# Patient Record
Sex: FEMALE | Race: WHITE | NOT HISPANIC OR LATINO | Employment: OTHER | URBAN - NONMETROPOLITAN AREA
[De-identification: names, ages, dates, MRNs, and addresses within clinical notes are randomized per-mention and may not be internally consistent; named-entity substitution may affect disease eponyms.]

---

## 2022-09-08 ENCOUNTER — OFFICE VISIT (OUTPATIENT)
Dept: PSYCHIATRY | Facility: CLINIC | Age: 57
End: 2022-09-08

## 2022-09-08 VITALS
HEIGHT: 63 IN | BODY MASS INDEX: 28.53 KG/M2 | HEART RATE: 95 BPM | WEIGHT: 161 LBS | DIASTOLIC BLOOD PRESSURE: 70 MMHG | SYSTOLIC BLOOD PRESSURE: 102 MMHG

## 2022-09-08 DIAGNOSIS — F43.10 POST TRAUMATIC STRESS DISORDER (PTSD): ICD-10-CM

## 2022-09-08 DIAGNOSIS — F33.1 MODERATE EPISODE OF RECURRENT MAJOR DEPRESSIVE DISORDER: Primary | ICD-10-CM

## 2022-09-08 PROCEDURE — 90792 PSYCH DIAG EVAL W/MED SRVCS: CPT | Performed by: NURSE PRACTITIONER

## 2022-09-08 RX ORDER — CEPHALEXIN 250 MG/1
250 CAPSULE ORAL DAILY
COMMUNITY
Start: 2022-07-26 | End: 2023-02-02

## 2022-09-08 RX ORDER — FLUOXETINE 10 MG/1
10 CAPSULE ORAL DAILY
COMMUNITY
Start: 2022-07-14 | End: 2022-09-08 | Stop reason: SDUPTHER

## 2022-09-08 RX ORDER — OMEPRAZOLE 20 MG/1
20 CAPSULE, DELAYED RELEASE ORAL DAILY
COMMUNITY
Start: 2022-07-26 | End: 2023-02-15 | Stop reason: SDUPTHER

## 2022-09-08 RX ORDER — IBANDRONATE SODIUM 150 MG/1
TABLET, FILM COATED ORAL
COMMUNITY
Start: 2022-08-18

## 2022-09-08 RX ORDER — ESTRADIOL 0.1 MG/G
CREAM VAGINAL
COMMUNITY
Start: 2022-07-07 | End: 2023-01-02 | Stop reason: SDUPTHER

## 2022-09-08 RX ORDER — PHENTERMINE HYDROCHLORIDE 37.5 MG/1
37.5 TABLET ORAL DAILY
COMMUNITY
Start: 2022-08-18 | End: 2022-11-14

## 2022-09-08 RX ORDER — FLUOXETINE 10 MG/1
10 CAPSULE ORAL DAILY
Qty: 30 CAPSULE | Refills: 2 | Status: SHIPPED | OUTPATIENT
Start: 2022-09-08 | End: 2022-11-03 | Stop reason: SDUPTHER
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
## 2022-10-24 ENCOUNTER — OFFICE VISIT (OUTPATIENT)
Dept: PSYCHIATRY | Facility: CLINIC | Age: 57
End: 2022-10-24

## 2022-10-24 DIAGNOSIS — F33.1 MODERATE EPISODE OF RECURRENT MAJOR DEPRESSIVE DISORDER: Primary | ICD-10-CM

## 2022-10-24 DIAGNOSIS — F43.10 POST TRAUMATIC STRESS DISORDER (PTSD): ICD-10-CM

## 2022-10-24 PROCEDURE — 90837 PSYTX W PT 60 MINUTES: CPT | Performed by: SOCIAL WORKER
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
## 2022-10-31 ENCOUNTER — OFFICE VISIT (OUTPATIENT)
Dept: PSYCHIATRY | Facility: CLINIC | Age: 57
End: 2022-10-31

## 2022-10-31 DIAGNOSIS — F43.10 POST TRAUMATIC STRESS DISORDER (PTSD): ICD-10-CM

## 2022-10-31 DIAGNOSIS — F33.1 MODERATE EPISODE OF RECURRENT MAJOR DEPRESSIVE DISORDER: Primary | ICD-10-CM

## 2022-10-31 PROCEDURE — 90837 PSYTX W PT 60 MINUTES: CPT | Performed by: SOCIAL WORKER
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
## 2022-11-03 ENCOUNTER — OFFICE VISIT (OUTPATIENT)
Dept: PSYCHIATRY | Facility: CLINIC | Age: 57
End: 2022-11-03

## 2022-11-03 VITALS
WEIGHT: 158 LBS | BODY MASS INDEX: 28 KG/M2 | DIASTOLIC BLOOD PRESSURE: 64 MMHG | HEART RATE: 88 BPM | SYSTOLIC BLOOD PRESSURE: 116 MMHG | HEIGHT: 63 IN

## 2022-11-03 DIAGNOSIS — F41.1 GENERALIZED ANXIETY DISORDER: ICD-10-CM

## 2022-11-03 DIAGNOSIS — F43.10 POST TRAUMATIC STRESS DISORDER (PTSD): ICD-10-CM

## 2022-11-03 DIAGNOSIS — F33.1 MODERATE EPISODE OF RECURRENT MAJOR DEPRESSIVE DISORDER: Primary | ICD-10-CM

## 2022-11-03 PROCEDURE — 99213 OFFICE O/P EST LOW 20 MIN: CPT | Performed by: NURSE PRACTITIONER

## 2022-11-03 RX ORDER — FLUOXETINE 10 MG/1
10 CAPSULE ORAL DAILY
Qty: 90 CAPSULE | Refills: 0 | Status: SHIPPED | OUTPATIENT
Start: 2022-11-03 | End: 2023-02-02 | Stop reason: SDUPTHER

## 2022-11-03 RX ORDER — PHENTERMINE AND TOPIRAMATE 11.25; 69 MG/1; MG/1
CAPSULE, EXTENDED RELEASE ORAL
COMMUNITY
Start: 2022-11-03 | End: 2022-11-03

## 2022-11-03 NOTE — PROGRESS NOTES
Subjective   Zo Palma is a 57 y.o. female who presents today for follow up    Chief Complaint:  Depression     History of Present Illness:   History of Present Illness  Zo Palma presents today for medication management follow-up.  Reports that she is doing well overall with current medication regimen.  Feels that symptoms associated with both anxiety and depression are adequately controlled with medication at current dose.  Reports that symptoms associated with depression have improved, rates depression 3-4/10 with 10 being the worst.  Denies any symptoms associated with anxiety.  Reports that SI has continued to be present on a daily basis and has been for multiple years now, but says she is able to control these ideations.  She continues to travel with her support animal as agreed upon by past psychiatrist to help with controlling SI.  She feels that this agreement has continued to help with SI.  Says that she was able to get a roommate, this has provided some financial relief. Denies any current plans plans or having access to means for completion of suicide.  Denies any AV hallucinations PHQ-9 total score: 1, GURMEET- 7 total score: 2.icide attempt about 2 years ago.     Previous Psych Meds: Reports having tried multiple SSRIs and SNRIs along with Abilify, risperidone, Seroquel, Vraylar, Latuda, Lamictal, Trileptal and Depakote (caused liver failure).     The following portions of the patient's history were reviewed and updated as appropriate: allergies, current medications, past family history, past medical history, past social history, past surgical history and problem list.      Past Medical History:  Past Medical History:   Diagnosis Date   • Anxiety    • Depression    • Insomnia    • Migraines    • Peripheral neuropathy        Social History:  Social History     Socioeconomic History   • Marital status:    Tobacco Use   • Smoking status: Never   Vaping Use   • Vaping Use: Never used   Substance  "and Sexual Activity   • Alcohol use: Yes     Alcohol/week: 1.0 standard drink     Types: 1 Shots of liquor per week   • Drug use: Never   • Sexual activity: Defer       Family History:  History reviewed. No pertinent family history.    Past Surgical History:  Past Surgical History:   Procedure Laterality Date   • BREAST SURGERY     • GASTRIC SLEEVE LAPAROSCOPIC N/A    • HYSTERECTOMY     • WISDOM TOOTH EXTRACTION N/A        Problem List:  There is no problem list on file for this patient.      Allergy:   Allergies   Allergen Reactions   • Chlorzoxazone Unknown - High Severity and Swelling     Lorzone, muscle relaxant.   • Iodine Anaphylaxis     Topical makes her swell  Anaphylaxis with IV contrast (when she was ~ 9yrs old)   • Phenazopyridine Hcl Swelling and Anaphylaxis   • Pyridium [Phenazopyridine] Anaphylaxis   • Quinine Unknown - High Severity     Has malaria symptoms   • Valproic Acid Other (See Comments)     Liver failure  Liver failure  Liver failure; lupus like symptoms and liver failure   • Methocarbamol Other (See Comments)     Made her feel \"suicidal\" and gave her less control over her actions.   • Iodinated Diagnostic Agents Unknown - Low Severity   • Acetaminophen-Codeine Itching   • Diphenhydramine Anxiety     Pt has severe panic attack symptoms when given benadryl IV. Needs to take a benzodiazapine med concurrently when getting benadryl.         Current Medications:   Current Outpatient Medications   Medication Sig Dispense Refill   • cephalexin (KEFLEX) 250 MG capsule Take 250 mg by mouth Daily.     • estradiol (ESTRACE) 0.1 MG/GM vaginal cream      • FLUoxetine (PROzac) 10 MG capsule Take 1 capsule by mouth Daily for 90 days. 90 capsule 0   • ibandronate (BONIVA) 150 MG tablet TAKE 1 TABLET BY MOUTH 1 TIME MONTHLY     • omeprazole (priLOSEC) 20 MG capsule Take 20 mg by mouth Daily.     • phentermine (ADIPEX-P) 37.5 MG tablet Take 37.5 mg by mouth Daily.       No current facility-administered " "medications for this visit.       Review of Symptoms:    Review of Systems   Constitutional: Negative for activity change, appetite change, fatigue, unexpected weight gain and unexpected weight loss.   Eyes: Negative for visual disturbance.   Respiratory: Negative for shortness of breath.    Cardiovascular: Negative for chest pain.   Psychiatric/Behavioral: Positive for depressed mood and stress. Negative for sleep disturbance and suicidal ideas. The patient is nervous/anxious.      Physical Exam:   Physical Exam  Vitals reviewed.   Constitutional:       General: She is not in acute distress.     Appearance: Normal appearance.   Neurological:      Mental Status: She is alert.      Gait: Gait normal.     Vitals:   Blood pressure 116/64, pulse 88, height 160 cm (63\"), weight 71.7 kg (158 lb).    Mental Status Exam:   Hygiene:   good  Cooperation:  Cooperative  Eye Contact:  Good  Psychomotor Behavior:  Appropriate  Affect:  Full range  Mood: normal  Hopelessness: Denies  Speech:  Normal  Thought Process:  Goal directed and Linear  Thought Content:  Mood congruent  Suicidal:  None  Homicidal:  None  Hallucinations:  None  Delusion:  None  Memory:  Intact  Orientation:  Person, Place, Time and Situation  Reliability:  good  Insight:  Good  Judgement:  Good  Impulse Control:  Good    Lab Results:   No results found for any previous visit.       EKG Results:  No orders to display       Assessment & Plan   Problems Addressed this Visit    None  Visit Diagnoses     Moderate episode of recurrent major depressive disorder (HCC)    -  Primary    Relevant Medications    FLUoxetine (PROzac) 10 MG capsule    Post traumatic stress disorder (PTSD)        Relevant Medications    FLUoxetine (PROzac) 10 MG capsule    Generalized anxiety disorder        Relevant Medications    FLUoxetine (PROzac) 10 MG capsule      Diagnoses       Codes Comments    Moderate episode of recurrent major depressive disorder (HCC)    -  Primary ICD-10-CM: " F33.1  ICD-9-CM: 296.32     Post traumatic stress disorder (PTSD)     ICD-10-CM: F43.10  ICD-9-CM: 309.81     Generalized anxiety disorder     ICD-10-CM: F41.1  ICD-9-CM: 300.02           Visit Diagnoses:    ICD-10-CM ICD-9-CM   1. Moderate episode of recurrent major depressive disorder (HCC)  F33.1 296.32   2. Post traumatic stress disorder (PTSD)  F43.10 309.81   3. Generalized anxiety disorder  F41.1 300.02     -Reviewed previous available documentation and most recent available labs. YOSHI reviewed and is appropriate.    Discussed different coping mechanisms to better control depression.  Encouraged her to continue with counseling as scheduled.    -The benefits of a healthy diet and exercise were discussed with patient, especially the positive effects they have on mental health. Patient encouraged to consider lifestyle modification regarding diet and exercise patterns to maximize results of mental health treatment.     Discussed medication regimen and plan of care.  Ms. Palma verbalizes that she is doing well on current medication regimen and feels symptoms are well controlled.  Denies any adverse effects of medication.  Will continue Prozac as previously described.   -Continue Prozac 10 mg daily for depression and anxiety.    GOALS:  Short Term Goals: Patient will be compliant with medication, and patient will have no significant medication related side effects.  Patient will be engaged in psychotherapy as indicated.  Patient will report subjective improvement of symptoms.  Long term goals: To stabilize mood and treat/improve subjective symptoms, the patient will stay out of the hospital, the patient will be at an optimal level of functioning, and the patient will take all medications as prescribed.  The patient/guardian verbalized understanding and agreement with goals that were mutually set.    TREATMENT PLAN: Continue supportive psychotherapy efforts and medications as indicated for patient's diagnosis.   Pharmacological and Non-Pharmacological treatment options discussed during today's visit. Patient/Guardian acknowledged and verbally consented with current treatment plan and was educated on the importance of compliance with treatment and follow-up appointments.      MEDICATION ISSUES:  Discussed medication options and treatment plan of prescribed medication as well as the risks, benefits, any black box warnings, and side effects including potential falls, possible impaired driving, and metabolic adversities among others. Patient is agreeable to call the office with any worsening of symptoms or onset of side effects, or if any concerns or questions arise.  The contact information for the office is made available to the patient. Patient is agreeable to call 911 or go to the nearest ER should they begin having any SI/HI, or if any urgent concerns arise. No medication side effects or related complaints today.     MEDS ORDERED DURING VISIT:  New Medications Ordered This Visit   Medications   • FLUoxetine (PROzac) 10 MG capsule     Sig: Take 1 capsule by mouth Daily for 90 days.     Dispense:  90 capsule     Refill:  0       FOLLOW UP:  Return in about 3 months (around 2/3/2023) for Recheck.             This document has been electronically signed by TRACI Khan  November 8, 2022 16:48 EST    Please note that portions of this note were completed with a voice recognition program. Efforts were made to edit dictation, but occasionally words are mistranscribed.

## 2022-11-14 ENCOUNTER — OFFICE VISIT (OUTPATIENT)
Dept: FAMILY MEDICINE CLINIC | Facility: CLINIC | Age: 57
End: 2022-11-14

## 2022-11-14 VITALS
HEIGHT: 63 IN | SYSTOLIC BLOOD PRESSURE: 112 MMHG | HEART RATE: 67 BPM | OXYGEN SATURATION: 99 % | DIASTOLIC BLOOD PRESSURE: 68 MMHG | BODY MASS INDEX: 28.17 KG/M2 | RESPIRATION RATE: 16 BRPM | WEIGHT: 159 LBS | TEMPERATURE: 98 F

## 2022-11-14 DIAGNOSIS — E55.9 VITAMIN D DEFICIENCY: ICD-10-CM

## 2022-11-14 DIAGNOSIS — E11.9 TYPE 2 DIABETES MELLITUS WITHOUT COMPLICATION, WITHOUT LONG-TERM CURRENT USE OF INSULIN: Chronic | ICD-10-CM

## 2022-11-14 DIAGNOSIS — N95.9 POSTMENOPAUSAL SYMPTOMS: ICD-10-CM

## 2022-11-14 DIAGNOSIS — E88.81 METABOLIC SYNDROME: Primary | ICD-10-CM

## 2022-11-14 DIAGNOSIS — M81.0 POSTMENOPAUSAL OSTEOPOROSIS: ICD-10-CM

## 2022-11-14 DIAGNOSIS — K21.9 GASTROESOPHAGEAL REFLUX DISEASE WITHOUT ESOPHAGITIS: ICD-10-CM

## 2022-11-14 DIAGNOSIS — E78.2 MIXED HYPERLIPIDEMIA: ICD-10-CM

## 2022-11-14 DIAGNOSIS — E27.40 ADRENAL INSUFFICIENCY: Chronic | ICD-10-CM

## 2022-11-14 PROBLEM — F33.1 MODERATE EPISODE OF RECURRENT MAJOR DEPRESSIVE DISORDER: Status: ACTIVE | Noted: 2019-06-25

## 2022-11-14 PROBLEM — J34.89 SINUS DRAINAGE: Status: ACTIVE | Noted: 2021-02-19

## 2022-11-14 PROBLEM — M85.862 OSTEOPENIA OF LEFT LOWER LEG: Status: ACTIVE | Noted: 2018-02-06

## 2022-11-14 PROBLEM — M19.09: Status: ACTIVE | Noted: 2018-08-27

## 2022-11-14 PROBLEM — G47.10 HYPERSOMNIA: Status: ACTIVE | Noted: 2018-07-03

## 2022-11-14 PROBLEM — R25.2 CRAMP OF LIMB: Status: ACTIVE | Noted: 2022-11-14

## 2022-11-14 PROCEDURE — 99204 OFFICE O/P NEW MOD 45 MIN: CPT | Performed by: FAMILY MEDICINE

## 2022-11-14 RX ORDER — PHENTERMINE AND TOPIRAMATE 15; 92 MG/1; MG/1
CAPSULE, EXTENDED RELEASE ORAL
COMMUNITY

## 2022-11-14 NOTE — PROGRESS NOTES
New Patient History and Physical      Referring Physician: No ref. provider found    Chief Complaint:    Chief Complaint   Patient presents with   • Establish Care       History of Present Illness:   Patient is a 57-year-old female who is here today to establish with a new primary care physician.    She has numerous chronic comorbid conditions, including metabolic syndrome, adrenal insufficiency, diabetes mellitus, dyslipidemia, GERD, vitamin D deficiency and postmenopausal osteoporosis.    She denies any cyclical/persistent hypoglycemic episodes.  She denies fever, chills or night sweats.  Denies any chest pain, syncope, palpitations or vertigo.    Deals with frequent electrolyte disturbances as result of her adrenal insufficiency syndrome.  She would like to be referred to endocrinology.    She denies any aspiration/dysphagia.  Denies any BRB/BTS.  Continues an active lifestyle, formal exercise program as best able.  She denies any recent falls or injuries.  Maintains good urine output without hematuria reported.  She reports balanced dietary intake.    Subjective     Review of Systems     1. Constitutional: Negative for fever. Negative for chills, diaphoresis, fatigue and unexpected weight change.   2. HENT: No dysphagia; no changes to vision/hearing/smell/taste; no epistaxis  3. Eyes: Negative for redness and visual disturbance.   4. Respiratory: negative for shortness of breath. Negative for chest pain . Negative for cough and chest tightness.   5. Cardiovascular: Negative for chest pain and palpitations.   6. Gastrointestinal: Negative for abdominal distention, abdominal pain and blood in stool.   7. Endocrine: Negative for cold intolerance and heat intolerance.   8. Genitourinary: Negative for difficulty urinating, dysuria and frequency.   9. Musculoskeletal: Negative for arthralgias, back pain and myalgias.   10. Skin: Negative for color change, rash and wound.   11. Neurological: Negative for  syncope, weakness and headaches.   12. Hematological: Negative for adenopathy. Does not bruise/bleed easily.   13. Psychiatric/Behavioral: Negative for confusion. The patient is not nervous/anxious.     The following portions of the patient's history were reviewed and updated as appropriate: allergies, current medications, past family history, past medical history, past social history, past surgical history and problem list.    Past Medical History:   Past Medical History:   Diagnosis Date   • Anxiety    • Depression    • Insomnia    • Migraines    • Obesity    • Peripheral neuropathy    • Seizures (HCC) 2001    Conversion Disorder       Past Surgical History:  Past Surgical History:   Procedure Laterality Date   • BARIATRIC SURGERY  2008    Gastric sleeve   • BREAST SURGERY     • CHOLECYSTECTOMY  2010   • COLONOSCOPY     • FRACTURE SURGERY      Left wrist   • GASTRIC SLEEVE LAPAROSCOPIC N/A    • HYSTERECTOMY     • SINUS SURGERY  2021   • WISDOM TOOTH EXTRACTION N/A        Family History: family history includes Cancer in her brother, father, and mother; Learning disabilities in her brother and father; Other in her brother, father, and paternal grandfather.    Social History:  reports that she has never smoked. She has never been exposed to tobacco smoke. She does not have any smokeless tobacco history on file. She reports current alcohol use of about 1.0 standard drink per week. She reports that she does not use drugs.    Medications:    Current Outpatient Medications:   •  cephalexin (KEFLEX) 250 MG capsule, Take 250 mg by mouth Daily., Disp: , Rfl:   •  estradiol (ESTRACE) 0.1 MG/GM vaginal cream, , Disp: , Rfl:   •  FLUoxetine (PROzac) 10 MG capsule, Take 1 capsule by mouth Daily for 90 days., Disp: 90 capsule, Rfl: 0  •  ibandronate (BONIVA) 150 MG tablet, TAKE 1 TABLET BY MOUTH 1 TIME MONTHLY, Disp: , Rfl:   •  omeprazole (priLOSEC) 20 MG capsule, Take 20 mg by mouth Daily., Disp: , Rfl:   •   "Phentermine-Topiramate (Qsymia) 15-92 MG capsule sustained-release 24 hr, Take  by mouth., Disp: , Rfl:     Allergies:  Allergies   Allergen Reactions   • Chlorzoxazone Unknown - High Severity and Swelling     Lorzone, muscle relaxant.   • Iodine Anaphylaxis     Topical makes her swell  Anaphylaxis with IV contrast (when she was ~ 9yrs old)   • Phenazopyridine Hcl Swelling and Anaphylaxis   • Pyridium [Phenazopyridine] Anaphylaxis   • Quinine Unknown - High Severity     Has malaria symptoms   • Valproic Acid Other (See Comments)     Liver failure  Liver failure  Liver failure; lupus like symptoms and liver failure   • Methocarbamol Other (See Comments)     Made her feel \"suicidal\" and gave her less control over her actions.   • Iodinated Diagnostic Agents Unknown - Low Severity   • Acetaminophen-Codeine Itching   • Diphenhydramine Anxiety     Pt has severe panic attack symptoms when given benadryl IV. Needs to take a benzodiazapine med concurrently when getting benadryl.        Objective     Physical Exam:  Vital Signs: /68   Pulse 67   Temp 98 °F (36.7 °C)   Resp 16   Ht 160 cm (63\")   Wt 72.1 kg (159 lb)   SpO2 99%   BMI 28.17 kg/m²    BMI is >= 25 and <30. (Overweight) The following options were offered after discussion;: exercise counseling/recommendations and nutrition counseling/recommendations    General Appearance: alert, oriented x 3, no acute distress.  Pleasant and interactive during questioning/examination.  Well-nourished and developed female.  Skin: warm and dry.  Without jaundice.  She has a generalized dark complexion.  HEENT: Atraumatic.  pupils round and reactive to light and accommodation, oral mucosa pink and moist.  Nares patent without epistaxis.  External auditory canals are patent tympanic membranes intact.  Neck: supple, no JVD, trachea midline.  No thyromegaly; post-surgical scarring noted to anterior neck.  Lungs: CTA, unlabored breathing effort.  Heart: RRR, normal S1 and S2, " no S3, no rub.  Abdomen: soft, non-tender, no palpable bladder, present bowel sounds to auscultation ×4.  No guarding or rigidity.  Postsurgical scarring noted.  Extremities: no clubbing, cyanosis or edema.  Good range of motion actively and passively.  Symmetric muscle strength and development  Neuro: normal speech and mental status.  Cranial nerves II through XII intact.  No anosmia. DTR 2+; proprioception intact.  No focal motor/sensory deficits.    Advance Care Planning   ACP discussion was held with the patient during this visit. Patient does not have an advance directive, information provided.       Assessment / Plan     Assessment/Plan:   Diagnoses and all orders for this visit:    1. Metabolic syndrome (Primary)  -     Ambulatory Referral to Endocrinology  -     Cortisol  -     Hemoglobin A1c    2. Vitamin D deficiency    3. Gastroesophageal reflux disease without esophagitis    4. Postmenopausal symptoms    5. Postmenopausal osteoporosis    6. Adrenal insufficiency (HCC)  -     Ambulatory Referral to Endocrinology  -     Cortisol    7. Type 2 diabetes mellitus without complication, without long-term current use of insulin (HCC)  -     Ambulatory Referral to Endocrinology  -     Hemoglobin A1c    8. Mixed hyperlipidemia  -     Lipid Panel      Referral has been placed to establish with endocrinology.  Her hemoglobin A1c demonstrates excellent blood glucose control, although is slightly above a traditional normal range.  I have stressed the importance of healthy dietary choices, as well as the need to avoid any prolonged fasting periods.  Maintain appropriate hydration status.    Surveillance labs today including hemoglobin A1c, lipid panel and cortisol level.    Vital signs demonstrate hemodynamic stability, blood pressure is at goal.    Continue bisphosphonate therapy and treatment of her postmenopausal osteoporosis.  Continue weightbearing activities, including formal exercise program.    Continue current  mood stabilizer treatment regimen.  Discussed need for stress/anxiety reducing techniques such as prayer/meditation/breathing and counting exercises and avoidance of stress producing environments/situations; will follow clinically.    Continue PPI therapy, as well as dietary/lifestyle modifications to aid in symptom management of her chronic GERD.      Discussion Summary:    I spent 45 minutes caring for Zo on this date of service. This time includes time spent by me in the following activities:preparing for the visit, reviewing tests, performing a medically appropriate examination and/or evaluation , counseling and educating the patient/family/caregiver, ordering medications, tests, or procedures, referring and communicating with other health care professionals , documenting information in the medical record and care coordination    Discussed plan of care in detail with pt today; pt verb understanding and agrees.  Follow up:  No follow-ups on file.     There are no Patient Instructions on file for this visit.    Evan Rivas DO  11/16/22  16:08 EST

## 2022-11-15 LAB
CHOLEST SERPL-MCNC: 218 MG/DL (ref 0–200)
CORTIS SERPL-MCNC: 7.5 UG/DL
HBA1C MFR BLD: 5.7 % (ref 4.8–5.6)
HDLC SERPL-MCNC: 69 MG/DL (ref 40–60)
LDLC SERPL CALC-MCNC: 124 MG/DL (ref 0–100)
TRIGL SERPL-MCNC: 144 MG/DL (ref 0–150)
VLDLC SERPL CALC-MCNC: 25 MG/DL (ref 5–40)

## 2022-11-29 ENCOUNTER — OFFICE VISIT (OUTPATIENT)
Dept: PSYCHIATRY | Facility: CLINIC | Age: 57
End: 2022-11-29

## 2022-11-29 DIAGNOSIS — F33.1 MODERATE EPISODE OF RECURRENT MAJOR DEPRESSIVE DISORDER: Primary | ICD-10-CM

## 2022-11-29 DIAGNOSIS — F43.10 POST TRAUMATIC STRESS DISORDER (PTSD): ICD-10-CM

## 2022-11-29 DIAGNOSIS — F41.1 GENERALIZED ANXIETY DISORDER: ICD-10-CM

## 2022-11-29 PROCEDURE — 90834 PSYTX W PT 45 MINUTES: CPT | Performed by: SOCIAL WORKER

## 2022-11-29 NOTE — PROGRESS NOTES
"Date: 11/29/2022   Time In: 1435  Time Out: 1516    PROGRESS NOTE  Data:  Zo Palma is a 57 y.o. female who presents today for individual therapy session at [facility].     ICD-10-CM ICD-9-CM   1. Moderate episode of recurrent major depressive disorder (HCC)  F33.1 296.32   2. Post traumatic stress disorder (PTSD)  F43.10 309.81   3. Generalized anxiety disorder  F41.1 300.02     Patient reports experiencing seasonal affective disorder. She discussed being triggered by overcast days that lead to negative thoughts and suicidal ideations. She reports having suicidal ideations two weeks ago and said she was making plans to complete suicide. She discussed having thoughts of \"No end in sight, things aren't going to get better\". She tells me she was able to call a friend to help with this. She reports believing that suicide is an option. She discussed a family trauma and contemplation about visiting a family member. She discussed her coping skills for these overcast days will be Using a SAD light, diffuser oils, and her online blog. She reports she will be taking a trip for a few weeks and can practice these coping skills while she is gone. Patient denies suicidal ideations today.       Clinical Maneuvering/Intervention:    (Scales based on 0 - 10 with 10 being the worst)  Depression:   na Anxiety:  na       Assisted patient in processing above session content; acknowledged and normalized patient’s thoughts, feelings, and concerns.  Assisted patient in processing her thoughts and feelings regarding the family trauma. Reviewed negative thoughts and challenged irrational thoughts about weather. Reviewed boundaries and discussed how they are important.     Allowed patient to freely discuss issues without interruption or judgment. Provided safe, confidential environment to facilitate the development of positive therapeutic relationship and encourage open, honest communication. Assisted patient in " identifying risk factors which would indicate the need for higher level of care including thoughts to harm self or others and/or self-harming behavior and encouraged patient to contact this office, call 911, or present to the nearest emergency room should any of these events occur. Discussed crisis intervention services and means to access. Patient adamantly and convincingly denies current suicidal or homicidal ideation or perceptual disturbance.    Assessment   Patient appears to maintain relative stability as compared to their baseline.  However, patient continues to struggle with mood and anxiety which continues to cause impairment in important areas of functioning.  As a result, they can be reasonably expected to continue to benefit from treatment and would likely be at increased risk for decompensation otherwise.    Mental Status Exam:   Hygiene:   good  Cooperation:  Cooperative  Eye Contact:  Good  Psychomotor Behavior:  Appropriate  Affect:  Appropriate  Mood: depressed and anxious  Speech:  Normal  Thought Process:  Goal directed  Thought Content:  Mood congruent  Suicidal:  None  Homicidal:  None  Hallucinations:  None  Delusion:  None  Memory:  Intact  Orientation:  Person, Place, Time and Situation  Reliability:  good  Insight:  Fair  Judgement:  Fair  Impulse Control:  Good  Physical/Medical Issues:  Yes seizures     Patient's Support Network Includes:  daughter and friends    Functional Status: Moderate impairment     Progress toward goal: Not at goal    Prognosis: Good with Ongoing Treatment          Plan     Patient will continue in individual outpatient therapy with focus on improved functioning and coping skills, maintaining stability, and avoiding decompensation and the need for higher level of care.    Patient will adhere to medication regimen as prescribed and report any side effects. Patient will contact this office, call 911 or present to the nearest emergency room should suicidal or homicidal  ideations occur. Provide Cognitive Behavioral Therapy and Solution Focused Therapy to improve functioning, maintain stability, and avoid decompensation and the need for higher level of care.     Return in about Return in about 4 weeks (around 12/27/2022). or earlier if symptoms worsen or fail to improve.            This document has been electronically signed by Mely Tate LCSW  November 29, 2022 14:36 EST      Part of this note may be an electronic transcription/translation of spoken language to printed text using the Dragon Dictation System.

## 2023-01-03 ENCOUNTER — TELEPHONE (OUTPATIENT)
Dept: FAMILY MEDICINE CLINIC | Facility: CLINIC | Age: 58
End: 2023-01-03
Payer: MEDICARE

## 2023-01-03 RX ORDER — ESTRADIOL 0.1 MG/G
2 CREAM VAGINAL DAILY
Qty: 60 G | Refills: 2 | Status: SHIPPED | OUTPATIENT
Start: 2023-01-03

## 2023-01-03 NOTE — TELEPHONE ENCOUNTER
MATTHEW SEAMAN/ALIVIA CALLED TO CONFIRM THE DIRECTIONS FOR THE ESTRADIOL CREAM. SHE SAYS THE NORMAL DOSAGE IS USUALLY 1 GRAM, 2-3 X PER WEEK. I SPOKE WITH DR JEFFRIES AND HE SAID IT'S OK TO BE FILLED PER THE \"NORMAL\" DOSAGE.

## 2023-01-25 ENCOUNTER — OFFICE VISIT (OUTPATIENT)
Dept: PSYCHIATRY | Facility: CLINIC | Age: 58
End: 2023-01-25
Payer: MEDICARE

## 2023-01-25 DIAGNOSIS — F41.1 GENERALIZED ANXIETY DISORDER: ICD-10-CM

## 2023-01-25 DIAGNOSIS — F33.1 MODERATE EPISODE OF RECURRENT MAJOR DEPRESSIVE DISORDER: Primary | ICD-10-CM

## 2023-01-25 DIAGNOSIS — F43.10 PTSD (POST-TRAUMATIC STRESS DISORDER): ICD-10-CM

## 2023-01-25 PROCEDURE — 90834 PSYTX W PT 45 MINUTES: CPT | Performed by: SOCIAL WORKER

## 2023-01-25 NOTE — PROGRESS NOTES
"Date: 01/25/2023   Time In: 1042  Time Out: 1123    PROGRESS NOTE  Data:  Zo Palma is a 57 y.o. female who presents today for individual therapy session at [facility].     ICD-10-CM ICD-9-CM   1. Moderate episode of recurrent major depressive disorder (HCC)  F33.1 296.32   2. PTSD (post-traumatic stress disorder)  F43.10 309.81   3. Generalized anxiety disorder  F41.1 300.02     Patient reports feeling \"down in the dumps\". She reports feeling lazy at times if she is not doing anything productive. She reports this could be from childhood.  She reports problems with sleep because of friends and work obligations. She tells me she is sleeping 2-6 hours per night and identified needing 13 hours per night to feel rested. She reports difficulty with showering and bathing and reports she will be able to do this tonight. She discussed a job opportunity that will help reduce stressors.       Clinical Maneuvering/Intervention:    Assisted patient in processing above session content; acknowledged and normalized patient’s thoughts, feelings, and concerns. Discussed triggers associated with patient's depression.  Also discussed coping skills for patient to implement such as spending time outdoors when possible, completing activities of daily living and using social supports.    Allowed patient to freely discuss issues without interruption or judgment. Provided safe, confidential environment to facilitate the development of positive therapeutic relationship and encourage open, honest communication. Assisted patient in identifying risk factors which would indicate the need for higher level of care including thoughts to harm self or others and/or self-harming behavior and encouraged patient to contact this office, call 911, or present to the nearest emergency room should any of these events occur. Discussed crisis intervention services and means to access. Patient adamantly and convincingly denies current " suicidal or homicidal ideation or perceptual disturbance.    Assessment   Patient appears to maintain relative stability as compared to their baseline.  However, patient continues to struggle with depression which continues to cause impairment in important areas of functioning.  As a result, they can be reasonably expected to continue to benefit from treatment and would likely be at increased risk for decompensation otherwise.    Mental Status Exam:   Hygiene:   fair  Cooperation:  Cooperative  Eye Contact:  Good  Psychomotor Behavior:  Appropriate  Affect:  Appropriate  Mood: depressed  Speech:  Normal  Thought Process:  Goal directed  Thought Content:  Mood congruent  Suicidal:  None  Homicidal:  None  Hallucinations:  None  Delusion:  None  Memory:  Intact  Orientation:  Person, Place, Time and Situation  Reliability:  good  Insight:  Fair  Judgement:  Fair  Impulse Control:  Good  Physical/Medical Issues:  Yes seizures     Patient's Support Network Includes:  daughter and friends    Functional Status: Moderate impairment     Progress toward goal: Not at goal    Prognosis: Good with Ongoing Treatment          Plan     Patient will continue in individual outpatient therapy with focus on improved functioning and coping skills, maintaining stability, and avoiding decompensation and the need for higher level of care.    Patient will adhere to medication regimen as prescribed and report any side effects. Patient will contact this office, call 911 or present to the nearest emergency room should suicidal or homicidal ideations occur. Provide Cognitive Behavioral Therapy and Solution Focused Therapy to improve functioning, maintain stability, and avoid decompensation and the need for higher level of care.     Return in about Return in about 2 weeks (around 2/8/2023). or earlier if symptoms worsen or fail to improve.            This document has been electronically signed by Mely Tate LCSW  January 25, 2023 10:41  EST      Part of this note may be an electronic transcription/translation of spoken language to printed text using the Dragon Dictation System.

## 2023-02-02 ENCOUNTER — OFFICE VISIT (OUTPATIENT)
Dept: PSYCHIATRY | Facility: CLINIC | Age: 58
End: 2023-02-02
Payer: MEDICARE

## 2023-02-02 VITALS
SYSTOLIC BLOOD PRESSURE: 128 MMHG | HEART RATE: 76 BPM | WEIGHT: 167 LBS | BODY MASS INDEX: 29.59 KG/M2 | DIASTOLIC BLOOD PRESSURE: 84 MMHG | HEIGHT: 63 IN

## 2023-02-02 DIAGNOSIS — F33.1 MODERATE EPISODE OF RECURRENT MAJOR DEPRESSIVE DISORDER: ICD-10-CM

## 2023-02-02 DIAGNOSIS — F43.10 PTSD (POST-TRAUMATIC STRESS DISORDER): ICD-10-CM

## 2023-02-02 DIAGNOSIS — F41.1 GENERALIZED ANXIETY DISORDER: Primary | ICD-10-CM

## 2023-02-02 PROCEDURE — 99214 OFFICE O/P EST MOD 30 MIN: CPT | Performed by: NURSE PRACTITIONER

## 2023-02-02 RX ORDER — FLUOXETINE 10 MG/1
20 CAPSULE ORAL DAILY
Qty: 180 CAPSULE | Refills: 0 | Status: SHIPPED | OUTPATIENT
Start: 2023-02-02

## 2023-02-02 NOTE — PROGRESS NOTES
Subjective   Zo Palma is a 58 y.o. female who presents today for follow up    Chief Complaint:  Depression and anxiety    History of Present Illness:   History of Present Illness  Zo Palma presents today for medication management follow-up.  She is accompanied to the visit today by her NOE dog. Reports that she has been doing well overall.  She does say that a friend from [place] recently moved in with her and she plans to be his caregiver.  Says that he has history of DID, with a total of 34 personalities.  She says that one of his personalities is anorexic, so he has lost a significant amount of weight and is down to 134 pounds.  Says that she feels confident that caring for him will not result in deterioration of her mental health or wellbeing.  Reports that her mood has been well stabilized.  Continues to take Prozac, but does admit to taking an extra capsule on occasion when feeling overwhelmed, anxious or depressed.  Admits that she only does this for short time periods as she would run out of medication too soon.  Says that [place] opens next week and feels that her mood will improve tremendously as she always notices a significant improvement in mood when she is around other people with like interests.  She does admit that her mood is often low/down at the end of [place] when people leave.  Says that she continues to travel with her dog as discussed in past with her previous psychiatrist and gave her word that she would always keep a support animal present.  Verbalizes that having a service animal with her helps with the frequent SI, denies any current plans plans or having access to means for completion of suicide.  Denies any history of auditory or visual hallucinations.  Reports that she is sleeping well.  Reports appetite is good, but admits to overeating and compulsive eating at times.  Currently on Qsymia as prescribed by PCP.  PHQ-9 total score: 5, GURMEET-7 total score: 3.    Previous Psych  Meds: Reports having tried multiple SSRIs and SNRIs along with Abilify, risperidone, Seroquel, Vraylar, Latuda, Lamictal, Trileptal and Depakote (caused liver failure).     The following portions of the patient's history were reviewed and updated as appropriate: allergies, current medications, past family history, past medical history, past social history, past surgical history and problem list.      Past Medical History:  Past Medical History:   Diagnosis Date   • Anxiety    • Depression    • Insomnia    • Migraines    • Obesity    • Peripheral neuropathy    • Seizures (HCC) 2001    Conversion Disorder       Social History:  Social History     Socioeconomic History   • Marital status:    Tobacco Use   • Smoking status: Never     Passive exposure: Never   • Smokeless tobacco: Never   Vaping Use   • Vaping Use: Never used   Substance and Sexual Activity   • Alcohol use: Yes     Alcohol/week: 1.0 standard drink     Types: 1 Drinks containing 0.5 oz of alcohol per week     Comment: Occasionally   • Drug use: Never   • Sexual activity: Defer     Partners: Male     Birth control/protection: Post-menopausal, Hysterectomy       Family History:  Family History   Problem Relation Age of Onset   • Cancer Mother         Brain, suspected uterine   • Cancer Father         Prostate   • Learning disabilities Father         Dyslexia   • Other Father         Early onset alzheimer   • Other Paternal Grandfather         Early onset alzheimer   • Cancer Brother         Prostate, bone   • Learning disabilities Brother         Dyslexia   • Other Brother         Alzheimer       Past Surgical History:  Past Surgical History:   Procedure Laterality Date   • BARIATRIC SURGERY  2008    Gastric sleeve   • BREAST SURGERY     • CHOLECYSTECTOMY  2010   • COLONOSCOPY     • FRACTURE SURGERY      Left wrist   • GASTRIC SLEEVE LAPAROSCOPIC N/A    • HYSTERECTOMY     • SINUS SURGERY  2021   • WISDOM TOOTH EXTRACTION N/A        Problem  "List:  Patient Active Problem List   Diagnosis   • Anxiety   • Bursitis of hip   • Cervical spondylosis with radiculopathy   • Chronic migraine without aura without status migrainosus, not intractable   • Conversion disorder with attacks or seizures, persistent, with psychological stressor   • Cramp of limb   • Prediabetes   • Diverticulosis of colon   • Elevated liver enzymes   • Eustachian tube dysfunction   • Foot drop   • Gastroesophageal reflux disease without esophagitis   • Hereditary and idiopathic peripheral neuropathy   • Hypersomnia   • Internal hemorrhoids   • Iron deficiency   • Lumbar radiculopathy, chronic   • Malaise and fatigue   • Metabolic syndrome   • Medicare annual wellness visit, subsequent   • Encounter for neuropsychological testing   • Mixed hyperlipidemia   • Moderate episode of recurrent major depressive disorder (HCC)   • Obstructive sleep apnea   • Osteoarthritis of right temporomandibular joint   • Osteopenia of left lower leg   • Panic disorder   • PTSD (post-traumatic stress disorder)   • RLS (restless legs syndrome)   • Primary insomnia   • Sinus drainage   • Tinnitus   • Urge incontinence   • Urinary urgency   • Vitamin D deficiency   • Postmenopausal symptoms   • Postmenopausal osteoporosis       Allergy:   Allergies   Allergen Reactions   • Chlorzoxazone Unknown - High Severity and Swelling     Lorzone, muscle relaxant.   • Iodine Anaphylaxis     Topical makes her swell  Anaphylaxis with IV contrast (when she was ~ 9yrs old)   • Phenazopyridine Hcl Swelling and Anaphylaxis   • Pyridium [Phenazopyridine] Anaphylaxis   • Quinine Unknown - High Severity     Has malaria symptoms   • Valproic Acid Other (See Comments)     Liver failure  Liver failure  Liver failure; lupus like symptoms and liver failure   • Methocarbamol Other (See Comments)     Made her feel \"suicidal\" and gave her less control over her actions.   • Iodinated Contrast Media Unknown - Low Severity   • " Acetaminophen-Codeine Itching   • Diphenhydramine Anxiety     Pt has severe panic attack symptoms when given benadryl IV. Needs to take a benzodiazapine med concurrently when getting benadryl.         Current Medications:   Current Outpatient Medications   Medication Sig Dispense Refill   • estradiol (ESTRACE) 0.1 MG/GM vaginal cream Insert 2 g into the vagina Daily. (Patient taking differently: Insert 2 g into the vagina 3 (Three) Times a Week.) 60 g 2   • FLUoxetine (PROzac) 10 MG capsule Take 2 capsules by mouth Daily. 180 capsule 0   • ibandronate (BONIVA) 150 MG tablet TAKE 1 TABLET BY MOUTH 1 TIME MONTHLY     • omeprazole (priLOSEC) 20 MG capsule Take 20 mg by mouth Daily.     • Phentermine-Topiramate (Qsymia) 15-92 MG capsule sustained-release 24 hr Take  by mouth.     • Liraglutide (Victoza) 18 MG/3ML solution pen-injector injection Inject 0.6 mg under the skin into the appropriate area as directed Daily. 3 mL 3   • NON FORMULARY Calcium 600mg-D3 120mcg Daily  D-Mannose 1000mg BID  Rhodiola Rosea 1000mg Daily  Taurine 1500mg 3 caps daily  Mag 500mg daily  Terra Cone Daily  Adrenal Support 2 caps daily  Chasteberry 300mg daily  D3 125 mcg daily  Dotorra Terrazyme 2 caps daily  Dotorra onguard daily  Pantethina 900mg daily  Dotorra Yarrow/pom 2 caps daily  Dotorra Sereneity daily  Doterra Alpha CRS 2 caps daily  Doterra Microplex 2 caps daily  Doterra EO Anthony 2 cap  Doterra MetaPWR 1 cap BID   Potassium 99mg daily       No current facility-administered medications for this visit.       Review of Symptoms:    Review of Systems   Constitutional: Negative for activity change, appetite change, fatigue, unexpected weight gain and unexpected weight loss.   Eyes: Negative for visual disturbance.   Respiratory: Negative for shortness of breath.    Cardiovascular: Negative for chest pain.   Psychiatric/Behavioral: Positive for depressed mood and stress. Negative for sleep disturbance and suicidal ideas. The patient is  "nervous/anxious.      Physical Exam:   Physical Exam  Vitals reviewed.   Constitutional:       General: She is not in acute distress.     Appearance: Normal appearance.   Neurological:      Mental Status: She is alert.      Gait: Gait normal.     Vitals:   Blood pressure 128/84, pulse 76, height 160 cm (63\"), weight 75.8 kg (167 lb).    Mental Status Exam:   Hygiene:   good  Cooperation:  Cooperative  Eye Contact:  Good  Psychomotor Behavior:  Appropriate  Affect:  Full range  Mood: normal  Hopelessness: Denies  Speech:  Normal  Thought Process:  Goal directed and Linear  Thought Content:  Mood congruent  Suicidal:  None  Homicidal:  None  Hallucinations:  None  Delusion:  None  Memory:  Intact  Orientation:  Person, Place, Time and Situation  Reliability:  good  Insight:  Good  Judgement:  Good  Impulse Control:  Good    Lab Results:   Office Visit on 11/14/2022   Component Date Value Ref Range Status   • Cortisol 11/14/2022 7.5  ug/dL Final    Comment:                         Cortisol AM         6.2 - 19.4                          Cortisol PM         2.3 - 11.9     • Hemoglobin A1C 11/14/2022 5.70 (H)  4.80 - 5.60 % Final    Comment: Hemoglobin A1C Ranges:  Increased Risk for Diabetes  5.7% to 6.4%  Diabetes                     >= 6.5%  Diabetic Goal                < 7.0%     • Total Cholesterol 11/14/2022 218 (H)  0 - 200 mg/dL Final    Comment: Cholesterol Reference Ranges  (U.S. Department of Health and Human Services ATP III  Classifications)  Desirable          <200 mg/dL  Borderline High    200-239 mg/dL  High Risk          >240 mg/dL  Triglyceride Reference Ranges  (U.S. Department of Health and Human Services ATP III  Classifications)  Normal           <150 mg/dL  Borderline High  150-199 mg/dL  High             200-499 mg/dL  Very High        >500 mg/dL  HDL Reference Ranges  (U.S. Department of Health and Human Services ATP III  Classifications)  Low     <40 mg/dl (major risk factor for CHD)  High    >60 " mg/dl ('negative' risk factor for CHD)  LDL Reference Ranges  (U.S. Department of Health and Human Services ATP III  Classifications)  Optimal          <100 mg/dL  Near Optimal     100-129 mg/dL  Borderline High  130-159 mg/dL  High             160-189 mg/dL  Very High        >189 mg/dL     • Triglycerides 11/14/2022 144  0 - 150 mg/dL Final   • HDL Cholesterol 11/14/2022 69 (H)  40 - 60 mg/dL Final   • VLDL Cholesterol Armaan 11/14/2022 25  5 - 40 mg/dL Final   • LDL Chol Calc (NIH) 11/14/2022 124 (H)  0 - 100 mg/dL Final       EKG Results:  No orders to display       Assessment & Plan   Problems Addressed this Visit        Mental Health    Moderate episode of recurrent major depressive disorder (HCC)    Relevant Medications    FLUoxetine (PROzac) 10 MG capsule    PTSD (post-traumatic stress disorder)    Relevant Medications    FLUoxetine (PROzac) 10 MG capsule   Other Visit Diagnoses     Generalized anxiety disorder    -  Primary    Relevant Medications    FLUoxetine (PROzac) 10 MG capsule      Diagnoses       Codes Comments    Generalized anxiety disorder    -  Primary ICD-10-CM: F41.1  ICD-9-CM: 300.02     Moderate episode of recurrent major depressive disorder (HCC)     ICD-10-CM: F33.1  ICD-9-CM: 296.32     PTSD (post-traumatic stress disorder)     ICD-10-CM: F43.10  ICD-9-CM: 309.81           Visit Diagnoses:    ICD-10-CM ICD-9-CM   1. Generalized anxiety disorder  F41.1 300.02   2. Moderate episode of recurrent major depressive disorder (HCC)  F33.1 296.32   3. PTSD (post-traumatic stress disorder)  F43.10 309.81     -Reviewed previous available documentation and most recent available labs. YOSHI reviewed and is appropriate.    Discussed different coping mechanisms to better control depression.  Encouraged her to continue with counseling as scheduled.    -The benefits of a healthy diet and exercise were discussed with patient, especially the positive effects they have on mental health. Patient encouraged to  consider lifestyle modification regarding diet and exercise patterns to maximize results of mental health treatment.     Zo reports that she is doing well with mood fluctuations, anxiety and depression overall.  She does admit to taking an extra capsule of Prozac on occasion for a total dose of 20 mg/day.  Says that she only does this for short periods of time as to not run out of medication.  Feels that this has been helpful in controlling increased episodes of depression or anxiety when they occur.  She has been taking 1 capsule (10 mg) consistently daily, but fears she will run out of medication if she continues to take an extra one during those periods of time.  Discussed increasing medication dose, she is agreeable to increase from 10 mg to 20 mg daily to help with better management of mood, anxiety and depression.  Denies any adverse effects of medication.    -Increase Prozac 10 mg to 20 mg daily for depression and anxiety.  Prefers to keep 10 mg capsules.    GOALS:  Short Term Goals: Patient will be compliant with medication, and patient will have no significant medication related side effects.  Patient will be engaged in psychotherapy as indicated.  Patient will report subjective improvement of symptoms.  Long term goals: To stabilize mood and treat/improve subjective symptoms, the patient will stay out of the hospital, the patient will be at an optimal level of functioning, and the patient will take all medications as prescribed.  The patient/guardian verbalized understanding and agreement with goals that were mutually set.    TREATMENT PLAN: Continue supportive psychotherapy efforts and medications as indicated for patient's diagnosis.  Pharmacological and Non-Pharmacological treatment options discussed during today's visit. Patient/Guardian acknowledged and verbally consented with current treatment plan and was educated on the importance of compliance with treatment and follow-up appointments.       MEDICATION ISSUES:  Discussed medication options and treatment plan of prescribed medication as well as the risks, benefits, any black box warnings, and side effects including potential falls, possible impaired driving, and metabolic adversities among others. Patient is agreeable to call the office with any worsening of symptoms or onset of side effects, or if any concerns or questions arise.  The contact information for the office is made available to the patient. Patient is agreeable to call 911 or go to the nearest ER should they begin having any SI/HI, or if any urgent concerns arise. No medication side effects or related complaints today.     MEDS ORDERED DURING VISIT:  New Medications Ordered This Visit   Medications   • FLUoxetine (PROzac) 10 MG capsule     Sig: Take 2 capsules by mouth Daily.     Dispense:  180 capsule     Refill:  0       FOLLOW UP:  Return in about 3 months (around 5/2/2023).             This document has been electronically signed by TRACI Khan  February 9, 2023 13:45 EST    Please note that portions of this note were completed with a voice recognition program. Efforts were made to edit dictation, but occasionally words are mistranscribed.

## 2023-02-06 ENCOUNTER — OFFICE VISIT (OUTPATIENT)
Dept: ENDOCRINOLOGY | Facility: CLINIC | Age: 58
End: 2023-02-06
Payer: MEDICARE

## 2023-02-06 VITALS
SYSTOLIC BLOOD PRESSURE: 122 MMHG | OXYGEN SATURATION: 97 % | WEIGHT: 165 LBS | HEART RATE: 84 BPM | DIASTOLIC BLOOD PRESSURE: 70 MMHG | HEIGHT: 63 IN | BODY MASS INDEX: 29.23 KG/M2

## 2023-02-06 DIAGNOSIS — R73.03 PREDIABETES: ICD-10-CM

## 2023-02-06 DIAGNOSIS — R68.89 ABNORMAL ENDOCRINE LABORATORY TEST FINDING: Primary | ICD-10-CM

## 2023-02-06 PROCEDURE — 99204 OFFICE O/P NEW MOD 45 MIN: CPT | Performed by: INTERNAL MEDICINE

## 2023-02-06 PROCEDURE — 96372 THER/PROPH/DIAG INJ SC/IM: CPT | Performed by: INTERNAL MEDICINE

## 2023-02-06 RX ORDER — LIRAGLUTIDE 6 MG/ML
0.6 INJECTION SUBCUTANEOUS DAILY
Qty: 3 ML | Refills: 3 | Status: SHIPPED | OUTPATIENT
Start: 2023-02-06 | End: 2023-02-27 | Stop reason: SDUPTHER

## 2023-02-06 RX ORDER — COSYNTROPIN 0.25 MG/ML
0.25 INJECTION, POWDER, FOR SOLUTION INTRAMUSCULAR; INTRAVENOUS ONCE
Status: COMPLETED | OUTPATIENT
Start: 2023-02-09 | End: 2023-02-09

## 2023-02-06 NOTE — PROGRESS NOTES
Chief Complaint   Patient presents with   • Abnormal Lab   • Prediabetes     Consult for Dr. Evan Rivas       HPI:   Zo Palma is a 58 y.o.female sent in consultation by Evan Rivas DO for further evaluation of a h/o abnormal adrenal hormone labs, difficulty losing weight, and prediabetes. Her history is as follows:    She is accompanied by her support animal today.    1) abnormal endocrine labs:  - In the early 2000's, pt reported being evaluated for multiple hormone deficiencies by a provider [place]. She was evaluated with serum, urine, and multiple salivary tests collected throughout the day. Does not appear to have had a Cosyntropin stimulation test. She was told she had deficiencies in estrogen and adrenal hormones. Pt was prescribed estrogen. Was not prescribed glucocorticoids.     Currently on these supplements that do not contain Biotin:  - Calcium +D3 (600mg / 120 mcg): 1 tab daily  - D-Mannose: 1 tab BID  - Rhodiola Rosea: 1000 mg daily  - Taurine 1500 mg: 3 caps daily  - Magnesium: 500 mg daily  - doTerra Bone Nutrient: 2 caps daily  - Adrenal Support: 2 caps daily - this supplement does not contain glucocorticoids  - Chaste Berry: 300 mg daily  - Vit D3: 5000 IU daily  - doTerra TerraZyme: 2 caps daily  - doTerra Onguard: 1 cap daily  - Pantethina: 900 mg daily  - doTerra Yarrow: 2 caps daily  - doTerra Sevenedy: 1 cap daily  - doTerra Alpha CRS: 2 caps daily  - doTerra Microplex: 2 caps daily  - doTerra E Omega: 2 caps daily  - doTerra MetaPower: 1 cap BID  - potassium 99 mg: daily     2) Weight gain:  - h/o sleeve gastrectomy in 2008. Pre-surgery wt was 245 lbs. Post-op was 135 lbs for many years  - By 2018, she had gained back to 175 lbs. She was prescribed Qsymia in 10/2018 and was able to lose 30 lbs (down to 145 lbs)  - Pt had to stop the Qsymia in 01/2022 as she did not have a provider to continue the Rx.  - She then regained wt to 165 lbs. In 2022, Dr. Suresh Edge prescribed  Qsymia 15-92 mg dose which she is taking currently. However, she has not had weight loss on this dose of Qsymia.    3) Prediabetes:  - diagnosed in 2008, A1C% 5.7. Highest A1C% was 6.1% in 11/2019  - reports a h/o reactive hypoglycemia   - Did not tolerate Metformin      Other history:  - pt's  passed away in 2020  - had a h/o acute liver injury in the past while on Depakote. LFT's in the 300's. H/o fatty liver    Review of Systems   Constitutional: Positive for unexpected weight change. Negative for fatigue.        Weight gain   HENT: Positive for tinnitus.    Eyes: Negative.    Respiratory: Negative.    Cardiovascular: Negative.    Gastrointestinal: Negative.         Acid reflux   Endocrine: Positive for cold intolerance and polydipsia.   Genitourinary: Negative.    Musculoskeletal: Positive for neck stiffness.   Skin: Negative.    Allergic/Immunologic: Positive for environmental allergies.   Neurological: Positive for tremors.   Hematological: Bruises/bleeds easily.   Psychiatric/Behavioral: Negative.        Past Medical History:   Diagnosis Date   • Anxiety    • Depression    • Insomnia    • Migraines    • Obesity    • Peripheral neuropathy    • Seizures (HCC) 2001    Conversion Disorder     family history includes Cancer in her brother, father, and mother; Learning disabilities in her brother and father; Other in her brother, father, and paternal grandfather.  Past Surgical History:   Procedure Laterality Date   • BARIATRIC SURGERY  2008    Gastric sleeve   • BREAST SURGERY     • CHOLECYSTECTOMY  2010   • COLONOSCOPY     • FRACTURE SURGERY      Left wrist   • GASTRIC SLEEVE LAPAROSCOPIC N/A    • HYSTERECTOMY     • SINUS SURGERY  2021   • WISDOM TOOTH EXTRACTION N/A      Social History     Tobacco Use   • Smoking status: Never     Passive exposure: Never   • Smokeless tobacco: Never   Vaping Use   • Vaping Use: Never used   Substance Use Topics   • Alcohol use: Yes     Alcohol/week: 1.0 standard drink      "Types: 1 Drinks containing 0.5 oz of alcohol per week     Comment: Occasionally   • Drug use: Never     Outpatient Medications Prior to Visit   Medication Sig Dispense Refill   • estradiol (ESTRACE) 0.1 MG/GM vaginal cream Insert 2 g into the vagina Daily. (Patient taking differently: Insert 2 g into the vagina 3 (Three) Times a Week.) 60 g 2   • FLUoxetine (PROzac) 10 MG capsule Take 2 capsules by mouth Daily. 180 capsule 0   • ibandronate (BONIVA) 150 MG tablet TAKE 1 TABLET BY MOUTH 1 TIME MONTHLY     • NON FORMULARY Calcium 600mg-D3 120mcg Daily  D-Mannose 1000mg BID  Rhodiola Rosea 1000mg Daily  Taurine 1500mg 3 caps daily  Mag 500mg daily  Terra Cone Daily  Adrenal Support 2 caps daily  Chasteberry 300mg daily  D3 125 mcg daily  Dotorra Terrazyme 2 caps daily  Dotorra onguard daily  Pantethina 900mg daily  Dotorra Yarrow/pom 2 caps daily  Dotorra Sereneity daily  Doterra Alpha CRS 2 caps daily  Doterra Microplex 2 caps daily  Doterra EO Anthony 2 cap  Doterra MetaPWR 1 cap BID   Potassium 99mg daily     • Phentermine-Topiramate (Qsymia) 15-92 MG capsule sustained-release 24 hr Take  by mouth.     • omeprazole (priLOSEC) 20 MG capsule Take 20 mg by mouth Daily.       No facility-administered medications prior to visit.     Allergies   Allergen Reactions   • Chlorzoxazone Unknown - High Severity and Swelling     Lorzone, muscle relaxant.   • Iodine Anaphylaxis     Topical makes her swell  Anaphylaxis with IV contrast (when she was ~ 9yrs old)   • Phenazopyridine Hcl Swelling and Anaphylaxis   • Pyridium [Phenazopyridine] Anaphylaxis   • Quinine Unknown - High Severity     Has malaria symptoms   • Valproic Acid Other (See Comments)     Liver failure  Liver failure  Liver failure; lupus like symptoms and liver failure   • Methocarbamol Other (See Comments)     Made her feel \"suicidal\" and gave her less control over her actions.   • Iodinated Contrast Media Unknown - Low Severity   • Acetaminophen-Codeine Itching   • " "Diphenhydramine Anxiety     Pt has severe panic attack symptoms when given benadryl IV. Needs to take a benzodiazapine med concurrently when getting benadryl.        /70   Pulse 84   Ht 160 cm (63\")   Wt 74.8 kg (165 lb)   SpO2 97%   BMI 29.23 kg/m²   Physical Exam  Vitals reviewed.   Constitutional:       General: She is not in acute distress.     Appearance: She is well-developed. She is not diaphoretic.   HENT:      Head: Normocephalic.   Eyes:      Conjunctiva/sclera: Conjunctivae normal.      Pupils: Pupils are equal, round, and reactive to light.   Neck:      Thyroid: No thyromegaly.      Trachea: No tracheal deviation.      Comments: No palpable thyroid nodules    Cardiovascular:      Rate and Rhythm: Normal rate and regular rhythm.      Heart sounds: Normal heart sounds. No murmur heard.  Pulmonary:      Effort: Pulmonary effort is normal. No respiratory distress.      Breath sounds: Normal breath sounds.   Abdominal:      General: Bowel sounds are normal.      Palpations: Abdomen is soft. There is no mass.      Tenderness: There is no abdominal tenderness.   Lymphadenopathy:      Cervical: No cervical adenopathy.   Skin:     General: Skin is warm and dry.      Findings: No erythema.      Comments: No areas of hyperpigmentation   Neurological:      Mental Status: She is alert and oriented to person, place, and time.      Cranial Nerves: No cranial nerve deficit.   Psychiatric:         Behavior: Behavior normal.         LABS/IMAGING: outside records reviewed and summarized in [facility]  Lab Results   Component Value Date    TSH 2.350 02/09/2023       ACTH stimulation test completed 2/9/2023 at 8:27 AM:  8AM ACTH: 8.7 - normal  Time 0 AM Cortisol: 13.95  Time 30 min Cortisol: 29.30  Time 60 min Cortisol: 34.80    ASSESSMENT/PLAN:  1) abnormal endocrine lab:  - Patient reported a history of being told she had deficiencies in adrenal hormones.  Discussed with patient the pathophysiology of primary versus " secondary adrenal insufficiency.  Discussed the signs and symptoms of these disorders.  - Reviewed with patient that clinically she did not have the signs or symptoms.   - Had patient complete an ACTH stimulation test on 2/9/2023 at 8:27 AM.  Labs showed no evidence of adrenal insufficiency  - Patient without evidence of thyroid hormone deficiency    2) prediabetes:  - Patient did not tolerate metformin in the past  - I will have patient try Victoza 0.6 mg daily.  If tolerated, advised patient that she can increase to 1.2 mg daily in 2 to 3 weeks.  If the 1.2 mg dose is tolerated, she can then increase to the 1.8 mg dose in 4 weeks.     RTC 3 months    Signed: Damari Fulton MD    Counseling was given to patient for the following topics:  diagnostic results, impressions and risks and benefits of treatment options, see details in assessment/plan. Total face to face time of the encounter was 55 minutes and 45 minutes was spent counseling.

## 2023-02-09 ENCOUNTER — LAB (OUTPATIENT)
Dept: ENDOCRINOLOGY | Facility: CLINIC | Age: 58
End: 2023-02-09
Payer: MEDICARE

## 2023-02-09 DIAGNOSIS — R68.89 ABNORMAL ENDOCRINE LABORATORY TEST FINDING: ICD-10-CM

## 2023-02-09 LAB
CORTIS AM PEAK SERPL-MCNC: 13.95 MCG/DL
CORTIS SERPL-MCNC: 29.3 MCG/DL
CORTIS SERPL-MCNC: 34.8 MCG/DL
T4 FREE SERPL-MCNC: 1.33 NG/DL (ref 0.93–1.7)
TSH SERPL DL<=0.05 MIU/L-ACNC: 2.35 UIU/ML (ref 0.27–4.2)

## 2023-02-09 PROCEDURE — 84443 ASSAY THYROID STIM HORMONE: CPT | Performed by: INTERNAL MEDICINE

## 2023-02-09 PROCEDURE — 82533 TOTAL CORTISOL: CPT | Performed by: INTERNAL MEDICINE

## 2023-02-09 PROCEDURE — 84439 ASSAY OF FREE THYROXINE: CPT | Performed by: INTERNAL MEDICINE

## 2023-02-09 PROCEDURE — 36415 COLL VENOUS BLD VENIPUNCTURE: CPT | Performed by: INTERNAL MEDICINE

## 2023-02-09 PROCEDURE — 82024 ASSAY OF ACTH: CPT | Performed by: INTERNAL MEDICINE

## 2023-02-09 RX ADMIN — COSYNTROPIN 0.25 MG: 0.25 INJECTION, POWDER, FOR SOLUTION INTRAMUSCULAR; INTRAVENOUS at 08:51

## 2023-02-10 LAB — ACTH PLAS-MCNC: 8.7 PG/ML (ref 7.2–63.3)

## 2023-02-15 RX ORDER — OMEPRAZOLE 20 MG/1
20 CAPSULE, DELAYED RELEASE ORAL DAILY
Qty: 90 CAPSULE | Refills: 0 | Status: SHIPPED | OUTPATIENT
Start: 2023-02-15

## 2023-02-27 DIAGNOSIS — R73.03 PREDIABETES: ICD-10-CM

## 2023-02-28 RX ORDER — PEN NEEDLE, DIABETIC 30 GX3/16"
1 NEEDLE, DISPOSABLE MISCELLANEOUS DAILY
Qty: 100 EACH | Refills: 3 | Status: SHIPPED | OUTPATIENT
Start: 2023-02-28

## 2023-02-28 RX ORDER — LIRAGLUTIDE 6 MG/ML
1.8 INJECTION SUBCUTANEOUS DAILY
Qty: 9 ML | Refills: 11 | Status: SHIPPED | OUTPATIENT
Start: 2023-02-28

## 2023-03-06 ENCOUNTER — OFFICE VISIT (OUTPATIENT)
Dept: PSYCHIATRY | Facility: CLINIC | Age: 58
End: 2023-03-06
Payer: MEDICARE

## 2023-03-06 DIAGNOSIS — F43.10 POST TRAUMATIC STRESS DISORDER (PTSD): ICD-10-CM

## 2023-03-06 DIAGNOSIS — F33.1 MODERATE EPISODE OF RECURRENT MAJOR DEPRESSIVE DISORDER: Primary | ICD-10-CM

## 2023-03-06 DIAGNOSIS — F41.1 GENERALIZED ANXIETY DISORDER: ICD-10-CM

## 2023-03-06 PROCEDURE — 90837 PSYTX W PT 60 MINUTES: CPT | Performed by: SOCIAL WORKER

## 2023-03-06 NOTE — PROGRESS NOTES
Date: 03/06/2023   Time In: 900  Time Out: 953    PROGRESS NOTE  Data:  Zo Palma is a 58 y.o. female who presents today for individual therapy session at [facility]. Chief Complaint: depression, anxiety and trauma    Patient reports struggling with depressive disorder for about 2 weeks. She reports sleeping 20 hours per day during this episode. She reports she has been feeling better the last few days. She reports Feeling she processed her 's death and reports processing childhood memories as well. She reports they have been positive or neutral feelings associated with these memories. She reports being busy packing and is anticipating moving in a month or so. She reports finances are still a stressor for her. She reports wanting to participate in ketamine therapy to help improve mood.  She discussed how being busy helps with managing her symptoms. She reports reaching out to friends and is going to be visiting a friend during her trip to [place].      Clinical Maneuvering/Intervention:    Assisted patient in processing above session content; acknowledged and normalized patient’s thoughts, feelings, and concerns.  We reviewed helpful coping skills to manage symptoms such as talking with supportive friends, using light and participating in activities.    Allowed patient to freely discuss issues without interruption or judgment. Provided safe, confidential environment to facilitate the development of positive therapeutic relationship and encourage open, honest communication. Assisted patient in identifying risk factors which would indicate the need for higher level of care including thoughts to harm self or others and/or self-harming behavior and encouraged patient to contact this office, call 911, or present to the nearest emergency room should any of these events occur. Discussed crisis intervention services and means to access. Patient adamantly and convincingly denies current suicidal  or homicidal ideation or perceptual disturbance.    Assessment   Patient appears to maintain relative stability as compared to their baseline.  However, patient continues to struggle with depression and anxiety which continues to cause impairment in important areas of functioning.  As a result, they can be reasonably expected to continue to benefit from treatment and would likely be at increased risk for decompensation otherwise.    Mental Status Exam:   Hygiene:   good  Cooperation:  Cooperative  Eye Contact:  Good  Psychomotor Behavior:  Appropriate  Affect:  Appropriate  Mood: depressed  Speech:  Normal  Thought Process:  Goal directed  Thought Content:  Mood congruent  Suicidal:  None  Homicidal:  None  Hallucinations:  None  Delusion:  None  Memory:  Intact  Orientation:  Person, Place, Time and Situation  Reliability:  good  Insight:  Good  Judgement:  Good  Impulse Control:  Good  Physical/Medical Issues:  No      Patient's Support Network Includes:  daughter and friends    Functional Status: Moderate impairment     Progress toward goal: Not at goal    Prognosis: Good with Ongoing Treatment          Plan     VISIT DIAGNOSIS:     ICD-10-CM ICD-9-CM   1. Moderate episode of recurrent major depressive disorder (HCC)  F33.1 296.32   2. Post traumatic stress disorder (PTSD)  F43.10 309.81   3. Generalized anxiety disorder  F41.1 300.02        Patient will continue in individual outpatient therapy with focus on improved functioning and coping skills, maintaining stability, and avoiding decompensation and the need for higher level of care.    Patient will adhere to medication regimen as prescribed and report any side effects. Patient will contact this office, call 911 or present to the nearest emergency room should suicidal or homicidal ideations occur. Provide Cognitive Behavioral Therapy and Solution Focused Therapy to improve functioning, maintain stability, and avoid decompensation and the need for higher level  of care.     Return in about Return in about 2 weeks (around 3/20/2023). or earlier if symptoms worsen or fail to improve.            This document has been electronically signed by Mely Tate LCSW  March 6, 2023 09:01 EST      Part of this note may be an electronic transcription/translation of spoken language to printed text using the Dragon Dictation System.

## 2023-03-29 ENCOUNTER — TELEPHONE (OUTPATIENT)
Dept: ENDOCRINOLOGY | Facility: CLINIC | Age: 58
End: 2023-03-29
Payer: MEDICARE

## 2023-04-04 ENCOUNTER — OFFICE VISIT (OUTPATIENT)
Dept: PSYCHIATRY | Facility: CLINIC | Age: 58
End: 2023-04-04
Payer: MEDICARE

## 2023-04-04 DIAGNOSIS — F33.1 MODERATE EPISODE OF RECURRENT MAJOR DEPRESSIVE DISORDER: Primary | ICD-10-CM

## 2023-04-04 DIAGNOSIS — F41.1 GENERALIZED ANXIETY DISORDER: ICD-10-CM

## 2023-04-04 DIAGNOSIS — F43.10 POST TRAUMATIC STRESS DISORDER (PTSD): ICD-10-CM

## 2023-04-04 PROCEDURE — 90832 PSYTX W PT 30 MINUTES: CPT | Performed by: SOCIAL WORKER

## 2023-04-04 NOTE — PROGRESS NOTES
Date: 2023   Time In: 1415  Time Out: 1450    PROGRESS NOTE  Data:  Zo Palma is a 58 y.o. female who presents today for individual therapy session at [facility]. Chief Complaint: depression, anxiety and trauma    Patient reports feeling good today. She reports having one depressed day when the weather was bad. She reports being busy with moving and working. She discussed new business opportunity. She reports finances continue to be a stressor but reports less stress because of new job and living arrangement. She reports having a few days of processing her childhood trauma and reports feeling better about that. She reports being able to recall some childhood memories. She discussed experiences with communicating with her   and says they have been positive. Patient reports she is managing at this time.       Clinical Maneuvering/Intervention:    Assisted patient in processing above session content; acknowledged and normalized patient’s thoughts, feelings, and concerns.  Rationalized patient thought process regarding depression.  Assisted patient in processing her thoughts and feelings regarding the changes with a new role and living situation, the processing of childhood memories and experiences with communicating with  .    Allowed patient to freely discuss issues without interruption or judgment. Provided safe, confidential environment to facilitate the development of positive therapeutic relationship and encourage open, honest communication. Assisted patient in identifying risk factors which would indicate the need for higher level of care including thoughts to harm self or others and/or self-harming behavior and encouraged patient to contact this office, call 911, or present to the nearest emergency room should any of these events occur. Discussed crisis intervention services and means to access. Patient adamantly and convincingly denies current suicidal  or homicidal ideation or perceptual disturbance.    Assessment   Patient appears to maintain relative stability as compared to their baseline.  However, patient continues to struggle with depression and anxiety which continues to cause impairment in important areas of functioning.  As a result, they can be reasonably expected to continue to benefit from treatment and would likely be at increased risk for decompensation otherwise.    Mental Status Exam:   Hygiene:   good  Cooperation:  Cooperative  Eye Contact:  Good  Psychomotor Behavior:  Appropriate  Affect:  Appropriate  Mood: normal  Speech:  Normal  Thought Process:  Goal directed  Thought Content:  Normal  Suicidal:  None  Homicidal:  None  Hallucinations:  None  Delusion:  None  Memory:  Intact  Orientation:  Person, Place, Time and Situation  Reliability:  good  Insight:  Good  Judgement:  Good  Impulse Control:  Good  Physical/Medical Issues:  Yes reports strained wrist     Patient's Support Network Includes:  daughter and friends    Functional Status: Mild impairment     Progress toward goal: Not at goal    Prognosis: Good with Ongoing Treatment          Plan     VISIT DIAGNOSIS:     ICD-10-CM ICD-9-CM   1. Moderate episode of recurrent major depressive disorder  F33.1 296.32   2. Post traumatic stress disorder (PTSD)  F43.10 309.81   3. Generalized anxiety disorder  F41.1 300.02        Patient will continue in individual outpatient therapy with focus on improved functioning and coping skills, maintaining stability, and avoiding decompensation and the need for higher level of care.    Patient will adhere to medication regimen as prescribed and report any side effects. Patient will contact this office, call 911 or present to the nearest emergency room should suicidal or homicidal ideations occur. Provide Cognitive Behavioral Therapy and Solution Focused Therapy to improve functioning, maintain stability, and avoid decompensation and the need for higher  level of care.     Return in about Return in about 2 weeks (around 4/18/2023). or earlier if symptoms worsen or fail to improve.            This document has been electronically signed by Mely Tate LCSW  April 4, 2023 14:15 EDT      Part of this note may be an electronic transcription/translation of spoken language to printed text using the Dragon Dictation System.

## 2023-04-17 ENCOUNTER — OFFICE VISIT (OUTPATIENT)
Dept: PSYCHIATRY | Facility: CLINIC | Age: 58
End: 2023-04-17
Payer: MEDICARE

## 2023-04-17 DIAGNOSIS — F33.1 MODERATE EPISODE OF RECURRENT MAJOR DEPRESSIVE DISORDER: Primary | ICD-10-CM

## 2023-04-17 DIAGNOSIS — F43.10 POST TRAUMATIC STRESS DISORDER (PTSD): ICD-10-CM

## 2023-04-17 DIAGNOSIS — F41.1 GENERALIZED ANXIETY DISORDER: ICD-10-CM

## 2023-04-17 PROCEDURE — 90834 PSYTX W PT 45 MINUTES: CPT | Performed by: SOCIAL WORKER

## 2023-04-17 NOTE — PROGRESS NOTES
Date: 04/17/2023   Time In: 805  Time Out: 844    PROGRESS NOTE  Data:  Zo Palma is a 58 y.o. female who presents today for individual therapy session at [facility]. Chief Complaint: depression, PTSD and anxiety    Patient reports feeling drained today. She discussed some stress and frustration with moving but reports she is managing this. She reports anticipating less stress when things get finished. She reports mood has been stable and reports taking baths as her coping skill.       Clinical Maneuvering/Intervention:    Assisted patient in processing above session content; acknowledged and normalized patient’s thoughts, feelings, and concerns.  Rationalized patient thought process regarding depression.  Discussed triggers associated with patient's stress.  Also discussed coping skills for patient to implement such as practicing acceptance.    Allowed patient to freely discuss issues without interruption or judgment. Provided safe, confidential environment to facilitate the development of positive therapeutic relationship and encourage open, honest communication. Assisted patient in identifying risk factors which would indicate the need for higher level of care including thoughts to harm self or others and/or self-harming behavior and encouraged patient to contact this office, call 911, or present to the nearest emergency room should any of these events occur. Discussed crisis intervention services and means to access. Patient adamantly and convincingly denies current suicidal or homicidal ideation or perceptual disturbance.    Assessment   Patient appears to maintain relative stability as compared to their baseline.  However, patient continues to struggle with depression which continues to cause impairment in important areas of functioning.  As a result, they can be reasonably expected to continue to benefit from treatment and would likely be at increased risk for decompensation  otherwise.    Mental Status Exam:   Hygiene:   good  Cooperation:  Cooperative  Eye Contact:  Good  Psychomotor Behavior:  Appropriate  Affect:  Appropriate  Mood: normal  Speech:  Normal  Thought Process:  Goal directed  Thought Content:  Normal  Suicidal:  None  Homicidal:  None  Hallucinations:  None  Delusion:  None  Memory:  Intact  Orientation:  Person, Place, Time and Situation  Reliability:  good  Insight:  Good  Judgement:  Good  Impulse Control:  Good  Physical/Medical Issues:  No      Patient's Support Network Includes:  daughter and friends    Functional Status: Mild impairment     Progress toward goal: Not at goal    Prognosis: Good with Ongoing Treatment          Plan     VISIT DIAGNOSIS:     ICD-10-CM ICD-9-CM   1. Moderate episode of recurrent major depressive disorder  F33.1 296.32   2. Post traumatic stress disorder (PTSD)  F43.10 309.81   3. Generalized anxiety disorder  F41.1 300.02        Patient will continue in individual outpatient therapy with focus on improved functioning and coping skills, maintaining stability, and avoiding decompensation and the need for higher level of care.    Patient will adhere to medication regimen as prescribed and report any side effects. Patient will contact this office, call 911 or present to the nearest emergency room should suicidal or homicidal ideations occur. Provide Cognitive Behavioral Therapy and Solution Focused Therapy to improve functioning, maintain stability, and avoid decompensation and the need for higher level of care.     Return in about Return in about 2 weeks (around 5/1/2023). or earlier if symptoms worsen or fail to improve.            This document has been electronically signed by Mely Tate LCSW  April 17, 2023 08:06 EDT      Part of this note may be an electronic transcription/translation of spoken language to printed text using the Dragon Dictation System.

## 2023-04-27 ENCOUNTER — OFFICE VISIT (OUTPATIENT)
Dept: FAMILY MEDICINE CLINIC | Facility: CLINIC | Age: 58
End: 2023-04-27
Payer: MEDICARE

## 2023-04-27 VITALS
DIASTOLIC BLOOD PRESSURE: 78 MMHG | BODY MASS INDEX: 28 KG/M2 | RESPIRATION RATE: 14 BRPM | HEART RATE: 89 BPM | OXYGEN SATURATION: 100 % | SYSTOLIC BLOOD PRESSURE: 122 MMHG | TEMPERATURE: 97 F | WEIGHT: 158 LBS | HEIGHT: 63 IN

## 2023-04-27 DIAGNOSIS — N32.89 BLADDER SPASM: Primary | ICD-10-CM

## 2023-04-27 DIAGNOSIS — R30.0 DYSURIA: ICD-10-CM

## 2023-04-27 DIAGNOSIS — N30.90 CYSTITIS: ICD-10-CM

## 2023-04-27 LAB
BILIRUB BLD-MCNC: NEGATIVE MG/DL
CLARITY, POC: CLEAR
COLOR UR: YELLOW
EXPIRATION DATE: NORMAL
GLUCOSE UR STRIP-MCNC: NEGATIVE MG/DL
KETONES UR QL: NEGATIVE
LEUKOCYTE EST, POC: NEGATIVE
Lab: NORMAL
NITRITE UR-MCNC: NEGATIVE MG/ML
PH UR: 6 [PH] (ref 5–8)
PROT UR STRIP-MCNC: NEGATIVE MG/DL
RBC # UR STRIP: NEGATIVE /UL
SP GR UR: 1.03 (ref 1–1.03)
UROBILINOGEN UR QL: NORMAL

## 2023-04-27 PROCEDURE — 81003 URINALYSIS AUTO W/O SCOPE: CPT | Performed by: FAMILY MEDICINE

## 2023-04-27 PROCEDURE — 99213 OFFICE O/P EST LOW 20 MIN: CPT | Performed by: FAMILY MEDICINE

## 2023-04-27 RX ORDER — CEFDINIR 300 MG/1
300 CAPSULE ORAL 2 TIMES DAILY
Qty: 10 CAPSULE | Refills: 0 | Status: SHIPPED | OUTPATIENT
Start: 2023-04-27 | End: 2023-05-02

## 2023-04-27 NOTE — PROGRESS NOTES
"                      Established Patient        Chief Complaint:   Chief Complaint   Patient presents with   • Urinary Tract Infection     symptoms        Zo Palma is a 58 y.o. female    History of Present Illness:   Here today in evaluation of urinary symptoms, symptoms described as crampy in nature, longstanding history of bladder spasms with urinary tract infections.  She describes some burning with urination, no gross hematuria.  Denies fever, chills or night sweats.  Does admit to some malaise/fatigue, however tolerance of p.o. intake is good, including both liquids and food.  She denies BRB/BTS.  Denies chest pain, syncope, palpitations or vertigo.    Subjective     The following portions of the patient's history were reviewed and updated as appropriate: allergies, current medications, past family history, past medical history, past social history, past surgical history and problem list.    Allergies   Allergen Reactions   • Chlorzoxazone Unknown - High Severity and Swelling     Lorzone, muscle relaxant.   • Iodine Anaphylaxis     Topical makes her swell  Anaphylaxis with IV contrast (when she was ~ 9yrs old)   • Phenazopyridine Hcl Swelling and Anaphylaxis   • Pyridium [Phenazopyridine] Anaphylaxis   • Quinine Unknown - High Severity     Has malaria symptoms   • Valproic Acid Other (See Comments)     Liver failure  Liver failure  Liver failure; lupus like symptoms and liver failure   • Methocarbamol Other (See Comments)     Made her feel \"suicidal\" and gave her less control over her actions.   • Iodinated Contrast Media Unknown - Low Severity   • Acetaminophen-Codeine Itching   • Diphenhydramine Anxiety     Pt has severe panic attack symptoms when given benadryl IV. Needs to take a benzodiazapine med concurrently when getting benadryl.        Review of Systems  1. Constitutional: Negative for fever. Negative for chills, diaphoresis, fatigue and unexpected weight change.   2. HENT: No dysphagia; no " "changes to vision/hearing/smell/taste; no epistaxis  3. Eyes: Negative for redness and visual disturbance.   4. Respiratory: negative for shortness of breath. Negative for chest pain . Negative for cough and chest tightness.   5. Cardiovascular: Negative for chest pain and palpitations.   6. Gastrointestinal: Negative for abdominal distention, abdominal pain and blood in stool.   7. Endocrine: Negative for cold intolerance and heat intolerance.   8. Genitourinary: As per above.  9. Musculoskeletal: Chronic arthralgias, back pain and myalgias.   10. Skin: Negative for color change, rash and wound.   11. Neurological: Negative for syncope, weakness and headaches.   12. Hematological: Negative for adenopathy. Does not bruise/bleed easily.   13. Psychiatric/Behavioral: Negative for confusion. The patient is not nervous/anxious.    Objective     Physical Exam   Vital Signs: /78   Pulse 89   Temp 97 °F (36.1 °C)   Resp 14   Ht 160 cm (63\")   Wt 71.7 kg (158 lb)   SpO2 100%   BMI 27.99 kg/m²   BMI is >= 25 and <30. (Overweight) The following options were offered after discussion;: exercise counseling/recommendations and nutrition counseling/recommendations    General Appearance: alert, oriented x 3, no acute distress.  Pleasant and interactive during questioning/examination.  Has service dog.   Skin: warm and dry.   HEENT: Atraumatic.  pupils round and reactive to light and accommodation, oral mucosa pink and moist.  Nares patent without epistaxis.  External auditory canals are patent tympanic membranes intact.  Neck: supple, no JVD, trachea midline.  No thyromegaly  Lungs: CTA, unlabored breathing effort.  Heart: RRR, normal S1 and S2, no S3, no rub.  Abdomen: soft, non-tender, no palpable bladder, present bowel sounds to auscultation ×4.  No guarding or rigidity.  No CVA tenderness.  Postsurgical scarring noted.  Extremities: no clubbing, cyanosis or edema.  Good range of motion actively and passively.  " Symmetric muscle strength and development  Neuro: normal speech and mental status.  Cranial nerves II through XII intact.  No anosmia. DTR 2+; proprioception intact.  No focal motor/sensory deficits.    Advance Care Planning   ACP discussion was held with the patient during this visit. Patient has an advance directive in EMR which is still valid.        Assessment and Plan      Assessment/Plan:   Diagnoses and all orders for this visit:    1. Bladder spasm (Primary)  -     POCT urinalysis dipstick, automated  -     cefdinir (OMNICEF) 300 MG capsule; Take 1 capsule by mouth 2 (Two) Times a Day for 5 days.  Dispense: 10 capsule; Refill: 0  -     Urine Culture - , Urine, Clean Catch    2. Dysuria  -     cefdinir (OMNICEF) 300 MG capsule; Take 1 capsule by mouth 2 (Two) Times a Day for 5 days.  Dispense: 10 capsule; Refill: 0  -     Urine Culture - , Urine, Clean Catch    3. Cystitis  -     cefdinir (OMNICEF) 300 MG capsule; Take 1 capsule by mouth 2 (Two) Times a Day for 5 days.  Dispense: 10 capsule; Refill: 0  -     Urine Culture - , Urine, Clean Catch      Patient has been erroneously marked as diabetic. Based on the available clinical information, she does not have diabetes and should therefore be excluded from diabetic health maintenance and quality measures for the remainder of the reporting period.    Planned treatment of complicated simple cystitis with a course of cefdinir, utilize a 5-day duration; patient verbalized understanding of the potential need for change in antibiotic choice, as the results of her urine culture will substantiate this.    Vital signs demonstrate hemodynamic stability.  I have stressed the importance of adequate hydration with her in detail today.    Discussion Summary:    Discussed plan of care in detail with pt today; pt verb understanding and agrees.    I spent 25 minutes caring for Zo on this date of service. This time includes time spent by me in the following  activities:preparing for the visit, performing a medically appropriate examination and/or evaluation , counseling and educating the patient/family/caregiver, ordering medications, tests, or procedures, documenting information in the medical record, independently interpreting results and communicating that information with the patient/family/caregiver and care coordination    I have reviewed and updated all copied forward information, as appropriate.  I attest to the accuracy and relevance of any unchanged information.    Follow up:  Return in about 25 days (around 5/22/2023) for Medicare Wellness, Initial.     There are no Patient Instructions on file for this visit.    Evan Rivas DO  04/27/23  09:18 EDT

## 2023-05-01 ENCOUNTER — OFFICE VISIT (OUTPATIENT)
Dept: PSYCHIATRY | Facility: CLINIC | Age: 58
End: 2023-05-01
Payer: MEDICARE

## 2023-05-01 DIAGNOSIS — F43.10 POST TRAUMATIC STRESS DISORDER (PTSD): ICD-10-CM

## 2023-05-01 DIAGNOSIS — F41.1 GENERALIZED ANXIETY DISORDER: ICD-10-CM

## 2023-05-01 DIAGNOSIS — F33.1 MODERATE EPISODE OF RECURRENT MAJOR DEPRESSIVE DISORDER: Primary | ICD-10-CM

## 2023-05-01 PROCEDURE — 90837 PSYTX W PT 60 MINUTES: CPT | Performed by: SOCIAL WORKER

## 2023-05-01 NOTE — PROGRESS NOTES
Date: 05/01/2023   Time In: 845  Time Out: 944    PROGRESS NOTE  Data:  Zo Palma is a 58 y.o. female who presents today for individual therapy session at [facility]. Chief Complaint: depression, anxiety and trauma    Patient reports she has been frustrated with packing and disagreements with roommate. She reports taking the day off yesterday and watched TV. She says this was helpful. She reports she has been in pain with her arthritis. She discussed moments of being triggered by trauma when her brother contacts her. She reports she does not want contact with them but reports feeling obligated to answer the phone. She says this sometimes makes her feel she is spiraling. She reports managing by playing games on her phone or going outside. She reports mood has been stable.       Clinical Maneuvering/Intervention:    Assisted patient in processing above session content; acknowledged and normalized patient’s thoughts, feelings, and concerns.  Rationalized patient thought process regarding stress of being caretaker and being triggered by brother.   Also discussed coping skills for patient to implement such as writing about the resentments.     Allowed patient to freely discuss issues without interruption or judgment. Provided safe, confidential environment to facilitate the development of positive therapeutic relationship and encourage open, honest communication. Assisted patient in identifying risk factors which would indicate the need for higher level of care including thoughts to harm self or others and/or self-harming behavior and encouraged patient to contact this office, call 911, or present to the nearest emergency room should any of these events occur. Discussed crisis intervention services and means to access. Patient adamantly and convincingly denies current suicidal or homicidal ideation or perceptual disturbance.    Assessment   Patient appears to maintain relative stability as  compared to their baseline.  However, patient continues to struggle with trauma and depression which continues to cause impairment in important areas of functioning.  As a result, they can be reasonably expected to continue to benefit from treatment and would likely be at increased risk for decompensation otherwise.    Mental Status Exam:   Hygiene:   good  Cooperation:  Cooperative  Eye Contact:  Good  Psychomotor Behavior:  Appropriate  Affect:  Appropriate  Mood: normal  Speech:  Normal  Thought Process:  Goal directed  Thought Content:  Mood congruent  Suicidal:  None  Homicidal:  None  Hallucinations:  None  Delusion:  None  Memory:  Intact  Orientation:  Person, Place, Time and Situation  Reliability:  good  Insight:  Good  Judgement:  Good  Impulse Control:  Good  Physical/Medical Issues:  No      Patient's Support Network Includes:  daughter and friends    Functional Status: Mild impairment     Progress toward goal: Not at goal    Prognosis: Good with Ongoing Treatment          Plan     VISIT DIAGNOSIS:     ICD-10-CM ICD-9-CM   1. Moderate episode of recurrent major depressive disorder  F33.1 296.32   2. Post traumatic stress disorder (PTSD)  F43.10 309.81   3. Generalized anxiety disorder  F41.1 300.02        Patient will continue in individual outpatient therapy with focus on improved functioning and coping skills, maintaining stability, and avoiding decompensation and the need for higher level of care.    Patient will adhere to medication regimen as prescribed and report any side effects. Patient will contact this office, call 911 or present to the nearest emergency room should suicidal or homicidal ideations occur. Provide Cognitive Behavioral Therapy and Solution Focused Therapy to improve functioning, maintain stability, and avoid decompensation and the need for higher level of care.     Return in about Return in about 2 weeks (around 5/15/2023). or earlier if symptoms worsen or fail to improve.             This document has been electronically signed by Mely Taet LCSW  May 1, 2023 08:45 EDT      Part of this note may be an electronic transcription/translation of spoken language to printed text using the Dragon Dictation System.

## 2023-05-02 LAB
BACTERIA UR CULT: ABNORMAL
BACTERIA UR CULT: ABNORMAL
OTHER ANTIBIOTIC SUSC ISLT: ABNORMAL

## 2023-05-03 ENCOUNTER — OFFICE VISIT (OUTPATIENT)
Dept: ENDOCRINOLOGY | Facility: CLINIC | Age: 58
End: 2023-05-03
Payer: MEDICARE

## 2023-05-03 VITALS
HEIGHT: 63 IN | DIASTOLIC BLOOD PRESSURE: 68 MMHG | SYSTOLIC BLOOD PRESSURE: 108 MMHG | BODY MASS INDEX: 27.64 KG/M2 | OXYGEN SATURATION: 97 % | WEIGHT: 156 LBS | HEART RATE: 62 BPM

## 2023-05-03 DIAGNOSIS — R73.03 PREDIABETES: Primary | ICD-10-CM

## 2023-05-03 LAB
EXPIRATION DATE: NORMAL
HBA1C MFR BLD: 5.5 %
Lab: NORMAL

## 2023-05-03 PROCEDURE — 3044F HG A1C LEVEL LT 7.0%: CPT | Performed by: INTERNAL MEDICINE

## 2023-05-03 PROCEDURE — 83036 HEMOGLOBIN GLYCOSYLATED A1C: CPT | Performed by: INTERNAL MEDICINE

## 2023-05-03 PROCEDURE — 99213 OFFICE O/P EST LOW 20 MIN: CPT | Performed by: INTERNAL MEDICINE

## 2023-05-03 NOTE — PROGRESS NOTES
Chief Complaint   Patient presents with   • Abnormal endocrine laboratory test finding     Follow up        HPI:   Zo Palma is a 58 y.o.female who returns to endocrine clinic for follow-up evaluation of her prediabetes and weight gain.  Last visit 02/06/2023. Her history is as follows:    Interim Events:   - Pt now able to obtain Victoza through [employer] PAP  - Is tolerating 1.8 mg qAM  - Overall feeling well.   - Has lost approximately 8 lbs since last visit    1) Weight gain:  - h/o sleeve gastrectomy in 2008. Pre-surgery wt was 245 lbs. Post-op was 135 lbs for many years  - By 2018, she had gained back to 175 lbs. She was prescribed Qsymia in 10/2018 and was able to lose 30 lbs (down to 145 lbs)  - Pt had to stop the Qsymia in 01/2022 as she did not have a provider to continue the Rx.  - She then regained wt to 165 lbs. In 2022, Dr. Suresh Edge prescribed Qsymia 15-92 mg dose which she was taking in 02/2023. However, she has not had weight loss on this dose of Qsymia.  - (02/2023) Started Victoza 1.8 mg daily, obtained through PAP. Has lost 8 lbs since last visit    2) Prediabetes:  - diagnosed in 2008, A1C% 5.7. Highest A1C% was 6.1% in 11/2019  - reports a h/o reactive hypoglycemia   - Did not tolerate Metformin  - (02/2023) Started Victoza 1.8 mg daily, obtained through PAP  Patient tolerating this dose    3) h/o  abnormal endocrine labs:  - In the early 2000's, pt reported being evaluated for multiple hormone deficiencies [place]. She was evaluated with serum, urine, and multiple salivary tests collected throughout the day. Does not appear to have had a Cosyntropin stimulation test. She was told she had deficiencies in estrogen and adrenal hormones. Pt was prescribed estrogen. Was not prescribed glucocorticoids.     Currently on these supplements that do not contain Biotin:  - Calcium +D3 (600mg / 120 mcg): 1 tab daily  - D-Mannose: 1 tab BID  - Rhodiola Rosea: 1000 mg daily  -  "Taurine 1500 mg: 3 caps daily  - Magnesium: 500 mg daily  - doTerra Bone Nutrient: 2 caps daily  - Adrenal Support: 2 caps daily - this supplement does not contain glucocorticoids  - Chrissy Berry: 300 mg daily  - Vit D3: 5000 IU daily  - doTerra TerraZyme: 2 caps daily  - doTerra Onguard: 1 cap daily  - Pantethina: 900 mg daily  - doTerra Yarrow: 2 caps daily  - doTerra Sevenedy: 1 cap daily  - doTerra Alpha CRS: 2 caps daily  - doTerra Microplex: 2 caps daily  - doTerra E Omega: 2 caps daily  - doTerra MetaPower: 1 cap BID  - potassium 99 mg: daily     - Had patient complete an ACTH stimulation test on 2/9/2023 at 8:27 AM.  Labs showed no evidence of adrenal insufficiency  - Patient without evidence of thyroid hormone deficiency    ACTH stimulation test completed 2/9/2023 at 8:27 AM:  8AM ACTH: 8.7 - normal  Time 0 AM Cortisol: 13.95  Time 30 min Cortisol: 29.30  Time 60 min Cortisol: 34.80    Other history:  - pt's  passed away in 2020  - had a h/o acute liver injury in the past while on Depakote. LFT's in the 300's. H/o fatty liver    Review of Systems   Constitutional: Negative for fatigue.        Weight loss   HENT: Positive for tinnitus.    Eyes: Negative.    Respiratory: Negative.    Cardiovascular: Negative.    Gastrointestinal: Negative.         Acid reflux   Endocrine: Positive for cold intolerance and polydipsia.   Genitourinary: Negative.    Musculoskeletal: Positive for neck stiffness.   Skin: Negative.    Allergic/Immunologic: Positive for environmental allergies.   Neurological: Positive for tremors.   Hematological: Bruises/bleeds easily.   Psychiatric/Behavioral: Negative.        The following portions of the patient's history were reviewed and updated as appropriate: allergies, current medications, past family history, past medical history, past social history, past surgical history and problem list.    /68   Pulse 62   Ht 160 cm (63\")   Wt 70.8 kg (156 lb)   SpO2 97%   BMI 27.63 " kg/m²   Physical Exam  Vitals reviewed.   Constitutional:       General: She is not in acute distress.     Appearance: She is well-developed. She is not diaphoretic.   HENT:      Head: Normocephalic.   Eyes:      Conjunctiva/sclera: Conjunctivae normal.      Pupils: Pupils are equal, round, and reactive to light.   Neck:      Thyroid: No thyromegaly.      Trachea: No tracheal deviation.      Comments: No palpable thyroid nodules    Cardiovascular:      Rate and Rhythm: Normal rate and regular rhythm.      Heart sounds: Normal heart sounds. No murmur heard.  Pulmonary:      Effort: Pulmonary effort is normal. No respiratory distress.      Breath sounds: Normal breath sounds.   Abdominal:      General: Bowel sounds are normal.      Palpations: Abdomen is soft. There is no mass.      Tenderness: There is no abdominal tenderness.   Lymphadenopathy:      Cervical: No cervical adenopathy.   Skin:     General: Skin is warm and dry.      Findings: No erythema.      Comments: No areas of hyperpigmentation   Neurological:      Mental Status: She is alert and oriented to person, place, and time.      Cranial Nerves: No cranial nerve deficit.   Psychiatric:         Behavior: Behavior normal.         LABS/IMAGING: outside records reviewed and summarized in HPI  Office Visit on 05/03/2023   Component Date Value Ref Range Status   • Hemoglobin A1C 05/03/2023 5.5  % Final   • Lot Number 05/03/2023 [id number]   Final   • Expiration Date 05/03/2023 12/12/24   Final       Lab Results   Component Value Date    TSH 2.350 02/09/2023       ASSESSMENT/PLAN:    1) prediabetes: controlled, A1C% 5.5 today  - Patient did not tolerate metformin in the past  - Continue Victoza 1.8 mg qAM  - Obtaining Victoza through [employer] PAP    RTC 6 months    Signed: Damari Fulton MD

## 2023-05-04 ENCOUNTER — OFFICE VISIT (OUTPATIENT)
Dept: PSYCHIATRY | Facility: CLINIC | Age: 58
End: 2023-05-04
Payer: MEDICARE

## 2023-05-04 VITALS
WEIGHT: 159 LBS | HEIGHT: 63 IN | BODY MASS INDEX: 28.17 KG/M2 | HEART RATE: 76 BPM | SYSTOLIC BLOOD PRESSURE: 118 MMHG | DIASTOLIC BLOOD PRESSURE: 74 MMHG

## 2023-05-04 DIAGNOSIS — F41.1 GENERALIZED ANXIETY DISORDER: Primary | ICD-10-CM

## 2023-05-04 DIAGNOSIS — F43.10 PTSD (POST-TRAUMATIC STRESS DISORDER): ICD-10-CM

## 2023-05-04 DIAGNOSIS — F33.1 MODERATE EPISODE OF RECURRENT MAJOR DEPRESSIVE DISORDER: ICD-10-CM

## 2023-05-04 DIAGNOSIS — F33.0 MILD EPISODE OF RECURRENT MAJOR DEPRESSIVE DISORDER: ICD-10-CM

## 2023-05-04 NOTE — PATIENT INSTRUCTIONS
"    Seasonal Affective Disorder  Seasonal affective disorder (SAD) is a type of depression. It is when you feel depressed at specific times of the year. SAD is most common during late fall and winter when the days are shorter and most people spend less time outdoors. This is why SAD is also known as the \"winter blues.\" SAD occurs less commonly in the spring or summer, but if it does, it may be associated with overexposure to light or high pollen counts.  SAD can be mild to severe, and it can interfere with work, school, relationships, and normal daily activities.  What are the causes?  The cause of this condition is not known. It may be related to changes in brain chemistry that are caused by having more or less exposure to daylight.  What increases the risk?  You are more likely to develop this condition if:  • You are female.  • You live far north or far south of the equator. These areas get less sunlight and have longer winter seasons.  • You have a personal history of depression or bipolar disorder.  • You have a family history of mental health conditions.  What are the signs or symptoms?  Symptoms of this condition include:  • Mood changes, such as:  ? Feeling sad or teary.  ? Having crying spells.  ? Irritability.  ? Feelings of guilt or worthlessness.  ? Thinking about self-harm or attempting suicide.  • Physical changes, such as:  ? Restlessness or loss of energy.  ? Difficulty concentrating, remembering, or making decisions.  ? A significant change in appetite or weight.  • Behavioral changes, such as:  ? Trouble sleeping, or sleeping more than usual.  ? Loss of interest in activities that you usually enjoy.  ? Overeating or craving sweet foods.  ? Avoiding social situations (social withdrawal), or feeling like \"hibernating.\"  Symptoms associated with the less common summer pattern of SAD include:  • Loss of appetite.  • Weight loss.  • Trouble sleeping.  • Episodes of violent behavior (in severe " cases).  How is this diagnosed?  This condition is usually diagnosed through an assessment with your health care provider. You will be asked about your moods, thoughts, and behaviors, and if you have noticed a seasonal pattern. You will also be asked about your medical history, any major life changes, and any medicines and substances that you use.  You may have a physical exam and blood tests to rule out other possible causes of your symptoms. You may be referred to a mental health specialist for more evaluation.  How is this treated?  Treatment for this condition may include:  • Light therapy. This therapy involves sitting in front of a light source for 15-30 minutes every day. The light source may be:  ? A light box designed specifically to treat SAD.  ? A jose simulator or sunrise clock. This is a timer-activated light source that copies the sunrise by slowly becoming brighter. This can help to activate your body's internal clock.  • Antidepressant medicine.  • Cognitive behavioral therapy (CBT). CBT is a form of talk therapy that helps to identify and change negative thoughts that are associated with SAD.  • Changes to your dietary, exercise, or sleeping habits. A healthy lifestyle may help to prevent or relieve symptoms.  Follow these instructions at home:  Medicines  • Take over-the-counter and prescription medicines only as told by your health care provider.  • If you are taking antidepressant medicines, ask your health care provider what side effects you should be aware of.  • Talk with your health care provider before you start taking any new prescription or over-the-counter medicines, herbs, or supplements.  Lifestyle  • Eat a healthy diet that includes fruits and vegetables, whole grains, and lean proteins.  • Get plenty of sleep. To improve your sleep, make sure you:  ? Keep your bedroom dark and cool.  ? Go to sleep and wake up at about the same time every day.  ? Do not keep screens, such as a TV or  smartphone, in your bedroom. Limit your screen time starting a few hours before bedtime.  • Exercise regularly.  • Limit alcohol and caffeine as told by your health care provider.  General instructions  • Attend CBT therapy sessions as directed.  • For fall and winter SAD only:  ? Make your home and work environment as yelena or bright as possible. Open window blinds and move furniture closer to windows.  ? Spend as much time outside as possible.  ? Use light therapy for 15-30 minutes every day, or as often as directed. This should start in the early fall and continue until spring. If SAD happens during the summer, light treatment would not be recommended.  • Keep all follow-up visits. This is important.  Where to find more information  • National [place] of Mental Health: www.nimh.nih.gov  • National [place] on Mental Illness: www.darryl.org  Contact a health care provider if:  • Your symptoms do not get better or they get worse.  • You have trouble taking care of yourself.  • You are using drugs or alcohol to cope with your symptoms.  • You have side effects from medicines.  Get help right away if:  • You have thoughts about hurting yourself or others.  If you ever feel like you may hurt yourself or others, or have thoughts about taking your own life, get help right away. Go to your nearest emergency department or:  • Call your local emergency services (416 in the [place]  • Call a suicide crisis helpline, such as the National Suicide Prevention Lifeline at 1-514.353.6662 or 264 in the [place] This is open 24 hours a day in the [place]  • Text the Crisis Text Line at 172930 (in the [place]  Summary  • Seasonal affective disorder (SAD) is a type of depression that is associated with specific times of the year (usually fall and winter).  • This condition may be treated with light therapy, talk therapy, and antidepressant medicines.  • To help treat your condition, take good care of yourself and keep all follow-up visits  with your health care provider.  • Get help right away if you have thoughts about hurting yourself or others.  This information is not intended to replace advice given to you by your health care provider. Make sure you discuss any questions you have with your health care provider.  Document Revised: 07/13/2022 Document Reviewed: 04/07/2022  Elsevier Patient Education © 2022 [employer]

## 2023-05-04 NOTE — PROGRESS NOTES
Subjective   Zo Palma is a 58 y.o. female who presents today for follow up    Chief Complaint:  Depression and anxiety    History of Present Illness:   History of Present Illness  Zo Palma presents today for medication management follow-up, along with her NOE.  Continues to take Prozac daily and verbalizes adequate control of anxiety, depression and mood fluctuations.  Says that she does have days of feeling more depressed, but has noticed that this occurs when there is an overcast out as she has history of seasonal depression.  Tries to stay busy, engaging in activities as a form of distraction.  Continues to help care for a friend with a history of DID that recently moved to [place] from [place]. Plan is still to move into his basement once contractors have completed the renovations.  Voices that she is happy with current medication regimen and denies any adverse effects.  Continues to travel with NOE, honoring verbal agreement with past psychiatrist to always travel with her dog due to frequent SI.  Says that having a service animal present helps decrease severity/frequency of SI.  Denies any current SI with plan or intent.  Continues to struggle with compulsive eating, was recently placed on Victoza that has been helpful in decreasing appetite.  Reports that she sleeps well.  Denies any HI, AVH.  PHQ-9 total score: 3, GURMEET-7 total score: 4.    Previous Psych Meds: Reports having tried multiple SSRIs and SNRIs along with Abilify, risperidone, Seroquel, Vraylar, Latuda, Lamictal, Trileptal and Depakote (caused liver failure).     The following portions of the patient's history were reviewed and updated as appropriate: allergies, current medications, past family history, past medical history, past social history, past surgical history and problem list.      Past Medical History:  Past Medical History:   Diagnosis Date   • Anxiety    • Depression    • Insomnia    • Migraines    • Obesity    • Peripheral  neuropathy    • Seizures 2001    Conversion Disorder       Social History:  Social History     Socioeconomic History   • Marital status:    Tobacco Use   • Smoking status: Never     Passive exposure: Past   • Smokeless tobacco: Never   Vaping Use   • Vaping Use: Never used   Substance and Sexual Activity   • Alcohol use: Yes     Alcohol/week: 1.0 standard drink     Types: 1 Drinks containing 0.5 oz of alcohol per week     Comment: Occasionally   • Drug use: Never   • Sexual activity: Defer     Partners: Male     Birth control/protection: Post-menopausal, Hysterectomy       Family History:  Family History   Problem Relation Age of Onset   • Cancer Mother         Brain, suspected uterine   • Cancer Father         Prostate   • Learning disabilities Father         Dyslexia   • Other Father         Early onset alzheimer   • Other Paternal Grandfather         Early onset alzheimer   • Cancer Brother         Prostate, bone   • Learning disabilities Brother         Dyslexia   • Other Brother         Alzheimer       Past Surgical History:  Past Surgical History:   Procedure Laterality Date   • BARIATRIC SURGERY  2008    Gastric sleeve   • BREAST SURGERY     • CHOLECYSTECTOMY  2010   • COLONOSCOPY     • FRACTURE SURGERY      Left wrist   • GASTRIC SLEEVE LAPAROSCOPIC N/A    • HYSTERECTOMY     • SINUS SURGERY  2021   • WISDOM TOOTH EXTRACTION N/A        Problem List:  Patient Active Problem List   Diagnosis   • Anxiety   • Bursitis of hip   • Cervical spondylosis with radiculopathy   • Chronic migraine without aura without status migrainosus, not intractable   • Conversion disorder with attacks or seizures, persistent, with psychological stressor   • Cramp of limb   • Prediabetes   • Diverticulosis of colon   • Elevated liver enzymes   • Eustachian tube dysfunction   • Foot drop   • Gastroesophageal reflux disease without esophagitis   • Hereditary and idiopathic peripheral neuropathy   • Hypersomnia   • Internal  "hemorrhoids   • Iron deficiency   • Lumbar radiculopathy, chronic   • Malaise and fatigue   • Metabolic syndrome   • Medicare annual wellness visit, subsequent   • Encounter for neuropsychological testing   • Mixed hyperlipidemia   • Moderate episode of recurrent major depressive disorder   • Obstructive sleep apnea   • Osteoarthritis of right temporomandibular joint   • Osteopenia of left lower leg   • Panic disorder   • PTSD (post-traumatic stress disorder)   • RLS (restless legs syndrome)   • Primary insomnia   • Sinus drainage   • Tinnitus   • Urge incontinence   • Urinary urgency   • Vitamin D deficiency   • Postmenopausal symptoms   • Postmenopausal osteoporosis       Allergy:   Allergies   Allergen Reactions   • Chlorzoxazone Unknown - High Severity and Swelling     Lorzone, muscle relaxant.   • Iodine Anaphylaxis     Topical makes her swell  Anaphylaxis with IV contrast (when she was ~ 9yrs old)   • Phenazopyridine Hcl Swelling and Anaphylaxis   • Pyridium [Phenazopyridine] Anaphylaxis   • Quinine Unknown - High Severity     Has malaria symptoms   • Valproic Acid Other (See Comments)     Liver failure  Liver failure  Liver failure; lupus like symptoms and liver failure   • Methocarbamol Other (See Comments)     Made her feel \"suicidal\" and gave her less control over her actions.   • Iodinated Contrast Media Unknown - Low Severity   • Acetaminophen-Codeine Itching   • Diphenhydramine Anxiety     Pt has severe panic attack symptoms when given benadryl IV. Needs to take a benzodiazapine med concurrently when getting benadryl.         Current Medications:   Current Outpatient Medications   Medication Sig Dispense Refill   • estradiol (ESTRACE) 0.1 MG/GM vaginal cream Insert 2 g into the vagina Daily. (Patient taking differently: Insert 2 g into the vagina 3 (Three) Times a Week.) 60 g 2   • FLUoxetine (PROzac) 10 MG capsule Take 2 capsules by mouth Daily. 180 capsule 0   • ibandronate (BONIVA) 150 MG tablet TAKE " "1 TABLET BY MOUTH 1 TIME MONTHLY     • Insulin Pen Needle (Pen Needles) 30G X 5 MM misc 1 each Daily. 100 each 3   • Liraglutide (Victoza) 18 MG/3ML solution pen-injector injection Inject 1.8 mg under the skin into the appropriate area as directed Daily. 9 mL 11   • NON FORMULARY Calcium 600mg-D3 120mcg Daily  D-Mannose 1000mg BID  Rhodiola Rosea 1000mg Daily  Taurine 1500mg 3 caps daily  Mag 500mg daily  Terra Cone Daily  Adrenal Support 2 caps daily  Chasteberry 300mg daily  D3 125 mcg daily  Dotorra Terrazyme 2 caps daily  Dotorra onguard daily  Pantethina 900mg daily  Dotorra Yarrow/pom 2 caps daily  Dotorra Sereneity daily  Doterra Alpha CRS 2 caps daily  Doterra Microplex 2 caps daily  Doterra EO Anthony 2 cap  Doterra MetaPWR 1 cap BID   Potassium 99mg daily     • omeprazole (priLOSEC) 20 MG capsule Take 1 capsule by mouth Daily. 90 capsule 0     No current facility-administered medications for this visit.       Review of Symptoms:    Review of Systems   Constitutional: Negative for activity change, appetite change, fatigue, unexpected weight gain and unexpected weight loss.   Eyes: Negative for visual disturbance.   Respiratory: Negative for shortness of breath.    Cardiovascular: Negative for chest pain.   Psychiatric/Behavioral: Positive for depressed mood and stress. Negative for sleep disturbance and suicidal ideas. The patient is nervous/anxious.      Physical Exam:   Physical Exam  Vitals reviewed.   Constitutional:       General: She is not in acute distress.     Appearance: Normal appearance.   Neurological:      Mental Status: She is alert.      Gait: Gait normal.     Vitals:   Blood pressure 118/74, pulse 76, height 160 cm (63\"), weight 72.1 kg (159 lb).    Mental Status Exam:   Hygiene:   good  Cooperation:  Cooperative  Eye Contact:  Good  Psychomotor Behavior:  Appropriate  Affect:  Full range and Appropriate  Mood: normal  Hopelessness: Denies  Speech:  Normal and Talkative  Thought Process:  Goal " directed and Linear  Thought Content:  Mood congruent  Suicidal:  None  Homicidal:  None  Hallucinations:  None  Delusion:  None  Memory:  Intact  Orientation:  Person, Place, Time and Situation  Reliability:  good  Insight:  Good  Judgement:  Good  Impulse Control:  Good    Lab Results:   Office Visit on 05/03/2023   Component Date Value Ref Range Status   • Hemoglobin A1C 05/03/2023 5.5  % Final   • Lot Number 05/03/2023 [id number]   Final   • Expiration Date 05/03/2023 12/12/24   Final   Office Visit on 04/27/2023   Component Date Value Ref Range Status   • Color 04/27/2023 Yellow  Yellow, Straw, Dark Yellow, Mely Final   • Clarity, UA 04/27/2023 Clear  Clear Final   • Specific Gravity  04/27/2023 1.030  1.005 - 1.030 Final   • pH, Urine 04/27/2023 6.0  5.0 - 8.0 Final   • Leukocytes 04/27/2023 Negative  Negative Final   • Nitrite, UA 04/27/2023 Negative  Negative Final   • Protein, POC 04/27/2023 Negative  Negative mg/dL Final   • Glucose, UA 04/27/2023 Negative  Negative mg/dL Final   • Ketones, UA 04/27/2023 Negative  Negative Final   • Urobilinogen, UA 04/27/2023 Normal  Normal, 0.2 E.U./dL Final   • Bilirubin 04/27/2023 Negative  Negative Final   • Blood, UA 04/27/2023 Negative  Negative Final   • Lot Number 04/27/2023 [id number]   Final   • Expiration Date 04/27/2023 02/08/2024   Final   • Urine Culture 04/27/2023 Final report (A)   Final   • Result 1 04/27/2023 Escherichia coli (A)   Final    Comment: Cefazolin <=4 ug/mL  Cefazolin with an CROW <=16 predicts susceptibility to the oral agents  cefaclor, cefdinir, cefpodoxime, cefprozil, cefuroxime, cephalexin,  and loracarbef when used for therapy of uncomplicated urinary tract  infections due to E. coli, Klebsiella pneumoniae, and Proteus  mirabilis.  Greater than 100,000 colony forming units per mL     • Susceptibility Testing 04/27/2023 Comment   Final    Comment:       ** S = Susceptible; I = Intermediate; R = Resistant **                     P =  Positive; N = Negative              MICS are expressed in micrograms per mL     Antibiotic                 RSLT#1    RSLT#2    RSLT#3    RSLT#4  Amoxicillin/Clavulanic Acid    S  Ampicillin                     R  Cefepime                       S  Ceftriaxone                    S  Cefuroxime                     S  Ciprofloxacin                  S  Ertapenem                      S  Gentamicin                     S  Imipenem                       S  Levofloxacin                   S  Meropenem                      S  Nitrofurantoin                 S  Piperacillin/Tazobactam        S  Tetracycline                   S  Tobramycin                     S  Trimethoprim/Sulfa             S     Lab on 02/09/2023   Component Date Value Ref Range Status   • ACTH 02/09/2023 8.7  7.2 - 63.3 pg/mL Final    ACTH reference interval for samples collected between 7 and 10 AM.   • TSH 02/09/2023 2.350  0.270 - 4.200 uIU/mL Final   • Free T4 02/09/2023 1.33  0.93 - 1.70 ng/dL Final   • Cortisol - AM 02/09/2023 13.95  mcg/dL Final   • Cortisol 02/09/2023 29.30    mcg/dL Final   • Cortisol 02/09/2023 34.80    mcg/dL Final   Office Visit on 11/14/2022   Component Date Value Ref Range Status   • Cortisol 11/14/2022 7.5  ug/dL Final    Comment:                         Cortisol AM         6.2 - 19.4                          Cortisol PM         2.3 - 11.9     • Hemoglobin A1C 11/14/2022 5.70 (H)  4.80 - 5.60 % Final    Comment: Hemoglobin A1C Ranges:  Increased Risk for Diabetes  5.7% to 6.4%  Diabetes                     >= 6.5%  Diabetic Goal                < 7.0%     • Total Cholesterol 11/14/2022 218 (H)  0 - 200 mg/dL Final    Comment: Cholesterol Reference Ranges  (U.S. Department of Health and Human Services ATP III  Classifications)  Desirable          <200 mg/dL  Borderline High    200-239 mg/dL  High Risk          >240 mg/dL  Triglyceride Reference Ranges  (U.S. Department of Health and Human Services ATP III  Classifications)  Normal            <150 mg/dL  Borderline High  150-199 mg/dL  High             200-499 mg/dL  Very High        >500 mg/dL  HDL Reference Ranges  (U.S. Department of Health and Human Services ATP III  Classifications)  Low     <40 mg/dl (major risk factor for CHD)  High    >60 mg/dl ('negative' risk factor for CHD)  LDL Reference Ranges  (U.S. Department of Health and Human Services ATP III  Classifications)  Optimal          <100 mg/dL  Near Optimal     100-129 mg/dL  Borderline High  130-159 mg/dL  High             160-189 mg/dL  Very High        >189 mg/dL     • Triglycerides 11/14/2022 144  0 - 150 mg/dL Final   • HDL Cholesterol 11/14/2022 69 (H)  40 - 60 mg/dL Final   • VLDL Cholesterol Armaan 11/14/2022 25  5 - 40 mg/dL Final   • LDL Chol Calc (NIH) 11/14/2022 124 (H)  0 - 100 mg/dL Final       EKG Results:  No orders to display       Assessment & Plan   Problems Addressed this Visit        Mental Health    Moderate episode of recurrent major depressive disorder    PTSD (post-traumatic stress disorder)   Other Visit Diagnoses     Generalized anxiety disorder    -  Primary    Mild episode of recurrent major depressive disorder          Diagnoses       Codes Comments    Generalized anxiety disorder    -  Primary ICD-10-CM: F41.1  ICD-9-CM: 300.02     Mild episode of recurrent major depressive disorder     ICD-10-CM: F33.0  ICD-9-CM: 296.31     PTSD (post-traumatic stress disorder)     ICD-10-CM: F43.10  ICD-9-CM: 309.81     Moderate episode of recurrent major depressive disorder     ICD-10-CM: F33.1  ICD-9-CM: 296.32           Visit Diagnoses:    ICD-10-CM ICD-9-CM   1. Generalized anxiety disorder  F41.1 300.02   2. Mild episode of recurrent major depressive disorder  F33.0 296.31   3. PTSD (post-traumatic stress disorder)  F43.10 309.81   4. Moderate episode of recurrent major depressive disorder  F33.1 296.32     Zo presents today for routine follow-up and medication management.  Has history of frequent mood  fluctuations, anxiety as well as depression.  Currently on Prozac and feels that medication has been beneficial.  Continues to take Prozac 10 mg daily on a consistent basis, takes 20 mg on occasion. Medication has been taking this way for several years and reports that depression, anxiety and mood fluctuations have been well controlled.  Discussed plan of care, voices that she would prefer to continue with 10 mg daily, taking an extra 10 mg capsule as needed when depression or anxiety is elevated. Denies any adverse effects of medication.  Continues to say that SI is constantly present, denies having plan or intent.  Continues to travel with NOE, honoring verbal contract with psychiatrist several years ago that she would never travel alone. Denies any AVH.    Continue Prozac 20 mg daily for depression and anxiety. Prefers to keep 10 mg capsules.  No refills needed at this time    -Reviewed previous available documentation and most recent available labs. YOSHI reviewed and is appropriate.    GOALS:  Short Term Goals: Patient will be compliant with medication, and patient will have no significant medication related side effects.  Patient will be engaged in psychotherapy as indicated.  Patient will report subjective improvement of symptoms.  Long term goals: To stabilize mood and treat/improve subjective symptoms, the patient will stay out of the hospital, the patient will be at an optimal level of functioning, and the patient will take all medications as prescribed.  The patient/guardian verbalized understanding and agreement with goals that were mutually set.    TREATMENT PLAN: Continue supportive psychotherapy efforts and medications as indicated for patient's diagnosis.  Pharmacological and Non-Pharmacological treatment options discussed during today's visit. Patient/Guardian acknowledged and verbally consented with current treatment plan and was educated on the importance of compliance with treatment and  follow-up appointments.      MEDICATION ISSUES:  Discussed medication options and treatment plan of prescribed medication as well as the risks, benefits, any black box warnings, and side effects including potential falls, possible impaired driving, and metabolic adversities among others. Patient is agreeable to call the office with any worsening of symptoms or onset of side effects, or if any concerns or questions arise.  The contact information for the office is made available to the patient. Patient is agreeable to call 911 or go to the nearest ER should they begin having any SI/HI, or if any urgent concerns arise. No medication side effects or related complaints today.     MEDS ORDERED DURING VISIT:  No orders of the defined types were placed in this encounter.      FOLLOW UP:  Return in about 3 months (around 8/4/2023) for Recheck.             This document has been electronically signed by TRACI Khan  May 4, 2023 12:28 EDT    Please note that portions of this note were completed with a voice recognition program. Efforts were made to edit dictation, but occasionally words are mistranscribed

## 2023-06-05 ENCOUNTER — TELEMEDICINE (OUTPATIENT)
Dept: PSYCHIATRY | Facility: CLINIC | Age: 58
End: 2023-06-05
Payer: MEDICARE

## 2023-06-05 DIAGNOSIS — F43.10 POST TRAUMATIC STRESS DISORDER (PTSD): ICD-10-CM

## 2023-06-05 DIAGNOSIS — F33.1 MAJOR DEPRESSIVE DISORDER, RECURRENT EPISODE, MODERATE: Primary | ICD-10-CM

## 2023-06-05 DIAGNOSIS — F41.1 GAD (GENERALIZED ANXIETY DISORDER): ICD-10-CM

## 2023-06-05 PROCEDURE — 90832 PSYTX W PT 30 MINUTES: CPT | Performed by: SOCIAL WORKER

## 2023-06-05 NOTE — PROGRESS NOTES
"Date: June 5, 2023  Time In: 1000  Time Out: 1028      PROGRESS NOTE  Data:  Zo Palma is a 58 y.o. female who presents today for individual therapy session through the [facility].  The Patient is seen remotely at their home, using Zoom. Patient is being seen via telehealth and stated they are in a secure environment for this session. The patient's condition being diagnosed/treated is appropriate for telemedicine. The provider identified herself as well as her credentials. The patient and/or patients guardian consent to be seen remotely, and when consent is given they understand that the consent allows for patient identifiable information to be sent to a third party as needed. They may refuse to be seen remotely at any time. The electronic data is encrypted and password protected, and the patient has been advised of the potential risks to privacy not withstanding such measures.     Chief Complaint: depression, anxiety and trauma    Patient reports feeling \"ok\" today. She discussed moments of frustration with current stressors. She reports taking a day off every 3-4 days just to herself. She reports having no contact with her brothers would be helpful for dealing with trauma. She reports some passive suicidal ideations at times. She reports her dog is a protective factor and that as long as she has him or a  person in the car she would not drive off a yohan.  She says this has been the safety plan since seeing her previous psychiatrist. She reports being busy continues to be helpful for her.       Clinical Maneuvering/Intervention:    Assisted patient in processing above session content; acknowledged and normalized patient’s thoughts, feelings, and concerns.  Rationalized patient thought process regarding feelings of frustration with changes in home environment and being a caregiver. We discussed work boundaries to help with stress management and she plans on going over this with roommate. " Patient will continue practicing coping skills and safety plan.  Allowed patient to freely discuss issues without interruption or judgment. Provided safe, confidential environment to facilitate the development of positive therapeutic relationship and encourage open, honest communication. Assisted patient in identifying risk factors which would indicate the need for higher level of care including thoughts to harm self or others and/or self-harming behavior and encouraged patient to contact this office, call 911, or present to the nearest emergency room should any of these events occur. Discussed crisis intervention services and means to access. Patient adamantly and convincingly denies current suicidal or homicidal ideation or perceptual disturbance.    Assessment   Patient appears to maintain relative stability as compared to their baseline.  However, patient continues to struggle with depression and anxiety which continues to cause impairment in important areas of functioning.  A result, they can be reasonably expected to continue to benefit from treatment and would likely be at increased risk for decompensation otherwise.    Mental Status Exam:   Hygiene:    video visit  Cooperation:  Cooperative  Eye Contact:  Good  Psychomotor Behavior:  Appropriate  Affect:  Appropriate  Mood: normal  Speech:  Normal  Thought Process:  Goal directed and Linear  Thought Content:  Mood congruent  Suicidal:  None  Homicidal:  None  Hallucinations:  None  Delusion:  None  Memory:  Intact  Orientation:  Person, Place, Time, and Situation  Reliability:  good  Insight:  Good  Judgement:  Fair  Impulse Control:  Good  Physical/Medical Issues:  No          Patient's Support Network Includes:   daughter and friends    Functional Status: Mild impairment     Progress toward goal: Not at goal    Prognosis: Good with Ongoing Treatment              Plan     Patient will continue in individual outpatient therapy with focus on improved  functioning and coping skills, maintaining stability, and avoiding decompensation and the need for higher level of care.    Patient will adhere to medication regimen as prescribed and report any side effects. Patient will contact this office, call 911 or present to the nearest emergency room should suicidal or homicidal ideations occur. Provide Cognitive Behavioral Therapy and Solution Focused Therapy to improve functioning, maintain stability, and avoid decompensation and the need for higher level of care.     Return in about 2 weeks, or earlier if symptoms worsen or fail to improve.           VISIT DIAGNOSIS:     ICD-10-CM ICD-9-CM   1. Major depressive disorder, recurrent episode, moderate  F33.1 296.32   2. Post traumatic stress disorder (PTSD)  F43.10 309.81   3. GURMEET (generalized anxiety disorder)  F41.1 300.02            This document has been electronically signed by Meyl Tate LCSW  June 5, 2023 09:59 EDT    Part of this note may be an electronic transcription/translation of spoken language to printed text using the Dragon Dictation System.

## 2023-06-19 ENCOUNTER — OFFICE VISIT (OUTPATIENT)
Dept: PSYCHIATRY | Facility: CLINIC | Age: 58
End: 2023-06-19
Payer: MEDICARE

## 2023-06-19 DIAGNOSIS — F43.10 POST TRAUMATIC STRESS DISORDER (PTSD): ICD-10-CM

## 2023-06-19 DIAGNOSIS — F33.1 MAJOR DEPRESSIVE DISORDER, RECURRENT EPISODE, MODERATE: Primary | ICD-10-CM

## 2023-06-19 DIAGNOSIS — F41.1 GENERALIZED ANXIETY DISORDER: ICD-10-CM

## 2023-06-19 NOTE — PROGRESS NOTES
"Date: 06/19/2023   Time In: 1002  Time Out: 1055    PROGRESS NOTE  Data:  Zo Palma is a 58 y.o. female who presents today for individual therapy session at [facility]. Chief Complaint: depression, anxiety and PTSD    Patient discussed experiencing \"disasters\" regarding the home projects they are working on. She discussed insight into her mood recently and was able to identify why she felt grumpy. She discussed problems with sleep and reports sleeping 2-4 hours per night. She discussed fear of doors being closed and discussed incidents that lead to this fear. She would like to work through this anxiety.    Clinical Maneuvering/Intervention:      Assisted patient in processing above session content; acknowledged and normalized patient’s thoughts, feelings, and concerns.  Rationalized patient thought process regarding mood changes and stressors that have contributed to problems with sleep.  We discussed re-framing negative thoughts associated with doors to reduce anxiety.  Allowed patient to freely discuss issues without interruption or judgment. Provided safe, confidential environment to facilitate the development of positive therapeutic relationship and encourage open, honest communication. Assisted patient in identifying risk factors which would indicate the need for higher level of care including thoughts to harm self or others and/or self-harming behavior and encouraged patient to contact this office, call 911, or present to the nearest emergency room should any of these events occur. Discussed crisis intervention services and means to access. Patient adamantly and convincingly denies current suicidal or homicidal ideation or perceptual disturbance.    Assessment   Patient appears to maintain relative stability as compared to their baseline.  However, patient continues to struggle with anxiety and depression which continues to cause impairment in important areas of functioning.  As a " result, they can be reasonably expected to continue to benefit from treatment and would likely be at increased risk for decompensation otherwise.    Mental Status Exam:   Hygiene:   good  Cooperation:  Cooperative  Eye Contact:  Good  Psychomotor Behavior:  Appropriate  Affect:  Appropriate  Mood: anxious  Speech:  Normal  Thought Process:  Goal directed and Linear  Thought Content:  Mood congruent  Suicidal:  None  Homicidal:  None  Hallucinations:  None  Delusion:  None  Memory:  Intact  Orientation:  Person, Place, Time and Situation  Reliability:  good  Insight:  Good  Judgement:  Good  Impulse Control:  Good  Physical/Medical Issues:  No      Patient's Support Network Includes:  children and friends    Functional Status: Mild impairment     Progress toward goal: Not at goal    Prognosis: Good with Ongoing Treatment          Plan     VISIT DIAGNOSIS:     ICD-10-CM ICD-9-CM   1. Major depressive disorder, recurrent episode, moderate  F33.1 296.32   2. Post traumatic stress disorder (PTSD)  F43.10 309.81   3. Generalized anxiety disorder  F41.1 300.02        Patient will continue in individual outpatient therapy with focus on improved functioning and coping skills, maintaining stability, and avoiding decompensation and the need for higher level of care.    Patient will adhere to medication regimen as prescribed and report any side effects. Patient will contact this office, call 911 or present to the nearest emergency room should suicidal or homicidal ideations occur. Provide Cognitive Behavioral Therapy and Solution Focused Therapy to improve functioning, maintain stability, and avoid decompensation and the need for higher level of care.     Return in about Return in about 2 weeks (around 7/3/2023). or earlier if symptoms worsen or fail to improve.            This document has been electronically signed by Mely Tate LCSW  June 19, 2023 10:02 EDT      Part of this note may be an electronic  transcription/translation of spoken language to printed text using the Dragon Dictation System.

## 2023-07-31 ENCOUNTER — TELEMEDICINE (OUTPATIENT)
Dept: PSYCHIATRY | Facility: CLINIC | Age: 58
End: 2023-07-31
Payer: MEDICARE

## 2023-07-31 DIAGNOSIS — F43.10 PTSD (POST-TRAUMATIC STRESS DISORDER): ICD-10-CM

## 2023-07-31 DIAGNOSIS — F41.1 GENERALIZED ANXIETY DISORDER: ICD-10-CM

## 2023-07-31 DIAGNOSIS — F33.1 MODERATE EPISODE OF RECURRENT MAJOR DEPRESSIVE DISORDER: Primary | ICD-10-CM

## 2023-07-31 PROCEDURE — 90834 PSYTX W PT 45 MINUTES: CPT | Performed by: SOCIAL WORKER

## 2023-08-01 ENCOUNTER — HOSPITAL ENCOUNTER (OUTPATIENT)
Dept: ULTRASOUND IMAGING | Facility: HOSPITAL | Age: 58
Discharge: HOME | End: 2023-08-01
Payer: MEDICARE

## 2023-08-01 ENCOUNTER — HOSPITAL ENCOUNTER (OUTPATIENT)
Dept: GENERAL RADIOLOGY | Facility: HOSPITAL | Age: 58
Discharge: HOME | End: 2023-08-01
Payer: MEDICARE

## 2023-08-01 ENCOUNTER — OFFICE VISIT (OUTPATIENT)
Dept: FAMILY MEDICINE CLINIC | Facility: CLINIC | Age: 58
End: 2023-08-01
Payer: MEDICARE

## 2023-08-01 VITALS
DIASTOLIC BLOOD PRESSURE: 78 MMHG | HEART RATE: 79 BPM | TEMPERATURE: 97.6 F | OXYGEN SATURATION: 100 % | SYSTOLIC BLOOD PRESSURE: 122 MMHG | BODY MASS INDEX: 25.41 KG/M2 | WEIGHT: 143.4 LBS | HEIGHT: 63 IN

## 2023-08-01 DIAGNOSIS — R60.0 EDEMA OF RIGHT LOWER EXTREMITY: ICD-10-CM

## 2023-08-01 DIAGNOSIS — M25.512 ARTHRALGIA OF LEFT SHOULDER REGION: ICD-10-CM

## 2023-08-01 DIAGNOSIS — M79.672 LEFT FOOT PAIN: ICD-10-CM

## 2023-08-01 DIAGNOSIS — M79.604 RIGHT LEG PAIN: ICD-10-CM

## 2023-08-01 DIAGNOSIS — M75.52 SUBACROMIAL BURSITIS OF LEFT SHOULDER JOINT: ICD-10-CM

## 2023-08-01 DIAGNOSIS — M75.82 ROTATOR CUFF TENDONITIS, LEFT: ICD-10-CM

## 2023-08-01 DIAGNOSIS — M25.512 ARTHRALGIA OF SHOULDER REGION, LEFT: Primary | ICD-10-CM

## 2023-08-01 PROCEDURE — 73630 X-RAY EXAM OF FOOT: CPT

## 2023-08-01 PROCEDURE — 99214 OFFICE O/P EST MOD 30 MIN: CPT | Performed by: FAMILY MEDICINE

## 2023-08-01 PROCEDURE — 93971 EXTREMITY STUDY: CPT

## 2023-08-01 PROCEDURE — 3044F HG A1C LEVEL LT 7.0%: CPT | Performed by: FAMILY MEDICINE

## 2023-08-01 RX ORDER — SULINDAC 200 MG/1
TABLET ORAL
Qty: 35 TABLET | Refills: 0 | Status: SHIPPED | OUTPATIENT
Start: 2023-08-01

## 2023-08-01 NOTE — PROGRESS NOTES
"                      Established Patient        Chief Complaint:   Chief Complaint   Patient presents with    Right leg and knee pain with numbness     Left shoulder pain as well        Zo Palma is a 58 y.o. female    History of Present Illness:   Answers submitted by the patient for this visit:  Other (Submitted on 7/31/2023)  Please describe your symptoms.: 1. Numbness in lower right leg -sock neuralgia , 2. Left rotator cuff injury  Have you had these symptoms before?: No  How long have you been having these symptoms?: Greater than 2 weeks  Please list any medications you are currently taking for this condition.: Tylenol for shoulder pain  Please describe any probable cause for these symptoms. : 1. 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 Torn rotator cuff  Primary Reason for Visit (Submitted on 7/31/2023)  What is the primary reason for your visit?: Other      Subjective     The following portions of the patient's history were reviewed and updated as appropriate: allergies, current medications, past family history, past medical history, past social history, past surgical history and problem list.    Allergies   Allergen Reactions    Chlorzoxazone Unknown - High Severity and Swelling     Lorzone, muscle relaxant.    Iodine Anaphylaxis     Topical makes her swell  Anaphylaxis with IV contrast (when she was ~ 9yrs old)    Phenazopyridine Hcl Swelling and Anaphylaxis    Pyridium [Phenazopyridine] Anaphylaxis    Quinine Unknown - High Severity     Has malaria symptoms    Valproic Acid Other (See Comments)     Liver failure  Liver failure  Liver failure; lupus like symptoms and liver failure    Methocarbamol Other (See Comments)     Made her feel \"suicidal\" and gave her less control over her actions.    Iodinated Contrast Media Unknown - Low Severity    Acetaminophen-Codeine Itching    Diphenhydramine Anxiety     Pt has severe panic attack symptoms when given benadryl IV. Needs to take a benzodiazapine med concurrently " "when getting benadryl.        Review of Systems  Constitutional: Negative for fever. Negative for chills, diaphoresis, fatigue and unexpected weight change.   HENT: No dysphagia; no changes to vision/hearing/smell/taste; no epistaxis  Eyes: Negative for redness and visual disturbance.   Respiratory: negative for shortness of breath. Negative for chest pain . Negative for cough and chest tightness.   Cardiovascular: Negative for chest pain and palpitations.   Gastrointestinal: Negative for abdominal distention, abdominal pain and blood in stool.   Endocrine: Negative for cold intolerance and heat intolerance.   Genitourinary: As per above.  Musculoskeletal: Chronic arthralgias, back pain and myalgias.  Otherwise, as per above.  Skin: Negative for color change, rash and wound.   Neurological: Negative for syncope, weakness and headaches.   Hematological: Negative for adenopathy. Does not bruise/bleed easily.   Psychiatric/Behavioral: Negative for confusion. The patient is not nervous/anxious.    Objective     Physical Exam   Vital Signs: /78   Pulse 79   Temp 97.6 øF (36.4 øC)   Ht 160 cm (63\")   Wt 65 kg (143 lb 6.4 oz)   SpO2 100%   BMI 25.40 kg/mý   BMI is >= 25 and <30. (Overweight) The following options were offered after discussion;: exercise counseling/recommendations and nutrition counseling/recommendations    General Appearance: alert, oriented x 3, no acute distress.  Pleasant and interactive during questioning/examination.  Has service dog.   Skin: warm and dry.   HEENT: Atraumatic.  pupils round and reactive to light and accommodation, oral mucosa pink and moist.  Nares patent without epistaxis.  External auditory canals are patent tympanic membranes intact.  Neck: supple, no JVD, trachea midline.  No thyromegaly  Lungs: CTA, unlabored breathing effort.  Heart: RRR, normal S1 and S2, no S3, no rub.  Abdomen: soft, non-tender, no palpable bladder, present bowel sounds to auscultation x4.  No " guarding or rigidity.  No CVA tenderness.  Postsurgical scarring noted.  Extremities: no clubbing, cyanosis.  Symmetric muscle strength and development.  Edema noted to the right lower leg.  There is impaired range of motion A/P to the left shoulder, pain on palpation to the left subacromial space.  Pain relieved with traction of the upper extremity.  Apley scratch test limited due to discomfort.  Cross body abduction worsens discomfort.  She is able to ambulate independently, toe raise additionally.  Tess/Simmons sign negative to bilateral lower extremities.  Neuro: normal speech and mental status.  Cranial nerves II through XII intact.  No anosmia. DTR 2+; proprioception intact.  No focal motor/sensory deficits.    Advance Care Planning   ACP discussion was held with the patient during this visit. Patient has an advance directive in EMR which is still valid.        Assessment and Plan      Assessment/Plan:   Diagnoses and all orders for this visit:    1. Arthralgia of shoulder region, left (Primary)  -     sulindac (CLINORIL) 200 MG tablet; 1 tab po bid with meals x 14 days; then dec to 1 tab po daily x 7 days; then STOP  Dispense: 35 tablet; Refill: 0    2. Right leg pain  -     US venous doppler lower extremity right (duplex)    3. Arthralgia of left shoulder region  -     sulindac (CLINORIL) 200 MG tablet; 1 tab po bid with meals x 14 days; then dec to 1 tab po daily x 7 days; then STOP  Dispense: 35 tablet; Refill: 0    4. Subacromial bursitis of left shoulder joint  -     sulindac (CLINORIL) 200 MG tablet; 1 tab po bid with meals x 14 days; then dec to 1 tab po daily x 7 days; then STOP  Dispense: 35 tablet; Refill: 0    5. Rotator cuff tendonitis, left    6. Left foot pain  -     XR Foot 3+ View Left    7. Edema of right lower extremity    I have recommended utilization of sulindac 200 mg, 1 tablet twice daily with meals for the first 14 days, then decrease to 1 p.o. daily with meal x7 days, then stop.   This should provide relief of her left subacromial bursitis.  I have also advised her to utilize ice compress therapy to the affected shoulder on an as-needed basis.    Venous ultrasound right lower extremity to rule out insidious DVT.    X-rays ordered of left foot additionally.  Symptoms favor degenerative osteoarthritis.    Vital signs demonstrate hemodynamic stability.      Discussion Summary:    Discussed plan of care in detail with pt today; pt verb understanding and agrees.    I spent 30 minutes caring for Zo on this date of service. This time includes time spent by me in the following activities:preparing for the visit, performing a medically appropriate examination and/or evaluation , counseling and educating the patient/family/caregiver, ordering medications, tests, or procedures, documenting information in the medical record, and care coordination    I have reviewed and updated all copied forward information, as appropriate.  I attest to the accuracy and relevance of any unchanged information.    Follow up:  No follow-ups on file.     There are no Patient Instructions on file for this visit.    Evan Rivas DO  08/01/23  10:44 EDT

## 2023-08-17 ENCOUNTER — OFFICE VISIT (OUTPATIENT)
Dept: PSYCHIATRY | Facility: CLINIC | Age: 58
End: 2023-08-17
Payer: MEDICARE

## 2023-08-17 VITALS
SYSTOLIC BLOOD PRESSURE: 128 MMHG | WEIGHT: 147 LBS | DIASTOLIC BLOOD PRESSURE: 82 MMHG | BODY MASS INDEX: 26.05 KG/M2 | HEART RATE: 70 BPM | HEIGHT: 63 IN

## 2023-08-17 DIAGNOSIS — F33.1 MODERATE EPISODE OF RECURRENT MAJOR DEPRESSIVE DISORDER: Primary | ICD-10-CM

## 2023-08-17 DIAGNOSIS — F41.1 GAD (GENERALIZED ANXIETY DISORDER): ICD-10-CM

## 2023-08-17 RX ORDER — FLUOXETINE 10 MG/1
20 CAPSULE ORAL DAILY
Qty: 180 CAPSULE | Refills: 0 | Status: SHIPPED | OUTPATIENT
Start: 2023-08-17

## 2023-08-17 NOTE — PROGRESS NOTES
Subjective   Zo Palma is a 58 y.o. female who presents today for follow up    Chief Complaint:  Depression and anxiety    History of Present Illness:   History of Present Illness  Zo Palma presents today for medication management follow-up along with her NOE (Jesús). Taking Prozac daily and reports that symptoms of depression remain at baseline. Reports no significant changes in mood. Continues to travel with NOE to honor the agreement with past psychiatrist to always travel with her dog due to frequent SI.  Reports that she does continue to have frequent SI with ideations that worsen when driving alone. Reports that she traveled without her NOE 2 to 3 weeks ago and reports increase in severity of SI.  Verbalizes that she will no longer travel alone. Hoping to sell her house soon to help with financial burdens.  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  Says that she has been trying to engage in more self-care and feels this has been helpful.  Continues to use methods of distraction to help with depression and anxiety. Still taking Victoza to help with compulsive eating and reports that medication works well.  Reports that sleep is adequate.  Denies any HI, AVH.  PHQ-9 total score: 5, GURMEET-7 total score: 5.    Previous Psych Meds: Reports having tried multiple SSRIs and SNRIs along with Abilify, risperidone, Seroquel, Vraylar, Latuda, Lamictal, Trileptal and Depakote (caused liver failure).     The following portions of the patient's history were reviewed and updated as appropriate: allergies, current medications, past family history, past medical history, past social history, past surgical history and problem list.      Past Medical History:  Past Medical History:   Diagnosis Date    Anxiety     Depression     Insomnia     Migraines     Obesity     Peripheral neuropathy     Seizures 2001    Conversion Disorder       Social History:  Social History      Socioeconomic History    Marital status:    Tobacco Use    Smoking status: Never     Passive exposure: Past    Smokeless tobacco: Never   Vaping Use    Vaping Use: Never used   Substance and Sexual Activity    Alcohol use: Yes     Alcohol/week: 1.0 standard drink     Types: 1 Drinks containing 0.5 oz of alcohol per week     Comment: Occasionally    Drug use: Never    Sexual activity: Defer     Partners: Male     Birth control/protection: Post-menopausal, Hysterectomy       Family History:  Family History   Problem Relation Age of Onset    Cancer Mother         Brain, suspected uterine    Cancer Father         Prostate    Learning disabilities Father         Dyslexia    Other Father         Early onset alzheimer    Other Paternal Grandfather         Early onset alzheimer    Cancer Brother         Prostate, bone    Learning disabilities Brother         Dyslexia    Other Brother         Alzheimer       Past Surgical History:  Past Surgical History:   Procedure Laterality Date    BARIATRIC SURGERY  2008    Gastric sleeve    BREAST SURGERY      CHOLECYSTECTOMY  2010    COLONOSCOPY      FRACTURE SURGERY      Left wrist    GASTRIC SLEEVE LAPAROSCOPIC N/A     HYSTERECTOMY      SINUS SURGERY  2021    WISDOM TOOTH EXTRACTION N/A        Problem List:  Patient Active Problem List   Diagnosis    Anxiety    Bursitis of hip    Cervical spondylosis with radiculopathy    Chronic migraine without aura without status migrainosus, not intractable    Conversion disorder with attacks or seizures, persistent, with psychological stressor    Cramp of limb    Prediabetes    Diverticulosis of colon    Elevated liver enzymes    Eustachian tube dysfunction    Foot drop    Gastroesophageal reflux disease without esophagitis    Hereditary and idiopathic peripheral neuropathy    Hypersomnia    Internal hemorrhoids    Iron deficiency    Lumbar radiculopathy, chronic    Malaise and fatigue    Metabolic syndrome    Medicare annual wellness  "visit, subsequent    Encounter for neuropsychological testing    Mixed hyperlipidemia    Moderate episode of recurrent major depressive disorder    Obstructive sleep apnea    Osteoarthritis of right temporomandibular joint    Osteopenia of left lower leg    Panic disorder    PTSD (post-traumatic stress disorder)    RLS (restless legs syndrome)    Primary insomnia    Sinus drainage    Tinnitus    Urge incontinence    Urinary urgency    Vitamin D deficiency    Postmenopausal symptoms    Postmenopausal osteoporosis    Right leg pain    Edema of right lower extremity       Allergy:   Allergies   Allergen Reactions    Chlorzoxazone Unknown - High Severity and Swelling     Lorzone, muscle relaxant.    Iodine Anaphylaxis     Topical makes her swell  Anaphylaxis with IV contrast (when she was ~ 9yrs old)    Phenazopyridine Hcl Swelling and Anaphylaxis    Pyridium [Phenazopyridine] Anaphylaxis    Quinine Unknown - High Severity     Has malaria symptoms    Valproic Acid Other (See Comments)     Liver failure  Liver failure  Liver failure; lupus like symptoms and liver failure    Methocarbamol Other (See Comments)     Made her feel \"suicidal\" and gave her less control over her actions.    Iodinated Contrast Media Unknown - Low Severity    Acetaminophen-Codeine Itching    Diphenhydramine Anxiety     Pt has severe panic attack symptoms when given benadryl IV. Needs to take a benzodiazapine med concurrently when getting benadryl.         Current Medications:   Current Outpatient Medications   Medication Sig Dispense Refill    estradiol (ESTRACE) 0.1 MG/GM vaginal cream Insert 2 g into the vagina Daily. (Patient taking differently: Insert 2 g into the vagina 3 (Three) Times a Week.) 60 g 2    FLUoxetine (PROzac) 10 MG capsule Take 2 capsules by mouth Daily. 180 capsule 0    ibandronate (BONIVA) 150 MG tablet TAKE 1 TABLET BY MOUTH 1 TIME MONTHLY      Insulin Pen Needle (Pen Needles) 30G X 5 MM misc 1 each Daily. 100 each 3    " "Liraglutide (Victoza) 18 MG/3ML solution pen-injector injection Inject 1.8 mg under the skin into the appropriate area as directed Daily. 9 mL 11    NON FORMULARY Calcium 600mg-D3 120mcg Daily  D-Mannose 1000mg BID  Rhodiola Rosea 1000mg Daily  Taurine 1500mg 3 caps daily  Mag 500mg daily  Terra Cone Daily  Adrenal Support 2 caps daily  Chasteberry 300mg daily  D3 125 mcg daily  Dotorra Terrazyme 2 caps daily  Dotorra onguard daily  Pantethina 900mg daily  Dotorra Yarrow/pom 2 caps daily  Dotorra Sereneity daily  Doterra Alpha CRS 2 caps daily  Doterra Microplex 2 caps daily  Doterra EO Anthony 2 cap  Doterra MetaPWR 1 cap BID   Potassium 99mg daily      omeprazole (priLOSEC) 20 MG capsule Take 1 capsule by mouth Daily. 90 capsule 0    sulindac (CLINORIL) 200 MG tablet 1 tab po bid with meals x 14 days; then dec to 1 tab po daily x 7 days; then STOP 35 tablet 0     No current facility-administered medications for this visit.     Review of Systems   Constitutional:  Negative for activity change, appetite change, unexpected weight gain and unexpected weight loss.   Respiratory:  Negative for shortness of breath.    Cardiovascular:  Negative for chest pain.   Psychiatric/Behavioral:  Positive for depressed mood and stress. Negative for sleep disturbance. The patient is nervous/anxious.      Physical Exam  Vitals reviewed.   Constitutional:       General: She is not in acute distress.     Appearance: Normal appearance.   Neurological:      Mental Status: She is alert.      Gait: Gait normal.     Vitals:   Blood pressure 128/82, pulse 70, height 160 cm (63\"), weight 66.7 kg (147 lb).    Mental Status Exam:   Hygiene:   good  Cooperation:  Cooperative  Eye Contact:  Good  Psychomotor Behavior:  Appropriate  Affect:  Full range and Appropriate  Mood: normal  Hopelessness: Denies  Speech:  Normal and Talkative  Thought Process:  Goal directed and Linear  Thought Content:  Mood congruent  Suicidal:  None  Homicidal:  " None  Hallucinations:  None  Delusion:  None  Memory:  Intact  Orientation:  Person, Place, Time, and Situation  Reliability:  good  Insight:  Good  Judgement:  Good  Impulse Control:  Good    Lab Results:   Office Visit on 05/03/2023   Component Date Value Ref Range Status    Hemoglobin A1C 05/03/2023 5.5  % Final    Lot Number 05/03/2023 [id number]   Final    Expiration Date 05/03/2023 12/12/24   Final   Office Visit on 04/27/2023   Component Date Value Ref Range Status    Color 04/27/2023 Yellow  Yellow, Straw, Dark Yellow, Mely Final    Clarity, UA 04/27/2023 Clear  Clear Final    Specific Gravity  04/27/2023 1.030  1.005 - 1.030 Final    pH, Urine 04/27/2023 6.0  5.0 - 8.0 Final    Leukocytes 04/27/2023 Negative  Negative Final    Nitrite, UA 04/27/2023 Negative  Negative Final    Protein, POC 04/27/2023 Negative  Negative mg/dL Final    Glucose, UA 04/27/2023 Negative  Negative mg/dL Final    Ketones, UA 04/27/2023 Negative  Negative Final    Urobilinogen, UA 04/27/2023 Normal  Normal, 0.2 E.U./dL Final    Bilirubin 04/27/2023 Negative  Negative Final    Blood, UA 04/27/2023 Negative  Negative Final    Lot Number 04/27/2023 [id number]   Final    Expiration Date 04/27/2023 02/08/2024   Final    Urine Culture 04/27/2023 Final report (A)   Final    Result 1 04/27/2023 Escherichia coli (A)   Final    Comment: Cefazolin <=4 ug/mL  Cefazolin with an CROW <=16 predicts susceptibility to the oral agents  cefaclor, cefdinir, cefpodoxime, cefprozil, cefuroxime, cephalexin,  and loracarbef when used for therapy of uncomplicated urinary tract  infections due to E. coli, Klebsiella pneumoniae, and Proteus  mirabilis.  Greater than 100,000 colony forming units per mL      Susceptibility Testing 04/27/2023 Comment   Final    Comment:       ** S = Susceptible; I = Intermediate; R = Resistant **                     P = Positive; N = Negative              MICS are expressed in micrograms per mL     Antibiotic                  RSLT#1    RSLT#2    RSLT#3    RSLT#4  Amoxicillin/Clavulanic Acid    S  Ampicillin                     R  Cefepime                       S  Ceftriaxone                    S  Cefuroxime                     S  Ciprofloxacin                  S  Ertapenem                      S  Gentamicin                     S  Imipenem                       S  Levofloxacin                   S  Meropenem                      S  Nitrofurantoin                 S  Piperacillin/Tazobactam        S  Tetracycline                   S  Tobramycin                     S  Trimethoprim/Sulfa             S         EKG Results:  No orders to display       Assessment & Plan   Problems Addressed this Visit          Mental Health    Moderate episode of recurrent major depressive disorder - Primary    Relevant Medications    FLUoxetine (PROzac) 10 MG capsule     Other Visit Diagnoses       GURMEET (generalized anxiety disorder)        Relevant Medications    FLUoxetine (PROzac) 10 MG capsule          Diagnoses         Codes Comments    Moderate episode of recurrent major depressive disorder    -  Primary ICD-10-CM: F33.1  ICD-9-CM: 296.32     GURMEET (generalized anxiety disorder)     ICD-10-CM: F41.1  ICD-9-CM: 300.02             Visit Diagnoses:    ICD-10-CM ICD-9-CM   1. Moderate episode of recurrent major depressive disorder  F33.1 296.32   2. GURMEET (generalized anxiety disorder)  F41.1 300.02     Zo presents today for medication management follow-up.  Reports that she continues to do well with medication and says that her mood remains at baseline. Verbalizes that she takes Prozac daily, but will take 20 mg on occasion to help with better control of symptoms.  Says that she has done medication this way for multiple years and symptoms have continued to be adequately controlled.  She voices interest in possibly starting Spravato to help with SI as she has been dealing with SI frequently for several years. Says that SI is constantly present, denies having  any plans or intent.  Provided information on Spravato and discussed requirements along with possible adverse effects. Verbalizes that she will continue to consider Spravato.  Discussed plan and is agreeable to continue with Prozac for now as previously prescribed.  Denies any adverse effects of medication.    -Refill Prozac 20 mg daily for depression and anxiety. Prefers to keep 10 mg capsules.     -Reviewed previous available documentation and most recent available labs. YOSHI reviewed and is appropriate.    GOALS:  Short Term Goals: Patient will be compliant with medication, and patient will have no significant medication related side effects.  Patient will be engaged in psychotherapy as indicated.  Patient will report subjective improvement of symptoms.  Long term goals: To stabilize mood and treat/improve subjective symptoms, the patient will stay out of the hospital, the patient will be at an optimal level of functioning, and the patient will take all medications as prescribed.  The patient/guardian verbalized understanding and agreement with goals that were mutually set.    TREATMENT PLAN: Continue supportive psychotherapy efforts and medications as indicated for patient's diagnosis.  Pharmacological and Non-Pharmacological treatment options discussed during today's visit. Patient/Guardian acknowledged and verbally consented with current treatment plan and was educated on the importance of compliance with treatment and follow-up appointments.      MEDICATION ISSUES:  Discussed medication options and treatment plan of prescribed medication as well as the risks, benefits, any black box warnings, and side effects including potential falls, possible impaired driving, and metabolic adversities among others. Patient is agreeable to call the office with any worsening of symptoms or onset of side effects, or if any concerns or questions arise.  The contact information for the office is made available to the patient.  Patient is agreeable to call 911 or go to the nearest ER should they begin having any SI/HI, or if any urgent concerns arise. No medication side effects or related complaints today.     MEDS ORDERED DURING VISIT:  New Medications Ordered This Visit   Medications    FLUoxetine (PROzac) 10 MG capsule     Sig: Take 2 capsules by mouth Daily.     Dispense:  180 capsule     Refill:  0       FOLLOW UP:  Return in about 3 months (around 11/17/2023), or if symptoms worsen or fail to improve, for Recheck.             This document has been electronically signed by TRACI Khan  August 17, 2023 13:07 EDT    Please note that portions of this note were completed with a voice recognition program. Efforts were made to edit dictation, but occasionally words are mistranscribed

## 2023-08-28 ENCOUNTER — TELEPHONE (OUTPATIENT)
Dept: FAMILY MEDICINE CLINIC | Facility: CLINIC | Age: 58
End: 2023-08-28
Payer: MEDICARE

## 2023-08-28 ENCOUNTER — OFFICE VISIT (OUTPATIENT)
Dept: PSYCHIATRY | Facility: CLINIC | Age: 58
End: 2023-08-28
Payer: MEDICARE

## 2023-08-28 DIAGNOSIS — F41.1 GENERALIZED ANXIETY DISORDER: ICD-10-CM

## 2023-08-28 DIAGNOSIS — F33.1 MAJOR DEPRESSIVE DISORDER, RECURRENT EPISODE, MODERATE: Primary | ICD-10-CM

## 2023-08-28 DIAGNOSIS — F43.10 POST TRAUMATIC STRESS DISORDER (PTSD): ICD-10-CM

## 2023-08-28 NOTE — TELEPHONE ENCOUNTER
Caller: Zo Palma    Relationship: Self    Best call back number: [phone]    Who are you requesting to speak with (clinical staff, provider,  specific staff member): DR. JEFFRIES     What was the call regarding: FINISHING ANTI-BIOTICS. PATIENT SENT A MESSAGE LAST WEEK AND HAS GOTTEN NO RESPONSE

## 2023-08-28 NOTE — PROGRESS NOTES
Date: 08/28/2023   Time In: 1005  Time Out: 1054    PROGRESS NOTE  Data:  Zo Palma is a 58 y.o. female who presents today for individual therapy session at [facility]. Chief Complaint: depression, anxiety and PTSD    Patient reports experiencing torn rotator cuff. She reports this is painful and is limiting her ability to do things. She reports spending more time sleeping. She reports financial stressors. She reports mood has been more down since injury and denies SI.      Clinical Maneuvering/Intervention:    Assisted patient in processing above session content; acknowledged and normalized patient's thoughts, feelings, and concerns.  Assisted patient in processing her thoughts and feelings regarding injury and the continued financial stress. Patient has established coping skills.  Allowed patient to freely discuss issues without interruption or judgment. Provided safe, confidential environment to facilitate the development of positive therapeutic relationship and encourage open, honest communication. Assisted patient in identifying risk factors which would indicate the need for higher level of care including thoughts to harm self or others and/or self-harming behavior and encouraged patient to contact this office, call 911, or present to the nearest emergency room should any of these events occur. Discussed crisis intervention services and means to access. Patient adamantly and convincingly denies current suicidal or homicidal ideation or perceptual disturbance.    Assessment   Patient appears to maintain relative stability as compared to their baseline.  However, patient continues to struggle with depression and anxiety which continues to cause impairment in important areas of functioning.  As a result, they can be reasonably expected to continue to benefit from treatment and would likely be at increased risk for decompensation otherwise.    Mental Status Exam:   Hygiene:    good  Cooperation:  Cooperative  Eye Contact:  Good  Psychomotor Behavior:  Appropriate  Affect:  Appropriate  Mood: normal  Speech:  Normal  Thought Process:  Goal directed and Linear  Thought Content:  Mood congruent  Suicidal:  None  Homicidal:  None  Hallucinations:  None  Delusion:  None  Memory:  Intact  Orientation:  Person, Place, Time, and Situation  Reliability:  good  Insight:  Good  Judgement:  Good  Impulse Control:  Good  Physical/Medical Issues:  No      Patient's Support Network Includes:  children and friend    Functional Status: Mild impairment     Progress toward goal: Not at goal    Prognosis: Good with Ongoing Treatment          Plan     VISIT DIAGNOSIS:     ICD-10-CM ICD-9-CM   1. Major depressive disorder, recurrent episode, moderate  F33.1 296.32   2. Post traumatic stress disorder (PTSD)  F43.10 309.81   3. Generalized anxiety disorder  F41.1 300.02        Patient will continue in individual outpatient therapy with focus on improved functioning and coping skills, maintaining stability, and avoiding decompensation and the need for higher level of care.    Patient will adhere to medication regimen as prescribed and report any side effects. Patient will contact this office, call 911 or present to the nearest emergency room should suicidal or homicidal ideations occur. Provide Cognitive Behavioral Therapy and Solution Focused Therapy to improve functioning, maintain stability, and avoid decompensation and the need for higher level of care.     Return in about Return in about 2 weeks (around 9/11/2023). or earlier if symptoms worsen or fail to improve.            This document has been electronically signed by Mely Tate LCSW  August 28, 2023 10:04 EDT      Part of this note may be an electronic transcription/translation of spoken language to printed text using the Dragon Dictation System.

## 2023-08-30 NOTE — TELEPHONE ENCOUNTER
Please advise.      I have attached the patient's previous [employer] message.    Zo Palma   to [facility] (supporting Evan Rivas DO)        2:25 PM  Dr. Rivas,     I finishes the course of anti-inflammatory that prescribed for my shoulder. It helped tremendously when on two tablets a day for the two weeks. The pain level increased when I went to one a day and now that the course is complete I am back to the levels I was at before drug treatment.     Where do we go from here?     Zo Palma

## 2023-09-05 ENCOUNTER — TELEPHONE (OUTPATIENT)
Dept: FAMILY MEDICINE CLINIC | Facility: CLINIC | Age: 58
End: 2023-09-05
Payer: MEDICARE

## 2023-09-06 NOTE — TELEPHONE ENCOUNTER
Would you be ok with ordering labs prior to the patient's appt?    Patient has  an appt on 9/19/23

## 2023-09-07 RX ORDER — IBANDRONATE SODIUM 150 MG/1
150 TABLET, FILM COATED ORAL
Qty: 3 TABLET | Refills: 3 | Status: SHIPPED | OUTPATIENT
Start: 2023-09-07

## 2023-09-18 ENCOUNTER — OFFICE VISIT (OUTPATIENT)
Dept: PSYCHIATRY | Facility: CLINIC | Age: 58
End: 2023-09-18
Payer: MEDICARE

## 2023-09-18 DIAGNOSIS — F33.1 MODERATE EPISODE OF RECURRENT MAJOR DEPRESSIVE DISORDER: Primary | ICD-10-CM

## 2023-09-18 DIAGNOSIS — F41.1 GENERALIZED ANXIETY DISORDER: ICD-10-CM

## 2023-09-18 DIAGNOSIS — F43.10 PTSD (POST-TRAUMATIC STRESS DISORDER): ICD-10-CM

## 2023-09-18 NOTE — PROGRESS NOTES
Date: 09/18/2023   Time In: 1000  Time Out: 1053    PROGRESS NOTE  Data:  Zo Palma is a 58 y.o. female who presents today for individual therapy session at [facility]. Chief Complaint: depression, anxiety, PTSD    Patient reports continued stressors regarding work, being a caregiver and dealing with her torn rotator cuff. She reports sleep problems and not getting enough rest. She discussed the possibility of getting ketamine treatment to alleviate depression symptoms such as low motivation and depressed mood.       Clinical Maneuvering/Intervention:    Assisted patient in processing above session content; acknowledged and normalized patient’s thoughts, feelings, and concerns.  Rationalized patient thought process regarding increase in stressors. Utilized person centered approach. Reviewed healthy ways of coping with stress. Allowed patient to freely discuss issues without interruption or judgment. Provided safe, confidential environment to facilitate the development of positive therapeutic relationship and encourage open, honest communication. Assisted patient in identifying risk factors which would indicate the need for higher level of care including thoughts to harm self or others and/or self-harming behavior and encouraged patient to contact this office, call 911, or present to the nearest emergency room should any of these events occur. Discussed crisis intervention services and means to access. Patient adamantly and convincingly denies current suicidal or homicidal ideation or perceptual disturbance.    Assessment   Patient appears to maintain relative stability as compared to their baseline.  However, patient continues to struggle with depression which continues to cause impairment in important areas of functioning.  As a result, they can be reasonably expected to continue to benefit from treatment and would likely be at increased risk for decompensation otherwise.    Mental Status  Exam:   Hygiene:   good  Cooperation:  Cooperative  Eye Contact:  Good  Psychomotor Behavior:  Appropriate  Affect:  Appropriate  Mood: normal  Speech:  Normal  Thought Process:  Goal directed and Linear  Thought Content:  Mood congruent  Suicidal:  None  Homicidal:  None  Hallucinations:  None  Delusion:  None  Memory:  Intact  Orientation:  Person, Place, Time, and Situation  Reliability:  good  Insight:  Good  Judgement:  Good  Impulse Control:  Good  Physical/Medical Issues:  No      Patient's Support Network Includes:  children and friend    Functional Status: Mild impairment     Progress toward goal: Not at goal    Prognosis: Good with Ongoing Treatment          Plan     VISIT DIAGNOSIS:     ICD-10-CM ICD-9-CM   1. Moderate episode of recurrent major depressive disorder  F33.1 296.32   2. PTSD (post-traumatic stress disorder)  F43.10 309.81   3. Generalized anxiety disorder  F41.1 300.02        Patient will continue in individual outpatient therapy with focus on improved functioning and coping skills, maintaining stability, and avoiding decompensation and the need for higher level of care.    Patient will adhere to medication regimen as prescribed and report any side effects. Patient will contact this office, call 911 or present to the nearest emergency room should suicidal or homicidal ideations occur. Provide Cognitive Behavioral Therapy and Solution Focused Therapy to improve functioning, maintain stability, and avoid decompensation and the need for higher level of care.     Return in about Return in about 2 weeks (around 10/2/2023). or earlier if symptoms worsen or fail to improve.            This document has been electronically signed by Mely Tate LCSW  September 18, 2023 09:56 EDT      Part of this note may be an electronic transcription/translation of spoken language to printed text using the Dragon Dictation System.

## 2023-09-19 ENCOUNTER — OFFICE VISIT (OUTPATIENT)
Dept: FAMILY MEDICINE CLINIC | Facility: CLINIC | Age: 58
End: 2023-09-19
Payer: MEDICARE

## 2023-09-19 VITALS
RESPIRATION RATE: 18 BRPM | OXYGEN SATURATION: 98 % | SYSTOLIC BLOOD PRESSURE: 118 MMHG | BODY MASS INDEX: 25.52 KG/M2 | HEIGHT: 63 IN | DIASTOLIC BLOOD PRESSURE: 88 MMHG | TEMPERATURE: 97 F | HEART RATE: 64 BPM | WEIGHT: 144 LBS

## 2023-09-19 DIAGNOSIS — Z00.00 MEDICARE ANNUAL WELLNESS VISIT, SUBSEQUENT: Primary | ICD-10-CM

## 2023-09-19 DIAGNOSIS — R74.8 ELEVATED LIVER ENZYMES: ICD-10-CM

## 2023-09-19 DIAGNOSIS — E55.9 VITAMIN D DEFICIENCY: ICD-10-CM

## 2023-09-19 DIAGNOSIS — E61.1 IRON DEFICIENCY: ICD-10-CM

## 2023-09-19 DIAGNOSIS — E78.2 MIXED HYPERLIPIDEMIA: ICD-10-CM

## 2023-09-19 PROCEDURE — 3044F HG A1C LEVEL LT 7.0%: CPT | Performed by: FAMILY MEDICINE

## 2023-09-19 PROCEDURE — G0439 PPPS, SUBSEQ VISIT: HCPCS | Performed by: FAMILY MEDICINE

## 2023-09-19 NOTE — PROGRESS NOTES
The ABCs of the Annual Wellness Visit  Subsequent Medicare Wellness Visit    Subjective    Zo Palma is a 58 y.o. female who presents for a Subsequent Medicare Wellness Visit.    The following portions of the patient's history were reviewed and   updated as appropriate: allergies, current medications, past family history, past medical history, past social history, past surgical history, and problem list.    Compared to one year ago, the patient feels her physical   health is the same.    Compared to one year ago, the patient feels her mental   health is the same.    Recent Hospitalizations:  She was not admitted to the hospital during the last year.       Current Medical Providers:  Patient Care Team:  Evan Rivas DO as PCP - General (Family Medicine)  Damari Fulton MD as Consulting Physician (Endocrinology)    Outpatient Medications Prior to Visit   Medication Sig Dispense Refill    estradiol (ESTRACE) 0.1 MG/GM vaginal cream Insert 2 g into the vagina Daily. (Patient taking differently: Insert 2 g into the vagina 3 (Three) Times a Week.) 60 g 2    FLUoxetine (PROzac) 10 MG capsule Take 2 capsules by mouth Daily. 180 capsule 0    ibandronate (BONIVA) 150 MG tablet Take 1 tablet by mouth Every 30 (Thirty) Days. 3 tablet 3    Insulin Pen Needle (Pen Needles) 30G X 5 MM misc 1 each Daily. 100 each 3    Liraglutide (Victoza) 18 MG/3ML solution pen-injector injection Inject 1.8 mg under the skin into the appropriate area as directed Daily. 9 mL 11    NON FORMULARY Calcium 600mg-D3 120mcg Daily  D-Mannose 1000mg BID  Rhodiola Rosea 1000mg Daily  Taurine 1500mg 3 caps daily  Mag 500mg daily  Terra Cone Daily  Adrenal Support 2 caps daily  Chasteberry 300mg daily  D3 125 mcg daily  Dotorra Terrazyme 2 caps daily  Dotorra onguard daily  Pantethina 900mg daily  Dotorra Yarrow/pom 2 caps daily  Dotorra Sereneity daily  Doterra Alpha CRS 2 caps daily  Doterra Microplex 2 caps daily  Doterra EO Anthony 2 cap  Doterra  MetaPWR 1 cap BID   Potassium 99mg daily      omeprazole (priLOSEC) 20 MG capsule Take 1 capsule by mouth Daily. 90 capsule 0    sulindac (CLINORIL) 200 MG tablet 1 tab po bid with meals 60 tablet 3     No facility-administered medications prior to visit.       No opioid medication identified on active medication list. I have reviewed chart for other potential  high risk medication/s and harmful drug interactions in the elderly.        Aspirin is not on active medication list.  Aspirin use is not indicated based on review of current medical condition/s. Risk of harm outweighs potential benefits.  .    Patient Active Problem List   Diagnosis    Anxiety    Bursitis of hip    Cervical spondylosis with radiculopathy    Chronic migraine without aura without status migrainosus, not intractable    Conversion disorder with attacks or seizures, persistent, with psychological stressor    Cramp of limb    Prediabetes    Diverticulosis of colon    Elevated liver enzymes    Eustachian tube dysfunction    Foot drop    Gastroesophageal reflux disease without esophagitis    Hereditary and idiopathic peripheral neuropathy    Hypersomnia    Internal hemorrhoids    Iron deficiency    Lumbar radiculopathy, chronic    Malaise and fatigue    Metabolic syndrome    Medicare annual wellness visit, subsequent    Encounter for neuropsychological testing    Mixed hyperlipidemia    Moderate episode of recurrent major depressive disorder    Obstructive sleep apnea    Osteoarthritis of right temporomandibular joint    Osteopenia of left lower leg    Panic disorder    PTSD (post-traumatic stress disorder)    RLS (restless legs syndrome)    Primary insomnia    Sinus drainage    Tinnitus    Urge incontinence    Urinary urgency    Vitamin D deficiency    Postmenopausal symptoms    Postmenopausal osteoporosis    Right leg pain    Edema of right lower extremity     Advance Care Planning   Advance Care Planning     Advance Directive is on file.  ACP  "discussion was held with the patient during this visit. Patient has an advance directive in EMR which is still valid.     Review of Systems  Constitutional: Negative for fever. Negative for chills, diaphoresis, fatigue and unexpected weight change.   HENT: No dysphagia; no changes to vision/hearing/smell/taste; no epistaxis  Eyes: Negative for redness and visual disturbance.   Respiratory: negative for shortness of breath. Negative for chest pain . Negative for cough and chest tightness.   Cardiovascular: Negative for chest pain and palpitations.   Gastrointestinal: Negative for abdominal distention, abdominal pain and blood in stool.   Endocrine: Negative for cold intolerance and heat intolerance.   Genitourinary: As per above.  Musculoskeletal: Chronic arthralgias, back pain and myalgias.  Otherwise, as per above.  Skin: Negative for color change, rash and wound.   Neurological: Negative for syncope, weakness and headaches.   Hematological: Negative for adenopathy. Does not bruise/bleed easily.   Psychiatric/Behavioral: Negative for confusion. The patient is not nervous/anxious.       Objective    Vitals:    09/19/23 1630   BP: 118/88   Pulse: 64   Resp: 18   Temp: 97 øF (36.1 øC)   SpO2: 98%   Weight: 65.3 kg (144 lb)   Height: 160 cm (63\")     Estimated body mass index is 25.51 kg/mý as calculated from the following:    Height as of this encounter: 160 cm (63\").    Weight as of this encounter: 65.3 kg (144 lb).     General Appearance: alert, oriented x 3, no acute distress.  Pleasant and interactive during questioning/examination.  Has service dog.   Skin: warm and dry.   HEENT: Atraumatic.  pupils round and reactive to light and accommodation, oral mucosa pink and moist.  Nares patent without epistaxis.  External auditory canals are patent tympanic membranes intact.  Neck: supple, no JVD, trachea midline.  No thyromegaly  Lungs: CTA, unlabored breathing effort.  Heart: RRR, normal S1 and S2, no S3, no " rub.  Abdomen: soft, non-tender, no palpable bladder, present bowel sounds to auscultation x4.  No guarding or rigidity.  No CVA tenderness.  Postsurgical scarring noted.  Extremities: no clubbing, cyanosis.  Symmetric muscle strength and development.  Edema noted to the right lower leg.  There is impaired range of motion A/P to the left shoulder, pain on palpation to the left subacromial space.  Pain relieved with traction of the upper extremity.  Apley scratch test limited due to discomfort.  Cross body abduction worsens discomfort.  She is able to ambulate independently, toe raise additionally.  Tess/Simmons sign negative to bilateral lower extremities.  Neuro: normal speech and mental status.  Cranial nerves II through XII intact.  No anosmia. DTR 2+; proprioception intact.  No focal motor/sensory deficits.      Does the patient have evidence of cognitive impairment?   No            HEALTH RISK ASSESSMENT    Smoking Status:  Social History     Tobacco Use   Smoking Status Never    Passive exposure: Past   Smokeless Tobacco Never     Alcohol Consumption:  Social History     Substance and Sexual Activity   Alcohol Use Yes    Alcohol/week: 1.0 standard drink of alcohol    Types: 1 Drinks containing 0.5 oz of alcohol per week    Comment: Occasionally     Fall Risk Screen:    [facility] Fall Risk Assessment was completed, and patient is at LOW risk for falls.Assessment completed on:2023    Depression Screenin/19/2023     4:28 PM   PHQ-2/PHQ-9 Depression Screening   Little Interest or Pleasure in Doing Things 0-->not at all   Feeling Down, Depressed or Hopeless 1-->several days   PHQ-9: Brief Depression Severity Measure Score 1       Health Habits and Functional and Cognitive Screenin/19/2023     4:29 PM   Functional & Cognitive Status   Do you have difficulty preparing food and eating? No   Do you have difficulty bathing yourself, getting dressed or grooming yourself? Yes   Do you have difficulty  using the toilet? No   Do you have difficulty moving around from place to place? No   Do you have trouble with steps or getting out of a bed or a chair? No   Current Diet Well Balanced Diet   Dental Exam Not up to date   Eye Exam Not up to date   Exercise (times per week) 7 times per week   Current Exercises Include House Cleaning;Walking;Yard Work   Do you need help using the phone?  No   Are you deaf or do you have serious difficulty hearing?  Yes   Do you need help to go to places out of walking distance? No   Do you need help shopping? No   Do you need help preparing meals?  No   Do you need help with housework?  No   Do you need help with laundry? No   Do you need help taking your medications? No   Do you need help managing money? Yes   Do you ever drive or ride in a car without wearing a seat belt? No       Age-appropriate Screening Schedule:  Refer to the list below for future screening recommendations based on patient's age, sex and/or medical conditions. Orders for these recommended tests are listed in the plan section. The patient has been provided with a written plan.    Health Maintenance   Topic Date Due    COVID-19 Vaccine (1) Never done    HEPATITIS C SCREENING  Never done    INFLUENZA VACCINE  08/01/2023    BMI FOLLOWUP  08/01/2024    ANNUAL WELLNESS VISIT  09/19/2024    LIPID PANEL  09/20/2024    MAMMOGRAM  11/17/2024    DXA SCAN  11/17/2024    COLORECTAL CANCER SCREENING  06/16/2025    TDAP/TD VACCINES (4 - Td or Tdap) 09/21/2031    Hepatitis B  Completed    ZOSTER VACCINE  Completed    Pneumococcal Vaccine 0-64  Aged Out                  CMS Preventative Services Quick Reference  Risk Factors Identified During Encounter:    None Identified    The above risks/problems have been discussed with the patient.  Pertinent information has been shared with the patient in the After Visit Summary.    Diagnoses and all orders for this visit:    1. Medicare annual wellness visit, subsequent (Primary)    2.  Mixed hyperlipidemia  -     Comprehensive Metabolic Panel  -     Lipid Panel    3. Iron deficiency  -     CBC & Differential  -     Iron Profile  -     Ferritin    4. Vitamin D deficiency  -     Vitamin D 25 hydroxy    5. Elevated liver enzymes  -     Comprehensive Metabolic Panel        Follow Up:   Next Medicare Wellness visit to be scheduled in 1 year.      An After Visit Summary and PPPS were made available to the patient.    I have discussed age/gender specific preventative healthcare issues in detail with patient today.  I have answered all of the questions.  Continue balanced dietary intake, natural sunlight exposure.    Surveillance labs today.    Continue iron supplementation.    Vital signs demonstrate hemodynamic stability.

## 2023-09-19 NOTE — PATIENT INSTRUCTIONS
Medicare Wellness  Personal Prevention Plan of Service     Date of Office Visit:    Encounter Provider:  Evan Rivas DO  Place of Service:  [facility]  Patient Name: Zo Palma  :  1965    As part of the Medicare Wellness portion of your visit today, we are providing you with this personalized preventive plan of services (PPPS). This plan is based upon recommendations of the [place] Preventive Services Task Force (USPSTF) and the Advisory Committee on Immunization Practices (ACIP).    This lists the preventive care services that should be considered, and provides dates of when you are due. Items listed as completed are up-to-date and do not require any further intervention.    Health Maintenance   Topic Date Due    COVID-19 Vaccine (1) Never done    HEPATITIS C SCREENING  Never done    INFLUENZA VACCINE  10/01/2023    LIPID PANEL  2023    ANNUAL WELLNESS VISIT  2024    BMI FOLLOWUP  2024    MAMMOGRAM  2024    DXA SCAN  2024    COLORECTAL CANCER SCREENING  2025    TDAP/TD VACCINES (4 - Td or Tdap) 2031    Hepatitis B  Completed    ZOSTER VACCINE  Completed    Pneumococcal Vaccine 0-64  Aged Out       Orders Placed This Encounter   Procedures    Comprehensive Metabolic Panel     Order Specific Question:   Release to patient     Answer:   Routine Release [[id number]]    Lipid Panel     Order Specific Question:   Release to patient     Answer:   Routine Release [[id number]]    Vitamin D 25 hydroxy     Order Specific Question:   Release to patient     Answer:   Routine Release [[id number]]    Iron Profile     Order Specific Question:   Release to patient     Answer:   Routine Release [[id number]]    Ferritin     Order Specific Question:   Release to patient     Answer:   Routine Release [[id number]]    CBC & Differential     Order Specific Question:   Release to patient     Answer:   Routine Release  [[id number]]       No follow-ups on file.

## 2023-10-02 ENCOUNTER — OFFICE VISIT (OUTPATIENT)
Dept: PSYCHIATRY | Facility: CLINIC | Age: 58
End: 2023-10-02
Payer: MEDICARE

## 2023-10-02 DIAGNOSIS — F41.1 GENERALIZED ANXIETY DISORDER: ICD-10-CM

## 2023-10-02 DIAGNOSIS — F33.1 MAJOR DEPRESSIVE DISORDER, RECURRENT EPISODE, MODERATE: Primary | ICD-10-CM

## 2023-10-02 DIAGNOSIS — F43.10 POST TRAUMATIC STRESS DISORDER (PTSD): ICD-10-CM

## 2023-10-02 PROCEDURE — 90834 PSYTX W PT 45 MINUTES: CPT | Performed by: SOCIAL WORKER

## 2023-10-02 NOTE — PROGRESS NOTES
"Date: 10/02/2023   Time In: 1000  Time Out: 1048    PROGRESS NOTE  Data:  Zo Palma is a 58 y.o. female who presents today for individual therapy session at [facility]. Chief Complaint: depression, anxiety and PTSD    Patient reports mood has been \"pretty good\" since last session. She reports enjoying her vacation with her family. She reports ongoing stressors with needing tasks completed at home and selling her house. She reports pain regarding torn rotator cuff and this is interfering with her completing work that needs to be done. She reports increase in worry regarding her friend. She reports adjusting with care giving role. Patient denies SI      Clinical Maneuvering/Intervention:    Assisted patient in processing above session content; acknowledged and normalized patient’s thoughts, feelings, and concerns.  Assisted patient in processing her thoughts and feelings regarding mood, ongoing stressors and physical pain. Patient has coping skills of \"keeping busy\" to help with mood.     Allowed patient to freely discuss issues without interruption or judgment. Provided safe, confidential environment to facilitate the development of positive therapeutic relationship and encourage open, honest communication. Assisted patient in identifying risk factors which would indicate the need for higher level of care including thoughts to harm self or others and/or self-harming behavior and encouraged patient to contact this office, call 911, or present to the nearest emergency room should any of these events occur. Discussed crisis intervention services and means to access. Patient adamantly and convincingly denies current suicidal or homicidal ideation or perceptual disturbance.    Assessment   Patient appears to maintain relative stability as compared to their baseline.  However, patient continues to struggle with anxiety and depression which continues to cause impairment in important areas of " functioning.  As a result, they can be reasonably expected to continue to benefit from treatment and would likely be at increased risk for decompensation otherwise.    Mental Status Exam:   Hygiene:   good  Cooperation:  Cooperative  Eye Contact:  Good  Psychomotor Behavior:  Appropriate  Affect:  Appropriate  Mood: normal  Speech:  Normal  Thought Process:  Goal directed and Linear  Thought Content:  Mood congruent  Suicidal:  None  Homicidal:  None  Hallucinations:  None  Delusion:  None  Memory:  Intact  Orientation:  Person, Place, Time, and Situation  Reliability:  good  Insight:  Good  Judgement:  Good  Impulse Control:  Good  Physical/Medical Issues:  No      Patient's Support Network Includes:  children and friend    Functional Status: Mild impairment     Progress toward goal: Not at goal    Prognosis: Good with Ongoing Treatment          Plan     VISIT DIAGNOSIS:     ICD-10-CM ICD-9-CM   1. Major depressive disorder, recurrent episode, moderate  F33.1 296.32   2. Post traumatic stress disorder (PTSD)  F43.10 309.81   3. Generalized anxiety disorder  F41.1 300.02        Patient will continue in individual outpatient therapy with focus on improved functioning and coping skills, maintaining stability, and avoiding decompensation and the need for higher level of care.    Patient will adhere to medication regimen as prescribed and report any side effects. Patient will contact this office, call 911 or present to the nearest emergency room should suicidal or homicidal ideations occur. Provide Cognitive Behavioral Therapy and Solution Focused Therapy to improve functioning, maintain stability, and avoid decompensation and the need for higher level of care.     Return in about Return in about 2 weeks (around 10/16/2023). or earlier if symptoms worsen or fail to improve.            This document has been electronically signed by Mely Tate LCSW  October 2, 2023 09:59 EDT      Part of this note may be an electronic  transcription/translation of spoken language to printed text using the Dragon Dictation System.

## 2023-10-16 ENCOUNTER — OFFICE VISIT (OUTPATIENT)
Dept: PSYCHIATRY | Facility: CLINIC | Age: 58
End: 2023-10-16
Payer: MEDICARE

## 2023-10-16 DIAGNOSIS — F41.1 GENERALIZED ANXIETY DISORDER: ICD-10-CM

## 2023-10-16 DIAGNOSIS — F43.10 POST TRAUMATIC STRESS DISORDER (PTSD): ICD-10-CM

## 2023-10-16 DIAGNOSIS — F33.1 MAJOR DEPRESSIVE DISORDER, RECURRENT EPISODE, MODERATE: ICD-10-CM

## 2023-10-16 PROCEDURE — 90837 PSYTX W PT 60 MINUTES: CPT | Performed by: SOCIAL WORKER

## 2023-10-16 NOTE — PROGRESS NOTES
"Date: 10/16/2023   Time In: 1100  Time Out: 1154    PROGRESS NOTE  Data:  Zo Palma is a 58 y.o. female who presents today for individual therapy session at [facility]. Chief Complaint: depression, anxiety and PTSD    Patient reports feeling \"ok\" today. Patient reports continued pain from shoulder injury., She reports this prevents her from getting work done at the house. She reports anxiety while in public. She reports mood has been \"blah\". She reports caregiver stress.       Clinical Maneuvering/Intervention:    Assisted patient in processing above session content; acknowledged and normalized patient’s thoughts, feelings, and concerns.  Applied cognitive behavioral techniques to assist patient in identifying maladaptive thoughts and behaviors that contribute to her mood. Discussed the coping skill of acceptance regarding roommate's behavior that she identify's as frustrating.  Allowed patient to freely discuss issues without interruption or judgment. Provided safe, confidential environment to facilitate the development of positive therapeutic relationship and encourage open, honest communication. Assisted patient in identifying risk factors which would indicate the need for higher level of care including thoughts to harm self or others and/or self-harming behavior and encouraged patient to contact this office, call 911, or present to the nearest emergency room should any of these events occur. Discussed crisis intervention services and means to access. Patient adamantly and convincingly denies current suicidal or homicidal ideation or perceptual disturbance.    Assessment   Patient appears to maintain relative stability as compared to their baseline.  However, patient continues to struggle with depression and anxiety which continues to cause impairment in important areas of functioning.  As a result, they can be reasonably expected to continue to benefit from treatment and would likely be " at increased risk for decompensation otherwise.    Mental Status Exam:   Hygiene:   good  Cooperation:  Cooperative  Eye Contact:  Good  Psychomotor Behavior:  Appropriate  Affect:  Appropriate  Mood: irritable  Speech:  Normal  Thought Process:  Goal directed and Linear  Thought Content:  Mood congruent  Suicidal:  None  Homicidal:  None  Hallucinations:  None  Delusion:  None  Memory:  Intact  Orientation:  Person, Place, Time, and Situation  Reliability:  good  Insight:  Good  Judgement:  Good  Impulse Control:  Good  Physical/Medical Issues:  No      Patient's Support Network Includes:  children and friend    Functional Status: Mild impairment     Progress toward goal: Not at goal    Prognosis: Good with Ongoing Treatment          Plan     VISIT DIAGNOSIS:     ICD-10-CM ICD-9-CM   1. Major depressive disorder, recurrent episode, moderate  F33.1 296.32   2. Generalized anxiety disorder  F41.1 300.02   3. Post traumatic stress disorder (PTSD)  F43.10 309.81        Patient will continue in individual outpatient therapy with focus on improved functioning and coping skills, maintaining stability, and avoiding decompensation and the need for higher level of care.    Patient will adhere to medication regimen as prescribed and report any side effects. Patient will contact this office, call 911 or present to the nearest emergency room should suicidal or homicidal ideations occur. Provide Cognitive Behavioral Therapy and Solution Focused Therapy to improve functioning, maintain stability, and avoid decompensation and the need for higher level of care.     Return in about Return in about 2 weeks (around 10/30/2023). or earlier if symptoms worsen or fail to improve.            This document has been electronically signed by Mely Tate LCSW  October 16, 2023 10:56 EDT      Part of this note may be an electronic transcription/translation of spoken language to printed text using the Dragon Dictation System.

## 2023-10-18 DIAGNOSIS — M75.02 ADHESIVE CAPSULITIS OF LEFT SHOULDER: ICD-10-CM

## 2023-10-18 DIAGNOSIS — M12.812 LEFT ROTATOR CUFF TEAR ARTHROPATHY: Primary | ICD-10-CM

## 2023-10-18 DIAGNOSIS — M75.102 LEFT ROTATOR CUFF TEAR ARTHROPATHY: Primary | ICD-10-CM

## 2023-10-30 ENCOUNTER — TELEMEDICINE (OUTPATIENT)
Dept: PSYCHIATRY | Facility: CLINIC | Age: 58
End: 2023-10-30
Payer: MEDICARE

## 2023-10-30 DIAGNOSIS — F41.1 GENERALIZED ANXIETY DISORDER: ICD-10-CM

## 2023-10-30 DIAGNOSIS — F43.10 POST TRAUMATIC STRESS DISORDER (PTSD): ICD-10-CM

## 2023-10-30 DIAGNOSIS — F33.1 MAJOR DEPRESSIVE DISORDER, RECURRENT EPISODE, MODERATE: ICD-10-CM

## 2023-10-30 PROCEDURE — 90832 PSYTX W PT 30 MINUTES: CPT | Performed by: SOCIAL WORKER

## 2023-10-30 NOTE — PROGRESS NOTES
"Date: October 30, 2023  Time In: 1104  Time Out: 1130      PROGRESS NOTE  Data:  Zo Palma is a 58 y.o. female who presents today for individual therapy session through the [facility].  The Patient is seen remotely at their home, using Zoom. Patient is being seen via telehealth and stated they are in a secure environment for this session. The patient's condition being diagnosed/treated is appropriate for telemedicine. The provider identified herself as well as her credentials. The patient and/or patients guardian consent to be seen remotely, and when consent is given they understand that the consent allows for patient identifiable information to be sent to a third party as needed. They may refuse to be seen remotely at any time. The electronic data is encrypted and password protected, and the patient has been advised of the potential risks to privacy not withstanding such measures.     Chief Complaint: depression, anxiety and trauma    Patient reports having a migraine today. She reports depressed mood and being in her \"sleep cycle\" where she sleeps more than normal. Patient reports feelings of frustration regarding contractors and home repair. Patient requested to end early because of migraine. Patient denies SI    Clinical Maneuvering/Intervention:    Assisted patient in processing above session content; acknowledged and normalized patient’s thoughts, feelings, and concerns.  Rationalized patient thought process regarding frustration and mood.  Applied cognitive behavioral techniques to assist patient in identifying maladaptive thoughts and assumptions that contribute to her frustration and mood. Discussed re-framing negative thoughts.    Allowed patient to freely discuss issues without interruption or judgment. Provided safe, confidential environment to facilitate the development of positive therapeutic relationship and encourage open, honest communication. Assisted patient in identifying " risk factors which would indicate the need for higher level of care including thoughts to harm self or others and/or self-harming behavior and encouraged patient to contact this office, call 911, or present to the nearest emergency room should any of these events occur. Discussed crisis intervention services and means to access. Patient adamantly and convincingly denies current suicidal or homicidal ideation or perceptual disturbance.    Assessment   Patient appears to maintain relative stability as compared to their baseline.  However, patient continues to struggle with depression which continues to cause impairment in important areas of functioning.  A result, they can be reasonably expected to continue to benefit from treatment and would likely be at increased risk for decompensation otherwise.    Mental Status Exam:   Hygiene:    mychart video  Cooperation:  Cooperative  Eye Contact:  Good  Psychomotor Behavior:  Appropriate  Affect:  Appropriate  Mood: depressed  Speech:  Normal  Thought Process:  Goal directed and Linear  Thought Content:  Mood congruent  Suicidal:  None  Homicidal:  None  Hallucinations:  None  Delusion:  None  Memory:  Intact  Orientation:  Person, Place, Time, and Situation  Reliability:  good  Insight:  Good  Judgement:  Good  Impulse Control:  Good  Physical/Medical Issues:  Yes reports migraine          Patient's Support Network Includes:  children and friend    Functional Status: Mild impairment     Progress toward goal: Not at goal    Prognosis: Good with Ongoing Treatment              Plan     Patient will continue in individual outpatient therapy with focus on improved functioning and coping skills, maintaining stability, and avoiding decompensation and the need for higher level of care.    Patient will adhere to medication regimen as prescribed and report any side effects. Patient will contact this office, call 911 or present to the nearest emergency room should suicidal or  homicidal ideations occur. Provide Cognitive Behavioral Therapy and Solution Focused Therapy to improve functioning, maintain stability, and avoid decompensation and the need for higher level of care.     Return in about 2 weeks, or earlier if symptoms worsen or fail to improve.           VISIT DIAGNOSIS:     ICD-10-CM ICD-9-CM   1. Major depressive disorder, recurrent episode, moderate  F33.1 296.32   2. Generalized anxiety disorder  F41.1 300.02   3. Post traumatic stress disorder (PTSD)  F43.10 309.81            This document has been electronically signed by Mely Tate LCSW  October 30, 2023 11:04 EDT    Part of this note may be an electronic transcription/translation of spoken language to printed text using the Dragon Dictation System.

## 2023-11-02 ENCOUNTER — HOSPITAL ENCOUNTER (INPATIENT)
Facility: HOSPITAL | Age: 58
LOS: 4 days | Discharge: HOME | DRG: 885 | End: 2023-11-06
Attending: PSYCHIATRY & NEUROLOGY | Admitting: PSYCHIATRY & NEUROLOGY
Payer: MEDICARE

## 2023-11-02 ENCOUNTER — TELEPHONE (OUTPATIENT)
Dept: PSYCHIATRY | Facility: CLINIC | Age: 58
End: 2023-11-02
Payer: MEDICARE

## 2023-11-02 ENCOUNTER — HOSPITAL ENCOUNTER (EMERGENCY)
Facility: HOSPITAL | Age: 58
Discharge: PSYCHIATRIC OR SUBSTANCE USE FACILITY | DRG: 885 | End: 2023-11-02
Attending: STUDENT IN AN ORGANIZED HEALTH CARE EDUCATION/TRAINING PROGRAM
Payer: MEDICARE

## 2023-11-02 VITALS
HEART RATE: 60 BPM | RESPIRATION RATE: 17 BRPM | WEIGHT: 138 LBS | TEMPERATURE: 98.4 F | DIASTOLIC BLOOD PRESSURE: 85 MMHG | OXYGEN SATURATION: 99 % | SYSTOLIC BLOOD PRESSURE: 129 MMHG | BODY MASS INDEX: 24.45 KG/M2 | HEIGHT: 63 IN

## 2023-11-02 DIAGNOSIS — R45.851 SUICIDAL IDEATION: Primary | ICD-10-CM

## 2023-11-02 DIAGNOSIS — N30.00 ACUTE CYSTITIS WITHOUT HEMATURIA: ICD-10-CM

## 2023-11-02 LAB
ALBUMIN SERPL-MCNC: 4.4 G/DL (ref 3.5–5.2)
ALBUMIN/GLOB SERPL: 1.6 G/DL
ALP SERPL-CCNC: 74 U/L (ref 39–117)
ALT SERPL W P-5'-P-CCNC: 22 U/L (ref 1–33)
AMPHET+METHAMPHET UR QL: NEGATIVE
AMPHETAMINES UR QL: NEGATIVE
ANION GAP SERPL CALCULATED.3IONS-SCNC: 9.5 MMOL/L (ref 5–15)
AST SERPL-CCNC: 20 U/L (ref 1–32)
BACTERIA UR QL AUTO: ABNORMAL /HPF
BARBITURATES UR QL SCN: NEGATIVE
BASOPHILS # BLD AUTO: 0.06 10*3/MM3 (ref 0–0.2)
BASOPHILS NFR BLD AUTO: 1 % (ref 0–1.5)
BENZODIAZ UR QL SCN: NEGATIVE
BILIRUB SERPL-MCNC: 0.2 MG/DL (ref 0–1.2)
BILIRUB UR QL STRIP: NEGATIVE
BUN SERPL-MCNC: 9 MG/DL (ref 6–20)
BUN/CREAT SERPL: 10.8 (ref 7–25)
BUPRENORPHINE SERPL-MCNC: NEGATIVE NG/ML
CALCIUM SPEC-SCNC: 9.8 MG/DL (ref 8.6–10.5)
CANNABINOIDS SERPL QL: NEGATIVE
CHLORIDE SERPL-SCNC: 108 MMOL/L (ref 98–107)
CLARITY UR: CLEAR
CO2 SERPL-SCNC: 24.5 MMOL/L (ref 22–29)
COCAINE UR QL: NEGATIVE
COLOR UR: YELLOW
CREAT SERPL-MCNC: 0.83 MG/DL (ref 0.57–1)
DEPRECATED RDW RBC AUTO: 42.7 FL (ref 37–54)
EGFRCR SERPLBLD CKD-EPI 2021: 81.8 ML/MIN/1.73
EOSINOPHIL # BLD AUTO: 0.1 10*3/MM3 (ref 0–0.4)
EOSINOPHIL NFR BLD AUTO: 1.7 % (ref 0.3–6.2)
ERYTHROCYTE [DISTWIDTH] IN BLOOD BY AUTOMATED COUNT: 12.3 % (ref 12.3–15.4)
ETHANOL BLD-MCNC: <10 MG/DL (ref 0–10)
ETHANOL UR QL: <0.01 %
FENTANYL UR-MCNC: NEGATIVE NG/ML
GLOBULIN UR ELPH-MCNC: 2.8 GM/DL
GLUCOSE SERPL-MCNC: 107 MG/DL (ref 65–99)
GLUCOSE UR STRIP-MCNC: ABNORMAL MG/DL
HCT VFR BLD AUTO: 42.5 % (ref 34–46.6)
HGB BLD-MCNC: 13.2 G/DL (ref 12–15.9)
HGB UR QL STRIP.AUTO: NEGATIVE
HOLD SPECIMEN: NORMAL
HYALINE CASTS UR QL AUTO: ABNORMAL /LPF
IMM GRANULOCYTES # BLD AUTO: 0 10*3/MM3 (ref 0–0.05)
IMM GRANULOCYTES NFR BLD AUTO: 0 % (ref 0–0.5)
KETONES UR QL STRIP: NEGATIVE
LEUKOCYTE ESTERASE UR QL STRIP.AUTO: ABNORMAL
LYMPHOCYTES # BLD AUTO: 2.03 10*3/MM3 (ref 0.7–3.1)
LYMPHOCYTES NFR BLD AUTO: 34.8 % (ref 19.6–45.3)
MAGNESIUM SERPL-MCNC: 2.1 MG/DL (ref 1.6–2.6)
MCH RBC QN AUTO: 29.1 PG (ref 26.6–33)
MCHC RBC AUTO-ENTMCNC: 31.1 G/DL (ref 31.5–35.7)
MCV RBC AUTO: 93.6 FL (ref 79–97)
METHADONE UR QL SCN: NEGATIVE
MONOCYTES # BLD AUTO: 0.52 10*3/MM3 (ref 0.1–0.9)
MONOCYTES NFR BLD AUTO: 8.9 % (ref 5–12)
NEUTROPHILS NFR BLD AUTO: 3.12 10*3/MM3 (ref 1.7–7)
NEUTROPHILS NFR BLD AUTO: 53.6 % (ref 42.7–76)
NITRITE UR QL STRIP: NEGATIVE
NRBC BLD AUTO-RTO: 0 /100 WBC (ref 0–0.2)
OPIATES UR QL: NEGATIVE
OXYCODONE UR QL SCN: NEGATIVE
PCP UR QL SCN: NEGATIVE
PH UR STRIP.AUTO: 5.5 [PH] (ref 5–8)
PLATELET # BLD AUTO: 243 10*3/MM3 (ref 140–450)
PMV BLD AUTO: 9.5 FL (ref 6–12)
POTASSIUM SERPL-SCNC: 3.6 MMOL/L (ref 3.5–5.2)
PROT SERPL-MCNC: 7.2 G/DL (ref 6–8.5)
PROT UR QL STRIP: NEGATIVE
RBC # BLD AUTO: 4.54 10*6/MM3 (ref 3.77–5.28)
RBC # UR STRIP: ABNORMAL /HPF
REF LAB TEST METHOD: ABNORMAL
SODIUM SERPL-SCNC: 142 MMOL/L (ref 136–145)
SP GR UR STRIP: 1.01 (ref 1–1.03)
SQUAMOUS #/AREA URNS HPF: ABNORMAL /HPF
TRICYCLICS UR QL SCN: NEGATIVE
UROBILINOGEN UR QL STRIP: ABNORMAL
WBC # UR STRIP: ABNORMAL /HPF
WBC NRBC COR # BLD: 5.83 10*3/MM3 (ref 3.4–10.8)
WHOLE BLOOD HOLD COAG: NORMAL
WHOLE BLOOD HOLD SPECIMEN: NORMAL

## 2023-11-02 PROCEDURE — 80307 DRUG TEST PRSMV CHEM ANLYZR: CPT | Performed by: PHYSICIAN ASSISTANT

## 2023-11-02 PROCEDURE — 93005 ELECTROCARDIOGRAM TRACING: CPT | Performed by: PSYCHIATRY & NEUROLOGY

## 2023-11-02 PROCEDURE — 36415 COLL VENOUS BLD VENIPUNCTURE: CPT

## 2023-11-02 PROCEDURE — 85025 COMPLETE CBC W/AUTO DIFF WBC: CPT | Performed by: PHYSICIAN ASSISTANT

## 2023-11-02 PROCEDURE — 87088 URINE BACTERIA CULTURE: CPT | Performed by: PHYSICIAN ASSISTANT

## 2023-11-02 PROCEDURE — 87186 SC STD MICRODIL/AGAR DIL: CPT | Performed by: PHYSICIAN ASSISTANT

## 2023-11-02 PROCEDURE — 87086 URINE CULTURE/COLONY COUNT: CPT | Performed by: PHYSICIAN ASSISTANT

## 2023-11-02 PROCEDURE — 80053 COMPREHEN METABOLIC PANEL: CPT | Performed by: PHYSICIAN ASSISTANT

## 2023-11-02 PROCEDURE — 81001 URINALYSIS AUTO W/SCOPE: CPT | Performed by: PHYSICIAN ASSISTANT

## 2023-11-02 PROCEDURE — 83735 ASSAY OF MAGNESIUM: CPT | Performed by: PHYSICIAN ASSISTANT

## 2023-11-02 PROCEDURE — 82077 ASSAY SPEC XCP UR&BREATH IA: CPT | Performed by: PHYSICIAN ASSISTANT

## 2023-11-02 PROCEDURE — 99285 EMERGENCY DEPT VISIT HI MDM: CPT

## 2023-11-02 RX ORDER — BENZONATATE 100 MG/1
100 CAPSULE ORAL 3 TIMES DAILY PRN
Status: DISCONTINUED | OUTPATIENT
Start: 2023-11-02 | End: 2023-11-06 | Stop reason: HOSPADM

## 2023-11-02 RX ORDER — OMEGA-3S/DHA/EPA/FISH OIL/D3 300MG-1000
1000 CAPSULE ORAL WEEKLY
Status: DISCONTINUED | OUTPATIENT
Start: 2023-11-03 | End: 2023-11-06 | Stop reason: HOSPADM

## 2023-11-02 RX ORDER — CEFDINIR 300 MG/1
300 CAPSULE ORAL EVERY 12 HOURS SCHEDULED
Status: DISCONTINUED | OUTPATIENT
Start: 2023-11-03 | End: 2023-11-06 | Stop reason: HOSPADM

## 2023-11-02 RX ORDER — ESTRADIOL 0.1 MG/G
2 CREAM VAGINAL 3 TIMES WEEKLY
Status: DISCONTINUED | OUTPATIENT
Start: 2023-11-03 | End: 2023-11-03

## 2023-11-02 RX ORDER — BISACODYL 5 MG/1
5 TABLET, DELAYED RELEASE ORAL DAILY PRN
Status: DISCONTINUED | OUTPATIENT
Start: 2023-11-02 | End: 2023-11-06 | Stop reason: HOSPADM

## 2023-11-02 RX ORDER — ESTRADIOL 0.1 MG/G
2 CREAM VAGINAL 3 TIMES WEEKLY
COMMUNITY

## 2023-11-02 RX ORDER — LANOLIN ALCOHOL/MO/W.PET/CERES
3 CREAM (GRAM) TOPICAL NIGHTLY PRN
Status: DISCONTINUED | OUTPATIENT
Start: 2023-11-04 | End: 2023-11-06 | Stop reason: HOSPADM

## 2023-11-02 RX ORDER — TRAZODONE HYDROCHLORIDE 50 MG/1
12.5 TABLET ORAL NIGHTLY PRN
Status: ACTIVE | OUTPATIENT
Start: 2023-11-02 | End: 2023-11-04

## 2023-11-02 RX ORDER — ALUMINA, MAGNESIA, AND SIMETHICONE 2400; 2400; 240 MG/30ML; MG/30ML; MG/30ML
15 SUSPENSION ORAL EVERY 6 HOURS PRN
Status: DISCONTINUED | OUTPATIENT
Start: 2023-11-02 | End: 2023-11-06 | Stop reason: HOSPADM

## 2023-11-02 RX ORDER — IBUPROFEN 400 MG/1
400 TABLET ORAL EVERY 6 HOURS PRN
Status: DISCONTINUED | OUTPATIENT
Start: 2023-11-02 | End: 2023-11-06 | Stop reason: HOSPADM

## 2023-11-02 RX ORDER — ONDANSETRON 4 MG/1
4 TABLET, FILM COATED ORAL EVERY 6 HOURS PRN
Status: DISCONTINUED | OUTPATIENT
Start: 2023-11-02 | End: 2023-11-06 | Stop reason: HOSPADM

## 2023-11-02 RX ORDER — FLUOXETINE HYDROCHLORIDE 20 MG/1
20 CAPSULE ORAL DAILY
Status: DISCONTINUED | OUTPATIENT
Start: 2023-11-03 | End: 2023-11-03

## 2023-11-02 RX ORDER — MULTIPLE VITAMINS W/ MINERALS TAB 9MG-400MCG
1 TAB ORAL DAILY
COMMUNITY

## 2023-11-02 RX ORDER — LIRAGLUTIDE 6 MG/ML
1.8 INJECTION SUBCUTANEOUS DAILY
COMMUNITY

## 2023-11-02 RX ORDER — MULTIPLE VITAMINS W/ MINERALS TAB 9MG-400MCG
1 TAB ORAL DAILY
Status: DISCONTINUED | OUTPATIENT
Start: 2023-11-03 | End: 2023-11-06 | Stop reason: HOSPADM

## 2023-11-02 RX ORDER — ECHINACEA PURPUREA EXTRACT 125 MG
2 TABLET ORAL AS NEEDED
Status: DISCONTINUED | OUTPATIENT
Start: 2023-11-02 | End: 2023-11-06 | Stop reason: HOSPADM

## 2023-11-02 RX ORDER — LOPERAMIDE HYDROCHLORIDE 2 MG/1
2 CAPSULE ORAL
Status: DISCONTINUED | OUTPATIENT
Start: 2023-11-02 | End: 2023-11-06 | Stop reason: HOSPADM

## 2023-11-02 RX ORDER — MELATONIN
1000 WEEKLY
COMMUNITY

## 2023-11-02 NOTE — ED PROVIDER NOTES
Subjective   History of Present Illness  58-year-old female who presents to the ED today for mental health evaluation.  She states she has been having worsening depression and began to have suicidal ideations last night.  She states she made a plan to go into the forest with a gun.  She denies any homicidal ideations.  She denies any drug or alcohol use but states she has had cravings to drink alcohol.  She states her appetite has been normal but her sleep has been poor.  She does report for the last 6 weeks she has had frequent urination with urgency.  She states now she is having some right flank pain.  She denies any dysuria but her urine has had a foul odor.  She also has a history of right hip bursitis and left rotator cuff injury.    History provided by:  Patient  Mental Health Problem  Presenting symptoms: depression and suicidal thoughts    Degree of incapacity (severity):  Severe  Onset quality:  Gradual  Duration:  1 day  Timing:  Constant  Progression:  Worsening  Chronicity:  New  Context: not alcohol use and not drug abuse    Relieved by:  Nothing  Worsened by:  Nothing  Associated symptoms: feelings of worthlessness and insomnia    Associated symptoms: no appetite change    Risk factors: hx of mental illness        Review of Systems   Constitutional: Negative.  Negative for appetite change.   HENT: Negative.     Eyes: Negative.    Respiratory: Negative.     Cardiovascular: Negative.    Gastrointestinal: Negative.    Genitourinary: Negative.    Musculoskeletal: Negative.    Skin: Negative.    Neurological: Negative.    Psychiatric/Behavioral:  Positive for dysphoric mood, sleep disturbance and suicidal ideas. The patient has insomnia.    All other systems reviewed and are negative.      Past Medical History:   Diagnosis Date    Anxiety     Depression     Insomnia     Migraines     Obesity     Peripheral neuropathy     Seizures 2001    Conversion Disorder       Allergies   Allergen Reactions     "Chlorzoxazone Unknown - High Severity and Swelling     Lorzone, muscle relaxant.    Iodine Anaphylaxis     Topical makes her swell  Anaphylaxis with IV contrast (when she was ~ 9yrs old)    Phenazopyridine Hcl Swelling and Anaphylaxis    Pyridium [Phenazopyridine] Anaphylaxis    Quinine Unknown - High Severity     Has malaria symptoms    Valproic Acid Other (See Comments)     Liver failure  Liver failure  Liver failure; lupus like symptoms and liver failure    Methocarbamol Other (See Comments)     Made her feel \"suicidal\" and gave her less control over her actions.    Iodinated Contrast Media Unknown - Low Severity    Acetaminophen-Codeine Itching    Diphenhydramine Anxiety     Pt has severe panic attack symptoms when given benadryl IV. Needs to take a benzodiazapine med concurrently when getting benadryl.        Past Surgical History:   Procedure Laterality Date    BARIATRIC SURGERY  2008    Gastric sleeve    BREAST SURGERY      CHOLECYSTECTOMY  2010    COLONOSCOPY      FRACTURE SURGERY      Left wrist    GASTRIC SLEEVE LAPAROSCOPIC N/A     HYSTERECTOMY      SINUS SURGERY  2021    WISDOM TOOTH EXTRACTION N/A        Family History   Problem Relation Age of Onset    Cancer Mother         Brain, suspected uterine    Cancer Father         Prostate    Learning disabilities Father         Dyslexia    Other Father         Early onset alzheimer    Other Paternal Grandfather         Early onset alzheimer    Cancer Brother         Prostate, bone    Learning disabilities Brother         Dyslexia    Other Brother         Alzheimer       Social History     Socioeconomic History    Marital status:    Tobacco Use    Smoking status: Never     Passive exposure: Past    Smokeless tobacco: Never   Vaping Use    Vaping Use: Never used   Substance and Sexual Activity    Alcohol use: Yes     Alcohol/week: 1.0 standard drink of alcohol     Types: 1 Drinks containing 0.5 oz of alcohol per week     Comment: Occasionally    Drug " use: Never    Sexual activity: Defer     Partners: Male     Birth control/protection: Post-menopausal, Hysterectomy           Objective   Physical Exam  Vitals and nursing note reviewed.   Constitutional:       General: She is not in acute distress.     Appearance: Normal appearance.   HENT:      Head: Normocephalic and atraumatic.      Right Ear: External ear normal.      Left Ear: External ear normal.      Nose: Nose normal.   Eyes:      Conjunctiva/sclera: Conjunctivae normal.      Pupils: Pupils are equal, round, and reactive to light.   Cardiovascular:      Rate and Rhythm: Normal rate and regular rhythm.      Pulses: Normal pulses.      Heart sounds: Normal heart sounds.   Pulmonary:      Effort: Pulmonary effort is normal.      Breath sounds: Normal breath sounds.   Abdominal:      General: Bowel sounds are normal.      Palpations: Abdomen is soft.   Musculoskeletal:         General: Normal range of motion.      Cervical back: Normal range of motion and neck supple.   Skin:     General: Skin is warm and dry.      Capillary Refill: Capillary refill takes less than 2 seconds.   Neurological:      General: No focal deficit present.      Mental Status: She is alert and oriented to person, place, and time.   Psychiatric:         Mood and Affect: Mood and affect normal.         Speech: Speech normal.         Behavior: Behavior normal. Behavior is cooperative.         Thought Content: Thought content includes suicidal ideation. Thought content does not include homicidal ideation. Thought content includes suicidal plan.         Procedures       Results for orders placed or performed during the hospital encounter of 11/02/23   Comprehensive Metabolic Panel    Specimen: Blood   Result Value Ref Range    Glucose 107 (H) 65 - 99 mg/dL    BUN 9 6 - 20 mg/dL    Creatinine 0.83 0.57 - 1.00 mg/dL    Sodium 142 136 - 145 mmol/L    Potassium 3.6 3.5 - 5.2 mmol/L    Chloride 108 (H) 98 - 107 mmol/L    CO2 24.5 22.0 - 29.0  mmol/L    Calcium 9.8 8.6 - 10.5 mg/dL    Total Protein 7.2 6.0 - 8.5 g/dL    Albumin 4.4 3.5 - 5.2 g/dL    ALT (SGPT) 22 1 - 33 U/L    AST (SGOT) 20 1 - 32 U/L    Alkaline Phosphatase 74 39 - 117 U/L    Total Bilirubin 0.2 0.0 - 1.2 mg/dL    Globulin 2.8 gm/dL    A/G Ratio 1.6 g/dL    BUN/Creatinine Ratio 10.8 7.0 - 25.0    Anion Gap 9.5 5.0 - 15.0 mmol/L    eGFR 81.8 >60.0 mL/min/1.73   Urinalysis With Microscopic If Indicated (No Culture) - Urine, Clean Catch    Specimen: Urine, Clean Catch   Result Value Ref Range    Color, UA Yellow Yellow, Straw    Appearance, UA Clear Clear    pH, UA 5.5 5.0 - 8.0    Specific Gravity, UA 1.012 1.005 - 1.030    Glucose,  mg/dL (Trace) (A) Negative    Ketones, UA Negative Negative    Bilirubin, UA Negative Negative    Blood, UA Negative Negative    Protein, UA Negative Negative    Leuk Esterase, UA Small (1+) (A) Negative    Nitrite, UA Negative Negative    Urobilinogen, UA 0.2 E.U./dL 0.2 - 1.0 E.U./dL   Ethanol    Specimen: Blood   Result Value Ref Range    Ethanol <10 0 - 10 mg/dL    Ethanol % <0.010 %   Urine Drug Screen - Urine, Clean Catch    Specimen: Urine, Clean Catch   Result Value Ref Range    THC, Screen, Urine Negative Negative    Phencyclidine (PCP), Urine Negative Negative    Cocaine Screen, Urine Negative Negative    Methamphetamine, Ur Negative Negative    Opiate Screen Negative Negative    Amphetamine Screen, Urine Negative Negative    Benzodiazepine Screen, Urine Negative Negative    Tricyclic Antidepressants Screen Negative Negative    Methadone Screen, Urine Negative Negative    Barbiturates Screen, Urine Negative Negative    Oxycodone Screen, Urine Negative Negative    Buprenorphine, Screen, Urine Negative Negative   Magnesium    Specimen: Blood   Result Value Ref Range    Magnesium 2.1 1.6 - 2.6 mg/dL   CBC Auto Differential    Specimen: Blood   Result Value Ref Range    WBC 5.83 3.40 - 10.80 10*3/mm3    RBC 4.54 3.77 - 5.28 10*6/mm3    Hemoglobin  13.2 12.0 - 15.9 g/dL    Hematocrit 42.5 34.0 - 46.6 %    MCV 93.6 79.0 - 97.0 fL    MCH 29.1 26.6 - 33.0 pg    MCHC 31.1 (L) 31.5 - 35.7 g/dL    RDW 12.3 12.3 - 15.4 %    RDW-SD 42.7 37.0 - 54.0 fl    MPV 9.5 6.0 - 12.0 fL    Platelets 243 140 - 450 10*3/mm3    Neutrophil % 53.6 42.7 - 76.0 %    Lymphocyte % 34.8 19.6 - 45.3 %    Monocyte % 8.9 5.0 - 12.0 %    Eosinophil % 1.7 0.3 - 6.2 %    Basophil % 1.0 0.0 - 1.5 %    Immature Grans % 0.0 0.0 - 0.5 %    Neutrophils, Absolute 3.12 1.70 - 7.00 10*3/mm3    Lymphocytes, Absolute 2.03 0.70 - 3.10 10*3/mm3    Monocytes, Absolute 0.52 0.10 - 0.90 10*3/mm3    Eosinophils, Absolute 0.10 0.00 - 0.40 10*3/mm3    Basophils, Absolute 0.06 0.00 - 0.20 10*3/mm3    Immature Grans, Absolute 0.00 0.00 - 0.05 10*3/mm3    nRBC 0.0 0.0 - 0.2 /100 WBC   Fentanyl, Urine - Urine, Clean Catch    Specimen: Urine, Clean Catch   Result Value Ref Range    Fentanyl, Urine Negative Negative   Urinalysis, Microscopic Only - Urine, Clean Catch    Specimen: Urine, Clean Catch   Result Value Ref Range    RBC, UA 0-2 None Seen, 0-2 /HPF    WBC, UA 11-20 (A) None Seen, 0-2 /HPF    Bacteria, UA 4+ (A) None Seen /HPF    Squamous Epithelial Cells, UA 0-2 None Seen, 0-2 /HPF    Hyaline Casts, UA None Seen None Seen /LPF    Methodology Automated Microscopy    [place] Urine Culture Tube - Urine, Clean Catch    Specimen: Urine, Clean Catch   Result Value Ref Range    Extra Tube Hold for add-ons.    Green Top (Gel)   Result Value Ref Range    Extra Tube Hold for add-ons.    Lavender Top   Result Value Ref Range    Extra Tube hold for add-on    Gold Top - SST   Result Value Ref Range    Extra Tube Hold for add-ons.    Light Blue Top   Result Value Ref Range    Extra Tube Hold for add-ons.           ED Course  ED Course as of 11/02/23 2052   Thu Nov 02, 2023 1837 Medically clear for psych - recommend starting cefdinir for UTI [AH]   1837 Endorsed to Mayra Singer []   2051 Spoke with psych intake nurse  who says patient will be [facility] to [facility]note 3[facility] under the care of Dr. Osuna.  [MM]      ED Course User Index  [AH] Huong Yañez PA  [MM] Mayra Singer PA                                           Medical Decision Making    58-year-old female who presents to the ED today for mental health evaluation.  She states she has been having worsening depression and began to have suicidal ideations last night.  She states she made a plan to go into the forest with a gun.  She denies any homicidal ideations.  She denies any drug or alcohol use but states she has had cravings to drink alcohol.  She states her appetite has been normal but her sleep has been poor.  She does report for the last 6 weeks she has had frequent urination with urgency.  She states now she is having some right flank pain.  She denies any dysuria but her urine has had a foul odor.  She also has a history of right hip bursitis and left rotator cuff injury.    History provided by:  Patient  Mental Health Problem  Presenting symptoms: depression and suicidal thoughts    Degree of incapacity (severity):  Severe  Onset quality:  Gradual  Duration:  1 day  Timing:  Constant  Progression:  Worsening  Chronicity:  New  Context: not alcohol use and not drug abuse    Relieved by:  Nothing  Worsened by:  Nothing  Associated symptoms: feelings of worthlessness and insomnia    Associated symptoms: no appetite change    Risk factors: hx of mental illness        Problems Addressed:  Acute cystitis without hematuria: complicated acute illness or injury  Suicidal ideation: complicated acute illness or injury    Amount and/or Complexity of Data Reviewed  Labs: ordered. Decision-making details documented in ED Course.        Final diagnoses:   Suicidal ideation   Acute cystitis without hematuria   Electronically signed by NACHO Adams, 11/02/23, 6:37 PM EDT.     ED Disposition  ED Disposition       ED Disposition   DC/Transfer to Behavioral Health     Condition   Stable    Comment   --               No follow-up provider specified.       Medication List      No changes were made to your prescriptions during this visit.            Mayra Singer PA  11/02/23 2052

## 2023-11-02 NOTE — TELEPHONE ENCOUNTER
Pt called and stated that she was not doing well on the Prozac and wanted to see about getting an earlier appointment if possible. Currently has an appointment 11/16/2023. Please advise.

## 2023-11-02 NOTE — TELEPHONE ENCOUNTER
States that right now it is not working for her at all. Pt stated that she is currently experiencing SI. Talked with pt about going to ED to be evaluated. Pt feels she may need inpatient. Informed pt that [facility] currently did not have any sooner appointments available. Pt stated she would go to ED for evaluation after finishing paying her bills. Asked pt if she had anyone with her, pt stated she did not. Pt stated that she felt safe enough to drive herself to ED. Instructed pt to call us back if she needed any further assistance.

## 2023-11-02 NOTE — NURSING NOTE
"Pt presents to intake for SI with specific plan. Pt says she planned to shoot herself in the woods behind her house today or tomorrow. Pt has access to a gun at home. Hx of previous attempts \"several years ago.\" No self-injury. Pt says sleeping and eating are both poor. Denies SI/HI/AVH. Rates anxiety and depression both 8/10. Denies any substance abuse.   "

## 2023-11-02 NOTE — NURSING NOTE
Patient presents to intake at 1745. Patient searched per policy with two staff members present. Items logged and placed in intake locker. Safety checks in place.

## 2023-11-03 LAB
CHOLEST SERPL-MCNC: 177 MG/DL (ref 0–200)
GLUCOSE BLDC GLUCOMTR-MCNC: 125 MG/DL (ref 70–130)
HAV IGM SERPL QL IA: NORMAL
HBA1C MFR BLD: 5.4 % (ref 4.8–5.6)
HBV CORE IGM SERPL QL IA: NORMAL
HBV SURFACE AG SERPL QL IA: NORMAL
HCV AB SER DONR QL: NORMAL
HDLC SERPL-MCNC: 52 MG/DL (ref 40–60)
LDLC SERPL CALC-MCNC: 106 MG/DL (ref 0–100)
LDLC/HDLC SERPL: 2.01 {RATIO}
QT INTERVAL: 406 MS
QTC INTERVAL: 398 MS
TRIGL SERPL-MCNC: 103 MG/DL (ref 0–150)
VLDLC SERPL-MCNC: 19 MG/DL (ref 5–40)

## 2023-11-03 PROCEDURE — 83036 HEMOGLOBIN GLYCOSYLATED A1C: CPT | Performed by: PSYCHIATRY & NEUROLOGY

## 2023-11-03 PROCEDURE — 80074 ACUTE HEPATITIS PANEL: CPT | Performed by: PSYCHIATRY & NEUROLOGY

## 2023-11-03 PROCEDURE — 99223 1ST HOSP IP/OBS HIGH 75: CPT | Performed by: PSYCHIATRY & NEUROLOGY

## 2023-11-03 PROCEDURE — 90686 IIV4 VACC NO PRSV 0.5 ML IM: CPT | Performed by: PSYCHIATRY & NEUROLOGY

## 2023-11-03 PROCEDURE — 25010000002 INFLUENZA VAC SPLIT QUAD 0.5 ML SUSPENSION PREFILLED SYRINGE: Performed by: PSYCHIATRY & NEUROLOGY

## 2023-11-03 PROCEDURE — 82948 REAGENT STRIP/BLOOD GLUCOSE: CPT

## 2023-11-03 PROCEDURE — 80061 LIPID PANEL: CPT | Performed by: PSYCHIATRY & NEUROLOGY

## 2023-11-03 PROCEDURE — 93010 ELECTROCARDIOGRAM REPORT: CPT | Performed by: INTERNAL MEDICINE

## 2023-11-03 PROCEDURE — G0008 ADMIN INFLUENZA VIRUS VAC: HCPCS | Performed by: PSYCHIATRY & NEUROLOGY

## 2023-11-03 RX ORDER — HYDROXYZINE HYDROCHLORIDE 25 MG/1
25 TABLET, FILM COATED ORAL 3 TIMES DAILY PRN
Status: DISCONTINUED | OUTPATIENT
Start: 2023-11-03 | End: 2023-11-06 | Stop reason: HOSPADM

## 2023-11-03 RX ORDER — FLUOXETINE HYDROCHLORIDE 20 MG/1
40 CAPSULE ORAL DAILY
Status: DISCONTINUED | OUTPATIENT
Start: 2023-11-04 | End: 2023-11-06 | Stop reason: HOSPADM

## 2023-11-03 RX ORDER — ESTRADIOL 0.1 MG/G
2 CREAM VAGINAL 3 TIMES WEEKLY
Status: DISCONTINUED | OUTPATIENT
Start: 2023-11-03 | End: 2023-11-06 | Stop reason: HOSPADM

## 2023-11-03 RX ORDER — HYDROXYZINE HYDROCHLORIDE 25 MG/1
25 TABLET, FILM COATED ORAL 3 TIMES DAILY PRN
Status: DISCONTINUED | OUTPATIENT
Start: 2023-11-03 | End: 2023-11-03

## 2023-11-03 RX ADMIN — CEFDINIR 300 MG: 300 CAPSULE ORAL at 00:57

## 2023-11-03 RX ADMIN — Medication 1 TABLET: at 08:20

## 2023-11-03 RX ADMIN — INFLUENZA A VIRUS A/VICTORIA/4897/2022 IVR-238 (H1N1) ANTIGEN (FORMALDEHYDE INACTIVATED), INFLUENZA A VIRUS A/DARWIN/9/2021 SAN-010 (H3N2) ANTIGEN (FORMALDEHYDE INACTIVATED), INFLUENZA B VIRUS B/PHUKET/3073/2013 ANTIGEN (FORMALDEHYDE INACTIVATED), AND INFLUENZA B VIRUS B/MICHIGAN/01/2021 ANTIGEN (FORMALDEHYDE INACTIVATED) 0.5 ML: 15; 15; 15; 15 INJECTION, SUSPENSION INTRAMUSCULAR at 09:06

## 2023-11-03 RX ADMIN — CEFDINIR 300 MG: 300 CAPSULE ORAL at 08:20

## 2023-11-03 RX ADMIN — CEFDINIR 300 MG: 300 CAPSULE ORAL at 20:30

## 2023-11-03 RX ADMIN — Medication 1000 UNITS: at 08:20

## 2023-11-03 RX ADMIN — ESTRADIOL 2 G: 0.1 CREAM VAGINAL at 20:30

## 2023-11-03 RX ADMIN — FLUOXETINE HYDROCHLORIDE 20 MG: 20 CAPSULE ORAL at 08:20

## 2023-11-03 RX ADMIN — IBUPROFEN 400 MG: 400 TABLET, FILM COATED ORAL at 08:25

## 2023-11-03 NOTE — NURSING NOTE
Spoke to Dr. SAQIB Osuan via phone. Intake information provided. Instructed to admit the patient. Admit orders received. SP3 routine orders, Cefdinir 300mg BID x 7 days recommended by  SORIN Nunez PA [id number]. Patient and ED provider made aware of plan of care. Safety precautions maintained.

## 2023-11-03 NOTE — PLAN OF CARE
Goal Outcome Evaluation:  Plan of Care Reviewed With: patient  Patient Agreement with Plan of Care: agrees     Progress: no change  Outcome Evaluation: Patient presents to [facility] from ED. Patient reports her depression has increased over the past month and half and she started becoming suicidal. Patient says she planned to take a gun out in the woods and shoot herself.  Patient reports history of suicide attempt about 10 years ago where she took foxglove. Patient rates anxiety and depression 8/10. Denies HI or AVH. Patient says sleeping and eating are both poor. Denies any substance abuse. She reports she has been dealing with torn left rotator cuff that has caused her right hip bursitis. She reports she has an MRI scheduled in Dec. for torn rotator cuff.

## 2023-11-03 NOTE — PLAN OF CARE
Goal Outcome Evaluation:  Plan of Care Reviewed With: patient  Patient Agreement with Plan of Care: agrees     Progress: no change  Outcome Evaluation: Pt new admit this shift for SI. Pt has been calm and cooperative and appears to have slept well

## 2023-11-03 NOTE — PLAN OF CARE
Goal Outcome Evaluation:  Plan of Care Reviewed With: patient  Patient Agreement with Plan of Care: agrees     Progress: improving       Patient rates anxiety 7-8 and depression 7, reports having suicidal thoughts verbalizes can inform staff of thoughts, denies thoughts of harming others, and denies hallucinations.

## 2023-11-03 NOTE — NURSING NOTE
[facility] Lead RN contacted at this time for chart review and request of bed assignment. Bed assigned to 23B.

## 2023-11-03 NOTE — H&P
INITIAL PSYCHIATRIC HISTORY & PHYSICAL    Patient Identification:  Name:  Zo Palma  Age:  58 y.o.  Sex:  female  :  1965  MRN:  [id number]   Visit Number:  [id number]  Primary Care Physician:  Evan Rivas DO    SUBJECTIVE    CC/Focus of Exam: Depression and SI    HPI: Zo Palma is a 58 y.o. female who was admitted on 2023 with complaints of depression and suicidal ideations with a plan. The patient reports she has been dealing with depression for a long time but it started getting worse over the last few weeks. Severity is high, timing is constant, no modifying factors identified.   Associated symptoms include poor sleep and appetite, anhedonia, feeling hopeless, no energy and suicidal thoughts. Patient reports she has tried to kill herself in the past about 10 years ago.  She reports no manic or hypomanic episode. Denies any AVH.     PAST PSYCHIATRIC HX: The patient has been in treatment for MDD, GURMEET and PTSD. Currently on fluoxetine and states it was helping but it is not as effective now.     SUBSTANCE USE HX: None reported. Drinks alcohol once a week.    SOCIAL HX:   Social History     Socioeconomic History    Marital status:    Tobacco Use    Smoking status: Never     Passive exposure: Past    Smokeless tobacco: Never   Vaping Use    Vaping Use: Never used   Substance and Sexual Activity    Alcohol use: Yes     Alcohol/week: 1.0 standard drink of alcohol     Types: 1 Drinks containing 0.5 oz of alcohol per week     Comment: Occasionally    Drug use: Never    Sexual activity: Defer     Partners: Male     Birth control/protection: Post-menopausal, Hysterectomy         Past Medical History:   Diagnosis Date    Anxiety     Depression     Insomnia     Migraines     Obesity     Peripheral neuropathy     Seizures     Conversion Disorder          Past Surgical History:   Procedure Laterality Date    BARIATRIC SURGERY  2008    Gastric sleeve    BREAST SURGERY       CHOLECYSTECTOMY  2010    COLONOSCOPY      FRACTURE SURGERY      Left wrist    GASTRIC SLEEVE LAPAROSCOPIC N/A     HYSTERECTOMY      SINUS SURGERY  2021    WISDOM TOOTH EXTRACTION N/A        Family History   Problem Relation Age of Onset    Cancer Mother         Brain, suspected uterine    Cancer Father         Prostate    Learning disabilities Father         Dyslexia    Other Father         Early onset alzheimer    Other Paternal Grandfather         Early onset alzheimer    Cancer Brother         Prostate, bone    Learning disabilities Brother         Dyslexia    Other Brother         Alzheimer         Medications Prior to Admission   Medication Sig Dispense Refill Last Dose    FLUoxetine (PROzac) 10 MG capsule Take 2 capsules by mouth Daily. 180 capsule 0 11/2/2023    Liraglutide (Victoza) 18 MG/3ML solution pen-injector injection Inject 1.8 mg under the skin into the appropriate area as directed Daily.   11/2/2023    multivitamin with minerals tablet tablet Take 1 tablet by mouth Daily.   11/2/2023    Omega 3-6-9 Fatty Acids (OMEGA 3-6-9 COMPLEX PO) Take 1 capsule by mouth Daily.   11/2/2023    Cholecalciferol 25 MCG (1000 UT) tablet Take 1 tablet by mouth 1 (One) Time Per Week.       estradiol (ESTRACE) 0.1 MG/GM vaginal cream Insert 2 g into the vagina 3 (Three) Times a Week.       ibandronate (BONIVA) 150 MG tablet Take 1 tablet by mouth Every 30 (Thirty) Days. 3 tablet 3          ALLERGIES:  Chlorzoxazone, Iodine, Phenazopyridine hcl, Pyridium [phenazopyridine], Quinine, Valproic acid, Methocarbamol, Iodinated contrast media, Acetaminophen-codeine, and Diphenhydramine    Temp:  [97 °F (36.1 °C)-98.5 °F (36.9 °C)] 97.9 °F (36.6 °C)  Heart Rate:  [55-71] 69  Resp:  [16-17] 16  BP: ()/(52-85) 136/75    REVIEW OF SYSTEMS:  Review of Systems   Constitutional:  Positive for fatigue.   HENT: Negative.     Eyes: Negative.    Respiratory: Negative.     Cardiovascular: Negative.    Gastrointestinal: Negative.     Endocrine: Negative.    Genitourinary:  Positive for dysuria.   Musculoskeletal:  Positive for arthralgias, joint swelling and myalgias.   Skin: Negative.    Allergic/Immunologic: Negative.    Neurological: Negative.    Hematological: Negative.    Psychiatric/Behavioral:  Positive for dysphoric mood and suicidal ideas. The patient is nervous/anxious.         OBJECTIVE    PHYSICAL EXAM:  Physical Exam  Constitutional:  Appears well-developed and well-nourished.   HENT:   Head: Normocephalic and atraumatic.   Right Ear: External ear normal.   Left Ear: External ear normal.   Mouth/Throat: Oropharynx is clear and moist.   Eyes: Pupils are equal, round, and reactive to light. Conjunctivae and EOM are normal.   Neck: Normal range of motion. Neck supple.   Cardiovascular: Normal rate, regular rhythm and normal heart sounds.    Respiratory: Effort normal and breath sounds normal. No respiratory distress. No wheezes.   GI: Soft. Bowel sounds are normal.No distension. There is no tenderness.   Musculoskeletal: Normal range of motion. No edema or deformity.   Neurological:No cranial nerve deficit. Coordination normal.   Skin: Skin is warm and dry. No rash noted. No erythema.     MENTAL STATUS EXAM:   Hygiene:   fair  Cooperation:  Cooperative  Eye Contact:  Fair  Psychomotor Behavior:  Appropriate  Affect:  Appropriate  Hopelessness: 4  Speech:  Normal  Thought Progress: Goal directed  Thought Content:  Normal  Suicidal:  Suicidal Ideation  Homicidal:  None  Hallucinations:  None  Delusion:  None  Memory:  Intact  Orientation:  Person, Place, Time, and Situation  Reliability:  fair  Insight:  Fair  Judgement:  Fair  Impulse Control:  Fair    Imaging Results (Last 24 Hours)       ** No results found for the last 24 hours. **             ECG/EMG Results (most recent)       Procedure Component Value Units Date/Time    ECG 12 Lead Other; Baseline Cardiac Status [[id number]] Collected: 11/03/23 0050     Updated: 11/03/23 0052      QT Interval 406 ms      QTC Interval 398 ms     Narrative:      Test Reason : Other~  Blood Pressure :   */*   mmHG  Vent. Rate :  58 BPM     Atrial Rate :  58 BPM     P-R Int : 198 ms          QRS Dur :  78 ms      QT Int : 406 ms       P-R-T Axes :  58   7  45 degrees     QTc Int : 398 ms    Sinus bradycardia  Otherwise normal ECG  No previous ECGs available    Referred By:            Confirmed By:              Lab Results   Component Value Date    GLUCOSE 107 (H) 11/02/2023    BUN 9 11/02/2023    CREATININE 0.83 11/02/2023    BCR 10.8 11/02/2023    CO2 24.5 11/02/2023    CALCIUM 9.8 11/02/2023    ALBUMIN 4.4 11/02/2023    AST 20 11/02/2023    ALT 22 11/02/2023       Lab Results   Component Value Date    WBC 5.83 11/02/2023    HGB 13.2 11/02/2023    HCT 42.5 11/02/2023    MCV 93.6 11/02/2023     11/02/2023       Last Urine Toxicity          Latest Ref Rng & Units 11/2/2023   LAST URINE TOXICITY RESULTS   Amphetamine, Urine Qual Negative Negative    Barbiturates Screen, Urine Negative Negative    Benzodiazepine Screen, Urine Negative Negative    Buprenorphine, Screen, Urine Negative Negative    Cocaine Screen, Urine Negative Negative    Fentanyl, Urine Negative Negative    Methadone Screen , Urine Negative Negative    Methamphetamine, Ur Negative Negative        Brief Urine Lab Results  (Last result in the past 365 days)        Color   Clarity   Blood   Leuk Est   Nitrite   Protein   CREAT   Urine HCG        11/02/23 1759 Yellow   Clear   Negative   Small (1+)   Negative   Negative                   DATA  Labs reviewed. CMP, CBC are unremarkable. Hep screen negative. UA shows glucose, leukocyte esterase, WBC, bacteria. UDS negative.   EKG reviewed. QTc 398 ms.  YOSHI reviewed.   Record reviewed. The patient has been in outpatient treatment at [facility] in [place] for MDD, GURMEET and PTSD.    Strengths: Motivated for treatment    Weaknesses:Poor coping skills    Code status:  Full  Discussed code  status with patient.    ASSESSMENT & PLAN:    Hospital bed: Yes patient has chronic pain.      Suicidal ideations  -SP 3      Severe episode of recurrent major depressive disorder  -Increase fluoxetine 40 mg daily      PTSD (post-traumatic stress disorder)  -Fluoxetine as above      GURMEET  -Hydroxyzine and fluoxetine      DM II  -Victoza      UTI  -Cefdinir            The patient has been admitted for safety and stabilization.  Patient will be monitored for suicidality daily and maintained on Special Precautions Level 3 (q15 min checks) .  The patient will have individual and group therapy with a master's level therapist. A master treatment plan will be developed and agreed upon by the patient and his/her treatment team.  The patient's estimated length of stay in the hospital is 5-7 days.

## 2023-11-04 LAB
BACTERIA SPEC AEROBE CULT: ABNORMAL
GLUCOSE BLDC GLUCOMTR-MCNC: 84 MG/DL (ref 70–130)
GLUCOSE BLDC GLUCOMTR-MCNC: 94 MG/DL (ref 70–130)

## 2023-11-04 PROCEDURE — 82948 REAGENT STRIP/BLOOD GLUCOSE: CPT

## 2023-11-04 PROCEDURE — 99232 SBSQ HOSP IP/OBS MODERATE 35: CPT | Performed by: PSYCHIATRY & NEUROLOGY

## 2023-11-04 RX ADMIN — FLUOXETINE HYDROCHLORIDE 40 MG: 20 CAPSULE ORAL at 08:20

## 2023-11-04 RX ADMIN — CEFDINIR 300 MG: 300 CAPSULE ORAL at 20:18

## 2023-11-04 RX ADMIN — Medication 1 TABLET: at 08:20

## 2023-11-04 RX ADMIN — CEFDINIR 300 MG: 300 CAPSULE ORAL at 08:20

## 2023-11-04 NOTE — PROGRESS NOTES
"0930    DATA:      Therapist met individually with patient this date to introduce role and to discuss hospitalization expectations. Patient agreeable. Reviewed medical record and staffed case with treatment team this date. No major issues identified.       Clinical Maneuvering/Intervention:     Therapist assisted patient in processing above session content; acknowledged and normalized patient’s thoughts, feelings, and concerns.  Discussed the therapist/patient relationship and explain the parameters and limitations of relative confidentiality.  Also discussed the importance of active participation, and honesty to the treatment process.  Encouraged the patient to discuss/vent their feelings, frustrations, and fears concerning their ongoing medical issues and validated their feelings.     Allowed patient to freely discuss issues without interruption or judgment. Provided safe, confidential environment to facilitate the development of positive therapeutic relationship and encourage open, honest communication.     Therapist addressed discharge safety planning this date. Assisted patient in identifying risk factors which would indicate the need for higher level of care after discharge;  including thoughts to harm self or others and/or self-harming behavior. Encouraged patient to call [phone],  911, or present to the nearest emergency room should any of these events occur. Discussed crisis intervention services and means to access.  Encouraged securing any objects of harm.       Therapist completed integrated summary, treatment plan, and initiated social history this date.  Therapist is strongly encouraging family involvement in treatment.       ASSESSMENT:      The patient is a 58 year old  female.  Per report, \"58-year-old female who presents to the ED today for mental health evaluation.  She states she has been having worsening depression and began to have suicidal ideations last night.  She states she made a plan " "to go into the forest with a gun.  She denies any homicidal ideations.  She denies any drug or alcohol use but states she has had cravings to drink alcohol.  She states her appetite has been normal but her sleep has been poor.  She does report for the last 6 weeks she has had frequent urination with urgency.  She states now she is having some right flank pain.  She denies any dysuria but her urine has had a foul odor.  She also has a history of right hip bursitis and left rotator cuff injury.\"     Today, patient was seen 1-1 in the office.  Patient calm/cooperative/oriented  Patient noted to have appropriate affect, congruent affect, normal thought content.  Patient reports that she came into the hospital to get help with medication adjustment.  Patient had been having suicidal thoughts at home and had been reading information on 988. Patient rates depression at 8/10 and anxiety at 7/10. Patient denies SI/HI/AVH.  Patient endorses a history of trauma and depression.  She has history of getting treatment in [place].  Patient has been living for the last 3 years here in [place].  She reports that she currently goes to [facility] outpatient.     Patient signed consent for her roommate Umair Daniels at [phone];  Umair was contacted this date; Umair was supportive and agreeable to help patient safe guard at home. Umair was encouraged to safe guard any firearm, weapon and medication. Umair identifies that he plans to visit with patient's service animal this date. Staff are aware.       Goals for treatment: Medication adjustment      Prior Hospitalizations / Dates/current treatment:  [place] inpatient history      Childhood History:  Raised in [place] and [facility]. Patient reports a trauma history.  Been in [place] 3 x years.  Raised by parents.  Currently living; [facility] with friend.       Suicide Attempts:  10 years;  took Lepe Glove.  Denies family history of suicide.      Alcohol:  denies     Substance use: " denies     Legal:  denies     Relationship/support: .  2 children: adults.      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  Social Security Income      Access to firearms:  Firearm is safe guarded by gun safe         PLAN:       Patient to remain hospitalized this date.      Treatment team will focus efforts on stabilizing patient's acute symptoms while providing education on healthy coping and crisis management to reduce hospitalizations.   Patient requires daily psychiatrist evaluation and 24/7 nursing supervision to promote patient  safety.     Therapist will offer 1-4 individual sessions, family education, and appropriate referral.     Therapist recommends continued assessment. Patient signed consent for [facility] and will be returning home with roommate at discharge.  Home identified to be safe guarded.

## 2023-11-04 NOTE — PLAN OF CARE
Goal Outcome Evaluation:  Plan of Care Reviewed With: patient  Patient Agreement with Plan of Care: agrees     Progress: improving     Patient rates anxiety 7 and depression 8, denies thoughts of harming self and others, and denies hallucinations.

## 2023-11-04 NOTE — PLAN OF CARE
Goal Outcome Evaluation:  Plan of Care Reviewed With: patient  Patient Agreement with Plan of Care: agrees        Outcome Evaluation: Rated anxiety and depression both 8/10. Denied SI, HI, AVH. Reported poor appetite and poor sleep. Patient seemed to sleep most of the night.

## 2023-11-04 NOTE — PROGRESS NOTES
"INPATIENT PSYCHIATRIC PROGRESS NOTE    Name:  Zo Palma  :  1965  MRN:  [id number]  Visit Number:  [id number]  Length of stay:  2    SUBJECTIVE    CC/Focus of Exam: SI    INTERVAL HISTORY: Patient reports that she has high hopes today but mood is still low.  She is tolerating medications without side effects.  Anxiety remains high  She denies SI/HI/AVH today.    Depression rating 8/10  Anxiety rating 8/10  Sleep: restless  Withdrawal sx: None  Cravin/10    Review of Systems   HENT: Negative.     Respiratory: Negative.     Cardiovascular: Negative.    Gastrointestinal: Negative.    Genitourinary: Negative.        OBJECTIVE    Temp:  [97.5 °F (36.4 °C)-97.9 °F (36.6 °C)] 97.5 °F (36.4 °C)  Heart Rate:  [60-92] 92  Resp:  [16-18] 18  BP: (120-136)/(68-77) 124/75    MENTAL STATUS EXAM:  Appearance: Casually dressed, good hygeine.   Cooperation: Cooperative  Psychomotor: No psychomotor agitation/retardation, No EPS, No motor tics  Speech: normal rate, amount.  Mood: \"Depressed but more hopeful\"   Affect: congruent, appropriate  Thought Content: goal directed, no delusional material present  Thought process: linear, organized.  Suicidality: Improving SI  Homicidality: No HI  Perception: No AH/VH  Insight: fair   Judgment: fair    Lab Results (last 24 hours)       Procedure Component Value Units Date/Time    POC Glucose Once [[id number]]  (Normal) Collected: 23 0744    Specimen: Blood Updated: 23 0751     Glucose 84 mg/dL     POC Glucose Once [[id number]]  (Normal) Collected: 23 1815    Specimen: Blood Updated: 23 1822     Glucose 125 mg/dL                Imaging Results (Last 24 Hours)       ** No results found for the last 24 hours. **               ECG/EMG Results (most recent)       Procedure Component Value Units Date/Time    ECG 12 Lead Other; Baseline Cardiac Status [[id number]] Collected: 23 0050     Updated: 23     QT Interval 406 ms      QTC Interval 398 ms  "    Narrative:      Test Reason : Other~  Blood Pressure :   */*   mmHG  Vent. Rate :  58 BPM     Atrial Rate :  58 BPM     P-R Int : 198 ms          QRS Dur :  78 ms      QT Int : 406 ms       P-R-T Axes :  58   7  45 degrees     QTc Int : 398 ms    Sinus bradycardia  Otherwise normal ECG  No previous ECGs available  Confirmed by Edgardo Gomez ([id number]) on 11/3/2023 8:02:06 PM    Referred By:            Confirmed By: Edgardo Gomez             ALLERGIES: Chlorzoxazone, Iodine, Phenazopyridine hcl, Pyridium [phenazopyridine], Quinine, Valproic acid, Methocarbamol, Iodinated contrast media, Acetaminophen-codeine, and Diphenhydramine      Current Facility-Administered Medications:     aluminum-magnesium hydroxide-simethicone (MAALOX MAX) 400-400-40 MG/5ML suspension 15 mL, 15 mL, Oral, Q6H PRN, Max Osuna MD    benzonatate (TESSALON) capsule 100 mg, 100 mg, Oral, TID PRN, Max Osuna MD    bisacodyl (DULCOLAX) EC tablet 5 mg, 5 mg, Oral, Daily PRN, Max Osuna MD    cefdinir (OMNICEF) capsule 300 mg, 300 mg, Oral, Q12H, Max Osuna MD, 300 mg at 11/04/23 0820    cholecalciferol (VITAMIN D3) tablet 1,000 Units, 1,000 Units, Oral, Weekly, Max Osuna MD, 1,000 Units at 11/03/23 0820    estradiol (ESTRACE) vaginal cream 2 g, 2 g, Vaginal, Once per day on Mon Wed Fri, Eric Orozco MD, 2 g at 11/03/23 2030    FLUoxetine (PROzac) capsule 40 mg, 40 mg, Oral, Daily, Eric Orozco MD, 40 mg at 11/04/23 0820    hydrOXYzine (ATARAX) tablet 25 mg, 25 mg, Oral, TID PRN, Eric Orozco MD    ibuprofen (ADVIL,MOTRIN) tablet 400 mg, 400 mg, Oral, Q6H PRN, Max Osuna MD, 400 mg at 11/03/23 0825    Liraglutide (VICTOZA) injection 1.8 mg, 1.8 mg, Subcutaneous, Daily, Max Osuna MD, 1.8 mg at 11/04/23 0821    loperamide (IMODIUM) capsule 2 mg, 2 mg, Oral, Q2H PRN, Max Osuna MD    magnesium hydroxide (MILK OF MAGNESIA) suspension 10 mL, 10 mL, Oral, Daily PRN, Max Osuna MD     melatonin tablet 3 mg, 3 mg, Oral, Nightly PRN, Max Osuna MD    multivitamin with minerals 1 tablet, 1 tablet, Oral, Daily, Max Osuna MD, 1 tablet at 11/04/23 0820    ondansetron (ZOFRAN) tablet 4 mg, 4 mg, Oral, Q6H PRN, Max Osuna MD    sodium chloride nasal spray 2 spray, 2 spray, Each Nare, PRN, Max Osuna MD    traZODone (DESYREL) tablet 12.5 mg, 12.5 mg, Oral, Nightly PRN, Max Osuna MD    Reviewed chart, notes, vitals, labs and EKG personally reviewed.    ASSESSMENT & PLAN:    Hospital bed: Yes patient has chronic pain.       Suicidal ideations  -Admitted for crisis stabilization  -SP 3       Severe episode of recurrent major depressive disorder  -Increased fluoxetine 40 mg daily       PTSD (post-traumatic stress disorder)  -Fluoxetine as above       GURMEET  -Hydroxyzine and fluoxetine       DM II  -Victoza       UTI  -Cefdinir       Special precautions: Special Precautions Level 3 (q15 min checks)           Behavioral Health Treatment Plan and Problem List: I have reviewed and approved the Behavioral Health Treatment Plan and Problem list.  The patient has had a chance to review and agrees with the treatment plan.     Clinician:  Brian Platt MD  11/04/23  13:40 EDT

## 2023-11-04 NOTE — PLAN OF CARE
Problem: Adult Behavioral Health Plan of Care  Goal: Plan of Care Review  Outcome: Ongoing, Progressing  Flowsheets (Taken 11/4/2023 1006)  Consent Given to Review Plan with: Cloud Walker  Progress: improving  Plan of Care Reviewed With:   patient   significant other  Patient Agreement with Plan of Care: agrees  Outcome Evaluation: New admit. Completed social history and integrated summary  Goal: Patient-Specific Goal (Individualization)  Outcome: Ongoing, Progressing  Flowsheets  Taken 11/4/2023 1006 by Marysol García  Patient-Specific Goals (Include Timeframe): Patient will deny SI/HI prior to discharge. Patient will identify 1 healthy coping skill for depression prior to discharge. Patient will consent to appropriate aftercare plan prior to discharge.  Individualized Care Needs: Thearpist will offer 1-4 individual sessions, family education, aftercare planning  Taken 11/4/2023 0958 by Marysol García  Patient Personal Strengths:   expressive of emotions   expressive of needs   family/social support   community support   motivated for treatment   motivated for recovery   positive educational history   positive vocational history   resilient   resourceful   stable living environment   spiritual/Rastafari support   realistic evaluation of current/future capabilities   intellectual cognitive skills  Patient Vulnerabilities:   adverse childhood experience(s)   history of unsuccessful treatment   family/relationship conflict  Taken 11/2/2023 2302 by Darrion Platt RN  Anxieties, Fears or Concerns: none  Goal: Optimized Coping Skills in Response to Life Stressors  Outcome: Ongoing, Progressing  Intervention: Promote Effective Coping Strategies  Flowsheets (Taken 11/4/2023 1006)  Supportive Measures:   active listening utilized   counseling provided  Goal: Develops/Participates in Therapeutic [place] to Support Successful Transition  Outcome: Ongoing, Progressing  Intervention: Foster  Please see the attached refill request.   Therapeutic [place]  Flowsheets (Taken 11/4/2023 0810 by Moira Luke, RN)  Trust Relationship/Rapport:   care explained   choices provided   emotional support provided   empathic listening provided   questions answered   questions encouraged   reassurance provided   thoughts/feelings acknowledged  Intervention: Mutually Develop Transition Plan  Flowsheets  Taken 11/4/2023 1006  Outpatient/Agency/Support Group Needs:   outpatient counseling   outpatient medication management   outpatient psychiatric care (specify)  Transition Support:   community resources reviewed   crisis management plan verbalized   crisis management plan promoted   follow-up care coordinated   follow-up care discussed  Anticipated Discharge Disposition: home or self-care  Taken 11/4/2023 1003  Discharge Coordination/Progress: Patient has [employer] and Medicare replacement for discharge planning. She has signed consent for [facility].  Concerns Comments: NA  Transportation Anticipated: family or friend will provide  Transportation Concerns: no car  Current Discharge Risk: psychiatric illness  Concerns to be Addressed:   coping/stress   suicidal   home safety   discharge planning   mental health   medication  Readmission Within the Last 30 Days: no previous admission in last 30 days  Patient/Family Anticipated Services at Transition:   outpatient care   mental health services  Patient's Choice of Community Agency(s): [facility]  Patient/Family Anticipates Transition to: home  Offered/Gave Vendor List: no   Goal Outcome Evaluation:  Plan of Care Reviewed With: patient, significant other  Patient Agreement with Plan of Care: agrees  Consent Given to Review Plan with: Cloud Walker  Progress: improving  Outcome Evaluation: New admit. Completed social history and integrated summary

## 2023-11-05 LAB
GLUCOSE BLDC GLUCOMTR-MCNC: 78 MG/DL (ref 70–130)
GLUCOSE BLDC GLUCOMTR-MCNC: 83 MG/DL (ref 70–130)

## 2023-11-05 PROCEDURE — 99232 SBSQ HOSP IP/OBS MODERATE 35: CPT | Performed by: PSYCHIATRY & NEUROLOGY

## 2023-11-05 PROCEDURE — 82948 REAGENT STRIP/BLOOD GLUCOSE: CPT

## 2023-11-05 RX ADMIN — IBUPROFEN 400 MG: 400 TABLET, FILM COATED ORAL at 15:03

## 2023-11-05 RX ADMIN — BISACODYL 5 MG: 5 TABLET, COATED ORAL at 12:29

## 2023-11-05 RX ADMIN — IBUPROFEN 400 MG: 400 TABLET, FILM COATED ORAL at 09:02

## 2023-11-05 RX ADMIN — CEFDINIR 300 MG: 300 CAPSULE ORAL at 20:17

## 2023-11-05 RX ADMIN — FLUOXETINE HYDROCHLORIDE 40 MG: 20 CAPSULE ORAL at 09:01

## 2023-11-05 RX ADMIN — Medication 1 TABLET: at 09:00

## 2023-11-05 RX ADMIN — CEFDINIR 300 MG: 300 CAPSULE ORAL at 09:00

## 2023-11-05 NOTE — PLAN OF CARE
"Goal Outcome Evaluation:  Plan of Care Reviewed With: patient  Patient Agreement with Plan of Care: agrees        Outcome Evaluation: Patient reports appetite and sleep good with anxiety and depression 7/10 with overall mood of \"ups and downs\", patient admits mood flucuates. Patient denies SI, HI, or AVH with no plans this shift. Patient reports pain with shoulder and hip;denied rating it. Patient admits to no bowel movement;noted if she doesn't go tomorrow she wants magnesium citrate;discussed options;adviced to consult with the doctor in the morning. Patient education; identify triggers that escalate her anxiety and suicidal behavior. Patient participated with group in playing rummy and eating snacks 100%. SP3/FALLS         "

## 2023-11-05 NOTE — NURSING NOTE
The 15 minute suicide/safety rounds were charted on paper and maintained during downtime 01:01 am-03:01 am due to time change. Epic has been updated accordingly and charted papers sent to medical records.

## 2023-11-05 NOTE — PROGRESS NOTES
"INPATIENT PSYCHIATRIC PROGRESS NOTE    Name:  Zo Palma  :  1965  MRN:  [id number]  Visit Number:  [id number]  Length of stay:  3    SUBJECTIVE    CC/Focus of Exam: SI    INTERVAL HISTORY: Patient states that she is about the same with some mild improvement in less negative thoughts.  She is not having medication side effects.  She is having a migraine and reports that it is too bright and loud this morning as her primary point of discussion.  No other issues noted.    Depression rating 8/10  Anxiety rating 8/10  Sleep: restless  Withdrawal sx: None  Cravin/10    Review of Systems   HENT: Negative.     Respiratory: Negative.     Cardiovascular: Negative.    Gastrointestinal: Negative.    Genitourinary: Negative.        OBJECTIVE    Temp:  [97.6 °F (36.4 °C)-98.6 °F (37 °C)] 97.6 °F (36.4 °C)  Heart Rate:  [61-83] 83  Resp:  [16-18] 18  BP: (101-118)/(61-76) 101/71    MENTAL STATUS EXAM:  Appearance: Casually dressed, good hygeine.   Cooperation: Cooperative  Psychomotor: No psychomotor agitation/retardation, No EPS, No motor tics  Speech: normal rate, amount.  Mood: \"Depressed and I have a migraine\"   Affect: congruent, appropriate  Thought Content: goal directed, no delusional material present  Thought process: linear, organized.  Suicidality: Improving SI  Homicidality: No HI  Perception: No AH/VH  Insight: fair   Judgment: fair    Lab Results (last 24 hours)       Procedure Component Value Units Date/Time    POC Glucose Once [[id number]]  (Normal) Collected: 23 0654    Specimen: Blood Updated: 23 0700     Glucose 83 mg/dL     POC Glucose Once [[id number]]  (Normal) Collected: 23 1637    Specimen: Blood Updated: 23 1643     Glucose 94 mg/dL                Imaging Results (Last 24 Hours)       ** No results found for the last 24 hours. **               ECG/EMG Results (most recent)       Procedure Component Value Units Date/Time    ECG 12 Lead Other; Baseline Cardiac Status " [[id number]] Collected: 11/03/23 0050     Updated: 11/03/23 2002     QT Interval 406 ms      QTC Interval 398 ms     Narrative:      Test Reason : Other~  Blood Pressure :   */*   mmHG  Vent. Rate :  58 BPM     Atrial Rate :  58 BPM     P-R Int : 198 ms          QRS Dur :  78 ms      QT Int : 406 ms       P-R-T Axes :  58   7  45 degrees     QTc Int : 398 ms    Sinus bradycardia  Otherwise normal ECG  No previous ECGs available  Confirmed by Edgardo Gomez ([id number]) on 11/3/2023 8:02:06 PM    Referred By:            Confirmed By: Edgardo Gomez             ALLERGIES: Chlorzoxazone, Iodine, Phenazopyridine hcl, Pyridium [phenazopyridine], Quinine, Valproic acid, Methocarbamol, Iodinated contrast media, Acetaminophen-codeine, and Diphenhydramine      Current Facility-Administered Medications:     aluminum-magnesium hydroxide-simethicone (MAALOX MAX) 400-400-40 MG/5ML suspension 15 mL, 15 mL, Oral, Q6H PRN, Max Osuna MD    benzonatate (TESSALON) capsule 100 mg, 100 mg, Oral, TID PRN, Max Osuna MD    bisacodyl (DULCOLAX) EC tablet 5 mg, 5 mg, Oral, Daily PRN, Max Osuna MD, 5 mg at 11/05/23 1229    cefdinir (OMNICEF) capsule 300 mg, 300 mg, Oral, Q12H, Max Osuna MD, 300 mg at 11/05/23 0900    cholecalciferol (VITAMIN D3) tablet 1,000 Units, 1,000 Units, Oral, Weekly, Max Osuna MD, 1,000 Units at 11/03/23 0820    estradiol (ESTRACE) vaginal cream 2 g, 2 g, Vaginal, Once per day on Mon Wed Fri, Eric Orozco MD, 2 g at 11/03/23 2030    FLUoxetine (PROzac) capsule 40 mg, 40 mg, Oral, Daily, Eric Orozco MD, 40 mg at 11/05/23 0901    hydrOXYzine (ATARAX) tablet 25 mg, 25 mg, Oral, TID PRN, Eric Orozco MD    ibuprofen (ADVIL,MOTRIN) tablet 400 mg, 400 mg, Oral, Q6H PRN, Max Osuna MD, 400 mg at 11/05/23 0902    Liraglutide (VICTOZA) injection 1.8 mg, 1.8 mg, Subcutaneous, Daily, Max Osuna MD, 1.8 mg at 11/05/23 0903    loperamide (IMODIUM) capsule 2 mg, 2 mg, Oral, Q2H  PRN, Max Osuna MD    magnesium hydroxide (MILK OF MAGNESIA) suspension 10 mL, 10 mL, Oral, Daily PRN, Max Osuna MD    melatonin tablet 3 mg, 3 mg, Oral, Nightly PRN, Max Osuna MD    multivitamin with minerals 1 tablet, 1 tablet, Oral, Daily, Max Osuna MD, 1 tablet at 11/05/23 0900    ondansetron (ZOFRAN) tablet 4 mg, 4 mg, Oral, Q6H PRN, Max Osuna MD    sodium chloride nasal spray 2 spray, 2 spray, Each Nare, PRJHOANA, Max Osuna MD    Reviewed chart, notes, vitals, labs and EKG personally reviewed.    ASSESSMENT & PLAN:    Hospital bed: Yes patient has chronic pain.       Suicidal ideations  -Admitted for crisis stabilization  -SP 3       Severe episode of recurrent major depressive disorder  -Increased fluoxetine 40 mg daily       PTSD (post-traumatic stress disorder)  -Fluoxetine as above       GURMEET  -Hydroxyzine and fluoxetine       DM II  -Victoza       UTI  -Cefdinir       Special precautions: Special Precautions Level 3 (q15 min checks)           Behavioral Health Treatment Plan and Problem List: I have reviewed and approved the Behavioral Health Treatment Plan and Problem list.  The patient has had a chance to review and agrees with the treatment plan.     Clinician:  Brian Platt MD  11/05/23  13:46 EST

## 2023-11-05 NOTE — PLAN OF CARE
Goal Outcome Evaluation:  Plan of Care Reviewed With: patient  Patient Agreement with Plan of Care: agrees     Progress: improving  Outcome Evaluation: Pt reports anxiety 6/10 and depression 8/10. She denies SI/HI/AVH. She reports sleep is fair, some interruptions related to unit sounds, and appetite is good. She reports she took PRN for BM and was able to go. NO voiced complaints related to UTI.

## 2023-11-06 VITALS
OXYGEN SATURATION: 95 % | DIASTOLIC BLOOD PRESSURE: 71 MMHG | RESPIRATION RATE: 16 BRPM | BODY MASS INDEX: 24.63 KG/M2 | WEIGHT: 139 LBS | TEMPERATURE: 97.4 F | HEART RATE: 71 BPM | SYSTOLIC BLOOD PRESSURE: 112 MMHG | HEIGHT: 63 IN

## 2023-11-06 LAB — GLUCOSE BLDC GLUCOMTR-MCNC: 79 MG/DL (ref 70–130)

## 2023-11-06 PROCEDURE — 82948 REAGENT STRIP/BLOOD GLUCOSE: CPT

## 2023-11-06 PROCEDURE — 99238 HOSP IP/OBS DSCHRG MGMT 30/<: CPT | Performed by: PSYCHIATRY & NEUROLOGY

## 2023-11-06 RX ORDER — FLUOXETINE HYDROCHLORIDE 40 MG/1
40 CAPSULE ORAL DAILY
Qty: 30 CAPSULE | Refills: 0 | Status: SHIPPED | OUTPATIENT
Start: 2023-11-07

## 2023-11-06 RX ORDER — CEFDINIR 300 MG/1
300 CAPSULE ORAL EVERY 12 HOURS SCHEDULED
Qty: 6 CAPSULE | Refills: 0 | Status: SHIPPED | OUTPATIENT
Start: 2023-11-06 | End: 2023-11-08 | Stop reason: SDUPTHER

## 2023-11-06 RX ADMIN — CEFDINIR 300 MG: 300 CAPSULE ORAL at 08:00

## 2023-11-06 RX ADMIN — Medication 1 TABLET: at 08:00

## 2023-11-06 RX ADMIN — FLUOXETINE HYDROCHLORIDE 40 MG: 20 CAPSULE ORAL at 08:00

## 2023-11-06 NOTE — DISCHARGE SUMMARY
":  1965  MRN:  [id number]  Visit Number:  [id number]      Date of Admission:2023   Date of Discharge:  2023    Discharge Diagnosis:  Principal Problem:    Suicidal ideations  Active Problems:    Moderate episode of recurrent major depressive disorder    PTSD (post-traumatic stress disorder)        Admission Diagnosis:  Suicidal ideations [R45.851]     HPI  Zo Palma is a 58 y.o. female who was admitted on 2023 with complaints of depression and suicidal ideations with a plan.   For details please see H&P dated 11/3/23.     Hospital Course  Patient is a 58 y.o. female presented with worsening depression and suicidal ideations with a plan.  Patient was admitted to the senior psych unit for safety, further evaluation and treatment.  Patient reported that she was taking Loxitane at home and that it had stopped working as good as it previously did.  She agreed to increase the dose to 40 mg from 20 mg.  She was able to tolerate the medication increase without any problems.  She made steady improvement in her mood and by 2023 felt ready to go home.  She denied any thoughts of harm to self or others.       Mental Status Exam upon discharge:   Mood \"better\"   Affect-congruent, appropriate, stable  Thought Content-goal directed, no delusional material present  Thought process-linear, organized.  Suicidality: No SI  Homicidality: No HI  Perception: No AH/VH    Procedures Performed         Consults:   Consults       No orders found from 10/4/2023 to 11/3/2023.            Pertinent Test Results:   Admission on 2023   Component Date Value Ref Range Status    Hepatitis B Surface Ag 2023 Non-Reactive  Non-Reactive Final    Hep A IgM 2023 Non-Reactive  Non-Reactive Final    Hep B C IgM 2023 Non-Reactive  Non-Reactive Final    Hepatitis C Ab 2023 Non-Reactive  Non-Reactive Final    Total Cholesterol 2023 177  0 - 200 mg/dL Final    Triglycerides 2023 103 "  0 - 150 mg/dL Final    HDL Cholesterol 11/03/2023 52  40 - 60 mg/dL Final    LDL Cholesterol  11/03/2023 106 (H)  0 - 100 mg/dL Final    VLDL Cholesterol 11/03/2023 19  5 - 40 mg/dL Final    LDL/HDL Ratio 11/03/2023 2.01   Final    Hemoglobin A1C 11/03/2023 5.40  4.80 - 5.60 % Final    QT Interval 11/03/2023 406  ms Final    QTC Interval 11/03/2023 398  ms Final    Glucose 11/03/2023 125  70 - 130 mg/dL Final    Glucose 11/04/2023 84  70 - 130 mg/dL Final    Glucose 11/04/2023 94  70 - 130 mg/dL Final    Glucose 11/05/2023 83  70 - 130 mg/dL Final    Glucose 11/05/2023 78  70 - 130 mg/dL Final    Glucose 11/06/2023 79  70 - 130 mg/dL Final   Admission on 11/02/2023, Discharged on 11/02/2023   Component Date Value Ref Range Status    Glucose 11/02/2023 107 (H)  65 - 99 mg/dL Final    BUN 11/02/2023 9  6 - 20 mg/dL Final    Creatinine 11/02/2023 0.83  0.57 - 1.00 mg/dL Final    Sodium 11/02/2023 142  136 - 145 mmol/L Final    Potassium 11/02/2023 3.6  3.5 - 5.2 mmol/L Final    Chloride 11/02/2023 108 (H)  98 - 107 mmol/L Final    CO2 11/02/2023 24.5  22.0 - 29.0 mmol/L Final    Calcium 11/02/2023 9.8  8.6 - 10.5 mg/dL Final    Total Protein 11/02/2023 7.2  6.0 - 8.5 g/dL Final    Albumin 11/02/2023 4.4  3.5 - 5.2 g/dL Final    ALT (SGPT) 11/02/2023 22  1 - 33 U/L Final    AST (SGOT) 11/02/2023 20  1 - 32 U/L Final    Alkaline Phosphatase 11/02/2023 74  39 - 117 U/L Final    Total Bilirubin 11/02/2023 0.2  0.0 - 1.2 mg/dL Final    Globulin 11/02/2023 2.8  gm/dL Final    A/G Ratio 11/02/2023 1.6  g/dL Final    BUN/Creatinine Ratio 11/02/2023 10.8  7.0 - 25.0 Final    Anion Gap 11/02/2023 9.5  5.0 - 15.0 mmol/L Final    eGFR 11/02/2023 81.8  >60.0 mL/min/1.73 Final    Color, UA 11/02/2023 Yellow  Yellow, Straw Final    Appearance, UA 11/02/2023 Clear  Clear Final    pH, UA 11/02/2023 5.5  5.0 - 8.0 Final    Specific [place], UA 11/02/2023 1.012  1.005 - 1.030 Final    Glucose, UA 11/02/2023 100 mg/dL (Trace) (A)   Negative Final    Ketones, UA 11/02/2023 Negative  Negative Final    Bilirubin, UA 11/02/2023 Negative  Negative Final    Blood, UA 11/02/2023 Negative  Negative Final    Protein, UA 11/02/2023 Negative  Negative Final    Leuk Esterase, UA 11/02/2023 Small (1+) (A)  Negative Final    Nitrite, UA 11/02/2023 Negative  Negative Final    Urobilinogen, UA 11/02/2023 0.2 E.U./dL  0.2 - 1.0 E.U./dL Final    Ethanol 11/02/2023 <10  0 - 10 mg/dL Final    Ethanol % 11/02/2023 <0.010  % Final    THC, Screen, Urine 11/02/2023 Negative  Negative Final    Phencyclidine (PCP), Urine 11/02/2023 Negative  Negative Final    Cocaine Screen, Urine 11/02/2023 Negative  Negative Final    Methamphetamine, Ur 11/02/2023 Negative  Negative Final    Opiate Screen 11/02/2023 Negative  Negative Final    Amphetamine Screen, Urine 11/02/2023 Negative  Negative Final    Benzodiazepine Screen, Urine 11/02/2023 Negative  Negative Final    Tricyclic Antidepressants Screen 11/02/2023 Negative  Negative Final    Methadone Screen, Urine 11/02/2023 Negative  Negative Final    Barbiturates Screen, Urine 11/02/2023 Negative  Negative Final    Oxycodone Screen, Urine 11/02/2023 Negative  Negative Final    Buprenorphine, Screen, Urine 11/02/2023 Negative  Negative Final    Magnesium 11/02/2023 2.1  1.6 - 2.6 mg/dL Final    WBC 11/02/2023 5.83  3.40 - 10.80 10*3/mm3 Final    RBC 11/02/2023 4.54  3.77 - 5.28 10*6/mm3 Final    Hemoglobin 11/02/2023 13.2  12.0 - 15.9 g/dL Final    Hematocrit 11/02/2023 42.5  34.0 - 46.6 % Final    MCV 11/02/2023 93.6  79.0 - 97.0 fL Final    MCH 11/02/2023 29.1  26.6 - 33.0 pg Final    MCHC 11/02/2023 31.1 (L)  31.5 - 35.7 g/dL Final    RDW 11/02/2023 12.3  12.3 - 15.4 % Final    RDW-SD 11/02/2023 42.7  37.0 - 54.0 fl Final    MPV 11/02/2023 9.5  6.0 - 12.0 fL Final    Platelets 11/02/2023 243  140 - 450 10*3/mm3 Final    Neutrophil % 11/02/2023 53.6  42.7 - 76.0 % Final    Lymphocyte % 11/02/2023 34.8  19.6 - 45.3 % Final     Monocyte % 11/02/2023 8.9  5.0 - 12.0 % Final    Eosinophil % 11/02/2023 1.7  0.3 - 6.2 % Final    Basophil % 11/02/2023 1.0  0.0 - 1.5 % Final    Immature Grans % 11/02/2023 0.0  0.0 - 0.5 % Final    Neutrophils, Absolute 11/02/2023 3.12  1.70 - 7.00 10*3/mm3 Final    Lymphocytes, Absolute 11/02/2023 2.03  0.70 - 3.10 10*3/mm3 Final    Monocytes, Absolute 11/02/2023 0.52  0.10 - 0.90 10*3/mm3 Final    Eosinophils, Absolute 11/02/2023 0.10  0.00 - 0.40 10*3/mm3 Final    Basophils, Absolute 11/02/2023 0.06  0.00 - 0.20 10*3/mm3 Final    Immature Grans, Absolute 11/02/2023 0.00  0.00 - 0.05 10*3/mm3 Final    nRBC 11/02/2023 0.0  0.0 - 0.2 /100 WBC Final    Extra Tube 11/02/2023 Hold for add-ons.   Final    Auto resulted.    Extra Tube 11/02/2023 hold for add-on   Final    Auto resulted    Extra Tube 11/02/2023 Hold for add-ons.   Final    Auto resulted.    Extra Tube 11/02/2023 Hold for add-ons.   Final    Auto resulted    Fentanyl, Urine 11/02/2023 Negative  Negative Final    RBC, UA 11/02/2023 0-2  None Seen, 0-2 /HPF Final    WBC, UA 11/02/2023 11-20 (A)  None Seen, 0-2 /HPF Final    Bacteria, UA 11/02/2023 4+ (A)  None Seen /HPF Final    Squamous Epithelial Cells, UA 11/02/2023 0-2  None Seen, 0-2 /HPF Final    Hyaline Casts, UA 11/02/2023 None Seen  None Seen /LPF Final    Methodology 11/02/2023 Automated Microscopy   Final    Extra Tube 11/02/2023 Hold for add-ons.   Final    Auto resulted.    Urine Culture 11/02/2023 >100,000 CFU/mL Escherichia coli (A)   Final        Condition on Discharge:  improved    Vital Signs  Temp:  [97.1 °F (36.2 °C)-97.2 °F (36.2 °C)] 97.2 °F (36.2 °C)  Heart Rate:  [59-75] 75  Resp:  [16-18] 16  BP: ()/(63-71) 97/70      Discharge Disposition:  Home or Self Care    Discharge Medications:     Discharge Medications        New Medications        Instructions Start Date   cefdinir 300 MG capsule  Commonly known as: OMNICEF   300 mg, Oral, Every 12 Hours Scheduled              Changes to Medications        Instructions Start Date   FLUoxetine 40 MG capsule  Commonly known as: PROzac  What changed:   medication strength  how much to take   40 mg, Oral, Daily   Start Date: November 7, 2023            Continue These Medications        Instructions Start Date   cholecalciferol 25 MCG (1000 UT) tablet  Commonly known as: VITAMIN D3   1,000 Units, Oral, Weekly      estradiol 0.1 MG/GM vaginal cream  Commonly known as: ESTRACE   2 g, Vaginal, 3 Times Weekly      ibandronate 150 MG tablet  Commonly known as: BONIVA   150 mg, Oral, Every 30 Days      multivitamin with minerals tablet tablet   1 tablet, Oral, Daily      OMEGA 3-6-9 COMPLEX PO   1 capsule, Oral, Daily      Victoza 18 MG/3ML solution pen-injector injection  Generic drug: Liraglutide   1.8 mg, Subcutaneous, Daily               Discharge Diet:    Diet Instructions    Resume regular diet           Activity at Discharge:    Activity Instructions    Activity as tolerated           Follow-up Appointments  Future Appointments   Date Time Provider Department Center   11/13/2023 11:00 AM Mely Tate LCSW [facility]   11/16/2023 11:30 AM Merari Solomon APRN [facility]   11/27/2023 10:00 AM Mely Tate LCSW [facility]   11/29/2023  8:00 AM Damari Fulton MD [facility]   12/11/2023 10:00 AM Mely Tate LCSW [facility]   12/26/2023 10:45 AM Evan Rivas DO [facility]         Time spent in discharge: < 30 min    Clinician:   Eric Orozco MD  11/06/23  14:56 EST

## 2023-11-06 NOTE — PLAN OF CARE
"  Problem: Adult Behavioral Health Plan of Care  Goal: Optimized Coping Skills in Response to Life Stressors  Outcome: Ongoing, Progressing  Intervention: Promote Effective Coping Strategies  Flowsheets (Taken 11/6/2023 1051)  Supportive Measures:   active listening utilized   counseling provided      DATA:    Therapist met with the patient individually. Therapist continues reviewing plan of care and aftercare plan.  The patient was agreeable. Therapist staffed case with Dr. Orozco.     ASSESSMENT:    Patient was seen for follow up of   Suicidal ideations. Severe episode of recurrent major depressive disorder. PTSD (post-traumatic stress disorder). GURMEET      Today, patient was seen 1-1 at bedside. The patient's affect appeared depressed, mood congruent, thought content normal.  Patient denies SI/HI/AVH.  Patient rates anxiety at 5/10 and depression at 8/10. Patient reports great sleep.  Patient reports typical appetite. Patient medication compliant and denies side affects.  Patient reports that she had a good visit with her roommate Cloud.  She identifies feeling \"blase\" and not well enough to return home as of yet.     Patient reports that her roommate had a lot of success with Ketamine and would like to talk to the doctor about this for herself.     Patient signed consent for her roommate [place] Walker at [phone]; no answer this date, left voicemail.     CLINICAL MANEUVERING:    Therapist provided safe, secure environment for patient to share.  Provided reflective listening and psychoeducation.  Assisted patient in processing the above session. Assisted patient in identifying any questions or needs to be addressed today.        Plan:     Patient to remain hospitalized this date.      Treatment team will focus efforts on stabilizing patient's acute symptoms while providing education on healthy coping and crisis management to reduce hospitalizations.   Patient requires daily psychiatrist evaluation and 24/7 " nursing supervision to promote patient  safety.     Therapist will offer 1-4 individual sessions, family education, and appropriate referral.     Therapist recommends continued assessment. Patient is scheduled with the [facility].  Patient to return home with roommate Umair at discharge. Umair reports that home is safe guarded from firearms, weapons, medications.

## 2023-11-06 NOTE — PLAN OF CARE
Goal Outcome Evaluation:  Plan of Care Reviewed With: patient  Patient Agreement with Plan of Care: agrees     Progress: improving  Outcome Evaluation: Calm and cooperative. Reports good appetite and sleep. Rates anxiety 7, depression 8. Denies SI/HI/AVH. Patient given ear plugs to address sleep concerns.

## 2023-11-08 ENCOUNTER — OFFICE VISIT (OUTPATIENT)
Dept: FAMILY MEDICINE CLINIC | Facility: CLINIC | Age: 58
End: 2023-11-08
Payer: MEDICARE

## 2023-11-08 VITALS
DIASTOLIC BLOOD PRESSURE: 80 MMHG | HEIGHT: 63 IN | RESPIRATION RATE: 16 BRPM | TEMPERATURE: 97.6 F | HEART RATE: 76 BPM | WEIGHT: 138 LBS | BODY MASS INDEX: 24.45 KG/M2 | OXYGEN SATURATION: 99 % | SYSTOLIC BLOOD PRESSURE: 122 MMHG

## 2023-11-08 DIAGNOSIS — M70.71 BURSITIS OF BOTH HIPS, UNSPECIFIED BURSA: ICD-10-CM

## 2023-11-08 DIAGNOSIS — N39.0 RECURRENT URINARY TRACT INFECTION: ICD-10-CM

## 2023-11-08 DIAGNOSIS — M70.72 BURSITIS OF BOTH HIPS, UNSPECIFIED BURSA: ICD-10-CM

## 2023-11-08 DIAGNOSIS — N39.0 URINARY TRACT INFECTION WITHOUT HEMATURIA, SITE UNSPECIFIED: Primary | ICD-10-CM

## 2023-11-08 RX ORDER — CEFDINIR 300 MG/1
300 CAPSULE ORAL 2 TIMES DAILY
Qty: 10 CAPSULE | Refills: 0 | Status: SHIPPED | OUTPATIENT
Start: 2023-11-08 | End: 2023-11-13

## 2023-11-10 LAB
BACTERIA UR CULT: NORMAL
BACTERIA UR CULT: NORMAL

## 2023-11-13 ENCOUNTER — OFFICE VISIT (OUTPATIENT)
Dept: PSYCHIATRY | Facility: CLINIC | Age: 58
End: 2023-11-13
Payer: MEDICARE

## 2023-11-13 DIAGNOSIS — F43.10 POST TRAUMATIC STRESS DISORDER (PTSD): ICD-10-CM

## 2023-11-13 DIAGNOSIS — F41.1 GENERALIZED ANXIETY DISORDER: ICD-10-CM

## 2023-11-13 DIAGNOSIS — F33.1 MAJOR DEPRESSIVE DISORDER, RECURRENT EPISODE, MODERATE: ICD-10-CM

## 2023-11-13 PROCEDURE — 90837 PSYTX W PT 60 MINUTES: CPT | Performed by: SOCIAL WORKER

## 2023-11-13 NOTE — PROGRESS NOTES
Date: 11/13/2023   Time In: 1030  Time Out: 1140    PROGRESS NOTE  Data:  Zo Palma is a 58 y.o. female who presents today for individual therapy session at [facility]. Chief Complaint: depression, anxiety and PTSD    Patient reports she was recently hospitalized for suicidal ideation. She reports increase in depression. She reports trigger for mood is financial stressors. She reports feeling she continues to struggle with despair regarding finances. She denies negative thoughts about self. She reports thoughts of suicide daily and denies wanting to act on those thoughts. She identifies her dog as a protective factor and having him with her. She reports feeling supported by her family during this time. She reports compliance with medication and reports having a medication appointment this week.      Clinical Maneuvering/Intervention:    Assisted patient in processing above session content; acknowledged and normalized patient’s thoughts, feelings, and concerns.  Applied cognitive behavioral techniques to assist patient in identifying maladaptive thoughts and behaviors that contribute to her depression. Discussed triggers associated with patient's depression.  Also discussed coping skills for patient to implement such as challenging negative thoughts, using gratitude journal and self soothing. Recommended participating in PHP/IOP program for additional treatment.   Allowed patient to freely discuss issues without interruption or judgment. Provided safe, confidential environment to facilitate the development of positive therapeutic relationship and encourage open, honest communication. Assisted patient in identifying risk factors which would indicate the need for higher level of care including thoughts to harm self or others and/or self-harming behavior and encouraged patient to contact this office, call 911, or present to the nearest emergency room should any of these events occur. Discussed  crisis intervention services and means to access. Patient adamantly and convincingly denies current suicidal or homicidal ideation or perceptual disturbance.    Assessment   Patient appears to maintain relative stability as compared to their baseline.  However, patient continues to struggle with depression which continues to cause impairment in important areas of functioning.  As a result, they can be reasonably expected to continue to benefit from treatment and would likely be at increased risk for decompensation otherwise.    Mental Status Exam:   Hygiene:   good  Cooperation:  Cooperative  Eye Contact:  Good  Psychomotor Behavior:  Appropriate  Affect:  Appropriate  Mood: depressed  Speech:  Normal  Thought Process:  Goal directed and Linear  Thought Content:  Mood congruent  Suicidal:  Suicidal Ideation without active plan or intent  Homicidal:  None  Hallucinations:  None  Delusion:  None  Memory:  Intact  Orientation:  Person, Place, Time, and Situation  Reliability:  good  Insight:  Fair  Judgement:  Fair  Impulse Control:  Good  Physical/Medical Issues:  Yes reports shoulder and hip pain      Patient's Support Network Includes:  daughter and friend    Functional Status: Severe impairment    Progress toward goal: Not at goal    Prognosis: Good with Ongoing Treatment          Plan     VISIT DIAGNOSIS:     ICD-10-CM ICD-9-CM   1. Major depressive disorder, recurrent episode, moderate  F33.1 296.32   2. Generalized anxiety disorder  F41.1 300.02   3. Post traumatic stress disorder (PTSD)  F43.10 309.81        Patient will continue in individual outpatient therapy with focus on improved functioning and coping skills, maintaining stability, and avoiding decompensation and the need for higher level of care.    Patient will adhere to medication regimen as prescribed and report any side effects. Patient will contact this office, call 911 or present to the nearest emergency room should suicidal or homicidal ideations  occur. Provide Cognitive Behavioral Therapy and Solution Focused Therapy to improve functioning, maintain stability, and avoid decompensation and the need for higher level of care.     Return in about Return in about 2 weeks (around 11/27/2023). or earlier if symptoms worsen or fail to improve.            This document has been electronically signed by Mely Tate LCSW  November 13, 2023 10:30 EST      Part of this note may be an electronic transcription/translation of spoken language to printed text using the Dragon Dictation System.

## 2023-11-15 ENCOUNTER — OFFICE VISIT (OUTPATIENT)
Dept: UROLOGY | Facility: CLINIC | Age: 58
End: 2023-11-15
Payer: MEDICARE

## 2023-11-15 VITALS
DIASTOLIC BLOOD PRESSURE: 82 MMHG | HEIGHT: 63 IN | HEART RATE: 67 BPM | WEIGHT: 138 LBS | BODY MASS INDEX: 24.45 KG/M2 | SYSTOLIC BLOOD PRESSURE: 122 MMHG

## 2023-11-15 DIAGNOSIS — R33.9 INCOMPLETE EMPTYING OF BLADDER: ICD-10-CM

## 2023-11-15 DIAGNOSIS — R10.9 BILATERAL FLANK PAIN: ICD-10-CM

## 2023-11-15 DIAGNOSIS — N39.0 RECURRENT UTI (URINARY TRACT INFECTION): Primary | ICD-10-CM

## 2023-11-15 DIAGNOSIS — N30.00 ACUTE CYSTITIS WITHOUT HEMATURIA: ICD-10-CM

## 2023-11-15 LAB
BILIRUB BLD-MCNC: NEGATIVE MG/DL
CLARITY, POC: CLEAR
COLOR UR: YELLOW
EXPIRATION DATE: NORMAL
GLUCOSE UR STRIP-MCNC: NEGATIVE MG/DL
KETONES UR QL: NEGATIVE
LEUKOCYTE EST, POC: NEGATIVE
Lab: NORMAL
NITRITE UR-MCNC: NEGATIVE MG/ML
PH UR: 6 [PH] (ref 5–8)
PROT UR STRIP-MCNC: NEGATIVE MG/DL
RBC # UR STRIP: NEGATIVE /UL
SP GR UR: 1.01 (ref 1–1.03)
UROBILINOGEN UR QL: NORMAL

## 2023-11-15 PROCEDURE — 87086 URINE CULTURE/COLONY COUNT: CPT | Performed by: NURSE PRACTITIONER

## 2023-11-15 RX ORDER — NITROFURANTOIN 25; 75 MG/1; MG/1
CAPSULE ORAL
Qty: 56 CAPSULE | Refills: 3 | Status: SHIPPED | OUTPATIENT
Start: 2023-11-15

## 2023-11-15 RX ORDER — ONDANSETRON 4 MG/1
4 TABLET, FILM COATED ORAL EVERY 12 HOURS PRN
Qty: 20 TABLET | Refills: 0 | Status: SHIPPED | OUTPATIENT
Start: 2023-11-15

## 2023-11-15 NOTE — PROGRESS NOTES
"                      Established Patient        Chief Complaint:   Chief Complaint   Patient presents with    Chronic Kidney Disease     Pt is here for a follow up on her kidneys and would also like to have a referral for bursitis.              History of Present Illness:    Zo Palma is a 58 y.o. female who presents today for concerns of worsening/recurrent UTI as well as complaints of bilateral hip pain r/t bursitis.    Subjective     The following portions of the patient's history were reviewed and updated as appropriate: allergies, current medications, past family history, past medical history, past social history, past surgical history and problem list.    ALLERGIES  Allergies   Allergen Reactions    Chlorzoxazone Unknown - High Severity and Swelling     Lorzone, muscle relaxant.    Iodine Anaphylaxis     Topical makes her swell  Anaphylaxis with IV contrast (when she was ~ 9yrs old)    Phenazopyridine Hcl Swelling and Anaphylaxis    Pyridium [Phenazopyridine] Anaphylaxis    Quinine Unknown - High Severity     Has malaria symptoms    Valproic Acid Other (See Comments)     Liver failure  Liver failure  Liver failure; lupus like symptoms and liver failure    Methocarbamol Other (See Comments)     Made her feel \"suicidal\" and gave her less control over her actions.    Iodinated Contrast Media Unknown - Low Severity    Acetaminophen-Codeine Itching    Diphenhydramine Anxiety     Pt has severe panic attack symptoms when given benadryl IV. Needs to take a benzodiazapine med concurrently when getting benadryl.        ROS  Review of Systems   Genitourinary:  Positive for dysuria, frequency and urgency. Negative for decreased urine volume, difficulty urinating and flank pain.       Objective     Vital Signs:   /80   Pulse 76   Temp 97.6 °F (36.4 °C)   Resp 16   Ht 160 cm (63\")   Wt 62.6 kg (138 lb)   SpO2 99%   BMI 24.45 kg/m²     BMI is within normal parameters. No other follow-up for BMI required.   "     Physical Exam   Physical Exam  Vitals and nursing note reviewed.   Cardiovascular:      Rate and Rhythm: Normal rate.   Pulmonary:      Effort: Pulmonary effort is normal.   Abdominal:      General: Bowel sounds are normal. There is no distension.      Palpations: Abdomen is soft. There is no mass.      Tenderness: There is no abdominal tenderness.      Hernia: No hernia is present.   Musculoskeletal:      Right hip: Tenderness and crepitus present. Normal range of motion.      Left hip: Tenderness and crepitus present. Normal range of motion.   Neurological:      Mental Status: She is alert and oriented to person, place, and time.   Psychiatric:         Mood and Affect: Mood normal.         Behavior: Behavior normal.         Assessment and Plan      Assessment/Plan:   Diagnoses and all orders for this visit:    1. Urinary tract infection without hematuria, site unspecified (Primary)  -     cefdinir (OMNICEF) 300 MG capsule; Take 1 capsule by mouth 2 (Two) Times a Day for 5 days.  Dispense: 10 capsule; Refill: 0  -     Urine Culture - Urine, Urine, Clean Catch    2. Bursitis of both hips, unspecified bursa  -     Ambulatory Referral to Orthopedic Surgery    3. Recurrent urinary tract infection  -     Ambulatory Referral to Urology  -     Urine Culture - Urine, Urine, Clean Catch    Risks, benefits, and potential side effects of current/new medications reviewed with patient.  Patient voiced understanding and wished to proceed with cefdinir for treatment of acute cystitis.    I will contact patient regarding test results and provide instructions regarding any necessary changes in plan of care.    Patient was encouraged to keep me informed of any acute changes, lack of improvement, or any new concerning symptoms.    Patient voiced understanding of all instructions and denied further questions.        I have reviewed and updated all copied forward information, as appropriate.  I attest to the accuracy and relevance  of any unchanged information.      Follow up:  Return if symptoms worsen or fail to improve.     Patient Education:  There are no Patient Instructions on file for this visit.    TRACI Benavides  11/15/23  15:19 EST          Please note that portions of this note may have been completed with a voice recognition program.

## 2023-11-15 NOTE — PROGRESS NOTES
"Chief Complaint  RECURRENT UTIs/ACUTE CYSTITIS (NEW PATIENT)    Subjective          Zo Palma presents to [facility] for RECURRENT UTIS VS CYSTITIS  History of Present Illness    Ms. Zo Palma is a pleasant 58-year-old female who presents to clinic today for evaluation as a new patient consult. Patient has been referred to clinic by PCP with concerns of recurrent UTIs. On initial evaluation, she reports numerous other concerns including overactive bladder/DETRUSOR  instability complicated by bouts of urinary frequency, urgency, and dysuria. She has had pelvic pressure and suprapubic discomfort, flank pain, lower back/abdominal pain, but denies any CVA tenderness. Most recent documented urine culture on 11/03/2023 was E. coli.  Urine Culture  >100,000 CFU/mL Escherichia coli Abnormal        Recently finished a dose of cefdinir, she presents to clinic today for Re-evaluation. Initially,  Patient is unable to give us any urine today for reevaluation. Post  Hydration, able to Catherize patient for clean specimen which is negative for leukocyte estrace, it is negative for nitrites, negative for gross /Microhematuria. Her PVR is 0ml     Prior to that, she had a positive urine culture on 04/27/2023. Patient reports that, She has had ongoing bouts of recurrent UTIs for > over 30 years for which she saw a urologist before and was placed on antibiotic suppressive therapy with complete resolution, until recently.The patient states she is here because she had a UTI that became pyelonephritis. She believes it may be gone, but she is still having \"right kidney pain.\" It has improved.     She has had intermittent bladder infections for the last 36 years. The source has not been found. Her first UTI occurred when she was 7 years of age after being kicked by a horse in the kidney.Patient reports Her bladder has been cauterized, it is very large, and she has had multiple " cystoscopies performed. She had bladder sling surgery using mesh in 2005. Her last cystoscopy was 3 years ago when she lived in [place].    She no longer has the urge to urinate until her bladder is full. She goes 4 to 5 hours without urinating. She last urinated at 8:30 AM this morning. She has had a 12-ounce cup of hot water and multiple sips of water since then. Her goal is 1 gallon of water a day with lemon essential oil. If she were to try to urinate without having the urge, she would have a bladder spasm, but nothing would come out. She denies having a urethral stricture. She has occasional constipation. Her UTIs are always caused by E. coli. When she lived in [place] her well water was contaminated with E. coli. She moved away 2 years ago and still has not been clear of E. coli.     She was on suppressive therapy 2 years ago. Currently she is using D-mannose. She is open to resuming suppressive therapy. She has used Macrobid in the past and it worked well for her. When she has a UTI, her urine is malodorous, she has urinary frequency, urgency, occasional hematuria sometimes with clots, and a burning sensation. She has had 1 or 2 renal lithiasis. She has not noticed evidence of a prolapse. The patient does not get yeast infections. She takes post-, pre-, and probiotics. She can not have intercourse due to dyspareunia. The patient has used estrogen cream for 22 years. She had a hydrodistention procedure in the past.    The patient had a complete hysterectomy.     The patient is a . She has a son and a daughter. Neither want children.    Her mother had uterine cancer. Her daughter has 2 renal arteries.    She denies being allergic to any antibiotics. She is allergic to PYRIDIUM.    Active Ambulatory Problems     Diagnosis Date Noted    Anxiety 01/06/2014    Bursitis of hip 08/06/2014    Cervical spondylosis with radiculopathy 06/20/2014    Chronic migraine without aura without status migrainosus, not  intractable 12/31/2003    Conversion disorder with attacks or seizures, persistent, with psychological stressor 04/21/2016    Cramp of limb 11/14/2022    Prediabetes 09/15/2008    Diverticulosis of colon 05/03/2011    Elevated liver enzymes 09/15/2008    Eustachian tube dysfunction 06/05/2015    Foot drop 06/20/2014    Gastroesophageal reflux disease without esophagitis 01/06/2014    Hereditary and idiopathic peripheral neuropathy 07/01/2010    Hypersomnia 07/03/2018    Internal hemorrhoids 05/03/2011    Iron deficiency 08/01/2014    Lumbar radiculopathy, chronic 06/20/2014    Malaise and fatigue 10/17/2013    Metabolic syndrome 09/15/2008    Medicare annual wellness visit, subsequent 10/15/2014    Encounter for neuropsychological testing 04/21/2016    Mixed hyperlipidemia 03/13/2008    Moderate episode of recurrent major depressive disorder 06/25/2019    Obstructive sleep apnea 12/17/2008    Osteoarthritis of right temporomandibular joint 08/27/2018    Osteopenia of left lower leg 02/06/2018    Panic disorder 11/01/2005    PTSD (post-traumatic stress disorder) 01/06/2014    RLS (restless legs syndrome) 08/03/2016    Primary insomnia 05/04/2005    Sinus drainage 02/19/2021    Tinnitus 01/06/2014    Urge incontinence 09/24/2015    Urinary urgency 12/14/2015    Vitamin D deficiency 06/20/2014    Postmenopausal symptoms 11/14/2022    Postmenopausal osteoporosis 11/14/2022    Right leg pain 08/01/2023    Edema of right lower extremity 08/01/2023    Suicidal ideations 11/02/2023    Recurrent UTI (urinary tract infection) 11/18/2023    Acute cystitis without hematuria 11/18/2023    Bilateral flank pain 11/18/2023    Incomplete emptying of bladder 11/18/2023     Resolved Ambulatory Problems     Diagnosis Date Noted    No Resolved Ambulatory Problems     Past Medical History:   Diagnosis Date    Ankle sprain     Arthritis of back     Arthritis of neck     Cervical disc disorder     Depression     Fracture of wrist      "Fracture, finger     Fracture, foot     Hip arthrosis     Hormone disorder     Insomnia     Interstitial cystitis     Kidney stone     Lumbosacral disc disease     Migraines     Obesity     Periarthritis of shoulder     Peripheral neuropathy     Rotator cuff syndrome     Seizures 2001    Subluxation of patella     Urinary incontinence     Urinary tract infection       Objective   Vital Signs:   /82 (BP Location: Left arm, Patient Position: Sitting, Cuff Size: Adult)   Pulse 67   Ht 160 cm (62.99\")   Wt 62.6 kg (138 lb)   BMI 24.45 kg/m²       ROS:   Review of Systems   Constitutional:  Positive for activity change and appetite change. Negative for chills, diaphoresis, fatigue, fever, unexpected weight gain and unexpected weight loss.   HENT:  Negative for congestion, ear discharge, ear pain, nosebleeds, rhinorrhea, sinus pressure and sore throat.    Eyes:  Negative for blurred vision, double vision, photophobia, pain, redness and visual disturbance.   Respiratory:  Negative for apnea, cough, chest tightness, shortness of breath, wheezing and stridor.    Cardiovascular:  Negative for chest pain and palpitations.   Gastrointestinal:  Negative for abdominal distention, abdominal pain, constipation, diarrhea, nausea and vomiting.   Endocrine: Negative for polydipsia, polyphagia and polyuria.   Genitourinary:  Positive for decreased urine volume, difficulty urinating, dyspareunia, dysuria, flank pain, pelvic pain, pelvic pressure and vaginal pain. Negative for frequency, genital sores, hematuria, urgency, urinary incontinence and vaginal discharge.   Musculoskeletal:  Positive for back pain and myalgias. Negative for arthralgias and joint swelling.   Skin:  Positive for dry skin. Negative for pallor, rash and wound.   Neurological:  Positive for weakness. Negative for dizziness, tremors, syncope, light-headedness, headache, memory problem and confusion.   Psychiatric/Behavioral:  Positive for sleep disturbance, " positive for hyperactivity and stress. Negative for behavioral problems and decreased concentration. The patient is nervous/anxious.         Physical Exam  Constitutional:       General: She is in acute distress.      Appearance: She is well-developed.   HENT:      Head: Normocephalic and atraumatic.   Eyes:      Pupils: Pupils are equal, round, and reactive to light.   Neck:      Thyroid: No thyromegaly.      Trachea: No tracheal deviation.   Cardiovascular:      Rate and Rhythm: Normal rate and regular rhythm.      Heart sounds: No murmur heard.  Pulmonary:      Effort: Pulmonary effort is normal. No respiratory distress.      Breath sounds: Normal breath sounds. No stridor. No wheezing.   Abdominal:      General: Bowel sounds are normal.      Palpations: Abdomen is soft.      Tenderness: There is abdominal tenderness.   Genitourinary:     Labia:         Right: No tenderness.         Left: No tenderness.       Vagina: Normal. Vaginal discharge present.      Comments: Soft nontender abdomen with no organomegaly, rigidity, guarding or tenderness.  Normal /atrophic vaginal orifice.  She has mild leakage with Valsalva.  No significant perineal body abnormalities and a normal external anus.       Musculoskeletal:         General: No deformity. Normal range of motion.      Cervical back: Normal range of motion.   Skin:     General: Skin is warm and dry.      Capillary Refill: Capillary refill takes less than 2 seconds.      Coloration: Skin is pale.      Findings: No erythema or rash.   Neurological:      Mental Status: She is alert and oriented to person, place, and time.      Cranial Nerves: No cranial nerve deficit.      Sensory: No sensory deficit.      Motor: Weakness present.      Coordination: Coordination normal.   Psychiatric:         Behavior: Behavior normal.         Thought Content: Thought content normal.         Judgment: Judgment normal.        Result Review :     MIGUEL ANGEL          4/27/2023    08:54 11/2/2023     17:59 11/15/2023    11:45   Urinalysis   Squamous Epithelial Cells, UA  0-2     Specific Gravity, UA  1.012     Ketones, UA Negative  Negative  Negative    Blood, UA  Negative     Leukocytes, UA Negative  Small (1+)  Negative    Nitrite, UA  Negative     RBC, UA  0-2     WBC, UA  11-20     Bacteria, UA  4+       Urine Culture          11/2/2023    17:59 11/8/2023    11:50 11/15/2023    11:44   Urine Culture   Urine Culture >100,000 CFU/mL Escherichia coli  Final report  No growth             Assessment and Plan    Problem List Items Addressed This Visit          Gastrointestinal Abdominal     Bilateral flank pain    Relevant Medications    nitrofurantoin, macrocrystal-monohydrate, (Macrobid) 100 MG capsule    ondansetron (Zofran) 4 MG tablet       Genitourinary and Reproductive     Recurrent UTI (urinary tract infection) - Primary    Overview     Macrobid BID x 7 days, then one nightly.  Cystoscopy with Dr. Glover.  Urine for cx if signs of UTI.  Reevaluate suppressive therapy based on cystoscopy.         Relevant Medications    nitrofurantoin, macrocrystal-monohydrate, (Macrobid) 100 MG capsule    ondansetron (Zofran) 4 MG tablet    Other Relevant Orders    POC Urinalysis Dipstick, Automated (Completed)    Urine Culture - Urine, Urine, Clean Catch (Completed)    Acute cystitis without hematuria    Overview     Macrobid twice daily for 7 days, then nightly for suppressive therapy.  Antiemetic prescribed.  Drop off urine sample in 2 weeks.         Relevant Medications    nitrofurantoin, macrocrystal-monohydrate, (Macrobid) 100 MG capsule    ondansetron (Zofran) 4 MG tablet    Incomplete emptying of bladder    Overview     Continue current regimen.  The patient will be scheduled for cystoscopy with Dr. Glover.         Relevant Medications    nitrofurantoin, macrocrystal-monohydrate, (Macrobid) 100 MG capsule    ondansetron (Zofran) 4 MG tablet    Other Relevant Orders    Bladder Scan (Completed)                                                    ASSESSMENT      Recurrent urinary tract infections/OverActiveBladder/DI/FLANK PAIN:  Ms. Zo Palma is a pleasant 58-year-old female who presents to clinic today for evaluation  with concerns of recurrent UTIs. On initial evaluation, she reports numerous other concerns including overactive bladder/DETRUSOR  instability complicated by bouts of urinary frequency, urgency, and dysuria. She has had pelvic pressure and suprapubic discomfort, flank pain, lower back/abdominal pain, but denies any CVA tenderness.    SHE IS POST RECENT THERAPY with Cefdinir for her E'coli positve culture 11/02/23, Also taking Smannose with Minimal improvement in her Symptoms.Urine Dip today is Unremarkable/Negative. She reports doing relatively better on her antibiotic prophylaxis in the past , and would like to continue. She has minimal frequency at times.  However, she DENIES having any more episodes of perineal irritation and yeast, or urine urgency. She denies N/V/D, Denies any Episodes of Gross Hematuria.     We discussed the types of organisms that are found in the urinary tract indicating that the vast majority are results of the patient's own gastrointestinal kayy.  We discussed how many of the antibiotics that are utilized can actually exacerbate these infections by creating resistant organisms and there is only a very few antibiotics that are concentrated in the urine and do not affect the rectal reservoir nor cause recurrent yeast vaginitis.      We discussed the risk factors for recurrent infections being intercourse in younger patients and atrophic changes in older patients.  We discussed the symptoms that are found including pain, pressure, burning, frequency, urgency suprapubic pain and painful intercourse.  I discussed upper tract symptoms including fevers, chills, and indicated the workup would be much more aggressive if the patient were to present with recurrent infections in the face of  upper tract symptomatology such as fever. I discussed the history of vesicoureteral reflux in young patients and finally chronic renal scarring as a result of such.         PLAN  We resent her urine for culture. I will call her with results if any bacteria growth.    Will start  Macrobid 100 mg PO Daily -Suppressive Therapy.     Patient will drop off another urine specimen for recheck in 2 weeks after starting therapy.    She has been encouraged to  increase her p.o. fluid intake to at least 1 to 2 L daily and avoid bladder irritants such as caffeine products, spicy foods, and citrusy foods.     I recommend concomitant probiotics with treatment with antibiotics to protect the rectal reservoir including over-the-counter yogurt preparations to jerry oral pills containing the appropriate probiotics. Patient reports the diligent use of Probiotics.    Continue Estradial cream 2 x weekly for Atrophic Vaginitis    Discussed lower Track investigation and patient has been scheduled for Cystoscopy on 01/16/2023 with Dr Glover-UTIS/Evaluate Mesh post bladder sling Sling 2005    We discussed OTHER  treatment options for HER BACK/FLANK pain with patient encouraged to continue conservative therapy alternating NSAID/Tylenol as tolerated, Also including hot baths, showers, warm compresses to lower back as tolerated. Also encouraged walking, physical therapy, light exercises as tolerated    Rest is the most important treatment in the case of BACK/FLANK pain. Rest and physical therapy are enough to improve minor pain. Discussed to monitor his daily routine with prevention of flank pain by: At least Drinking 8 glasses of water per day, Limiting your alcohol consumption.  Having a healthy diet containing fruits, vegetables, and a lot of fluids, Practicing safe sex.  Also maintaining proper hygiene of your body as well as the environment.    Discussed a kidney stone prevention diet to include increasing p.o. fluid intake, to at least 1  to 2 L of water daily.  She is to avoid caffeine products such as cola, coffee, and to avoid soft or soda drinks.  She is to decrease her sodium consumption as in  Fast foods, bernal, salted nuts, canned foods, and smoked/cured foods. She is also to decrease her oxalate consumption, as in spinach, Nora greens, and Rhubarb.  Also important is to decrease protein intake, as in red meats, peanut butter, and also avoid nuts.    FOLLOW UP WITH GI-ABDOMINAL PAIN, IBS-C    Return to clinic sooner if need be     Patient is agreeable  WITH plan of care.    Will see her back  for Follow up in clinic and recheck her urinalysis at that time.    She may return sooner if need be     Patient is agreeable to plan of care.    Patient reports that she is not currently experiencing any symptoms of urinary incontinence.    BMI is within normal parameters. No other follow-up for BMI required.    RADIOLOGY (CT AND/OR KUB):    CT Abdomen and Pelvis: No results found for this or any previous visit.     CT Stone Protocol: No results found for this or any previous visit.     KUB: No results found for this or any previous visit.     LABS (3 MONTHS):    Office Visit on 11/15/2023   Component Date Value Ref Range Status    Color 11/15/2023 Yellow  Yellow, Straw, Dark Yellow, Mely Final    Clarity, UA 11/15/2023 Clear  Clear Final    Specific Gravity  11/15/2023 1.015  1.005 - 1.030 Final    pH, Urine 11/15/2023 6.0  5.0 - 8.0 Final    Leukocytes 11/15/2023 Negative  Negative Final    Nitrite, UA 11/15/2023 Negative  Negative Final    Protein, POC 11/15/2023 Negative  Negative mg/dL Final    Glucose, UA 11/15/2023 Negative  Negative mg/dL Final    Ketones, UA 11/15/2023 Negative  Negative Final    Urobilinogen, UA 11/15/2023 Normal  Normal, 0.2 E.U./dL Final    Bilirubin 11/15/2023 Negative  Negative Final    Blood, UA 11/15/2023 Negative  Negative Final    Lot Number 11/15/2023 [id number]   Final    Expiration Date 11/15/2023 102,546    Final    Urine Culture 11/15/2023 No growth   Final   Office Visit on 11/08/2023   Component Date Value Ref Range Status    Urine Culture 11/08/2023 Final report   Final    Result 1 11/08/2023 Comment   Final    Comment: Mixed urogenital kayy  Less than 10,000 colonies/mL     Admission on 11/02/2023, Discharged on 11/06/2023   Component Date Value Ref Range Status    Hepatitis B Surface Ag 11/03/2023 Non-Reactive  Non-Reactive Final    Hep A IgM 11/03/2023 Non-Reactive  Non-Reactive Final    Hep B C IgM 11/03/2023 Non-Reactive  Non-Reactive Final    Hepatitis C Ab 11/03/2023 Non-Reactive  Non-Reactive Final    Total Cholesterol 11/03/2023 177  0 - 200 mg/dL Final    Triglycerides 11/03/2023 103  0 - 150 mg/dL Final    HDL Cholesterol 11/03/2023 52  40 - 60 mg/dL Final    LDL Cholesterol  11/03/2023 106 (H)  0 - 100 mg/dL Final    VLDL Cholesterol 11/03/2023 19  5 - 40 mg/dL Final    LDL/HDL Ratio 11/03/2023 2.01   Final    Hemoglobin A1C 11/03/2023 5.40  4.80 - 5.60 % Final    QT Interval 11/03/2023 406  ms Final    QTC Interval 11/03/2023 398  ms Final    Glucose 11/03/2023 125  70 - 130 mg/dL Final    Glucose 11/04/2023 84  70 - 130 mg/dL Final    Glucose 11/04/2023 94  70 - 130 mg/dL Final    Glucose 11/05/2023 83  70 - 130 mg/dL Final    Glucose 11/05/2023 78  70 - 130 mg/dL Final    Glucose 11/06/2023 79  70 - 130 mg/dL Final   Admission on 11/02/2023, Discharged on 11/02/2023   Component Date Value Ref Range Status    Glucose 11/02/2023 107 (H)  65 - 99 mg/dL Final    BUN 11/02/2023 9  6 - 20 mg/dL Final    Creatinine 11/02/2023 0.83  0.57 - 1.00 mg/dL Final    Sodium 11/02/2023 142  136 - 145 mmol/L Final    Potassium 11/02/2023 3.6  3.5 - 5.2 mmol/L Final    Chloride 11/02/2023 108 (H)  98 - 107 mmol/L Final    CO2 11/02/2023 24.5  22.0 - 29.0 mmol/L Final    Calcium 11/02/2023 9.8  8.6 - 10.5 mg/dL Final    Total Protein 11/02/2023 7.2  6.0 - 8.5 g/dL Final    Albumin 11/02/2023 4.4  3.5 - 5.2 g/dL Final     ALT (SGPT) 11/02/2023 22  1 - 33 U/L Final    AST (SGOT) 11/02/2023 20  1 - 32 U/L Final    Alkaline Phosphatase 11/02/2023 74  39 - 117 U/L Final    Total Bilirubin 11/02/2023 0.2  0.0 - 1.2 mg/dL Final    Globulin 11/02/2023 2.8  gm/dL Final    A/G Ratio 11/02/2023 1.6  g/dL Final    BUN/Creatinine Ratio 11/02/2023 10.8  7.0 - 25.0 Final    Anion Gap 11/02/2023 9.5  5.0 - 15.0 mmol/L Final    eGFR 11/02/2023 81.8  >60.0 mL/min/1.73 Final    Color, UA 11/02/2023 Yellow  Yellow, Straw Final    Appearance, UA 11/02/2023 Clear  Clear Final    pH, UA 11/02/2023 5.5  5.0 - 8.0 Final    Specific [place], UA 11/02/2023 1.012  1.005 - 1.030 Final    Glucose, UA 11/02/2023 100 mg/dL (Trace) (A)  Negative Final    Ketones, UA 11/02/2023 Negative  Negative Final    Bilirubin, UA 11/02/2023 Negative  Negative Final    Blood, UA 11/02/2023 Negative  Negative Final    Protein, UA 11/02/2023 Negative  Negative Final    Leuk Esterase, UA 11/02/2023 Small (1+) (A)  Negative Final    Nitrite, UA 11/02/2023 Negative  Negative Final    Urobilinogen, UA 11/02/2023 0.2 E.U./dL  0.2 - 1.0 E.U./dL Final    Ethanol 11/02/2023 <10  0 - 10 mg/dL Final    Ethanol % 11/02/2023 <0.010  % Final    THC, Screen, Urine 11/02/2023 Negative  Negative Final    Phencyclidine (PCP), Urine 11/02/2023 Negative  Negative Final    Cocaine Screen, Urine 11/02/2023 Negative  Negative Final    Methamphetamine, Ur 11/02/2023 Negative  Negative Final    Opiate Screen 11/02/2023 Negative  Negative Final    Amphetamine Screen, Urine 11/02/2023 Negative  Negative Final    Benzodiazepine Screen, Urine 11/02/2023 Negative  Negative Final    Tricyclic Antidepressants Screen 11/02/2023 Negative  Negative Final    Methadone Screen, Urine 11/02/2023 Negative  Negative Final    Barbiturates Screen, Urine 11/02/2023 Negative  Negative Final    Oxycodone Screen, Urine 11/02/2023 Negative  Negative Final    Buprenorphine, Screen, Urine 11/02/2023 Negative  Negative Final     Magnesium 11/02/2023 2.1  1.6 - 2.6 mg/dL Final    WBC 11/02/2023 5.83  3.40 - 10.80 10*3/mm3 Final    RBC 11/02/2023 4.54  3.77 - 5.28 10*6/mm3 Final    Hemoglobin 11/02/2023 13.2  12.0 - 15.9 g/dL Final    Hematocrit 11/02/2023 42.5  34.0 - 46.6 % Final    MCV 11/02/2023 93.6  79.0 - 97.0 fL Final    MCH 11/02/2023 29.1  26.6 - 33.0 pg Final    MCHC 11/02/2023 31.1 (L)  31.5 - 35.7 g/dL Final    RDW 11/02/2023 12.3  12.3 - 15.4 % Final    RDW-SD 11/02/2023 42.7  37.0 - 54.0 fl Final    MPV 11/02/2023 9.5  6.0 - 12.0 fL Final    Platelets 11/02/2023 243  140 - 450 10*3/mm3 Final    Neutrophil % 11/02/2023 53.6  42.7 - 76.0 % Final    Lymphocyte % 11/02/2023 34.8  19.6 - 45.3 % Final    Monocyte % 11/02/2023 8.9  5.0 - 12.0 % Final    Eosinophil % 11/02/2023 1.7  0.3 - 6.2 % Final    Basophil % 11/02/2023 1.0  0.0 - 1.5 % Final    Immature Grans % 11/02/2023 0.0  0.0 - 0.5 % Final    Neutrophils, Absolute 11/02/2023 3.12  1.70 - 7.00 10*3/mm3 Final    Lymphocytes, Absolute 11/02/2023 2.03  0.70 - 3.10 10*3/mm3 Final    Monocytes, Absolute 11/02/2023 0.52  0.10 - 0.90 10*3/mm3 Final    Eosinophils, Absolute 11/02/2023 0.10  0.00 - 0.40 10*3/mm3 Final    Basophils, Absolute 11/02/2023 0.06  0.00 - 0.20 10*3/mm3 Final    Immature Grans, Absolute 11/02/2023 0.00  0.00 - 0.05 10*3/mm3 Final    nRBC 11/02/2023 0.0  0.0 - 0.2 /100 WBC Final    Extra Tube 11/02/2023 Hold for add-ons.   Final    Auto resulted.    Extra Tube 11/02/2023 hold for add-on   Final    Auto resulted    Extra Tube 11/02/2023 Hold for add-ons.   Final    Auto resulted.    Extra Tube 11/02/2023 Hold for add-ons.   Final    Auto resulted    Fentanyl, Urine 11/02/2023 Negative  Negative Final    RBC, UA 11/02/2023 0-2  None Seen, 0-2 /HPF Final    WBC, UA 11/02/2023 11-20 (A)  None Seen, 0-2 /HPF Final    Bacteria, UA 11/02/2023 4+ (A)  None Seen /HPF Final    Squamous Epithelial Cells, UA 11/02/2023 0-2  None Seen, 0-2 /HPF Final    Hyaline Casts, UA  11/02/2023 None Seen  None Seen /LPF Final    Methodology 11/02/2023 Automated Microscopy   Final    Extra Tube 11/02/2023 Hold for add-ons.   Final    Auto resulted.    Urine Culture 11/02/2023 >100,000 CFU/mL Escherichia coli (A)   Final        Smoking Cessation Counseling:  Never a smoker.  Patient does not currently use any tobacco products.     Follow Up   Return in about 2 months (around 1/16/2024) for Next scheduled follow up, With Dr. Glover, Cystoscopy, RECURRENT UTI/DYSURIA/DETRUSSOR INSTABILITY.    Patient was given instructions and counseling regarding her condition or for health maintenance advice. Please see specific information pulled into the AVS if appropriate.          This document has been electronically signed by Griselda Cheng-Akwa, APRN   November 18, 2023 15:20 EST      Dictated Utilizing Dragon Dictation: Part of this note may be an electronic transcription/translation of spoken language to printed text using the Dragon Dictation System.    Transcribed from ambient dictation for Griselda Cheng-Akwa, APRN by Demetria Gasca.  11/15/23   13:24 EST    Patient or patient representative verbalized consent to the visit recording.  I have personally performed the services described in this document as transcribed by the above individual, and it is both accurate and complete.

## 2023-11-16 ENCOUNTER — TELEPHONE (OUTPATIENT)
Dept: UROLOGY | Facility: CLINIC | Age: 58
End: 2023-11-16
Payer: MEDICARE

## 2023-11-16 ENCOUNTER — OFFICE VISIT (OUTPATIENT)
Dept: PSYCHIATRY | Facility: CLINIC | Age: 58
End: 2023-11-16
Payer: MEDICARE

## 2023-11-16 VITALS
SYSTOLIC BLOOD PRESSURE: 110 MMHG | OXYGEN SATURATION: 99 % | BODY MASS INDEX: 25.05 KG/M2 | HEART RATE: 67 BPM | HEIGHT: 63 IN | DIASTOLIC BLOOD PRESSURE: 78 MMHG | WEIGHT: 141.4 LBS

## 2023-11-16 DIAGNOSIS — F41.1 GENERALIZED ANXIETY DISORDER: ICD-10-CM

## 2023-11-16 DIAGNOSIS — F33.2 SEVERE EPISODE OF RECURRENT MAJOR DEPRESSIVE DISORDER, WITHOUT PSYCHOTIC FEATURES: Primary | ICD-10-CM

## 2023-11-16 LAB — BACTERIA SPEC AEROBE CULT: NO GROWTH

## 2023-11-16 NOTE — PROGRESS NOTES
"Subjective   Zo Palma is a 58 y.o. female who presents today for follow up    Chief Complaint:  Depression and anxiety    History of Present Illness:   History of Present Illness  Zo Palma presents today for medication management follow-up and recent hospital discharge. She is also accompanied by her NOE (Jesús).  Was inpatient from 11/4 through 11/6 for increased SI with plan. Fluoxetine was increased to 40 mg daily while inpatient and feels that this has been helpful.Began to feel better overall about 2 days ago. She says that today is the first day that she has been able to drive without wanting to \"drive off a yohan.\"  Reports chronic SI that has been present for several years, with symptoms that worsen in severity at times.  Always travels with NOE per her agreement with past psychiatrist due to SI.  Continues to struggle with symptoms of depression, feeling down, decreased motivation and decreased interest in activities. Struggles to fall and stay asleep. Denies any appetite changes. Admits to SI daily, adamantly denies any current SI with plan.  PHQ-9 total score: 19 (previously 5), GURMEET-7 total score: 12 (previously 5).    Previous Psych Meds: Reports having tried multiple SSRIs and SNRIs along with Abilify, risperidone, Seroquel, Vraylar, Latuda, Lamictal, Trileptal and Depakote (caused liver failure).     The following portions of the patient's history were reviewed and updated as appropriate: allergies, current medications, past family history, past medical history, past social history, past surgical history and problem list.      Past Medical History:  Past Medical History:   Diagnosis Date    Ankle sprain     Anxiety     Arthritis of back     Arthritis of neck     Bursitis of hip     Cervical disc disorder     Depression     Fracture of wrist     Fracture, finger     Fracture, foot     Hip arthrosis     Hormone disorder     Insomnia     Interstitial cystitis     Kidney stone     Lumbosacral " disc disease     Migraines     Obesity     Periarthritis of shoulder     Peripheral neuropathy     Rotator cuff syndrome     Seizures 2001    Conversion Disorder    Subluxation of patella     Urinary incontinence     Urinary tract infection        Social History:  Social History     Socioeconomic History    Marital status:    Tobacco Use    Smoking status: Never     Passive exposure: Past    Smokeless tobacco: Never   Vaping Use    Vaping Use: Never used   Substance and Sexual Activity    Alcohol use: Yes     Alcohol/week: 1.0 standard drink of alcohol     Types: 1 Drinks containing 0.5 oz of alcohol per week     Comment: Occasionally    Drug use: Never    Sexual activity: Not Currently     Partners: Male     Birth control/protection: Post-menopausal, Hysterectomy       Family History:  Family History   Problem Relation Age of Onset    Cancer Mother         Brain, suspected uterine    Rheumatologic disease Mother     Cancer Father         Prostate    Learning disabilities Father         Dyslexia    Other Father         Early onset alzheimer    Prostate cancer Father     Other Paternal Grandfather         Early onset alzheimer    Cancer Brother         Prostate, bone    Learning disabilities Brother         Dyslexia    Other Brother         Alzheimer    Prostate cancer Maternal Grandfather        Past Surgical History:  Past Surgical History:   Procedure Laterality Date    BARIATRIC SURGERY  2008    Gastric sleeve    BLADDER SURGERY      BREAST SURGERY      CHOLECYSTECTOMY  2010    COLONOSCOPY      CYSTOSCOPY      FOOT SURGERY      FRACTURE SURGERY      Left wrist    GASTRIC SLEEVE LAPAROSCOPIC N/A     HYSTERECTOMY      NECK SURGERY      OOPHORECTOMY      SINUS SURGERY  2021    WISDOM TOOTH EXTRACTION N/A     WRIST SURGERY         Problem List:  Patient Active Problem List   Diagnosis    Anxiety    Bursitis of hip    Cervical spondylosis with radiculopathy    Chronic migraine without aura without status  "migrainosus, not intractable    Conversion disorder with attacks or seizures, persistent, with psychological stressor    Cramp of limb    Prediabetes    Diverticulosis of colon    Elevated liver enzymes    Eustachian tube dysfunction    Foot drop    Gastroesophageal reflux disease without esophagitis    Hereditary and idiopathic peripheral neuropathy    Hypersomnia    Internal hemorrhoids    Iron deficiency    Lumbar radiculopathy, chronic    Malaise and fatigue    Metabolic syndrome    Medicare annual wellness visit, subsequent    Encounter for neuropsychological testing    Mixed hyperlipidemia    Moderate episode of recurrent major depressive disorder    Obstructive sleep apnea    Osteoarthritis of right temporomandibular joint    Osteopenia of left lower leg    Panic disorder    PTSD (post-traumatic stress disorder)    RLS (restless legs syndrome)    Primary insomnia    Sinus drainage    Tinnitus    Urge incontinence    Urinary urgency    Vitamin D deficiency    Postmenopausal symptoms    Postmenopausal osteoporosis    Right leg pain    Edema of right lower extremity    Suicidal ideations    Recurrent UTI (urinary tract infection)    Acute cystitis without hematuria    Bilateral flank pain    Incomplete emptying of bladder       Allergy:   Allergies   Allergen Reactions    Chlorzoxazone Unknown - High Severity and Swelling     Lorzone, muscle relaxant.    Iodine Anaphylaxis     Topical makes her swell  Anaphylaxis with IV contrast (when she was ~ 9yrs old)    Phenazopyridine Hcl Swelling and Anaphylaxis    Pyridium [Phenazopyridine] Anaphylaxis    Quinine Unknown - High Severity     Has malaria symptoms    Valproic Acid Other (See Comments)     Liver failure  Liver failure  Liver failure; lupus like symptoms and liver failure    Methocarbamol Other (See Comments)     Made her feel \"suicidal\" and gave her less control over her actions.    Iodinated Contrast Media Unknown - Low Severity    Acetaminophen-Codeine " Itching    Diphenhydramine Anxiety     Pt has severe panic attack symptoms when given benadryl IV. Needs to take a benzodiazapine med concurrently when getting benadryl.         Current Medications:   Current Outpatient Medications   Medication Sig Dispense Refill    Cholecalciferol 25 MCG (1000 UT) tablet Take 1 tablet by mouth 1 (One) Time Per Week. Indications: Vitamin D Deficiency      D-MANNOSE PO Take 1,000 mg by mouth Daily.      estradiol (ESTRACE) 0.1 MG/GM vaginal cream Insert 2 applicators into the vagina 3 (Three) Times a Week. Indications: Bladder Inflammation      FLUoxetine (PROzac) 40 MG capsule Take 1 capsule by mouth Daily. Indications: Major Depressive Disorder 30 capsule 0    ibandronate (BONIVA) 150 MG tablet Take 1 tablet by mouth Every 30 (Thirty) Days. 3 tablet 3    Liraglutide (Victoza) 18 MG/3ML solution pen-injector injection Inject 1.8 mg under the skin into the appropriate area as directed Daily. Indications: Type 2 Diabetes      multivitamin with minerals tablet tablet Take 1 tablet by mouth Daily. Indications: nutrtition supplement      nitrofurantoin, macrocrystal-monohydrate, (Macrobid) 100 MG capsule TAKE 1 CAPSULE BY MOUTH TWICE DAILY X 7 DAYS, THEN TAKE 1 CAPSULE NIGHTLY FOR SUPPRESSIVE THERAPY E'COLI UTIs 56 capsule 3    Omega 3-6-9 Fatty Acids (OMEGA 3-6-9 COMPLEX PO) Take 1 capsule by mouth Daily.      ondansetron (Zofran) 4 MG tablet Take 1 tablet by mouth Every 12 (Twelve) Hours As Needed for Nausea. 20 tablet 0     No current facility-administered medications for this visit.     Review of Systems   Constitutional:  Negative for activity change, appetite change, unexpected weight gain and unexpected weight loss.   Respiratory:  Negative for shortness of breath.    Cardiovascular:  Negative for chest pain.   Psychiatric/Behavioral:  Positive for suicidal ideas, depressed mood and stress. Negative for sleep disturbance. The patient is nervous/anxious.      Physical Exam  Vitals  "reviewed.   Constitutional:       General: She is not in acute distress.     Appearance: Normal appearance.   Neurological:      Mental Status: She is alert.      Gait: Gait normal.     Vitals:   Blood pressure 110/78, pulse 67, height 160 cm (63\"), weight 64.1 kg (141 lb 6.4 oz), SpO2 99%.    Mental Status Exam:   Hygiene:   good  Cooperation:  Cooperative  Eye Contact:  Good  Psychomotor Behavior:  Appropriate  Affect:  Full range and Appropriate  Mood: normal  Hopelessness: Denies  Speech:  Normal and Talkative  Thought Process:  Goal directed and Linear  Thought Content:  Mood congruent  Suicidal:  Suicidal Ideation and without plan or intent  Homicidal:  None  Hallucinations:  None  Delusion:  None  Memory:  Intact  Orientation:  Person, Place, Time, and Situation  Reliability:  good  Insight:  Good  Judgement:  Good  Impulse Control:  Good    Assessment & Plan   Problems Addressed this Visit    None  Visit Diagnoses       Severe episode of recurrent major depressive disorder, without psychotic features    -  Primary    Generalized anxiety disorder              Diagnoses         Codes Comments    Severe episode of recurrent major depressive disorder, without psychotic features    -  Primary ICD-10-CM: F33.2  ICD-9-CM: 296.33     Generalized anxiety disorder     ICD-10-CM: F41.1  ICD-9-CM: 300.02             Visit Diagnoses:    ICD-10-CM ICD-9-CM   1. Severe episode of recurrent major depressive disorder, without psychotic features  F33.2 296.33   2. Generalized anxiety disorder  F41.1 300.02     Zo presents today for medication management.  Recently discharged from Hospital, was inpatient from 11/2 through 11/6 for SI with plan.  Has chronic SI that has been present for several years that worsens in severity intermittently.  Has tried multiple medications in the past, many of which were not effective or caused adverse effects.  Discussed medication options, she does feel that increasing Prozac to 40 mg " daily has been beneficial and voices interest in continuing with medication as previously prescribed.  Discussed Spravato and voices interest in this option. Agreeable to obtain past medication trials and dates.  Denies any adverse effects of Prozac and agreeable to continue with previously prescribed dose of 40 mg daily.    -Continue Prozac 40 mg daily for depression and anxiety.     -Reviewed previous available documentation and most recent available labs. YOSHI reviewed and is appropriate.    GOALS:  Short Term Goals: Patient will be compliant with medication, and patient will have no significant medication related side effects.  Patient will be engaged in psychotherapy as indicated.  Patient will report subjective improvement of symptoms.  Long term goals: To stabilize mood and treat/improve subjective symptoms, the patient will stay out of the hospital, the patient will be at an optimal level of functioning, and the patient will take all medications as prescribed.  The patient/guardian verbalized understanding and agreement with goals that were mutually set.    TREATMENT PLAN: Continue supportive psychotherapy efforts and medications as indicated for patient's diagnosis.  Pharmacological and Non-Pharmacological treatment options discussed during today's visit. Patient/Guardian acknowledged and verbally consented with current treatment plan and was educated on the importance of compliance with treatment and follow-up appointments.      MEDICATION ISSUES:  Discussed medication options and treatment plan of prescribed medication as well as the risks, benefits, any black box warnings, and side effects including potential falls, possible impaired driving, and metabolic adversities among others. Patient is agreeable to call the office with any worsening of symptoms or onset of side effects, or if any concerns or questions arise.  The contact information for the office is made available to the patient. Patient is  agreeable to call 911 or go to the nearest ER should they begin having any SI/HI, or if any urgent concerns arise. No medication side effects or related complaints today.     MEDS ORDERED DURING VISIT:  No orders of the defined types were placed in this encounter.      FOLLOW UP:  Return in about 4 weeks (around 12/14/2023) for Recheck.             This document has been electronically signed by TRACI Khan  December 4, 2023 08:44 EST    Please note that portions of this note were completed with a voice recognition program. Efforts were made to edit dictation, but occasionally words are mistranscribed

## 2023-11-16 NOTE — TELEPHONE ENCOUNTER
Called patient with urine culture results negative  for any infection at  this time. She may drop off another urine sample if she becomes symptomatic for any UTIs at this time. If not, Follow up in clinic as discussed.    Urine Culture   No growth    Thank you

## 2023-11-17 ENCOUNTER — OFFICE VISIT (OUTPATIENT)
Dept: ORTHOPEDIC SURGERY | Facility: CLINIC | Age: 58
End: 2023-11-17
Payer: MEDICARE

## 2023-11-17 ENCOUNTER — HOSPITAL ENCOUNTER (OUTPATIENT)
Dept: GENERAL RADIOLOGY | Facility: HOSPITAL | Age: 58
Discharge: HOME | End: 2023-11-17
Payer: MEDICARE

## 2023-11-17 ENCOUNTER — PATIENT ROUNDING (BHMG ONLY) (OUTPATIENT)
Dept: UROLOGY | Facility: CLINIC | Age: 58
End: 2023-11-17
Payer: MEDICARE

## 2023-11-17 ENCOUNTER — HOSPITAL ENCOUNTER (OUTPATIENT)
Dept: GENERAL RADIOLOGY | Facility: HOSPITAL | Age: 58
Discharge: HOME | End: 2023-11-17
Admitting: FAMILY MEDICINE
Payer: MEDICARE

## 2023-11-17 VITALS
BODY MASS INDEX: 24.2 KG/M2 | HEIGHT: 63 IN | SYSTOLIC BLOOD PRESSURE: 106 MMHG | DIASTOLIC BLOOD PRESSURE: 75 MMHG | HEART RATE: 81 BPM | OXYGEN SATURATION: 98 % | WEIGHT: 136.6 LBS

## 2023-11-17 DIAGNOSIS — M54.41 CHRONIC MIDLINE LOW BACK PAIN WITH BILATERAL SCIATICA: ICD-10-CM

## 2023-11-17 DIAGNOSIS — M25.551 BILATERAL HIP PAIN: ICD-10-CM

## 2023-11-17 DIAGNOSIS — M54.42 CHRONIC MIDLINE LOW BACK PAIN WITH BILATERAL SCIATICA: ICD-10-CM

## 2023-11-17 DIAGNOSIS — M51.36 DDD (DEGENERATIVE DISC DISEASE), LUMBAR: ICD-10-CM

## 2023-11-17 DIAGNOSIS — M25.552 BILATERAL HIP PAIN: ICD-10-CM

## 2023-11-17 DIAGNOSIS — M47.816 OSTEOARTHRITIS OF FACET JOINT OF LUMBAR SPINE: ICD-10-CM

## 2023-11-17 DIAGNOSIS — M70.61 TROCHANTERIC BURSITIS OF RIGHT HIP: ICD-10-CM

## 2023-11-17 DIAGNOSIS — M76.891 HIP TENDONITIS, RIGHT: ICD-10-CM

## 2023-11-17 DIAGNOSIS — M25.552 BILATERAL HIP PAIN: Primary | ICD-10-CM

## 2023-11-17 DIAGNOSIS — M25.551 BILATERAL HIP PAIN: Primary | ICD-10-CM

## 2023-11-17 DIAGNOSIS — G89.29 CHRONIC MIDLINE LOW BACK PAIN WITH BILATERAL SCIATICA: ICD-10-CM

## 2023-11-17 PROCEDURE — 73522 X-RAY EXAM HIPS BI 3-4 VIEWS: CPT

## 2023-11-17 PROCEDURE — 72110 X-RAY EXAM L-2 SPINE 4/>VWS: CPT

## 2023-11-17 RX ADMIN — LIDOCAINE HYDROCHLORIDE 5 ML: 10 INJECTION, SOLUTION EPIDURAL; INFILTRATION; INTRACAUDAL; PERINEURAL at 10:08

## 2023-11-17 RX ADMIN — METHYLPREDNISOLONE ACETATE 80 MG: 80 INJECTION, SUSPENSION INTRA-ARTICULAR; INTRALESIONAL; INTRAMUSCULAR; SOFT TISSUE at 10:08

## 2023-11-17 NOTE — PROGRESS NOTES
New Patient Visit      Patient: Zo Palma  YOB: 1965  Date of Encounter: 11/17/2023  PCP: Evan Rivas DO  Referring Provider: No ref. provider found     Subjective   Zo Palma is a 58 y.o. female who presents to the office today for evaluation of Pain and Initial Evaluation of the Left Hip and Initial Evaluation and Pain of the Right Hip      Chief Complaint   Patient presents with    Left Hip - Pain, Initial Evaluation    Right Hip - Initial Evaluation, Pain       HPI  New patient who presents complaining of chronic bilateral hip pain and bursitis, which have been going on since the sixth grade and worse on the right than the left. Denies any injury. She states that the left is not bothering her today, but the right is flaring up a lot. The patient also has chronic low back issues and has been treated intermittently with steroid injections and nerve and facet blocks in the lumbar spine, all of which have helped. She was previously seeing [facility] in [place], [place] and moved here in the last year or two to be closer to her daughter but has not established with a new pain management here. Her last set of facet blocks was 4 years ago and helped significantly. Not currently taking any NSAIDs, but is using deep blue pain rub which is helpful.      Patient Active Problem List   Diagnosis    Anxiety    Bursitis of hip    Cervical spondylosis with radiculopathy    Chronic migraine without aura without status migrainosus, not intractable    Conversion disorder with attacks or seizures, persistent, with psychological stressor    Cramp of limb    Prediabetes    Diverticulosis of colon    Elevated liver enzymes    Eustachian tube dysfunction    Foot drop    Gastroesophageal reflux disease without esophagitis    Hereditary and idiopathic peripheral neuropathy    Hypersomnia    Internal hemorrhoids    Iron deficiency    Lumbar radiculopathy, chronic    Malaise and fatigue     Metabolic syndrome    Medicare annual wellness visit, subsequent    Encounter for neuropsychological testing    Mixed hyperlipidemia    Moderate episode of recurrent major depressive disorder    Obstructive sleep apnea    Osteoarthritis of right temporomandibular joint    Osteopenia of left lower leg    Panic disorder    PTSD (post-traumatic stress disorder)    RLS (restless legs syndrome)    Primary insomnia    Sinus drainage    Tinnitus    Urge incontinence    Urinary urgency    Vitamin D deficiency    Postmenopausal symptoms    Postmenopausal osteoporosis    Right leg pain    Edema of right lower extremity    Suicidal ideations       Past Medical History:   Diagnosis Date    Ankle sprain     Anxiety     Arthritis of back     Arthritis of neck     Bursitis of hip     Cervical disc disorder     Depression     Fracture of wrist     Fracture, finger     Fracture, foot     Hip arthrosis     Hormone disorder     Insomnia     Interstitial cystitis     Kidney stone     Lumbosacral disc disease     Migraines     Obesity     Periarthritis of shoulder     Peripheral neuropathy     Rotator cuff syndrome     Seizures 2001    Conversion Disorder    Subluxation of patella     Urinary incontinence     Urinary tract infection        Past Surgical History:   Procedure Laterality Date    BARIATRIC SURGERY  2008    Gastric sleeve    BLADDER SURGERY      BREAST SURGERY      CHOLECYSTECTOMY  2010    COLONOSCOPY      CYSTOSCOPY      FOOT SURGERY      FRACTURE SURGERY      Left wrist    GASTRIC SLEEVE LAPAROSCOPIC N/A     HYSTERECTOMY      NECK SURGERY      OOPHORECTOMY      SINUS SURGERY  2021    WISDOM TOOTH EXTRACTION N/A     WRIST SURGERY         Family History   Problem Relation Age of Onset    Cancer Mother         Brain, suspected uterine    Rheumatologic disease Mother     Cancer Father         Prostate    Learning disabilities Father         Dyslexia    Other Father         Early onset alzheimer    Prostate cancer Father      Other Paternal Grandfather         Early onset alzheimer    Cancer Brother         Prostate, bone    Learning disabilities Brother         Dyslexia    Other Brother         Alzheimer    Prostate cancer Maternal Grandfather        Social History     Socioeconomic History    Marital status:    Tobacco Use    Smoking status: Never     Passive exposure: Past    Smokeless tobacco: Never   Vaping Use    Vaping Use: Never used   Substance and Sexual Activity    Alcohol use: Yes     Alcohol/week: 1.0 standard drink of alcohol     Types: 1 Drinks containing 0.5 oz of alcohol per week     Comment: Occasionally    Drug use: Never    Sexual activity: Not Currently     Partners: Male     Birth control/protection: Post-menopausal, Hysterectomy       Current Outpatient Medications   Medication Sig Dispense Refill    Cholecalciferol 25 MCG (1000 UT) tablet Take 1 tablet by mouth 1 (One) Time Per Week. Indications: Vitamin D Deficiency      D-MANNOSE PO Take 1,000 mg by mouth Daily.      estradiol (ESTRACE) 0.1 MG/GM vaginal cream Insert 2 applicators into the vagina 3 (Three) Times a Week. Indications: Bladder Inflammation      FLUoxetine (PROzac) 40 MG capsule Take 1 capsule by mouth Daily. Indications: Major Depressive Disorder 30 capsule 0    ibandronate (BONIVA) 150 MG tablet Take 1 tablet by mouth Every 30 (Thirty) Days. 3 tablet 3    Liraglutide (Victoza) 18 MG/3ML solution pen-injector injection Inject 1.8 mg under the skin into the appropriate area as directed Daily. Indications: Type 2 Diabetes      multivitamin with minerals tablet tablet Take 1 tablet by mouth Daily. Indications: nutrtition supplement      nitrofurantoin, macrocrystal-monohydrate, (Macrobid) 100 MG capsule TAKE 1 CAPSULE BY MOUTH TWICE DAILY X 7 DAYS, THEN TAKE 1 CAPSULE NIGHTLY FOR SUPPRESSIVE THERAPY E'COLI UTIs 56 capsule 3    Omega 3-6-9 Fatty Acids (OMEGA 3-6-9 COMPLEX PO) Take 1 capsule by mouth Daily.      ondansetron (Zofran) 4 MG tablet  "Take 1 tablet by mouth Every 12 (Twelve) Hours As Needed for Nausea. 20 tablet 0     No current facility-administered medications for this visit.       Allergies   Allergen Reactions    Chlorzoxazone Unknown - High Severity and Swelling     Lorzone, muscle relaxant.    Iodine Anaphylaxis     Topical makes her swell  Anaphylaxis with IV contrast (when she was ~ 9yrs old)    Phenazopyridine Hcl Swelling and Anaphylaxis    Pyridium [Phenazopyridine] Anaphylaxis    Quinine Unknown - High Severity     Has malaria symptoms    Valproic Acid Other (See Comments)     Liver failure  Liver failure  Liver failure; lupus like symptoms and liver failure    Methocarbamol Other (See Comments)     Made her feel \"suicidal\" and gave her less control over her actions.    Iodinated Contrast Media Unknown - Low Severity    Acetaminophen-Codeine Itching    Diphenhydramine Anxiety     Pt has severe panic attack symptoms when given benadryl IV. Needs to take a benzodiazapine med concurrently when getting benadryl.        Review of Systems   Constitutional:  Positive for activity change. Negative for fever.   Respiratory:  Negative for shortness of breath and wheezing.    Cardiovascular:  Negative for chest pain.   Musculoskeletal:  Positive for arthralgias, back pain, gait problem and myalgias.   Skin:  Negative for color change and wound.   Neurological:  Negative for weakness and numbness.       Visit Vitals  /75 (BP Location: Right arm, Patient Position: Sitting, Cuff Size: Adult)   Pulse 81   Ht 160 cm (62.99\")   Wt 62 kg (136 lb 9.6 oz)   SpO2 98%   BMI 24.20 kg/m²     58 y.o.female  Physical Exam  Vitals and nursing note reviewed.   Constitutional:       General: She is not in acute distress.     Appearance: Normal appearance.   Pulmonary:      Effort: Pulmonary effort is normal. No respiratory distress.   Musculoskeletal:      Lumbar back: Spasms, tenderness and bony tenderness present. Decreased range of motion. Negative right " straight leg raise test and negative left straight leg raise test.      Right hip: Tenderness present. Decreased range of motion. Normal strength.      Left hip: No tenderness. Decreased range of motion. Normal strength.      Comments: Right hip tender over greater trochanter    Full strength in both lower extremities.  Intact deep tendon reflexes pedal pulses and sensation   Skin:     General: Skin is warm and dry.      Findings: No erythema.   Neurological:      General: No focal deficit present.      Mental Status: She is alert.      Sensory: No sensory deficit.      Motor: No weakness.         Radiology Results:    XR Hips Bilateral With or Without Pelvis 3-4 View    Result Date: 11/17/2023    No acute findings in the bilateral hips.   This report was finalized on 11/17/2023 10:05 AM by Dr. Perry Victoria MD.    My interpretation: spurring noted on bilateral greater trochanters    Narrative        This exam was performed at [facility] on 9/27/2019.       INDICATION: Low back pain. Radiculopathy     COMPARISON: 7/15/2015     TECHNIQUE:   Following established exam protocols, multisequence, multiangle images were obtained through the lumbar spine.     CONTRAST: None.     FINDINGS:     Vertebral alignment is within normal limits.       Vertebral body heights are preserved. Marrow signal normal except for what appear to be some scattered T1 and T2 hyperintense hemangiomata and some degenerative endplate marrow signal change such as at L2-3. Disc desiccation evident L2-3 and L3-4  particular. Disc heights appear to be relatively preserved.       Visualized spinal cord and cauda equina nerve roots are unremarkable.       Visualized paraspinal soft tissues are unremarkable.         L1-2: Mild ligamentum flavum hypertrophy posteriorly. No significant canal or foraminal stenosis. Disc is unremarkable. No significant change.     L2-3: Mild diffuse disc bulging appearing similar to prior.  Ligamentum flavum hypertrophy posteriorly. Minimal spinal canal stenosis. No significant foraminal narrowing with mild foraminal narrowing on the left unchanged.     L3-4: Mild disc bulging. Posterior element hypertrophic change evident. No significance spinal canal stenosis. No significant foraminal narrowing at this level appearing unchanged.     L4-5: Minimal disc bulging. Posterior element hypertrophic change. No significant canal or foraminal stenosis appearance similar to prior.     L5-S1: Disc is essentially normal. Facet joint arthritis is seen posteriorly and there is thickening of the ligamentum flavum. No significant canal stenosis. There is mild right foraminal narrowing. Appearance is similar to prior.        Exam End: 09/27/19 20:56     Large Joint Arthrocentesis: R greater trochanteric bursa  Date/Time: 11/17/2023 10:08 AM  Consent given by: patient  Site marked: site marked  Timeout: Immediately prior to procedure a time out was called to verify the correct patient, procedure, equipment, support staff and site/side marked as required   Supporting Documentation  Indications: pain   Procedure Details  Location: hip - R greater trochanteric bursa  Preparation: Patient prepped in sterile fashion using alcohol. Ethyl chloride used for anesthesia..  Needle size: 22 G  Approach: lateral  Medications administered: 80 mg methylPREDNISolone acetate 80 MG/ML; 5 mL lidocaine PF 1% 1 % (Injected on the 3 most tender areas of the greater trochanter using 5 cc of 1% lidocaine without epi and 80 mg methylprednisolone)  Patient tolerance: patient tolerated the procedure well with no immediate complications (Follow-up care precautions were discussed.)       Assessment & Plan   Diagnoses and all orders for this visit:    1. Bilateral hip pain (Primary)  -     XR Hips Bilateral With or Without Pelvis 3-4 View; Future  -     Large Joint Arthrocentesis: R greater trochanteric bursa    2. Trochanteric bursitis of right  hip  -     Large Joint Arthrocentesis: R greater trochanteric bursa    3. Hip tendonitis, right  -     Large Joint Arthrocentesis: R greater trochanteric bursa    4. Chronic midline low back pain with bilateral sciatica  -     XR Spine Lumbar Complete 4+VW; Future    5. DDD (degenerative disc disease), lumbar  -     XR Spine Lumbar Complete 4+VW; Future    6. 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  -     XR Spine Lumbar Complete 4+VW; Future         MEDS ORDERED DURING VISIT:  No orders of the defined types were placed in this encounter.    MEDICATION ISSUES:  Discussed medication options and treatment plan of prescribed medication as well as the risks, benefits, and side effects including potential falls, possible impaired driving and metabolic adversities among others. Patient is agreeable to call the office with any worsening of symptoms or onset of side effects. Patient is agreeable to call 911 or go to the nearest ER should he/she begin having SI/HI.     Discussion:  Reviewed old imaging.  Patient is experiencing pain from chronic hip bursitis which is flared up on the right but not the left today.  Discussed treatment options and patient elected to receive a steroid injection which is helped in the past.  She was given hip exercises to work on at home.  Will get lumbar films before followup at go over results then. Consider referring patient back for facet blocks.  Consider medication changes.  Follow-up in 2 to 4 weeks             This document has been electronically signed by Dariel Gustafson DO   November 17, 2023 10:10 EST    Part of this note may be an electronic transcription/translation of spoken language to printed text using the Dragon Dictation System.      Transcribed from ambient dictation for Dariel Gustafson DO by Cuong Wilson.  11/20/23   14:48 EST    I have personally performed the services described in this document as transcribed by the above individual, and it is both accurate and  complete.

## 2023-11-18 PROBLEM — R10.9 BILATERAL FLANK PAIN: Status: ACTIVE | Noted: 2023-11-18

## 2023-11-18 PROBLEM — N30.00 ACUTE CYSTITIS WITHOUT HEMATURIA: Status: ACTIVE | Noted: 2023-11-18

## 2023-11-18 PROBLEM — R33.9 INCOMPLETE EMPTYING OF BLADDER: Status: ACTIVE | Noted: 2023-11-18

## 2023-11-18 PROBLEM — N39.0 RECURRENT UTI (URINARY TRACT INFECTION): Status: ACTIVE | Noted: 2023-11-18

## 2023-11-21 RX ORDER — LIDOCAINE HYDROCHLORIDE 10 MG/ML
5 INJECTION, SOLUTION EPIDURAL; INFILTRATION; INTRACAUDAL; PERINEURAL
Status: COMPLETED | OUTPATIENT
Start: 2023-11-17 | End: 2023-11-17

## 2023-11-21 RX ORDER — METHYLPREDNISOLONE ACETATE 80 MG/ML
80 INJECTION, SUSPENSION INTRA-ARTICULAR; INTRALESIONAL; INTRAMUSCULAR; SOFT TISSUE
Status: COMPLETED | OUTPATIENT
Start: 2023-11-17 | End: 2023-11-17

## 2023-11-29 ENCOUNTER — OFFICE VISIT (OUTPATIENT)
Dept: ENDOCRINOLOGY | Facility: CLINIC | Age: 58
End: 2023-11-29
Payer: MEDICARE

## 2023-11-29 VITALS
BODY MASS INDEX: 24.1 KG/M2 | OXYGEN SATURATION: 98 % | HEIGHT: 63 IN | DIASTOLIC BLOOD PRESSURE: 74 MMHG | HEART RATE: 64 BPM | SYSTOLIC BLOOD PRESSURE: 124 MMHG | WEIGHT: 136 LBS

## 2023-11-29 DIAGNOSIS — R73.03 PREDIABETES: Primary | ICD-10-CM

## 2023-11-29 PROCEDURE — 99213 OFFICE O/P EST LOW 20 MIN: CPT | Performed by: INTERNAL MEDICINE

## 2023-11-29 PROCEDURE — 3044F HG A1C LEVEL LT 7.0%: CPT | Performed by: INTERNAL MEDICINE

## 2023-11-29 NOTE — PROGRESS NOTES
Chief Complaint   Patient presents with    Prediabetes     Follow-up       HPI:   Zo Palma is a 58 y.o.female who returns to endocrine clinic for follow-up evaluation of her prediabetes and weight gain.  Last visit 05/03/2023. Her history is as follows:    Interim Events:   - Pt is able to obtain Victoza through "[employer] PAP  - Is tolerating 1.8 mg qAM  - Has lost approximately 31 lbs since 02/2023    1) Weight gain:  - h/o sleeve gastrectomy in 2008. Pre-surgery wt was 245 lbs. Post-op was 135 lbs for many years  - By 2018, she had gained back to 175 lbs. She was prescribed Qsymia in 10/2018 and was able to lose 30 lbs (down to 145 lbs)  - Pt had to stop the Qsymia in 01/2022 as she did not have a provider to continue the Rx.  - She then regained wt to 165 lbs. In 2022, Dr. Suresh Edge prescribed Qsymia 15-92 mg dose which she was taking in 02/2023. However, she has not had weight loss on this dose of Qsymia.  - (02/2023) Started Victoza 1.8 mg daily, obtained through PAP. Has lost approximately 31 lbs since 02/2023 with Rx, dietary changes and exercise    2) Prediabetes:  - diagnosed in 2008, A1C% 5.7. Highest A1C% was 6.1% in 11/2019  - reports a h/o reactive hypoglycemia   - Did not tolerate Metformin  - (02/2023) Started Victoza 1.8 mg daily, obtained through PAP  Patient tolerating this dose    3) h/o  abnormal endocrine labs:  - In the early 2000's, pt reported being evaluated for multiple hormone deficiencies [place] a [place]. She was evaluated with serum, urine, and multiple salivary tests collected throughout the day. Does not appear to have had a Cosyntropin stimulation test. She was told she had deficiencies in estrogen and adrenal hormones. Pt was prescribed estrogen. Was not prescribed glucocorticoids.     Currently on these supplements that do not contain Biotin:  - Calcium +D3 (600mg / 120 mcg): 1 tab daily  - D-Mannose: 1 tab BID  - Magnesium: 500 mg daily  - doTerra Bone  "Nutrient: 2 caps daily  - Vit D3: 5000 IU daily  - doTerra TerraZyme: 2 caps daily  - doTerra Onguard: 1 cap daily  - Pantethina: 900 mg daily  - doTerra Yarrow: 2 caps daily  - doTerra Serenity: 1 cap daily  - doTerra Alpha CRS: 2 caps daily  - doTerra Microplex: 2 caps daily  - doTerra E Omega: 2 caps daily  - doTerra MetaPower: 1 cap BID  - potassium 99 mg: daily     - Had patient complete an ACTH stimulation test on 2/9/2023 at 8:27 AM.  Labs showed no evidence of adrenal insufficiency  - Patient without evidence of thyroid hormone deficiency    ACTH stimulation test completed 2/9/2023 at 8:27 AM:  8AM ACTH: 8.7 - normal  Time 0 AM Cortisol: 13.95  Time 30 min Cortisol: 29.30  Time 60 min Cortisol: 34.80    Other history:  - pt's  passed away in 2020  - had a h/o acute liver injury in the past while on Depakote. LFT's in the 300's. H/o fatty liver    Review of Systems   Constitutional:  Negative for fatigue.        Intentional Weight loss   HENT:  Positive for tinnitus.    Eyes: Negative.    Respiratory: Negative.     Cardiovascular: Negative.    Gastrointestinal: Negative.         Acid reflux   Endocrine: Positive for cold intolerance.   Genitourinary: Negative.    Musculoskeletal:  Positive for neck stiffness.        Left shoulder injury   Skin: Negative.    Allergic/Immunologic: Positive for environmental allergies.   Neurological:  Positive for tremors.   Hematological:  Bruises/bleeds easily.   Psychiatric/Behavioral: Negative.         The following portions of the patient's history were reviewed and updated as appropriate: allergies, current medications, past family history, past medical history, past social history, past surgical history and problem list.      /74   Pulse 64   Ht 160 cm (63\")   Wt 61.7 kg (136 lb)   SpO2 98%   BMI 24.09 kg/m²   Physical Exam  Vitals reviewed.   Constitutional:       General: She is not in acute distress.     Appearance: She is well-developed. She is not " diaphoretic.   HENT:      Head: Normocephalic.   Eyes:      Conjunctiva/sclera: Conjunctivae normal.      Pupils: Pupils are equal, round, and reactive to light.   Neck:      Thyroid: No thyromegaly.      Trachea: No tracheal deviation.      Comments: No palpable thyroid nodules    Cardiovascular:      Rate and Rhythm: Normal rate and regular rhythm.      Heart sounds: Normal heart sounds. No murmur heard.  Pulmonary:      Effort: Pulmonary effort is normal. No respiratory distress.      Breath sounds: Normal breath sounds.   Abdominal:      General: Bowel sounds are normal.      Palpations: Abdomen is soft. There is no mass.      Tenderness: There is no abdominal tenderness.   Lymphadenopathy:      Cervical: No cervical adenopathy.   Skin:     General: Skin is warm and dry.      Findings: No erythema.      Comments: No areas of hyperpigmentation   Neurological:      Mental Status: She is alert and oriented to person, place, and time.      Cranial Nerves: No cranial nerve deficit.   Psychiatric:         Behavior: Behavior normal.         LABS/IMAGING: outside records reviewed and summarized [facility]  Lab Results   Component Value Date    HGBA1C 5.40 11/03/2023     Lab Results   Component Value Date    CHOL 177 11/03/2023    CHLPL 218 (H) 11/14/2022    TRIG 103 11/03/2023    HDL 52 11/03/2023     (H) 11/03/2023     Lab Results   Component Value Date    WBC 5.83 11/02/2023    HGB 13.2 11/02/2023    HCT 42.5 11/02/2023    MCV 93.6 11/02/2023     11/02/2023     Lab Results   Component Value Date    GLUCOSE 107 (H) 11/02/2023    BUN 9 11/02/2023    CREATININE 0.83 11/02/2023    EGFR 81.8 11/02/2023    BCR 10.8 11/02/2023    K 3.6 11/02/2023    CO2 24.5 11/02/2023    CALCIUM 9.8 11/02/2023    ALBUMIN 4.4 11/02/2023    BILITOT 0.2 11/02/2023    AST 20 11/02/2023    ALT 22 11/02/2023     Lab Results   Component Value Date    TSH 2.350 02/09/2023       ASSESSMENT/PLAN:    1) prediabetes: controlled, A1C% 5.40 on  11/03/2023  - Patient did not tolerate metformin in the past  - Continue Victoza 1.8 mg qAM  - Obtaining Victoza through [employer] PAP    RTC 6 months    Electronically Signed: Damari Fulton MD

## 2023-12-04 ENCOUNTER — LAB (OUTPATIENT)
Dept: UROLOGY | Facility: CLINIC | Age: 58
End: 2023-12-04
Payer: MEDICARE

## 2023-12-04 DIAGNOSIS — N39.0 RECURRENT UTI (URINARY TRACT INFECTION): Primary | ICD-10-CM

## 2023-12-04 PROCEDURE — 87086 URINE CULTURE/COLONY COUNT: CPT | Performed by: NURSE PRACTITIONER

## 2023-12-05 DIAGNOSIS — F41.1 GENERALIZED ANXIETY DISORDER: ICD-10-CM

## 2023-12-05 DIAGNOSIS — F33.2 SEVERE EPISODE OF RECURRENT MAJOR DEPRESSIVE DISORDER, WITHOUT PSYCHOTIC FEATURES: Primary | ICD-10-CM

## 2023-12-05 RX ORDER — FLUOXETINE HYDROCHLORIDE 40 MG/1
40 CAPSULE ORAL DAILY
Qty: 30 CAPSULE | Refills: 2 | Status: SHIPPED | OUTPATIENT
Start: 2023-12-05

## 2023-12-05 NOTE — TELEPHONE ENCOUNTER
Patient states when inpatient they increased Fluoxetine to 40 mg while inpatient but that a refill was never sent in by [facility]. She is resending email of failed med's.    Rx Refill Note  Requested Prescriptions     Pending Prescriptions Disp Refills    FLUoxetine (PROzac) 40 MG capsule 30 capsule 0     Sig: Take 1 capsule by mouth Daily. Indications: Major Depressive Disorder      Last office visit with prescribing clinician: 11/16/2023   Last telemedicine visit with prescribing clinician: 10/30/2023   Next office visit with prescribing clinician: 12/12/2023                         Would you like a call back once the refill request has been completed: [] Yes [] No    If the office needs to give you a call back, can they leave a voicemail: [] Yes [] No    Bety Farris CMA  12/05/23, 13:21 EST

## 2023-12-06 ENCOUNTER — TELEPHONE (OUTPATIENT)
Dept: UROLOGY | Facility: CLINIC | Age: 58
End: 2023-12-06
Payer: MEDICARE

## 2023-12-06 LAB — BACTERIA SPEC AEROBE CULT: NO GROWTH

## 2023-12-06 NOTE — TELEPHONE ENCOUNTER
----- Message from Griselda Cheng-Akwa, APRN sent at 12/5/2023  4:12 PM EST -----  Please kindly let patient know her urine culture results are negative for any bacterial infection at this time.    However she may drop off another urine analysis for RE-evaluation if she feels symptomatic.      If not encourage increasing p.o. fluid intake, avoiding any bladder irritants such as caffeine products, and follow-up in clinic as discussed.    Urine Culture - No growth    Thank you

## 2023-12-06 NOTE — TELEPHONE ENCOUNTER
Called patient to go over her urine culture results that was negative. Patient verbalized understanding.

## 2023-12-11 ENCOUNTER — OFFICE VISIT (OUTPATIENT)
Dept: PSYCHIATRY | Facility: CLINIC | Age: 58
End: 2023-12-11
Payer: MEDICARE

## 2023-12-11 DIAGNOSIS — F43.10 POST TRAUMATIC STRESS DISORDER (PTSD): ICD-10-CM

## 2023-12-11 DIAGNOSIS — F41.1 GENERALIZED ANXIETY DISORDER: ICD-10-CM

## 2023-12-11 DIAGNOSIS — F33.1 MAJOR DEPRESSIVE DISORDER, RECURRENT EPISODE, MODERATE: ICD-10-CM

## 2023-12-11 PROCEDURE — 90837 PSYTX W PT 60 MINUTES: CPT | Performed by: SOCIAL WORKER

## 2023-12-11 NOTE — PROGRESS NOTES
"Date: 12/11/2023   Time In: 1000  Time Out: 1057    PROGRESS NOTE  Data:  Zo Palma is a 58 y.o. female who presents today for individual therapy session at [facility]. Chief Complaint: depression, anxiety and PTSD    Patient reports anxiety and less panic. She reports less negative self talk and less physical pain. She reports fear of showers and concerns regarding balance.      Clinical Maneuvering/Intervention:      Assisted patient in processing above session content; acknowledged and normalized patient’s thoughts, feelings, and concerns.  Rationalized patient thought process regarding anxiety and fear.  Discussed triggers associated with patient's anxiety in showers.  Also discussed coping skills for patient to implement such as breathing exercises, challenging anxious thoughts and mindfulness. Patient reports that obtaining a shower chair will be helpful for working through fear of being in the shower as well as using the statement \"no one is out there\". She reports improvement with depressed mood.    Allowed patient to freely discuss issues without interruption or judgment. Provided safe, confidential environment to facilitate the development of positive therapeutic relationship and encourage open, honest communication. Assisted patient in identifying risk factors which would indicate the need for higher level of care including thoughts to harm self or others and/or self-harming behavior and encouraged patient to contact this office, call 911, or present to the nearest emergency room should any of these events occur. Discussed crisis intervention services and means to access. Patient adamantly and convincingly denies current suicidal or homicidal ideation or perceptual disturbance.    Assessment   Patient appears to maintain relative stability as compared to their baseline.  However, patient continues to struggle with anxiety and depression which continues to cause impairment in " important areas of functioning.  As a result, they can be reasonably expected to continue to benefit from treatment and would likely be at increased risk for decompensation otherwise.    Mental Status Exam:   Hygiene:   good  Cooperation:  Cooperative  Eye Contact:  Good  Psychomotor Behavior:  Appropriate  Affect:  Appropriate  Mood: normal  Speech:  Normal  Thought Process:  Goal directed and Linear  Thought Content:  Mood congruent  Suicidal:  None  Homicidal:  None  Hallucinations:  None  Delusion:  None  Memory:  Intact  Orientation:  Person, Place, Time, and Situation  Reliability:  good  Insight:  Good  Judgement:  Good  Impulse Control:  Good  Physical/Medical Issues:  Yes reports shoulder pain      Patient's Support Network Includes:   daughter and friend    Functional Status: Moderate impairment     Progress toward goal: Not at goal    Prognosis: Good with Ongoing Treatment          Plan     VISIT DIAGNOSIS:     ICD-10-CM ICD-9-CM   1. Major depressive disorder, recurrent episode, moderate  F33.1 296.32   2. Generalized anxiety disorder  F41.1 300.02   3. Post traumatic stress disorder (PTSD)  F43.10 309.81        Patient will continue in individual outpatient therapy with focus on improved functioning and coping skills, maintaining stability, and avoiding decompensation and the need for higher level of care.    Patient will adhere to medication regimen as prescribed and report any side effects. Patient will contact this office, call 911 or present to the nearest emergency room should suicidal or homicidal ideations occur. Provide Cognitive Behavioral Therapy and Solution Focused Therapy to improve functioning, maintain stability, and avoid decompensation and the need for higher level of care.     Return in about Return in about 2 weeks (around 12/25/2023). or earlier if symptoms worsen or fail to improve.            This document has been electronically signed by Mely Tate LCSW  December 11, 2023  09:53 EST      Part of this note may be an electronic transcription/translation of spoken language to printed text using the Dragon Dictation System.

## 2023-12-12 ENCOUNTER — TELEMEDICINE (OUTPATIENT)
Dept: PSYCHIATRY | Facility: CLINIC | Age: 58
End: 2023-12-12
Payer: MEDICARE

## 2023-12-12 DIAGNOSIS — F43.10 PTSD (POST-TRAUMATIC STRESS DISORDER): ICD-10-CM

## 2023-12-12 DIAGNOSIS — F41.1 GENERALIZED ANXIETY DISORDER: Primary | ICD-10-CM

## 2023-12-12 PROCEDURE — 99214 OFFICE O/P EST MOD 30 MIN: CPT | Performed by: NURSE PRACTITIONER

## 2023-12-12 NOTE — PROGRESS NOTES
"This provider is located at [facility], [facility], [address], [address] in [place] [address], using a secure [employer]hart Video Visit through [employer]. Patient is being seen remotely via telehealth at their home address in [place] [address], and stated they are in a secure environment for this session. The patient's condition being diagnosed/treated is appropriate for telemedicine. The provider identified herself as well as her credentials. The patient, and/or patients guardian, consent to be seen remotely, and when consent is given they understand that the consent allows for patient identifiable information to be sent to a third party as needed. They may refuse to be seen remotely at any time. The electronic data is encrypted and password protected, and the patient and/or guardian has been advised of the potential risks to privacy not withstanding such measures.     You have chosen to receive care through a telehealth visit.  Do you consent to use a video/audio connection for your medical care today? Yes    Subjective   Zo Palma is a 58 y.o. female who presents today for follow up    Chief Complaint:  Depression and anxiety    History of Present Illness:   History of Present Illness  Zo Palma presents today via MyChart video visit for medication management follow-up.  Reports that she is \"feeling much better.\"  Says that she is able to drive into town now without having a panic episode.  Feels that symptoms are better managed with current medication regimen.  She does continue to have some symptoms of depression, feeling down, decreased motivation and decreased interest in activities.  Also feels anxious, nervous and on edge.  Rates overall current depression 2/10, rates anxiety 4/10 on a 0-10 scale with 10 being the worst.  Has began engaging in counseling and says that counseling sessions often trigger seizure-like activity that resulted in strokelike symptoms.  This has " occurred in the past, previous diagnosis of conversion disorder resulting symptoms that resemble stroke.  Currently taking Prozac 40 mg daily.  Admits to frequent SI without plan or intent, denies HI.  PHQ-9 total score: 0 (previously 19), GURMEET-7 total score: 7 (previously 12).    Previous Psych Meds: Reports having tried multiple SSRIs and SNRIs along with Abilify, risperidone, Seroquel, Vraylar, Latuda, Lamictal, Trileptal and Depakote (caused liver failure).     The following portions of the patient's history were reviewed and updated as appropriate: allergies, current medications, past family history, past medical history, past social history, past surgical history and problem list.      Past Medical History:  Past Medical History:   Diagnosis Date    Ankle sprain     Anxiety     Arthritis of back     Arthritis of neck     Bursitis of hip     Cervical disc disorder     Depression     Fracture of wrist     Fracture, finger     Fracture, foot     Frozen shoulder     Hip arthrosis     Hormone disorder     Insomnia     Interstitial cystitis     Kidney stone     Lumbosacral disc disease     Migraines     Obesity     Periarthritis of shoulder     Peripheral neuropathy     Rotator cuff syndrome     Scoliosis 11/2023    Seizures 2001    Conversion Disorder    Subluxation of patella     Urinary incontinence     Urinary tract infection        Social History:  Social History     Socioeconomic History    Marital status:    Tobacco Use    Smoking status: Never     Passive exposure: Past    Smokeless tobacco: Never   Vaping Use    Vaping Use: Never used   Substance and Sexual Activity    Alcohol use: Yes     Alcohol/week: 1.0 standard drink of alcohol     Types: 1 Drinks containing 0.5 oz of alcohol per week     Comment: Occasionally    Drug use: Never    Sexual activity: Not Currently     Partners: Male     Birth control/protection: Post-menopausal, Hysterectomy       Family History:  Family History   Problem Relation  Age of Onset    Cancer Mother         Brain, suspected uterine    Rheumatologic disease Mother     Cancer Father         Prostate    Learning disabilities Father         Dyslexia    Other Father         Early onset alzheimer    Prostate cancer Father     Other Paternal Grandfather         Early onset alzheimer    Cancer Brother         Prostate, bone    Learning disabilities Brother         Dyslexia    Other Brother         Alzheimer    Prostate cancer Maternal Grandfather        Past Surgical History:  Past Surgical History:   Procedure Laterality Date    BARIATRIC SURGERY  2008    Gastric sleeve    BLADDER SURGERY      BREAST SURGERY      CHOLECYSTECTOMY  2010    COLONOSCOPY      CYSTOSCOPY      FOOT SURGERY      FRACTURE SURGERY      Left wrist    GASTRIC SLEEVE LAPAROSCOPIC N/A     HYSTERECTOMY      NECK SURGERY      OOPHORECTOMY      SINUS SURGERY  2021    WISDOM TOOTH EXTRACTION N/A     WRIST SURGERY         Problem List:  Patient Active Problem List   Diagnosis    Anxiety    Bursitis of hip    Cervical spondylosis with radiculopathy    Chronic migraine without aura without status migrainosus, not intractable    Conversion disorder with attacks or seizures, persistent, with psychological stressor    Cramp of limb    Prediabetes    Diverticulosis of colon    Elevated liver enzymes    Eustachian tube dysfunction    Foot drop    Gastroesophageal reflux disease without esophagitis    Hereditary and idiopathic peripheral neuropathy    Hypersomnia    Internal hemorrhoids    Iron deficiency    Lumbar radiculopathy, chronic    Malaise and fatigue    Metabolic syndrome    Medicare annual wellness visit, subsequent    Encounter for neuropsychological testing    Mixed hyperlipidemia    Moderate episode of recurrent major depressive disorder    Obstructive sleep apnea    Osteoarthritis of right temporomandibular joint    Osteopenia of left lower leg    Panic disorder    PTSD (post-traumatic stress disorder)    RLS  "(restless legs syndrome)    Primary insomnia    Sinus drainage    Tinnitus    Urge incontinence    Urinary urgency    Vitamin D deficiency    Postmenopausal symptoms    Postmenopausal osteoporosis    Right leg pain    Edema of right lower extremity    Suicidal ideations    Recurrent UTI (urinary tract infection)    Acute cystitis without hematuria    Bilateral flank pain    Incomplete emptying of bladder    Osteoarthritis of left AC (acromioclavicular) joint    Tear of left glenoid labrum    Infraspinatus tendon partial tear, left, initial encounter    Supraspinatus tendonitis with fraying, left    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    DDD (degenerative disc disease), lumbar    Chronic midline low back pain with bilateral sciatica       Allergy:   Allergies   Allergen Reactions    Chlorzoxazone Unknown - High Severity and Swelling     Lorzone, muscle relaxant.    Iodine Anaphylaxis     Topical makes her swell  Anaphylaxis with IV contrast (when she was ~ 9yrs old)    Phenazopyridine Hcl Swelling and Anaphylaxis    Pyridium [Phenazopyridine] Anaphylaxis    Quinine Unknown - High Severity     Has malaria symptoms    Valproic Acid Other (See Comments)     Liver failure  Liver failure  Liver failure; lupus like symptoms and liver failure    Methocarbamol Other (See Comments)     Made her feel \"suicidal\" and gave her less control over her actions.    Iodinated Contrast Media Unknown - Low Severity    Acetaminophen-Codeine Itching    Diphenhydramine Anxiety     Pt has severe panic attack symptoms when given benadryl IV. Needs to take a benzodiazapine med concurrently when getting benadryl.         Current Medications:   Current Outpatient Medications   Medication Sig Dispense Refill    Cholecalciferol 25 MCG (1000 UT) tablet Take 1 tablet by mouth 1 (One) Time Per Week. Indications: Vitamin D Deficiency      D-MANNOSE PO Take 1,000 mg by mouth Daily.      estradiol (ESTRACE) 0.1 MG/GM vaginal cream Insert 2 " applicators into the vagina 3 (Three) Times a Week. Indications: Bladder Inflammation      FLUoxetine (PROzac) 40 MG capsule Take 1 capsule by mouth Daily. Indications: Major Depressive Disorder 30 capsule 2    ibandronate (BONIVA) 150 MG tablet Take 1 tablet by mouth Every 30 (Thirty) Days. 3 tablet 3    Liraglutide (Victoza) 18 MG/3ML solution pen-injector injection Inject 1.8 mg under the skin into the appropriate area as directed Daily. Indications: Type 2 Diabetes      multivitamin with minerals tablet tablet Take 1 tablet by mouth Daily. Indications: nutrtition supplement      nitrofurantoin, macrocrystal-monohydrate, (Macrobid) 100 MG capsule TAKE 1 CAPSULE BY MOUTH TWICE DAILY X 7 DAYS, THEN TAKE 1 CAPSULE NIGHTLY FOR SUPPRESSIVE THERAPY E'COLI UTIs 56 capsule 3    Omega 3-6-9 Fatty Acids (OMEGA 3-6-9 COMPLEX PO) Take 1 capsule by mouth Daily.      omeprazole (priLOSEC) 20 MG capsule TAKE 1 CAPSULE BY MOUTH DAILY 90 capsule 1    ondansetron (Zofran) 4 MG tablet Take 1 tablet by mouth Every 12 (Twelve) Hours As Needed for Nausea. 20 tablet 0     No current facility-administered medications for this visit.     Review of Systems   Constitutional:  Negative for activity change, appetite change, unexpected weight gain and unexpected weight loss.   Respiratory:  Negative for shortness of breath.    Cardiovascular:  Negative for chest pain.   Psychiatric/Behavioral:  Positive for depressed mood. Negative for sleep disturbance and suicidal ideas. The patient is nervous/anxious.      Physical Exam  Constitutional:       General: She is not in acute distress.     Appearance: Normal appearance.   Neurological:      Gait: Gait normal.     Vitals:   The patient was seen remotely today via a MyChart Video Visit through [facility].  Unable to obtain vital signs due to nature of remote visit.    Mental Status Exam:   Hygiene:    appears good  Cooperation:  Cooperative  Eye Contact:  Good  Psychomotor Behavior:   Appropriate  Affect:  Full range and Appropriate  Mood: normal  Hopelessness: Denies  Speech:  Normal and Talkative  Thought Process:  Goal directed and Linear  Thought Content:  Mood congruent  Suicidal:  Suicidal Ideation and without plan or intent  Homicidal:  None  Hallucinations:  None  Delusion:  None  Memory:  Intact  Orientation:  Person, Place, Time, and Situation  Reliability:  good  Insight:  Good  Judgement:  Good  Impulse Control:  Good    Assessment & Plan   Problems Addressed this Visit          Mental Health    PTSD (post-traumatic stress disorder)     Other Visit Diagnoses       Generalized anxiety disorder    -  Primary          Diagnoses         Codes Comments    Generalized anxiety disorder    -  Primary ICD-10-CM: F41.1  ICD-9-CM: 300.02     PTSD (post-traumatic stress disorder)     ICD-10-CM: F43.10  ICD-9-CM: 309.81             Visit Diagnoses:    ICD-10-CM ICD-9-CM   1. Generalized anxiety disorder  F41.1 300.02   2. PTSD (post-traumatic stress disorder)  F43.10 309.81     Zo presents today for medication management follow-up via [employer] video visit.  Reports that she is doing well overall, denies any current symptoms associated with depression.  She does struggle with some anxiety, but feels that symptoms are manageable with medication.  Voices interest in continuing with medication as previously prescribed.  Has submitted history of medications along with dates in anticipation of receiving Spravato. Will continue with Prozac 40 mg daily as previously prescribed.  Denies adverse effects of medication.    -Continue Prozac 40 mg daily for depression and anxiety.     -Reviewed previous available documentation and most recent available labs. YOSHI reviewed and is appropriate.    GOALS:  Short Term Goals: Patient will be compliant with medication, and patient will have no significant medication related side effects.  Patient will be engaged in psychotherapy as indicated.  Patient will  report subjective improvement of symptoms.  Long term goals: To stabilize mood and treat/improve subjective symptoms, the patient will stay out of the hospital, the patient will be at an optimal level of functioning, and the patient will take all medications as prescribed.  The patient/guardian verbalized understanding and agreement with goals that were mutually set.    TREATMENT PLAN: Continue supportive psychotherapy efforts and medications as indicated for patient's diagnosis.  Pharmacological and Non-Pharmacological treatment options discussed during today's visit. Patient/Guardian acknowledged and verbally consented with current treatment plan and was educated on the importance of compliance with treatment and follow-up appointments.      MEDICATION ISSUES:  Discussed medication options and treatment plan of prescribed medication as well as the risks, benefits, any black box warnings, and side effects including potential falls, possible impaired driving, and metabolic adversities among others. Patient is agreeable to call the office with any worsening of symptoms or onset of side effects, or if any concerns or questions arise.  The contact information for the office is made available to the patient. Patient is agreeable to call 911 or go to the nearest ER should they begin having any SI/HI, or if any urgent concerns arise. No medication side effects or related complaints today.     MEDS ORDERED DURING VISIT:  No orders of the defined types were placed in this encounter.      FOLLOW UP:  Return in about 4 weeks (around 1/9/2024) for Recheck.             This document has been electronically signed by TRACI Khan  December 26, 2023 22:29 EST    Please note that portions of this note were completed with a voice recognition program. Efforts were made to edit dictation, but occasionally words are mistranscribed

## 2023-12-13 ENCOUNTER — OFFICE VISIT (OUTPATIENT)
Dept: ORTHOPEDIC SURGERY | Facility: CLINIC | Age: 58
End: 2023-12-13
Payer: MEDICARE

## 2023-12-13 VITALS
BODY MASS INDEX: 24.1 KG/M2 | HEART RATE: 71 BPM | HEIGHT: 63 IN | SYSTOLIC BLOOD PRESSURE: 107 MMHG | DIASTOLIC BLOOD PRESSURE: 68 MMHG | WEIGHT: 136 LBS | OXYGEN SATURATION: 98 %

## 2023-12-13 DIAGNOSIS — M25.552 BILATERAL HIP PAIN: Primary | ICD-10-CM

## 2023-12-13 DIAGNOSIS — M70.61 TROCHANTERIC BURSITIS OF RIGHT HIP: ICD-10-CM

## 2023-12-13 DIAGNOSIS — G89.29 CHRONIC MIDLINE LOW BACK PAIN WITH BILATERAL SCIATICA: ICD-10-CM

## 2023-12-13 DIAGNOSIS — M54.41 CHRONIC MIDLINE LOW BACK PAIN WITH BILATERAL SCIATICA: ICD-10-CM

## 2023-12-13 DIAGNOSIS — M51.36 DDD (DEGENERATIVE DISC DISEASE), LUMBAR: ICD-10-CM

## 2023-12-13 DIAGNOSIS — S46.812A INFRASPINATUS TENDON TEAR, LEFT, INITIAL ENCOUNTER: ICD-10-CM

## 2023-12-13 DIAGNOSIS — M54.42 CHRONIC MIDLINE LOW BACK PAIN WITH BILATERAL SCIATICA: ICD-10-CM

## 2023-12-13 DIAGNOSIS — M76.891 HIP TENDONITIS, RIGHT: ICD-10-CM

## 2023-12-13 DIAGNOSIS — M47.816 OSTEOARTHRITIS OF FACET JOINT OF LUMBAR SPINE: ICD-10-CM

## 2023-12-13 DIAGNOSIS — S43.432A TEAR OF LEFT GLENOID LABRUM, INITIAL ENCOUNTER: ICD-10-CM

## 2023-12-13 DIAGNOSIS — M75.92 SUPRASPINATUS TENDONITIS, LEFT: ICD-10-CM

## 2023-12-13 DIAGNOSIS — M25.551 BILATERAL HIP PAIN: Primary | ICD-10-CM

## 2023-12-13 DIAGNOSIS — M25.512 CHRONIC LEFT SHOULDER PAIN: ICD-10-CM

## 2023-12-13 DIAGNOSIS — M19.012 OSTEOARTHRITIS OF LEFT AC (ACROMIOCLAVICULAR) JOINT: ICD-10-CM

## 2023-12-13 DIAGNOSIS — G89.29 CHRONIC LEFT SHOULDER PAIN: ICD-10-CM

## 2023-12-13 PROBLEM — M51.369 DDD (DEGENERATIVE DISC DISEASE), LUMBAR: Status: ACTIVE | Noted: 2023-12-13

## 2023-12-13 NOTE — PROGRESS NOTES
Follow Up Visit      Patient: Zo Palma  YOB: 1965  Date of Encounter: 12/13/2023  PCP: Evan Rivas DO  Referring Provider: TRACI Benavides   Zo Palma is a 58 y.o. female who presents to the office today for evaluation of Follow-up and Pain of the Right Hip and Follow-up and Pain of the Left Hip      Chief Complaint   Patient presents with    Right Hip - Follow-up, Pain    Left Hip - Follow-up, Pain       HPI  Patient presents for follow-up for right hip pain and low back pain after having right hip bursa injection last visit reports 80% improvement in pain and continuing get better.  The left is still doing well.  Continues to have pain and soreness in the low back needs to go over x-rays.    Patient also notes that she has been having left shoulder problems since related to multiple injuries, including overuse injury while lifting boxes several years ago and then again fell off a ladder directly onto his back in March 2023.  Has been bothering her ever since especially when she reaches backwards or extends the arm.  Patient is seeing a Dr. Robb in the [facility] who just did a steroid injection for her about a week ago which has been somewhat helpful.  She just had an MRI and has not gotten to discuss it with them yet but is scheduled to go back to see him in January  Patient Active Problem List   Diagnosis    Anxiety    Bursitis of hip    Cervical spondylosis with radiculopathy    Chronic migraine without aura without status migrainosus, not intractable    Conversion disorder with attacks or seizures, persistent, with psychological stressor    Cramp of limb    Prediabetes    Diverticulosis of colon    Elevated liver enzymes    Eustachian tube dysfunction    Foot drop    Gastroesophageal reflux disease without esophagitis    Hereditary and idiopathic peripheral neuropathy    Hypersomnia    Internal hemorrhoids    Iron deficiency    Lumbar radiculopathy,  chronic    Malaise and fatigue    Metabolic syndrome    Medicare annual wellness visit, subsequent    Encounter for neuropsychological testing    Mixed hyperlipidemia    Moderate episode of recurrent major depressive disorder    Obstructive sleep apnea    Osteoarthritis of right temporomandibular joint    Osteopenia of left lower leg    Panic disorder    PTSD (post-traumatic stress disorder)    RLS (restless legs syndrome)    Primary insomnia    Sinus drainage    Tinnitus    Urge incontinence    Urinary urgency    Vitamin D deficiency    Postmenopausal symptoms    Postmenopausal osteoporosis    Right leg pain    Edema of right lower extremity    Suicidal ideations    Recurrent UTI (urinary tract infection)    Acute cystitis without hematuria    Bilateral flank pain    Incomplete emptying of bladder    Osteoarthritis of left AC (acromioclavicular) joint    Tear of left glenoid labrum    Infraspinatus tendon partial tear, left, initial encounter    Supraspinatus tendonitis with fraying, left    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    DDD (degenerative disc disease), lumbar    Chronic midline low back pain with bilateral sciatica       Past Medical History:   Diagnosis Date    Ankle sprain     Anxiety     Arthritis of back     Arthritis of neck     Bursitis of hip     Cervical disc disorder     Depression     Fracture of wrist     Fracture, finger     Fracture, foot     Frozen shoulder     Hip arthrosis     Hormone disorder     Insomnia     Interstitial cystitis     Kidney stone     Lumbosacral disc disease     Migraines     Obesity     Periarthritis of shoulder     Peripheral neuropathy     Rotator cuff syndrome     Scoliosis 11/2023    Seizures 2001    Conversion Disorder    Subluxation of patella     Urinary incontinence     Urinary tract infection        Allergies   Allergen Reactions    Chlorzoxazone Unknown - High Severity and Swelling     Lorzone, muscle relaxant.    Iodine Anaphylaxis     Topical makes  "her swell  Anaphylaxis with IV contrast (when she was ~ 9yrs old)    Phenazopyridine Hcl Swelling and Anaphylaxis    Pyridium [Phenazopyridine] Anaphylaxis    Quinine Unknown - High Severity     Has malaria symptoms    Valproic Acid Other (See Comments)     Liver failure  Liver failure  Liver failure; lupus like symptoms and liver failure    Methocarbamol Other (See Comments)     Made her feel \"suicidal\" and gave her less control over her actions.    Iodinated Contrast Media Unknown - Low Severity    Acetaminophen-Codeine Itching    Diphenhydramine Anxiety     Pt has severe panic attack symptoms when given benadryl IV. Needs to take a benzodiazapine med concurrently when getting benadryl.        Review of Systems   Constitutional:  Positive for activity change. Negative for fever.   Respiratory:  Negative for shortness of breath and wheezing.    Cardiovascular:  Negative for chest pain.   Musculoskeletal:  Positive for arthralgias, back pain and myalgias.   Skin:  Negative for color change and wound.   Neurological:  Negative for weakness and numbness.       Visit Vitals  /68 (BP Location: Left arm, Patient Position: Sitting, Cuff Size: Adult)   Pulse 71   Ht 160 cm (62.99\")   Wt 61.7 kg (136 lb)   SpO2 98%   BMI 24.10 kg/m²     58 y.o.female  Physical Exam  Vitals and nursing note reviewed.   Constitutional:       General: She is not in acute distress.     Appearance: Normal appearance.   Pulmonary:      Effort: Pulmonary effort is normal. No respiratory distress.   Musculoskeletal:      Left shoulder: No tenderness. Decreased range of motion. Normal strength. Normal pulse.      Lumbar back: Spasms, tenderness and bony tenderness present. Decreased range of motion. Negative right straight leg raise test and negative left straight leg raise test.      Right hip: Tenderness present. Decreased range of motion. Normal strength.      Left hip: No tenderness. Decreased range of motion. Normal strength.      " Comments: L shoulder  Nontender.  Full range of motion with pain on extension and adduction.  Pain on testing of biceps supraspinatus and infraspinatus without weakness.  Severe pain and weakness on [place] test.  Pain on AC crossover and shear felt deep in the shoulder.  Negative impingement signs.  Intact deep tendon reflexes radial pulse and sensation and strength.  Intact pinch  and intrinsic strength    Mildly Right hip tender over greater trochanter. Much improved. No pain on resisted testing    TTP over right SI    Full strength in both lower extremities.  Intact deep tendon reflexes pedal pulses and sensation   Skin:     General: Skin is warm and dry.      Findings: No erythema.   Neurological:      General: No focal deficit present.      Mental Status: She is alert.      Sensory: No sensory deficit.      Motor: No weakness.         Radiology Results:    MRI Shoulder Left Without Contrast    Result Date: 12/5/2023  Moderate tendinopathy of the supraspinatus, with low-grade interstitial tear and bursal sided fraying. Moderate to severe tendinopathy of the infraspinatus, with high-grade articular sided partial tear at the footprint. There is a posterior superior labral tear. Mild osteoarthritis in the glenohumeral joint. Severe osteoarthritis of the AC joint. There is narrowing of the acromiohumeral space. There is edema in the rotator interval or soft tissue thickening in the axillary pouch. These findings may suggest adhesive capsulitis. CRITICAL RESULT:   No. COMMUNICATION: Per this written report. Drafted by Wm Lane MD on 12/5/2023 12:20 PM Final report signed by Wm Lane MD on 12/5/2023 12:25 PM    XR Spine Lumbar Complete 4+VW    Result Date: 11/17/2023  1.  Mild degenerative changes lumbar spine with some anterior osteophyte formation. 2.  Mild scoliotic curvature.   This report was finalized on 11/17/2023 10:48 AM by Dr. Perry Victoria MD.      XR  Hips Bilateral With or Without Pelvis 3-4 View    Result Date: 11/17/2023    No acute findings in the bilateral hips.   This report was finalized on 11/17/2023 10:05 AM by Dr. Perry Victoria MD.       Assessment & Plan   Diagnoses and all orders for this visit:    1. Bilateral hip pain (Primary)    2. Trochanteric bursitis of right hip    3. Hip tendonitis, right    4. Chronic midline low back pain with bilateral sciatica    5. DDD (degenerative disc disease), lumbar    6. 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    7. Chronic left shoulder pain  -     Ambulatory Referral to Physical Therapy Evaluate and treat    8. Supraspinatus tendonitis with fraying, left  -     Ambulatory Referral to Physical Therapy Evaluate and treat    9. Infraspinatus tendon partial tear, left, initial encounter  -     Ambulatory Referral to Physical Therapy Evaluate and treat    10. Tear of left glenoid labrum, initial encounter  -     Ambulatory Referral to Physical Therapy Evaluate and treat    11. Osteoarthritis of left AC (acromioclavicular) joint  -     Ambulatory Referral to Physical Therapy Evaluate and treat         MEDS ORDERED DURING VISIT:  No orders of the defined types were placed in this encounter.    MEDICATION ISSUES:  Discussed medication options and treatment plan of prescribed medication as well as the risks, benefits, and side effects including potential falls, possible impaired driving and metabolic adversities among others. Patient is agreeable to call the office with any worsening of symptoms or onset of side effects. Patient is agreeable to call 911 or go to the nearest ER should he/she begin having SI/HI.     Discussion:  Hip pain is much improved and still getting better.  Patient will continue home exercises and follow-up if it begins to hurt her again.  Reviewed lumbar imaging with patient and gave home exercises for back and sacroiliac joint.  Consider new MRIs and PT and referral for facet injections if  pain worsens.  Patient would benefit from osteopathic manipulation will follow-up for osteopathic evaluation and treatment.    Reviewed left shoulder MRI and x-ray with patient showing damage to rotator cuff and labrum as well as AC arthritis.  Patient just received a steroid injection from her orthopedic surgeon 1 week ago which is still working.  Gave patient home exercises for the shoulder and referred to physical therapy.  Would consider glenohumeral joint injection.  Patient will follow-up with surgeon as scheduled in January.  Follow-up with me in 1 month    Spent greater than 30 minutes in interview discussion documentation exam review of multiple imaging studies and orthopedic notes.         This document has been electronically signed by Dariel Gustafson DO   December 13, 2023 12:47 EST    Dictated Utilizing Dragon Dictation: Part of this note may be an electronic transcription/translation of spoken language to printed text using the Dragon Dictation System.

## 2023-12-14 ENCOUNTER — PROCEDURE VISIT (OUTPATIENT)
Dept: ORTHOPEDIC SURGERY | Facility: CLINIC | Age: 58
End: 2023-12-14
Payer: MEDICARE

## 2023-12-14 VITALS
DIASTOLIC BLOOD PRESSURE: 68 MMHG | SYSTOLIC BLOOD PRESSURE: 107 MMHG | HEIGHT: 63 IN | OXYGEN SATURATION: 98 % | BODY MASS INDEX: 24.1 KG/M2 | HEART RATE: 75 BPM | WEIGHT: 136 LBS

## 2023-12-14 DIAGNOSIS — M99.03 SEGMENTAL AND SOMATIC DYSFUNCTION OF LUMBAR REGION: ICD-10-CM

## 2023-12-14 DIAGNOSIS — M19.012 OSTEOARTHRITIS OF LEFT AC (ACROMIOCLAVICULAR) JOINT: Primary | ICD-10-CM

## 2023-12-14 DIAGNOSIS — M54.42 CHRONIC MIDLINE LOW BACK PAIN WITH BILATERAL SCIATICA: ICD-10-CM

## 2023-12-14 DIAGNOSIS — M25.551 BILATERAL HIP PAIN: ICD-10-CM

## 2023-12-14 DIAGNOSIS — M76.891 HIP TENDONITIS, RIGHT: ICD-10-CM

## 2023-12-14 DIAGNOSIS — M25.552 BILATERAL HIP PAIN: ICD-10-CM

## 2023-12-14 DIAGNOSIS — M99.05 SEGMENTAL AND SOMATIC DYSFUNCTION OF PELVIC REGION: ICD-10-CM

## 2023-12-14 DIAGNOSIS — M54.41 CHRONIC MIDLINE LOW BACK PAIN WITH BILATERAL SCIATICA: ICD-10-CM

## 2023-12-14 DIAGNOSIS — M62.838 MUSCLE SPASM: ICD-10-CM

## 2023-12-14 DIAGNOSIS — G89.29 CHRONIC MIDLINE LOW BACK PAIN WITH BILATERAL SCIATICA: ICD-10-CM

## 2023-12-14 DIAGNOSIS — M79.10 MUSCLE PAIN: ICD-10-CM

## 2023-12-14 DIAGNOSIS — M99.01 SEGMENTAL AND SOMATIC DYSFUNCTION OF CERVICAL REGION: ICD-10-CM

## 2023-12-14 PROCEDURE — 99213 OFFICE O/P EST LOW 20 MIN: CPT | Performed by: FAMILY MEDICINE

## 2023-12-14 PROCEDURE — 98929 OSTEOPATH MANJ 9-10 REGIONS: CPT | Performed by: FAMILY MEDICINE

## 2023-12-14 NOTE — PROGRESS NOTES
Follow Up Visit    Patient: Zo Palma  YOB: 1965  Date of Encounter: 12/14/2023  PCP: Evan Rivas DO  Referring Provider: No ref. provider found     Subjective   Zo Palma is a 58 y.o. female who presents to the office today for evaluation of Follow-up and Pain of the Left Hip and Follow-up of the Right Hip      Chief Complaint   Patient presents with    Left Hip - Follow-up, Pain    Right Hip - Follow-up       HPI  Patient presents for follow-up for back pain issues and desires osteopathic evaluation and management.  Complains of spasm and stiffness.  Previously responded well to OMT  Patient Active Problem List   Diagnosis    Anxiety    Bursitis of hip    Cervical spondylosis with radiculopathy    Chronic migraine without aura without status migrainosus, not intractable    Conversion disorder with attacks or seizures, persistent, with psychological stressor    Cramp of limb    Prediabetes    Diverticulosis of colon    Elevated liver enzymes    Eustachian tube dysfunction    Foot drop    Gastroesophageal reflux disease without esophagitis    Hereditary and idiopathic peripheral neuropathy    Hypersomnia    Internal hemorrhoids    Iron deficiency    Lumbar radiculopathy, chronic    Malaise and fatigue    Metabolic syndrome    Medicare annual wellness visit, subsequent    Encounter for neuropsychological testing    Mixed hyperlipidemia    Moderate episode of recurrent major depressive disorder    Obstructive sleep apnea    Osteoarthritis of right temporomandibular joint    Osteopenia of left lower leg    Panic disorder    PTSD (post-traumatic stress disorder)    RLS (restless legs syndrome)    Primary insomnia    Sinus drainage    Tinnitus    Urge incontinence    Urinary urgency    Vitamin D deficiency    Postmenopausal symptoms    Postmenopausal osteoporosis    Right leg pain    Edema of right lower extremity    Suicidal ideations    Recurrent UTI (urinary tract infection)    Acute  "cystitis without hematuria    Bilateral flank pain    Incomplete emptying of bladder    Osteoarthritis of left AC (acromioclavicular) joint    Tear of left glenoid labrum    Infraspinatus tendon partial tear, left, initial encounter    Supraspinatus tendonitis with fraying, left    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    DDD (degenerative disc disease), lumbar    Chronic midline low back pain with bilateral sciatica       Past Medical History:   Diagnosis Date    Ankle sprain     Anxiety     Arthritis of back     Arthritis of neck     Bursitis of hip     Cervical disc disorder     Depression     Fracture of wrist     Fracture, finger     Fracture, foot     Frozen shoulder     Hip arthrosis     Hormone disorder     Insomnia     Interstitial cystitis     Kidney stone     Lumbosacral disc disease     Migraines     Obesity     Periarthritis of shoulder     Peripheral neuropathy     Rotator cuff syndrome     Scoliosis 11/2023    Seizures 2001    Conversion Disorder    Subluxation of patella     Urinary incontinence     Urinary tract infection        Allergies   Allergen Reactions    Chlorzoxazone Unknown - High Severity and Swelling     Lorzone, muscle relaxant.    Iodine Anaphylaxis     Topical makes her swell  Anaphylaxis with IV contrast (when she was ~ 9yrs old)    Phenazopyridine Hcl Swelling and Anaphylaxis    Pyridium [Phenazopyridine] Anaphylaxis    Quinine Unknown - High Severity     Has malaria symptoms    Valproic Acid Other (See Comments)     Liver failure  Liver failure  Liver failure; lupus like symptoms and liver failure    Methocarbamol Other (See Comments)     Made her feel \"suicidal\" and gave her less control over her actions.    Iodinated Contrast Media Unknown - Low Severity    Acetaminophen-Codeine Itching    Diphenhydramine Anxiety     Pt has severe panic attack symptoms when given benadryl IV. Needs to take a benzodiazapine med concurrently when getting benadryl.        Review of Systems " "  Constitutional:  Positive for activity change. Negative for fever.   Respiratory:  Negative for shortness of breath and wheezing.    Cardiovascular:  Negative for chest pain.   Musculoskeletal:  Positive for arthralgias, back pain, myalgias, neck pain and neck stiffness.   Skin:  Negative for color change and wound.   Neurological:  Negative for weakness and numbness.       Visit Vitals  /68 (BP Location: Left arm, Patient Position: Sitting, Cuff Size: Adult)   Pulse 75   Ht 160 cm (62.99\")   Wt 61.7 kg (136 lb)   SpO2 98%   BMI 24.10 kg/m²     58 y.o.female  Physical Exam  Vitals and nursing note reviewed.   Constitutional:       General: She is not in acute distress.     Appearance: Normal appearance.   Pulmonary:      Effort: Pulmonary effort is normal. No respiratory distress.   Musculoskeletal:      Left shoulder: No tenderness. Decreased range of motion. Normal strength. Normal pulse.      Cervical back: Spasms present. Decreased range of motion.      Thoracic back: Spasms present. Decreased range of motion.      Lumbar back: Spasms, tenderness and bony tenderness present. Decreased range of motion. Negative right straight leg raise test and negative left straight leg raise test.      Right hip: Tenderness present. Decreased range of motion. Normal strength.      Left hip: No tenderness. Decreased range of motion. Normal strength.   Skin:     General: Skin is warm and dry.      Findings: No erythema.   Neurological:      General: No focal deficit present.      Mental Status: She is alert.      Sensory: No sensory deficit.      Motor: No weakness.         Radiology Results:    MRI Shoulder Left Without Contrast    Result Date: 12/5/2023  Moderate tendinopathy of the supraspinatus, with low-grade interstitial tear and bursal sided fraying. Moderate to severe tendinopathy of the infraspinatus, with high-grade articular sided partial tear at the footprint. There is a posterior superior labral tear. Mild " osteoarthritis in the glenohumeral joint. Severe osteoarthritis of the AC joint. There is narrowing of the acromiohumeral space. There is edema in the rotator interval or soft tissue thickening in the axillary pouch. These findings may suggest adhesive capsulitis. CRITICAL RESULT:   No. COMMUNICATION: Per this written report. Drafted by Wm Lane MD on 12/5/2023 12:20 PM Final report signed by Wm Lane MD on 12/5/2023 12:25 PM    XR Spine Lumbar Complete 4+VW    Result Date: 11/17/2023  1.  Mild degenerative changes lumbar spine with some anterior osteophyte formation. 2.  Mild scoliotic curvature.   This report was finalized on 11/17/2023 10:48 AM by Dr. Perry Victoria MD.      XR Hips Bilateral With or Without Pelvis 3-4 View    Result Date: 11/17/2023    No acute findings in the bilateral hips.   This report was finalized on 11/17/2023 10:05 AM by Dr. Perry Victoria MD.       OMT Procedure  Indications: TART findings, muscle spasm, pain    Risks and benefits discussed and patient gave verbal consent to treat    Regions treated: Head, C-Spine, Ribs, Upper Extremity, T- Spine, L-Spine, Pelvis, Sacrum, and Lower Extremity    Findings: Head Suboccipital restriction, Cervical Spine Generalized cervical motion restriction, AARL or RR, C3 ERS L, C4 ERS R or FRS R, or C5 ERS L, Upper Extremity Left Scapula Restriction, Ribs Thoracic Inlet Restriction, Thoracic Spine Generalized Thoracic Restriction, T5 ERS R, or T7 ERS R, Lumbar Spine Generalized Lumbar Restriction or L4 ERS L, Pelvis Right Anterior Innominate Rotation, Sacrum R/R rotation, or Lower Extremity Right Psoas Restriction, Right Piriformis Restriction, Left Psoas Restriction, or Left Piriformis Restriction    Modalities: HVLA, Muscle Energy, Direct Myofascial Release, Indirect Myofascial Release, and Still Technique    Patient reports decreased pain, improved range of motion, and improved functionality after  treatment. There were no adverse effects.      Assessment & Plan   Diagnoses and all orders for this visit:    1. Osteoarthritis of left AC (acromioclavicular) joint (Primary)    2. Bilateral hip pain    3. Hip tendonitis, right    4. Chronic midline low back pain with bilateral sciatica    5. Segmental and somatic dysfunction of cervical region    6. Segmental and somatic dysfunction of lumbar region    7. Segmental and somatic dysfunction of pelvic region    8. Muscle pain    9. Muscle spasm          Discussion:  Patient responded well to osteopathic manipulative treatment today in the office with improved pain and spasm in the range of motion in the low back and neck.  Follow-up in 2 weeks for further osteopathic evaluation and management.             This document has been electronically signed by Dariel Gustafson DO   December 14, 2023 12:51 EST    Part of this note may be an electronic transcription/translation of spoken language to printed text using the Dragon Dictation System.

## 2023-12-21 RX ORDER — OMEPRAZOLE 20 MG/1
20 CAPSULE, DELAYED RELEASE ORAL DAILY
Qty: 90 CAPSULE | Refills: 1 | Status: SHIPPED | OUTPATIENT
Start: 2023-12-21

## 2023-12-26 ENCOUNTER — TELEPHONE (OUTPATIENT)
Dept: PSYCHIATRY | Facility: CLINIC | Age: 58
End: 2023-12-26
Payer: MEDICARE

## 2023-12-26 NOTE — TELEPHONE ENCOUNTER
Pt called and stated that she has been experiencing an increase in panic attacks and would like to schedule an earlier appointment than what you currently have available to discuss if possible. Please advise.

## 2023-12-27 ENCOUNTER — OFFICE VISIT (OUTPATIENT)
Dept: PSYCHIATRY | Facility: CLINIC | Age: 58
End: 2023-12-27
Payer: MEDICARE

## 2023-12-27 VITALS
HEART RATE: 99 BPM | WEIGHT: 138 LBS | BODY MASS INDEX: 24.45 KG/M2 | OXYGEN SATURATION: 100 % | HEIGHT: 63 IN | SYSTOLIC BLOOD PRESSURE: 98 MMHG | DIASTOLIC BLOOD PRESSURE: 62 MMHG

## 2023-12-27 DIAGNOSIS — F43.10 PTSD (POST-TRAUMATIC STRESS DISORDER): ICD-10-CM

## 2023-12-27 DIAGNOSIS — F41.0 PANIC ATTACKS: ICD-10-CM

## 2023-12-27 DIAGNOSIS — F33.0 MILD EPISODE OF RECURRENT MAJOR DEPRESSIVE DISORDER: ICD-10-CM

## 2023-12-27 DIAGNOSIS — F41.1 GENERALIZED ANXIETY DISORDER: Primary | ICD-10-CM

## 2023-12-27 RX ORDER — HYDROXYZINE HYDROCHLORIDE 10 MG/1
10 TABLET, FILM COATED ORAL 2 TIMES DAILY PRN
Qty: 60 TABLET | Refills: 1 | Status: SHIPPED | OUTPATIENT
Start: 2023-12-27

## 2023-12-27 NOTE — PROGRESS NOTES
Subjective   Zo Palma is a 58 y.o. female who presents today for follow up    Chief Complaint:  Depression and anxiety    History of Present Illness:   History of Present Illness  Zo Palma presents today for earlier scheduled appointment per her request to discuss recent panic episodes.  Voices that panic attacks have increased over the past month.  Panic episodes often occur without known trigger. During these episodes, she notices increased heart rate and feels that she is in fight or flight mode.  Panic attacks can occur while at home, driving or in public settings. Says that she is now fearful of even going anywhere due to these panic episodes.  Although she has identified no known triggers, she does feels that anything increasing stress triggers the panic attack.  Having episodes approximately 2-3 times per week. Feels that overall anxiety and depression are well controlled. Currently taking Prozac 40 mg daily and feels that medication has been beneficial. Has history of panic attacks, was on lorazepam about 15 years ago. Has frequent SI, sometimes having plan with no intent. Denies any current plan or intent.  Says that her high risk suicide score is associated with most recent inpatient admission for SI as questions are based on the past 3 months.  Denies any issues with sleep or appetite.  PHQ-9 total score: 3, GURMEET-7 total score: 6.    Previous Psych Meds: Reports having tried multiple SSRIs and SNRIs along with Abilify, risperidone, Seroquel, Vraylar, Latuda, Lamictal, Trileptal, lorazepam and Depakote (caused liver failure).     The following portions of the patient's history were reviewed and updated as appropriate: allergies, current medications, past family history, past medical history, past social history, past surgical history and problem list.      Past Medical History:  Past Medical History:   Diagnosis Date    Ankle sprain     Anxiety     Arthritis of back     Arthritis of neck      Bursitis of hip     Cervical disc disorder     Depression     Fracture of wrist     Fracture, finger     Fracture, foot     Frozen shoulder     Hip arthrosis     Hormone disorder     Insomnia     Interstitial cystitis     Kidney stone     Lumbosacral disc disease     Migraines     Obesity     Periarthritis of shoulder     Peripheral neuropathy     Rotator cuff syndrome     Scoliosis 11/2023    Seizures 2001    Conversion Disorder    Subluxation of patella     Urinary incontinence     Urinary tract infection        Social History:  Social History     Socioeconomic History    Marital status:    Tobacco Use    Smoking status: Never     Passive exposure: Past    Smokeless tobacco: Never   Vaping Use    Vaping Use: Never used   Substance and Sexual Activity    Alcohol use: Yes     Alcohol/week: 1.0 standard drink of alcohol     Types: 1 Drinks containing 0.5 oz of alcohol per week     Comment: Occasionally    Drug use: Never    Sexual activity: Not Currently     Partners: Male     Birth control/protection: Post-menopausal, Hysterectomy       Family History:  Family History   Problem Relation Age of Onset    Cancer Mother         Brain, suspected uterine    Rheumatologic disease Mother     Cancer Father         Prostate    Learning disabilities Father         Dyslexia    Other Father         Early onset alzheimer    Prostate cancer Father     Other Paternal Grandfather         Early onset alzheimer    Cancer Brother         Prostate, bone    Learning disabilities Brother         Dyslexia    Other Brother         Alzheimer    Prostate cancer Maternal Grandfather        Past Surgical History:  Past Surgical History:   Procedure Laterality Date    BARIATRIC SURGERY  2008    Gastric sleeve    BLADDER SURGERY      BREAST SURGERY      CHOLECYSTECTOMY  2010    COLONOSCOPY      CYSTOSCOPY      FOOT SURGERY      FRACTURE SURGERY      Left wrist    GASTRIC SLEEVE LAPAROSCOPIC N/A     HYSTERECTOMY      NECK SURGERY       OOPHORECTOMY      SINUS SURGERY  2021    WISDOM TOOTH EXTRACTION N/A     WRIST SURGERY         Problem List:  Patient Active Problem List   Diagnosis    Anxiety    Bursitis of hip    Cervical spondylosis with radiculopathy    Chronic migraine without aura without status migrainosus, not intractable    Conversion disorder with attacks or seizures, persistent, with psychological stressor    Cramp of limb    Prediabetes    Diverticulosis of colon    Elevated liver enzymes    Eustachian tube dysfunction    Foot drop    Gastroesophageal reflux disease without esophagitis    Hereditary and idiopathic peripheral neuropathy    Hypersomnia    Internal hemorrhoids    Iron deficiency    Lumbar radiculopathy, chronic    Malaise and fatigue    Metabolic syndrome    Medicare annual wellness visit, subsequent    Encounter for neuropsychological testing    Mixed hyperlipidemia    Moderate episode of recurrent major depressive disorder    Obstructive sleep apnea    Osteoarthritis of right temporomandibular joint    Osteopenia of left lower leg    Panic disorder    PTSD (post-traumatic stress disorder)    RLS (restless legs syndrome)    Primary insomnia    Sinus drainage    Tinnitus    Urge incontinence    Urinary urgency    Vitamin D deficiency    Postmenopausal symptoms    Postmenopausal osteoporosis    Right leg pain    Edema of right lower extremity    Suicidal ideations    Recurrent UTI (urinary tract infection)    Acute cystitis without hematuria    Bilateral flank pain    Incomplete emptying of bladder    Osteoarthritis of left AC (acromioclavicular) joint    Tear of left glenoid labrum    Infraspinatus tendon partial tear, left, initial encounter    Supraspinatus tendonitis with fraying, left    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    DDD (degenerative disc disease), lumbar    Chronic midline low back pain with bilateral sciatica       Allergy:   Allergies   Allergen Reactions    Chlorzoxazone Unknown - High Severity  "and Swelling     Lorzone, muscle relaxant.    Iodine Anaphylaxis     Topical makes her swell  Anaphylaxis with IV contrast (when she was ~ 9yrs old)    Phenazopyridine Hcl Swelling and Anaphylaxis    Pyridium [Phenazopyridine] Anaphylaxis    Quinine Unknown - High Severity     Has malaria symptoms    Valproic Acid Other (See Comments)     Liver failure  Liver failure  Liver failure; lupus like symptoms and liver failure    Methocarbamol Other (See Comments)     Made her feel \"suicidal\" and gave her less control over her actions.    Iodinated Contrast Media Unknown - Low Severity    Acetaminophen-Codeine Itching    Diphenhydramine Anxiety     Pt has severe panic attack symptoms when given benadryl IV. Needs to take a benzodiazapine med concurrently when getting benadryl.         Current Medications:   Current Outpatient Medications   Medication Sig Dispense Refill    Cholecalciferol 25 MCG (1000 UT) tablet Take 1 tablet by mouth 1 (One) Time Per Week. Indications: Vitamin D Deficiency      D-MANNOSE PO Take 1,000 mg by mouth Daily.      estradiol (ESTRACE) 0.1 MG/GM vaginal cream Insert 2 applicators into the vagina 3 (Three) Times a Week. Indications: Bladder Inflammation      FLUoxetine (PROzac) 40 MG capsule Take 1 capsule by mouth Daily. Indications: Major Depressive Disorder 30 capsule 2    ibandronate (BONIVA) 150 MG tablet Take 1 tablet by mouth Every 30 (Thirty) Days. 3 tablet 3    Liraglutide (Victoza) 18 MG/3ML solution pen-injector injection Inject 1.8 mg under the skin into the appropriate area as directed Daily. Indications: Type 2 Diabetes      multivitamin with minerals tablet tablet Take 1 tablet by mouth Daily. Indications: nutrtition supplement      nitrofurantoin, macrocrystal-monohydrate, (Macrobid) 100 MG capsule TAKE 1 CAPSULE BY MOUTH TWICE DAILY X 7 DAYS, THEN TAKE 1 CAPSULE NIGHTLY FOR SUPPRESSIVE THERAPY E'COLI UTIs 56 capsule 3    Omega 3-6-9 Fatty Acids (OMEGA 3-6-9 COMPLEX PO) Take 1 " "capsule by mouth Daily.      omeprazole (priLOSEC) 20 MG capsule TAKE 1 CAPSULE BY MOUTH DAILY 90 capsule 1    ondansetron (Zofran) 4 MG tablet Take 1 tablet by mouth Every 12 (Twelve) Hours As Needed for Nausea. 20 tablet 0    hydrOXYzine (ATARAX) 10 MG tablet Take 1 tablet by mouth 2 (Two) Times a Day As Needed (anxiety and/or sleep). 60 tablet 1     No current facility-administered medications for this visit.     Review of Systems   Constitutional:  Negative for activity change, appetite change, unexpected weight gain and unexpected weight loss.   Respiratory:  Negative for shortness of breath.    Cardiovascular:  Negative for chest pain.   Psychiatric/Behavioral:  Positive for suicidal ideas, depressed mood and stress. Negative for sleep disturbance. The patient is nervous/anxious.      Physical Exam  Vitals reviewed.   Constitutional:       General: She is not in acute distress.     Appearance: Normal appearance.   Neurological:      Mental Status: She is alert.      Gait: Gait normal.     Vitals:   Blood pressure 98/62, pulse 99, height 160 cm (63\"), weight 62.6 kg (138 lb), SpO2 100%.    Mental Status Exam:   Hygiene:   good  Cooperation:  Cooperative  Eye Contact:  Good  Psychomotor Behavior:  Appropriate  Affect:  Full range and Appropriate  Mood: normal  Hopelessness: Denies  Speech:  Normal and Talkative  Thought Process:  Goal directed and Linear  Thought Content:  Mood congruent  Suicidal:  Suicidal Ideation and without plan or intent  Homicidal:  None  Hallucinations:  None  Delusion:  None  Memory:  Intact  Orientation:  Person, Place, Time, and Situation  Reliability:  good  Insight:  Good  Judgement:  Good  Impulse Control:  Good    Assessment & Plan   Problems Addressed this Visit          Mental Health    PTSD (post-traumatic stress disorder)    Relevant Medications    hydrOXYzine (ATARAX) 10 MG tablet     Other Visit Diagnoses       Generalized anxiety disorder    -  Primary    Relevant " "Medications    hydrOXYzine (ATARAX) 10 MG tablet    Mild episode of recurrent major depressive disorder        Relevant Medications    hydrOXYzine (ATARAX) 10 MG tablet    Panic attacks        Relevant Medications    hydrOXYzine (ATARAX) 10 MG tablet          Diagnoses         Codes Comments    Generalized anxiety disorder    -  Primary ICD-10-CM: F41.1  ICD-9-CM: 300.02     PTSD (post-traumatic stress disorder)     ICD-10-CM: F43.10  ICD-9-CM: 309.81     Mild episode of recurrent major depressive disorder     ICD-10-CM: F33.0  ICD-9-CM: 296.31     Panic attacks     ICD-10-CM: F41.0  ICD-9-CM: 300.01             Visit Diagnoses:    ICD-10-CM ICD-9-CM   1. Generalized anxiety disorder  F41.1 300.02   2. PTSD (post-traumatic stress disorder)  F43.10 309.81   3. Mild episode of recurrent major depressive disorder  F33.0 296.31   4. Panic attacks  F41.0 300.01     Zo presents today after requesting sooner appointment to discuss panic episodes.  Feels that overall anxiety and depression are well controlled, but is having increase in panic attacks. Has history of panic episodes and was on lorazepam that was beneficial about 15 years ago.  Discussed medication options, voices interest in trying medication that is \"least harsh.\"  Will initiate hydroxyzine 10 mg twice daily as needed for anxiety/panic episodes.  Will continue with Prozac 40 mg daily as previously prescribed.  Denies any adverse effects associated with Prozac.     -Start hydroxyzine 10 mg twice daily as needed for panic/anxiety  -Continue Prozac 40 mg daily for depression and anxiety.     -Reviewed previous available documentation and most recent available labs. YOSHI reviewed and is appropriate.    GOALS:  Short Term Goals: Patient will be compliant with medication, and patient will have no significant medication related side effects.  Patient will be engaged in psychotherapy as indicated.  Patient will report subjective improvement of symptoms.  Long " term goals: To stabilize mood and treat/improve subjective symptoms, the patient will stay out of the hospital, the patient will be at an optimal level of functioning, and the patient will take all medications as prescribed.  The patient/guardian verbalized understanding and agreement with goals that were mutually set.    TREATMENT PLAN: Continue supportive psychotherapy efforts and medications as indicated for patient's diagnosis.  Pharmacological and Non-Pharmacological treatment options discussed during today's visit. Patient/Guardian acknowledged and verbally consented with current treatment plan and was educated on the importance of compliance with treatment and follow-up appointments.      MEDICATION ISSUES:  Discussed medication options and treatment plan of prescribed medication as well as the risks, benefits, any black box warnings, and side effects including potential falls, possible impaired driving, and metabolic adversities among others. Patient is agreeable to call the office with any worsening of symptoms or onset of side effects, or if any concerns or questions arise.  The contact information for the office is made available to the patient. Patient is agreeable to call 911 or go to the nearest ER should they begin having any SI/HI, or if any urgent concerns arise. No medication side effects or related complaints today.     MEDS ORDERED DURING VISIT:  New Medications Ordered This Visit   Medications    hydrOXYzine (ATARAX) 10 MG tablet     Sig: Take 1 tablet by mouth 2 (Two) Times a Day As Needed (anxiety and/or sleep).     Dispense:  60 tablet     Refill:  1       FOLLOW UP:  Return in about 4 weeks (around 1/24/2024) for Recheck.             This document has been electronically signed by TRACI Khan  December 27, 2023 13:18 EST    Please note that portions of this note were completed with a voice recognition program. Efforts were made to edit dictation, but occasionally words are  mistranscribed

## 2024-01-08 ENCOUNTER — OFFICE VISIT (OUTPATIENT)
Dept: ORTHOPEDIC SURGERY | Facility: CLINIC | Age: 59
End: 2024-01-08
Payer: MEDICARE

## 2024-01-08 VITALS
OXYGEN SATURATION: 97 % | BODY MASS INDEX: 24.31 KG/M2 | SYSTOLIC BLOOD PRESSURE: 96 MMHG | WEIGHT: 137.2 LBS | DIASTOLIC BLOOD PRESSURE: 57 MMHG | HEIGHT: 63 IN | HEART RATE: 77 BPM | RESPIRATION RATE: 18 BRPM

## 2024-01-08 DIAGNOSIS — M62.838 MUSCLE SPASM: ICD-10-CM

## 2024-01-08 DIAGNOSIS — M25.552 BILATERAL HIP PAIN: ICD-10-CM

## 2024-01-08 DIAGNOSIS — M54.41 CHRONIC MIDLINE LOW BACK PAIN WITH BILATERAL SCIATICA: Primary | ICD-10-CM

## 2024-01-08 DIAGNOSIS — M99.03 SEGMENTAL AND SOMATIC DYSFUNCTION OF LUMBAR REGION: ICD-10-CM

## 2024-01-08 DIAGNOSIS — M79.10 MUSCLE PAIN: ICD-10-CM

## 2024-01-08 DIAGNOSIS — G89.29 CHRONIC MIDLINE LOW BACK PAIN WITH BILATERAL SCIATICA: Primary | ICD-10-CM

## 2024-01-08 DIAGNOSIS — M25.551 BILATERAL HIP PAIN: ICD-10-CM

## 2024-01-08 DIAGNOSIS — M99.05 SEGMENTAL AND SOMATIC DYSFUNCTION OF PELVIC REGION: ICD-10-CM

## 2024-01-08 DIAGNOSIS — M99.01 SEGMENTAL AND SOMATIC DYSFUNCTION OF CERVICAL REGION: ICD-10-CM

## 2024-01-08 DIAGNOSIS — M54.42 CHRONIC MIDLINE LOW BACK PAIN WITH BILATERAL SCIATICA: Primary | ICD-10-CM

## 2024-01-08 PROCEDURE — 98929 OSTEOPATH MANJ 9-10 REGIONS: CPT | Performed by: FAMILY MEDICINE

## 2024-01-08 PROCEDURE — 99213 OFFICE O/P EST LOW 20 MIN: CPT | Performed by: FAMILY MEDICINE

## 2024-01-08 NOTE — PROGRESS NOTES
Follow Up Visit      Patient: Zo Palma  YOB: 1965  Date of Encounter: 01/08/2024  PCP: Evan Rivas DO  Referring Provider: No ref. provider found     Subjective   Zo Palma is a 58 y.o. female who presents to the office today for evaluation of Follow-up of the Right Hip (Pt denies pain), Follow-up of the Left Hip (Pt denies pain), and Pain of the Lumbar Spine      Chief Complaint   Patient presents with    Right Hip - Follow-up     Pt denies pain    Left Hip - Follow-up     Pt denies pain    Lumbar Spine - Pain       HPI  Patient presents for follow-up for low back pain..  Desires OMT  Mainly getting pain in the right lower back area  Patient Active Problem List   Diagnosis    Anxiety    Bursitis of hip    Cervical spondylosis with radiculopathy    Chronic migraine without aura without status migrainosus, not intractable    Conversion disorder with attacks or seizures, persistent, with psychological stressor    Cramp of limb    Prediabetes    Diverticulosis of colon    Elevated liver enzymes    Eustachian tube dysfunction    Foot drop    Gastroesophageal reflux disease without esophagitis    Hereditary and idiopathic peripheral neuropathy    Hypersomnia    Internal hemorrhoids    Iron deficiency    Lumbar radiculopathy, chronic    Malaise and fatigue    Metabolic syndrome    Medicare annual wellness visit, subsequent    Encounter for neuropsychological testing    Mixed hyperlipidemia    Moderate episode of recurrent major depressive disorder    Obstructive sleep apnea    Osteoarthritis of right temporomandibular joint    Osteopenia of left lower leg    Panic disorder    PTSD (post-traumatic stress disorder)    RLS (restless legs syndrome)    Primary insomnia    Sinus drainage    Tinnitus    Urge incontinence    Urinary urgency    Vitamin D deficiency    Postmenopausal symptoms    Postmenopausal osteoporosis    Right leg pain    Edema of right lower extremity    Suicidal ideations     "Recurrent UTI (urinary tract infection)    Acute cystitis without hematuria    Bilateral flank pain    Incomplete emptying of bladder    Osteoarthritis of left AC (acromioclavicular) joint    Tear of left glenoid labrum    Infraspinatus tendon partial tear, left, initial encounter    Supraspinatus tendonitis with fraying, left    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    DDD (degenerative disc disease), lumbar    Chronic midline low back pain with bilateral sciatica       Past Medical History:   Diagnosis Date    Ankle sprain     Anxiety     Arthritis of back     Arthritis of neck     Bursitis of hip     Cervical disc disorder     Depression     Fracture of wrist     Fracture, finger     Fracture, foot     Frozen shoulder     Hip arthrosis     Hormone disorder     Insomnia     Interstitial cystitis     Kidney stone     Lumbosacral disc disease     Migraines     Obesity     Periarthritis of shoulder     Peripheral neuropathy     Rotator cuff syndrome     Scoliosis 11/2023    Seizures 2001    Conversion Disorder    Subluxation of patella     Urinary incontinence     Urinary tract infection        Allergies   Allergen Reactions    Chlorzoxazone Unknown - High Severity and Swelling     Lorzone, muscle relaxant.    Iodine Anaphylaxis     Topical makes her swell  Anaphylaxis with IV contrast (when she was ~ 9yrs old)    Phenazopyridine Hcl Swelling and Anaphylaxis    Pyridium [Phenazopyridine] Anaphylaxis    Quinine Unknown - High Severity     Has malaria symptoms    Valproic Acid Other (See Comments)     Liver failure  Liver failure  Liver failure; lupus like symptoms and liver failure    Methocarbamol Other (See Comments)     Made her feel \"suicidal\" and gave her less control over her actions.    Iodinated Contrast Media Unknown - Low Severity    Acetaminophen-Codeine Itching    Diphenhydramine Anxiety     Pt has severe panic attack symptoms when given benadryl IV. Needs to take a benzodiazapine med concurrently " "when getting benadryl.        Review of Systems   Constitutional:  Positive for activity change. Negative for fever.   Respiratory:  Negative for shortness of breath and wheezing.    Cardiovascular:  Negative for chest pain.   Musculoskeletal:  Positive for arthralgias, back pain and myalgias. Negative for gait problem.   Skin:  Negative for color change and wound.   Neurological:  Negative for weakness and numbness.       Visit Vitals  BP 96/57 (BP Location: Left arm, Patient Position: Sitting, Cuff Size: Adult)   Pulse 77   Resp 18   Ht 160 cm (63\")   Wt 62.2 kg (137 lb 3.2 oz)   SpO2 97%   BMI 24.30 kg/m²     58 y.o.female  Physical Exam  Vitals and nursing note reviewed.   Constitutional:       General: She is not in acute distress.     Appearance: Normal appearance.   Pulmonary:      Effort: Pulmonary effort is normal. No respiratory distress.   Musculoskeletal:      Left shoulder: No tenderness. Decreased range of motion. Normal strength. Normal pulse.      Cervical back: Spasms present. Decreased range of motion.      Lumbar back: Spasms, tenderness and bony tenderness present. Decreased range of motion. Negative right straight leg raise test and negative left straight leg raise test.      Comments: Right SI tenderness   Skin:     General: Skin is warm and dry.      Findings: No erythema.   Neurological:      General: No focal deficit present.      Mental Status: She is alert.      Sensory: No sensory deficit.      Motor: No weakness.       Osteopathic Manipulative Treatment - OMT - Procedure Note    OMT Procedure  Indications: TART findings, muscle spasm, pain    Risks and benefits discussed and patient gave verbal consent to treat    Regions treated: Head, C-Spine, Ribs, Upper Extremity, T- Spine, L-Spine, Pelvis, Sacrum, and Lower Extremity    Findings: Head Suboccipital restriction, Cervical Spine Generalized cervical motion restriction, AARR, C3 ERS L, or C4 FRS L, Upper Extremity Right shoulder " Restriction, Ribs Thoracic Inlet Restriction, Thoracic Spine Generalized Thoracic Restriction, T5 ERS R, or T6 ERS R, Lumbar Spine Generalized Lumbar Restriction, L4 ERS L, or L5 ERS R or FRS L, Pelvis Left Posterior Innominate Rotation, Sacrum R/R rotation, or Lower Extremity Left Psoas Restriction    Modalities: Muscle Energy, Direct Myofascial Release, Indirect Myofascial Release, and Still Technique    Patient reports decreased pain, improved range of motion, and improved functionality after treatment. There were no adverse effects.        Radiology Results:    No radiology results for the last 30 days.    Assessment & Plan   Diagnoses and all orders for this visit:    1. Chronic midline low back pain with bilateral sciatica (Primary)    2. Bilateral hip pain    3. Segmental and somatic dysfunction of cervical region    4. Segmental and somatic dysfunction of lumbar region    5. Segmental and somatic dysfunction of pelvic region    6. Muscle pain    7. Muscle spasm         MEDS ORDERED DURING VISIT:  No orders of the defined types were placed in this encounter.    MEDICATION ISSUES:  Discussed medication options and treatment plan of prescribed medication as well as the risks, benefits, and side effects including potential falls, possible impaired driving and metabolic adversities among others. Patient is agreeable to call the office with any worsening of symptoms or onset of side effects. Patient is agreeable to call 911 or go to the nearest ER should he/she begin having SI/HI.     Discussion:  Patient notes significant improvement in pain and spasm after OMT.  Follow-up in 3 days for reevaluation of bursitis issues             This document has been electronically signed by Dariel Gustafson DO   January 8, 2024 11:07 EST    Dictated Utilizing Dragon Dictation: Part of this note may be an electronic transcription/translation of spoken language to printed text using the Dragon Dictation System.

## 2024-01-10 ENCOUNTER — TELEMEDICINE (OUTPATIENT)
Dept: PSYCHIATRY | Facility: CLINIC | Age: 59
End: 2024-01-10
Payer: MEDICARE

## 2024-01-10 DIAGNOSIS — F33.1 MAJOR DEPRESSIVE DISORDER, RECURRENT EPISODE, MODERATE: Primary | ICD-10-CM

## 2024-01-10 DIAGNOSIS — F41.0 PANIC ATTACKS: ICD-10-CM

## 2024-01-10 DIAGNOSIS — F43.10 POST TRAUMATIC STRESS DISORDER (PTSD): ICD-10-CM

## 2024-01-10 DIAGNOSIS — F41.1 GENERALIZED ANXIETY DISORDER: ICD-10-CM

## 2024-01-10 NOTE — PROGRESS NOTES
Date: January 10, 2024  Time In: 1005  Time Out: 1052      PROGRESS NOTE  Data:  Zo Palma is a 58 y.o. female who presents today for individual therapy session through the [facility].  The Patient is seen remotely at their home, using Zoom. Patient is being seen via telehealth and stated they are in a secure environment for this session. The patient's condition being diagnosed/treated is appropriate for telemedicine. The provider identified herself as well as her credentials. The patient and/or patients guardian consent to be seen remotely, and when consent is given they understand that the consent allows for patient identifiable information to be sent to a third party as needed. They may refuse to be seen remotely at any time. The electronic data is encrypted and password protected, and the patient has been advised of the potential risks to privacy not withstanding such measures.     Chief Complaint: depression, anxiety and PTSD    Patient reports feelings of hopelessness and anxiety attacks. She reports worsening financial stressors as a trigger for this. She reports going to physical therapy for her shoulder. She reports obtaining shower chair to help with stability. She reports identifying a new trigger of essential oil that has been causing panic attacks. She reports having a safety plan in place with roommate for suicidal ideation.       Clinical Maneuvering/Intervention:    Assisted patient in processing above session content; acknowledged and normalized patient’s thoughts, feelings, and concerns.  Rationalized patient thought process regarding financial stressors and mood changes. Patient reports actively problem solving to identify solutions regarding financial situation. She reports practicing breathing exercises when feeling panic.  Discussed triggers associated with patient's depressed mood and feelings of hopelessness.  Also discussed coping skills for patient to implement such  "as using her support system, challenging negative thoughts and using her support dog. Patient has safety plan in place of telling roommate if feeling suicidal and will take her to emergency room.    Allowed patient to freely discuss issues without interruption or judgment. Provided safe, confidential environment to facilitate the development of positive therapeutic relationship and encourage open, honest communication. Assisted patient in identifying risk factors which would indicate the need for higher level of care including thoughts to harm self or others and/or self-harming behavior and encouraged patient to contact this office, call 911, or present to the nearest emergency room should any of these events occur. Discussed crisis intervention services and means to access. Patient adamantly and convincingly denies current suicidal or homicidal ideation or perceptual disturbance.    Assessment   Patient appears to maintain relative stability as compared to their baseline.  However, patient continues to struggle with depression and anxiety which continues to cause impairment in important areas of functioning.  A result, they can be reasonably expected to continue to benefit from treatment and would likely be at increased risk for decompensation otherwise.    Mental Status Exam:   Hygiene:    mychart video, appears good  Cooperation:  Cooperative  Eye Contact:  Good  Psychomotor Behavior:  Appropriate  Affect:  Appropriate  Mood:  \"hopeless\"  Speech:  Normal  Thought Process:  Goal directed and Linear  Thought Content:  Mood congruent  Suicidal:   passive suicidal ideation  Homicidal:  None  Hallucinations:  None  Delusion:  None  Memory:  Intact  Orientation:  Person, Place, Time, and Situation  Reliability:  good  Insight:  Good  Judgement:  Fair  Impulse Control:  Good  Physical/Medical Issues:  Yes reports low blood pressure          Patient's Support Network Includes:  daughter and friend    Functional Status: " Moderate impairment     Progress toward goal: Not at goal    Prognosis: Good with Ongoing Treatment              Plan     Patient will continue in individual outpatient therapy with focus on improved functioning and coping skills, maintaining stability, and avoiding decompensation and the need for higher level of care.    Patient will adhere to medication regimen as prescribed and report any side effects. Patient will contact this office, call 911 or present to the nearest emergency room should suicidal or homicidal ideations occur. Provide Cognitive Behavioral Therapy and Solution Focused Therapy to improve functioning, maintain stability, and avoid decompensation and the need for higher level of care.     Return in about 2 weeks, or earlier if symptoms worsen or fail to improve.           VISIT DIAGNOSIS:     ICD-10-CM ICD-9-CM   1. Major depressive disorder, recurrent episode, moderate  F33.1 296.32   2. Generalized anxiety disorder  F41.1 300.02   3. Panic attacks  F41.0 300.01   4. Post traumatic stress disorder (PTSD)  F43.10 309.81            This document has been electronically signed by Mely Tate LCSW  January 10, 2024 10:04 EST    Part of this note may be an electronic transcription/translation of spoken language to printed text using the Dragon Dictation System.

## 2024-01-11 ENCOUNTER — OFFICE VISIT (OUTPATIENT)
Dept: ORTHOPEDIC SURGERY | Facility: CLINIC | Age: 59
End: 2024-01-11
Payer: MEDICARE

## 2024-01-11 VITALS
DIASTOLIC BLOOD PRESSURE: 76 MMHG | OXYGEN SATURATION: 98 % | SYSTOLIC BLOOD PRESSURE: 111 MMHG | BODY MASS INDEX: 24.33 KG/M2 | HEART RATE: 80 BPM | WEIGHT: 137.3 LBS | HEIGHT: 63 IN

## 2024-01-11 DIAGNOSIS — M62.838 MUSCLE SPASM: ICD-10-CM

## 2024-01-11 DIAGNOSIS — M25.551 BILATERAL HIP PAIN: Primary | ICD-10-CM

## 2024-01-11 DIAGNOSIS — M70.61 TROCHANTERIC BURSITIS OF RIGHT HIP: ICD-10-CM

## 2024-01-11 DIAGNOSIS — M79.10 MUSCLE PAIN: ICD-10-CM

## 2024-01-11 DIAGNOSIS — M76.891 HIP TENDONITIS, RIGHT: ICD-10-CM

## 2024-01-11 DIAGNOSIS — M25.552 BILATERAL HIP PAIN: Primary | ICD-10-CM

## 2024-01-11 PROCEDURE — 99213 OFFICE O/P EST LOW 20 MIN: CPT | Performed by: FAMILY MEDICINE

## 2024-01-11 NOTE — PROGRESS NOTES
Follow Up Visit      Patient: Zo Palma  YOB: 1965  Date of Encounter: 01/11/2024  PCP: Evan Rivas DO  Referring Provider: No ref. provider found     Subjective   Zo Palma is a 58 y.o. female who presents to the office today for evaluation of Follow-up and Pain of the Right Hip and Follow-up and Pain of the Left Hip      Chief Complaint   Patient presents with    Right Hip - Follow-up, Pain    Left Hip - Follow-up, Pain       HPI  Patient presents for follow-up for hip bursitis.  Left hip continues to do well.  Right hip is doing much better overall although is slightly worse in the last few days possibly due to bad weather.  Complains of 1/10 achy pain in the right hip bursa and lateral thigh  Patient Active Problem List   Diagnosis    Anxiety    Bursitis of hip    Cervical spondylosis with radiculopathy    Chronic migraine without aura without status migrainosus, not intractable    Conversion disorder with attacks or seizures, persistent, with psychological stressor    Cramp of limb    Prediabetes    Diverticulosis of colon    Elevated liver enzymes    Eustachian tube dysfunction    Foot drop    Gastroesophageal reflux disease without esophagitis    Hereditary and idiopathic peripheral neuropathy    Hypersomnia    Internal hemorrhoids    Iron deficiency    Lumbar radiculopathy, chronic    Malaise and fatigue    Metabolic syndrome    Medicare annual wellness visit, subsequent    Encounter for neuropsychological testing    Mixed hyperlipidemia    Moderate episode of recurrent major depressive disorder    Obstructive sleep apnea    Osteoarthritis of right temporomandibular joint    Osteopenia of left lower leg    Panic disorder    PTSD (post-traumatic stress disorder)    RLS (restless legs syndrome)    Primary insomnia    Sinus drainage    Tinnitus    Urge incontinence    Urinary urgency    Vitamin D deficiency    Postmenopausal symptoms    Postmenopausal osteoporosis    Right leg pain  "   Edema of right lower extremity    Suicidal ideations    Recurrent UTI (urinary tract infection)    Acute cystitis without hematuria    Bilateral flank pain    Incomplete emptying of bladder    Osteoarthritis of left AC (acromioclavicular) joint    Tear of left glenoid labrum    Infraspinatus tendon partial tear, left, initial encounter    Supraspinatus tendonitis with fraying, left    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    DDD (degenerative disc disease), lumbar    Chronic midline low back pain with bilateral sciatica       Past Medical History:   Diagnosis Date    Ankle sprain     Anxiety     Arthritis of back     Arthritis of neck     Bursitis of hip     Cervical disc disorder     Depression     Fracture of wrist     Fracture, finger     Fracture, foot     Frozen shoulder     Hip arthrosis     Hormone disorder     Insomnia     Interstitial cystitis     Kidney stone     Lumbosacral disc disease     Migraines     Obesity     Periarthritis of shoulder     Peripheral neuropathy     Rotator cuff syndrome     Scoliosis 11/2023    Seizures 2001    Conversion Disorder    Subluxation of patella     Urinary incontinence     Urinary tract infection        Allergies   Allergen Reactions    Chlorzoxazone Unknown - High Severity and Swelling     Lorzone, muscle relaxant.    Iodine Anaphylaxis     Topical makes her swell  Anaphylaxis with IV contrast (when she was ~ 9yrs old)    Phenazopyridine Hcl Swelling and Anaphylaxis    Pyridium [Phenazopyridine] Anaphylaxis    Quinine Unknown - High Severity     Has malaria symptoms    Valproic Acid Other (See Comments)     Liver failure  Liver failure  Liver failure; lupus like symptoms and liver failure    Methocarbamol Other (See Comments)     Made her feel \"suicidal\" and gave her less control over her actions.    Iodinated Contrast Media Unknown - Low Severity    Acetaminophen-Codeine Itching    Diphenhydramine Anxiety     Pt has severe panic attack symptoms when given " "benadryl IV. Needs to take a benzodiazapine med concurrently when getting benadryl.        Review of Systems   Constitutional:  Negative for activity change and fever.   Respiratory:  Negative for shortness of breath and wheezing.    Cardiovascular:  Negative for chest pain.   Musculoskeletal:  Positive for arthralgias, gait problem and myalgias.   Skin:  Negative for color change and wound.   Neurological:  Negative for weakness and numbness.       Visit Vitals  /76 (BP Location: Left arm, Patient Position: Sitting, Cuff Size: Adult)   Pulse 80   Ht 160 cm (62.99\")   Wt 62.3 kg (137 lb 4.8 oz)   SpO2 98%   BMI 24.33 kg/m²     58 y.o.female  Physical Exam  Vitals and nursing note reviewed.   Constitutional:       General: She is not in acute distress.     Appearance: Normal appearance.   Pulmonary:      Effort: Pulmonary effort is normal. No respiratory distress.   Musculoskeletal:      Lumbar back: No tenderness or bony tenderness.      Right hip: Tenderness present. Normal range of motion. Normal strength.      Comments: Tender right greater trochanter and IT band. No pain on resisted testing   Skin:     General: Skin is warm and dry.      Findings: No erythema.   Neurological:      General: No focal deficit present.      Mental Status: She is alert.      Sensory: No sensory deficit.      Motor: No weakness.         Radiology Results:    No radiology results for the last 30 days.    Assessment & Plan   Diagnoses and all orders for this visit:    1. Bilateral hip pain (Primary)    2. Trochanteric bursitis of right hip    3. Hip tendonitis, right    4. Muscle spasm    5. Muscle pain         MEDS ORDERED DURING VISIT:  No orders of the defined types were placed in this encounter.    MEDICATION ISSUES:  Discussed medication options and treatment plan of prescribed medication as well as the risks, benefits, and side effects including potential falls, possible impaired driving and metabolic adversities among " others. Patient is agreeable to call the office with any worsening of symptoms or onset of side effects. Patient is agreeable to call 911 or go to the nearest ER should he/she begin having SI/HI.     Discussion:  Hip bursitis currently well controlled. Continue PT and home exercise. F/u for further osteopathic evaluation and management in 2-3 weeks and prn.             This document has been electronically signed by Dariel Gustafson DO   January 11, 2024 10:14 EST    Dictated Utilizing Dragon Dictation: Part of this note may be an electronic transcription/translation of spoken language to printed text using the Dragon Dictation System.

## 2024-01-29 ENCOUNTER — OFFICE VISIT (OUTPATIENT)
Dept: FAMILY MEDICINE CLINIC | Facility: CLINIC | Age: 59
End: 2024-01-29
Payer: MEDICARE

## 2024-01-29 ENCOUNTER — HOSPITAL ENCOUNTER (INPATIENT)
Facility: HOSPITAL | Age: 59
LOS: 7 days | Discharge: HOME | DRG: 885 | End: 2024-02-05
Attending: STUDENT IN AN ORGANIZED HEALTH CARE EDUCATION/TRAINING PROGRAM | Admitting: STUDENT IN AN ORGANIZED HEALTH CARE EDUCATION/TRAINING PROGRAM
Payer: MEDICARE

## 2024-01-29 ENCOUNTER — OFFICE VISIT (OUTPATIENT)
Dept: PSYCHIATRY | Facility: CLINIC | Age: 59
End: 2024-01-29
Payer: MEDICARE

## 2024-01-29 ENCOUNTER — HOSPITAL ENCOUNTER (EMERGENCY)
Facility: HOSPITAL | Age: 59
Discharge: PSYCHIATRIC OR SUBSTANCE USE FACILITY | DRG: 885 | End: 2024-01-29
Attending: EMERGENCY MEDICINE | Admitting: EMERGENCY MEDICINE
Payer: MEDICARE

## 2024-01-29 VITALS — HEIGHT: 63 IN | TEMPERATURE: 98.3 F | OXYGEN SATURATION: 97 % | BODY MASS INDEX: 23.04 KG/M2 | WEIGHT: 130 LBS

## 2024-01-29 VITALS
TEMPERATURE: 97.9 F | OXYGEN SATURATION: 99 % | HEIGHT: 63 IN | HEART RATE: 63 BPM | WEIGHT: 140 LBS | DIASTOLIC BLOOD PRESSURE: 61 MMHG | BODY MASS INDEX: 24.8 KG/M2 | SYSTOLIC BLOOD PRESSURE: 132 MMHG | RESPIRATION RATE: 18 BRPM

## 2024-01-29 DIAGNOSIS — I95.1 ORTHOSTASIS: Primary | ICD-10-CM

## 2024-01-29 DIAGNOSIS — Z79.899 HIGH RISK MEDICATION USE: Primary | ICD-10-CM

## 2024-01-29 DIAGNOSIS — F33.2 SEVERE EPISODE OF RECURRENT MAJOR DEPRESSIVE DISORDER, WITHOUT PSYCHOTIC FEATURES: ICD-10-CM

## 2024-01-29 DIAGNOSIS — F33.1 MAJOR DEPRESSIVE DISORDER, RECURRENT EPISODE, MODERATE: Primary | ICD-10-CM

## 2024-01-29 DIAGNOSIS — R45.851 SUICIDAL IDEATIONS: Primary | ICD-10-CM

## 2024-01-29 DIAGNOSIS — R45.851 SUICIDAL IDEATION: ICD-10-CM

## 2024-01-29 DIAGNOSIS — F41.0 PANIC ATTACKS: ICD-10-CM

## 2024-01-29 DIAGNOSIS — R41.81 AGE-RELATED COGNITIVE DECLINE: ICD-10-CM

## 2024-01-29 DIAGNOSIS — F41.1 GENERALIZED ANXIETY DISORDER: ICD-10-CM

## 2024-01-29 DIAGNOSIS — G25.0 ESSENTIAL TREMOR: ICD-10-CM

## 2024-01-29 DIAGNOSIS — F43.10 POST TRAUMATIC STRESS DISORDER (PTSD): ICD-10-CM

## 2024-01-29 LAB
ALBUMIN SERPL-MCNC: 4.5 G/DL (ref 3.5–5.2)
ALBUMIN/GLOB SERPL: 1.5 G/DL
ALP SERPL-CCNC: 85 U/L (ref 39–117)
ALT SERPL W P-5'-P-CCNC: 29 U/L (ref 1–33)
AMPHET+METHAMPHET UR QL: NEGATIVE
AMPHETAMINES UR QL: NEGATIVE
ANION GAP SERPL CALCULATED.3IONS-SCNC: 10.8 MMOL/L (ref 5–15)
APAP SERPL-MCNC: <5 MCG/ML (ref 0–30)
AST SERPL-CCNC: 31 U/L (ref 1–32)
BARBITURATES UR QL SCN: NEGATIVE
BASOPHILS # BLD AUTO: 0.04 10*3/MM3 (ref 0–0.2)
BASOPHILS NFR BLD AUTO: 0.6 % (ref 0–1.5)
BENZODIAZ UR QL SCN: NEGATIVE
BILIRUB SERPL-MCNC: 0.2 MG/DL (ref 0–1.2)
BUN SERPL-MCNC: 15 MG/DL (ref 6–20)
BUN/CREAT SERPL: 20.8 (ref 7–25)
BUPRENORPHINE SERPL-MCNC: NEGATIVE NG/ML
CALCIUM SPEC-SCNC: 9.8 MG/DL (ref 8.6–10.5)
CANNABINOIDS SERPL QL: NEGATIVE
CHLORIDE SERPL-SCNC: 102 MMOL/L (ref 98–107)
CO2 SERPL-SCNC: 27.2 MMOL/L (ref 22–29)
COCAINE UR QL: NEGATIVE
CREAT SERPL-MCNC: 0.72 MG/DL (ref 0.57–1)
DEPRECATED RDW RBC AUTO: 42.2 FL (ref 37–54)
EGFRCR SERPLBLD CKD-EPI 2021: 96.5 ML/MIN/1.73
EOSINOPHIL # BLD AUTO: 0.19 10*3/MM3 (ref 0–0.4)
EOSINOPHIL NFR BLD AUTO: 3 % (ref 0.3–6.2)
ERYTHROCYTE [DISTWIDTH] IN BLOOD BY AUTOMATED COUNT: 13 % (ref 12.3–15.4)
ETHANOL BLD-MCNC: <10 MG/DL (ref 0–10)
ETHANOL UR QL: <0.01 %
FENTANYL UR-MCNC: NEGATIVE NG/ML
GLOBULIN UR ELPH-MCNC: 3 GM/DL
GLUCOSE SERPL-MCNC: 87 MG/DL (ref 65–99)
HCT VFR BLD AUTO: 42.4 % (ref 34–46.6)
HGB BLD-MCNC: 13.9 G/DL (ref 12–15.9)
HOLD SPECIMEN: NORMAL
HOLD SPECIMEN: NORMAL
IMM GRANULOCYTES # BLD AUTO: 0.02 10*3/MM3 (ref 0–0.05)
IMM GRANULOCYTES NFR BLD AUTO: 0.3 % (ref 0–0.5)
LYMPHOCYTES # BLD AUTO: 1.48 10*3/MM3 (ref 0.7–3.1)
LYMPHOCYTES NFR BLD AUTO: 23.7 % (ref 19.6–45.3)
MCH RBC QN AUTO: 29.7 PG (ref 26.6–33)
MCHC RBC AUTO-ENTMCNC: 32.8 G/DL (ref 31.5–35.7)
MCV RBC AUTO: 90.6 FL (ref 79–97)
METHADONE UR QL SCN: NEGATIVE
MONOCYTES # BLD AUTO: 0.54 10*3/MM3 (ref 0.1–0.9)
MONOCYTES NFR BLD AUTO: 8.7 % (ref 5–12)
NEUTROPHILS NFR BLD AUTO: 3.97 10*3/MM3 (ref 1.7–7)
NEUTROPHILS NFR BLD AUTO: 63.7 % (ref 42.7–76)
NRBC BLD AUTO-RTO: 0 /100 WBC (ref 0–0.2)
OPIATES UR QL: NEGATIVE
OXYCODONE UR QL SCN: NEGATIVE
PCP UR QL SCN: NEGATIVE
PLATELET # BLD AUTO: 301 10*3/MM3 (ref 140–450)
PMV BLD AUTO: 9 FL (ref 6–12)
POTASSIUM SERPL-SCNC: 4 MMOL/L (ref 3.5–5.2)
PROT SERPL-MCNC: 7.5 G/DL (ref 6–8.5)
RBC # BLD AUTO: 4.68 10*6/MM3 (ref 3.77–5.28)
SODIUM SERPL-SCNC: 140 MMOL/L (ref 136–145)
TRICYCLICS UR QL SCN: NEGATIVE
WBC NRBC COR # BLD AUTO: 6.24 10*3/MM3 (ref 3.4–10.8)
WHOLE BLOOD HOLD COAG: NORMAL
WHOLE BLOOD HOLD SPECIMEN: NORMAL

## 2024-01-29 PROCEDURE — 93005 ELECTROCARDIOGRAM TRACING: CPT

## 2024-01-29 PROCEDURE — 82306 VITAMIN D 25 HYDROXY: CPT | Performed by: STUDENT IN AN ORGANIZED HEALTH CARE EDUCATION/TRAINING PROGRAM

## 2024-01-29 PROCEDURE — 80053 COMPREHEN METABOLIC PANEL: CPT

## 2024-01-29 PROCEDURE — 80143 DRUG ASSAY ACETAMINOPHEN: CPT

## 2024-01-29 PROCEDURE — 99285 EMERGENCY DEPT VISIT HI MDM: CPT

## 2024-01-29 PROCEDURE — 82077 ASSAY SPEC XCP UR&BREATH IA: CPT

## 2024-01-29 PROCEDURE — 85025 COMPLETE CBC W/AUTO DIFF WBC: CPT

## 2024-01-29 PROCEDURE — 99214 OFFICE O/P EST MOD 30 MIN: CPT | Performed by: FAMILY MEDICINE

## 2024-01-29 PROCEDURE — 82607 VITAMIN B-12: CPT | Performed by: STUDENT IN AN ORGANIZED HEALTH CARE EDUCATION/TRAINING PROGRAM

## 2024-01-29 PROCEDURE — 84443 ASSAY THYROID STIM HORMONE: CPT | Performed by: STUDENT IN AN ORGANIZED HEALTH CARE EDUCATION/TRAINING PROGRAM

## 2024-01-29 PROCEDURE — 80307 DRUG TEST PRSMV CHEM ANLYZR: CPT

## 2024-01-29 PROCEDURE — 36415 COLL VENOUS BLD VENIPUNCTURE: CPT

## 2024-01-29 PROCEDURE — 83036 HEMOGLOBIN GLYCOSYLATED A1C: CPT | Performed by: STUDENT IN AN ORGANIZED HEALTH CARE EDUCATION/TRAINING PROGRAM

## 2024-01-29 PROCEDURE — 3044F HG A1C LEVEL LT 7.0%: CPT | Performed by: FAMILY MEDICINE

## 2024-01-29 PROCEDURE — 90837 PSYTX W PT 60 MINUTES: CPT | Performed by: SOCIAL WORKER

## 2024-01-29 RX ORDER — HYDROXYZINE HYDROCHLORIDE 25 MG/1
25 TABLET, FILM COATED ORAL EVERY 4 HOURS PRN
Status: DISCONTINUED | OUTPATIENT
Start: 2024-01-29 | End: 2024-02-05 | Stop reason: HOSPADM

## 2024-01-29 RX ORDER — BENZTROPINE MESYLATE 1 MG/1
1 TABLET ORAL DAILY PRN
Status: DISCONTINUED | OUTPATIENT
Start: 2024-01-29 | End: 2024-02-05 | Stop reason: HOSPADM

## 2024-01-29 RX ORDER — LOPERAMIDE HYDROCHLORIDE 2 MG/1
2 CAPSULE ORAL
Status: DISCONTINUED | OUTPATIENT
Start: 2024-01-29 | End: 2024-02-05 | Stop reason: HOSPADM

## 2024-01-29 RX ORDER — BENZTROPINE MESYLATE 1 MG/ML
0.5 INJECTION INTRAMUSCULAR; INTRAVENOUS DAILY PRN
Status: DISCONTINUED | OUTPATIENT
Start: 2024-01-29 | End: 2024-02-05 | Stop reason: HOSPADM

## 2024-01-29 RX ORDER — TRAZODONE HYDROCHLORIDE 50 MG/1
100 TABLET ORAL NIGHTLY PRN
Status: DISCONTINUED | OUTPATIENT
Start: 2024-01-29 | End: 2024-02-05 | Stop reason: HOSPADM

## 2024-01-29 RX ORDER — MIDODRINE HYDROCHLORIDE 5 MG/1
5 TABLET ORAL 2 TIMES DAILY
Qty: 60 TABLET | Refills: 2 | Status: ON HOLD | OUTPATIENT
Start: 2024-01-29

## 2024-01-29 RX ORDER — ALUMINA, MAGNESIA, AND SIMETHICONE 2400; 2400; 240 MG/30ML; MG/30ML; MG/30ML
15 SUSPENSION ORAL EVERY 6 HOURS PRN
Status: DISCONTINUED | OUTPATIENT
Start: 2024-01-29 | End: 2024-02-05 | Stop reason: HOSPADM

## 2024-01-29 NOTE — PROGRESS NOTES
"                      Established Patient        Chief Complaint:   Chief Complaint   Patient presents with    Hypotension problems        oZ Palma is a 59 y.o. female    History of Present Illness:   Answers submitted by the patient for this visit:  Other (Submitted on 1/27/2024)  Please describe your symptoms.: Low blood pressure, dizziness  Have you had these symptoms before?: Yes  How long have you been having these symptoms?: Greater than 2 weeks  Please list any medications you are currently taking for this condition.: N/A  Please describe any probable cause for these symptoms. : Orthostatic hypotension  Primary Reason for Visit (Submitted on 1/27/2024)  What is the primary reason for your visit?: Other      Difficulty with orthostasis, long-standing; becoming predictable and unavoidable.    Also experiencing difficulty with memory issues.    Occasional attention tremors.  Subjective     The following portions of the patient's history were reviewed and updated as appropriate: allergies, current medications, past family history, past medical history, past social history, past surgical history and problem list.    Allergies   Allergen Reactions    Chlorzoxazone Unknown - High Severity and Swelling     Lorzone, muscle relaxant.    Iodine Anaphylaxis     Topical makes her swell  Anaphylaxis with IV contrast (when she was ~ 9yrs old)    Phenazopyridine Hcl Swelling and Anaphylaxis    Pyridium [Phenazopyridine] Anaphylaxis    Quinine Unknown - High Severity     Has malaria symptoms    Valproic Acid Other (See Comments)     Liver failure  Liver failure  Liver failure; lupus like symptoms and liver failure    Methocarbamol Other (See Comments)     Made her feel \"suicidal\" and gave her less control over her actions.    Iodinated Contrast Media Unknown - Low Severity    Codeine Itching    Diphenhydramine Anxiety     Pt has severe panic attack symptoms when given benadryl IV. Needs to take a benzodiazapine med " "concurrently when getting benadryl.        Review of Systems  Constitutional: Negative for fever. Negative for chills, diaphoresis, fatigue and unexpected weight change.   HENT: No dysphagia; no changes to vision/hearing/smell/taste; no epistaxis  Eyes: Negative for redness and visual disturbance.   Respiratory: negative for shortness of breath. Negative for chest pain . Negative for cough and chest tightness.   Cardiovascular: Negative for chest pain and palpitations.   Gastrointestinal: Negative for abdominal distention, abdominal pain and blood in stool.   Endocrine: Negative for cold intolerance and heat intolerance.   Genitourinary: Negative for difficulty urinating, dysuria and frequency.   Musculoskeletal: Negative for arthralgias, back pain and myalgias.   Skin: Negative for color change, rash and wound.   Neurological: Positional vertigo as per above, with weakness and headaches.   Hematological: Negative for adenopathy. Does not bruise/bleed easily.   Psychiatric/Behavioral: Negative for confusion. The patient is not nervous/anxious.  She does report some short-term memory issues, more noticeable than usual.    Objective     Physical Exam   Vital Signs: Temp 98.3 °F (36.8 °C)   Ht 160 cm (62.99\")   Wt 59 kg (130 lb)   SpO2 97%   BMI 23.04 kg/m²   BMI is within normal parameters. No other follow-up for BMI required.    General Appearance: alert, oriented x 3, no acute distress.  Skin: warm and dry.   HEENT: Atraumatic.  pupils round and reactive to light and accommodation, oral mucosa pink and moist.  Nares patent without epistaxis.  External auditory canals are patent tympanic membranes intact.  Neck: supple, no JVD, trachea midline.  No thyromegaly  Lungs: CTA, unlabored breathing effort.  Heart: RRR, normal S1 and S2, no S3, no rub.  Abdomen: soft, non-tender, no palpable bladder, present bowel sounds to auscultation ×4.  No guarding or rigidity.  Extremities: no clubbing, cyanosis or edema.  Good " range of motion actively and passively.  Symmetric muscle strength and development  Neuro: normal speech and mental status.  Cranial nerves II through XII intact.  No anosmia. DTR 2+; proprioception intact.  No focal motor/sensory deficits.    Advance Care Planning   ACP discussion was held with the patient during this visit. Patient has an advance directive in EMR which is still valid.        Assessment and Plan      Assessment/Plan:   Diagnoses and all orders for this visit:    1. Orthostasis (Primary)  -     midodrine (PROAMATINE) 5 MG tablet; Take 1 tablet by mouth 2 (Two) Times a Day.  Dispense: 60 tablet; Refill: 2    2. Essential tremor    3. Age-related cognitive decline      Start trial of midrodrine; will follow clinically for improvement to tremors/memory also.    Consider MRI/MRA brain if needed, as well as potential of medication.    I have stressed the importance of adequate hydration, as well as avoidance of excessive caffeine intake.  Continue balanced dietary intake.  Avoid prolonged fasting periods as best able.      Discussion Summary:    Discussed plan of care in detail with pt today; pt verb understanding and agrees.    I spent 30 minutes caring for Zo on this date of service. This time includes time spent by me in the following activities:preparing for the visit, performing a medically appropriate examination and/or evaluation , counseling and educating the patient/family/caregiver, ordering medications, tests, or procedures, documenting information in the medical record, and care coordination    I have reviewed and updated all copied forward information, as appropriate.  I attest to the accuracy and relevance of any unchanged information.    Follow up:  Return in about 3 weeks (around 2/19/2024) for Recheck, Med Change/New Meds.     There are no Patient Instructions on file for this visit.    Evan Rivas DO  02/11/24  08:06 EST

## 2024-01-29 NOTE — ED PROVIDER NOTES
Subjective   History of Present Illness  Chief Complaint: Suicidal ideations  History of Present Illness: This is a 59-year-old female with a history of anxiety and depression who has been seen multiple times for suicidal ideations presents today as requested by our outpatient referral center for complaints of suicidal ideations and attempt 2 days ago, says she took 32 of her home meclizine and attempt to kill herself.  Has had no attempt today, says she is always suicidal but does not have a plan at this time.    Nurses Notes reviewed and agree, including vitals, allergies, social history and prior medical history.    REVIEW OF SYSTEMS: All systems reviewed and not pertinent unless noted.    Positive for: Suicidal ideation with recent attempt    Negative for: All other review of systems reviewed negative unspecified  Review of Systems    Past Medical History:   Diagnosis Date    Ankle sprain     Anxiety     Arthritis of back     Arthritis of neck     Bursitis of hip     Cervical disc disorder     Depression     Fracture of wrist     Fracture, finger     Fracture, foot     Frozen shoulder     Hip arthrosis     Hormone disorder     Insomnia     Interstitial cystitis     Kidney stone     Lumbosacral disc disease     Migraines     Obesity     Periarthritis of shoulder     Peripheral neuropathy     Rotator cuff syndrome     Scoliosis 11/2023    Seizures 2001    Conversion Disorder    Subluxation of patella     Urinary incontinence     Urinary tract infection        Allergies   Allergen Reactions    Chlorzoxazone Unknown - High Severity and Swelling     Lorzone, muscle relaxant.    Iodine Anaphylaxis     Topical makes her swell  Anaphylaxis with IV contrast (when she was ~ 9yrs old)    Phenazopyridine Hcl Swelling and Anaphylaxis    Pyridium [Phenazopyridine] Anaphylaxis    Quinine Unknown - High Severity     Has malaria symptoms    Valproic Acid Other (See Comments)     Liver failure  Liver failure  Liver failure; lupus  "like symptoms and liver failure    Methocarbamol Other (See Comments)     Made her feel \"suicidal\" and gave her less control over her actions.    Iodinated Contrast Media Unknown - Low Severity    Acetaminophen-Codeine Itching    Diphenhydramine Anxiety     Pt has severe panic attack symptoms when given benadryl IV. Needs to take a benzodiazapine med concurrently when getting benadryl.        Past Surgical History:   Procedure Laterality Date    BARIATRIC SURGERY  2008    Gastric sleeve    BLADDER SURGERY      BREAST SURGERY      CHOLECYSTECTOMY  2010    COLONOSCOPY      CYSTOSCOPY      FOOT SURGERY      FRACTURE SURGERY      Left wrist    GASTRIC SLEEVE LAPAROSCOPIC N/A     HYSTERECTOMY      NECK SURGERY      OOPHORECTOMY      SINUS SURGERY  2021    WISDOM TOOTH EXTRACTION N/A     WRIST SURGERY         Family History   Problem Relation Age of Onset    Cancer Mother         Brain, suspected uterine    Rheumatologic disease Mother     Cancer Father         Prostate    Learning disabilities Father         Dyslexia    Other Father         Early onset alzheimer    Prostate cancer Father     Other Paternal Grandfather         Early onset alzheimer    Cancer Brother         Prostate, bone    Learning disabilities Brother         Dyslexia    Other Brother         Alzheimer    Prostate cancer Maternal Grandfather        Social History     Socioeconomic History    Marital status:    Tobacco Use    Smoking status: Never     Passive exposure: Past    Smokeless tobacco: Never   Vaping Use    Vaping Use: Never used   Substance and Sexual Activity    Alcohol use: Yes     Alcohol/week: 1.0 standard drink of alcohol     Types: 1 Drinks containing 0.5 oz of alcohol per week     Comment: Occasionally    Drug use: Never    Sexual activity: Not Currently     Partners: Male     Birth control/protection: Post-menopausal, Hysterectomy           Objective   Physical Exam  Constitutional:       Appearance: Normal appearance. She is " normal weight.   HENT:      Head: Normocephalic and atraumatic.   Eyes:      Extraocular Movements: Extraocular movements intact.   Cardiovascular:      Rate and Rhythm: Normal rate and regular rhythm.      Pulses: Normal pulses.      Heart sounds: Normal heart sounds.   Pulmonary:      Effort: Pulmonary effort is normal.      Breath sounds: Normal breath sounds.   Abdominal:      General: Abdomen is flat. Bowel sounds are normal.      Palpations: Abdomen is soft.   Musculoskeletal:         General: Normal range of motion.      Cervical back: Neck supple.   Skin:     General: Skin is warm and dry.      Capillary Refill: Capillary refill takes less than 2 seconds.   Neurological:      General: No focal deficit present.      Mental Status: She is alert and oriented to person, place, and time.   Psychiatric:         Attention and Perception: Attention and perception normal.         Mood and Affect: Mood and affect normal.         Speech: Speech normal.         Behavior: Behavior normal. Behavior is cooperative.         Thought Content: Thought content includes suicidal ideation. Thought content does not include homicidal ideation. Thought content includes suicidal plan. Thought content does not include homicidal plan.         Cognition and Memory: Cognition and memory normal.         Judgment: Judgment normal.         Procedures           ED Course  ED Course as of 01/29/24 1956 Mon Jan 29, 2024   7437 EKG interpreted by me: Sinus rhythm, normal rate, no acute ST/T changes, this is a normal EKG [MP]      ED Course User Index  [MP] Livan Carrizales MD                                             Medical Decision Making  Initial impression of presenting illness: 59-year-old female presents today with suicidal thoughts, did have an attempt 2 days ago she took 32 of her home meclizine to try to kill herself.    DDX includes but is not limited to suicidal thoughts and attempt    Patient arrives hemodynamically  stable, afebrile without respiratory stress with vitals interpreted by me. Pertinent features from physical exam: Gross unremarkable.    Initial diagnostic plan: Psychiatric workup and psychiatric interview with arrival healthy    Results from initial plan were reviewed and interpreted by me revealing laboratory workup grossly unremarkable.    Diagnostic information from other sources: None    Interventions / Re-evaluation: None    Results/clinical rationale were discussed with the patient    Consultations/Discussion of results with other physicians: Patient has spoken with our behavioral health team, awaiting admission versus discharge instructions from our behavioral team.    Dr. Alves has accepted the patient patient be going to our behavioral health unit.    Problems Addressed:  Suicidal ideations: acute illness or injury        Final diagnoses:   Suicidal ideations       ED Disposition  ED Disposition       ED Disposition   DC/Transfer to Behavioral Health    Condition   Stable    Comment   --               No follow-up provider specified.       Medication List      No changes were made to your prescriptions during this visit.            Pranay Dunbar, PA  01/29/24 1956
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
## 2024-01-29 NOTE — PROGRESS NOTES
Date: 01/29/2024   Time In: 1617  Time Out: 1724    PROGRESS NOTE  Data:  Zo Palma is a 59 y.o. female who presents today for individual therapy session at [facility]. Chief Complaint: depression, anxiety and PTSD    Patient reports worsening depression. She reports difficulty with hygiene. She reports thoughts of suicide. She reports always having plans but says she does not intend to act on them because she has a doctor's appointment tomorrow. Patient disclosed taking more hydroxyzine than was prescribed, in an attempt to overdose a few days ago.  Patient does not have her service dog with her at the appointment today.      Clinical Maneuvering/Intervention:    Assisted patient in processing above session content; acknowledged and normalized patient’s thoughts, feelings, and concerns.  Rationalized patient thought process regarding depression and recent stressors.  Discussed triggers associated with patient's depression.  Patient identified financial stressors as the contributor to her worsening depression. Patient agreed to continued safety planning with roommate and roommate has agreed to  patient after this session. However, patient is unsure if she can keep herself safe. Patient reports she has had many plans in the past and has recently been hospitalized in November. Patient agreed to assessment in the emergency room.    Allowed patient to freely discuss issues without interruption or judgment. Provided safe, confidential environment to facilitate the development of positive therapeutic relationship and encourage open, honest communication. Assisted patient in identifying risk factors which would indicate the need for higher level of care including thoughts to harm self or others and/or self-harming behavior and encouraged patient to contact this office, call 911, or present to the nearest emergency room should any of these events occur. Discussed crisis intervention services  and means to access. Patient adamantly and convincingly denies current suicidal or homicidal ideation or perceptual disturbance.    Assessment   Patient appears to maintain relative stability as compared to their baseline.  However, patient continues to struggle with depression and anxiety which continues to cause impairment in important areas of functioning.  As a result, they can be reasonably expected to continue to benefit from treatment and would likely be at increased risk for decompensation otherwise.    Mental Status Exam:   Hygiene:   fair  Cooperation:  Cooperative  Eye Contact:  Good  Psychomotor Behavior:  Appropriate  Affect:  Appropriate  Mood: depressed  Speech:  Normal  Thought Process:  Linear  Thought Content:  Mood congruent  Suicidal:  Suicidal Ideation  Homicidal:  None  Hallucinations:  None  Delusion:  None  Memory:  Intact  Orientation:  Person, Place, Time, and Situation  Reliability:  good  Insight:  Fair  Judgement:  Poor  Impulse Control:  Poor  Physical/Medical Issues:  Yes reports low blood pressure      Patient's Support Network Includes:  daughter and friend    Functional Status: Moderate impairment     Progress toward goal: Not at goal    Prognosis: Good with Ongoing Treatment          Plan     VISIT DIAGNOSIS:     ICD-10-CM ICD-9-CM   1. Major depressive disorder, recurrent episode, moderate  F33.1 296.32   2. Generalized anxiety disorder  F41.1 300.02   3. Panic attacks  F41.0 300.01   4. Post traumatic stress disorder (PTSD)  F43.10 309.81        Patient will continue in individual outpatient therapy with focus on improved functioning and coping skills, maintaining stability, and avoiding decompensation and the need for higher level of care.    Patient will adhere to medication regimen as prescribed and report any side effects. Patient will contact this office, call 911 or present to the nearest emergency room should suicidal or homicidal ideations occur. Provide Cognitive  Behavioral Therapy and Solution Focused Therapy to improve functioning, maintain stability, and avoid decompensation and the need for higher level of care.     Return in about Return in about 2 weeks (around 2/12/2024). or earlier if symptoms worsen or fail to improve.            This document has been electronically signed by Mely Tate LCSW  January 29, 2024 16:12 EST      Part of this note may be an electronic transcription/translation of spoken language to printed text using the Dragon Dictation System.

## 2024-01-30 PROBLEM — K59.00 CONSTIPATION: Status: ACTIVE | Noted: 2024-01-30

## 2024-01-30 PROBLEM — F33.2 SEVERE EPISODE OF RECURRENT MAJOR DEPRESSIVE DISORDER, WITHOUT PSYCHOTIC FEATURES: Status: ACTIVE | Noted: 2024-01-30

## 2024-01-30 PROCEDURE — 99223 1ST HOSP IP/OBS HIGH 75: CPT | Performed by: STUDENT IN AN ORGANIZED HEALTH CARE EDUCATION/TRAINING PROGRAM

## 2024-01-30 RX ORDER — MELATONIN
1000 DAILY
Status: DISCONTINUED | OUTPATIENT
Start: 2024-01-31 | End: 2024-02-05 | Stop reason: HOSPADM

## 2024-01-30 RX ORDER — MELATONIN
1000 WEEKLY
Status: DISCONTINUED | OUTPATIENT
Start: 2024-01-30 | End: 2024-01-30

## 2024-01-30 RX ORDER — NITROFURANTOIN 25; 75 MG/1; MG/1
100 CAPSULE ORAL EVERY 12 HOURS SCHEDULED
Status: DISCONTINUED | OUTPATIENT
Start: 2024-01-30 | End: 2024-02-05 | Stop reason: HOSPADM

## 2024-01-30 RX ORDER — PRAZOSIN HYDROCHLORIDE 1 MG/1
1 CAPSULE ORAL NIGHTLY
Status: DISCONTINUED | OUTPATIENT
Start: 2024-01-30 | End: 2024-01-31

## 2024-01-30 RX ORDER — PRAMIPEXOLE DIHYDROCHLORIDE 0.25 MG/1
0.12 TABLET ORAL NIGHTLY
Status: DISCONTINUED | OUTPATIENT
Start: 2024-01-30 | End: 2024-02-01

## 2024-01-30 RX ORDER — MULTIPLE VITAMINS W/ MINERALS TAB 9MG-400MCG
1 TAB ORAL DAILY
Status: DISCONTINUED | OUTPATIENT
Start: 2024-01-30 | End: 2024-02-05 | Stop reason: HOSPADM

## 2024-01-30 RX ORDER — DEXTROSE MONOHYDRATE 25 G/50ML
25 INJECTION, SOLUTION INTRAVENOUS
Status: DISCONTINUED | OUTPATIENT
Start: 2024-01-30 | End: 2024-02-03

## 2024-01-30 RX ORDER — ESTRADIOL 0.1 MG/G
2 CREAM VAGINAL 3 TIMES WEEKLY
Status: DISCONTINUED | OUTPATIENT
Start: 2024-01-31 | End: 2024-01-31

## 2024-01-30 RX ORDER — NICOTINE POLACRILEX 4 MG
15 LOZENGE BUCCAL
Status: DISCONTINUED | OUTPATIENT
Start: 2024-01-30 | End: 2024-02-03

## 2024-01-30 RX ORDER — ERGOCALCIFEROL 1.25 MG/1
50000 CAPSULE ORAL WEEKLY
COMMUNITY
End: 2024-02-13 | Stop reason: HOSPADM

## 2024-01-30 RX ORDER — NITROFURANTOIN 25; 75 MG/1; MG/1
100 CAPSULE ORAL EVERY 12 HOURS SCHEDULED
Status: DISCONTINUED | OUTPATIENT
Start: 2024-01-30 | End: 2024-01-30

## 2024-01-30 RX ORDER — IBUPROFEN 600 MG/1
600 TABLET ORAL EVERY 4 HOURS PRN
Status: DISCONTINUED | OUTPATIENT
Start: 2024-01-30 | End: 2024-02-05 | Stop reason: HOSPADM

## 2024-01-30 RX ORDER — MAGNESIUM CARB/ALUMINUM HYDROX 105-160MG
150 TABLET,CHEWABLE ORAL 3 TIMES DAILY
Status: DISCONTINUED | OUTPATIENT
Start: 2024-01-30 | End: 2024-01-31

## 2024-01-30 RX ORDER — FLUOXETINE HYDROCHLORIDE 20 MG/1
40 CAPSULE ORAL DAILY
Status: DISCONTINUED | OUTPATIENT
Start: 2024-01-30 | End: 2024-02-05 | Stop reason: HOSPADM

## 2024-01-30 RX ORDER — INSULIN ASPART 100 [IU]/ML
2-7 INJECTION, SOLUTION INTRAVENOUS; SUBCUTANEOUS
Status: DISCONTINUED | OUTPATIENT
Start: 2024-01-31 | End: 2024-02-03

## 2024-01-30 RX ADMIN — Medication 1 TABLET: at 08:41

## 2024-01-30 RX ADMIN — PRAMIPEXOLE DIHYDROCHLORIDE 0.12 MG: 0.25 TABLET ORAL at 20:26

## 2024-01-30 RX ADMIN — MAGNESIUM CITRATE 150 ML: 1.75 LIQUID ORAL at 20:28

## 2024-01-30 RX ADMIN — NITROFURANTOIN MONOHYDRATE/MACROCRYSTALLINE 100 MG: 25; 75 CAPSULE ORAL at 08:41

## 2024-01-30 RX ADMIN — HYDROXYZINE HYDROCHLORIDE 25 MG: 25 TABLET, FILM COATED ORAL at 13:38

## 2024-01-30 RX ADMIN — NITROFURANTOIN MONOHYDRATE/MACROCRYSTALLINE 100 MG: 25; 75 CAPSULE ORAL at 20:26

## 2024-01-30 RX ADMIN — Medication 1000 UNITS: at 08:41

## 2024-01-30 RX ADMIN — IBUPROFEN 600 MG: 600 TABLET ORAL at 13:46

## 2024-01-30 RX ADMIN — PRAZOSIN HYDROCHLORIDE 1 MG: 1 CAPSULE ORAL at 20:26

## 2024-01-30 RX ADMIN — FLUOXETINE HYDROCHLORIDE 40 MG: 20 CAPSULE ORAL at 08:41

## 2024-01-30 NOTE — SIGNIFICANT NOTE
01/30/24 1718   Group Therapy Session   Group Attendance attended group session   Time Session Began 1030   Time Session Ended 1100   Total Time (minutes) 30   Group Type psychotherapeutic   Group Topic Covered goal setting;problem solving   Literature/Videos Given topic handouts   Literature/Videos Given Comments Identified goals and limiting beliefs that have stopped us from reaching goals. Identified result of believing limiting ideas and processed evidence against limiting beliefs.   Patient Participation/Contribution cooperative with task   Patient Participation Detail Literature provided for patient to complete independently
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
## 2024-01-30 NOTE — PLAN OF CARE
Problem: Adult Behavioral Health Plan of Care  Goal: Plan of Care Review  Outcome: Ongoing, Progressing  Flowsheets (Taken 1/30/2024 1216)  Plan of Care Reviewed With: patient  Patient Agreement with Plan of Care: agrees  Outcome Evaluation: New admit. Completed social history and initial intake.  Goal: Patient-Specific Goal (Individualization)  Outcome: Ongoing, Progressing  Flowsheets  Taken 1/30/2024 1216  Patient-Specific Goals (Include Timeframe): Patient will deny SI/HI/AVH by discharge. Patient will establish individual coping skills and a safety plan for a transition of care. Patient will establish outpatient services for ongoing support post discharge.  Individualized Care Needs: Therapist to offer 1-4 therapy sessions, aftercare, planning, safety planning, family education, group therapy, and brief CBT/MI interventions.  Taken 1/30/2024 1113  Patient Personal Strengths:   expressive of emotions   expressive of needs   independent living skills   no history of violence   positive educational history   resilient  Patient Vulnerabilities:   adverse childhood experience(s)   family/relationship conflict   legal concerns  Goal: Optimized Coping Skills in Response to Life Stressors  Outcome: Ongoing, Progressing  Intervention: Promote Effective Coping Strategies  Flowsheets (Taken 1/30/2024 1216)  Supportive Measures:   active listening utilized   goal-setting facilitated   positive reinforcement provided   relaxation techniques promoted   self-care encouraged   self-reflection promoted   verbalization of feelings encouraged  Goal: Develops/Participates in Therapeutic [place] to Support Successful Transition  Outcome: Ongoing, Progressing  Intervention: Foster Therapeutic [place]  Flowsheets (Taken 1/30/2024 1216)  Trust Relationship/Rapport:   care explained   choices provided   emotional support provided   empathic listening provided   questions answered   questions encouraged   thoughts/feelings  acknowledged   reassurance provided  Intervention: Mutually Develop Transition Plan  Flowsheets (Taken 1/30/2024 1216)  Outpatient/Agency/Support Group Needs:   intensive outpatient services   outpatient counseling   outpatient medication management   outpatient psychiatric care (specify)  Transition Support:   community resources reviewed   crisis management plan promoted   crisis management plan verbalized   follow-up care discussed  Transportation Anticipated: car, drives self  Anticipated Discharge Disposition: home or self-care  Transportation Concerns: none  Current Discharge Risk:   legal concerns   psychiatric illness  Concerns to be Addressed:   coping/stress   financial/insurance   medication   mental health   suicidal  Readmission Within the Last 30 Days: no previous admission in last 30 days  Patient/Family Anticipated Services at Transition:   mental health services   outpatient care  Patient's Choice of Community Agency(s): [facility]  Patient/Family Anticipates Transition to: home   Goal Outcome Evaluation:  Plan of Care Reviewed With: patient  Patient Agreement with Plan of Care: agrees        Outcome Evaluation: New admit. Completed social history and initial intake.

## 2024-01-30 NOTE — PLAN OF CARE
Goal Outcome Evaluation:  Plan of Care Reviewed With: patient  Patient Agreement with Plan of Care: agrees        Outcome Evaluation: endorsing SI but will not divulge plan. Interacting w/ peers and participating in groups. VSS. Anxiety & depression 8/10.

## 2024-01-30 NOTE — NURSING NOTE
1300: Pt sitting in group room for [facility] and notified this nurse that she needed to go lay down for a minute. This nurse noticed Pt's right eye was twitching/spasming very rapidly. This nurse helped Pt to her room and notified Lead RN, Mary and MD.     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 Pt sat on bedside with this nurse present and had many involuntary facial, arm, and leg movements. Pt held conversation with this nurse during the entirety of episode of conversion.     1315: Involuntary movements subsided, Pt laid back on bed. Lead RNMary administered PRN Hydroxyzine 25mg, and PRN Ibuprofen 600mg for c/o of headache. No convulsions or LOC noted by this nurse. Once episode ended, this nurse asked Pt where she is, the date, and birthday. Pt is A&Ox4.     1330: Pt told this nurse she was feeling better and wanted to join the milieu again. Pt remains 1:1 at this time.

## 2024-01-30 NOTE — SIGNIFICANT NOTE
01/30/24 1617   Group Therapy Session   Group Attendance attended group session   Time Session Began 1400   Time Session Ended 1500   Total Time (minutes) 60   Group Type psychoeducation;psychotherapeutic   Group Topic Covered coping skills/lifestyle management;relaxation techniques;self care activities;other (see comments)  (trauma)   Literature/Videos Given topic videos   Literature/Videos Given Comments provided psychoeducation on how trauma is stored in the body and its connection to physical health. reviewed coping skills.   Patient Participation/Contribution cooperative with task;discussed personal experience with topic   Affect During Group affect consistent with mood;appropriate to situation

## 2024-01-30 NOTE — NURSING NOTE
"  Leisure Participation Screen      Pertinent Diagnosis/Presenting Problems    Presenting Problems/Reason for Referral Suicidal Ideations   Lifestyle/Recreational History/Interest    Profession/Job \"I am disabled\"   Current or Previous  Service \"No\"   Current Living Situation \"I have a roommate\"   Transportation Situation \"I have truck but probably for not much longer\"   Current Educational Level \"I have a Masters in Developmental Genetics\"   Support Network \"Friends, roommate, and my dog\"   Recreational History and Interests    How Important is Recreation to You? \", it's a good outlet for people\"   Satisfied With Leisure Lifestyle? \"Yes\"   Participate Regularly in Leisure Activity? \"As much as possible\"   Are You a Social Person? \"Nope\"   Do You Prefer To Be Alone? \"Yes\"   Do You Like New Experiences? \"Yes\"   Current Hobbies/Interests \"Traveling, gardening, watching tv, coloring, and reading\"   Past Hobbies/Interests \"Needlefitting\"   Barriers To Leisure Activity    Barriers to Leisure Participation \"Hand tremors\"   Coping/Wellbeing    Describe Yourself \"Reasonably intelligent, animal person, empathic\"   Personal Strengths \"I am an empath, so I feel others emotions and know how to help\"   How Do You [place] With Life Stressors? \"Petting my dog\"   Recreation Participation    Recreation Inpatient Participation arts/crafts   Objectives of Inpatient Participation Increase motivation and activity level through successful participation  Increase positive attitudes leading to a healthy lesiure lifestyle   Orientation to Unit Programming Patient received explanation of recreation services  Inpatient recreation programming initiated   Was patient able to complete assessment with staff? Yes   Recreation Assessment/Plan    Patient/Family Goal Increase knowledge and utilization of leisure activities as coping skills  Decrease symptom burden through leisure participation  Maintain current cognitive functioning; Increase " awareness of local resources  Decrease isolation  Increase self confidence through successful participation in programming  Practice utilizing appropriate social skills  Improve mood and reduce anxiety   Recreation Participation Goals/Objectives Improve coping skills/strategies   Recreation Plan Structured leisure participation   Referrals to Community Recreation Programs List available on request

## 2024-01-30 NOTE — PLAN OF CARE
Goal Outcome Evaluation:  Plan of Care Reviewed With: patient  Patient Agreement with Plan of Care: agrees     Progress: improving  Outcome Evaluation: Pt admitted to unit.

## 2024-01-30 NOTE — SIGNIFICANT NOTE
01/30/24 1457   Living Environment   People in Home other (see comments)  (roommate)   Current Living Arrangements home;steady   Potentially Unsafe Housing Conditions none   In the past 12 months has the electric, gas, oil, or water company threatened to shut off services in your home? No   Primary Care Provided by self   Provides Primary Care For no one   Family Caregiver if Needed child(gaby), adult   Quality of Family Relationships involved;stressful   Able to Return to Prior Arrangements yes   Transition Planning   Patient/Family Anticipates Transition to home   Patient/Family Anticipated Services at Transition mental health services;community agency   Transportation Anticipated car, drives self;family or friend will provide   Discharge Needs Assessment, BH   Concerns to be Addressed adjustment to diagnosis/illness;financial/insurance;coping/stress;decision-making;home safety;mental health;medication   Outpatient/Agency/Support Group Needs outpatient counseling;outpatient medication management   Patient's Choice of Community Agency(s) [facility] [place]   Anticipated Discharge Disposition another healthcare facility;home or self-care   Current Discharge Risk psychiatric illness;financial support inadequate;lack of support system/caregiver   Discharge Coordination/Progress Unknown of Discharge date and coordination at this time.
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
## 2024-01-31 LAB
25(OH)D3 SERPL-MCNC: 51.5 NG/ML (ref 30–100)
GLUCOSE BLDC GLUCOMTR-MCNC: 110 MG/DL (ref 70–130)
GLUCOSE BLDC GLUCOMTR-MCNC: 115 MG/DL (ref 70–130)
GLUCOSE BLDC GLUCOMTR-MCNC: 145 MG/DL (ref 70–130)
GLUCOSE BLDC GLUCOMTR-MCNC: 90 MG/DL (ref 70–130)
HBA1C MFR BLD: 5.5 % (ref 4.8–5.6)
TSH SERPL DL<=0.05 MIU/L-ACNC: 2.08 UIU/ML (ref 0.27–4.2)
VIT B12 BLD-MCNC: 496 PG/ML (ref 211–946)

## 2024-01-31 PROCEDURE — 99232 SBSQ HOSP IP/OBS MODERATE 35: CPT | Performed by: STUDENT IN AN ORGANIZED HEALTH CARE EDUCATION/TRAINING PROGRAM

## 2024-01-31 PROCEDURE — 82948 REAGENT STRIP/BLOOD GLUCOSE: CPT

## 2024-01-31 RX ORDER — ESTRADIOL 0.1 MG/G
2 CREAM VAGINAL 3 TIMES WEEKLY
Status: DISCONTINUED | OUTPATIENT
Start: 2024-01-31 | End: 2024-02-05 | Stop reason: HOSPADM

## 2024-01-31 RX ORDER — ESTRADIOL 0.1 MG/G
2 CREAM VAGINAL 3 TIMES WEEKLY
Status: DISCONTINUED | OUTPATIENT
Start: 2024-02-02 | End: 2024-01-31

## 2024-01-31 RX ORDER — MAGNESIUM CARB/ALUMINUM HYDROX 105-160MG
150 TABLET,CHEWABLE ORAL 2 TIMES DAILY PRN
Status: DISCONTINUED | OUTPATIENT
Start: 2024-01-31 | End: 2024-02-05 | Stop reason: HOSPADM

## 2024-01-31 RX ORDER — PRAZOSIN HYDROCHLORIDE 1 MG/1
2 CAPSULE ORAL NIGHTLY
Status: DISCONTINUED | OUTPATIENT
Start: 2024-01-31 | End: 2024-02-05 | Stop reason: HOSPADM

## 2024-01-31 RX ADMIN — Medication 1 TABLET: at 08:43

## 2024-01-31 RX ADMIN — MAGNESIUM CITRATE 150 ML: 1.75 LIQUID ORAL at 09:01

## 2024-01-31 RX ADMIN — ESTRADIOL 2 APPLICATOR: 0.1 CREAM VAGINAL at 21:16

## 2024-01-31 RX ADMIN — PRAZOSIN HYDROCHLORIDE 2 MG: 1 CAPSULE ORAL at 20:14

## 2024-01-31 RX ADMIN — NITROFURANTOIN MONOHYDRATE/MACROCRYSTALLINE 100 MG: 25; 75 CAPSULE ORAL at 08:43

## 2024-01-31 RX ADMIN — Medication 1000 UNITS: at 08:43

## 2024-01-31 RX ADMIN — NITROFURANTOIN MONOHYDRATE/MACROCRYSTALLINE 100 MG: 25; 75 CAPSULE ORAL at 20:14

## 2024-01-31 RX ADMIN — FLUOXETINE HYDROCHLORIDE 40 MG: 20 CAPSULE ORAL at 08:43

## 2024-01-31 RX ADMIN — MAGNESIUM CITRATE 150 ML: 1.75 LIQUID ORAL at 16:08

## 2024-01-31 RX ADMIN — PRAMIPEXOLE DIHYDROCHLORIDE 0.12 MG: 0.25 TABLET ORAL at 20:14

## 2024-01-31 RX ADMIN — IBUPROFEN 600 MG: 600 TABLET ORAL at 09:03

## 2024-01-31 NOTE — SIGNIFICANT NOTE
01/31/24 1307   Group Therapy Session   Group Attendance attended group session   Time Session Began 1030   Time Session Ended 1100   Total Time (minutes) 30   Group Type psychotherapeutic   Group Topic Covered coping skills/lifestyle management   Literature/Videos Given topic handouts   Literature/Videos Given Comments Utilized art activity to identify personal strengths, protective factors and coping skills to improve mood and promote relaxation and distraction.   Patient Participation Detail Literature provided for patient to complete indepedently

## 2024-01-31 NOTE — PLAN OF CARE
Goal Outcome Evaluation:  Plan of Care Reviewed With: patient  Patient Agreement with Plan of Care: agrees        Outcome Evaluation: Patient taken off of 1;1. Endorsing SI. VSS. Anxiety/depression 8/8. Continue with plan of care.

## 2024-01-31 NOTE — PLAN OF CARE
"Goal Outcome Evaluation:  Plan of Care Reviewed With: patient  Patient Agreement with Plan of Care: agrees     Progress: no change  Outcome Evaluation: Pt reports SI with a plan however declines to share plan.  Pt does states that \"it could be fun\" and is inappropriately cheerful and smiling when talking about SI.  Pt rates anxiety 7/10 and depression 8/10.                               "

## 2024-01-31 NOTE — SIGNIFICANT NOTE
01/31/24 9836   Group Therapy Session   Group Attendance attended group session   Time Session Began 1400   Time Session Ended 1500   Total Time (minutes) 60   Group Type psychoeducation;psychotherapeutic   Group Topic Covered problem solving;goal setting;other (see comments)  (Values)   Group Session Detail provided psychoeducation on values and how our values assist in setting goals, decision making and inspire behavior change. Explored individual values, where these values originated from, and how they can change over time.   Patient Participation/Contribution cooperative with task;discussed personal experience with topic;listened actively;offered helpful suggestions to peers;organized   Affect During Group appropriate to situation;affect consistent with mood

## 2024-01-31 NOTE — PROGRESS NOTES
SW call [facility] ([facility]) regarding ECT coverage.    [facility] RN is out of the office for the week. Staff can call [facility] (insurance) to receive information on coverage for ECT. PA may or may not be required for ECT.

## 2024-01-31 NOTE — THERAPY TREATMENT NOTE
"DATA:    Therapist met with the patient individually. Therapist continues reviewing plan of care and aftercare plan.  The patient was agreeable.    ASSESSMENT:    Patient was seen for follow up of SI and depression.      Today, patient was seen 1-1 in office. The patient's mood appears appropriate to circumstances, affect congruent, thought content normal. Patient reports sleep is Poor due to noise on the unit and appetite is Good. On scale 1-10, patient rates depression 8 and anxiety 9. Patient denies hallucinations and states \"it would be lucie fun though.\" Patient denies HI. Patient reports SI is \"always there.\" Patient remains inappropriately cheerful while discussing SI. Patient continues to endorse death wish stating \"always, that hasn't changed either.\" When exploring plan/intent to hurt herself patient states, \"no matter where I am, I always have a plan.\" Patient expressed happiness her 1:1 sitter was removed and reports having plan to hurt herself while on the unit but does not disclose plan. Patient states, \"If given the opportunity, yes, I would act on it.\" Treatment notified of patient's reports. Patient states she attempted to contact her daughter last night on the phone but \"she didn't want to talk to me.\" Patient reports feeling hopeful about future treatment options.          CLINICAL MANEUVERING:    Therapist provided safe, secure environment for patient to share.  Provided reflective listening and psychoeducation.  Assisted patient in processing the above session. Assisted patient in identifying any questions or needs to be addressed today.  Assisted in identifying, challenging, and changing bias thoughts. Assisted patient in identifying positive and hopeful things in her life at the present time.       Plan:     Patient to remain hospitalized this date.      Treatment team will focus efforts on stabilizing patient's acute symptoms while providing education on healthy coping and crisis management " to reduce hospitalizations.   Patient requires daily psychiatrist evaluation and 24/7 nursing supervision to promote patient  safety.     Therapist will offer 1-4 individual sessions, family education, and appropriate referral.     Therapist recommends continued assessment. Patient to follow up with established care at [facility] at discharge.

## 2024-01-31 NOTE — PROGRESS NOTES
"    Inpatient Psych Progress Note     Clinician: Macy Ugalde MD  Admission Date: 1/29/2024  13:26 EST 01/31/24    Behavioral Health Treatment Plan and Problem List: I have reviewed and approved the Behavioral Health Treatment Plan and Problem list.    Allergies  Allergies   Allergen Reactions    Chlorzoxazone Unknown - High Severity and Swelling     Lorzone, muscle relaxant.    Iodine Anaphylaxis     Topical makes her swell  Anaphylaxis with IV contrast (when she was ~ 9yrs old)    Phenazopyridine Hcl Swelling and Anaphylaxis    Pyridium [Phenazopyridine] Anaphylaxis    Quinine Unknown - High Severity     Has malaria symptoms    Valproic Acid Other (See Comments)     Liver failure  Liver failure  Liver failure; lupus like symptoms and liver failure    Methocarbamol Other (See Comments)     Made her feel \"suicidal\" and gave her less control over her actions.    Iodinated Contrast Media Unknown - Low Severity    Codeine Itching    Diphenhydramine Anxiety     Pt has severe panic attack symptoms when given benadryl IV. Needs to take a benzodiazapine med concurrently when getting benadryl.        Hospital Day: 2 days      Assessment completed within view of staff    History  CC/clinical focus: SI, depression    Interval HPI: Patient seen and evaluated by me.  Chart reviewed. Discussed with treatment team and unit staff. No major issues overnight. VSS. Med compliant. Pt received PRN Atarax and ibuprofen over the past 24h. No other PRNs required. Pt has been participating in therapeutic activities without issue. Interactions with staff and peers have been appropriate. Overnight, pt reported to staff that she has SI but declined to share a plan, and was noted to be inappropriately cheerful when talking about SI. Sitter was discontinued this morning as pt maintained safety throughout the night and had taken no actions to harm herself. On interview today, pt presents similarly to yesterday, inappropriately bright and " "cheerful while discussing distressing topics. She spoke with her daughter briefly last night but daughter was upset that pt had called later than expected and declined to hear pt's request. Pt reports that she felt extremely disappointed and hopeless after this. She says that when sitter was discontinued earlier today, her first thought was \"yay, now I can kill myself... isn't that awful\". Pt confirms that although she would still \"love\" to die, maintains that she has a plan available to her on the unit, but will not disclose details. She acknowledges that it is unlikely to be successful anyway as our unit appears to be extremely safe. She now has some hope that things can get better for her, and this hope is currently preventing her from acting on her suicidal thoughts on the unit. When asked what had changed since yesterday to cause this improvement in hopelessness, pt says that it is because she has already noticed some small improvements with her initial medication adjustments and was feeling more optimistic after discussing a wide variety of future treatment options with me yesterday. She reports that her baseline tremor and RLS had already improved a lot with Mirapex, and she can see herself being able to enjoy activities like writing and needle felting again in the future if tremor continues to improve. Sleep was a little better than usual last night, but she did still wake up very easily due to noise on the unit. She feels that her startle response is improving, and she is feeling a little calmer today. Pt denies any adverse effects from her current regimen, and requests to continue titration as recommended. She spoke with [place] last night about ECT, and both agree that she should pursue this option if Spravato cannot be covered. Pt reports that she has had a BM and feels relieved of bloating. Fine BLE tremor is present but very mild. ROS otherwise as below.    Interval Review of Systems:   General ROS: " "negative for - fever or malaise  Endocrine ROS: negative for - palpitations  Respiratory ROS: no cough, shortness of breath, or wheezing  Cardiovascular ROS: no chest pain or dyspnea on exertion  Gastrointestinal ROS: no abdominal pain, no black or bloody stools    /80   Pulse 83 Comment: Simultaneous filing. User may be unaware of other data.  Temp 97.2 °F (36.2 °C) (Oral) Comment: Simultaneous filing. User may be unaware of other data. Comment (Src): Simultaneous filing. User may be unaware of other data.  Resp 16 Comment: Simultaneous filing. User may be unaware of other data.  Ht 160.8 cm (63.3\")   Wt 62.8 kg (138 lb 6.4 oz)   SpO2 97%   BMI 24.28 kg/m²     Mental Status Exam  Behavior: Cooperative, pleasant, good eye contact  Psychomotor activity: Calm  Orientation: x4  Mood: depressed, frustrated  Affect: mood incongruent, inappropriately bright and full  Thought Process: linear, organized, goal-directed  Thought Content: No delusions voiced  Hallucinations: Denies AVH, no RIS observed  Concentration: Fair  Suicidal Ideation: Endorses chronic SI with plan to drive self off of yohan if not in secured environment and endorses current death wish and SI w/ undisclosed plan available to her on unit, but denies current intent to act on plan  Homicidal Ideation: Denies  Hopelessness: Denies today  Insight: Limited, improving  Judgement: Limited, improving      Medical Decision Making:   Labs:     Lab Results (last 24 hours)       Procedure Component Value Units Date/Time    POC Glucose Once [[id number]]  (Normal) Collected: 01/31/24 1207    Specimen: Blood Updated: 01/31/24 1211     Glucose 90 mg/dL      Comment: Serial Number: [id number]:  [id number]       POC Glucose Once [[id number]]  (Normal) Collected: 01/31/24 0833    Specimen: Blood Updated: 01/31/24 0916     Glucose 110 mg/dL      Comment: Serial Number: [id number]:  [id number]       TSH Rfx On Abnormal To Free T4 [[id number]]  (Normal) " Collected: 01/29/24 1800    Specimen: Blood Updated: 01/31/24 0038     TSH 2.080 uIU/mL     Hemoglobin A1c [[id number]]  (Normal) Collected: 01/29/24 1800    Specimen: Blood Updated: 01/31/24 0035     Hemoglobin A1C 5.50 %     Narrative:      Hemoglobin A1C Ranges:    Increased Risk for Diabetes  5.7% to 6.4%  Diabetes                     >= 6.5%  Diabetic Goal                < 7.0%    Vitamin B12 [[id number]] Collected: 01/29/24 1800    Specimen: Blood Updated: 01/31/24 0017    Vitamin D,25-Hydroxy [[id number]] Collected: 01/29/24 1800    Specimen: Blood Updated: 01/31/24 0017              Radiology:     Imaging Results (Last 24 Hours)       ** No results found for the last 24 hours. **              EKG:     ECG/EMG Results (most recent)       None             Medications:  cholecalciferol, 1,000 Units, Oral, Daily  [START ON 2/2/2024] estradiol, 2 g, Vaginal, Once per day on Monday Wednesday Friday  FLUoxetine, 40 mg, Oral, Daily  Insulin Aspart, 2-7 Units, Subcutaneous, 4x Daily AC & at Bedtime  Liraglutide, 1.8 mg, Subcutaneous, QAM  magnesium citrate, 150 mL, Oral, TID  multivitamin with minerals, 1 tablet, Oral, Daily  nitrofurantoin (macrocrystal-monohydrate), 100 mg, Oral, Q12H  pramipexole, 0.125 mg, Oral, Nightly  prazosin, 1 mg, Oral, Nightly           All medications reviewed.    Assessment and Plan:      Major depressive disorder, recurrent, severe without psychotic features  Suicidal ideation with recent attempt by overdose on hydroxyzine  PTSD  Conversion disorder with non-epileptic seizures  R/o Cluster B personality disorder  - Continue hospitalization on the [facility] for safety and stabilization.  - Appropriate precautions ordered; de-escalate from SP1 to SP3 due to pt being able to maintain safety on unit despite recent threats of active plan with intent. Pt continues to endorse undisclosed plan but denies intent to act on plan on the unit.   - Encourage engagement in individual, group, and  family therapies as appropriate.  - Collateral as needed  - Recommended initiation of Spravato for acute suicidality. Discussed with [facility] specialty pharmacy 1/30 - pt previously not approved due to low PHQ-9 score and $400 copay. Pt clearly appropriate for treatment at this time due to severe depression and acute suicidality. Will likely need to pursue as an outpatient as [facility] is not certified to administer Spravato, but will work with outpatient med provider TRACI Khan and specialty pharmacy to facilitate initiation of treatment as soon as pt is discharged. Pharmacy is looking into alternative funding for Spravato, and pt plans to reach out to children again to discuss possibility of financial support to cover at least initial trial of Spravato.   - Continue Prozac 40 mg QD for now. Pt will need to maintain on an antidepressant to pursue Spravato augmentation. Pt has long history of medication trail failures due to ineffectiveness and/or adverse side effects, and will likely benefit from [employer] testing on an outpatient basis.   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 Already noticing improvement in tremors and RLS. Plan to titrate to 0.125 mg BID on 2/2 if pt continues to tolerate well. Pt will require gradual titration over the next several weeks to reach target dose of at least 1 mg daily. Plan to titrate dose by 0.125 mg every 3-5 days as tolerated.   - Increase prazosin from 1 to 2 mg QHS for trauma-related hyperarousal. Pt advised of potential for exacerbation of orthostatic hypotension, but she has not been symptomatic since admission and is not currently requiring midodrine for maintenance of adequate BP. Will monitor closely and consider cautious titration to effect. Will likely benefit from addition of daytime dose if able to tolerate it.  - Pt is at extremely high risk for completed suicide should she discharge without  significant improvement in suicidality and mood. She is open to considering transfer to another facility for initiation of ECT if Spravato cannot be pursued. Anticipate further discussion with Dr. Osuna at [facility] [place] should we need to pursue this option.      T2DM  - Diabetic diet, Accu-cheks TIDAC, low-dose SSI as needed, hypoglycemic protocol; HbA1c pending  - Plan to continue home Victoza 1.8 mg QAM if pt is able to have home supply brought to [facility].      History of recurrent UTI  - Admission UA WNL; pt currently asymptomatic  - Continue home Macrobid 100 mg BID for UTI prophylaxis     Constipation  - Pt reports that she only has BM every 2-3 weeks. Had BM today.  - Continue magnesium citrate 150 mg TID for constipation. May also help with anxiety. Plan to switch to PRN use once pt having regular Bms.      RLS/Essential tremor  - Starting Mirapex for depression, and RLS/tremors already appear to already be improving after initiation     History of orthostatic hypotension  - Holding home midodrine 5 mg BID; pt had not yet started taking, pt asymptomatic since admission  - Continue to monitor, especially considering initiation of prazosin. If pt becomes symptomatic, will hold off on titration of prazosin and consider starting midodrine.      History of Vit D deficiency  - Continue cholecalciferol QD  - Vit D level WNL on admission     Post-menopausal osteoporosis  - Takes ibandronate 150 mg monthly, due for next dose on 2/1. Unavailable on formulary. Will restart if pt is able to obtain home supply.      Hot flashes  - Continue home estradiol cream three times weekly    New lab results reviewed: HbA1c 5.5; TSH, Vit B12, 25-OH Vit D WNL    Continue hospitalization for safety and stabilization.  Continue current level of Special Precautions (q15 minute checks). Anticipated total length of stay 6-8 days.      This document has been electronically signed by Macy Ugalde MD.  January 31, 2024 13:26 EST

## 2024-02-01 DIAGNOSIS — Z13.820 SCREENING FOR OSTEOPOROSIS: ICD-10-CM

## 2024-02-01 DIAGNOSIS — Z12.31 ENCOUNTER FOR SCREENING MAMMOGRAM FOR MALIGNANT NEOPLASM OF BREAST: Primary | ICD-10-CM

## 2024-02-01 DIAGNOSIS — Z78.0 POSTMENOPAUSAL: ICD-10-CM

## 2024-02-01 LAB
GLUCOSE BLDC GLUCOMTR-MCNC: 100 MG/DL (ref 70–130)
GLUCOSE BLDC GLUCOMTR-MCNC: 119 MG/DL (ref 70–130)
GLUCOSE BLDC GLUCOMTR-MCNC: 86 MG/DL (ref 70–130)
GLUCOSE BLDC GLUCOMTR-MCNC: 88 MG/DL (ref 70–130)

## 2024-02-01 PROCEDURE — 82948 REAGENT STRIP/BLOOD GLUCOSE: CPT | Performed by: STUDENT IN AN ORGANIZED HEALTH CARE EDUCATION/TRAINING PROGRAM

## 2024-02-01 PROCEDURE — 99233 SBSQ HOSP IP/OBS HIGH 50: CPT | Performed by: STUDENT IN AN ORGANIZED HEALTH CARE EDUCATION/TRAINING PROGRAM

## 2024-02-01 PROCEDURE — 82948 REAGENT STRIP/BLOOD GLUCOSE: CPT

## 2024-02-01 RX ORDER — IBANDRONATE SODIUM 150 MG/1
150 TABLET, FILM COATED ORAL ONCE
Qty: 1 TABLET | Refills: 0 | Status: COMPLETED | OUTPATIENT
Start: 2024-02-02 | End: 2024-02-02

## 2024-02-01 RX ORDER — PRAMIPEXOLE DIHYDROCHLORIDE 0.25 MG/1
0.12 TABLET ORAL 2 TIMES DAILY
Status: DISCONTINUED | OUTPATIENT
Start: 2024-02-02 | End: 2024-02-05 | Stop reason: HOSPADM

## 2024-02-01 RX ADMIN — NITROFURANTOIN MONOHYDRATE/MACROCRYSTALLINE 100 MG: 25; 75 CAPSULE ORAL at 08:31

## 2024-02-01 RX ADMIN — IBUPROFEN 600 MG: 600 TABLET ORAL at 08:36

## 2024-02-01 RX ADMIN — IBUPROFEN 600 MG: 600 TABLET ORAL at 18:12

## 2024-02-01 RX ADMIN — Medication 1000 UNITS: at 08:31

## 2024-02-01 RX ADMIN — HYDROXYZINE HYDROCHLORIDE 25 MG: 25 TABLET, FILM COATED ORAL at 18:16

## 2024-02-01 RX ADMIN — NITROFURANTOIN MONOHYDRATE/MACROCRYSTALLINE 100 MG: 25; 75 CAPSULE ORAL at 20:05

## 2024-02-01 RX ADMIN — Medication 1 TABLET: at 08:31

## 2024-02-01 RX ADMIN — PRAZOSIN HYDROCHLORIDE 2 MG: 1 CAPSULE ORAL at 21:36

## 2024-02-01 RX ADMIN — FLUOXETINE HYDROCHLORIDE 40 MG: 20 CAPSULE ORAL at 08:31

## 2024-02-01 NOTE — SIGNIFICANT NOTE
02/01/24 1543   Group Therapy Session   Group Attendance attended group session   Time Session Began 1400   Time Session Ended 1445   Total Time (minutes) 45   Group Type psychotherapeutic   Group Topic Covered mindfulness   Literature/Videos Given topic videos   Literature/Videos Given Comments Provided psychoeducation and identified benefits of progressive muscle relaxation. Explored where tension is being held in the body. Completed guided PMR activity.   Patient Participation/Contribution cooperative with task   Affect During Group irritable

## 2024-02-01 NOTE — PLAN OF CARE
"Goal Outcome Evaluation:  Plan of Care Reviewed With: patient  Patient Agreement with Plan of Care: agrees     Progress: no change  Outcome Evaluation: Pt happily endorsing SI, stating that she is \"always suicidal\". Pt would not disclose any details of specific plan. Pt denies HI and AVH. Pt ranked anxiety as a 7/10 and depression as an 8/10. Pt did not request any PRN medicaitons. Pt recieving glucose monitoring prior to meals and at bedtime. Will continue current plan of care.                               "

## 2024-02-01 NOTE — PLAN OF CARE
"Goal Outcome Evaluation:  Plan of Care Reviewed With: patient  Patient Agreement with Plan of Care: agrees     Progress: improving  Outcome Evaluation: Pt still endorsing SI, refused to divulge plan stating \"I'm not telling you so you can't stop it\". Reports axiety 6 and depression 7.  Affect not congruent with reported mood.Patient has not required any PRN on this shift.                               "

## 2024-02-01 NOTE — SIGNIFICANT NOTE
02/01/24 1100   Group Therapy Session   Group Attendance attended group session   Time Session Began 1000   Time Session Ended 1030   Total Time (minutes) 30   Group Type psychotherapeutic   Group Topic Covered coping skills/lifestyle management;problem solving   Literature/Videos Given topic handouts   Literature/Videos Given Comments Identify fearful thoughts that foster low self-esteem and coping techniques to resist the impact of fear on behavior. Practice using positive self-talk messages.   Patient Participation Detail Patient walked out of group room and did not participate in activity

## 2024-02-01 NOTE — THERAPY TREATMENT NOTE
"DATA:    Therapist met with the patient individually. Therapist continues reviewing plan of care and aftercare plan.  The patient was agreeable.    ASSESSMENT:    Patient was seen for follow up of SI and depression.     Today, patient was seen 1-1 in office. The patient's mood appears neutral, affect congruent, thought content normal. Patient reports sleep is  \"better\"  and appetite is Fair. On scale 1-10, patient rates depression 9 and anxiety 10. Patient denies hallucinations. Patient does not present as cheerful as previous days, reports having a low mood. Patient states, \"I woke up feeling a little different. And being in group constantly, I'm getting burnt out.\" Patient states she was triggered by something in therapist group this morning (replacing fears with positive messages) and had to leave due to feeling a panic attack coming on. Patient states she was able to \"practice my breathing\" which helped her deescalate. Patient is unable to identify what specifically triggered her. Patient reports she was able to contact her son last night to explore payment options for medication. Patient reports \"always\" having a death wish but denies current SI stating \"not at this exact moment.\" When asked about patients plan/intent to harm self on the unit she states \"I always have a plan, no matter where I am.\" When asked about plan/intent to hurt herself outside of the hospital she states, \"it depends on where I'm at when I get done here. I play it by ear.\" Patient denies HI.       CLINICAL MANEUVERING:    Therapist provided safe, secure environment for patient to share.  Provided reflective listening and psychoeducation.  Assisted patient in processing the above session. Assisted patient in identifying any questions or needs to be addressed today. Explored patient success and sources of support and concern that exist in her life.     Therapist allowed patient to call her friend/roommate to confirm his attendance to " visitation today.           Plan:     Patient to remain hospitalized this date.      Treatment team will focus efforts on stabilizing patient's acute symptoms while providing education on healthy coping and crisis management to reduce hospitalizations.   Patient requires daily psychiatrist evaluation and 24/7 nursing supervision to promote patient  safety.     Therapist will offer 1-4 individual sessions, family education, and appropriate referral.     Therapist recommends continued assessment. Patient to follow up with [facility] at discharge.

## 2024-02-01 NOTE — PROGRESS NOTES
"    Inpatient Psych Progress Note     Clinician: Macy Ugalde MD  Admission Date: 1/29/2024  14:46 EST 02/01/24    Behavioral Health Treatment Plan and Problem List: I have reviewed and approved the Behavioral Health Treatment Plan and Problem list.    Allergies  Allergies   Allergen Reactions    Chlorzoxazone Unknown - High Severity and Swelling     Lorzone, muscle relaxant.    Iodine Anaphylaxis     Topical makes her swell  Anaphylaxis with IV contrast (when she was ~ 9yrs old)    Phenazopyridine Hcl Swelling and Anaphylaxis    Pyridium [Phenazopyridine] Anaphylaxis    Quinine Unknown - High Severity     Has malaria symptoms    Valproic Acid Other (See Comments)     Liver failure  Liver failure  Liver failure; lupus like symptoms and liver failure    Methocarbamol Other (See Comments)     Made her feel \"suicidal\" and gave her less control over her actions.    Iodinated Contrast Media Unknown - Low Severity    Codeine Itching    Diphenhydramine Anxiety     Pt has severe panic attack symptoms when given benadryl IV. Needs to take a benzodiazapine med concurrently when getting benadryl.        Hospital Day: 3 days      Assessment completed within view of staff    History  CC/clinical focus: SI, depression    Interval HPI: Patient seen and evaluated by me.  Chart reviewed. Discussed with treatment team and unit staff. No major issues overnight. VSS. Med compliant.  Required ibuprofen x 2 over the past 24 hours.  No other PRNs required. Pt has been participating in therapeutic activities without issue. Interactions with staff and peers have been appropriate.  Per night shift, patient happily endorsed SI, but would not disclose any details of her plan.     On interview today, patient presents much like yesterday-inappropriately bright and cheerful.  She admits that she is feeling more depressed today compared to yesterday due to being triggered by something during the group earlier today. She is unsure of exactly " "what triggered her, but knows that the feeling has stayed with her through the day.  She continues to endorse a death wish and is feeling a little less hopeful today due to lack of finalize treatment plan.  She reports that she reached out to her children again yesterday, and son will be discussing further with his spouse any financial assistance can be provided.  She denies current SI but reports that she has experienced it throughout the majority of her day.  She finally discloses has been to tear the pillowcases and/or sheets into strips, tied the strips together, and strangle herself.  She reports that she has tested the strength of these materials and believes them to be strong enough to end her life.  Patient denies current intent to act on this plan, and states that she will continue to maintain safety on the unit. However, she would \"for sure not come back\" if she was discharged without relief in her depression, confirming that she would end her life.  Pt reports that her startle response has improved remarkably with prazosin, but she felt dizzy soon after administration last night. She would like to continue on same dose as long as it is administered immediately before she goes to bed to limit risk of fall. Patient denies any other adverse effects to her current regimen.  She currently denies SI/HI/AVH.  She reports that she slept a little bit better overnight compared to the day before but states that she keeps waking up with a headache that is responsive to ibuprofen.  She currently endorses a mild headache.  He states that her appetite is poor but she has been forcing herself to eat adequate amounts.  Bilateral hand tremors are a little worse compared to yesterday, and are visible upon rest. ROS otherwise as below.    Interval Review of Systems:   General ROS: negative for - fever or malaise  Endocrine ROS: negative for - palpitations  Respiratory ROS: no cough, shortness of breath, or " "wheezing  Cardiovascular ROS: no chest pain or dyspnea on exertion  Gastrointestinal ROS: no abdominal pain, no black or bloody stools    /66 (BP Location: Left arm, Patient Position: Sitting)   Pulse 71   Temp 98.6 °F (37 °C) (Oral)   Resp 16   Ht 160.8 cm (63.3\")   Wt 62.8 kg (138 lb 6.4 oz)   SpO2 98%   BMI 24.28 kg/m²     Mental Status Exam  Behavior: Cooperative, pleasant, good eye contact  Psychomotor activity: Calm; very mild, fine bilateral hand tremor noted at rest  Orientation: x4  Mood: depressed, anxious  Affect: mood incongruent, inappropriately bright and full  Thought Process: linear, organized, goal-directed  Thought Content: No delusions voiced  Hallucinations: Denies AVH, no RIS observed  Concentration: Fair  Suicidal Ideation: Endorses chronic SI with plan to drive self off of [place]ohan if not in secured environment and endorses current death wish; denies current SI but admits that she has been having SI with plan to strangle self with tied-together strips of pillowcases and/or sheets; denies any intent to act on plan on the unit  Homicidal Ideation: Denies  Hopelessness: Moderate, worsening  Insight: Limited  Judgement: Limited      Medical Decision Making:   Labs:     Lab Results (last 24 hours)       Procedure Component Value Units Date/Time    POC Glucose TID AC [[id number]]  (Normal) Collected: 02/01/24 1143    Specimen: Blood Updated: 02/01/24 1145     Glucose 119 mg/dL      Comment: Serial Number: [id number]:  [id number]       POC Glucose TID AC [[id number]]  (Normal) Collected: 02/01/24 0805    Specimen: Blood Updated: 02/01/24 0811     Glucose 86 mg/dL      Comment: Serial Number: [id number]:  [id number]       POC Glucose Once [[id number]]  (Normal) Collected: 01/31/24 2017    Specimen: Blood Updated: 01/31/24 2033     Glucose 115 mg/dL      Comment: Serial Number: [id number]:  [id number]       POC Glucose Once [[id number]]  (Abnormal) Collected: 01/31/24 1737    " Specimen: Blood Updated: 01/31/24 1907     Glucose 145 mg/dL      Comment: Serial Number: [id number]:  [id number]       Vitamin B12 [[id number]]  (Normal) Collected: 01/29/24 1800    Specimen: Blood Updated: 01/31/24 1844     Vitamin B-12 496 pg/mL     Narrative:      Results may be falsely increased if patient taking Biotin.      Vitamin D,25-Hydroxy [[id number]]  (Normal) Collected: 01/29/24 1800    Specimen: Blood Updated: 01/31/24 1844     25 Hydroxy, Vitamin D 51.5 ng/ml     Narrative:      Reference Range for Total Vitamin D 25(OH)     Deficiency <20.0 ng/mL   Insufficiency 21-29 ng/mL   Sufficiency  ng/mL  Toxicity >100 ng/ml                Radiology:     Imaging Results (Last 24 Hours)       ** No results found for the last 24 hours. **              EKG:     ECG/EMG Results (most recent)       None             Medications:  cholecalciferol, 1,000 Units, Oral, Daily  estradiol, 2 g, Vaginal, Once per day on Monday Wednesday Friday  FLUoxetine, 40 mg, Oral, Daily  Insulin Aspart, 2-7 Units, Subcutaneous, 4x Daily AC & at Bedtime  Liraglutide, 1.8 mg, Subcutaneous, QAM  multivitamin with minerals, 1 tablet, Oral, Daily  nitrofurantoin (macrocrystal-monohydrate), 100 mg, Oral, Q12H  pramipexole, 0.125 mg, Oral, Nightly  prazosin, 2 mg, Oral, Nightly          All medications reviewed.    Assessment and Plan:      Major depressive disorder, recurrent, severe without psychotic features  Suicidal ideation with recent attempt by overdose on hydroxyzine  PTSD  Conversion disorder with non-epileptic seizures  R/o Cluster B personality disorder  - Continue hospitalization on the [facility] for safety and stabilization.  - Appropriate precautions ordered; de-escalate from SP1 to SP3 due to pt being able to maintain safety on unit despite recent threats of active plan with intent. Pt continues to endorse undisclosed plan but denies intent to act on plan on the unit.   - Encourage engagement in individual,  group, and family therapies as appropriate.  - Collateral as needed  - Recommended initiation of Spravato for acute suicidality. Discussed with [facility] specialty pharmacy 1/30 - pt previously not approved due to low PHQ-9 score and $400 copay. Pt clearly appropriate for treatment at this time due to severe depression and acute suicidality. Will likely need to pursue as an outpatient as [facility] is not certified to administer Spravato, but will work with outpatient med provider TRACI Khan and specialty pharmacy to facilitate initiation of treatment as soon as pt is discharged. Pharmacy is looking into alternative funding for Spravato, and pt plans to reach out to children again to discuss possibility of financial support to cover at least initial trial of Spravato.   - On 2/1, discussed with [facility] retail pharmacy regarding possibility of obtaining Auvelity for rapid-onset treatment of severe depression - pt would be responsible for $360 per month, and as she would need to maintain on this medication indefinitely, pt believes this is not a feasible option for her. Discussed with inpatient pharmacist regarding other alternative rapid-acting options for severe depression with active suicidality, and he has no further recommendations beyond Spravato and Auvelity.   - Continue Prozac 40 mg QD for now. Pt will need to maintain on an antidepressant to pursue Spravato augmentation. Pt has long history of medication trail failures due to ineffectiveness and/or adverse side effects, and will likely benefit from GeneSight testing on an outpatient basis.   - Increase Mirapex from 0.125 mg QHS to 0.125 mg BID for treatment-resistant depression, antidepressant augmentation. Already noticing improvement in tremors and RLS. Pt will require gradual titration over the next several weeks to reach target dose of at least 1 mg daily. Plan to titrate dose by 0.125 mg every 3-5 days as tolerated.   - Continue prazosin 2 mg QHS for  trauma-related hyperarousal. Staff advised to administer only when pt is about to go to bed to limit risk of dizziness and falls. Will likely benefit from addition of daytime dose if able to tolerate it.  - Pt is at extremely high risk for completed suicide should she discharge without significant improvement in suicidality and mood. She is open to considering transfer to another facility for initiation of ECT if Spravato cannot be pursued. Anticipate further discussion with Dr. Osuna at [facility] should we need to pursue this option.      T2DM  - Diabetic diet, Accu-cheks TIDAC, low-dose SSI as needed, hypoglycemic protocol; HbA1c 5.5  - Continue home Victoza 1.8 mg QAM using home supply      History of recurrent UTI  - Admission UA WNL; pt currently asymptomatic  - Continue home Macrobid 100 mg BID for UTI prophylaxis     Constipation, resolved  - Pt reports that she only has BM every 2-3 weeks. Constipation now resolved.  - Switch magnesium citrate from scheduled to 150 mg BID PRN constipation.      RLS/Essential tremor  - Starting Mirapex for depression, and RLS/tremors already appear to already be improving after initiation     History of orthostatic hypotension  - Holding home midodrine 5 mg BID; pt had not yet started taking, pt asymptomatic since admission  - Continue to monitor, especially considering initiation of prazosin. If pt becomes symptomatic, will hold off on titration of prazosin and consider starting midodrine.      History of Vit D deficiency  - Continue cholecalciferol QD  - Vit D level WNL on admission     Post-menopausal osteoporosis  - Restart ibandronate 150 mg monthly using home supply. Due 2/1.      Hot flashes  - Continue home estradiol cream three times weekly    Continue hospitalization for safety and stabilization.  Continue current level of Special Precautions (q15 minute checks). Anticipated total length of stay 6-8 days.      This document has been electronically signed by  Macy Ugalde MD.  February 1, 2024 14:45 EST

## 2024-02-02 LAB
GLUCOSE BLDC GLUCOMTR-MCNC: 144 MG/DL (ref 70–130)
GLUCOSE BLDC GLUCOMTR-MCNC: 75 MG/DL (ref 70–130)
GLUCOSE BLDC GLUCOMTR-MCNC: 78 MG/DL (ref 70–130)
GLUCOSE BLDC GLUCOMTR-MCNC: 84 MG/DL (ref 70–130)
GLUCOSE BLDC GLUCOMTR-MCNC: 98 MG/DL (ref 70–130)

## 2024-02-02 PROCEDURE — 82948 REAGENT STRIP/BLOOD GLUCOSE: CPT | Performed by: STUDENT IN AN ORGANIZED HEALTH CARE EDUCATION/TRAINING PROGRAM

## 2024-02-02 PROCEDURE — 25010000002 ONDANSETRON PER 1 MG: Performed by: STUDENT IN AN ORGANIZED HEALTH CARE EDUCATION/TRAINING PROGRAM

## 2024-02-02 PROCEDURE — 82948 REAGENT STRIP/BLOOD GLUCOSE: CPT

## 2024-02-02 PROCEDURE — 99233 SBSQ HOSP IP/OBS HIGH 50: CPT | Performed by: STUDENT IN AN ORGANIZED HEALTH CARE EDUCATION/TRAINING PROGRAM

## 2024-02-02 RX ORDER — ONDANSETRON 2 MG/ML
4 INJECTION INTRAMUSCULAR; INTRAVENOUS EVERY 6 HOURS PRN
Status: DISCONTINUED | OUTPATIENT
Start: 2024-02-02 | End: 2024-02-05 | Stop reason: HOSPADM

## 2024-02-02 RX ORDER — PANTOPRAZOLE SODIUM 40 MG/1
40 TABLET, DELAYED RELEASE ORAL DAILY
Status: DISCONTINUED | OUTPATIENT
Start: 2024-02-02 | End: 2024-02-05 | Stop reason: HOSPADM

## 2024-02-02 RX ADMIN — ESTRADIOL 2 APPLICATOR: 0.1 CREAM VAGINAL at 20:21

## 2024-02-02 RX ADMIN — Medication 1 TABLET: at 08:29

## 2024-02-02 RX ADMIN — PRAMIPEXOLE DIHYDROCHLORIDE 0.12 MG: 0.25 TABLET ORAL at 20:22

## 2024-02-02 RX ADMIN — PANTOPRAZOLE SODIUM 40 MG: 40 TABLET, DELAYED RELEASE ORAL at 11:08

## 2024-02-02 RX ADMIN — Medication 1000 UNITS: at 08:29

## 2024-02-02 RX ADMIN — PRAZOSIN HYDROCHLORIDE 2 MG: 1 CAPSULE ORAL at 22:09

## 2024-02-02 RX ADMIN — ONDANSETRON 4 MG: 2 INJECTION INTRAMUSCULAR; INTRAVENOUS at 09:50

## 2024-02-02 RX ADMIN — FLUOXETINE HYDROCHLORIDE 40 MG: 20 CAPSULE ORAL at 08:29

## 2024-02-02 RX ADMIN — NITROFURANTOIN MONOHYDRATE/MACROCRYSTALLINE 100 MG: 25; 75 CAPSULE ORAL at 08:29

## 2024-02-02 RX ADMIN — Medication 1000 MG: at 08:28

## 2024-02-02 RX ADMIN — IBANDRONATE SODIUM 150 MG: 150 TABLET, FILM COATED ORAL at 07:08

## 2024-02-02 RX ADMIN — PRAMIPEXOLE DIHYDROCHLORIDE 0.12 MG: 0.25 TABLET ORAL at 08:29

## 2024-02-02 RX ADMIN — NITROFURANTOIN MONOHYDRATE/MACROCRYSTALLINE 100 MG: 25; 75 CAPSULE ORAL at 20:22

## 2024-02-02 NOTE — SIGNIFICANT NOTE
02/02/24 1231   Group Therapy Session   Group Attendance attended group session   Time Session Began 1030   Time Session Ended 1100   Total Time (minutes) 30   Group Type psychotherapeutic   Group Topic Covered mindfulness   Literature/Videos Given topic handouts   Literature/Videos Given Comments Identified benefits of practicing self compassion and ability to challenge negative thoughts. Provided education on mindfullness and explored ways to support ourselves.   Patient Participation Detail Literature provided for patient to complete independently

## 2024-02-02 NOTE — SIGNIFICANT NOTE
02/02/24 1453   Group Therapy Session   Group Attendance attended group session   Time Session Began 1400   Time Session Ended 1445   Total Time (minutes) 45   Group Type psychotherapeutic   Group Topic Covered balanced lifestyle;coping skills/lifestyle management;problem solving;self care activities   Group Session Detail Utilized play to provide psychoeducation on mental illness and wellness. Identified appropriate coping skills and supports.   Patient Participation/Contribution cooperative with task;discussed personal experience with topic;listened actively;organized   Affect During Group affect consistent with mood;appropriate to situation

## 2024-02-02 NOTE — PLAN OF CARE
"Goal Outcome Evaluation:  Plan of Care Reviewed With: patient  Patient Agreement with Plan of Care: agrees     Progress: no change  Outcome Evaluation: no acute events during shift at this time. VSS. pt denies HI & hallucinations. pt reports \"just a little\" SI. anxiety 10/10 & depression 8/10.pt affect not congruent with mood. no pt complaints or changes in condition at this time. continue plan of care.                               "

## 2024-02-02 NOTE — PROGRESS NOTES
"    Inpatient Psych Progress Note     Clinician: Macy Ugalde MD  Admission Date: 1/29/2024  11:09 EST  02/02/24    Behavioral Health Treatment Plan and Problem List: I have reviewed and approved the Behavioral Health Treatment Plan and Problem list.    Allergies  Allergies   Allergen Reactions    Chlorzoxazone Unknown - High Severity and Swelling     Lorzone, muscle relaxant.    Iodine Anaphylaxis     Topical makes her swell  Anaphylaxis with IV contrast (when she was ~ 9yrs old)    Phenazopyridine Hcl Swelling and Anaphylaxis    Pyridium [Phenazopyridine] Anaphylaxis    Quinine Unknown - High Severity     Has malaria symptoms    Valproic Acid Other (See Comments)     Liver failure  Liver failure  Liver failure; lupus like symptoms and liver failure    Methocarbamol Other (See Comments)     Made her feel \"suicidal\" and gave her less control over her actions.    Iodinated Contrast Media Unknown - Low Severity    Codeine Itching    Diphenhydramine Anxiety     Pt has severe panic attack symptoms when given benadryl IV. Needs to take a benzodiazapine med concurrently when getting benadryl.        Hospital Day: 4 days      Assessment completed within view of staff    History  CC/clinical focus: SI, depression    Interval HPI: Patient seen and evaluated by me.  Chart reviewed. Discussed with treatment team and unit staff. No major issues overnight. VSS. Med compliant.  Required PRN ibuprofen and Atarax over the past 24 hours.  Pt has been participating in therapeutic activities without issue. Interactions with staff and peers have been appropriate.  Per night shift, pt endorsed \"just a little\" SI. Funding for 1 month of Spravato treatment was approved from the [facility] this morning. This morning, pt c/o severe nausea that started shortly before breakfast and did not improve after eating. She was only able to eat a little of her breakfast. PRN Zofran IM ordered and administered with some relief of symptoms. Pt " "informed of the Spravato approval and reports feeling excited and hopeful about this. She does feel a little frustrated that this cannot be initiated while inpatient, but believes that she will be able to maintain her safety to pursue treatment with Spravato. She currently denies SI/HI/AVH. She continues to endorse death wish, but admits that it is not as severe as before. She says that mood is \"good for once\" today because she knows that she will be able to receive Spravato. Pt reports that she is tolerating her current regimen without major issue. She slept very well last night but reported feeling a little groggy this morning. She is unsure if this was due to her medication. She is pleased that her startle response has improved so much, and reports that her tremors have entirely resolved. Appetite this morning was poor due to nausea. She has been having regular but slightly loose Bms, and endorses acid reflux. ROS otherwise as below.    Interval Review of Systems:   General ROS: negative for - fever or malaise  Endocrine ROS: negative for - palpitations  Respiratory ROS: no cough, shortness of breath, or wheezing  Cardiovascular ROS: no chest pain or dyspnea on exertion  Gastrointestinal ROS: no abdominal pain, no black or bloody stools    /73   Pulse 70   Temp 97.7 °F (36.5 °C) (Oral)   Resp 16   Ht 160.8 cm (63.3\")   Wt 62.8 kg (138 lb 6.4 oz)   SpO2 98%   BMI 24.28 kg/m²     Mental Status Exam  Behavior: Cooperative, pleasant, good eye contact  Psychomotor activity: Calm; no involuntary movements observed  Orientation: x4  Mood: \"good for once\", hopeful  Affect: mood congruent, euthymic and full  Thought Process: linear, organized, goal-directed  Thought Content: No delusions voiced, improving future orientation  Hallucinations: Denies AVH, no RIS observed  Concentration: Fair  Suicidal Ideation: Denies current SI  Homicidal Ideation: Denies  Hopelessness: Denies today  Insight: Limited but " improving  Judgement: Limited but improving    Medical Decision Making:   Labs:     Lab Results (last 24 hours)       Procedure Component Value Units Date/Time    POC Glucose Once [[id number]]  (Normal) Collected: 02/02/24 0947    Specimen: Blood Updated: 02/02/24 0950     Glucose 84 mg/dL      Comment: Serial Number: [id number]:  [id number]       POC Glucose TID AC [[id number]]  (Abnormal) Collected: 02/02/24 0756    Specimen: Blood Updated: 02/02/24 0804     Glucose 144 mg/dL      Comment: Serial Number: [id number]:  [id number]       POC Glucose Once [[id number]]  (Normal) Collected: 02/01/24 2044    Specimen: Blood Updated: 02/01/24 2057     Glucose 88 mg/dL      Comment: Serial Number: [id number]:  [id number]       POC Glucose TID AC [[id number]]  (Normal) Collected: 02/01/24 1714    Specimen: Blood Updated: 02/01/24 1720     Glucose 100 mg/dL      Comment: Serial Number: [id number]:  [id number]       POC Glucose TID AC [[id number]]  (Normal) Collected: 02/01/24 1143    Specimen: Blood Updated: 02/01/24 1145     Glucose 119 mg/dL      Comment: Serial Number: [id number]:  [id number]                 Radiology:     Imaging Results (Last 24 Hours)       ** No results found for the last 24 hours. **              EKG:     ECG/EMG Results (most recent)       None             Medications:  cholecalciferol, 1,000 Units, Oral, Daily  D-Mannose, 2 capsule, Oral, Daily With Breakfast  estradiol, 2 g, Vaginal, Once per day on Monday Wednesday Friday  FLUoxetine, 40 mg, Oral, Daily  Insulin Aspart, 2-7 Units, Subcutaneous, 4x Daily AC & at Bedtime  Liraglutide, 1.8 mg, Subcutaneous, QAM  multivitamin with minerals, 1 tablet, Oral, Daily  nitrofurantoin (macrocrystal-monohydrate), 100 mg, Oral, Q12H  pantoprazole, 40 mg, Oral, Daily  pramipexole, 0.125 mg, Oral, BID  prazosin, 2 mg, Oral, Nightly          All medications reviewed.    Assessment and Plan:      Major depressive disorder, recurrent, severe without  psychotic features  Suicidal ideation with recent attempt by overdose on hydroxyzine  PTSD  Conversion disorder with non-epileptic seizures  R/o Cluster B personality disorder  - Continue hospitalization on the [facility] for safety and stabilization.  - Appropriate precautions ordered; de-escalate from SP1 to SP3 due to pt being able to maintain safety on unit despite recent threats of active plan with intent. Pt continues to endorse undisclosed plan but denies intent to act on plan on the unit.   - Encourage engagement in individual, group, and family therapies as appropriate.  - Collateral as needed  - Funding for 1 month trial of Spravato approved through [employer]. [employer] form signed today, and urgent authorization has been requested. Per pharmacy, pt can begin treatment on Monday afternoon. Plan to utilize 84 mg twice weekly for indication of severe depression with acute suicidality.   - Continue Prozac 40 mg QD for now. Pt will need to maintain on an antidepressant to pursue Spravato augmentation. Pt has long history of medication trail failures due to ineffectiveness and/or adverse side effects, and will likely benefit from GeneSight testing on an outpatient basis.   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 Pt will require gradual titration over the next several weeks to reach target dose of at least 1 mg daily for mood, but has already shown significant improvement in tremors/RLS. Plan to titrate dose by 0.125 mg every 3-5 days as tolerated, with next titration due 2/5.   - Continue prazosin 2 mg QHS for trauma-related hyperarousal. Staff advised to administer only when pt is about to go to bed to limit risk of dizziness and falls. Will likely benefit from addition of daytime dose if able to tolerate it.     T2DM  - Diabetic diet, Accu-cheks TIDAC, low-dose SSI as needed, hypoglycemic protocol; HbA1c 5.5  - Continue home Victoza 1.8 mg QAM using home  supply      History of recurrent UTI  - Admission UA WNL; pt currently asymptomatic  - Continue home Macrobid 100 mg BID for UTI prophylaxis     Constipation, resolved  - Pt reports that she only has BM every 2-3 weeks. Constipation now resolved.  - Continue magnesium citrate 150 mg BID PRN constipation.      RLS/Essential tremor  - Continue Mirapex as above     History of orthostatic hypotension  - Holding home midodrine 5 mg BID; pt had not yet started taking, pt asymptomatic since admission  - Continue to monitor, especially considering initiation of prazosin. If pt becomes symptomatic, will hold off on titration of prazosin and consider starting midodrine.      History of Vit D deficiency  - Continue cholecalciferol QD  - Vit D level WNL on admission     Post-menopausal osteoporosis  - Restart ibandronate 150 mg monthly using home supply. Due 2/1.      Hot flashes  - Continue home estradiol cream three times weekly    GERD  - Start Protonix 40 mg QD     Continue hospitalization for safety and stabilization.  Continue current level of Special Precautions (q15 minute checks). Plan to discharge from [facility] on Monday directly to [facility] speciality pharmacy to initiate Spravato treatment.       This document has been electronically signed by Macy Ugalde MD.  February 2, 2024 11:09 EST

## 2024-02-02 NOTE — PLAN OF CARE
"Goal Outcome Evaluation:  Plan of Care Reviewed With: patient  Patient Agreement with Plan of Care: agrees         Pt reports anxiety 2 and depression an 8 on 1-10 scale upon assessment this morning. Pt states \"I actually feel pretty good today\". Pt denied SI/HI/AVH at time of assessment. VSS. IM Zofran given for pt c/o nausea. Per pt, this intervention was effective. No other PRNs administered this shift. Will continue with current plan of care.                                "

## 2024-02-02 NOTE — THERAPY TREATMENT NOTE
"DATA:    Therapist met with the patient individually. Therapist continues reviewing plan of care and aftercare plan.  The patient was agreeable.    ASSESSMENT:    Patient was seen for follow up of SI and depression.       Today, patient was seen 1-1 in office. The patient's mood appears appropriate to circumstances, affect congruent, thought content normal. Patient reports sleep is  \"much better\"  and appetite is  \"not good today but mostly because my stomach.\"  Patient reports \"feeling like crap physically but I'm okay otherwise.\" Patient reports stomach pain and nausea this morning. On scale 1-10, patient rates depression 5 and anxiety 6. Patient denies hallucinations and HI. Patient denies current SI, death wish, plan/intent. Patient found out she will be getting Spravato treatment and reports feeling \"very hopeful.\"        CLINICAL MANEUVERING:    Therapist provided safe, secure environment for patient to share.  Provided reflective listening and psychoeducation.  Assisted patient in processing the above session. Assisted patient in identifying any questions or needs to be addressed today. Therapist provided supportive therapy.          Plan:     Patient to remain hospitalized this date.      Treatment team will focus efforts on stabilizing patient's acute symptoms while providing education on healthy coping and crisis management to reduce hospitalizations.   Patient requires daily psychiatrist evaluation and 24/7 nursing supervision to promote patient  safety.     Therapist will offer 1-4 individual sessions, family education, and appropriate referral.     Therapist recommends continued assessment. Patient to follow up with [facility] at discharge.   "

## 2024-02-03 LAB
GLUCOSE BLDC GLUCOMTR-MCNC: 81 MG/DL (ref 70–130)
GLUCOSE BLDC GLUCOMTR-MCNC: 83 MG/DL (ref 70–130)

## 2024-02-03 PROCEDURE — 99232 SBSQ HOSP IP/OBS MODERATE 35: CPT | Performed by: STUDENT IN AN ORGANIZED HEALTH CARE EDUCATION/TRAINING PROGRAM

## 2024-02-03 PROCEDURE — 93010 ELECTROCARDIOGRAM REPORT: CPT | Performed by: INTERNAL MEDICINE

## 2024-02-03 PROCEDURE — 82948 REAGENT STRIP/BLOOD GLUCOSE: CPT

## 2024-02-03 PROCEDURE — 93005 ELECTROCARDIOGRAM TRACING: CPT | Performed by: STUDENT IN AN ORGANIZED HEALTH CARE EDUCATION/TRAINING PROGRAM

## 2024-02-03 RX ORDER — IBUPROFEN 600 MG/1
600 TABLET ORAL ONCE
Status: COMPLETED | OUTPATIENT
Start: 2024-02-03 | End: 2024-02-03

## 2024-02-03 RX ADMIN — Medication 1000 UNITS: at 08:30

## 2024-02-03 RX ADMIN — HYDROXYZINE HYDROCHLORIDE 25 MG: 25 TABLET, FILM COATED ORAL at 18:32

## 2024-02-03 RX ADMIN — PRAZOSIN HYDROCHLORIDE 2 MG: 1 CAPSULE ORAL at 20:37

## 2024-02-03 RX ADMIN — PRAMIPEXOLE DIHYDROCHLORIDE 0.12 MG: 0.25 TABLET ORAL at 08:30

## 2024-02-03 RX ADMIN — Medication 1 TABLET: at 08:30

## 2024-02-03 RX ADMIN — FLUOXETINE HYDROCHLORIDE 40 MG: 20 CAPSULE ORAL at 08:30

## 2024-02-03 RX ADMIN — Medication 1000 MG: at 08:28

## 2024-02-03 RX ADMIN — PRAMIPEXOLE DIHYDROCHLORIDE 0.12 MG: 0.25 TABLET ORAL at 20:03

## 2024-02-03 RX ADMIN — IBUPROFEN 600 MG: 600 TABLET, FILM COATED ORAL at 20:03

## 2024-02-03 RX ADMIN — NITROFURANTOIN MONOHYDRATE/MACROCRYSTALLINE 100 MG: 25; 75 CAPSULE ORAL at 20:03

## 2024-02-03 RX ADMIN — PANTOPRAZOLE SODIUM 40 MG: 40 TABLET, DELAYED RELEASE ORAL at 08:30

## 2024-02-03 RX ADMIN — NITROFURANTOIN MONOHYDRATE/MACROCRYSTALLINE 100 MG: 25; 75 CAPSULE ORAL at 08:30

## 2024-02-03 NOTE — SIGNIFICANT NOTE
02/03/24 1243   Group Therapy Session   Group Attendance attended group session   Time Session Began 1030   Time Session Ended 1100   Total Time (minutes) 30   Group Type psychotherapeutic;psychoeducation   Group Topic Covered mindfulness   Group Session Detail Patients participated in mindful eating. Patients educated on mindful eating and that it is about listening to our physical sensations--like hunger, fullness, and satisfaction cues--and taking time to notice our thoughts and emotions while eating so that we can have a more enjoyable and healthful eating experience.   Patient Participation/Contribution cooperative with task;listened actively   Patient Participation Detail Directions provided for patient to complete activity independently.   Affect During Group affect consistent with mood   Degree of Insight moderate

## 2024-02-03 NOTE — PLAN OF CARE
Goal Outcome Evaluation:  Plan of Care Reviewed With: patient  Patient Agreement with Plan of Care: agrees        Outcome Evaluation: VSS. Denies SI/HI/AVH and pain. Participated in all groups and interacted well with peers.Anxiety & depression 7/7.Continue plan of care.

## 2024-02-03 NOTE — PROGRESS NOTES
"    Inpatient Psych Progress Note     Clinician: Macy Ugalde MD  Admission Date: 1/29/2024  10:42 EST   02/03/24    Behavioral Health Treatment Plan and Problem List: I have reviewed and approved the Behavioral Health Treatment Plan and Problem list.    Allergies  Allergies   Allergen Reactions    Chlorzoxazone Unknown - High Severity and Swelling     Lorzone, muscle relaxant.    Iodine Anaphylaxis     Topical makes her swell  Anaphylaxis with IV contrast (when she was ~ 9yrs old)    Phenazopyridine Hcl Swelling and Anaphylaxis    Pyridium [Phenazopyridine] Anaphylaxis    Quinine Unknown - High Severity     Has malaria symptoms    Valproic Acid Other (See Comments)     Liver failure  Liver failure  Liver failure; lupus like symptoms and liver failure    Methocarbamol Other (See Comments)     Made her feel \"suicidal\" and gave her less control over her actions.    Iodinated Contrast Media Unknown - Low Severity    Codeine Itching    Diphenhydramine Anxiety     Pt has severe panic attack symptoms when given benadryl IV. Needs to take a benzodiazapine med concurrently when getting benadryl.        Hospital Day: 5 days    Assessment completed within view of staff    History  CC/clinical focus: SI, depression    Interval HPI: Patient seen and evaluated by me.  Chart reviewed. Discussed with treatment team and unit staff. No major issues overnight. VSS. Med compliant. No PRNs required after receiving Zofran IM x1 yesterday. BP low at 83/60 this morning, but pt asymptomatic and reports that this is a fairly normal BP for her. BP check later in the day was WNL. Pt reportedly did not sleep well due to noise on the unit. On interview today, pt continues to exhibit bright, incongruen affect while discussing distressing things. She says that she was woke herself up in the middle of the night during a nightmare of punching a cat, and acted out this punch in her sleep. She also feels that she is becoming more irritable, and " "almost slammed her door shut because staff kept on opening it overnight and disrupting her sleep. She was feeling \"very suicidal\" last night and continued to have plan to tear fabric into strips - \"It wouldn't take much, the plan isn't to suffocate, the plan is to target the carotid artery\". She denied that she had any intent to act on this plan on the unit, but says that she was \"this close\" to reaching out to staff due to the severity of her SI. Pt praised for maintaining her safety, and encouraged to reach out to staff should this happen again. She is worried her sleep issues and irritability may be due to prazosin, but pt reminded that prazosin dose was not adjusted last night, and it would be much more likely to reduce nightmares and irritability. She agrees that frustration and change in REM sleep with frequent sleep interruptions are more likely culprit, but agrees with hold off further adjustments to her oral regimen for now in order to more closely monitor for Ses. Otherwise, pt reports that her mood has been \"pretty good\" today -  still depressed but not as much as she had expected to feel today after yesterday. Appetite is okay. Energy is a little low. Nausea is minimal today. Pt reports that her tremors are almost entirely gone, and she is very pleased with this. Pt denies current SI/HI/AVH, remains hopeful for Spravato, and believes that she will be able to keep herself safe at home after discharge because she is able to pursue Spravato treatment now. ROS otherwise as below.    Interval Review of Systems:   General ROS: negative for - fever or malaise, + fatigue  Endocrine ROS: negative for - palpitations  Respiratory ROS: no cough, shortness of breath, or wheezing  Cardiovascular ROS: no chest pain or dyspnea on exertion  Gastrointestinal ROS: no abdominal pain, no black or bloody stools    BP (!) 83/60 (BP Location: Right arm, Patient Position: Sitting)   Pulse 83   Temp 97.6 °F (36.4 °C) (Oral)   " "Resp 16   Ht 160.8 cm (63.3\")   Wt 62.8 kg (138 lb 6.4 oz)   SpO2 98%   BMI 24.28 kg/m²     Mental Status Exam  Behavior: Cooperative, pleasant, good eye contact  Psychomotor activity: Calm; barely perceptible fine bilateral hand tremor upon extension  Orientation: x4  Mood: \"pretty good\"  Affect: mood congruent, euthymic and full  Thought Process: linear, organized, goal-directed  Thought Content: No delusions voiced, improving future orientation  Hallucinations: Denies AVH, no RIS observed  Concentration: Fair  Suicidal Ideation: Denies current SI, but endorses SI w/ plan to cut off circulation to carotid artery with fabric strips most recently as last night  Homicidal Ideation: Denies  Hopelessness: Denies today  Insight: Limited but improving  Judgement: Limited but improving    Medical Decision Making:   Labs:     Lab Results (last 24 hours)       Procedure Component Value Units Date/Time    POC Glucose Once [[id number]]  (Normal) Collected: 02/03/24 0811    Specimen: Blood Updated: 02/03/24 0814     Glucose 83 mg/dL      Comment: Serial Number: [id number]:  [id number]       POC Glucose Once [[id number]]  (Normal) Collected: 02/02/24 2025    Specimen: Blood Updated: 02/02/24 2056     Glucose 98 mg/dL      Comment: Serial Number: [id number]:  [id number]       POC Glucose TID AC [[id number]]  (Normal) Collected: 02/02/24 1716    Specimen: Blood Updated: 02/02/24 1720     Glucose 75 mg/dL      Comment: Serial Number: [id number]:  [id number]       POC Glucose TID AC [[id number]]  (Normal) Collected: 02/02/24 1203    Specimen: Blood Updated: 02/02/24 1206     Glucose 78 mg/dL      Comment: Serial Number: [id number]:  [id number]                 Radiology:     Imaging Results (Last 24 Hours)       ** No results found for the last 24 hours. **              EKG:     ECG/EMG Results (most recent)       None             Medications:  cholecalciferol, 1,000 Units, Oral, Daily  D-Mannose, 2 capsule, Oral, " Daily With Breakfast  estradiol, 2 g, Vaginal, Once per day on Monday Wednesday Friday  FLUoxetine, 40 mg, Oral, Daily  Insulin Aspart, 2-7 Units, Subcutaneous, 4x Daily AC & at Bedtime  Liraglutide, 1.8 mg, Subcutaneous, QAM  multivitamin with minerals, 1 tablet, Oral, Daily  nitrofurantoin (macrocrystal-monohydrate), 100 mg, Oral, Q12H  pantoprazole, 40 mg, Oral, Daily  pramipexole, 0.125 mg, Oral, BID  prazosin, 2 mg, Oral, Nightly          All medications reviewed.    Assessment and Plan:      Major depressive disorder, recurrent, severe without psychotic features  Suicidal ideation with recent attempt by overdose on hydroxyzine  PTSD  Conversion disorder with non-epileptic seizures  R/o Cluster B personality disorder  - Continue hospitalization on the [facility] for safety and stabilization.  - Appropriate precautions ordered; SP3  - Encourage engagement in individual, group, and family therapies as appropriate.  - Collateral as needed  - Funding for 1 month trial of Spravato approved through [employer]. [employer]S form signed today, and urgent authorization has been requested. Per pharmacy, pt can begin treatment on Monday afternoon. Plan to utilize 84 mg twice weekly for indication of severe depression with acute suicidality.   - Continue Prozac 40 mg QD for now. Pt will need to maintain on an antidepressant to pursue Spravato augmentation. Pt has long history of medication trail failures due to ineffectiveness and/or adverse side effects, and will likely benefit from GeneSight testing on an outpatient basis.   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 Pt will require gradual titration over the next several weeks to reach target dose of at least 1 mg daily for mood, but has already shown significant improvement in tremors/RLS. Plan to titrate dose by 0.125 mg every 3-5 days as tolerated, with next titration due 2/5.   - Continue prazosin 2 mg QHS for  trauma-related hyperarousal. Staff advised to administer only when pt is about to go to bed to limit risk of dizziness and falls. Will likely benefit from addition of daytime dose if able to tolerate it.     T2DM  - Diabetic diet, Accu-cheks TIDAC, low-dose SSI as needed, hypoglycemic protocol; HbA1c 5.5  - Continue home Victoza 1.8 mg QAM using home supply      History of recurrent UTI  - Admission UA WNL; pt currently asymptomatic  - Continue home Macrobid 100 mg BID for UTI prophylaxis     Constipation, resolved  - Pt reports that she only has BM every 2-3 weeks. Constipation now resolved.  - Continue magnesium citrate 150 mg BID PRN constipation.      RLS/Essential tremor  - Continue Mirapex as above     History of orthostatic hypotension  - Holding home midodrine 5 mg BID; pt had not yet started taking, pt asymptomatic since admission  - Continue to monitor, especially considering initiation of prazosin. If pt becomes symptomatic, will hold off on titration of prazosin and consider starting midodrine.      History of Vit D deficiency  - Continue cholecalciferol QD  - Vit D level WNL on admission     Post-menopausal osteoporosis  - Restarted ibandronate 150 mg monthly using home supply 2/1     Hot flashes  - Continue home estradiol cream three times weekly    GERD  - Continue Protonix 40 mg QD     Continue hospitalization for safety and stabilization.  Continue current level of Special Precautions (q15 minute checks). Plan to discharge from [facility] on Monday directly to [facility] speciality pharmacy to initiate Spravato treatment.       This document has been electronically signed by Macy Ugalde MD.  February 3, 2024 10:42 EST

## 2024-02-03 NOTE — THERAPY TREATMENT NOTE
"DATA:    Therapist met with the patient individually. Therapist continues reviewing plan of care and aftercare plan.  The patient was agreeable.    ASSESSMENT:    Patient was seen for follow up of SI and depression.     Today, patient was seen 1-1 in office. The patient's mood appears appropriate to circumstances, affect congruent, thought content normal. Patient reports sleep is Poor and appetite is  \"off a little bit\" . On scale 1-10, patient rates depression  6-7  and anxiety 7. Patient does/does not report hallucinations: None. Patient reports that she did not have the best night. Patient reports having increased irritability. Patient reports that she was thinking of ways to kill herself on the unit. Patient reports she considered taking the pillow case and using it to cut off circulation of an artery. Patient reports that she is not currently endorsing SI but she always has a plan. Patient reports being hopeful about the Spravato treatment.     CLINICAL MANEUVERING:    Therapist provided safe, secure environment for patient to share.  Provided reflective listening and psychoeducation.  Assisted patient in processing the above session. Assisted patient in identifying any questions or needs to be addressed today. Therapist normalized and validated feelings. Therapist utilized supportive psychotherapy.       Plan:     Patient to remain hospitalized this date.      Treatment team will focus efforts on stabilizing patient's acute symptoms while providing education on healthy coping and crisis management to reduce hospitalizations.   Patient requires daily psychiatrist evaluation and 24/7 nursing supervision to promote patient  safety.     Therapist will offer 1-4 individual sessions, family education, and appropriate referral.     Therapist recommends continued assessment. Patient to follow up with [facility] at discharge.   "

## 2024-02-03 NOTE — SIGNIFICANT NOTE
02/03/24 1452   Group Therapy Session   Group Attendance attended group session   Time Session Began 1400   Time Session Ended 1442   Total Time (minutes) 42   Group Type psychotherapeutic   Group Topic Covered coping skills/lifestyle management   Group Session Detail Patients participated in utilizing critical thinking to brainstorm different coping strategies based on the letter in the alphabet.   Patient Participation/Contribution cooperative with task;discussed personal experience with topic;expressed understanding of topic;listened actively;offered helpful suggestions to peers;organized   Patient Participation Detail Patient provided personal coping skills.   Affect During Group affect consistent with mood   Degree of Insight moderate        Wt Readings from Last 3 Encounters:   04/08/21 63 5 kg (139 lb 15 9 oz)   03/30/21 63 3 kg (139 lb 8 oz)   03/24/21 66 2 kg (145 lb 15 1 oz)     · Concerning for acute diastolic heart failure as evidenced by BNP greater than 12,000, increase in weight  · Did receive gentle IV fluids initially on admission for acute kidney injury  · Patient is typically maintained on Lasix 60 mg daily at home  · Cardiology following,  · P o   Lasix decreased to 40 mg daily  · Patient placed on D5W by Nephrology in setting of worsening hypernatremia  · Echo with EF 55%, G2 DD  · Obtain daily weights, accurate I&Os, low sodium diet

## 2024-02-03 NOTE — PLAN OF CARE
Goal Outcome Evaluation:  Plan of Care Reviewed With: patient  Patient Agreement with Plan of Care: agrees     Progress: improving  Outcome Evaluation: no acute events during shift at this time. VSS. pt denies SI/HI/hallucinations. pt reports anxiety 4/10 & depression 5/10. no PRNs given. no other changes in pt condition or pt complaints at this time. continue plan of care.

## 2024-02-04 PROCEDURE — 99232 SBSQ HOSP IP/OBS MODERATE 35: CPT | Performed by: STUDENT IN AN ORGANIZED HEALTH CARE EDUCATION/TRAINING PROGRAM

## 2024-02-04 RX ORDER — MIDODRINE HYDROCHLORIDE 5 MG/1
2.5 TABLET ORAL
Status: DISCONTINUED | OUTPATIENT
Start: 2024-02-04 | End: 2024-02-05 | Stop reason: HOSPADM

## 2024-02-04 RX ADMIN — PRAZOSIN HYDROCHLORIDE 2 MG: 1 CAPSULE ORAL at 20:24

## 2024-02-04 RX ADMIN — MIDODRINE HYDROCHLORIDE 2.5 MG: 5 TABLET ORAL at 16:52

## 2024-02-04 RX ADMIN — FLUOXETINE HYDROCHLORIDE 40 MG: 20 CAPSULE ORAL at 08:18

## 2024-02-04 RX ADMIN — HYDROXYZINE HYDROCHLORIDE 25 MG: 25 TABLET, FILM COATED ORAL at 20:24

## 2024-02-04 RX ADMIN — Medication 1 TABLET: at 08:18

## 2024-02-04 RX ADMIN — Medication 1000 UNITS: at 08:18

## 2024-02-04 RX ADMIN — PRAMIPEXOLE DIHYDROCHLORIDE 0.12 MG: 0.25 TABLET ORAL at 20:24

## 2024-02-04 RX ADMIN — NITROFURANTOIN MONOHYDRATE/MACROCRYSTALLINE 100 MG: 25; 75 CAPSULE ORAL at 08:18

## 2024-02-04 RX ADMIN — PRAMIPEXOLE DIHYDROCHLORIDE 0.12 MG: 0.25 TABLET ORAL at 08:18

## 2024-02-04 RX ADMIN — PANTOPRAZOLE SODIUM 40 MG: 40 TABLET, DELAYED RELEASE ORAL at 08:18

## 2024-02-04 RX ADMIN — NITROFURANTOIN MONOHYDRATE/MACROCRYSTALLINE 100 MG: 25; 75 CAPSULE ORAL at 20:25

## 2024-02-04 RX ADMIN — Medication 1000 MG: at 08:26

## 2024-02-04 NOTE — PLAN OF CARE
Goal Outcome Evaluation:  Plan of Care Reviewed With: patient  Patient Agreement with Plan of Care: agrees     Progress: improving  Outcome Evaluation: Pt reports SI but declines to tell plan and reports that she has shared with MD alreading.  Pt reports anxiety 9/10 and depression 7/10.  Pt c/o chest pain, MD notifed orders received.  Pt able to rest without any event overnight.

## 2024-02-04 NOTE — PLAN OF CARE
Goal Outcome Evaluation:  Plan of Care Reviewed With: patient  Patient Agreement with Plan of Care: agrees        Outcome Evaluation: VSS. Anxiety & depression 6/10. Denies SI/HI/AVH & pain. Interacted well w/ peers and participated in groups. Continue plan of care.

## 2024-02-04 NOTE — SIGNIFICANT NOTE
02/04/24 1507   Group Therapy Session   Group Attendance attended group session   Time Session Began 1410   Time Session Ended 1500   Group Type psychotherapeutic   Group Topic Covered cognitive therapy techniques;emotions/expression   Group Session Detail Utilized DBT activity to identify strengths, values and supports while highlighting areas to grow.   Patient Participation/Contribution cooperative with task;discussed personal experience with topic;expressed understanding of topic;listened actively;organized   Patient Participation Detail patient engaged in group wile simultaneously completeing puzzle   Affect During Group affect consistent with mood;appropriate to situation   Degree of Insight moderate

## 2024-02-04 NOTE — THERAPY TREATMENT NOTE
"DATA:    Therapist met with the patient individually. Therapist continues reviewing plan of care and aftercare plan.  The patient was agreeable.    ASSESSMENT:    Patient was seen for follow up of SI and depression.       Today, patient was seen 1-1 in office. The patient's mood appears neutral, affect congruent, thought content normal. Patient reports sleep is Fair and appetite is Poor stating \"its gradually getting worse.\" On scale 1-10, patient rates depression 8 and anxiety 7. Patient denies hallucinations, SI/HI. Patient reports death wish \"is an option but its not as strong as it was in the past.\"  Patient reports feeling \"not the best physically which is affecting me mentally. I feel blah.\" Patient reports having chest pain last night due to stress but could not identify source of stress, psychiatrist notified. Patient reports her goal for today is to start creating safety plan. Patient identified local support as [place] and friends in [place], [place] and [place]. Patient has outpatient follow up scheduled with established provider at [facility], therapist explored potential of increasing appointments however patient states she is agreeable to biweekly appointments due to finances. Patient identifies coping skills as yoga, manual labor (remodeling living area), deep breathing, PRN medications, guided meditations, puzzles, and Jesús (service animal). Patient identifies lethargy, irritability, and poor sleep as warning signs. Therapist suggests patient keep weekly med planner, and explore limiting access to means with roommate.    Patient reports needing follow up scheduled with med provider and interest in exploring addition of sleep medication to regimen. Therapist also encouraged patient to establish minimum of 2 rest/self care days per week to engage in pleasurable activities.        CLINICAL MANEUVERING:    Therapist provided safe, secure environment for patient to share.  Provided reflective " listening and psychoeducation.  Assisted patient in processing the above session. Assisted patient in identifying any questions or needs to be addressed today. Assisted patient in establishing safety plan (social and professional supports, coping skills, identify warning sings, limiting access to means). Patient expressed interest in starting sleep medication to improve sleep hygiene, psychiatrist notified.     Plan:     Patient to remain hospitalized this date.      Treatment team will focus efforts on stabilizing patient's acute symptoms while providing education on healthy coping and crisis management to reduce hospitalizations.   Patient requires daily psychiatrist evaluation and 24/7 nursing supervision to promote patient  safety.     Therapist will offer 1-4 individual sessions, family education, and appropriate referral.     Patient anticipates discharge tomorrow for Spravato treatment. Patient to follow up with established care at [facility] for continued treatment.

## 2024-02-04 NOTE — PROGRESS NOTES
"    Inpatient Psych Progress Note     Clinician: Macy Ugalde MD  Admission Date: 1/29/2024  11:47 EST   02/04/24    Behavioral Health Treatment Plan and Problem List: I have reviewed and approved the Behavioral Health Treatment Plan and Problem list.    Allergies  Allergies   Allergen Reactions    Chlorzoxazone Unknown - High Severity and Swelling     Lorzone, muscle relaxant.    Iodine Anaphylaxis     Topical makes her swell  Anaphylaxis with IV contrast (when she was ~ 9yrs old)    Phenazopyridine Hcl Swelling and Anaphylaxis    Pyridium [Phenazopyridine] Anaphylaxis    Quinine Unknown - High Severity     Has malaria symptoms    Valproic Acid Other (See Comments)     Liver failure  Liver failure  Liver failure; lupus like symptoms and liver failure    Methocarbamol Other (See Comments)     Made her feel \"suicidal\" and gave her less control over her actions.    Iodinated Contrast Media Unknown - Low Severity    Codeine Itching    Diphenhydramine Anxiety     Pt has severe panic attack symptoms when given benadryl IV. Needs to take a benzodiazapine med concurrently when getting benadryl.        Hospital Day: 6 days    Assessment completed within view of staff    History  CC/clinical focus: SI, depression    Interval HPI: Patient seen and evaluated by me.  Chart reviewed. Discussed with treatment team and unit staff. No major issues overnight. VSS. Med compliant. Received PRN hydroxyzine yesterday. No other PRNs required. Pt has been attending and participating in therapeutic activities without issue. Interactions with staff and peers has been appropriate. Last night, pt c/o R-sided chest pain 4/10 unrelieved by hydroxyzine. 1x ibuprofen 600 mg administered and EKG obtained, showing NSR w/ HR 64. Pt reported full resolution of pain within 1 hour, and rested uneventfully for the remainder of the evening. She endorsed SI but declined to share plan, stating that she had already informed me of plan.  On interview " "today, patient reports that she is feeling more down compared to yesterday and the day before, and thinks that this might be due to uncertainty about \"checking out\".  She also admits that this is causing her more anxiety.  Although she remains hopeful that she will improve in mood and suicidality soon, she recognizes that she needs to \"start walking the steps\" to start building confidence that she can make progress.  She denies SI right now, and does not think that she has had any today, she continues to have a death wish- \"If I didn't wake up I would be perfectly fine with that\".  She does believe that she will be able to maintain her safety at home while pursuing Spravato treatment over the next month.  She says that if she were to go home right now she is unsure if she has any desire to act on her suicidal thoughts, but feels \"more and less okay either way right now\".  Patient endorses feeling a little nauseous and anxious after talking.  She also reports that her head has been feeling \"wonky\" whenever she eats, feeling dizzy, unsteady on her feet, with tunnel vision.  She suspects that she has orthostatic, but vital signs this morning were normal.  Orthostatic vital signs were checked and were positive for orthostasis based on increase in heart rate.  Patient agreeable to starting low-dose midodrine with each meal to limit orthostatic hypotension. ROS otherwise as below.     Interval Review of Systems:   General ROS: negative for - fever or malaise, + fatigue  Endocrine ROS: negative for - palpitations  Respiratory ROS: no cough, shortness of breath, or wheezing  Cardiovascular ROS: no chest pain or dyspnea on exertion  Gastrointestinal ROS: no abdominal pain, no black or bloody stools    /77 (BP Location: Left arm, Patient Position: Sitting)   Pulse 85   Temp 98.1 °F (36.7 °C) (Oral)   Resp 16   Ht 160.8 cm (63.3\")   Wt 62.8 kg (138 lb 6.4 oz)   SpO2 97%   BMI 24.28 kg/m²     Mental Status " Exam  Behavior: Cooperative, pleasant, good eye contact  Psychomotor activity: Calm; barely perceptible fine bilateral hand tremor upon extension  Orientation: x4  Mood: depressed  Affect: mood incongruent, mostly euthymic and full  Thought Process: linear, organized, goal-directed  Thought Content: No delusions voiced, improving future orientation  Hallucinations: Denies AVH, no RIS observed  Concentration: Fair  Suicidal Ideation: Denies current SI  Homicidal Ideation: Denies  Hopelessness: Denies today  Insight: Limited but improving  Judgement: Limited but improving    Medical Decision Making:   Labs:     Lab Results (last 24 hours)       Procedure Component Value Units Date/Time    POC Glucose Once [[id number]]  (Normal) Collected: 02/03/24 1157    Specimen: Blood Updated: 02/03/24 1526     Glucose 81 mg/dL      Comment: Serial Number: [id number]:  [id number]                 Radiology:     Imaging Results (Last 24 Hours)       ** No results found for the last 24 hours. **              EKG:     ECG/EMG Results (most recent)       None             Medications:  cholecalciferol, 1,000 Units, Oral, Daily  D-Mannose, 2 capsule, Oral, Daily With Breakfast  estradiol, 2 g, Vaginal, Once per day on Monday Wednesday Friday  FLUoxetine, 40 mg, Oral, Daily  Liraglutide, 1.8 mg, Subcutaneous, QAM  multivitamin with minerals, 1 tablet, Oral, Daily  nitrofurantoin (macrocrystal-monohydrate), 100 mg, Oral, Q12H  pantoprazole, 40 mg, Oral, Daily  pramipexole, 0.125 mg, Oral, BID  prazosin, 2 mg, Oral, Nightly          All medications reviewed.    Assessment and Plan:      Major depressive disorder, recurrent, severe without psychotic features  Suicidal ideation with recent attempt by overdose on hydroxyzine  PTSD  Conversion disorder with non-epileptic seizures  R/o Cluster B personality disorder  - Continue hospitalization on the [facility] for safety and stabilization.  - Appropriate precautions ordered; SP3  -  Encourage engagement in individual, group, and family therapies as appropriate.  - Collateral as needed  - Funding for 1 month trial of Spravato approved through [facility] [place]. [employer] form signed today, and urgent authorization has been requested. Per pharmacy, pt can begin treatment on Monday afternoon. Plan to utilize 84 mg twice weekly for indication of severe depression with acute suicidality.   - Continue Prozac 40 mg QD for now. Pt will need to maintain on an antidepressant to pursue Spravato augmentation. Pt has long history of medication trail failures due to ineffectiveness and/or adverse side effects, and will likely benefit from GeneSight testing on an outpatient basis.   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 Pt will require gradual titration over the next several weeks to reach target dose of at least 1 mg daily for mood, but has already shown significant improvement in tremors/RLS. Plan to titrate dose by 0.125 mg every 3-5 days as tolerated, with next titration due 2/5.   - Continue prazosin 2 mg QHS for trauma-related hyperarousal. Staff advised to administer only when pt is about to go to bed to limit risk of dizziness and falls. Will likely benefit from addition of daytime dose if able to tolerate it.     T2DM  - Diabetic diet, Accu-cheks TIDAC, low-dose SSI as needed, hypoglycemic protocol; HbA1c 5.5  - Continue home Victoza 1.8 mg QAM using home supply      History of recurrent UTI  - Admission UA WNL; pt currently asymptomatic  - Continue home Macrobid 100 mg BID for UTI prophylaxis     Constipation, resolved  - Pt reports that she only has BM every 2-3 weeks. Constipation now resolved.  - Continue magnesium citrate 150 mg BID PRN constipation.      RLS/Essential tremor  - Continue Mirapex as above     History of orthostatic hypotension  - Holding home midodrine 5 mg BID; pt had not yet started taking, pt asymptomatic since admission  -  Continue to monitor, especially considering initiation of prazosin. If pt becomes symptomatic, will hold off on titration of prazosin and consider starting midodrine.      History of Vit D deficiency  - Continue cholecalciferol QD  - Vit D level WNL on admission     Post-menopausal osteoporosis  - Restarted ibandronate 150 mg monthly using home supply 2/1     Hot flashes  - Continue home estradiol cream three times weekly    GERD  - Continue Protonix 40 mg QD     Orthostatic hypotension  - Start midodrine 2.5 mg TIDAC  - Will continue to monitor orthostatics PRN.  - Pt advised to sit down slowly if experiencing dizziness/lightheadedness    Continue hospitalization for safety and stabilization.  Continue current level of Special Precautions (q15 minute checks). Plan to discharge from [facility] on Monday directly to [facility] speciality pharmacy to initiate Spravato treatment.       This document has been electronically signed by Macy Ugalde MD.  February 4, 2024 11:47 EST

## 2024-02-04 NOTE — NURSING NOTE
2005  Pt reports R side chest pain 4/10 that does not radiate, pulsates, and intermittently stabbing. Pt believes pain is r/t anxiety but not relieved by meds. Dr. Ugalde aware, one time dose ibuprofen ordered. EKG shows NSR 64.    2100  Pt states pain is 0/10.

## 2024-02-04 NOTE — SIGNIFICANT NOTE
02/04/24 1236   Group Therapy Session   Group Attendance attended group session   Time Session Began 1030   Time Session Ended 1100   Total Time (minutes) 30   Group Type psychotherapeutic   Group Topic Covered balanced lifestyle;problem solving   Literature/Videos Given topic handouts   Literature/Videos Given Comments identify advantages of current life situation, personal strengths, and positive traits. Develop action plan to increase activies that give meaning and satisfaction to life.   Patient Participation Detail Patient meeting with therapist for majority of groupl. Literature provided for patient to complete independently

## 2024-02-05 ENCOUNTER — DISEASE STATE MANAGEMENT VISIT (OUTPATIENT)
Dept: PHARMACY | Facility: HOSPITAL | Age: 59
End: 2024-02-05
Payer: MEDICARE

## 2024-02-05 VITALS
SYSTOLIC BLOOD PRESSURE: 127 MMHG | DIASTOLIC BLOOD PRESSURE: 78 MMHG | RESPIRATION RATE: 18 BRPM | TEMPERATURE: 98.4 F | OXYGEN SATURATION: 98 % | HEART RATE: 62 BPM | HEIGHT: 63 IN | BODY MASS INDEX: 24.52 KG/M2 | WEIGHT: 138.4 LBS

## 2024-02-05 VITALS — SYSTOLIC BLOOD PRESSURE: 117 MMHG | DIASTOLIC BLOOD PRESSURE: 67 MMHG

## 2024-02-05 DIAGNOSIS — F33.2 SEVERE EPISODE OF RECURRENT MAJOR DEPRESSIVE DISORDER, WITHOUT PSYCHOTIC FEATURES: ICD-10-CM

## 2024-02-05 DIAGNOSIS — R45.851 SUICIDAL IDEATION: ICD-10-CM

## 2024-02-05 PROCEDURE — G0463 HOSPITAL OUTPT CLINIC VISIT: HCPCS

## 2024-02-05 PROCEDURE — 99239 HOSP IP/OBS DSCHRG MGMT >30: CPT | Performed by: STUDENT IN AN ORGANIZED HEALTH CARE EDUCATION/TRAINING PROGRAM

## 2024-02-05 RX ORDER — PRAMIPEXOLE DIHYDROCHLORIDE 0.5 MG/1
0.5 TABLET ORAL 2 TIMES DAILY
Qty: 60 TABLET | Refills: 0 | Status: ON HOLD | OUTPATIENT
Start: 2024-02-05 | End: 2024-02-13

## 2024-02-05 RX ORDER — PRAMIPEXOLE DIHYDROCHLORIDE 0.12 MG/1
TABLET ORAL
Qty: 75 TABLET | Refills: 0 | Status: SHIPPED | OUTPATIENT
Start: 2024-02-05 | End: 2024-02-13 | Stop reason: HOSPADM

## 2024-02-05 RX ORDER — PANTOPRAZOLE SODIUM 40 MG/1
40 TABLET, DELAYED RELEASE ORAL DAILY
Qty: 30 TABLET | Refills: 0 | Status: ON HOLD | OUTPATIENT
Start: 2024-02-06 | End: 2024-02-13

## 2024-02-05 RX ORDER — ESKETAMINE HYDROCHLORIDE 28 MG/.2ML
84 SOLUTION NASAL 2 TIMES WEEKLY
Qty: 24 EACH | Refills: 0 | Status: CANCELLED | OUTPATIENT
Start: 2024-02-05

## 2024-02-05 RX ORDER — PRAZOSIN HYDROCHLORIDE 2 MG/1
2 CAPSULE ORAL NIGHTLY
Qty: 30 CAPSULE | Refills: 0 | Status: SHIPPED | OUTPATIENT
Start: 2024-02-05 | End: 2024-02-13 | Stop reason: HOSPADM

## 2024-02-05 RX ADMIN — MIDODRINE HYDROCHLORIDE 2.5 MG: 5 TABLET ORAL at 08:32

## 2024-02-05 RX ADMIN — NITROFURANTOIN MONOHYDRATE/MACROCRYSTALLINE 100 MG: 25; 75 CAPSULE ORAL at 08:29

## 2024-02-05 RX ADMIN — MIDODRINE HYDROCHLORIDE 2.5 MG: 5 TABLET ORAL at 11:58

## 2024-02-05 RX ADMIN — Medication 1000 MG: at 08:27

## 2024-02-05 RX ADMIN — PANTOPRAZOLE SODIUM 40 MG: 40 TABLET, DELAYED RELEASE ORAL at 08:31

## 2024-02-05 RX ADMIN — FLUOXETINE HYDROCHLORIDE 40 MG: 20 CAPSULE ORAL at 08:28

## 2024-02-05 RX ADMIN — Medication 1000 UNITS: at 08:27

## 2024-02-05 RX ADMIN — PRAMIPEXOLE DIHYDROCHLORIDE 0.12 MG: 0.25 TABLET ORAL at 08:31

## 2024-02-05 RX ADMIN — Medication 1 TABLET: at 08:29

## 2024-02-05 NOTE — THERAPY DISCHARGE NOTE
Therapist met 1-1 in the office. Patient calm/cooperative, oriented x 4.  Patient noted to have appropriate affect, congruent mood, normal thought content. Patient denies SI/HI/AVH and acute symptoms.   Safety planning conducted; patient/family denies access to firearms, weapons, medications. Medications were recommended to be safe guarded and for family to administer with patient.     Assisted patient in identifying risk factors which would indicate the need for higher level of care including thoughts to harm self or others and/or self-harming behavior and encouraged patient to call [phone], call 911, or present to the nearest emergency room should any of these events occur. Discussed crisis intervention services and means to access.  Patient adamantly and convincingly denies current suicidal or homicidal ideation or perceptual disturbance.    Therapist completed the following safety plan with the patient       SAFETY/MENTAL HEALTH PLAN    1. Recognizing warning signs: Warning signs that a crisis may be developing such as thoughts, images, mood, situation, behaviors:  Lethargy, Poor Sleep, Irritability.         2. Internal coping strategies: Things that the patient can do to take their mind off problems without contacting another person such as relaxation techniques, physical activity, etc:  Yoga, Deep Breathing, Manual Labor (Remodeling Living Area).       3. Socialization strategies for distraction and support: People and social settings that provide distraction or support - names or places, telephone:   [place], Friends.       4. Social contact for assistance in resolving crisis: People the patient can ask for help - names and telephone:  [place], Friends.       5. Professionals or agencies contacts to help resolve crisis: Professionals or agencies the patient can contact during a crisis - clinician name/location/phone/emergency contact number, local urgent care services with address/phone, National Suicide Prevention  Lifeline (427), Emergency contact 911:    Mely Block, [facility]        6. Means restriction: Secure sharps, medications, safeguard weapons, identified crisis plan, made multiple outpatient provider appointments for follow up care.

## 2024-02-05 NOTE — PROGRESS NOTES
Ambulatory Care Clinic  Spravato® (esketamine) Initiation     Zo Palma is a 59 y.o. female that is being seen in the Ambulatory Care Clinic for treatment of depressive symptoms with suicidal ideation. Zo Palma is being evaluated for appropriateness of Spravato therapy. Patient has appropriate trial and failure of the following antidepressants/adjuvant therapies since diagnosis: Patient evaluated for all relevant medical history that could potentially disqualify them from treatment below:     Aneurysmal vascular disease (including thoracic and abdominal aorta, intracranial, and peripheral arterial vessels)? No  Arteriovenous malformation No  History of intracerebral hemorrhage No  Significant cardiovascular history? No  Severe hepatic impairment (Child-Gutierrez class C)? No  Pregnant, breastfeeding, or planning to become pregnant? No  If childbearing potential, on effective contraception? No    Past Medical History:   Diagnosis Date    Ankle sprain     Anxiety     Arthritis of back     Arthritis of neck     Bursitis of hip     Cervical disc disorder     Depression     Fracture of wrist     Fracture, finger     Fracture, foot     Frozen shoulder     Hip arthrosis     Hormone disorder     Insomnia     Interstitial cystitis     Kidney stone     Lumbosacral disc disease     Migraines     Obesity     Periarthritis of shoulder     Peripheral neuropathy     Rotator cuff syndrome     Scoliosis 11/2023    Seizures 2001    Conversion Disorder    Subluxation of patella     Urinary incontinence     Urinary tract infection      Social History     Socioeconomic History    Marital status:     Number of children: 2    Highest education level: Master's degree (e.g., MA, MS, Pancho, MEd, MSW, SULY)   Tobacco Use    Smoking status: Never     Passive exposure: Past    Smokeless tobacco: Never   Vaping Use    Vaping Use: Never used   Substance and Sexual Activity    Alcohol use: Yes     Alcohol/week: 1.0 standard drink of  "alcohol     Types: 1 Drinks containing 0.5 oz of alcohol per week     Comment: Occasionally    Drug use: Never    Sexual activity: Not Currently     Partners: Male     Birth control/protection: Post-menopausal, Hysterectomy     Allergies   Allergen Reactions    Chlorzoxazone Unknown - High Severity and Swelling     Lorzone, muscle relaxant.    Iodine Anaphylaxis     Topical makes her swell  Anaphylaxis with IV contrast (when she was ~ 9yrs old)    Phenazopyridine Hcl Swelling and Anaphylaxis    Pyridium [Phenazopyridine] Anaphylaxis    Quinine Unknown - High Severity     Has malaria symptoms    Valproic Acid Other (See Comments)     Liver failure  Liver failure  Liver failure; lupus like symptoms and liver failure    Methocarbamol Other (See Comments)     Made her feel \"suicidal\" and gave her less control over her actions.    Iodinated Contrast Media Unknown - Low Severity    Codeine Itching    Diphenhydramine Anxiety     Pt has severe panic attack symptoms when given benadryl IV. Needs to take a benzodiazapine med concurrently when getting benadryl.      Current Outpatient Medications   Medication Sig Dispense Refill    Esketamine HCl, 84 MG Dose, Nasal Solution Instill 6 sprays into the nostril(s) as directed 2 (Two) Times a Week. Deliver to practitioner at registered location for administration. 24 each 0    D-MANNOSE PO Take 1,000 mg by mouth 2 (Two) Times a Day.      estradiol (ESTRACE) 0.1 MG/GM vaginal cream Insert 2 applicators into the vagina 3 (Three) Times a Week. Indications: Bladder Inflammation      FLUoxetine (PROzac) 40 MG capsule Take 1 capsule by mouth Daily. Indications: Major Depressive Disorder 30 capsule 2    hydrOXYzine (ATARAX) 10 MG tablet Take 1 tablet by mouth 2 (Two) Times a Day As Needed (anxiety and/or sleep). 60 tablet 1    ibandronate (BONIVA) 150 MG tablet Take 1 tablet by mouth Every 30 (Thirty) Days. 3 tablet 3    Liraglutide (Victoza) 18 MG/3ML solution pen-injector injection " Inject 1.8 mg under the skin into the appropriate area as directed Daily. Indications: Type 2 Diabetes      midodrine (PROAMATINE) 5 MG tablet Take 1 tablet by mouth 2 (Two) Times a Day. 60 tablet 2    multivitamin with minerals tablet tablet Take 1 tablet by mouth Daily. Indications: nutrtition supplement      nitrofurantoin, macrocrystal-monohydrate, (Macrobid) 100 MG capsule TAKE 1 CAPSULE BY MOUTH TWICE DAILY X 7 DAYS, THEN TAKE 1 CAPSULE NIGHTLY FOR SUPPRESSIVE THERAPY E'COLI UTIs 56 capsule 3    Omega 3-6-9 Fatty Acids (OMEGA 3-6-9 COMPLEX PO) Take 1 capsule by mouth Daily.      [START ON 2/6/2024] pantoprazole (PROTONIX) 40 MG EC tablet Take 1 tablet by mouth Daily. 30 tablet 0    pramipexole (MIRAPEX) 0.125 MG tablet SEE PROVIDER PRINT OUT FOR INSTRUCTIONS 75 tablet 0    pramipexole (Mirapex) 0.5 MG tablet Take 1 tablet by mouth 2 (Two) Times a Day. Start after completing titration of pramipexole using 0.125 mg tabs (separate rx provided) 60 tablet 0    prazosin (MINIPRESS) 2 MG capsule Take 1 capsule by mouth Every Night. 30 capsule 0    vitamin D (ERGOCALCIFEROL) 1.25 MG (07554 UT) capsule capsule Take 1 capsule by mouth 1 (One) Time Per Week.       Current Facility-Administered Medications   Medication Dose Route Frequency Provider Last Rate Last Admin    Esketamine HCl (84 MG Dose) Nasal Solution 84 mg  84 mg Nasal Once per day on Monday Wednesday Macy Ugalde MD           PHQ-9 Depression Screening  Little interest or pleasure in doing things? 3-->nearly every day   Feeling down, depressed, or hopeless? 3-->nearly every day   Trouble falling or staying asleep, or sleeping too much? 3-->nearly every day   Feeling tired or having little energy? 3-->nearly every day   Poor appetite or overeating? 2-->more than half the days   Feeling bad about yourself - or that you are a failure or have let yourself or your family down? 3-->nearly every day   Trouble concentrating on things, such as reading the  newspaper or watching television? 2-->more than half the days   Moving or speaking so slowly that other people could have noticed? Or the opposite - being so fidgety or restless that you have been moving around a lot more than usual? 0-->not at all   Thoughts that you would be better off dead, or of hurting yourself in some way? 3-->nearly every day   PHQ-9 Total Score 22   If you checked off any problems, how difficult have these problems made it for you to do your work, take care of things at home, or get along with other people?        PHQ-9 History Dates   Baseline: 22 2/5/2024                                 Current Anti-Depressant Regimen:   Fluoxetine 40mg QD    Drug Interactions Screening:  Medications were reviewed for drug-drug interactions. were identified.    Initial Education for Spravato:  The patient has been provided with the following education for SPRAVATO (esketamine). All questions and concerns have been addressed prior to the patient receiving the medication, and the patient has verbalized understanding of the education and any materials provided. Additional patient education shall be provided and documented upon request by the patient, provider or payer.      The following counseling points for Spravato were addressed:  Goal of treatment:  This medication is used in combination with oral anti-depressants for the treatment of either treatment-resistant depression (TRD) or depressive symptoms in adults with major depressive disorder with acute suicidal thoughts or actions.  The goal of treatment is to reduce depressive symptom occurrence and severity  Directions for use:  Spravato is administered intranasally under the supervision of a healthcare provider twice weekly for 4 weeks, then once weekly for 4 weeks, then once every 1-2 weeks based on response for TRD or twice weekly for 4 weeks for adults with suicidal thoughts or actions. Your provider will continue to evaluate the need to continue  therapy. You will administer the nasal spray yourself in clinic under our supervision for every dose. You will be provided education on proper administration technique and will have the opportunity to practice administration with a  device prior to giving yourself the first dose. After administration, you must remain in the clinic for monitoring for a minimum of 2 hours.  Administration instructions reviewed: the healthcare provider will inspect the device to make sure it is ready to use; (1) blow your nose before using first device (2) hold device with your thumb supporting the plunger and do not press (3) recline your head at about 45 degrees during administration to keep the medication inside your nose (4) insert tip of device into first nostril until the nose rest is touching the skin between your nostrils (5) close your opposite nostril and breathe through your nose while pushing the plunger all the way up until it stops (6) sniff gently after spraying to keep medication in nose (7) switch hands to insert tip of same device into second nostril and repeat administration (8) give device back to healthcare provider so they can inspect and ensure dose was given (9) rest comfortably for 5 minutes before repeating process with next device  To reduce risk of nausea and vomiting, avoid food for at least 2 hours before administration and avoid drinking liquids at least 30 minutes prior to administration of Spravato  If you take a nasal corticosteroid or nasal decongestant, take at least 1 hour prior to appointment  If instructed by your referring provider, hold medication(s) that may increase your BP or increase risk of sedation the morning prior to your appointment.    After administration, you cannot drive or operate heavy machinery until the next day after a restful sleep. You should not do anything that you need to be completely alert for. You must have a pre-arranged  to take you home from each  visit.  Adverse effects:  Spravato can cause serious side effects including:  Sedation and dissociation: may cause sleepiness, fainting, dizziness, spinning sensation, or feeling disconnected from yourself, thoughts, feelings, space and time (dissociation)  Abuse and misuse: tell your healthcare provider if you have ever abused or been dependent on alcohol, prescription medicines, or street drugs.  Antidepressants may increase suicidal thoughts and actions in some people 24 years of age and younger, especially in the first few months of treatment or when the dose is changed  Pay close attention to any changes in mood, behavior, thoughts, feelings, or if you develop any suicidal thoughts or actions and tell your healthcare provider right away  May cause increased blood pressure that can last for about 4 hours after taking a dose. We will check your blood pressure before taking Spravato, 40 minutes after your dose, and again 2 hours after your dose. Tell your healthcare provider right away if you get chest pain, shortness of breath, sudden severe headache, change in vision, or seizures after taking dose.  Monitor for signs/symptoms of a urinary tract infection including frequent or urgent need to urinate, pain while urinating, or urinating frequently at night.  Call 911 or go to the nearest ED with any signs or symptoms of allergic reaction  The most common side effects of Spravato when used along with an antidepressant taken by mouth include: dissociation, dizziness, nausea/vomiting, sleepiness, numbness, anxiety, lack of energy, feeling drunk, increased blood pressure  Follow-Up:  It is very important that you keep your appointments for administration days. If you miss or are unable to keep an appointment, contact clinic as soon as possible to reschedule appropriately.  Make sure to tell your provider and pharmacist about all medications you are taking, including herbal supplements and OTC products at each visit.  Contact clinic if any new medications are started during treatment.  Other considerations:  REMS Program: due to the risk of serious adverse events resulting from sedation, dissociation, abuse, and misuse, Spravato is only available through a REMS program. You are required to complete the patient enrollment form for this program prior to starting Spravato.    Zo Palma was given the opportunity to practice self-administration with the  device. Patient instructed on proper administration technique and is able to successfully self-administer product under supervision of healthcare professional.     Assessment/Plan:  Patient's current antidepressant treatment regimen includes Fluoxetine 40mg QD and patient will continue use of oral antidepressant during Spravato treatment. Due to patient's multiple antidepressant therapy failures, lack of any contraindicating disease states, ability to comply with treatment requirements, and diagnosis of depressive symptoms with suicidal ideation, Spravato therapy initiation is appropriate at this time. Referring provider didn't indicate that the patient should hold any medications the morning prior to administration.    Patient denies  any new medications or changes in dosages from what was reviewed in EPIC. Patient aware this medication can never be dispensed directly to them and is always with the healthcare professional who will hand to them at time of administration and then disposed of by healthcare professional. Patient was enrolled in the Spravato REMS program for monitoring. SPRAVATO® REMS Patient Confirmation Number: [id number]  NDC: 21212-898-84  LOT: [id number]  Exp: 1/2027  Will initiate Spravato 84 mg intranasal once for administration in the [facility]. Patient monitored for a minimum of 2 hours in clinic according to protocol. Oral and written education provided as above prior to administration of first dose.    Ana Laura Rowe,  PharmD  02/05/24  16:00 EST

## 2024-02-05 NOTE — PLAN OF CARE
Goal Outcome Evaluation:  Plan of Care Reviewed With: patient  Patient Agreement with Plan of Care: agrees     Progress: improving  Outcome Evaluation: Pt was calm and cooperative to care. Pt ranked anxiety as an 8/10 and depression as a 7/10. Pt recieved PRN atarax for anxiey. Pt denied SI, HI, AVH. Pt slept well overnight with minimal awakenings. Continue current plan of care.
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
## 2024-02-05 NOTE — PLAN OF CARE
Goal Outcome Evaluation:  Plan of Care Reviewed With: patient  Patient Agreement with Plan of Care: agrees     Progress: improving  Outcome Evaluation: Pt ready for discharge.  Given discharge instructions including follow ups, verbalized understanding.

## 2024-02-05 NOTE — SIGNIFICANT NOTE
02/05/24 1229   Group Therapy Session   Group Attendance attended group session   Time Session Began 1100   Time Session Ended 1130   Total Time (minutes) 30   Group Type psychotherapeutic   Group Topic Covered other (see comments)  (Positive Psychology)   Literature/Videos Given topic handouts   Group Session Detail Patients are asked to list five things that make life enjoyable or worthwhile. Then they are asked to describe their most recent difficult situation, or a recent time something did not go their way. Finally, they reflect on and describe silver linings of the difficult situation.   Patient Participation/Contribution cooperative with task   Patient Participation Detail Literature provided for patient to complete activity independently.   Affect During Group affect consistent with mood   Degree of Insight moderate

## 2024-02-06 LAB
QT INTERVAL: 410 MS
QTC INTERVAL: 422 MS

## 2024-02-07 ENCOUNTER — DISEASE STATE MANAGEMENT VISIT (OUTPATIENT)
Dept: PHARMACY | Facility: HOSPITAL | Age: 59
End: 2024-02-07
Payer: MEDICARE

## 2024-02-07 VITALS — SYSTOLIC BLOOD PRESSURE: 107 MMHG | DIASTOLIC BLOOD PRESSURE: 67 MMHG

## 2024-02-07 PROCEDURE — G0463 HOSPITAL OUTPT CLINIC VISIT: HCPCS

## 2024-02-07 NOTE — PROGRESS NOTES
[facility]  Spravato® (esketamine) Administration     Zo Palma is a 59 y.o. female that is being seen in the [facility] for continued treatment of depressive symptoms with suicidal ideation. Spravato therapy was initiated on 02/05/2024. Patient re-evaluated for all relevant medical history that could potentially disqualify them from treatment below:     Aneurysmal vascular disease (including thoracic and abdominal aorta, intracranial, and peripheral arterial vessels)? No  Arteriovenous malformation No  History of intracerebral hemorrhage No  Significant cardiovascular history? No  Severe hepatic impairment (Child-Gutierrez class C)? No  Pregnant, breastfeeding, or planning to become pregnant? No  If childbearing potential, on effective contraception? No    Past Medical History:   Diagnosis Date    Ankle sprain     Anxiety     Arthritis of back     Arthritis of neck     Bursitis of hip     Cervical disc disorder     Depression     Fracture of wrist     Fracture, finger     Fracture, foot     Frozen shoulder     Hip arthrosis     Hormone disorder     Insomnia     Interstitial cystitis     Kidney stone     Lumbosacral disc disease     Migraines     Obesity     Periarthritis of shoulder     Peripheral neuropathy     Rotator cuff syndrome     Scoliosis 11/2023    Seizures 2001    Conversion Disorder    Subluxation of patella     Urinary incontinence     Urinary tract infection      Social History     Socioeconomic History    Marital status:     Number of children: 2    Highest education level: Master's degree (e.g., MA, MS, Pancho, MEd, MSW, SULY)   Tobacco Use    Smoking status: Never     Passive exposure: Past    Smokeless tobacco: Never   Vaping Use    Vaping Use: Never used   Substance and Sexual Activity    Alcohol use: Yes     Alcohol/week: 1.0 standard drink of alcohol     Types: 1 Drinks containing 0.5 oz of alcohol per week     Comment: Occasionally    Drug use: Never    Sexual  "activity: Not Currently     Partners: Male     Birth control/protection: Post-menopausal, Hysterectomy     Allergies   Allergen Reactions    Chlorzoxazone Unknown - High Severity and Swelling     Lorzone, muscle relaxant.    Iodine Anaphylaxis     Topical makes her swell  Anaphylaxis with IV contrast (when she was ~ 9yrs old)    Phenazopyridine Hcl Swelling and Anaphylaxis    Pyridium [Phenazopyridine] Anaphylaxis    Quinine Unknown - High Severity     Has malaria symptoms    Valproic Acid Other (See Comments)     Liver failure  Liver failure  Liver failure; lupus like symptoms and liver failure    Methocarbamol Other (See Comments)     Made her feel \"suicidal\" and gave her less control over her actions.    Iodinated Contrast Media Unknown - Low Severity    Codeine Itching    Diphenhydramine Anxiety     Pt has severe panic attack symptoms when given benadryl IV. Needs to take a benzodiazapine med concurrently when getting benadryl.      Current Outpatient Medications   Medication Sig Dispense Refill    D-MANNOSE PO Take 1,000 mg by mouth 2 (Two) Times a Day.      Esketamine HCl, 84 MG Dose, Nasal Solution Instill 6 sprays into the nostril(s) as directed 2 (Two) Times a Week. Deliver to practitioner at registered location for administration. 24 each 0    estradiol (ESTRACE) 0.1 MG/GM vaginal cream Insert 2 applicators into the vagina 3 (Three) Times a Week. Indications: Bladder Inflammation      FLUoxetine (PROzac) 40 MG capsule Take 1 capsule by mouth Daily. Indications: Major Depressive Disorder 30 capsule 2    hydrOXYzine (ATARAX) 10 MG tablet Take 1 tablet by mouth 2 (Two) Times a Day As Needed (anxiety and/or sleep). 60 tablet 1    ibandronate (BONIVA) 150 MG tablet Take 1 tablet by mouth Every 30 (Thirty) Days. 3 tablet 3    Liraglutide (Victoza) 18 MG/3ML solution pen-injector injection Inject 1.8 mg under the skin into the appropriate area as directed Daily. Indications: Type 2 Diabetes      midodrine " (PROAMATINE) 5 MG tablet Take 1 tablet by mouth 2 (Two) Times a Day. 60 tablet 2    multivitamin with minerals tablet tablet Take 1 tablet by mouth Daily. Indications: nutrtition supplement      nitrofurantoin, macrocrystal-monohydrate, (Macrobid) 100 MG capsule TAKE 1 CAPSULE BY MOUTH TWICE DAILY X 7 DAYS, THEN TAKE 1 CAPSULE NIGHTLY FOR SUPPRESSIVE THERAPY E'COLI UTIs 56 capsule 3    Omega 3-6-9 Fatty Acids (OMEGA 3-6-9 COMPLEX PO) Take 1 capsule by mouth Daily.      pantoprazole (PROTONIX) 40 MG EC tablet Take 1 tablet by mouth Daily. 30 tablet 0    pramipexole (MIRAPEX) 0.125 MG tablet SEE PROVIDER PRINT OUT FOR INSTRUCTIONS 75 tablet 0    pramipexole (Mirapex) 0.5 MG tablet Take 1 tablet by mouth 2 (Two) Times a Day. Start after completing titration of pramipexole using 0.125 mg tabs (separate rx provided) 60 tablet 0    prazosin (MINIPRESS) 2 MG capsule Take 1 capsule by mouth Every Night. 30 capsule 0    vitamin D (ERGOCALCIFEROL) 1.25 MG (86415 UT) capsule capsule Take 1 capsule by mouth 1 (One) Time Per Week.       Current Facility-Administered Medications   Medication Dose Route Frequency Provider Last Rate Last Admin    Esketamine HCl (84 MG Dose) Nasal Solution 84 mg  84 mg Nasal Once per day on Monday Wednesday Macy Ugalde MD   84 mg at 02/05/24 162       PHQ-9 Depression Screening  Little interest or pleasure in doing things? 3-->nearly every day   Feeling down, depressed, or hopeless? 2-->more than half the days   Trouble falling or staying asleep, or sleeping too much? 3-->nearly every day   Feeling tired or having little energy? 3-->nearly every day   Poor appetite or overeating? 3-->nearly every day   Feeling bad about yourself - or that you are a failure or have let yourself or your family down? 2-->more than half the days   Trouble concentrating on things, such as reading the newspaper or watching television? 1-->several days   Moving or speaking so slowly that other people could have  noticed? Or the opposite - being so fidgety or restless that you have been moving around a lot more than usual? 0-->not at all   Thoughts that you would be better off dead, or of hurting yourself in some way? 3-->nearly every day   PHQ-9 Total Score 20   If you checked off any problems, how difficult have these problems made it for you to do your work, take care of things at home, or get along with other people?        PHQ-9 History Dates   Baseline: 22 02/05/2024 20 02/07/2024                             Current Anti-Depressant Regimen:   Fluoxetine 40 mg QD     Drug Interactions Screening:  Medication list updated and reviewed for drug-drug interactions with Spravato. Noted interaction with hydroxyzine and fluoxetine (CNS depression), will monitor closely for potential. Patient is also taking midodrine for orthostatic hypotension, monitoring closely for hypertension per protocol.     Follow-Up Monitoring:  Medication Changes: No  BP: within normal limits  Worsening Depression: No  Emergence of suicidal thoughts/behaviors: No  Signs/symptoms of abuse/misuse: No    Assessment/Plan:  Patient returning to clinic today for administration of next Spravato dose for depressive symptoms with suicidal ideation. Last dose administered was Spravato 84 mg given on 02/05/2024.  PHQ-9 score today of 20, represents decrease from last score. Patient reports tolerated last Spravato dose. Based on these factors, will order Spravato 84 mg intranasal x1 for administration in the [facility] today. Patient monitored for a minimum of 2 hours in clinic according to protocol.  Patient aware this medication can never be dispensed directly to them and is always with the healthcare professional who will hand to them at time of administration and then disposed of by healthcare professional. Patient was enrolled in the Spravato REMS program for monitoring. SPRAVATO® REMS Patient Confirmation Number: [id number]      Cathy SAN  Ml, Pharmacy Intern  02/07/24  10:04 EST

## 2024-02-08 ENCOUNTER — HOSPITAL ENCOUNTER (EMERGENCY)
Facility: HOSPITAL | Age: 59
Discharge: OTHER INSTITUTION | DRG: 885 | End: 2024-02-09
Attending: STUDENT IN AN ORGANIZED HEALTH CARE EDUCATION/TRAINING PROGRAM
Payer: MEDICARE

## 2024-02-08 VITALS
DIASTOLIC BLOOD PRESSURE: 77 MMHG | HEART RATE: 65 BPM | WEIGHT: 138 LBS | SYSTOLIC BLOOD PRESSURE: 112 MMHG | HEIGHT: 63 IN | RESPIRATION RATE: 18 BRPM | TEMPERATURE: 97.6 F | OXYGEN SATURATION: 98 % | BODY MASS INDEX: 24.45 KG/M2

## 2024-02-08 DIAGNOSIS — R45.851 VERBALIZES SUICIDAL THOUGHTS: ICD-10-CM

## 2024-02-08 DIAGNOSIS — F33.9 EPISODE OF RECURRENT MAJOR DEPRESSIVE DISORDER, UNSPECIFIED DEPRESSION EPISODE SEVERITY: Primary | ICD-10-CM

## 2024-02-08 LAB
ALBUMIN SERPL-MCNC: 3.9 G/DL (ref 3.5–5.2)
ALBUMIN/GLOB SERPL: 1.3 G/DL
ALP SERPL-CCNC: 84 U/L (ref 39–117)
ALT SERPL W P-5'-P-CCNC: 43 U/L (ref 1–33)
AMPHET+METHAMPHET UR QL: NEGATIVE
AMPHETAMINES UR QL: NEGATIVE
ANION GAP SERPL CALCULATED.3IONS-SCNC: 8.8 MMOL/L (ref 5–15)
APAP SERPL-MCNC: <5 MCG/ML (ref 0–30)
AST SERPL-CCNC: 30 U/L (ref 1–32)
BARBITURATES UR QL SCN: NEGATIVE
BASOPHILS # BLD AUTO: 0.05 10*3/MM3 (ref 0–0.2)
BASOPHILS NFR BLD AUTO: 0.7 % (ref 0–1.5)
BENZODIAZ UR QL SCN: NEGATIVE
BILIRUB SERPL-MCNC: 0.3 MG/DL (ref 0–1.2)
BILIRUB UR QL STRIP: NEGATIVE
BUN SERPL-MCNC: 20 MG/DL (ref 6–20)
BUN/CREAT SERPL: 22 (ref 7–25)
BUPRENORPHINE SERPL-MCNC: NEGATIVE NG/ML
CALCIUM SPEC-SCNC: 9.1 MG/DL (ref 8.6–10.5)
CANNABINOIDS SERPL QL: NEGATIVE
CHLORIDE SERPL-SCNC: 100 MMOL/L (ref 98–107)
CLARITY UR: CLEAR
CO2 SERPL-SCNC: 26.2 MMOL/L (ref 22–29)
COCAINE UR QL: NEGATIVE
COLOR UR: YELLOW
CREAT SERPL-MCNC: 0.91 MG/DL (ref 0.57–1)
DEPRECATED RDW RBC AUTO: 42.8 FL (ref 37–54)
EGFRCR SERPLBLD CKD-EPI 2021: 72.8 ML/MIN/1.73
EOSINOPHIL # BLD AUTO: 0.08 10*3/MM3 (ref 0–0.4)
EOSINOPHIL NFR BLD AUTO: 1.1 % (ref 0.3–6.2)
ERYTHROCYTE [DISTWIDTH] IN BLOOD BY AUTOMATED COUNT: 12.5 % (ref 12.3–15.4)
ETHANOL BLD-MCNC: <10 MG/DL (ref 0–10)
ETHANOL UR QL: <0.01 %
FENTANYL UR-MCNC: NEGATIVE NG/ML
GLOBULIN UR ELPH-MCNC: 3.1 GM/DL
GLUCOSE SERPL-MCNC: 111 MG/DL (ref 65–99)
GLUCOSE UR STRIP-MCNC: NEGATIVE MG/DL
HCT VFR BLD AUTO: 40.4 % (ref 34–46.6)
HGB BLD-MCNC: 13 G/DL (ref 12–15.9)
HGB UR QL STRIP.AUTO: NEGATIVE
HOLD SPECIMEN: NORMAL
HOLD SPECIMEN: NORMAL
IMM GRANULOCYTES # BLD AUTO: 0.03 10*3/MM3 (ref 0–0.05)
IMM GRANULOCYTES NFR BLD AUTO: 0.4 % (ref 0–0.5)
KETONES UR QL STRIP: NEGATIVE
LEUKOCYTE ESTERASE UR QL STRIP.AUTO: NEGATIVE
LYMPHOCYTES # BLD AUTO: 1.11 10*3/MM3 (ref 0.7–3.1)
LYMPHOCYTES NFR BLD AUTO: 14.6 % (ref 19.6–45.3)
MCH RBC QN AUTO: 29.7 PG (ref 26.6–33)
MCHC RBC AUTO-ENTMCNC: 32.2 G/DL (ref 31.5–35.7)
MCV RBC AUTO: 92.2 FL (ref 79–97)
METHADONE UR QL SCN: NEGATIVE
MONOCYTES # BLD AUTO: 0.55 10*3/MM3 (ref 0.1–0.9)
MONOCYTES NFR BLD AUTO: 7.2 % (ref 5–12)
NEUTROPHILS NFR BLD AUTO: 5.79 10*3/MM3 (ref 1.7–7)
NEUTROPHILS NFR BLD AUTO: 76 % (ref 42.7–76)
NITRITE UR QL STRIP: NEGATIVE
NRBC BLD AUTO-RTO: 0 /100 WBC (ref 0–0.2)
OPIATES UR QL: NEGATIVE
OXYCODONE UR QL SCN: NEGATIVE
PCP UR QL SCN: NEGATIVE
PH UR STRIP.AUTO: 5.5 [PH] (ref 5–8)
PLATELET # BLD AUTO: 286 10*3/MM3 (ref 140–450)
PMV BLD AUTO: 9 FL (ref 6–12)
POTASSIUM SERPL-SCNC: 3.7 MMOL/L (ref 3.5–5.2)
PROT SERPL-MCNC: 7 G/DL (ref 6–8.5)
PROT UR QL STRIP: NEGATIVE
RBC # BLD AUTO: 4.38 10*6/MM3 (ref 3.77–5.28)
SALICYLATES SERPL-MCNC: <0.3 MG/DL
SODIUM SERPL-SCNC: 135 MMOL/L (ref 136–145)
SP GR UR STRIP: 1.02 (ref 1–1.03)
TRICYCLICS UR QL SCN: NEGATIVE
UROBILINOGEN UR QL STRIP: NORMAL
WBC NRBC COR # BLD AUTO: 7.61 10*3/MM3 (ref 3.4–10.8)
WHOLE BLOOD HOLD SPECIMEN: NORMAL

## 2024-02-08 PROCEDURE — 99285 EMERGENCY DEPT VISIT HI MDM: CPT

## 2024-02-08 PROCEDURE — 80053 COMPREHEN METABOLIC PANEL: CPT | Performed by: STUDENT IN AN ORGANIZED HEALTH CARE EDUCATION/TRAINING PROGRAM

## 2024-02-08 PROCEDURE — 81003 URINALYSIS AUTO W/O SCOPE: CPT | Performed by: STUDENT IN AN ORGANIZED HEALTH CARE EDUCATION/TRAINING PROGRAM

## 2024-02-08 PROCEDURE — 85025 COMPLETE CBC W/AUTO DIFF WBC: CPT | Performed by: STUDENT IN AN ORGANIZED HEALTH CARE EDUCATION/TRAINING PROGRAM

## 2024-02-08 PROCEDURE — 93005 ELECTROCARDIOGRAM TRACING: CPT

## 2024-02-08 PROCEDURE — 36415 COLL VENOUS BLD VENIPUNCTURE: CPT

## 2024-02-08 PROCEDURE — 82077 ASSAY SPEC XCP UR&BREATH IA: CPT | Performed by: STUDENT IN AN ORGANIZED HEALTH CARE EDUCATION/TRAINING PROGRAM

## 2024-02-08 PROCEDURE — 80143 DRUG ASSAY ACETAMINOPHEN: CPT | Performed by: STUDENT IN AN ORGANIZED HEALTH CARE EDUCATION/TRAINING PROGRAM

## 2024-02-08 PROCEDURE — 80179 DRUG ASSAY SALICYLATE: CPT | Performed by: STUDENT IN AN ORGANIZED HEALTH CARE EDUCATION/TRAINING PROGRAM

## 2024-02-08 PROCEDURE — 93005 ELECTROCARDIOGRAM TRACING: CPT | Performed by: STUDENT IN AN ORGANIZED HEALTH CARE EDUCATION/TRAINING PROGRAM

## 2024-02-08 PROCEDURE — 80307 DRUG TEST PRSMV CHEM ANLYZR: CPT | Performed by: STUDENT IN AN ORGANIZED HEALTH CARE EDUCATION/TRAINING PROGRAM

## 2024-02-09 ENCOUNTER — HOSPITAL ENCOUNTER (INPATIENT)
Facility: HOSPITAL | Age: 59
LOS: 4 days | Discharge: PSYCHIATRIC OR SUBSTANCE USE FACILITY | DRG: 885 | End: 2024-02-13
Attending: STUDENT IN AN ORGANIZED HEALTH CARE EDUCATION/TRAINING PROGRAM | Admitting: STUDENT IN AN ORGANIZED HEALTH CARE EDUCATION/TRAINING PROGRAM
Payer: MEDICARE

## 2024-02-09 PROBLEM — J30.9 ALLERGIC RHINITIS: Status: ACTIVE | Noted: 2024-02-09

## 2024-02-09 LAB
GLUCOSE BLDC GLUCOMTR-MCNC: 109 MG/DL (ref 70–130)
GLUCOSE BLDC GLUCOMTR-MCNC: 162 MG/DL (ref 70–130)
GLUCOSE BLDC GLUCOMTR-MCNC: 184 MG/DL (ref 70–130)
GLUCOSE BLDC GLUCOMTR-MCNC: 98 MG/DL (ref 70–130)

## 2024-02-09 PROCEDURE — 82948 REAGENT STRIP/BLOOD GLUCOSE: CPT | Performed by: STUDENT IN AN ORGANIZED HEALTH CARE EDUCATION/TRAINING PROGRAM

## 2024-02-09 PROCEDURE — 99223 1ST HOSP IP/OBS HIGH 75: CPT | Performed by: STUDENT IN AN ORGANIZED HEALTH CARE EDUCATION/TRAINING PROGRAM

## 2024-02-09 PROCEDURE — 82948 REAGENT STRIP/BLOOD GLUCOSE: CPT

## 2024-02-09 RX ORDER — FLUTICASONE PROPIONATE 50 MCG
2 SPRAY, SUSPENSION (ML) NASAL DAILY
Status: DISCONTINUED | OUTPATIENT
Start: 2024-02-09 | End: 2024-02-13 | Stop reason: HOSPADM

## 2024-02-09 RX ORDER — BENZTROPINE MESYLATE 1 MG/1
1 TABLET ORAL DAILY PRN
Status: DISCONTINUED | OUTPATIENT
Start: 2024-02-09 | End: 2024-02-13 | Stop reason: HOSPADM

## 2024-02-09 RX ORDER — ACETAMINOPHEN 325 MG/1
650 TABLET ORAL EVERY 4 HOURS PRN
Status: DISCONTINUED | OUTPATIENT
Start: 2024-02-09 | End: 2024-02-13 | Stop reason: HOSPADM

## 2024-02-09 RX ORDER — MIDODRINE HYDROCHLORIDE 5 MG/1
5 TABLET ORAL
Status: DISCONTINUED | OUTPATIENT
Start: 2024-02-09 | End: 2024-02-13 | Stop reason: HOSPADM

## 2024-02-09 RX ORDER — ESTRADIOL 0.1 MG/G
2 CREAM VAGINAL 3 TIMES WEEKLY
Status: DISCONTINUED | OUTPATIENT
Start: 2024-02-09 | End: 2024-02-13 | Stop reason: HOSPADM

## 2024-02-09 RX ORDER — CHOLECALCIFEROL (VITAMIN D3) 125 MCG
2.5 CAPSULE ORAL NIGHTLY
Status: DISCONTINUED | OUTPATIENT
Start: 2024-02-09 | End: 2024-02-13 | Stop reason: HOSPADM

## 2024-02-09 RX ORDER — LOPERAMIDE HYDROCHLORIDE 2 MG/1
2 CAPSULE ORAL
Status: DISCONTINUED | OUTPATIENT
Start: 2024-02-09 | End: 2024-02-13 | Stop reason: HOSPADM

## 2024-02-09 RX ORDER — PANTOPRAZOLE SODIUM 40 MG/1
40 TABLET, DELAYED RELEASE ORAL DAILY
Status: DISCONTINUED | OUTPATIENT
Start: 2024-02-09 | End: 2024-02-13 | Stop reason: HOSPADM

## 2024-02-09 RX ORDER — MULTIPLE VITAMINS W/ MINERALS TAB 9MG-400MCG
1 TAB ORAL DAILY
Status: DISCONTINUED | OUTPATIENT
Start: 2024-02-09 | End: 2024-02-13 | Stop reason: HOSPADM

## 2024-02-09 RX ORDER — CETIRIZINE HYDROCHLORIDE 10 MG/1
10 TABLET ORAL DAILY
Status: DISCONTINUED | OUTPATIENT
Start: 2024-02-09 | End: 2024-02-13 | Stop reason: HOSPADM

## 2024-02-09 RX ORDER — DEXTROSE MONOHYDRATE 25 G/50ML
25 INJECTION, SOLUTION INTRAVENOUS
Status: DISCONTINUED | OUTPATIENT
Start: 2024-02-09 | End: 2024-02-13 | Stop reason: HOSPADM

## 2024-02-09 RX ORDER — PRAMIPEXOLE DIHYDROCHLORIDE 0.25 MG/1
0.25 TABLET ORAL 2 TIMES DAILY
Status: DISCONTINUED | OUTPATIENT
Start: 2024-02-09 | End: 2024-02-11

## 2024-02-09 RX ORDER — TRAZODONE HYDROCHLORIDE 50 MG/1
50 TABLET ORAL NIGHTLY PRN
Status: DISCONTINUED | OUTPATIENT
Start: 2024-02-09 | End: 2024-02-13 | Stop reason: HOSPADM

## 2024-02-09 RX ORDER — MELATONIN
5000 DAILY
Status: DISCONTINUED | OUTPATIENT
Start: 2024-02-09 | End: 2024-02-09

## 2024-02-09 RX ORDER — NITROFURANTOIN 25; 75 MG/1; MG/1
100 CAPSULE ORAL NIGHTLY
Status: DISCONTINUED | OUTPATIENT
Start: 2024-02-09 | End: 2024-02-13 | Stop reason: HOSPADM

## 2024-02-09 RX ORDER — BENZTROPINE MESYLATE 1 MG/ML
0.5 INJECTION INTRAMUSCULAR; INTRAVENOUS DAILY PRN
Status: DISCONTINUED | OUTPATIENT
Start: 2024-02-09 | End: 2024-02-13 | Stop reason: HOSPADM

## 2024-02-09 RX ORDER — INSULIN ASPART 100 [IU]/ML
2-9 INJECTION, SOLUTION INTRAVENOUS; SUBCUTANEOUS
Status: DISCONTINUED | OUTPATIENT
Start: 2024-02-09 | End: 2024-02-13 | Stop reason: HOSPADM

## 2024-02-09 RX ORDER — HYDROXYZINE HYDROCHLORIDE 25 MG/1
25 TABLET, FILM COATED ORAL EVERY 4 HOURS PRN
Status: DISCONTINUED | OUTPATIENT
Start: 2024-02-09 | End: 2024-02-13 | Stop reason: HOSPADM

## 2024-02-09 RX ORDER — PRAZOSIN HYDROCHLORIDE 1 MG/1
2 CAPSULE ORAL NIGHTLY
Status: DISCONTINUED | OUTPATIENT
Start: 2024-02-09 | End: 2024-02-12

## 2024-02-09 RX ORDER — NICOTINE POLACRILEX 4 MG
15 LOZENGE BUCCAL
Status: DISCONTINUED | OUTPATIENT
Start: 2024-02-09 | End: 2024-02-13 | Stop reason: HOSPADM

## 2024-02-09 RX ORDER — FLUOXETINE HYDROCHLORIDE 20 MG/1
40 CAPSULE ORAL DAILY
Status: DISCONTINUED | OUTPATIENT
Start: 2024-02-09 | End: 2024-02-13 | Stop reason: HOSPADM

## 2024-02-09 RX ORDER — ALUMINA, MAGNESIA, AND SIMETHICONE 2400; 2400; 240 MG/30ML; MG/30ML; MG/30ML
15 SUSPENSION ORAL EVERY 6 HOURS PRN
Status: DISCONTINUED | OUTPATIENT
Start: 2024-02-09 | End: 2024-02-13 | Stop reason: HOSPADM

## 2024-02-09 RX ADMIN — CETIRIZINE HYDROCHLORIDE 10 MG: 10 TABLET, FILM COATED ORAL at 12:49

## 2024-02-09 RX ADMIN — FLUOXETINE HYDROCHLORIDE 40 MG: 20 CAPSULE ORAL at 12:49

## 2024-02-09 RX ADMIN — PRAMIPEXOLE DIHYDROCHLORIDE 0.25 MG: 0.25 TABLET ORAL at 20:34

## 2024-02-09 RX ADMIN — PRAZOSIN HYDROCHLORIDE 2 MG: 1 CAPSULE ORAL at 20:34

## 2024-02-09 RX ADMIN — PRAMIPEXOLE DIHYDROCHLORIDE 0.25 MG: 0.25 TABLET ORAL at 12:49

## 2024-02-09 RX ADMIN — NITROFURANTOIN MONOHYDRATE/MACROCRYSTALLINE 100 MG: 25; 75 CAPSULE ORAL at 20:34

## 2024-02-09 RX ADMIN — PANTOPRAZOLE SODIUM 40 MG: 40 TABLET, DELAYED RELEASE ORAL at 12:49

## 2024-02-09 RX ADMIN — TRAZODONE HYDROCHLORIDE 50 MG: 50 TABLET ORAL at 20:34

## 2024-02-09 RX ADMIN — Medication 2.5 MG: at 20:34

## 2024-02-09 RX ADMIN — Medication 1 TABLET: at 12:49

## 2024-02-09 RX ADMIN — FLUTICASONE PROPIONATE 2 SPRAY: 50 SPRAY, METERED NASAL at 12:51

## 2024-02-09 RX ADMIN — HYDROXYZINE HYDROCHLORIDE 25 MG: 25 TABLET, FILM COATED ORAL at 11:29

## 2024-02-09 RX ADMIN — Medication 5000 UNITS: at 12:49

## 2024-02-09 RX ADMIN — MIDODRINE HYDROCHLORIDE 5 MG: 5 TABLET ORAL at 17:39

## 2024-02-09 RX ADMIN — MIDODRINE HYDROCHLORIDE 5 MG: 5 TABLET ORAL at 12:49

## 2024-02-09 RX ADMIN — ESTRADIOL 2 APPLICATOR: 0.1 CREAM VAGINAL at 12:51
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
## 2024-02-09 NOTE — PLAN OF CARE
Goal Outcome Evaluation:  Plan of Care Reviewed With: patient  Patient Agreement with Plan of Care: agrees     Progress: no change  Outcome Evaluation: new admission to [facility]. VSS. pt denies HI & hallucinations. pt reports SI, anxiety 6/10, & depression 10/10. MASD to bottom. no other pt complaints or changes in condition at this time. continue plan of care.

## 2024-02-09 NOTE — SIGNIFICANT NOTE
02/09/24 1253   Group Therapy Session   Group Attendance attended group session   Time Session Began 1030   Time Session Ended 1100   Total Time (minutes) 30   Group Type psychotherapeutic   Group Topic Covered cognitive activities;emotions/expression;other (see comments)  (gratitude)   Group Session Detail Patient's participated in a gratitude exercise identifying 3 things they're grateful for and 3 good things that have happened to them.   Patient Participation/Contribution cooperative with task   Patient Participation Detail Literature provided for patient to complete activity independently.   Affect During Group affect consistent with mood   Degree of Insight moderate

## 2024-02-09 NOTE — PLAN OF CARE
Problem: Adult Behavioral Health Plan of Care  Goal: Patient-Specific Goal (Individualization)  Outcome: Ongoing, Progressing  Flowsheets  Taken 2/9/2024 1651  Patient-Specific Goals (Include Timeframe): Patient will deny SI/HI prior to discharge. Patient will identify 1 healthy coping skill and consent to appropriate aftercare plan prior to discharge.  Individualized Care Needs: Therapist to offer 1-4 therapy sessions, aftercare, planning, safety planning, family education, group therapy, and brief CBT/MI interventions.  Taken 2/9/2024 1634  Patient Personal Strengths:   expressive of emotions   expressive of needs   positive educational history   resilient  Patient Vulnerabilities:   adverse childhood experience(s)   family/relationship conflict   legal concerns  Goal: Optimized Coping Skills in Response to Life Stressors  Outcome: Ongoing, Progressing  Intervention: Promote Effective Coping Strategies  Flowsheets (Taken 2/9/2024 1651)  Supportive Measures:   active listening utilized   counseling provided   decision-making supported   goal-setting facilitated   positive reinforcement provided   verbalization of feelings encouraged   problem-solving facilitated  Goal: Develops/Participates in Therapeutic Buhl to Support Successful Transition  Outcome: Ongoing, Progressing  Intervention: Foster Therapeutic Buhl  Flowsheets (Taken 2/9/2024 1651)  Trust Relationship/Rapport:   care explained   choices provided   emotional support provided   empathic listening provided   thoughts/feelings acknowledged   reassurance provided   questions encouraged   questions answered  Intervention: Mutually Develop Transition Plan  Flowsheets  Taken 2/9/2024 1651  Outpatient/Agency/Support Group Needs:   outpatient counseling   outpatient medication management  Transition Support: follow-up care discussed  Anticipated Discharge Disposition: home with family  Readmission Within the Last 30 Days: previous discharge plan  unsuccessful  Taken 2/9/2024 1650  Transportation Anticipated: family or friend will provide  Transportation Concerns: none  Current Discharge Risk: psychiatric illness  Concerns to be Addressed: suicidal  Readmission Within the Last 30 Days: previous discharge plan unsuccessful  Patient/Family Anticipated Services at Transition: mental health services  Patient's Choice of Community Agency(s): [facility]  Patient/Family Anticipates Transition to: home with family  Offered/Gave Vendor List: no   Goal Outcome Evaluation:

## 2024-02-09 NOTE — PLAN OF CARE
Goal Outcome Evaluation:  Plan of Care Reviewed With: patient  Patient Agreement with Plan of Care: agrees     Progress: no change  Outcome Evaluation: Pt continues to reports SI with depression 10/10.  Pt given atarax PRN for increased anxiety.  Pt withdrawn from group today.
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
## 2024-02-09 NOTE — ED PROVIDER NOTES
EMERGENCY DEPARTMENT ENCOUNTER    Pt Name: Zo Palma  MRN: [id number]  Pt :   1965  Room Number:  CDU1/01  Date of encounter:  2024  PCP: Evan Rivas DO  ED Provider: Antonio Sarah MD    Historian: Patient      HPI:  Chief Complaint: Depression and suicidal thoughts        Context: Zo Palma is a 59 y.o. female who presents to the ED c/o pression and suicidal thoughts.  Patient was discharged from inpatient psychiatric department here on Monday.  States that she is getting worse.  Has had 2 rounds of outpatient IV ketamine therapy.  States the first 1 seemed to do well but the second 1 made her thoughts worse.  States she has been thinking of every way possible to kill her self including shooting herself pain or self overdosing or wrecking her car.  Patient's roommate took away everything that she had mentioned using to hurt herself today.  Denies any drug use or overdose.  Denies any alcohol use today.      PAST MEDICAL HISTORY  Past Medical History:   Diagnosis Date    Ankle sprain     Anxiety     Arthritis of back     Arthritis of neck     Bursitis of hip     Cervical disc disorder     Depression     Fracture of wrist     Fracture, finger     Fracture, foot     Frozen shoulder     Hip arthrosis     Hormone disorder     Insomnia     Interstitial cystitis     Kidney stone     Lumbosacral disc disease     Migraines     Obesity     Periarthritis of shoulder     Peripheral neuropathy     Rotator cuff syndrome     Scoliosis 2023    Seizures     Conversion Disorder    Subluxation of patella     Urinary incontinence     Urinary tract infection          PAST SURGICAL HISTORY  Past Surgical History:   Procedure Laterality Date    BARIATRIC SURGERY      Gastric sleeve    BLADDER SURGERY      BREAST SURGERY      CHOLECYSTECTOMY  2010    COLONOSCOPY      CYSTOSCOPY      FOOT SURGERY      FRACTURE SURGERY      Left wrist    GASTRIC SLEEVE LAPAROSCOPIC N/A     HYSTERECTOMY      NECK  SURGERY      OOPHORECTOMY      SINUS SURGERY  2021    WISDOM TOOTH EXTRACTION N/A     WRIST SURGERY           FAMILY HISTORY  Family History   Problem Relation Age of Onset    Cancer Mother         Brain, suspected uterine    Rheumatologic disease Mother     Cancer Father         Prostate    Learning disabilities Father         Dyslexia    Other Father         Early onset alzheimer    Prostate cancer Father     Other Paternal Grandfather         Early onset alzheimer    Cancer Brother         Prostate, bone    Learning disabilities Brother         Dyslexia    Other Brother         Alzheimer    Prostate cancer Maternal Grandfather          SOCIAL HISTORY  Social History     Socioeconomic History    Marital status:     Number of children: 2    Highest education level: Master's degree (e.g., MA, MS, Pancho, MEd, MSW, SULY)   Tobacco Use    Smoking status: Never     Passive exposure: Past    Smokeless tobacco: Never   Vaping Use    Vaping Use: Never used   Substance and Sexual Activity    Alcohol use: Yes     Alcohol/week: 1.0 standard drink of alcohol     Types: 1 Drinks containing 0.5 oz of alcohol per week     Comment: Occasionally    Drug use: Never    Sexual activity: Not Currently     Partners: Male     Birth control/protection: Post-menopausal, Hysterectomy         ALLERGIES  Chlorzoxazone, Iodine, Phenazopyridine hcl, Pyridium [phenazopyridine], Quinine, Valproic acid, Methocarbamol, Iodinated contrast media, Codeine, and Diphenhydramine        REVIEW OF SYSTEMS  Review of Systems     All systems reviewed and negative except for those discussed in HPI.       PHYSICAL EXAM    I have reviewed the triage vital signs and nursing notes.    ED Triage Vitals [02/08/24 2127]   Temp Heart Rate Resp BP SpO2   97.6 °F (36.4 °C) 65 18 112/77 98 %      Temp src Heart Rate Source Patient Position BP Location FiO2 (%)   Oral Monitor Sitting Left arm --       Physical Exam    General:  Awake, alert, no acute  distress  HEENT: Atraumatic, normocephalic, EOMI, PERRLA, mucous membranes moist  NECK:  Supple, atraumatic  Cardiovascular:  Regular rate.  all extremities well perfused  Respiratory:  Regular rate, equal chest rise. No resp distress  Abdominal:  Soft, nondistended   Extremity:  No visible bony abnormalities in all 4 extremities.  Full range of motion of all extremities.  Skin:  Warm and dry.  No rashes  Lymphatic:  No gross lymphadenopathy, no cervical lymphadenopathy  Neuro:  AAOx3, GCS 15.  moving all extremities  Psych:   Admits to suicidal thoughts and depression        LAB RESULTS  Recent Results (from the past 24 hour(s))   Comprehensive Metabolic Panel    Collection Time: 02/08/24  9:43 PM    Specimen: Arm, Right; Blood   Result Value Ref Range    Glucose 111 (H) 65 - 99 mg/dL    BUN 20 6 - 20 mg/dL    Creatinine 0.91 0.57 - 1.00 mg/dL    Sodium 135 (L) 136 - 145 mmol/L    Potassium 3.7 3.5 - 5.2 mmol/L    Chloride 100 98 - 107 mmol/L    CO2 26.2 22.0 - 29.0 mmol/L    Calcium 9.1 8.6 - 10.5 mg/dL    Total Protein 7.0 6.0 - 8.5 g/dL    Albumin 3.9 3.5 - 5.2 g/dL    ALT (SGPT) 43 (H) 1 - 33 U/L    AST (SGOT) 30 1 - 32 U/L    Alkaline Phosphatase 84 39 - 117 U/L    Total Bilirubin 0.3 0.0 - 1.2 mg/dL    Globulin 3.1 gm/dL    A/G Ratio 1.3 g/dL    BUN/Creatinine Ratio 22.0 7.0 - 25.0    Anion Gap 8.8 5.0 - 15.0 mmol/L    eGFR 72.8 >60.0 mL/min/1.73   Ethanol    Collection Time: 02/08/24  9:43 PM    Specimen: Arm, Right; Blood   Result Value Ref Range    Ethanol <10 0 - 10 mg/dL    Ethanol % <0.010 %   Acetaminophen Level    Collection Time: 02/08/24  9:43 PM    Specimen: Arm, Right; Blood   Result Value Ref Range    Acetaminophen <5.0 0.0 - 30.0 mcg/mL   Salicylate Level    Collection Time: 02/08/24  9:43 PM    Specimen: Arm, Right; Blood   Result Value Ref Range    Salicylate <0.3 <=30.0 mg/dL   CBC Auto Differential    Collection Time: 02/08/24  9:43 PM    Specimen: Arm, Right; Blood   Result Value Ref Range     WBC 7.61 3.40 - 10.80 10*3/mm3    RBC 4.38 3.77 - 5.28 10*6/mm3    Hemoglobin 13.0 12.0 - 15.9 g/dL    Hematocrit 40.4 34.0 - 46.6 %    MCV 92.2 79.0 - 97.0 fL    MCH 29.7 26.6 - 33.0 pg    MCHC 32.2 31.5 - 35.7 g/dL    RDW 12.5 12.3 - 15.4 %    RDW-SD 42.8 37.0 - 54.0 fl    MPV 9.0 6.0 - 12.0 fL    Platelets 286 140 - 450 10*3/mm3    Neutrophil % 76.0 42.7 - 76.0 %    Lymphocyte % 14.6 (L) 19.6 - 45.3 %    Monocyte % 7.2 5.0 - 12.0 %    Eosinophil % 1.1 0.3 - 6.2 %    Basophil % 0.7 0.0 - 1.5 %    Immature Grans % 0.4 0.0 - 0.5 %    Neutrophils, Absolute 5.79 1.70 - 7.00 10*3/mm3    Lymphocytes, Absolute 1.11 0.70 - 3.10 10*3/mm3    Monocytes, Absolute 0.55 0.10 - 0.90 10*3/mm3    Eosinophils, Absolute 0.08 0.00 - 0.40 10*3/mm3    Basophils, Absolute 0.05 0.00 - 0.20 10*3/mm3    Immature Grans, Absolute 0.03 0.00 - 0.05 10*3/mm3    nRBC 0.0 0.0 - 0.2 /100 WBC   Green Top (Gel)    Collection Time: 02/08/24  9:43 PM   Result Value Ref Range    Extra Tube Hold for add-ons.    Lavender Top    Collection Time: 02/08/24  9:43 PM   Result Value Ref Range    Extra Tube hold for add-on    Gold Top - SST    Collection Time: 02/08/24  9:43 PM   Result Value Ref Range    Extra Tube Hold for add-ons.    Urine Drug Screen - Urine, Clean Catch    Collection Time: 02/08/24  9:48 PM    Specimen: Urine, Clean Catch   Result Value Ref Range    THC, Screen, Urine Negative Negative    Phencyclidine (PCP), Urine Negative Negative    Cocaine Screen, Urine Negative Negative    Methamphetamine, Ur Negative Negative    Opiate Screen Negative Negative    Amphetamine Screen, Urine Negative Negative    Benzodiazepine Screen, Urine Negative Negative    Tricyclic Antidepressants Screen Negative Negative    Methadone Screen, Urine Negative Negative    Barbiturates Screen, Urine Negative Negative    Oxycodone Screen, Urine Negative Negative    Buprenorphine, Screen, Urine Negative Negative   Urinalysis With Microscopic If Indicated (No Culture)  - Urine, Clean Catch    Collection Time: 02/08/24  9:48 PM    Specimen: Urine, Clean Catch   Result Value Ref Range    Color, UA Yellow Yellow, Straw    Appearance, UA Clear Clear    pH, UA 5.5 5.0 - 8.0    Specific Gravity, UA 1.020 1.005 - 1.030    Glucose, UA Negative Negative    Ketones, UA Negative Negative    Bilirubin, UA Negative Negative    Blood, UA Negative Negative    Protein, UA Negative Negative    Leuk Esterase, UA Negative Negative    Nitrite, UA Negative Negative    Urobilinogen, UA 0.2 E.U./dL 0.2 - 1.0 E.U./dL   Fentanyl, Urine - Urine, Clean Catch    Collection Time: 02/08/24  9:48 PM    Specimen: Urine, Clean Catch   Result Value Ref Range    Fentanyl, Urine Negative Negative       If labs were ordered, I independently reviewed the results and considered them in treating the patient.        RADIOLOGY  No Radiology Exams Resulted Within Past 24 Hours        PROCEDURES    Procedures    ECG 12 Lead Other; psych eval   Final Result          MEDICATIONS GIVEN IN ER    Medications - No data to display      MEDICAL DECISION MAKING, PROGRESS, and CONSULTS    All labs, if obtained, have been independently reviewed by me.  All radiology studies, if obtained, have been reviewed by me and the radiologist dictating the report.  All EKG's, if obtained, have been independently viewed and interpreted by me.      Discussion below represents my analysis of pertinent findings related to patient's condition, differential diagnosis, treatment plan and final disposition.    Zo Palma is a 59 y.o. female who presents to the ED c/o suicidal thoughts.  Hemodynamically stable nontoxic in appearance upon arrival.  Patient had recent hospitalization for depression and suicidal ideation.  Denies any overdose attempt or self-harm attempt on today.  EKG obtained which was personally reviewed and interpreted showing normal sinus rhythm with a rate of 74 no evidence of significant interval abnormalities or acute ischemia.    Labs obtained for medical clearance prior to behavioral health evaluation.  Labs personally reviewed and interpreted showing no critical electrolyte abnormalities or emergent correction.  Alcohol level negative.  Drug screen negative.  Salicylate and acetaminophen levels both negative. Patient medically cleared for evaluation by behavioral health.    Patient accepted by behavioral health unit for admission.                             Orders placed during this visit:  Orders Placed This Encounter   Procedures    Comprehensive Metabolic Panel    Ethanol    Acetaminophen Level    Salicylate Level    Urine Drug Screen - Urine, Clean Catch    Urinalysis With Microscopic If Indicated (No Culture) - Urine, Clean Catch    [place] Draw    CBC Auto Differential    Fentanyl, Urine - Urine, Clean Catch    Inpatient Behavioral Health Consult    ECG 12 Lead Other; psych eval    CBC & Differential    Green Top (Gel)    Lavender Top    Gold Top - SST    Light Blue Top         ED Course:    Consultants:                  Shared Decision Making:  After my consideration of clinical presentation and any laboratory/radiology studies obtained, I discussed the findings with the patient/patient representative who is in agreement with the treatment plan and the final disposition.   Risks and benefits of discharge and/or observation/admission were discussed.      AS OF 23:59 EST VITALS:    BP - 112/77  HR - 65  TEMP - 97.6 °F (36.4 °C) (Oral)  O2 SATS - 98%                  DIAGNOSIS  Final diagnoses:   Episode of recurrent major depressive disorder, unspecified depression episode severity   Verbalizes suicidal thoughts         DISPOSITION  admit      Please note that portions of this document were completed with voice recognition software.        Antonio Sarah MD  02/08/24 2784

## 2024-02-09 NOTE — SIGNIFICANT NOTE
02/09/24 1556   Group Therapy Session   Group Attendance attended group session   Time Session Began 1400   Time Session Ended 1445   Total Time (minutes) 45   Group Type psychotherapeutic;recreation   Group Topic Covered self care activities;cognitive activities   Group Session Detail Patient's participated in an activity to promote cognitive functioning and critical thinking skills.   Patient Participation/Contribution cooperative with task;discussed personal experience with topic;expressed understanding of topic;listened actively;organized   Affect During Group affect consistent with mood   Degree of Insight moderate

## 2024-02-10 LAB
GLUCOSE BLDC GLUCOMTR-MCNC: 103 MG/DL (ref 70–130)
GLUCOSE BLDC GLUCOMTR-MCNC: 94 MG/DL (ref 70–130)

## 2024-02-10 PROCEDURE — 99232 SBSQ HOSP IP/OBS MODERATE 35: CPT | Performed by: STUDENT IN AN ORGANIZED HEALTH CARE EDUCATION/TRAINING PROGRAM

## 2024-02-10 PROCEDURE — 82948 REAGENT STRIP/BLOOD GLUCOSE: CPT | Performed by: STUDENT IN AN ORGANIZED HEALTH CARE EDUCATION/TRAINING PROGRAM

## 2024-02-10 PROCEDURE — 82948 REAGENT STRIP/BLOOD GLUCOSE: CPT

## 2024-02-10 RX ADMIN — TRAZODONE HYDROCHLORIDE 50 MG: 50 TABLET ORAL at 20:28

## 2024-02-10 RX ADMIN — MIDODRINE HYDROCHLORIDE 5 MG: 5 TABLET ORAL at 08:09

## 2024-02-10 RX ADMIN — FLUOXETINE HYDROCHLORIDE 40 MG: 20 CAPSULE ORAL at 08:09

## 2024-02-10 RX ADMIN — PRAMIPEXOLE DIHYDROCHLORIDE 0.25 MG: 0.25 TABLET ORAL at 20:26

## 2024-02-10 RX ADMIN — PRAMIPEXOLE DIHYDROCHLORIDE 0.25 MG: 0.25 TABLET ORAL at 08:09

## 2024-02-10 RX ADMIN — Medication 5000 UNITS: at 08:09

## 2024-02-10 RX ADMIN — CETIRIZINE HYDROCHLORIDE 10 MG: 10 TABLET, FILM COATED ORAL at 08:09

## 2024-02-10 RX ADMIN — MIDODRINE HYDROCHLORIDE 5 MG: 5 TABLET ORAL at 12:22

## 2024-02-10 RX ADMIN — Medication 2.5 MG: at 20:26

## 2024-02-10 RX ADMIN — MIDODRINE HYDROCHLORIDE 5 MG: 5 TABLET ORAL at 17:19

## 2024-02-10 RX ADMIN — PANTOPRAZOLE SODIUM 40 MG: 40 TABLET, DELAYED RELEASE ORAL at 08:09

## 2024-02-10 RX ADMIN — ACETAMINOPHEN 650 MG: 325 TABLET, FILM COATED ORAL at 20:28

## 2024-02-10 RX ADMIN — Medication 1 TABLET: at 08:09

## 2024-02-10 RX ADMIN — FLUTICASONE PROPIONATE 2 SPRAY: 50 SPRAY, METERED NASAL at 08:09

## 2024-02-10 RX ADMIN — NITROFURANTOIN MONOHYDRATE/MACROCRYSTALLINE 100 MG: 25; 75 CAPSULE ORAL at 20:26

## 2024-02-10 NOTE — PLAN OF CARE
Goal Outcome Evaluation:  Plan of Care Reviewed With: patient  Patient Agreement with Plan of Care: agrees     Progress: no change  Outcome Evaluation: no acute events during shift at this time. VSS. pt reports SI, anxiety 5/10 & depression 10/10. pt denies HI/hallucinations. PRN trazodone given. no other changes in pt condition. continue plan of care.

## 2024-02-10 NOTE — SIGNIFICANT NOTE
02/10/24 1553   Group Therapy Session   Group Attendance attended group session   Time Session Began 1420   Time Session Ended 1500   Total Time (minutes) 40   Group Type psychoeducation;psychotherapeutic   Group Topic Covered mindfulness;relaxation techniques   Literature/Videos Given topic videos   Group Session Detail Provided psychoeducation on the relationship between mind and body. Practiced relaxation techniques including deep breathing and progressive muscle relaxation.   Patient Participation/Contribution cooperative with task   Affect During Group affect consistent with mood;appropriate to situation

## 2024-02-10 NOTE — PLAN OF CARE
Goal Outcome Evaluation:  Plan of Care Reviewed With: patient  Patient Agreement with Plan of Care: agrees  Consent Given to Review Plan with: VSS, rates anxiety 4/10 and depression 8/10, continues to endorse SI, denies HI, AVH at this time.  She has been pleasant and cooperative this day.

## 2024-02-10 NOTE — THERAPY TREATMENT NOTE
"DATA:    Therapist met with the patient individually. Therapist continues reviewing plan of care and aftercare plan.  The patient was agreeable.    ASSESSMENT:    Patient was seen for follow up of depression and SI.       Today, patient was seen 1-1 in office. The patient's mood appears neutral, affect congruent, thought content normal. Patient reports sleep is Good stating \"I didn't hear a sound all night, I crashed and burned, it was great!\" Patient reports appetite is  \"better today than it was yesterday.\"  On scale 1-10, patient rates depression 8 and anxiety 4. Patient denies current hallucinations, but reports seeing \"floaters, like a kaleidoscope\" after receiving night time medication. Patient continues to endorse SI stating, \"I'm still hunting. My brain is continually scanning to see what there is.\" Patient reports looking on EVS cart for items to hurt herself with, specifically a trash bag. Therapist notified treatment team of this. Patient endorses death wish stating, \"always but some part of me has to wish that I'm not or we wouldn't be trying this.\" Patient denies HI.      CLINICAL MANEUVERING:    Therapist provided safe, secure environment for patient to share.  Provided reflective listening and psychoeducation.  Assisted patient in processing the above session. Assisted patient in identifying any questions or needs to be addressed today. Patient processed guilt around Sproavato not working but remains hopeful about ECT.        Plan:     Patient to remain hospitalized this date.      Treatment team will focus efforts on stabilizing patient's acute symptoms while providing education on healthy coping and crisis management to reduce hospitalizations.   Patient requires daily psychiatrist evaluation and 24/7 nursing supervision to promote patient  safety.     Therapist will offer 1-4 individual sessions, family education, and appropriate referral.     Treatment team to explore transferring patient for ECT " on Monday. [facility].

## 2024-02-10 NOTE — PROGRESS NOTES
"    Inpatient Psych Progress Note     Clinician: Zeynep Vogel MD  Admission Date: 2/9/2024  14:28 EST 02/10/24    Behavioral Health Treatment Plan and Problem List: I have reviewed and approved the Behavioral Health Treatment Plan and Problem list.    Allergies  Allergies   Allergen Reactions    Chlorzoxazone Unknown - High Severity and Swelling     Lorzone, muscle relaxant.    Iodine Anaphylaxis     Topical makes her swell  Anaphylaxis with IV contrast (when she was ~ 9yrs old)    Phenazopyridine Hcl Swelling and Anaphylaxis    Pyridium [Phenazopyridine] Anaphylaxis    Quinine Unknown - High Severity     Has malaria symptoms    Valproic Acid Other (See Comments)     Liver failure  Liver failure  Liver failure; lupus like symptoms and liver failure    Methocarbamol Other (See Comments)     Made her feel \"suicidal\" and gave her less control over her actions.    Iodinated Contrast Media Unknown - Low Severity    Codeine Itching    Diphenhydramine Anxiety     Pt has severe panic attack symptoms when given benadryl IV. Needs to take a benzodiazapine med concurrently when getting benadryl.        Hospital Day: 1 day      Assessment completed within view of staff    History  CC/clinical focus: Depression, suicidal thoughts    Interval HPI: Patient seen and evaluated by me.  Chart reviewed. Discussed with treatment team and unit staff. No major issues overnight. Med compliant. PRNs required Trazodone and Hydroxyzine x1. Pt has been participating in therapeutic activities without issue. Interactions with staff and peers have been appropriate.     This is the first time this physician is meeting this patient. On interview today, pt reports goal was stay positive, and is struggling but doing well. Rates depression as 8/10, and anxiety as 4/10 with 10 being the worst. States depression isn't anything in particular but reports anxiety about ECT. Discusses feeling guilty bothering people. Reports sleeping well after taking " "Trazodone and slept through the night until being woken up, and states it was the best night of sleep she's gotten in years. Denies nightmares. Reports improved appetite, compared PTA, last BM. With regards to RLS and tremors, reports improved hand tremor on the left (no tremor present on physical evaluation), and does intermittent jerking of the left leg, every few minutes but reports it is tolerable. Reports feeling dizzy this morning 2-3 times when she would get up and walked a few steps. Endorses SI and initially denies plan or intent but does report actively trying to find things to kill self with, citing trying to grab a plastic bag across her head but they wouldn't, reports not wanting to traumatize people is the reason that prevents her from doing it. Denies HI with plan or intent. Denies AVH. Denies panic attacks today.     ROS otherwise as below.      Interval Review of Systems:   General ROS: negative for - fever or malaise  Endocrine ROS: negative for - palpitations  Respiratory ROS: no cough, shortness of breath, or wheezing  Cardiovascular ROS: no chest pain or dyspnea on exertion  Gastrointestinal ROS: no abdominal pain, no black or bloody stools    BP 99/44 (BP Location: Left arm, Patient Position: Sitting)   Pulse 66   Temp 97.6 °F (36.4 °C) (Oral)   Resp 16   Ht 160 cm (62.99\")   Wt 62.1 kg (137 lb)   SpO2 98%   BMI 24.27 kg/m²     Mental Status Exam  Behavior: Cooperative  Psychomotor activity: Calm  Orientation: x4  Mood: \"Trying to stay positive,\"  Affect: mood congruent, restricted with intermittent brightening.   Thought Process: linear, organized  Thought Content: No delusions voiced  Hallucinations: Denies AVH, no RIS observed  Concentration:  Fair  Suicidal Ideation: Endorses active SI with no specific plan but has been searching for items on the unit to hurt herself with, revealed she asked  for a bag to put over her head and  said no, so she tried to steal one but " couldn't.   Homicidal Ideation: Denies  Hopelessness: Severe  Insight: Limited   Judgement: Limited      Medical Decision Making:   Labs:     Lab Results (last 24 hours)       Procedure Component Value Units Date/Time    POC Glucose 4x Daily Before Meals & at Bedtime [[id number]]  (Normal) Collected: 02/10/24 1204    Specimen: Blood Updated: 02/10/24 1206     Glucose 103 mg/dL      Comment: Serial Number: [id number]:  [id number]       POC Glucose Once [[id number]]  (Normal) Collected: 02/09/24 2037    Specimen: Blood Updated: 02/09/24 2050     Glucose 98 mg/dL      Comment: Serial Number: [id number]:  [id number]       POC Glucose 4x Daily Before Meals & at Bedtime [[id number]]  (Abnormal) Collected: 02/09/24 1723    Specimen: Blood Updated: 02/09/24 1726     Glucose 162 mg/dL      Comment: Serial Number: [id number]:  [id number]                 Radiology:     Imaging Results (Last 24 Hours)       ** No results found for the last 24 hours. **              EKG:     ECG/EMG Results (most recent)       None             Medications:  cetirizine, 10 mg, Oral, Daily  estradiol, 2 g, Vaginal, Once per day on Monday Wednesday Friday  FLUoxetine, 40 mg, Oral, Daily  fluticasone, 2 spray, Each Nare, Daily  Insulin Aspart, 2-9 Units, Subcutaneous, 4x Daily AC & at Bedtime  Liraglutide, 1.8 mg, Subcutaneous, Daily  melatonin, 2.5 mg, Oral, Nightly  midodrine, 5 mg, Oral, TID AC  multivitamin with minerals, 1 tablet, Oral, Daily  nitrofurantoin (macrocrystal-monohydrate), 100 mg, Oral, Nightly  pantoprazole, 40 mg, Oral, Daily  pramipexole, 0.25 mg, Oral, BID  prazosin, 2 mg, Oral, Nightly  vitamin D3, 5,000 Units, Oral, Daily           All medications reviewed.    Assessment and Plan:   Major depressive disorder, recurrent, severe without psychotic features  PTSD  Suicidal ideation  - Admit to the [facility] for safety and stabilization.  - Appropriate precautions ordered; SP3 and will be monitored for self-harm on  the unit.   - Encourage engagement in individual, group, and family therapies as appropriate.  - Collateral as needed  - Continue home Prozac 40 mg QD, prazosin 2 mg QHS.   - Continue Mirapex titration of 0.125 mg every 3 days to target dose of 1 mg daily as long as pt continues to tolerate. Plan to increase from current dose of 0.25 mg BID to 0.25 mg QAM/0.375 mg QHS on 2/11.   - Reached out to [facility] to discuss possibility of transfer for ECT. Unfortunately, Dr. Osuna is off the unit until Monday. Will reach out to him on Monday to request transfer.     Insomnia  - Start melatonin 2.5 mg QHS for sleep.  - Pt reported sleeping significantly better than she has in years after taking PRN Trazodone 50mg HS. Consider short-term trial of Ambien 2.5 mg QHS PRN sleep if pt still experiencing significant issues with sleep. Has failed numerous previous trials.        T2DM  - Diabetic diet, Accu-cheks TIDAC, low-dose SSI as needed, hypoglycemic protocol; HbA1c pending  - Continue home Victoza 1.8 mg QAM using home supply     Allergic rhinitis  - Start Zyrtec 10 mg QD, Flonase 2 sprays QD     Orthostatic hypotension  - Continue Midodrine 5 mg TIDAC. Consider further titration if necessary. Pt reported some dizziness this morning when getting up, resolving after taking a few steps, has not experienced dizziness since afternoon dose.   - PRN orthostatic VS     History of recurrent UTI  - Admission UA WNL; pt currently asymptomatic  - Continue home Macrobid 100 mg BID for UTI prophylaxis     Vit D deficiency   - Continue cholecalciferol 5000 units QD     Hot flashes  - Continue home estradiol cream three times weekly     RLS/tremors  - Continue Mirapex titration as above      Given the high risk and/or potentially life-threatening nature of patient's presenting symptoms, patient has been admitted for safety and stabilization and placed on the appropriate level of precautions.  Patient will be assigned a Master's level  therapist and encouraged to participate in both individual and group therapy on the unit.    Continue hospitalization for safety and stabilization.  Continue current level of Special Precautions (q15 minute checks).     This document has been electronically signed by Zeynep Vogel MD.  February 10, 2024 14:28 EST

## 2024-02-11 LAB
GLUCOSE BLDC GLUCOMTR-MCNC: 77 MG/DL (ref 70–130)
GLUCOSE BLDC GLUCOMTR-MCNC: 78 MG/DL (ref 70–130)
GLUCOSE BLDC GLUCOMTR-MCNC: 83 MG/DL (ref 70–130)
GLUCOSE BLDC GLUCOMTR-MCNC: 92 MG/DL (ref 70–130)

## 2024-02-11 PROCEDURE — 82948 REAGENT STRIP/BLOOD GLUCOSE: CPT | Performed by: STUDENT IN AN ORGANIZED HEALTH CARE EDUCATION/TRAINING PROGRAM

## 2024-02-11 PROCEDURE — 99232 SBSQ HOSP IP/OBS MODERATE 35: CPT | Performed by: STUDENT IN AN ORGANIZED HEALTH CARE EDUCATION/TRAINING PROGRAM

## 2024-02-11 PROCEDURE — 82948 REAGENT STRIP/BLOOD GLUCOSE: CPT

## 2024-02-11 RX ORDER — PRAMIPEXOLE DIHYDROCHLORIDE 0.25 MG/1
0.25 TABLET ORAL DAILY
Status: DISCONTINUED | OUTPATIENT
Start: 2024-02-12 | End: 2024-02-13 | Stop reason: HOSPADM

## 2024-02-11 RX ORDER — PRAMIPEXOLE DIHYDROCHLORIDE 0.25 MG/1
0.38 TABLET ORAL NIGHTLY
Status: DISCONTINUED | OUTPATIENT
Start: 2024-02-11 | End: 2024-02-13 | Stop reason: HOSPADM

## 2024-02-11 RX ADMIN — CETIRIZINE HYDROCHLORIDE 10 MG: 10 TABLET, FILM COATED ORAL at 08:10

## 2024-02-11 RX ADMIN — MIDODRINE HYDROCHLORIDE 5 MG: 5 TABLET ORAL at 17:29

## 2024-02-11 RX ADMIN — PANTOPRAZOLE SODIUM 40 MG: 40 TABLET, DELAYED RELEASE ORAL at 08:10

## 2024-02-11 RX ADMIN — Medication 2.5 MG: at 20:48

## 2024-02-11 RX ADMIN — PRAMIPEXOLE DIHYDROCHLORIDE 0.38 MG: 0.25 TABLET ORAL at 20:48

## 2024-02-11 RX ADMIN — TRAZODONE HYDROCHLORIDE 50 MG: 50 TABLET ORAL at 20:48

## 2024-02-11 RX ADMIN — Medication 1 TABLET: at 08:10

## 2024-02-11 RX ADMIN — PRAMIPEXOLE DIHYDROCHLORIDE 0.25 MG: 0.25 TABLET ORAL at 08:10

## 2024-02-11 RX ADMIN — FLUTICASONE PROPIONATE 2 SPRAY: 50 SPRAY, METERED NASAL at 08:11

## 2024-02-11 RX ADMIN — NITROFURANTOIN MONOHYDRATE/MACROCRYSTALLINE 100 MG: 25; 75 CAPSULE ORAL at 20:48

## 2024-02-11 RX ADMIN — Medication 5000 UNITS: at 08:10

## 2024-02-11 RX ADMIN — MIDODRINE HYDROCHLORIDE 5 MG: 5 TABLET ORAL at 11:56

## 2024-02-11 RX ADMIN — MIDODRINE HYDROCHLORIDE 5 MG: 5 TABLET ORAL at 08:10

## 2024-02-11 RX ADMIN — FLUOXETINE HYDROCHLORIDE 40 MG: 20 CAPSULE ORAL at 08:10

## 2024-02-11 NOTE — NURSING NOTE
"Pertinent Diagnosis/Presenting Problems     Presenting Problems/Reason for Referral Suicidal Ideations   Lifestyle/Recreational History/Interest     Profession/Job \"I am disabled\"   Current or Previous  Service \"No\"   Current Living Situation \"I have a roommate\"   Transportation Situation \"I have truck but probably for not much longer\"   Current Educational Level \"I have a Masters in Developmental Genetics\"   Support Network \"Friends, roommate, and my dog\"   Recreational History and Interests     How Important is Recreation to You? \", it's a good outlet for people\"   Satisfied With Leisure Lifestyle? \"Yes\"   Participate Regularly in Leisure Activity? \"As much as possible\"   Are You a Social Person? \"Nope\"   Do You Prefer To Be Alone? \"Yes\"   Do You Like New Experiences? \"Yes\"   Current Hobbies/Interests \"Traveling, gardening, watching tv, coloring, and reading\"   Past Hobbies/Interests \"Needlefitting\"   Barriers To Leisure Activity     Barriers to Leisure Participation \"Symptom burden\"   Coping/Wellbeing     Describe Yourself \"Reasonably intelligent, animal person, empathic\"   Personal Strengths \"I am an empath, so I feel others emotions and know how to help\"   How Do You [place] With Life Stressors? \"Petting my dog\"   Recreation Participation     Recreation Inpatient Participation arts/crafts, guided meditation, PMR     Objectives of Inpatient Participation Increase motivation and activity level through successful participation  Increase positive attitudes leading to a healthy lesiure lifestyle   Orientation to Unit Programming Patient received explanation of recreation services  Inpatient recreation programming initiated   Was patient able to complete assessment with staff? Yes   Recreation Assessment/Plan     Patient/Family Goal Increase knowledge and utilization of leisure activities as coping skills  Decrease symptom burden through leisure participation  Maintain current cognitive functioning; Increase " awareness of local resources  Decrease isolation  Increase self confidence through successful participation in programming  Practice utilizing appropriate social skills  Improve mood and reduce anxiety   Recreation Participation Goals/Objectives Improve coping skills/strategies   Recreation Plan Structured leisure participation   Referrals to Community Recreation Programs List available on request

## 2024-02-11 NOTE — THERAPY TREATMENT NOTE
"DATA:    Therapist met with the patient individually. Therapist continues reviewing plan of care and aftercare plan.  The patient was agreeable.    ASSESSMENT:    Patient was seen for follow up of depression and SI.     Today, patient was seen 1-1 in office. The patient's mood appears appropriate to circumstances, affect congruent, thought content normal. Patient reports sleep is Good and states she slept \"like a rock.\" Patient reports appetite is  \"better than it had been.\"  On scale 1-10, patient rates depression 8 and anxiety 2. Patient denies current hallucinations and HI. Patient reports \"always\" having suicidal thoughts and states, \"I was eyeing the plastic bag in the doctors office. I'm just trying to find the right thing;\" treatment team notified. Patient reports \"always\" wishing to be dead. Patient denies immediate concerns and reports \"I'm just living in the moment.\"        CLINICAL MANEUVERING:    Therapist provided safe, secure environment for patient to share.  Provided reflective listening and psychoeducation.  Assisted patient in processing the above session. Assisted patient in identifying any questions or needs to be addressed today. Provided supportive therapy and assisted patient in exploring pros and cons of ECT. Patient remains hopeful about ECT.       Plan:     Patient to remain hospitalized this date.      Treatment team will focus efforts on stabilizing patient's acute symptoms while providing education on healthy coping and crisis management to reduce hospitalizations.   Patient requires daily psychiatrist evaluation and 24/7 nursing supervision to promote patient  safety.     Therapist will offer 1-4 individual sessions, family education, and appropriate referral.     Treatment to explore transferring patient for ECT on Monday. [facility].   "

## 2024-02-11 NOTE — SIGNIFICANT NOTE
02/11/24 1408   Group Therapy Session   Time Session Began 1330   Time Session Ended 1400   Total Time (minutes) 30   Group Type psychoeducation;psychotherapeutic   Group Topic Covered cognitive therapy techniques;other (see comments)  (Cognitive distortions)   Literature/Videos Given topic handouts   Literature/Videos Given Comments Identify negative, distorted cognitions that mediate intense negative emotions. Learn new ways to think that are more adaptive and replace negative thoughts with more realistic interpretations of situations.   Patient Participation Detail Literature provided for patient to complete independently

## 2024-02-11 NOTE — PROGRESS NOTES
"    Inpatient Psych Progress Note     Clinician: Zeynep Vogel MD  Admission Date: 2/9/2024  14:28 EST 02/11/24    Behavioral Health Treatment Plan and Problem List: I have reviewed and approved the Behavioral Health Treatment Plan and Problem list.    Allergies  Allergies   Allergen Reactions    Chlorzoxazone Unknown - High Severity and Swelling     Lorzone, muscle relaxant.    Iodine Anaphylaxis     Topical makes her swell  Anaphylaxis with IV contrast (when she was ~ 9yrs old)    Phenazopyridine Hcl Swelling and Anaphylaxis    Pyridium [Phenazopyridine] Anaphylaxis    Quinine Unknown - High Severity     Has malaria symptoms    Valproic Acid Other (See Comments)     Liver failure  Liver failure  Liver failure; lupus like symptoms and liver failure    Methocarbamol Other (See Comments)     Made her feel \"suicidal\" and gave her less control over her actions.    Iodinated Contrast Media Unknown - Low Severity    Codeine Itching    Diphenhydramine Anxiety     Pt has severe panic attack symptoms when given benadryl IV. Needs to take a benzodiazapine med concurrently when getting benadryl.        Hospital Day: 2 days      Assessment completed within view of staff    History  CC/clinical focus: Depression, suicidal thoughts    Interval HPI: Patient seen and evaluated by me.  Chart reviewed. Discussed with treatment team and unit staff. No major issues overnight. Med compliant. PRNs required: Trazodone and Acetaminophen. Pt has been participating in therapeutic activities without issue. Interactions with staff and peers have been appropriate. Pt's Prazosin held last night due to low BP.     This is the second time this physician is seeing the patient. On interview today, pt report she's doing \"okay\" Reports rest of the day wasn't bad. Rates depression as 8/10 and anxiety as 2/10 with 10 being the worst. Reports good sleep last night, denies nightmares. Reports some dizziness this morning, occurring when she stands and " "takes a few steps, lasting for a few seconds. Pt did receive first dose of Midrodrine around an hour ago and reports overall improvement, pt encouraged to hydrate. Reports good appetite, with last BM. Endorses SI and denies specific plan and intent but does report she's searching for stuff on the unit to kill self and is disappointed she can't and states she also stops from looking due to not wanting to traumatize anyone. Denies HI/AVH.     ROS otherwise as below.      Interval Review of Systems:   General ROS: negative for - fever or malaise  Endocrine ROS: negative for - palpitations  Respiratory ROS: no cough, shortness of breath, or wheezing  Cardiovascular ROS: no chest pain or dyspnea on exertion  Gastrointestinal ROS: no abdominal pain, no black or bloody stools    BP 95/70 (BP Location: Left arm, Patient Position: Standing)   Pulse 67   Temp 98.2 °F (36.8 °C) (Oral)   Resp 16   Ht 160 cm (62.99\")   Wt 62.1 kg (137 lb)   SpO2 100%   BMI 24.27 kg/m²     Mental Status Exam  Behavior: Cooperative  Psychomotor activity: Calm  Orientation: x4  Mood: \"Okay\"  Affect: mood congruent, dysphoric at times with restricted range  Thought Process: linear, organized  Thought Content: No delusions voiced  Hallucinations: Denies AVH, no RIS observed  Concentration:  Fair  Suicidal Ideation: Endorses active SI with no specific plan but reports trying to find items on the unit she can try to kill self with, while also reporting she stops due to not wanting to traumatize anyone.   Homicidal Ideation: Denies  Hopelessness: Severe  Insight: Limited   Judgement: Limited      Medical Decision Making:   Labs:     Lab Results (last 24 hours)       Procedure Component Value Units Date/Time    POC Glucose Once [[id number]]  (Normal) Collected: 02/10/24 2031    Specimen: Blood Updated: 02/10/24 2034     Glucose 94 mg/dL      Comment: Serial Number: [id number]:  [id number]       POC Glucose 4x Daily Before Meals & at Bedtime " [[id number]]  (Normal) Collected: 02/10/24 1204    Specimen: Blood Updated: 02/10/24 1206     Glucose 103 mg/dL      Comment: Serial Number: [id number]:  [id number]                 Radiology:     Imaging Results (Last 24 Hours)       ** No results found for the last 24 hours. **              EKG:     ECG/EMG Results (most recent)       None             Medications:  cetirizine, 10 mg, Oral, Daily  estradiol, 2 g, Vaginal, Once per day on Monday Wednesday Friday  FLUoxetine, 40 mg, Oral, Daily  fluticasone, 2 spray, Each Nare, Daily  Insulin Aspart, 2-9 Units, Subcutaneous, 4x Daily AC & at Bedtime  Liraglutide, 1.8 mg, Subcutaneous, Daily  melatonin, 2.5 mg, Oral, Nightly  midodrine, 5 mg, Oral, TID AC  multivitamin with minerals, 1 tablet, Oral, Daily  nitrofurantoin (macrocrystal-monohydrate), 100 mg, Oral, Nightly  pantoprazole, 40 mg, Oral, Daily  pramipexole, 0.25 mg, Oral, BID  prazosin, 2 mg, Oral, Nightly  vitamin D3, 5,000 Units, Oral, Daily           All medications reviewed.    Assessment and Plan:   Major depressive disorder, recurrent, severe without psychotic features  PTSD  Suicidal ideation  - Admit to the [facility] for safety and stabilization.  - Appropriate precautions ordered; SP3 and will be monitored for self-harm on the unit.   - Encourage engagement in individual, group, and family therapies as appropriate.  - Collateral as needed  - Continue home Prozac 40 mg QD,   -  Hold Prazosin 2mg PO qHS due to low BP as well as patient sleeping well without nightmares at this time.   - Change Mirapex from 0.25mg BID to 0.25mg QAM + 0.375mg HS as part of plan to titrate every 3 days to target dose of 1mg daily.  - Reached out to [facility] to discuss possibility of transfer for ECT. Unfortunately, Dr. Osuna is off the unit until Monday. Will reach out to him on Monday to request transfer.       Insomnia  - Start melatonin 2.5 mg QHS for sleep.  - Pt is sleeping significantly better than  she has in years after taking PRN Trazodone 50mg HS. Consider short-term trial of Ambien 2.5 mg QHS PRN sleep if pt continues to experience significant issues with sleep. Has failed numerous previous trials.        T2DM  - Diabetic diet, Accu-cheks TIDAC, low-dose SSI as needed, hypoglycemic protocol; HbA1c pending  - Continue home Victoza 1.8 mg QAM using home supply     Allergic rhinitis  - Start Zyrtec 10 mg QD, Flonase 2 sprays QD     Orthostatic hypotension  - Continue Midodrine 5 mg TIDAC. Consider further titration if necessary. Pt continues to experience some dizziness in the morning when getting up, resolving after taking a few steps, will continue to monitor.   - PRN orthostatic VS     History of recurrent UTI  - Admission UA WNL; pt currently asymptomatic  - Continue home Macrobid 100 mg BID for UTI prophylaxis     Vit D deficiency   - Continue cholecalciferol 5000 units QD     Hot flashes  - Continue home estradiol cream three times weekly     RLS/tremors  - Continue Mirapex titration as above      Given the high risk and/or potentially life-threatening nature of patient's presenting symptoms, patient has been admitted for safety and stabilization and placed on the appropriate level of precautions.  Patient will be assigned a Master's level therapist and encouraged to participate in both individual and group therapy on the unit.    Continue hospitalization for safety and stabilization.  Continue current level of Special Precautions (q15 minute checks).     This document has been electronically signed by Zeynep Vogel MD.  February 11, 2024 09:36 EST

## 2024-02-11 NOTE — PLAN OF CARE
Goal Outcome Evaluation:  Plan of Care Reviewed With: patient  Patient Agreement with Plan of Care: agrees     Progress: no change  Outcome Evaluation: no acute events during shift at this time. VSS, soft BP minipress held. pt denies HI/hallucinations. pt reports active SI. pt reports anxiety 4/10 & depression 8/10. pt withdrawn. no other changes in pt condition at this time. continue plan of care.

## 2024-02-11 NOTE — PLAN OF CARE
"Goal Outcome Evaluation:  Plan of Care Reviewed With: patient  Patient Agreement with Plan of Care: agrees     Progress: no change  Outcome Evaluation: Pt continues to report SI but denies current plan.  Pt rates anxiety 2/10 and depression 8/10.  Pt reports feeling \"down\".  No PRN's required this shift.  No acute events this shift.                               "

## 2024-02-12 LAB
GLUCOSE BLDC GLUCOMTR-MCNC: 77 MG/DL (ref 70–130)
GLUCOSE BLDC GLUCOMTR-MCNC: 82 MG/DL (ref 70–130)
GLUCOSE BLDC GLUCOMTR-MCNC: 87 MG/DL (ref 70–130)
GLUCOSE BLDC GLUCOMTR-MCNC: 88 MG/DL (ref 70–130)

## 2024-02-12 PROCEDURE — 82948 REAGENT STRIP/BLOOD GLUCOSE: CPT

## 2024-02-12 PROCEDURE — 99232 SBSQ HOSP IP/OBS MODERATE 35: CPT | Performed by: STUDENT IN AN ORGANIZED HEALTH CARE EDUCATION/TRAINING PROGRAM

## 2024-02-12 PROCEDURE — 82948 REAGENT STRIP/BLOOD GLUCOSE: CPT | Performed by: STUDENT IN AN ORGANIZED HEALTH CARE EDUCATION/TRAINING PROGRAM

## 2024-02-12 RX ORDER — POLYETHYLENE GLYCOL 3350 17 G/17G
17 POWDER, FOR SOLUTION ORAL DAILY
Status: COMPLETED | OUTPATIENT
Start: 2024-02-12 | End: 2024-02-13

## 2024-02-12 RX ORDER — PRAZOSIN HYDROCHLORIDE 1 MG/1
1 CAPSULE ORAL NIGHTLY
Status: DISCONTINUED | OUTPATIENT
Start: 2024-02-12 | End: 2024-02-12

## 2024-02-12 RX ORDER — POLYETHYLENE GLYCOL 3350 17 G/17G
17 POWDER, FOR SOLUTION ORAL DAILY PRN
Status: DISCONTINUED | OUTPATIENT
Start: 2024-02-14 | End: 2024-02-13 | Stop reason: HOSPADM

## 2024-02-12 RX ORDER — PRAZOSIN HYDROCHLORIDE 1 MG/1
1 CAPSULE ORAL NIGHTLY
Status: DISCONTINUED | OUTPATIENT
Start: 2024-02-12 | End: 2024-02-13 | Stop reason: HOSPADM

## 2024-02-12 RX ADMIN — PRAMIPEXOLE DIHYDROCHLORIDE 0.25 MG: 0.25 TABLET ORAL at 08:46

## 2024-02-12 RX ADMIN — MIDODRINE HYDROCHLORIDE 5 MG: 5 TABLET ORAL at 08:46

## 2024-02-12 RX ADMIN — Medication 2.5 MG: at 20:40

## 2024-02-12 RX ADMIN — Medication 1 TABLET: at 08:45

## 2024-02-12 RX ADMIN — MIDODRINE HYDROCHLORIDE 5 MG: 5 TABLET ORAL at 16:52

## 2024-02-12 RX ADMIN — MIDODRINE HYDROCHLORIDE 5 MG: 5 TABLET ORAL at 12:22

## 2024-02-12 RX ADMIN — ESTRADIOL 2 APPLICATOR: 0.1 CREAM VAGINAL at 20:32

## 2024-02-12 RX ADMIN — FLUTICASONE PROPIONATE 2 SPRAY: 50 SPRAY, METERED NASAL at 08:45

## 2024-02-12 RX ADMIN — CETIRIZINE HYDROCHLORIDE 10 MG: 10 TABLET, FILM COATED ORAL at 08:46

## 2024-02-12 RX ADMIN — PRAMIPEXOLE DIHYDROCHLORIDE 0.38 MG: 0.25 TABLET ORAL at 20:40

## 2024-02-12 RX ADMIN — PANTOPRAZOLE SODIUM 40 MG: 40 TABLET, DELAYED RELEASE ORAL at 08:46

## 2024-02-12 RX ADMIN — FLUOXETINE HYDROCHLORIDE 40 MG: 20 CAPSULE ORAL at 08:46

## 2024-02-12 RX ADMIN — PRAZOSIN HYDROCHLORIDE 1 MG: 1 CAPSULE ORAL at 20:39

## 2024-02-12 RX ADMIN — POLYETHYLENE GLYCOL 3350 17 G: 17 POWDER, FOR SOLUTION ORAL at 16:53

## 2024-02-12 RX ADMIN — TRAZODONE HYDROCHLORIDE 50 MG: 50 TABLET ORAL at 21:00

## 2024-02-12 RX ADMIN — Medication 5000 UNITS: at 08:45

## 2024-02-12 RX ADMIN — NITROFURANTOIN MONOHYDRATE/MACROCRYSTALLINE 100 MG: 25; 75 CAPSULE ORAL at 20:40

## 2024-02-12 NOTE — PROGRESS NOTES
"    Inpatient Psych Progress Note     Clinician: Macy Ugalde MD  Admission Date: 2/9/2024  13:30 EST  02/12/24    Behavioral Health Treatment Plan and Problem List: I have reviewed and approved the Behavioral Health Treatment Plan and Problem list.    Allergies  Allergies   Allergen Reactions    Chlorzoxazone Unknown - High Severity and Swelling     Lorzone, muscle relaxant.    Iodine Anaphylaxis     Topical makes her swell  Anaphylaxis with IV contrast (when she was ~ 9yrs old)    Phenazopyridine Hcl Swelling and Anaphylaxis    Pyridium [Phenazopyridine] Anaphylaxis    Quinine Unknown - High Severity     Has malaria symptoms    Valproic Acid Other (See Comments)     Liver failure  Liver failure  Liver failure; lupus like symptoms and liver failure    Methocarbamol Other (See Comments)     Made her feel \"suicidal\" and gave her less control over her actions.    Iodinated Contrast Media Unknown - Low Severity    Codeine Itching    Diphenhydramine Anxiety     Pt has severe panic attack symptoms when given benadryl IV. Needs to take a benzodiazapine med concurrently when getting benadryl.        Hospital Day: 3 days      Assessment completed within view of staff    History  CC/clinical focus: Depression, suicidal thoughts    Interval HPI: Patient seen and evaluated by me.  Chart reviewed. Discussed with treatment team and unit staff. No major issues overnight. Med compliant. PRNs required over the past 24h: Trazodone. Pt has been participating in therapeutic activities without issue. Interactions with staff and peers have been appropriate. Pt's prazosin has been ordered to be held indefinitely due to low BP and lack of nightmares. Discussed with Dr. Osuna at [facility] [place], who is open to accepting pt for transfer to start ECT if pt's insurance will cover treatment. Decision from insurance still pending.     On interview today, pt reports that she continues to feel very depressed and suicidal. She admits that she had " "been considering tearing up her sheets vs stealing a plastic bag from the EVS cart, but notified staff of her thoughts and items were secured. She continues to endorse SI and says that she was looking at an extra trash bag hanging on the trash can in the therapists' office saying that she could kill herself if given the chance, but she recognizes that she will never have the chance to act on her SI on the unit. She reports feeling somewhat hopeful about ECT, but overall has \"mixed feelings\". She does not think she has any reason to continue to live, and endorses a death wish. She did not sleep well last night and did not realize that her prazosin had been discontinued. She thinks that it had been very helpful for improving her sleep quality and preventing her from startling awake, and would like to restart it if possible. She is still having some dizziness/lightheadedness around mealtimes, but thinks that midodrine is helping. Her appetite has been fairly poor, and she feels tired today. She also feels like she is starting to get constipated - last BM was Saturday. She requests to start Miralax. She currently reports feeling \"a little suicidal\" but does not want to act on these thoughts on the unit due to fear of traumatizing others. She says that she does plan to end her life \"once I run out of options\". She currently denies HI/AVH. ROS otherwise as below.    Interval Review of Systems:   General ROS: negative for - fever or malaise  Endocrine ROS: negative for - palpitations  Respiratory ROS: no cough, shortness of breath, or wheezing  Cardiovascular ROS: no chest pain or dyspnea on exertion  Gastrointestinal ROS: no abdominal pain, no black or bloody stools, + constipation    BP 97/63 (BP Location: Left arm, Patient Position: Sitting)   Pulse 64   Temp 98.3 °F (36.8 °C) (Oral)   Resp 16   Ht 160 cm (62.99\")   Wt 62.1 kg (137 lb)   SpO2 100%   BMI 24.27 kg/m²     Mental Status Exam  Behavior: Cooperative, " "fair eye contact, pleasant  Psychomotor activity: Calm  Orientation: x4  Mood: \"depressed\"  Affect: mood congruent, dysphoric at times with restricted range  Thought Process: linear, organized  Thought Content: No delusions voiced  Hallucinations: Denies AVH, no RIS observed  Concentration:  Fair  Suicidal Ideation: Endorses currently having \"a little\" SI with no current plan but has considered several options around the unit that she cannot act on unless left unsupervised for some time, reports that she stops due to not wanting to traumatize anyone; continues to endorse death wish and believes that she will kill herself if she runs out of treatment options  Homicidal Ideation: Denies  Hopelessness: Severe  Insight: Limited   Judgement: Limited      Medical Decision Making:   Labs:     Lab Results (last 24 hours)       Procedure Component Value Units Date/Time    POC Glucose 4x Daily Before Meals & at Bedtime [[id number]]  (Normal) Collected: 02/12/24 1219    Specimen: Blood Updated: 02/12/24 1222     Glucose 82 mg/dL      Comment: Serial Number: [id number]:  [id number]       POC Glucose 4x Daily Before Meals & at Bedtime [[id number]]  (Normal) Collected: 02/12/24 0814    Specimen: Blood Updated: 02/12/24 0816     Glucose 88 mg/dL      Comment: Serial Number: [id number]:  [id number]       POC Glucose Once [[id number]]  (Normal) Collected: 02/11/24 2051    Specimen: Blood Updated: 02/11/24 2106     Glucose 92 mg/dL      Comment: Serial Number: [id number]:  [id number]       POC Glucose 4x Daily Before Meals & at Bedtime [[id number]]  (Normal) Collected: 02/11/24 1719    Specimen: Blood Updated: 02/11/24 1721     Glucose 78 mg/dL      Comment: Serial Number: [id number]:  [id number]       POC Glucose Once [[id number]]  (Normal) Collected: 02/11/24 1159    Specimen: Blood Updated: 02/11/24 1721     Glucose 77 mg/dL      Comment: Serial Number: [id number]:  [id number]       POC Glucose Once [[id number]]  " (Normal) Collected: 02/11/24 0809    Specimen: Blood Updated: 02/11/24 1721     Glucose 83 mg/dL      Comment: Serial Number: [id number]:  [id number]                 Radiology:     Imaging Results (Last 24 Hours)       ** No results found for the last 24 hours. **              EKG:     ECG/EMG Results (most recent)       None             Medications:  cetirizine, 10 mg, Oral, Daily  estradiol, 2 g, Vaginal, Once per day on Monday Wednesday Friday  FLUoxetine, 40 mg, Oral, Daily  fluticasone, 2 spray, Each Nare, Daily  Insulin Aspart, 2-9 Units, Subcutaneous, 4x Daily AC & at Bedtime  Liraglutide, 1.8 mg, Subcutaneous, Daily  melatonin, 2.5 mg, Oral, Nightly  midodrine, 5 mg, Oral, TID AC  multivitamin with minerals, 1 tablet, Oral, Daily  nitrofurantoin (macrocrystal-monohydrate), 100 mg, Oral, Nightly  pantoprazole, 40 mg, Oral, Daily  pramipexole, 0.25 mg, Oral, Daily  pramipexole, 0.375 mg, Oral, Nightly  [Held by provider] prazosin, 1 mg, Oral, Nightly  prazosin, 1 mg, Oral, Nightly  vitamin D3, 5,000 Units, Oral, Daily           All medications reviewed.    Assessment and Plan:   Major depressive disorder, recurrent, severe without psychotic features  PTSD  Suicidal ideation  R/o Cluster B personality disorder  - Continue hospitalization on the [facility] for safety and stabilization.  - Appropriate precautions ordered; SP3 and will be monitored for self-harm on the unit.   - Encourage engagement in individual, group, and family therapies as appropriate.  - Collateral as needed  - Continue home Prozac 40 mg QD for depression and PTSD symptoms.   -  Restart prazosin at 1 mg QHS due to c/o significant decrease in sleep quality after discontinuation 2/11. Prazosin should be administered immediately prior to bedtime to limit risk of falls from orthostasis. Pt previously tolerated 2 mg without major issue - will consider titration if needed and if pt tolerates well.   - Continue Mirapex 0.25 mg QAM + 0.375 mg  QHS for treatment-resistant depression. Plan to continue titration by 0.125 mg daily every 3 days as long as pt tolerating well until target dose of 1 mg daily is reached. Titration to 0.375 mg BID planned for 2/14.  - Reached out to Dr. Osuna at [facility] [place] to discuss possibility of transfer for ECT - he is agreeable. Awaiting confirmation from insurance about coverage. Once this is confirmed, will complete pre-ECT w/u then transfer to [facility] for initiation of ECT.      Insomnia  - Continue melatonin 2.5 mg QHS and Trazodone 50 mg PRN for sleep.   - Restart prazosin as noted above.      T2DM  - Diabetic diet, Accu-cheks TIDAC, low-dose SSI as needed, hypoglycemic protocol; HbA1c pending  - Continue home Victoza 1.8 mg QAM using home supply     Allergic rhinitis  - Continue Zyrtec 10 mg QD, Flonase 2 sprays QD     Orthostatic hypotension  - Continue Midodrine 5 mg TIDAC. Consider further titration if necessary. Pt continues to experience some dizziness in the morning when getting up, resolving after taking a few steps, will continue to monitor.   - PRN orthostatic VS; WNL on past few checks    History of recurrent UTI  - Admission UA WNL; pt currently asymptomatic  - Continue home Macrobid 100 mg BID for UTI prophylaxis     Vit D deficiency   - Continue cholecalciferol 5000 units QD     Hot flashes  - Continue home estradiol cream three times weekly     RLS/tremors  - Continue Mirapex titration as above    Constipation  - Miralax QD x 2 days then switch to PRN      Continue hospitalization for safety and stabilization.  Continue current level of Special Precautions (q15 minute checks). Anticipate discharge to [facility] in the next 1-2 days once transfer can be arranged.       This document has been electronically signed by Macy Ugalde MD.  February 12, 2024 13:30 EST

## 2024-02-12 NOTE — THERAPY TREATMENT NOTE
"DATA:    Therapist met with the patient individually. Therapist continues reviewing plan of care and aftercare plan.  The patient was agreeable.    ASSESSMENT:    Patient was seen for follow up of depression and SI.       Today, patient was seen 1-1 in office. The patient's mood appears appropriate to circumstances, affect congruent, thought content normal. Patient reports sleep is Poor and appetite is Absent. On scale 1-10, patient rates depression 9, \"pushing a 10\" and anxiety 2. Patient does/does not report hallucinations: None. Patient reports that she is feeling \"woozy\" today. Patient reports that the feeling is in her head and it's not necessarily dizziness. Patient reports that she has to make a decision today about the Spravato but has concerns about trying treatment again after ECT due to her depression worsening. Patient reports feeling down regarding disappointing staff because she did not have a good response. Patient endorses SI and that she continues to look for things on the unit to hurt herself. Patient reports there being a sheet on her bed that looks as if \"someone took a bite out of it\" and patient had thoughts of tearing the sheet to use to harm herself. Patient reports that she did inform MELANIE Martini about it and she removed the sheet from the bed, replacing it with a new one. Patient reports that she has been considering plastic bags that [employer] use to store trash.     CLINICAL MANEUVERING:    Therapist provided safe, secure environment for patient to share.  Provided reflective listening and psychoeducation.  Assisted patient in processing the above session. Assisted patient in identifying any questions or needs to be addressed today. Therapist assisted in creating a cost and benefit analysis regarding her consideration to continue Spravato. Therapist emphasized choice in patient's care. Therapist normalized and validated patient's feelings regarding disappointment with Spravato not working. " Therapist challenged patient's inappropriate guilt regarding staff being disappointed with her due to not having a positive response with Spravato. Therapist utilized supportive psychotherapy.      Plan:     Patient to remain hospitalized this date.      Treatment team will focus efforts on stabilizing patient's acute symptoms while providing education on healthy coping and crisis management to reduce hospitalizations.   Patient requires daily psychiatrist evaluation and 24/7 nursing supervision to promote patient  safety.     Therapist will offer 1-4 individual sessions, family education, and appropriate referral.     Treatment to explore transferring patient for ECT on Monday. [facility]

## 2024-02-12 NOTE — PLAN OF CARE
Goal Outcome Evaluation:  Plan of Care Reviewed With: patient  Patient Agreement with Plan of Care: agrees     Progress: no change  Outcome Evaluation: Pt continues to reports SI but denies plan.  Pt rates depression 8/10.  Pt calm and cooperative throughout shift, however withdrawn from group.

## 2024-02-12 NOTE — SIGNIFICANT NOTE
02/12/24 1607   Group Therapy Session   Group Attendance attended group session   Time Session Began 1400   Time Session Ended 1440   Total Time (minutes) 40   Group Type psychotherapeutic   Group Topic Covered coping skills/lifestyle management;cognitive activities   Literature/Videos Given topic handouts   Literature/Videos Given Comments Stress Exploration   Group Session Detail Patient's participated in a Stress Exploration worksheet the helps patient's learn about their stressors, and factors that protect them from stress. Stressors include daily hassles, major life changes, and life circumstances. Things that protect against stress include daily uplifts, healthy coping strategies, and protective factors.   Patient Participation/Contribution cooperative with task;discussed personal experience with topic;expressed understanding of topic;listened actively;organized;offered helpful suggestions to peers   Affect During Group affect consistent with mood   Degree of Insight moderate

## 2024-02-12 NOTE — PLAN OF CARE
Goal Outcome Evaluation:  Plan of Care Reviewed With: patient  Patient Agreement with Plan of Care: agrees  Consent Given to Review Plan with: Anxiety 2/10 depression 8/10 pt endorsing SI with a plan but wont elaborate on the specifics of the plan.  Progress: improving  Outcome Evaluation: No acute event pt states anxiety 2/10 and depression 8/10

## 2024-02-13 ENCOUNTER — APPOINTMENT (OUTPATIENT)
Dept: CT IMAGING | Facility: HOSPITAL | Age: 59
DRG: 885 | End: 2024-02-13
Payer: MEDICARE

## 2024-02-13 ENCOUNTER — HOSPITAL ENCOUNTER (INPATIENT)
Facility: HOSPITAL | Age: 59
LOS: 9 days | Discharge: HOME | End: 2024-02-22
Attending: PSYCHIATRY & NEUROLOGY | Admitting: PSYCHIATRY & NEUROLOGY
Payer: MEDICARE

## 2024-02-13 ENCOUNTER — APPOINTMENT (OUTPATIENT)
Dept: GENERAL RADIOLOGY | Facility: HOSPITAL | Age: 59
DRG: 885 | End: 2024-02-13
Payer: MEDICARE

## 2024-02-13 VITALS
TEMPERATURE: 97.9 F | HEART RATE: 66 BPM | HEIGHT: 63 IN | DIASTOLIC BLOOD PRESSURE: 70 MMHG | SYSTOLIC BLOOD PRESSURE: 107 MMHG | BODY MASS INDEX: 24.27 KG/M2 | WEIGHT: 137 LBS | OXYGEN SATURATION: 100 % | RESPIRATION RATE: 16 BRPM

## 2024-02-13 DIAGNOSIS — F33.2 SEVERE EPISODE OF RECURRENT MAJOR DEPRESSIVE DISORDER, WITHOUT PSYCHOTIC FEATURES: Primary | ICD-10-CM

## 2024-02-13 PROBLEM — R45.851 SUICIDAL IDEATION: Chronic | Status: ACTIVE | Noted: 2024-01-29

## 2024-02-13 PROBLEM — F32.9 MDD (MAJOR DEPRESSIVE DISORDER): Status: ACTIVE | Noted: 2024-02-13

## 2024-02-13 LAB
ALBUMIN SERPL-MCNC: 4.2 G/DL (ref 3.5–5.2)
ALBUMIN/GLOB SERPL: 1.3 G/DL
ALP SERPL-CCNC: 79 U/L (ref 39–117)
ALT SERPL W P-5'-P-CCNC: 46 U/L (ref 1–33)
ANION GAP SERPL CALCULATED.3IONS-SCNC: 10.1 MMOL/L (ref 5–15)
AST SERPL-CCNC: 36 U/L (ref 1–32)
BASOPHILS # BLD AUTO: 0.05 10*3/MM3 (ref 0–0.2)
BASOPHILS NFR BLD AUTO: 0.9 % (ref 0–1.5)
BILIRUB SERPL-MCNC: 0.3 MG/DL (ref 0–1.2)
BILIRUB UR QL STRIP: NEGATIVE
BUN SERPL-MCNC: 9 MG/DL (ref 6–20)
BUN/CREAT SERPL: 10.2 (ref 7–25)
CALCIUM SPEC-SCNC: 9.8 MG/DL (ref 8.6–10.5)
CHLORIDE SERPL-SCNC: 101 MMOL/L (ref 98–107)
CLARITY UR: CLEAR
CO2 SERPL-SCNC: 26.9 MMOL/L (ref 22–29)
COLOR UR: YELLOW
CREAT SERPL-MCNC: 0.88 MG/DL (ref 0.57–1)
DEPRECATED RDW RBC AUTO: 42.4 FL (ref 37–54)
EGFRCR SERPLBLD CKD-EPI 2021: 75.8 ML/MIN/1.73
EOSINOPHIL # BLD AUTO: 0.07 10*3/MM3 (ref 0–0.4)
EOSINOPHIL NFR BLD AUTO: 1.2 % (ref 0.3–6.2)
ERYTHROCYTE [DISTWIDTH] IN BLOOD BY AUTOMATED COUNT: 12.7 % (ref 12.3–15.4)
GLOBULIN UR ELPH-MCNC: 3.3 GM/DL
GLUCOSE BLDC GLUCOMTR-MCNC: 137 MG/DL (ref 70–130)
GLUCOSE BLDC GLUCOMTR-MCNC: 79 MG/DL (ref 70–130)
GLUCOSE BLDC GLUCOMTR-MCNC: 82 MG/DL (ref 70–130)
GLUCOSE SERPL-MCNC: 104 MG/DL (ref 65–99)
GLUCOSE UR STRIP-MCNC: NEGATIVE MG/DL
HCT VFR BLD AUTO: 41 % (ref 34–46.6)
HGB BLD-MCNC: 13.6 G/DL (ref 12–15.9)
HGB UR QL STRIP.AUTO: NEGATIVE
IMM GRANULOCYTES # BLD AUTO: 0.02 10*3/MM3 (ref 0–0.05)
IMM GRANULOCYTES NFR BLD AUTO: 0.4 % (ref 0–0.5)
KETONES UR QL STRIP: NEGATIVE
LEUKOCYTE ESTERASE UR QL STRIP.AUTO: NEGATIVE
LYMPHOCYTES # BLD AUTO: 1.23 10*3/MM3 (ref 0.7–3.1)
LYMPHOCYTES NFR BLD AUTO: 21.8 % (ref 19.6–45.3)
MCH RBC QN AUTO: 30.3 PG (ref 26.6–33)
MCHC RBC AUTO-ENTMCNC: 33.2 G/DL (ref 31.5–35.7)
MCV RBC AUTO: 91.3 FL (ref 79–97)
MONOCYTES # BLD AUTO: 0.41 10*3/MM3 (ref 0.1–0.9)
MONOCYTES NFR BLD AUTO: 7.3 % (ref 5–12)
NEUTROPHILS NFR BLD AUTO: 3.86 10*3/MM3 (ref 1.7–7)
NEUTROPHILS NFR BLD AUTO: 68.4 % (ref 42.7–76)
NITRITE UR QL STRIP: NEGATIVE
NRBC BLD AUTO-RTO: 0 /100 WBC (ref 0–0.2)
PH UR STRIP.AUTO: 6 [PH] (ref 5–8)
PLATELET # BLD AUTO: 315 10*3/MM3 (ref 140–450)
PMV BLD AUTO: 9 FL (ref 6–12)
POTASSIUM SERPL-SCNC: 4.4 MMOL/L (ref 3.5–5.2)
PROT SERPL-MCNC: 7.5 G/DL (ref 6–8.5)
PROT UR QL STRIP: NEGATIVE
QT INTERVAL: 408 MS
QTC INTERVAL: 403 MS
RBC # BLD AUTO: 4.49 10*6/MM3 (ref 3.77–5.28)
SODIUM SERPL-SCNC: 138 MMOL/L (ref 136–145)
SP GR UR STRIP: 1.01 (ref 1–1.03)
T4 FREE SERPL-MCNC: 1.18 NG/DL (ref 0.93–1.7)
TSH SERPL DL<=0.05 MIU/L-ACNC: 2.25 UIU/ML (ref 0.27–4.2)
UROBILINOGEN UR QL STRIP: NORMAL
WBC NRBC COR # BLD AUTO: 5.64 10*3/MM3 (ref 3.4–10.8)

## 2024-02-13 PROCEDURE — 85025 COMPLETE CBC W/AUTO DIFF WBC: CPT | Performed by: STUDENT IN AN ORGANIZED HEALTH CARE EDUCATION/TRAINING PROGRAM

## 2024-02-13 PROCEDURE — 99239 HOSP IP/OBS DSCHRG MGMT >30: CPT | Performed by: STUDENT IN AN ORGANIZED HEALTH CARE EDUCATION/TRAINING PROGRAM

## 2024-02-13 PROCEDURE — 93005 ELECTROCARDIOGRAM TRACING: CPT | Performed by: PSYCHIATRY & NEUROLOGY

## 2024-02-13 PROCEDURE — 93010 ELECTROCARDIOGRAM REPORT: CPT | Performed by: INTERNAL MEDICINE

## 2024-02-13 PROCEDURE — 82948 REAGENT STRIP/BLOOD GLUCOSE: CPT | Performed by: STUDENT IN AN ORGANIZED HEALTH CARE EDUCATION/TRAINING PROGRAM

## 2024-02-13 PROCEDURE — 70450 CT HEAD/BRAIN W/O DYE: CPT

## 2024-02-13 PROCEDURE — 71046 X-RAY EXAM CHEST 2 VIEWS: CPT

## 2024-02-13 PROCEDURE — 80053 COMPREHEN METABOLIC PANEL: CPT | Performed by: STUDENT IN AN ORGANIZED HEALTH CARE EDUCATION/TRAINING PROGRAM

## 2024-02-13 PROCEDURE — 84443 ASSAY THYROID STIM HORMONE: CPT | Performed by: STUDENT IN AN ORGANIZED HEALTH CARE EDUCATION/TRAINING PROGRAM

## 2024-02-13 PROCEDURE — 82948 REAGENT STRIP/BLOOD GLUCOSE: CPT

## 2024-02-13 PROCEDURE — 84439 ASSAY OF FREE THYROXINE: CPT | Performed by: STUDENT IN AN ORGANIZED HEALTH CARE EDUCATION/TRAINING PROGRAM

## 2024-02-13 PROCEDURE — 72100 X-RAY EXAM L-S SPINE 2/3 VWS: CPT

## 2024-02-13 PROCEDURE — 93005 ELECTROCARDIOGRAM TRACING: CPT | Performed by: STUDENT IN AN ORGANIZED HEALTH CARE EDUCATION/TRAINING PROGRAM

## 2024-02-13 PROCEDURE — 81003 URINALYSIS AUTO W/O SCOPE: CPT | Performed by: STUDENT IN AN ORGANIZED HEALTH CARE EDUCATION/TRAINING PROGRAM

## 2024-02-13 RX ORDER — MIDODRINE HYDROCHLORIDE 2.5 MG/1
5 TABLET ORAL
Status: DISCONTINUED | OUTPATIENT
Start: 2024-02-14 | End: 2024-02-22 | Stop reason: HOSPADM

## 2024-02-13 RX ORDER — OMEPRAZOLE 20 MG/1
20 CAPSULE, DELAYED RELEASE ORAL DAILY
Status: ON HOLD | COMMUNITY
End: 2024-02-13

## 2024-02-13 RX ORDER — TRAZODONE HYDROCHLORIDE 50 MG/1
50 TABLET ORAL NIGHTLY PRN
Status: ON HOLD
Start: 2024-02-13 | End: 2024-02-13

## 2024-02-13 RX ORDER — PANTOPRAZOLE SODIUM 40 MG/1
40 TABLET, DELAYED RELEASE ORAL DAILY
Status: DISCONTINUED | OUTPATIENT
Start: 2024-02-14 | End: 2024-02-22 | Stop reason: HOSPADM

## 2024-02-13 RX ORDER — CETIRIZINE HYDROCHLORIDE 10 MG/1
10 TABLET ORAL DAILY
Status: ON HOLD
Start: 2024-02-14 | End: 2024-02-13

## 2024-02-13 RX ORDER — PRAMIPEXOLE DIHYDROCHLORIDE 0.12 MG/1
0.38 TABLET ORAL NIGHTLY
Status: CANCELLED | OUTPATIENT
Start: 2024-02-13

## 2024-02-13 RX ORDER — ALUMINA, MAGNESIA, AND SIMETHICONE 2400; 2400; 240 MG/30ML; MG/30ML; MG/30ML
15 SUSPENSION ORAL EVERY 6 HOURS PRN
Status: DISCONTINUED | OUTPATIENT
Start: 2024-02-13 | End: 2024-02-22 | Stop reason: HOSPADM

## 2024-02-13 RX ORDER — PANTOPRAZOLE SODIUM 40 MG/1
40 TABLET, DELAYED RELEASE ORAL DAILY
COMMUNITY

## 2024-02-13 RX ORDER — MIDODRINE HYDROCHLORIDE 5 MG/1
5 TABLET ORAL
COMMUNITY

## 2024-02-13 RX ORDER — IBUPROFEN 400 MG/1
400 TABLET ORAL EVERY 6 HOURS PRN
Status: DISCONTINUED | OUTPATIENT
Start: 2024-02-13 | End: 2024-02-22 | Stop reason: HOSPADM

## 2024-02-13 RX ORDER — PRAMIPEXOLE DIHYDROCHLORIDE 0.12 MG/1
0.38 TABLET ORAL NIGHTLY
Start: 2024-02-13 | End: 2024-02-22 | Stop reason: HOSPADM

## 2024-02-13 RX ORDER — FLUOXETINE HYDROCHLORIDE 20 MG/1
40 CAPSULE ORAL DAILY
Status: DISCONTINUED | OUTPATIENT
Start: 2024-02-14 | End: 2024-02-22 | Stop reason: HOSPADM

## 2024-02-13 RX ORDER — BISACODYL 5 MG/1
5 TABLET, DELAYED RELEASE ORAL DAILY PRN
Status: DISCONTINUED | OUTPATIENT
Start: 2024-02-13 | End: 2024-02-22 | Stop reason: HOSPADM

## 2024-02-13 RX ORDER — HYDROXYZINE HYDROCHLORIDE 10 MG/1
10 TABLET, FILM COATED ORAL 2 TIMES DAILY
Status: DISCONTINUED | OUTPATIENT
Start: 2024-02-13 | End: 2024-02-14

## 2024-02-13 RX ORDER — PRAZOSIN HYDROCHLORIDE 1 MG/1
2 CAPSULE ORAL NIGHTLY
Status: DISCONTINUED | OUTPATIENT
Start: 2024-02-13 | End: 2024-02-14

## 2024-02-13 RX ORDER — NITROFURANTOIN 25; 75 MG/1; MG/1
100 CAPSULE ORAL NIGHTLY
Qty: 10 CAPSULE | Refills: 0 | Status: ON HOLD | OUTPATIENT
Start: 2024-02-13 | End: 2024-02-13

## 2024-02-13 RX ORDER — POLYETHYLENE GLYCOL 3350 17 G/17G
17 POWDER, FOR SOLUTION ORAL DAILY PRN
Status: ON HOLD
Start: 2024-02-14 | End: 2024-02-13

## 2024-02-13 RX ORDER — MIDODRINE HYDROCHLORIDE 5 MG/1
5 TABLET ORAL
Status: ON HOLD
Start: 2024-02-14 | End: 2024-02-13

## 2024-02-13 RX ORDER — NICOTINE 21 MG/24HR
1 PATCH, TRANSDERMAL 24 HOURS TRANSDERMAL
Status: DISCONTINUED | OUTPATIENT
Start: 2024-02-13 | End: 2024-02-17

## 2024-02-13 RX ORDER — CHOLECALCIFEROL (VITAMIN D3) 125 MCG
2.5 CAPSULE ORAL NIGHTLY
Status: ON HOLD
Start: 2024-02-13 | End: 2024-02-13

## 2024-02-13 RX ORDER — ONDANSETRON 4 MG/1
4 TABLET, ORALLY DISINTEGRATING ORAL EVERY 6 HOURS PRN
Status: DISCONTINUED | OUTPATIENT
Start: 2024-02-13 | End: 2024-02-22 | Stop reason: HOSPADM

## 2024-02-13 RX ORDER — PRAZOSIN HYDROCHLORIDE 2 MG/1
2 CAPSULE ORAL NIGHTLY
COMMUNITY
End: 2024-02-22

## 2024-02-13 RX ORDER — LANOLIN ALCOHOL/MO/W.PET/CERES
3 CREAM (GRAM) TOPICAL NIGHTLY PRN
Status: DISCONTINUED | OUTPATIENT
Start: 2024-02-15 | End: 2024-02-22 | Stop reason: HOSPADM

## 2024-02-13 RX ORDER — ACETAMINOPHEN 325 MG/1
650 TABLET ORAL EVERY 6 HOURS PRN
Status: DISCONTINUED | OUTPATIENT
Start: 2024-02-13 | End: 2024-02-13

## 2024-02-13 RX ORDER — PRAZOSIN HYDROCHLORIDE 1 MG/1
1 CAPSULE ORAL NIGHTLY
Status: ON HOLD
Start: 2024-02-13 | End: 2024-02-13

## 2024-02-13 RX ORDER — PRAMIPEXOLE DIHYDROCHLORIDE 0.25 MG/1
0.25 TABLET ORAL DAILY
Status: ON HOLD
Start: 2024-02-14 | End: 2024-02-13

## 2024-02-13 RX ORDER — TRAZODONE HYDROCHLORIDE 50 MG/1
12.5 TABLET ORAL NIGHTLY PRN
Status: DISCONTINUED | OUTPATIENT
Start: 2024-02-13 | End: 2024-02-14

## 2024-02-13 RX ORDER — BENZONATATE 100 MG/1
100 CAPSULE ORAL 3 TIMES DAILY PRN
Status: DISCONTINUED | OUTPATIENT
Start: 2024-02-13 | End: 2024-02-22 | Stop reason: HOSPADM

## 2024-02-13 RX ORDER — ECHINACEA PURPUREA EXTRACT 125 MG
2 TABLET ORAL AS NEEDED
Status: DISCONTINUED | OUTPATIENT
Start: 2024-02-13 | End: 2024-02-22 | Stop reason: HOSPADM

## 2024-02-13 RX ORDER — HYDROXYZINE HYDROCHLORIDE 25 MG/1
25 TABLET, FILM COATED ORAL 3 TIMES DAILY PRN
Status: ON HOLD
Start: 2024-02-13 | End: 2024-02-13

## 2024-02-13 RX ORDER — NITROFURANTOIN 25; 75 MG/1; MG/1
100 CAPSULE ORAL 2 TIMES DAILY
Status: DISCONTINUED | OUTPATIENT
Start: 2024-02-13 | End: 2024-02-22 | Stop reason: HOSPADM

## 2024-02-13 RX ORDER — LOPERAMIDE HYDROCHLORIDE 2 MG/1
2 CAPSULE ORAL
Status: DISCONTINUED | OUTPATIENT
Start: 2024-02-13 | End: 2024-02-22 | Stop reason: HOSPADM

## 2024-02-13 RX ORDER — NITROFURANTOIN 25; 75 MG/1; MG/1
100 CAPSULE ORAL 2 TIMES DAILY
COMMUNITY

## 2024-02-13 RX ORDER — HYDROXYZINE HYDROCHLORIDE 10 MG/1
10 TABLET, FILM COATED ORAL 2 TIMES DAILY
COMMUNITY
End: 2024-02-22 | Stop reason: HOSPADM

## 2024-02-13 RX ORDER — ESTRADIOL 0.1 MG/G
2 CREAM VAGINAL 3 TIMES WEEKLY
Status: DISCONTINUED | OUTPATIENT
Start: 2024-02-14 | End: 2024-02-22 | Stop reason: HOSPADM

## 2024-02-13 RX ORDER — FLUTICASONE PROPIONATE 50 MCG
2 SPRAY, SUSPENSION (ML) NASAL DAILY
Status: ON HOLD
Start: 2024-02-14 | End: 2024-02-13

## 2024-02-13 RX ADMIN — MIDODRINE HYDROCHLORIDE 5 MG: 5 TABLET ORAL at 08:18

## 2024-02-13 RX ADMIN — PRAMIPEXOLE DIHYDROCHLORIDE 0.25 MG: 0.25 TABLET ORAL at 08:17

## 2024-02-13 RX ADMIN — Medication 5000 UNITS: at 08:17

## 2024-02-13 RX ADMIN — PRAZOSIN HYDROCHLORIDE 2 MG: 1 CAPSULE ORAL at 22:29

## 2024-02-13 RX ADMIN — CETIRIZINE HYDROCHLORIDE 10 MG: 10 TABLET, FILM COATED ORAL at 10:30

## 2024-02-13 RX ADMIN — PANTOPRAZOLE SODIUM 40 MG: 40 TABLET, DELAYED RELEASE ORAL at 08:18

## 2024-02-13 RX ADMIN — POLYETHYLENE GLYCOL 3350 17 G: 17 POWDER, FOR SOLUTION ORAL at 08:18

## 2024-02-13 RX ADMIN — FLUTICASONE PROPIONATE 2 SPRAY: 50 SPRAY, METERED NASAL at 09:58

## 2024-02-13 RX ADMIN — Medication 1 TABLET: at 08:18

## 2024-02-13 RX ADMIN — HYDROXYZINE HYDROCHLORIDE 10 MG: 10 TABLET ORAL at 22:29

## 2024-02-13 RX ADMIN — FLUOXETINE HYDROCHLORIDE 40 MG: 20 CAPSULE ORAL at 09:59

## 2024-02-13 RX ADMIN — MIDODRINE HYDROCHLORIDE 5 MG: 5 TABLET ORAL at 11:22

## 2024-02-13 RX ADMIN — NITROFURANTOIN MONOHYDRATE/MACROCRYSTALLINE 100 MG: 25; 75 CAPSULE ORAL at 22:29

## 2024-02-13 RX ADMIN — MIDODRINE HYDROCHLORIDE 5 MG: 5 TABLET ORAL at 16:38

## 2024-02-13 NOTE — PROGRESS NOTES
ETA for [facility] transport between 4:45 and 5.  should be coming to the unit. For any issues [facility] number is [phone]

## 2024-02-13 NOTE — NURSING NOTE
Report given to Dorothy on senior psych at [facility], no questions/concerns voiced.

## 2024-02-13 NOTE — DISCHARGE SUMMARY
Inpatient Psychiatry Discharge Summary    Date of Admission:  2/9/2024 12:42 AM   Date of Discharge:  2/13/2024    Discharge Diagnosis:Principal Problem:    Severe episode of recurrent major depressive disorder, without psychotic features  Active Problems:    PTSD (post-traumatic stress disorder)    Primary insomnia    Orthostasis    Suicidal ideation    Constipation    Allergic rhinitis    History of Presenting Illness (per H&P 2/9/24):  Zo Palma is a 59 y.o. female admitted to the [facility] on 2/9/2024. Pt was evaluated by me on 02/09/24. Pt has a long psychiatric history of MDD, GURMEET, PTSD, conversion disorder with nonepileptic seizures, and chronic SI, and past medical history including arthritis with T2DM, chronic right hip pain and lower back pain, degenerative disc disorder, diverticulosis, migraines, peripheral neuropathy, hyperlipidemia, RLS, chronic hot flashes, tremors, orthostatic hypotension, recurrent UTIs, iron deficiency, Vit D deficiency, and GERD. She was just discharged from the [facility] earlier this week in order to initiate treatment with Spravato for treatment-resistant depression and suicidality.      On initial evaluation on the unit, patient's affect is still inappropriately bright for the most part, but affect appears superficial and strained, and she becomes almost tearful at times.  Patient says that although she is used to putting on a happy face for others, she feels like she is struggling to do so right now.  Patient reports that she is feeling extremely disappointed that the Spravato has not been effective for her.  She reports that her initial dose of Spravato seemed to improve her mood significantly, but by the next day she was already feeling back to her baseline level of depression, and she felt even worse the following day.  She remained hopeful that Spravato would be effective and was hoping to repeat the same response that she had with her first dose, but  "unfortunately after the 2-hour monitoring period she was feeling even more depressed than before and much more suicidal than she had been in the last several days.  She reports that her thoughts became fixated on suicide, and she did not feel that she was able to maintain her safety at home.  Her roommate Cloud removed all potential means to end her life from their home, but patient remained suicidal prompting her to return to the hospital for help.  Patient says that she has looked into ECT and would like to pursue this treatment option.  She is aware that ECT is not administered at this hospital, and she will require transfer to another facility to initiate treatment.  She remains unsure if she wants to continue her trial of Spravato once she is stabilized and states \"I am just not sure about anything right now\". She continues to endorse SI and has been looking for means to hurt herself on the unit but recognizes that there is nothing she can use to harm herself with. She does not want to continue looking for means, but feels that it is almost compulsive. She says that the only thing preventing her from trying to end her life on the unit is that she does not want to traumatize her peers and staff.     Hospital Course:  Patient was admitted for safety and stabilization.  Patient was assigned a master's level therapist and provided with an opportunity to participate in group and individual therapy and counseling on the unit. Pt was observed to be more dysphoric and affect more constricted compared to her previous admission to the [facility]. Although pt was interested in transfer to the [facility] for ECT, Dr. Osuna was off the unit for several days and pt continued her treatment on the [facility]. Due to persistent and difficult-to-treat depression, pt was continued on home Prozac 40 mg QD and Mirapex titration was continued, with plan to increase dose by 0.125 mg every 3 days until target dose of at " least 1 mg daily in divided doses was reached. Pt tolerated titration of Mirapex to 0.25 mg QAM/0.375 mg QHS without issue, with next titration due 2/14. Pt was also continued on prazosin 2 mg QHS for trauma-related hyperarousal, but this was later held due to concern for borderline low blood pressure and symptomatic orthostasis. Pt reported significantly worse sleep without prazosin, so it was cautiously restarted at 1 mg QHS with good effect and without exacerbation of orthostasis. She was also started on melatonin 2.5 mg QHS and provided with PRN Trazodone for sleep. Pt engaged well in group and individual therapies, but reported no improvement in her depression or SI and continued to frequently endorse thoughts of ending her life both on and off the unit. She was briefly placed on 1:1 observation for safety after disclosing that she was actively looking for means to harm herself on the unit, but did not have any behavioral issues with or without sitter present and clarified that she would not end her life on the unit as she did not want to traumatize anyone. Once he returned to [facility], Dr. Osuna approved pt's transfer there to pursue ECT. On day of discharge, pt denied SI/HI/AVH, but continued to endorse a death wish. She did express feeling hopeful about ECT, and was able to contract for safety while continuing inpatient treatment. Pt was discharged from the unit to transfer to [facility] without incident. Outpatient follow-up has been ascertained, but may need to be rescheduled by [facility] staff if her hospitalization is prolonged.      Medically, due to history of T2DM, pt was placed on a diabetic diet and BG was monitored with Accu-cheks TIDAC, low-dose SSI as needed, and hypoglycemic protocol. Pt was continued on Victoza 1.8 mg QAM using pt's home supply.  Home Macrobid 100 mg BID was continued for UTI prophylaxis due to history of recurrent UTI. Home midodrine was increased from 5 mg BID to TID AC due to  symptomatic orthostasis while eating, and orthostatic VS were monitored as needed. She was continued on estradiol cream three times weekly for hot flashes. Pt was continued on daily cholecalciferol due to history of Vit D deficiency. She was also started on Zyrtec and Flonase for allergic rhinitis.     Pt eventually developed dizziness and lightheadedness around mealtimes and showed evidence of orthostasis based on tachycardia on repeat monitoring of orthostatic vital signs. Midodrine was initiated at 2.5 mg BID with improvement of symptoms then increased to home dose of 5 mg BID upon discharge. Pt was continued on daily cholecalciferol due to history of Vit D deficiency. She was continued on ibandronate 150 mg monthly for post-menopausal osteoporosis as well as estradiol cream three times weekly for hot flashes. Pt was given Miralax QD for constipation. Pt tolerated the above medication regimen without major issue.     Routine labs were checked upon admission and were unremarkable. EKG on admission showed: NSR, HR 62, Qtc 391 ms.     Consults:   Consults       No orders found from 1/11/2024 to 2/10/2024.            Labs:  Lab Results (all)       Procedure Component Value Units Date/Time    POC Glucose 4x Daily Before Meals & at Bedtime [[id number]]  (Normal) Collected: 02/13/24 0754    Specimen: Blood Updated: 02/13/24 0757     Glucose 79 mg/dL      Comment: Serial Number: [id number]:  [id number]       POC Glucose Once [[id number]]  (Normal) Collected: 02/12/24 2036    Specimen: Blood Updated: 02/12/24 2109     Glucose 77 mg/dL      Comment: Serial Number: [id number]:  [id number]       POC Glucose Once [[id number]]  (Normal) Collected: 02/12/24 1651    Specimen: Blood Updated: 02/12/24 2109     Glucose 87 mg/dL      Comment: Serial Number: [id number]:  [id number]       POC Glucose 4x Daily Before Meals & at Bedtime [[id number]]  (Normal) Collected: 02/12/24 1219    Specimen: Blood Updated: 02/12/24 1222      Glucose 82 mg/dL      Comment: Serial Number: [id number]:  [id number]       POC Glucose 4x Daily Before Meals & at Bedtime [[id number]]  (Normal) Collected: 02/12/24 0814    Specimen: Blood Updated: 02/12/24 0816     Glucose 88 mg/dL      Comment: Serial Number: [id number]:  [id number]       POC Glucose Once [[id number]]  (Normal) Collected: 02/11/24 2051    Specimen: Blood Updated: 02/11/24 2106     Glucose 92 mg/dL      Comment: Serial Number: [id number]:  [id number]       POC Glucose 4x Daily Before Meals & at Bedtime [[id number]]  (Normal) Collected: 02/11/24 1719    Specimen: Blood Updated: 02/11/24 1721     Glucose 78 mg/dL      Comment: Serial Number: [id number]:  [id number]       POC Glucose Once [[id number]]  (Normal) Collected: 02/11/24 1159    Specimen: Blood Updated: 02/11/24 1721     Glucose 77 mg/dL      Comment: Serial Number: [id number]:  [id number]       POC Glucose Once [[id number]]  (Normal) Collected: 02/11/24 0809    Specimen: Blood Updated: 02/11/24 1721     Glucose 83 mg/dL      Comment: Serial Number: [id number]:  [id number]       POC Glucose Once [[id number]]  (Normal) Collected: 02/10/24 2031    Specimen: Blood Updated: 02/10/24 2034     Glucose 94 mg/dL      Comment: Serial Number: [id number]:  [id number]       POC Glucose 4x Daily Before Meals & at Bedtime [[id number]]  (Normal) Collected: 02/10/24 1204    Specimen: Blood Updated: 02/10/24 1206     Glucose 103 mg/dL      Comment: Serial Number: [id number]:  [id number]       POC Glucose Once [[id number]]  (Normal) Collected: 02/09/24 2037    Specimen: Blood Updated: 02/09/24 2050     Glucose 98 mg/dL      Comment: Serial Number: [id number]:  [id number]       POC Glucose 4x Daily Before Meals & at Bedtime [[id number]]  (Abnormal) Collected: 02/09/24 1723    Specimen: Blood Updated: 02/09/24 1726     Glucose 162 mg/dL      Comment: Serial Number: [id number]:  [id number]       POC Glucose 4x Daily Before Meals  & at Bedtime [[id number]]  (Normal) Collected: 02/09/24 1140    Specimen: Blood Updated: 02/09/24 1145     Glucose 109 mg/dL      Comment: Serial Number: [id number]:  [id number]       POC Glucose 4x Daily Before Meals & at Bedtime [[id number]]  (Abnormal) Collected: 02/09/24 0853    Specimen: Blood Updated: 02/09/24 0856     Glucose 184 mg/dL      Comment: Serial Number: [id number]:  [id number]               Imaging:  Imaging Results (All)       None            Mental Status Exam on Day of Discharge  Behavior: Cooperative  Psychomotor activity: Calm  Orientation: x4  Mood: depressed  Affect: mood congruent, constricted, dysphoric and mildly irritable  Thought Process: linear, organized  Thought Content: No delusions voiced  Hallucinations: Denies AVH, no RIS observed  Concentration: Fair  Suicidal Ideation: Currently endorses SI with no specific plan or intent on unit, but believes that she will end her life if discharged without relief from her depression  Homicidal Ideation: Denies  Hopelessness: Moderate, somewhat improved with prospect of ECT  Insight: Limited  Judgement: Limited    Condition on Discharge: Psychiatrically unstable and at high risk of suicide if not stabilized on an inpatient setting    Vital Signs  Temp:  [97.9 °F (36.6 °C)-98 °F (36.7 °C)] 97.9 °F (36.6 °C)  Heart Rate:  [55-66] 66  Resp:  [16] 16  BP: (107-108)/(55-70) 107/70    Discharge Disposition  Discharge to [facility] for ongoing inpatient psychiatric stabilization and initiation of ECT    Discharge Medications     Discharge Medications        ASK your doctor about these medications        Instructions Start Date   D-MANNOSE PO   1,000 mg, Oral, 2 Times Daily      estradiol 0.1 MG/GM vaginal cream  Commonly known as: ESTRACE   2 g, Vaginal, 3 Times Weekly, Mondays, Wednesdays and Fridays      FLUoxetine 40 MG capsule  Commonly known as: PROzac   40 mg, Oral, Daily      hydrOXYzine 10 MG tablet  Commonly known as: ATARAX   10  mg, Oral, 2 Times Daily PRN      ibandronate 150 MG tablet  Commonly known as: BONIVA   150 mg, Oral, Every 30 Days      midodrine 5 MG tablet  Commonly known as: PROAMATINE   5 mg, Oral, 2 Times Daily      multivitamin with minerals tablet tablet   1 tablet, Oral, Daily      nitrofurantoin (macrocrystal-monohydrate) 100 MG capsule  Commonly known as: Macrobid   TAKE 1 CAPSULE BY MOUTH TWICE DAILY X 7 DAYS, THEN TAKE 1 CAPSULE NIGHTLY FOR SUPPRESSIVE THERAPY E'COLI UTIs      OMEGA 3-6-9 COMPLEX PO   1 capsule, Oral, Daily      pantoprazole 40 MG EC tablet  Commonly known as: PROTONIX   40 mg, Oral, Daily      pramipexole 0.125 MG tablet  Commonly known as: MIRAPEX   SEE PROVIDER PRINT OUT FOR INSTRUCTIONS      pramipexole 0.5 MG tablet  Commonly known as: Mirapex   0.5 mg, Oral, 2 Times Daily, Start after completing titration of pramipexole using 0.125 mg tabs (separate rx provided)      prazosin 2 MG capsule  Commonly known as: MINIPRESS   2 mg, Oral, Nightly      Spravato (84 MG Dose) Nasal Solution  Generic drug: Esketamine HCl (84 MG Dose)   Instill 6 sprays into the nostril(s) as directed 2 (Two) Times a Week. Deliver to practitioner at registered location for administration.      Victoza 18 MG/3ML solution pen-injector injection  Generic drug: Liraglutide   1.8 mg, Subcutaneous, Daily      vitamin D 1.25 MG (08642 UT) capsule capsule  Commonly known as: ERGOCALCIFEROL   50,000 Units, Oral, Weekly               Discharge Diet: Diabetic    Activity at Discharge: As tolerated    Follow-up Appointments:    Future Appointments   Date Time Provider Department Center   2/19/2024  9:45 AM [facility]   2/26/2024 11:00 AM Mely Tate LCSW [facility]   3/4/2024  2:30 PM Miguel Glover MD [facility]  [facility]   3/7/2024 11:00 AM Merari Solomon APRN E [facility]   6/3/2024  9:30 AM Damari Fulton MD M[facility] END [facility]       Time: I spent 33 minutes on this discharge activity  which included: face-to-face encounter with the patient, reviewing the data in the system, coordination of the care with the nursing staff as well as consultants, documentation, and entering orders.          This document has been electronically signed by Macy Ugalde MD.  February 13, 2024 10:45 EST

## 2024-02-13 NOTE — THERAPY DISCHARGE NOTE
Patient unable to engage in discharge planning due to symptom burden. Patient is transferring to [facility] for inpatient for ECT treatment. Patient to complete discharge planning with [facility] staff once patient is able to safety plan effectively.

## 2024-02-13 NOTE — SIGNIFICANT NOTE
02/13/24 1451   Group Therapy Session   Group Attendance attended group session   Time Session Began 1410   Time Session Ended 1445   Total Time (minutes) 35   Group Type psychotherapeutic   Group Topic Covered emotions/expression   Literature/Videos Given topic handouts   Group Session Detail Patient's are asked write a short message to someone who they miss, someone who they are frustrated with, or to someone with whom they want to share something. Then the patient's can represent their feelings or a message through artwork. Patient's then share their drawing with the group and convey the emotion they were trying to express.   Patient Participation/Contribution did not discuss personal experience;discussed personal experience with topic;expressed understanding of topic;listened actively   Affect During Group affect consistent with mood   Degree of Insight moderate

## 2024-02-13 NOTE — SIGNIFICANT NOTE
02/13/24 1036   Plan   Plan Patient to DC to [facility] via [facility] for ECT   Patient/Family in Agreement with Plan yes   Final Discharge Disposition Code 02 - short term hospital for IP care;65 - psychiatric hospital or unit   Final Note Patient to transfer to [facility]. Patient has appointments, beginning 2/26 (LCSW), 3/07 (APRN)

## 2024-02-13 NOTE — PLAN OF CARE
Goal Outcome Evaluation:  Plan of Care Reviewed With: patient  Patient Agreement with Plan of Care: agrees     Progress: no change  Outcome Evaluation: Pt continues to report SI with plan but is not willing to disclose plan. Pt reports anxiety as a 2/10 and depression as a 9/10. Pt denies HI and AVH. Pt required PRN trazodone for sleep overnight. Will continue current plan of care.

## 2024-02-14 LAB
CHOLEST SERPL-MCNC: 178 MG/DL (ref 0–200)
HAV IGM SERPL QL IA: NORMAL
HBA1C MFR BLD: 4.9 % (ref 4.8–5.6)
HBV CORE IGM SERPL QL IA: NORMAL
HBV SURFACE AG SERPL QL IA: NORMAL
HCV AB SER DONR QL: NORMAL
HDLC SERPL-MCNC: 55 MG/DL (ref 40–60)
LDLC SERPL CALC-MCNC: 104 MG/DL (ref 0–100)
LDLC/HDLC SERPL: 1.85 {RATIO}
QT INTERVAL: 396 MS
QTC INTERVAL: 385 MS
TRIGL SERPL-MCNC: 106 MG/DL (ref 0–150)
VLDLC SERPL-MCNC: 19 MG/DL (ref 5–40)

## 2024-02-14 PROCEDURE — 99223 1ST HOSP IP/OBS HIGH 75: CPT | Performed by: PSYCHIATRY & NEUROLOGY

## 2024-02-14 PROCEDURE — 99232 SBSQ HOSP IP/OBS MODERATE 35: CPT

## 2024-02-14 PROCEDURE — 80074 ACUTE HEPATITIS PANEL: CPT | Performed by: PSYCHIATRY & NEUROLOGY

## 2024-02-14 PROCEDURE — 83036 HEMOGLOBIN GLYCOSYLATED A1C: CPT | Performed by: PSYCHIATRY & NEUROLOGY

## 2024-02-14 PROCEDURE — 80061 LIPID PANEL: CPT | Performed by: PSYCHIATRY & NEUROLOGY

## 2024-02-14 RX ORDER — MIRTAZAPINE 15 MG/1
15 TABLET, FILM COATED ORAL NIGHTLY
Status: DISCONTINUED | OUTPATIENT
Start: 2024-02-14 | End: 2024-02-22 | Stop reason: HOSPADM

## 2024-02-14 RX ORDER — HYDROXYZINE HYDROCHLORIDE 10 MG/1
10 TABLET, FILM COATED ORAL 3 TIMES DAILY PRN
Status: DISCONTINUED | OUTPATIENT
Start: 2024-02-14 | End: 2024-02-22 | Stop reason: HOSPADM

## 2024-02-14 RX ADMIN — CARBIDOPA AND LEVODOPA 5 MG: 50; 200 TABLET, EXTENDED RELEASE ORAL at 08:49

## 2024-02-14 RX ADMIN — NITROFURANTOIN MONOHYDRATE/MACROCRYSTALLINE 100 MG: 25; 75 CAPSULE ORAL at 20:36

## 2024-02-14 RX ADMIN — MIRTAZAPINE 15 MG: 15 TABLET, FILM COATED ORAL at 20:36

## 2024-02-14 RX ADMIN — HYDROXYZINE HYDROCHLORIDE 10 MG: 10 TABLET ORAL at 08:48

## 2024-02-14 RX ADMIN — ESTRADIOL 2 APPLICATOR: 0.1 CREAM VAGINAL at 21:09

## 2024-02-14 RX ADMIN — CARBIDOPA AND LEVODOPA 5 MG: 50; 200 TABLET, EXTENDED RELEASE ORAL at 17:23

## 2024-02-14 RX ADMIN — FLUOXETINE HYDROCHLORIDE 40 MG: 20 CAPSULE ORAL at 08:48

## 2024-02-14 RX ADMIN — PANTOPRAZOLE SODIUM 40 MG: 40 TABLET, DELAYED RELEASE ORAL at 08:48

## 2024-02-14 RX ADMIN — NITROFURANTOIN MONOHYDRATE/MACROCRYSTALLINE 100 MG: 25; 75 CAPSULE ORAL at 08:48

## 2024-02-14 RX ADMIN — CARBIDOPA AND LEVODOPA 5 MG: 50; 200 TABLET, EXTENDED RELEASE ORAL at 12:16

## 2024-02-14 NOTE — NURSING NOTE
Called and notified Dr. Osuna per request of patient's arrival to unit. Reviewed home medication with MD. All meds to restart with the exception of Mirapex. Pt requested to be a DNR discussed with MD, MD wanted to discuss with pt tomorrow. MD will review case in the am to see what needs to be completed to have ECT done.
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
## 2024-02-14 NOTE — PLAN OF CARE
"Goal Outcome Evaluation:  Plan of Care Reviewed With: patient  Patient Agreement with Plan of Care: agrees     Progress: improving  Outcome Evaluation: Pt calm and cooperative with staff. Pt interacting appropriately with peers and participating in groups. Pt reports poor appetite; reports good sleep. Pt rates anxiety 3/10 and depression 8/10. Pt plans for ECT tomorrow 2/15/24.  Pt denies HI and AVH; when asked about SI, pt states, \"yes, it depends\". Pt did not elaborate.                             "

## 2024-02-14 NOTE — PLAN OF CARE
Goal Outcome Evaluation:  Plan of Care Reviewed With: patient  Patient Agreement with Plan of Care: agrees  Consent Given to Review Plan with: roomate  Progress: no change  Outcome Evaluation: Therapist met with Patient to review care plan, social history, and aftercare recommendations; Patient agreeable.        Problem: Adult Behavioral Health Plan of Care  Goal: Plan of Care Review  Outcome: Ongoing, Progressing  Flowsheets (Taken 2/14/2024 1101)  Consent Given to Review Plan with: roomate  Progress: no change  Plan of Care Reviewed With: patient  Patient Agreement with Plan of Care: agrees  Outcome Evaluation:   Therapist met with Patient to review care plan, social history, and aftercare recommendations   Patient agreeable.  Goal: Patient-Specific Goal (Individualization)  Outcome: Ongoing, Progressing  Flowsheets  Taken 2/14/2024 1101 by Corina Durant MSW  Patient-Specific Goals (Include Timeframe): Identify 2-3 coping skills, address relapse prevention measures, complete aftercare plans, and deny SI/HI prior to discharge.  Individualized Care Needs: Therapist to offer 1-4 therapy sessions, aftercare plannig, safety planning, family education, group therapy, and brief CBT/MI interventions.  Taken 2/14/2024 1051 by Corina Durant MSW  Patient Personal Strengths:   self-reliant   resilient   resourceful   expressive of emotions   expressive of needs   family/social support  Patient Vulnerabilities:   poor impulse control   lacks insight into illness   adverse childhood experience(s)  Taken 2/13/2024 1912 by Nadege Thomas, RN  Anxieties, Fears or Concerns: None verbalized  Goal: Optimized Coping Skills in Response to Life Stressors  Outcome: Ongoing, Progressing  Flowsheets (Taken 2/14/2024 1101)  Optimized Coping Skills in Response to Life Stressors: making progress toward outcome  Intervention: Promote Effective Coping Strategies  Flowsheets (Taken 2/14/2024 1101)  Supportive Measures:   active  listening utilized   counseling provided   goal-setting facilitated   verbalization of feelings encouraged  Goal: Develops/Participates in Therapeutic Millen to Support Successful Transition  Outcome: Ongoing, Progressing  Flowsheets (Taken 2/14/2024 1101)  Develops/Participates in Therapeutic Millen to Support Successful Transition: making progress toward outcome  Intervention: Foster Therapeutic Millen  Flowsheets (Taken 2/14/2024 1101)  Trust Relationship/Rapport:   care explained   reassurance provided   choices provided   thoughts/feelings acknowledged   emotional support provided   empathic listening provided   questions answered   questions encouraged  Intervention: Mutually Develop Transition Plan  Flowsheets  Taken 2/14/2024 1101 by Corina Durant MSW  Outpatient/Agency/Support Group Needs:   outpatient counseling   outpatient medication management  Discharge Coordination/Progress:   Therapist met with Patient to complete discharge needs assessment   Patient agreeable.  Transition Support: community resources reviewed  Anticipated Discharge Disposition: home with family  Transportation Concerns: no car  Current Discharge Risk: psychiatric illness  Concerns to be Addressed: mental health  Readmission Within the Last 30 Days: no previous admission in last 30 days  Patient's Choice of Community Agency(s): [facility]  Offered/Gave Vendor List: no  Taken 2/13/2024 1912 by Nadege Thomas, RN  Transportation Anticipated: family or friend will provide  Patient/Family Anticipated Services at Transition:   mental health services   outpatient care  Patient/Family Anticipates Transition to: home with family     DATA: Therapist met individually with patient this date to introduce role and to discuss hospitalization expectations. Patient agreeable.     Patient signed consent for her roommate, Umair. This therapist spoke with him today. He was provided an update on the treatment plan. He states that he would like to  bring Patient's service dog during visitation on Saturday. This therapist spoke with Namrata who states that Patient can leave the unit to visit with ehr dog with a doctor's order, for a limited amount of time.      Clinical Maneuvering/Intervention:     Therapist assisted patient in processing above session content; acknowledged and normalized patient’s thoughts, feelings, and concerns.  Discussed the therapist/patient relationship and explain the parameters and limitations of relative confidentiality.  Also discussed the importance of active participation, and honesty to the treatment process.  Encouraged the patient to discuss/vent their feelings, frustrations, and fears concerning their ongoing medical issues and validated their feelings.     Discussed the importance of finding enjoyable activities and coping skills that the patient can engage in a regular basis. Discussed healthy coping skills such as distraction, self love, grounding, thought challenges/reframing, etc.  Provided patient with list of healthy coping skills this date. Discussed the importance of medication compliance.  Praised the patient for seeking help and spent the majority of the session building rapport.       Allowed patient to freely discuss issues without interruption or judgment. Provided safe, confidential environment to facilitate the development of positive therapeutic relationship and encourage open, honest communication.      Therapist addressed discharge safety planning this date. Assisted patient in identifying risk factors which would indicate the need for higher level of care after discharge;  including thoughts to harm self or others and/or self-harming behavior. Encouraged patient to call 911, or present to the nearest emergency room should any of these events occur. Discussed crisis intervention services and means to access.  Encouraged securing any objects of harm.       Therapist completed integrated summary, treatment  plan, and initiated social history this date.  Therapist is strongly encouraging family involvement in treatment.       ASSESSMENT: Zo Palma is a 59 year old  female living in [facility] with her roommate. She has a master's degree, but is currently unemployed. Patient was admitted for chronic SI. She is seeking ECT treatment, which will begin tomorrow. Patient reports a trauma history, and a long history of depression. She states that her biggest stressor at this time is financial. She reports good support from her roommate and her children. She denies any drug or alcohol abuse. Patient was polite and cooperative with assessment.      PLAN:       Patient to remain hospitalized this date.     Treatment team will focus efforts on stabilizing patient's acute symptoms while providing education on healthy coping and crisis management to reduce hospitalizations.   Patient requires daily psychiatrist evaluation and 24/7 nursing supervision to promote patient  safety.     Therapist will offer 1-4 individual sessions, 1 therapy group daily, family education, and appropriate referral.    Therapist recommends outpatient medication management and therapy.

## 2024-02-14 NOTE — NURSING NOTE
Spoke with pt, Zo Palma, and pt aware of full code status and verbalizes understanding and agreement of remaining full code during admission.

## 2024-02-14 NOTE — PROGRESS NOTES
Discussed the DNR request in the patient's Living Well with her and the exception that applies to medical/surgical elective procedures such as ECT. Patient states she understand that resuscitation is obligatory with general anesthesia and agrees with this override of the living will document to facilitate ECT.  The Living Well document otherwise applies to her hospital stay here.
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
## 2024-02-14 NOTE — CONSULTS
"       [facility] Medicine Services  CONSULT NOTE     Inpatient Hospitalist Consult  Consult performed by: Boy Vergara PA-C  Consult ordered by: Vinod Osuna MD        Patient Identification:  Name:  Zo Palma  Age:  59 y.o.  Sex:  female  :  1965  MRN:  [id number]  Visit Number:  [id number]  Primary care provider:  Evan Rivas DO    Length of stay:  1    Subjective     Chief Complaint:  No chief complaint on file.      History of presenting illness:     Zo Palma is a 59 y.o. female admitted by the psychiatry service secondary to MDD, dysthymic disorder, PTSD.  Her past medical history is significant for T2DM, RLS, GERD, orthostatic hypotension, chronic low back pain.      Hospital medicine was consulted for ECT clearance.    Most recent vital signs: Temp 97.7, heart rate 70, respirations 20, /60, SpO2 96% on RA.  Imaging workup reviewed, unremarkable.  EKG reviewed-sinus bradycardia rate 57.    On exam patient was pleasant and cooperative.  She voiced no current complaints on ROS other than depression, see below.  She denies any significant cardiac history.  She had a stress test \"years\" ago and states results were unremarkable at that time.  She does advise that she has titanium hardware in her neck from previous surgery.  She also notes that she is allergic to surgical steel.  She denies any other significant spinal injuries.  No significant history of head injuries/skull fractures.  She denies true seizure history though does note history of pseudoseizures secondary to conversion disorder.  Has tolerated anesthesia well in the past.    ---------------------------------------------------------------------------------------------------------------------  Review of Systems   Constitutional:  Negative for chills and fever.   HENT:  Negative for congestion and rhinorrhea.    Respiratory:  Negative for cough and shortness of breath.  " "  Cardiovascular:  Negative for chest pain and palpitations.   Gastrointestinal:  Negative for abdominal pain, diarrhea, nausea and vomiting.   Genitourinary:  Negative for difficulty urinating and dysuria.   Musculoskeletal:  Negative for arthralgias and myalgias.   Skin:  Negative for rash and wound.   Neurological:  Negative for dizziness and light-headedness.   Psychiatric/Behavioral:  Positive for dysphoric mood.           ---------------------------------------------------------------------------------------------------------------------   Past History:  Past Medical History:   Diagnosis Date    Ankle sprain     Anxiety     Arthritis of back     Arthritis of neck     Bursitis of hip     Cervical disc disorder     Conversion disorder     Depression     Fracture of wrist     Fracture, finger     Fracture, foot     Frozen shoulder     Hip arthrosis     Hormone disorder     Insomnia     Interstitial cystitis     Kidney stone     Lumbosacral disc disease     Migraines     Orthostatic hypotension     Periarthritis of shoulder     Peripheral neuropathy     Rotator cuff syndrome     Scoliosis 11/2023    Seizures 2001    Conversion Disorder    Subluxation of patella     Suicidal thoughts     Suicide attempt     \"I tried overdosing\"  32 antihistamine tablets per pt 1/27/24    Type 2 diabetes mellitus     Urinary tract infection      Past Surgical History:   Procedure Laterality Date    BARIATRIC SURGERY  2008    Gastric sleeve    BLADDER SURGERY      BREAST SURGERY      CHOLECYSTECTOMY  2010    COLONOSCOPY      CYSTOSCOPY      FOOT SURGERY      FRACTURE SURGERY      Left wrist    GASTRIC SLEEVE LAPAROSCOPIC N/A     HYSTERECTOMY      NECK SURGERY      OOPHORECTOMY      SINUS SURGERY  2021    WISDOM TOOTH EXTRACTION N/A     WRIST SURGERY       Family History   Problem Relation Age of Onset    Cancer Mother         Brain, suspected uterine    Rheumatologic disease Mother     Dementia Father     Cancer Father         " Prostate    Learning disabilities Father         Dyslexia    Other Father         Early onset alzheimer    Prostate cancer Father     Cancer Brother         Prostate, bone    Learning disabilities Brother         Dyslexia    Other Brother         Alzheimer    Prostate cancer Maternal Grandfather     Other Paternal Grandfather         Early onset alzheimer     Social History     Socioeconomic History    Marital status:     Number of children: 2    Highest education level: Master's degree (e.g., MA, MS, Pancho, MEd, MSW, SULY)   Tobacco Use    Smoking status: Never     Passive exposure: Past    Smokeless tobacco: Never   Vaping Use    Vaping Use: Never used   Substance and Sexual Activity    Alcohol use: Yes     Alcohol/week: 1.0 standard drink of alcohol     Types: 1 Drinks containing 0.5 oz of alcohol per week     Comment: Occasionally    Drug use: Never    Sexual activity: Not Currently     Partners: Male     Birth control/protection: Post-menopausal, Hysterectomy     ---------------------------------------------------------------------------------------------------------------------   Allergies:  Chlorzoxazone, Iodine, Phenazopyridine hcl, Pyridium [phenazopyridine], Quinine, Valproic acid, Methocarbamol, Iodinated contrast media, Codeine, and Diphenhydramine  ---------------------------------------------------------------------------------------------------------------------   Prior to Admission Medications       Prescriptions Last Dose Informant Patient Reported? Taking?    estradiol (ESTRACE) 0.1 MG/GM vaginal cream  Self, Other Yes Yes    Insert 2 applicators into the vagina 3 (Three) Times a Week. Mondays, Wednesdays and Fridays  Indications: Bladder Inflammation    FLUoxetine (PROzac) 40 MG capsule 2/13/2024 Self, Other No Yes    Take 1 capsule by mouth Daily. Indications: Major Depressive Disorder    hydrOXYzine (ATARAX) 10 MG tablet 2/13/2024 Self, Other Yes Yes    Take 1 tablet by mouth 2 (Two) Times  a Day.    ibandronate (BONIVA) 150 MG tablet  Self, Other No Yes    Take 1 tablet by mouth Every 30 (Thirty) Days.    midodrine (PROAMATINE) 5 MG tablet 2/13/2024 Self, Other Yes Yes    Take 1 tablet by mouth 3 (Three) Times a Day Before Meals.    nitrofurantoin, macrocrystal-monohydrate, (MACROBID) 100 MG capsule 2/13/2024 Self, Other Yes Yes    Take 1 capsule by mouth 2 (Two) Times a Day.    pantoprazole (PROTONIX) 40 MG EC tablet 2/13/2024 Self, Other Yes Yes    Take 1 tablet by mouth Daily.    pramipexole (MIRAPEX) 0.125 MG tablet 2/12/2024 Self, Other No Yes    Take 3 tablets by mouth Every Night.    prazosin (MINIPRESS) 2 MG capsule 2/12/2024 Self, Other Yes Yes    Take 1 capsule by mouth Every Night.          ---------------------------------------------------------------------------------------------------------------------     Objective      Procedures:      [facility] Meds:  estradiol, 2 g, Vaginal, Once per day on Monday Wednesday Friday  FLUoxetine, 40 mg, Oral, Daily  midodrine, 5 mg, Oral, TID AC  mirtazapine, 15 mg, Oral, Nightly  nicotine, 1 patch, Transdermal, Q24H  nitrofurantoin (macrocrystal-monohydrate), 100 mg, Oral, BID  pantoprazole, 40 mg, Oral, Daily      Pharmacy Consult,       ---------------------------------------------------------------------------------------------------------------------   Vital Signs:  Temp:  [96.9 °F (36.1 °C)-97.7 °F (36.5 °C)] 97.7 °F (36.5 °C)  Heart Rate:  [] 70  Resp:  [16-20] 20  BP: ()/(49-79) 105/60  No data found.  SpO2 Percentage    02/13/24 2005 02/14/24 0200 02/14/24 0743   SpO2: 98% 98% 96%     SpO2:  [96 %-98 %] 96 %  on   ;   Device (Oxygen Therapy): room air    Body mass index is 24.41 kg/m².  Wt Readings from Last 3 Encounters:   02/13/24 63.5 kg (140 lb)   02/09/24 62.1 kg (137 lb)   02/08/24 62.6 kg (138 lb)      No intake or output data in the 24 hours ending 02/14/24 1224  Diet: Regular/House Diet; Safe Tray; Texture: Regular Texture  (IDDSI 7); Fluid Consistency: Thin (IDDSI 0)  ---------------------------------------------------------------------------------------------------------------------   Physical exam:  Physical Exam  Vitals and nursing note reviewed.   Constitutional:       General: She is not in acute distress.  HENT:      Head: Normocephalic and atraumatic.   Eyes:      Extraocular Movements: Extraocular movements intact.      Conjunctiva/sclera: Conjunctivae normal.   Cardiovascular:      Rate and Rhythm: Normal rate and regular rhythm.      Heart sounds: No murmur heard.  Pulmonary:      Effort: Pulmonary effort is normal.      Breath sounds: Normal breath sounds.   Abdominal:      Palpations: Abdomen is soft.   Musculoskeletal:      Right lower leg: No edema.      Left lower leg: No edema.   Skin:     General: Skin is warm and dry.   Neurological:      Mental Status: She is alert. Mental status is at baseline.       ---------------------------------------------------------------------------------------------------------------------   EKG:       I have personally reviewed the EKG.   ---------------------------------------------------------------------------------------------------------------------         Results from last 7 days   Lab Units 02/14/24  0545   CHOLESTEROL mg/dL 178   TRIGLYCERIDES mg/dL 106   HDL CHOL mg/dL 55   LDL CHOL mg/dL 104*       Results from last 7 days   Lab Units 02/13/24  1116 02/08/24  2143   WBC 10*3/mm3 5.64 7.61   HEMOGLOBIN g/dL 13.6 13.0   HEMATOCRIT % 41.0 40.4   MCV fL 91.3 92.2   MCHC g/dL 33.2 32.2   PLATELETS 10*3/mm3 315 286     Results from last 7 days   Lab Units 02/13/24  1116 02/08/24  2143   SODIUM mmol/L 138 135*   POTASSIUM mmol/L 4.4 3.7   CHLORIDE mmol/L 101 100   CO2 mmol/L 26.9 26.2   BUN mg/dL 9 20   CREATININE mg/dL 0.88 0.91   CALCIUM mg/dL 9.8 9.1   GLUCOSE mg/dL 104* 111*   ALBUMIN g/dL 4.2 3.9   BILIRUBIN mg/dL 0.3 0.3   ALK PHOS U/L 79 84   AST (SGOT) U/L 36* 30   ALT (SGPT)  "U/L 46* 43*   Estimated Creatinine Clearance: 69 mL/min (by C-G formula based on SCr of 0.88 mg/dL).  No results found for: \"AMMONIA\"    Hemoglobin A1C   Date/Time Value Ref Range Status   02/14/2024 0545 4.90 4.80 - 5.60 % Final     Glucose   Date/Time Value Ref Range Status   02/13/2024 1640 82 70 - 130 mg/dL Final     Comment:     Serial Number: [id number]:  [id number]   02/13/2024 1107 137 (H) 70 - 130 mg/dL Final     Comment:     Serial Number: [id number]:  [id number]   02/13/2024 0754 79 70 - 130 mg/dL Final     Comment:     Serial Number: [id number]:  [id number]   02/12/2024 2036 77 70 - 130 mg/dL Final     Comment:     Serial Number: [id number]:  [id number]   02/12/2024 1651 87 70 - 130 mg/dL Final     Comment:     Serial Number: [id number]:  [id number]   02/12/2024 1219 82 70 - 130 mg/dL Final     Comment:     Serial Number: [id number]:  [id number]   02/12/2024 0814 88 70 - 130 mg/dL Final     Comment:     Serial Number: [id number]:  [id number]   02/11/2024 2051 92 70 - 130 mg/dL Final     Comment:     Serial Number: [id number]:  [id number]     Lab Results   Component Value Date    HGBA1C 4.90 02/14/2024     Lab Results   Component Value Date    TSH 2.250 02/13/2024    FREET4 1.18 02/13/2024       No results found for: \"BLOODCX\"  No results found for: \"URINECX\"  No results found for: \"WOUNDCX\"  No results found for: \"STOOLCX\"  No results found for: \"RESPCX\"  Pain Management Panel  More data exists         Latest Ref Rng & Units 2/8/2024 1/29/2024   Pain Management Panel   Amphetamine, Urine Qual Negative Negative  Negative    Barbiturates Screen, Urine Negative Negative  Negative    Benzodiazepine Screen, Urine Negative Negative  Negative    Buprenorphine, Screen, Urine Negative Negative  Negative    Cocaine Screen, Urine Negative Negative  Negative    Fentanyl, Urine Negative Negative  Negative    Methadone Screen , Urine Negative Negative  Negative    Methamphetamine, Ur " Negative Negative  Negative      I have personally reviewed the above laboratory results.   ---------------------------------------------------------------------------------------------------------------------  Imaging Results (Last 7 Days)       ** No results found for the last 168 hours. **          I have personally reviewed the above radiology results.   ----------------------------------------------------------------------------------------------------------------------    Assessment/Plan       MDD, recurrent  Dysthymic disorder  PTSD  Pseudoseizures  Continue management per primary.  Hospital medicine consulted for ECT clearance.  Imaging workup reviewed and unremarkable.  Vital signs are stable.  EKG unremarkable-sinus bradycardia upper 50s.  History taken from patient and pertinent risk factors addressed.  Patient does have history of surgical intervention to the C-spine with titanium hardware in place.  Per review of literature on up-to-date this does not preclude the patient from undergoing ECT.  After review given no contraindication identified patient is medically cleared to proceed.    T2DM  GERD  RLS  Orthostatic hypotension  Agree with continued home regimen  Patient had reported taking Victoza in admission H&P which does not appear on med rec.  Blood sugars currently well-controlled.  Continue to monitor closely.    Thank you for the consult and allowing us to participate in the care of your patient, hospital medicine will continue to follow. Please do not hesitate to call with any questions or concerns.      Boy Vergara PA-C  02/14/24  12:24 EST    Attestation: I personally discussed the patient's history of presenting illness, physical examination, assessment, and plan with my attending physician, Dr. Niño.

## 2024-02-14 NOTE — NURSING NOTE
Linda Carrillo MD returned page for hospitalist consult for medical clearance r/t to ECT. Stated Dr. Niño next in rotation and requested MD be added to treatment team - Dr. Niño added.

## 2024-02-15 ENCOUNTER — ANESTHESIA EVENT (OUTPATIENT)
Dept: PERIOP | Facility: HOSPITAL | Age: 59
End: 2024-02-15
Payer: MEDICARE

## 2024-02-15 ENCOUNTER — ANESTHESIA (OUTPATIENT)
Dept: PERIOP | Facility: HOSPITAL | Age: 59
End: 2024-02-15
Payer: MEDICARE

## 2024-02-15 PROCEDURE — 25010000002 ONDANSETRON PER 1 MG: Performed by: NURSE ANESTHETIST, CERTIFIED REGISTERED

## 2024-02-15 PROCEDURE — 90870 ELECTROCONVULSIVE THERAPY: CPT | Performed by: PSYCHIATRY & NEUROLOGY

## 2024-02-15 PROCEDURE — 25010000002 SUCCINYLCHOLINE PER 20 MG: Performed by: NURSE ANESTHETIST, CERTIFIED REGISTERED

## 2024-02-15 PROCEDURE — GZB4ZZZ OTHER ELECTROCONVULSIVE THERAPY: ICD-10-PCS | Performed by: PSYCHIATRY & NEUROLOGY

## 2024-02-15 PROCEDURE — 25010000002 KETOROLAC TROMETHAMINE PER 15 MG: Performed by: NURSE ANESTHETIST, CERTIFIED REGISTERED

## 2024-02-15 PROCEDURE — 25810000003 LACTATED RINGERS PER 1000 ML: Performed by: ANESTHESIOLOGY

## 2024-02-15 PROCEDURE — 25010000002 MIDAZOLAM PER 1 MG: Performed by: NURSE ANESTHETIST, CERTIFIED REGISTERED

## 2024-02-15 RX ORDER — SUCCINYLCHOLINE CHLORIDE 20 MG/ML
INJECTION INTRAMUSCULAR; INTRAVENOUS AS NEEDED
Status: DISCONTINUED | OUTPATIENT
Start: 2024-02-15 | End: 2024-02-15 | Stop reason: SURG

## 2024-02-15 RX ORDER — ONDANSETRON 2 MG/ML
4 INJECTION INTRAMUSCULAR; INTRAVENOUS AS NEEDED
Status: DISCONTINUED | OUTPATIENT
Start: 2024-02-15 | End: 2024-02-15 | Stop reason: HOSPADM

## 2024-02-15 RX ORDER — FAMOTIDINE 10 MG/ML
INJECTION, SOLUTION INTRAVENOUS AS NEEDED
Status: DISCONTINUED | OUTPATIENT
Start: 2024-02-15 | End: 2024-02-15 | Stop reason: SURG

## 2024-02-15 RX ORDER — SODIUM CHLORIDE 0.9 % (FLUSH) 0.9 %
10 SYRINGE (ML) INJECTION EVERY 12 HOURS SCHEDULED
Status: DISCONTINUED | OUTPATIENT
Start: 2024-02-15 | End: 2024-02-15 | Stop reason: HOSPADM

## 2024-02-15 RX ORDER — SODIUM CHLORIDE 0.9 % (FLUSH) 0.9 %
10 SYRINGE (ML) INJECTION AS NEEDED
Status: DISCONTINUED | OUTPATIENT
Start: 2024-02-15 | End: 2024-02-15 | Stop reason: HOSPADM

## 2024-02-15 RX ORDER — KETOROLAC TROMETHAMINE 30 MG/ML
30 INJECTION, SOLUTION INTRAMUSCULAR; INTRAVENOUS EVERY 6 HOURS PRN
Status: DISCONTINUED | OUTPATIENT
Start: 2024-02-15 | End: 2024-02-15 | Stop reason: HOSPADM

## 2024-02-15 RX ORDER — MEPERIDINE HYDROCHLORIDE 25 MG/ML
12.5 INJECTION INTRAMUSCULAR; INTRAVENOUS; SUBCUTANEOUS
Status: DISCONTINUED | OUTPATIENT
Start: 2024-02-15 | End: 2024-02-15 | Stop reason: HOSPADM

## 2024-02-15 RX ORDER — ONDANSETRON 2 MG/ML
INJECTION INTRAMUSCULAR; INTRAVENOUS AS NEEDED
Status: DISCONTINUED | OUTPATIENT
Start: 2024-02-15 | End: 2024-02-15 | Stop reason: SURG

## 2024-02-15 RX ORDER — SODIUM CHLORIDE, SODIUM LACTATE, POTASSIUM CHLORIDE, CALCIUM CHLORIDE 600; 310; 30; 20 MG/100ML; MG/100ML; MG/100ML; MG/100ML
125 INJECTION, SOLUTION INTRAVENOUS ONCE
Status: COMPLETED | OUTPATIENT
Start: 2024-02-15 | End: 2024-02-15

## 2024-02-15 RX ORDER — SODIUM CHLORIDE, SODIUM LACTATE, POTASSIUM CHLORIDE, CALCIUM CHLORIDE 600; 310; 30; 20 MG/100ML; MG/100ML; MG/100ML; MG/100ML
100 INJECTION, SOLUTION INTRAVENOUS ONCE AS NEEDED
Status: DISCONTINUED | OUTPATIENT
Start: 2024-02-15 | End: 2024-02-15 | Stop reason: HOSPADM

## 2024-02-15 RX ORDER — MIDAZOLAM HYDROCHLORIDE 1 MG/ML
INJECTION INTRAMUSCULAR; INTRAVENOUS AS NEEDED
Status: DISCONTINUED | OUTPATIENT
Start: 2024-02-15 | End: 2024-02-15 | Stop reason: SURG

## 2024-02-15 RX ORDER — KETOROLAC TROMETHAMINE 30 MG/ML
INJECTION, SOLUTION INTRAMUSCULAR; INTRAVENOUS AS NEEDED
Status: DISCONTINUED | OUTPATIENT
Start: 2024-02-15 | End: 2024-02-15 | Stop reason: SURG

## 2024-02-15 RX ORDER — OXYCODONE HYDROCHLORIDE AND ACETAMINOPHEN 5; 325 MG/1; MG/1
1 TABLET ORAL ONCE AS NEEDED
Status: DISCONTINUED | OUTPATIENT
Start: 2024-02-15 | End: 2024-02-15 | Stop reason: HOSPADM

## 2024-02-15 RX ORDER — IPRATROPIUM BROMIDE AND ALBUTEROL SULFATE 2.5; .5 MG/3ML; MG/3ML
3 SOLUTION RESPIRATORY (INHALATION) ONCE AS NEEDED
Status: DISCONTINUED | OUTPATIENT
Start: 2024-02-15 | End: 2024-02-15 | Stop reason: HOSPADM

## 2024-02-15 RX ORDER — SODIUM CHLORIDE 9 MG/ML
40 INJECTION, SOLUTION INTRAVENOUS AS NEEDED
Status: DISCONTINUED | OUTPATIENT
Start: 2024-02-15 | End: 2024-02-15 | Stop reason: HOSPADM

## 2024-02-15 RX ORDER — MIDAZOLAM HYDROCHLORIDE 1 MG/ML
1 INJECTION INTRAMUSCULAR; INTRAVENOUS
Status: DISCONTINUED | OUTPATIENT
Start: 2024-02-15 | End: 2024-02-15 | Stop reason: HOSPADM

## 2024-02-15 RX ADMIN — SUCCINYLCHOLINE CHLORIDE 100 MG: 20 INJECTION, SOLUTION INTRAMUSCULAR; INTRAVENOUS at 07:59

## 2024-02-15 RX ADMIN — SODIUM CHLORIDE, POTASSIUM CHLORIDE, SODIUM LACTATE AND CALCIUM CHLORIDE: 600; 310; 30; 20 INJECTION, SOLUTION INTRAVENOUS at 07:51

## 2024-02-15 RX ADMIN — Medication 3 MG: at 20:34

## 2024-02-15 RX ADMIN — CARBIDOPA AND LEVODOPA 5 MG: 50; 200 TABLET, EXTENDED RELEASE ORAL at 16:30

## 2024-02-15 RX ADMIN — PANTOPRAZOLE SODIUM 40 MG: 40 TABLET, DELAYED RELEASE ORAL at 10:43

## 2024-02-15 RX ADMIN — MIRTAZAPINE 15 MG: 15 TABLET, FILM COATED ORAL at 20:34

## 2024-02-15 RX ADMIN — KETOROLAC TROMETHAMINE 30 MG: 30 INJECTION, SOLUTION INTRAMUSCULAR; INTRAVENOUS at 08:04

## 2024-02-15 RX ADMIN — NITROFURANTOIN MONOHYDRATE/MACROCRYSTALLINE 100 MG: 25; 75 CAPSULE ORAL at 20:34

## 2024-02-15 RX ADMIN — FLUOXETINE HYDROCHLORIDE 40 MG: 20 CAPSULE ORAL at 10:43

## 2024-02-15 RX ADMIN — METHOHEXITAL SODIUM 100 MG: 500 INJECTION, POWDER, LYOPHILIZED, FOR SOLUTION INTRAMUSCULAR; INTRAVENOUS; RECTAL at 07:59

## 2024-02-15 RX ADMIN — ONDANSETRON 4 MG: 2 INJECTION INTRAMUSCULAR; INTRAVENOUS at 08:04

## 2024-02-15 RX ADMIN — NITROFURANTOIN MONOHYDRATE/MACROCRYSTALLINE 100 MG: 25; 75 CAPSULE ORAL at 10:36

## 2024-02-15 RX ADMIN — CARBIDOPA AND LEVODOPA 5 MG: 50; 200 TABLET, EXTENDED RELEASE ORAL at 10:41

## 2024-02-15 RX ADMIN — MIDAZOLAM HYDROCHLORIDE 2 MG: 1 INJECTION, SOLUTION INTRAMUSCULAR; INTRAVENOUS at 08:04

## 2024-02-15 RX ADMIN — FAMOTIDINE 20 MG: 10 INJECTION, SOLUTION INTRAVENOUS at 08:04

## 2024-02-15 RX ADMIN — IBUPROFEN 400 MG: 400 TABLET, FILM COATED ORAL at 19:54

## 2024-02-15 RX ADMIN — IBUPROFEN 400 MG: 400 TABLET, FILM COATED ORAL at 10:48

## 2024-02-15 NOTE — PLAN OF CARE
Goal Outcome Evaluation:  Plan of Care Reviewed With: patient  Patient Agreement with Plan of Care: agrees     Progress: no change  Outcome Evaluation: PATIENT HAD HER FIRST ECT THIS MORNING,  TOLERATED WELL, NO COMPLAINTS NOTED.  OUT OF ROOM FOR MEALS AND WATCHIING TV, TALKS WITH OTHER PATIENTS ON UNIT.  DENIES S/I.  VERY COOPERATIVE WITH STAFF.

## 2024-02-15 NOTE — PLAN OF CARE
Goal Outcome Evaluation:  Plan of Care Reviewed With: patient  Patient Agreement with Plan of Care: agrees  Consent Given to Review Plan with: roomate  Progress: no change  Outcome Evaluation: Therapiat met with Patient to review treatment progress and aftercare plans; Patient agreeable.      Problem: Adult Behavioral Health Plan of Care  Goal: Plan of Care Review  Outcome: Ongoing, Progressing  Flowsheets  Taken 2/15/2024 1324  Progress: no change  Plan of Care Reviewed With: patient  Patient Agreement with Plan of Care: agrees  Outcome Evaluation:   Therapiat met with Patient to review treatment progress and aftercare plans   Patient agreeable.  Taken 2/14/2024 1101  Consent Given to Review Plan with: roomate  Goal: Optimized Coping Skills in Response to Life Stressors  Outcome: Ongoing, Progressing  Flowsheets (Taken 2/15/2024 1324)  Optimized Coping Skills in Response to Life Stressors: making progress toward outcome  Intervention: Promote Effective Coping Strategies  Flowsheets (Taken 2/15/2024 1324)  Supportive Measures:   counseling provided   active listening utilized   goal-setting facilitated   verbalization of feelings encouraged  Goal: Develops/Participates in Therapeutic [place] to Support Successful Transition  Outcome: Ongoing, Progressing  Flowsheets (Taken 2/15/2024 1324)  Develops/Participates in Therapeutic [place] to Support Successful Transition: making progress toward outcome  Intervention: Foster Therapeutic [place]  Flowsheets (Taken 2/15/2024 1324)  Trust Relationship/Rapport:   care explained   reassurance provided   choices provided   emotional support provided   empathic listening provided   thoughts/feelings acknowledged   questions answered   questions encouraged     DATA: Therapist met with Patient individually this date. Patient agreeable to discuss current treatment progress and discharge concerns.     CLINICAL MANUVERING/INTERVENTIONS:  Assisted Patient in processing session  "content; acknowledged and normalized Patient’s thoughts, feelings, and concerns by utilizing a person-centered approach in efforts to build appropriate rapport and a positive therapeutic relationship with open and honest communication. Allowed Patient to ventilate regarding current stressors and triggers for negative emotions and thoughts in a safe nonjudgmental environment with unconditional positive regard, active listening skills, and empathy.     ASSESSMENT: Patient was seen 1-1 for a follow up today. She continues to receive treatment for major depression. She reports no change in her depression at this time. She received ECT treatment this morning and reports that she has a mild headache this afternoon. She shared more about her past, specifically trauma. However this was not detailed information. She states that she has talked about this extensively with therapists in the past and that she doesn't want to open that \"pandora's box\" again. We made a goal to focus more on the future. 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     PLAN:   Patient will continue stabilization. Patient will continue to receive services offered by Treatment Team.     Patient will follow-up with [facility].      "

## 2024-02-15 NOTE — PLAN OF CARE
Goal Outcome Evaluation:  Plan of Care Reviewed With: patient  Patient Agreement with Plan of Care: agrees     Progress: no change  Outcome Evaluation: Pt reports sleeping well and a fair appetite. Rates A/D 3/9. Reports SI with no plan. Denies HI or hallucinations. Reports feeling helpless, hopeless and worthless. Patient has slept approsimately 7 hours as of the end of night shift.

## 2024-02-15 NOTE — NURSING NOTE
PATIENT RETURNED FROM ECT.  ALERT, VERBAL AND DENIES ANY PAIN.  VS STABLE.  PATIENT LYING QUIETLY IN BED.

## 2024-02-15 NOTE — OP NOTE
ECT OPERATIVE PROCEDURE REPORT      PATIENT NAME: Zo Palma    Assistants: None    Primary Surgeon: PELON Au MD    Preoperative Diagnosis:   Major Depression, Recurrent, Severe Without Psychosis    Postoperative Diagnosis: Same  : 1965    SSN:     MRN#: [id number]    PHYSICIAN: PELON AU M.D.    PROCEDURE DATE: 02/15/24    This is the patient's  first  Treatment.     PROCEDURE:   Bifrontal ECT utilizing Thymatron System IV.   Energy Set  50  %  Charge Delivered  251.6  mC  Current  0.90  A  Stimulus Duration 7.0  Sec  Frequency  40  Hz  Pulse Width 0.50  msec      ANESTHESIA: See anesthesia report for medications and monitoring.                         DETAILS: Impulse at 0801 with a resultant 65 second seizure.        Specimens Removed: NA  Blood Administered: NA  Estimated Blood Loss: None  Complications: None    Pt STATUS STABLE: 0830 HR    Grafts or Implants: NA    Dictated utilizing Dragon dictation

## 2024-02-16 PROCEDURE — 99232 SBSQ HOSP IP/OBS MODERATE 35: CPT | Performed by: PSYCHIATRY & NEUROLOGY

## 2024-02-16 RX ORDER — POLYETHYLENE GLYCOL 3350 17 G/17G
17 POWDER, FOR SOLUTION ORAL DAILY PRN
Status: COMPLETED | OUTPATIENT
Start: 2024-02-16 | End: 2024-02-16

## 2024-02-16 RX ADMIN — CARBIDOPA AND LEVODOPA 5 MG: 50; 200 TABLET, EXTENDED RELEASE ORAL at 12:17

## 2024-02-16 RX ADMIN — NITROFURANTOIN MONOHYDRATE/MACROCRYSTALLINE 100 MG: 25; 75 CAPSULE ORAL at 20:22

## 2024-02-16 RX ADMIN — PANTOPRAZOLE SODIUM 40 MG: 40 TABLET, DELAYED RELEASE ORAL at 08:20

## 2024-02-16 RX ADMIN — FLUOXETINE HYDROCHLORIDE 40 MG: 20 CAPSULE ORAL at 08:20

## 2024-02-16 RX ADMIN — NITROFURANTOIN MONOHYDRATE/MACROCRYSTALLINE 100 MG: 25; 75 CAPSULE ORAL at 08:20

## 2024-02-16 RX ADMIN — POLYETHYLENE GLYCOL (3350) 17 G: 17 POWDER, FOR SOLUTION ORAL at 17:26

## 2024-02-16 RX ADMIN — MIRTAZAPINE 15 MG: 15 TABLET, FILM COATED ORAL at 20:22

## 2024-02-16 RX ADMIN — CARBIDOPA AND LEVODOPA 5 MG: 50; 200 TABLET, EXTENDED RELEASE ORAL at 17:21

## 2024-02-16 RX ADMIN — CARBIDOPA AND LEVODOPA 5 MG: 50; 200 TABLET, EXTENDED RELEASE ORAL at 08:20

## 2024-02-16 RX ADMIN — ESTRADIOL 2 APPLICATOR: 0.1 CREAM VAGINAL at 20:22

## 2024-02-16 NOTE — PLAN OF CARE
Goal Outcome Evaluation:  Plan of Care Reviewed With: patient  Patient Agreement with Plan of Care: agrees  Consent Given to Review Plan with: roomate  Progress: improving  Outcome Evaluation: Therapist met with Patient to review treatment progress and aftercare plans; Patient agreeable.        Problem: Adult Behavioral Health Plan of Care  Goal: Plan of Care Review  Outcome: Ongoing, Progressing  Flowsheets  Taken 2/16/2024 0939  Progress: improving  Plan of Care Reviewed With: patient  Patient Agreement with Plan of Care: agrees  Outcome Evaluation:   Therapist met with Patient to review treatment progress and aftercare plans   Patient agreeable.  Taken 2/14/2024 1101  Consent Given to Review Plan with: roomate  Goal: Optimized Coping Skills in Response to Life Stressors  Outcome: Ongoing, Progressing  Flowsheets (Taken 2/16/2024 0939)  Optimized Coping Skills in Response to Life Stressors: making progress toward outcome  Intervention: Promote Effective Coping Strategies  Flowsheets (Taken 2/16/2024 0939)  Supportive Measures:   active listening utilized   counseling provided   goal-setting facilitated   verbalization of feelings encouraged  Goal: Develops/Participates in Therapeutic [place] to Support Successful Transition  Outcome: Ongoing, Progressing  Flowsheets (Taken 2/16/2024 0939)  Develops/Participates in Therapeutic [place] to Support Successful Transition: making progress toward outcome  Intervention: Foster Therapeutic [place]  Flowsheets (Taken 2/16/2024 0939)  Trust Relationship/Rapport:   care explained   reassurance provided   choices provided   thoughts/feelings acknowledged   emotional support provided   empathic listening provided   questions answered   questions encouraged     DATA: Therapist met with Patient individually this date. Patient agreeable to discuss current treatment progress and discharge concerns.     CLINICAL MANUVERING/INTERVENTIONS:  Assisted Patient in processing session  content; acknowledged and normalized Patient’s thoughts, feelings, and concerns by utilizing a person-centered approach in efforts to build appropriate rapport and a positive therapeutic relationship with open and honest communication. Allowed Patient to ventilate regarding current stressors and triggers for negative emotions and thoughts in a safe nonjudgmental environment with unconditional positive regard, active listening skills, and empathy.     ASSESSMENT: Patient was seen 1-1 for a follow up today. Patient continues to receive treatment for major depression. She reports that she is feeling more positive today. She appears hopeful and states that she is looking forward to another ECT on Monday. She is also looking forward to seeing her dog tomorrow. This therapist staffed with QUINTIN Martino about Patient's plans to visit with her dog tomorrow.    PLAN:   Patient will continue stabilization. Patient will continue to receive services offered by Treatment Team.     Patient will follow-up with [facility].

## 2024-02-16 NOTE — PROGRESS NOTES
"INPATIENT PSYCHIATRIC PROGRESS NOTE    Name:  Zo Palma  :  1965  MRN:  [id number]  Visit Number:  [id number]  Length of stay:  3    Behavioral Health Treatment Plan and Problem List: I have reviewed and approved the Behavioral Health Treatment Plan and Problem list.    SUBJECTIVE    CC/ Focus of exam:  depression/anxiety    Patient's subjective status: \"a little better\"    INTERVAL HISTORY: The patient's affect has improved, trace of optimism and interactions with staff and peers appropriate.  Patient slept well however continuing to report thoughts of self-harm having requested that nurses remove the sheet from the adjacent bed in her room.  Conversion to an outpatient ECT treatment format not possible at this point given her continued SI.      Depression rating 8/10  Anxiety rating 3/10  Hopelessness: 3/10  Sleep:slept well       ROS: No cardiovascular, GI, or Neurological complaints.       OBJECTIVE    Vitals:    24 0732   BP: 123/80   Pulse: 60   Resp:    Temp:    SpO2:       Temp:  [97 °F (36.1 °C)-98.5 °F (36.9 °C)] 97.4 °F (36.3 °C)  Heart Rate:  [56-73] 60  Resp:  [12-18] 18  BP: ()/(57-80) 123/80    MENTAL STATUS EXAM:       Psychomotor: No psychomotor agitation/retardation, No EPS, No motor tics  Speech-normal rate, amount.  Mood/Affect: Depressed  Thought Processes: linear, logical, and goal directed  Thought Content: mood congruent  Hallucination(s): none  Hopelessness: Intermittent  Optimistic: minimally  Suicidal Thoughts: Yes  Suicidal Plan/Intent: No  Homicidal Thoughts: absent  Orientation: oriented x 3  Memory: recent intact    Lab Results (last 24 hours)       ** No results found for the last 24 hours. **             Imaging Results (Last 24 Hours)       ** No results found for the last 24 hours. **             Lab and x-ray studies reviewed.    ECG/EMG Results (most recent)       Procedure Component Value Units Date/Time    ECG 12 Lead Other; Baseline Cardiac Status " [[id number]] Collected: 02/13/24 1938     Updated: 02/14/24 1747     QT Interval 396 ms      QTC Interval 385 ms     Narrative:      Test Reason : Other~  Blood Pressure :   */*   mmHG  Vent. Rate :  57 BPM     Atrial Rate :  57 BPM     P-R Int : 200 ms          QRS Dur :  76 ms      QT Int : 396 ms       P-R-T Axes :  46   2  41 degrees     QTc Int : 385 ms    Sinus bradycardia  Otherwise normal ECG  When compared with ECG of 13-FEB-2024 12:41,  No significant change was found  Confirmed by Rick Richardson ([id number]) on 2/14/2024 5:44:36 PM    Referred By:            Confirmed By: Rick Richardson             ALLERGIES: Chlorzoxazone, Iodine, Phenazopyridine hcl, Pyridium [phenazopyridine], Quinine, Valproic acid, Methocarbamol, Iodinated contrast media, Codeine, and Diphenhydramine    Medications:     estradiol, 2 g, Vaginal, Once per day on Monday Wednesday Friday  FLUoxetine, 40 mg, Oral, Daily  midodrine, 5 mg, Oral, TID AC  mirtazapine, 15 mg, Oral, Nightly  nicotine, 1 patch, Transdermal, Q24H  nitrofurantoin (macrocrystal-monohydrate), 100 mg, Oral, BID  pantoprazole, 40 mg, Oral, Daily       ASSESSMENT & PLAN    Major depressive disorder, recurrent, without psychosis (TRD)  Continue the Prozac, proceed with ECT as planned, provide for supportive individual group psychotherapeutic effort.     Dysthymic disorder  Same as above     PTSD  Continue with the supportive individual group psychotherapeutic effort     Pseudoseizures (history of)  Continue with the supportive individual and group psychotherapeutic effort     Diabetes type 2  Victoza     Restless leg syndrome     GERD  Protonix     Orthostatic hypotension  Reevaluate current medications     Recurrent urinary tract infections  Patient has been prescribed Mobic as a preventative strategy     Nonspecific arthralgias and low back pain  Treat symptomatically with non opiate analgesic      Special precautions: Special Precautions Level 3 (q15 min checks)     Behavioral  Health Treatment Plan and Problem List: I have reviewed and approved the Behavioral Health Treatment Plan and Problem list.    I spent a total of 26 minutes in direct patient care including  17 minutes face to face with the patient assessment, coordination of care, and counseling the patient on the current and follow-up treatment plans regarding her status and situation.  Patient had no additional questions.     PELON Osuna MD    Clinician:  Vinod Osuna MD  02/16/24  08:11 EST    Dictated utilizing Dragon dictation

## 2024-02-16 NOTE — PLAN OF CARE
Goal Outcome Evaluation:  Plan of Care Reviewed With: patient  Patient Agreement with Plan of Care: agrees     Progress: no change  Outcome Evaluation: Patient repors fair sleep and a poor appetite. Rates A/D 3/8. Denies SI/HI or hallucinations. Denies feeling helpless, hopeless or worthless. Pt has slept approximately 9 hours this shift.

## 2024-02-16 NOTE — PLAN OF CARE
Goal Outcome Evaluation:  Plan of Care Reviewed With: patient  Patient Agreement with Plan of Care: agrees     Progress: improving     Patient rates anxiety 3 and depression 7, reports having suicidal thoughts verbalizes can inform staff of thoughts, denies thoughts of harming others, and denies hallucinations.

## 2024-02-16 NOTE — PLAN OF CARE
Goal Outcome Evaluation:  Plan of Care Reviewed With: patient  Patient Agreement with Plan of Care: agrees     Progress: improving     Dr. Osuna has ECT planned for Monday care plan updated.

## 2024-02-17 PROCEDURE — 99232 SBSQ HOSP IP/OBS MODERATE 35: CPT | Performed by: PSYCHIATRY & NEUROLOGY

## 2024-02-17 RX ADMIN — CARBIDOPA AND LEVODOPA 5 MG: 50; 200 TABLET, EXTENDED RELEASE ORAL at 11:25

## 2024-02-17 RX ADMIN — CARBIDOPA AND LEVODOPA 5 MG: 50; 200 TABLET, EXTENDED RELEASE ORAL at 17:21

## 2024-02-17 RX ADMIN — FLUOXETINE HYDROCHLORIDE 40 MG: 20 CAPSULE ORAL at 08:15

## 2024-02-17 RX ADMIN — IBUPROFEN 400 MG: 400 TABLET, FILM COATED ORAL at 03:11

## 2024-02-17 RX ADMIN — CARBIDOPA AND LEVODOPA 5 MG: 50; 200 TABLET, EXTENDED RELEASE ORAL at 06:35

## 2024-02-17 RX ADMIN — MIRTAZAPINE 15 MG: 15 TABLET, FILM COATED ORAL at 20:28

## 2024-02-17 RX ADMIN — PANTOPRAZOLE SODIUM 40 MG: 40 TABLET, DELAYED RELEASE ORAL at 08:15

## 2024-02-17 RX ADMIN — NITROFURANTOIN MONOHYDRATE/MACROCRYSTALLINE 100 MG: 25; 75 CAPSULE ORAL at 20:28

## 2024-02-17 RX ADMIN — NITROFURANTOIN MONOHYDRATE/MACROCRYSTALLINE 100 MG: 25; 75 CAPSULE ORAL at 08:15

## 2024-02-17 NOTE — PLAN OF CARE
Goal Outcome Evaluation:  Plan of Care Reviewed With: patient  Patient Agreement with Plan of Care: agrees     Progress: no change  Outcome Evaluation: PATIENT HAS BEEN UP IN DAY ROOM MOST OF SHIFT, TOOK ON NAP.  VERY COOPERATIVE, TALKS WITH OTHER STAFF ON UNIT.  ANXIETY/DEPRESSION 5 AND DENIES ANY S/I.  NO VISITOR TODAY WITH HER SERVICE DOG BUT DIDNT SEEM UPSET.

## 2024-02-17 NOTE — PROGRESS NOTES
"INPATIENT PSYCHIATRIC PROGRESS NOTE    Name:  Zo Palma  :  1965  MRN:  [id number]  Visit Number:  [id number]  Length of stay:  4    SUBJECTIVE    CC/Focus of Exam: Depression, SI    INTERVAL HISTORY:  First time seeing patient.  Chart, notes, vitals, labs and EKG personally reviewed.  ALT/AST 46/36    Patient reports mood gradually improving.  Increased hopefulness, energy, motivation.  Reports insomnia last night, but relates this to helicopter landing around 3 AM.  Patient reports tolerating medications well.  No new complaints today.  Event ongoing suicidality, plan is to remain hospitalized and receive ECT treatment once Dr. Osuna returns next week.    Depression rating 7/10  Anxiety rating 710  Sleep: Interrupted  Withdrawal sx: Denied  Cravin/10    Review of Systems   Constitutional: Negative.    Respiratory: Negative.     Cardiovascular: Negative.    Gastrointestinal: Negative.    Musculoskeletal: Negative.    Psychiatric/Behavioral:  Positive for dysphoric mood, sleep disturbance and suicidal ideas. The patient is nervous/anxious.        OBJECTIVE    Temp:  [96.9 °F (36.1 °C)-97.7 °F (36.5 °C)] 96.9 °F (36.1 °C)  Heart Rate:  [56-79] 56  Resp:  [18-19] 18  BP: (100-128)/(66-81) 128/81    MENTAL STATUS EXAM:  Appearance: Casually dressed, good hygeine.   Cooperation: Cooperative  Psychomotor: No psychomotor agitation/retardation, No EPS, No motor tics  Speech: normal rate, amount.  Mood: \"Okay\"   Affect: congruent, anxious  Thought Content: goal directed, no delusional material present  Thought process: linear, organized.  Suicidality: + SI  Homicidality: No HI  Perception: No AH/VH  Insight: fair   Judgment: fair    Lab Results (last 24 hours)       ** No results found for the last 24 hours. **               Imaging Results (Last 24 Hours)       ** No results found for the last 24 hours. **               ECG/EMG Results (most recent)       Procedure Component Value Units Date/Time    ECG " 12 Lead Other; Baseline Cardiac Status [[id number]] Collected: 02/13/24 1938     Updated: 02/14/24 1747     QT Interval 396 ms      QTC Interval 385 ms     Narrative:      Test Reason : Other~  Blood Pressure :   */*   mmHG  Vent. Rate :  57 BPM     Atrial Rate :  57 BPM     P-R Int : 200 ms          QRS Dur :  76 ms      QT Int : 396 ms       P-R-T Axes :  46   2  41 degrees     QTc Int : 385 ms    Sinus bradycardia  Otherwise normal ECG  When compared with ECG of 13-FEB-2024 12:41,  No significant change was found  Confirmed by Rick Richardson ([id number]) on 2/14/2024 5:44:36 PM    Referred By:            Confirmed By: Rick Richardson             ALLERGIES: Chlorzoxazone, Iodine, Phenazopyridine hcl, Pyridium [phenazopyridine], Quinine, Valproic acid, Methocarbamol, Iodinated contrast media, Codeine, and Diphenhydramine      Current Facility-Administered Medications:     aluminum-magnesium hydroxide-simethicone (MAALOX MAX) 400-400-40 MG/5ML suspension 15 mL, 15 mL, Oral, Q6H PRN, Vinod Osuna MD    benzonatate (TESSALON) capsule 100 mg, 100 mg, Oral, TID PRN, Vinod Osuna MD    bisacodyl (DULCOLAX) EC tablet 5 mg, 5 mg, Oral, Daily PRN, Vinod Osuna MD    estradiol (ESTRACE) vaginal cream 2 applicator, 2 g, Vaginal, Once per day on Monday Wednesday Friday, Vinod Osuna MD, 2 applicator at 02/16/24 2022    FLUoxetine (PROzac) capsule 40 mg, 40 mg, Oral, Daily, Vinod Osuna MD, 40 mg at 02/17/24 0815    hydrOXYzine (ATARAX) tablet 10 mg, 10 mg, Oral, TID PRN, Vinod Osuna MD, 10 mg at 02/14/24 0848    ibuprofen (ADVIL,MOTRIN) tablet 400 mg, 400 mg, Oral, Q6H PRN, Vinod Osuna MD, 400 mg at 02/17/24 0311    loperamide (IMODIUM) capsule 2 mg, 2 mg, Oral, Q2H PRN, Vinod Osuna MD    magnesium hydroxide (MILK OF MAGNESIA) suspension 10 mL, 10 mL, Oral, Daily PRN, Vinod Osuna MD    melatonin tablet 3 mg, 3 mg,  Oral, Nightly PRN, Vinod Osuna MD, 3 mg at 02/15/24 2034    midodrine (PROAMATINE) tablet 5 mg, 5 mg, Oral, TID AC, Vinod Osuna MD, 5 mg at 02/17/24 0635    mirtazapine (REMERON) tablet 15 mg, 15 mg, Oral, Nightly, Vinod Osuna MD, 15 mg at 02/16/24 2022    nitrofurantoin (macrocrystal-monohydrate) (MACROBID) capsule 100 mg, 100 mg, Oral, BID, Vinod Osuna MD, 100 mg at 02/17/24 0815    ondansetron ODT (ZOFRAN-ODT) disintegrating tablet 4 mg, 4 mg, Oral, Q6H PRN, Vinod Osuna MD    pantoprazole (PROTONIX) EC tablet 40 mg, 40 mg, Oral, Daily, Vinod Osuna MD, 40 mg at 02/17/24 0815    Pharmacy Consult, , Does not apply, Continuous PRN, Vinod Osuna MD    sodium chloride nasal spray 2 spray, 2 spray, Each Nare, PRJHOANA, Vinod Osuna MD    Reviewed chart, notes, vitals, labs and EKG personally reviewed.    ASSESSMENT & PLAN:        MDD (major depressive disorder)    Gastroesophageal reflux disease without esophagitis    Severe episode of recurrent major depressive disorder, without psychotic features    Suicidal Ideation  -SI with plan  -Admit for crisis stabilization  -SP3    Major depressive disorder, recurrent, without psychosis (TRD)  Continue the Prozac, proceed with ECT as planned, provide for supportive individual group psychotherapeutic effort.     Dysthymic disorder  Same as above     PTSD  Continue with the supportive individual group psychotherapeutic effort     Pseudoseizures (history of)  Continue with the supportive individual and group psychotherapeutic effort     Diabetes type 2  Victoza     Restless leg syndrome     GERD  Protonix     Orthostatic hypotension  Reevaluate current medications     Recurrent urinary tract infections  Patient has been prescribed Mobic as a preventative strategy     Nonspecific arthralgias and low back pain  Treat symptomatically with non opiate analgesic    Special  precautions: Special Precautions Level 3 (q15 min checks)     Behavioral Health Treatment Plan and Problem List: I have reviewed and approved the Behavioral Health Treatment Plan and Problem list.  The patient has had a chance to review and agrees with the treatment plan.    I have reviewed the copied text and it is accurate as of 02/17/24     Clinician:  Maxwell Cannon MD  02/17/24  09:45 EST

## 2024-02-17 NOTE — NURSING NOTE
Pt slept throughout night with minor interruptions. Pt recorded to have slept 6.5 hours, pt still sleeping at this time.

## 2024-02-17 NOTE — PLAN OF CARE
Goal Outcome Evaluation:  Plan of Care Reviewed With: patient  Patient Agreement with Plan of Care: agrees     Progress: improving  Outcome Evaluation: Pt calm and cooperative. Spent evening in day room with other patients. Pt denies SI,HI,AVH this shift. Pt verbalizes that she was able to have a bowel movement following previous complaints of constipation. Rates anxiety 3 and depression 7. Reports poor appetite and good sleep.

## 2024-02-18 PROCEDURE — 99232 SBSQ HOSP IP/OBS MODERATE 35: CPT | Performed by: PSYCHIATRY & NEUROLOGY

## 2024-02-18 RX ADMIN — FLUOXETINE HYDROCHLORIDE 40 MG: 20 CAPSULE ORAL at 08:34

## 2024-02-18 RX ADMIN — CARBIDOPA AND LEVODOPA 5 MG: 50; 200 TABLET, EXTENDED RELEASE ORAL at 11:55

## 2024-02-18 RX ADMIN — NITROFURANTOIN MONOHYDRATE/MACROCRYSTALLINE 100 MG: 25; 75 CAPSULE ORAL at 08:34

## 2024-02-18 RX ADMIN — CARBIDOPA AND LEVODOPA 5 MG: 50; 200 TABLET, EXTENDED RELEASE ORAL at 08:04

## 2024-02-18 RX ADMIN — MIRTAZAPINE 15 MG: 15 TABLET, FILM COATED ORAL at 20:39

## 2024-02-18 RX ADMIN — PANTOPRAZOLE SODIUM 40 MG: 40 TABLET, DELAYED RELEASE ORAL at 08:34

## 2024-02-18 RX ADMIN — CARBIDOPA AND LEVODOPA 5 MG: 50; 200 TABLET, EXTENDED RELEASE ORAL at 17:10

## 2024-02-18 RX ADMIN — NITROFURANTOIN MONOHYDRATE/MACROCRYSTALLINE 100 MG: 25; 75 CAPSULE ORAL at 20:39

## 2024-02-18 NOTE — PROGRESS NOTES
"INPATIENT PSYCHIATRIC PROGRESS NOTE    Name:  Zo Palma  :  1965  MRN:  [id number]  Visit Number:  [id number]  Length of stay:  5    SUBJECTIVE    CC/Focus of Exam: Depression, SI    INTERVAL HISTORY:  Patient reports mood somewhat labile, now reporting depression intermixed with times of \"euphoria.\"  Intermittent SI.  Increased hopefulness, energy, motivation.  Unclear if these reports are related to personality or underlying illness.  Sleep improving. Patient reports tolerating medications well.  No new complaints today.  Plan to continue ECT treatment once Dr. Osuna returns tomorrow.    Depression rating 5/10  Anxiety rating 7/10  Sleep: Better  Withdrawal sx: Denied  Cravin/10    Review of Systems   Constitutional: Negative.    Respiratory: Negative.     Cardiovascular: Negative.    Gastrointestinal: Negative.    Musculoskeletal: Negative.    Psychiatric/Behavioral:  Positive for dysphoric mood, sleep disturbance and suicidal ideas. The patient is nervous/anxious.        OBJECTIVE    Temp:  [97.8 °F (36.6 °C)-98.3 °F (36.8 °C)] 97.8 °F (36.6 °C)  Heart Rate:  [55-71] 55  Resp:  [18] 18  BP: (105-148)/(63-77) 148/68    MENTAL STATUS EXAM:  Appearance: Casually dressed, good hygeine.   Cooperation: Cooperative  Psychomotor: No psychomotor agitation/retardation, No EPS, No motor tics  Speech: normal rate, amount.  Mood: \"A little better today\"   Affect: congruent, anxious  Thought Content: goal directed, no delusional material present  Thought process: linear, organized.  Suicidality: + SI  Homicidality: No HI  Perception: No AH/VH  Insight: fair   Judgment: fair    Lab Results (last 24 hours)       ** No results found for the last 24 hours. **               Imaging Results (Last 24 Hours)       ** No results found for the last 24 hours. **               ECG/EMG Results (most recent)       Procedure Component Value Units Date/Time    ECG 12 Lead Other; Baseline Cardiac Status [[id number]] " Collected: 02/13/24 1938     Updated: 02/14/24 1747     QT Interval 396 ms      QTC Interval 385 ms     Narrative:      Test Reason : Other~  Blood Pressure :   */*   mmHG  Vent. Rate :  57 BPM     Atrial Rate :  57 BPM     P-R Int : 200 ms          QRS Dur :  76 ms      QT Int : 396 ms       P-R-T Axes :  46   2  41 degrees     QTc Int : 385 ms    Sinus bradycardia  Otherwise normal ECG  When compared with ECG of 13-FEB-2024 12:41,  No significant change was found  Confirmed by Rick Richardson ([id number]) on 2/14/2024 5:44:36 PM    Referred By:            Confirmed By: Rick Richardson             ALLERGIES: Chlorzoxazone, Iodine, Phenazopyridine hcl, Pyridium [phenazopyridine], Quinine, Valproic acid, Methocarbamol, Iodinated contrast media, Codeine, and Diphenhydramine      Current Facility-Administered Medications:     aluminum-magnesium hydroxide-simethicone (MAALOX MAX) 400-400-40 MG/5ML suspension 15 mL, 15 mL, Oral, Q6H PRN, Vinod Osuna MD    benzonatate (TESSALON) capsule 100 mg, 100 mg, Oral, TID PRN, Vinod Osuna MD    bisacodyl (DULCOLAX) EC tablet 5 mg, 5 mg, Oral, Daily PRN, Vinod Osuna MD    estradiol (ESTRACE) vaginal cream 2 applicator, 2 g, Vaginal, Once per day on Monday Wednesday Friday, Vinod Osuna MD, 2 applicator at 02/16/24 2022    FLUoxetine (PROzac) capsule 40 mg, 40 mg, Oral, Daily, Vinod Osuna MD, 40 mg at 02/18/24 0834    hydrOXYzine (ATARAX) tablet 10 mg, 10 mg, Oral, TID PRN, Vinod Osuna MD, 10 mg at 02/14/24 0848    ibuprofen (ADVIL,MOTRIN) tablet 400 mg, 400 mg, Oral, Q6H PRN, Vinod Osuna MD, 400 mg at 02/17/24 0311    loperamide (IMODIUM) capsule 2 mg, 2 mg, Oral, Q2H PRN, Vinod Osuna MD    magnesium hydroxide (MILK OF MAGNESIA) suspension 10 mL, 10 mL, Oral, Daily PRN, Vinod Osuna MD    melatonin tablet 3 mg, 3 mg, Oral, Nightly PRN, Vinod Osuna MD, 3 mg  at 02/15/24 2034    midodrine (PROAMATINE) tablet 5 mg, 5 mg, Oral, TID AC, Vinod Osuna MD, 5 mg at 02/18/24 0804    mirtazapine (REMERON) tablet 15 mg, 15 mg, Oral, Nightly, Vinod Osuna MD, 15 mg at 02/17/24 2028    nitrofurantoin (macrocrystal-monohydrate) (MACROBID) capsule 100 mg, 100 mg, Oral, BID, Vinod Osuna MD, 100 mg at 02/18/24 0834    ondansetron ODT (ZOFRAN-ODT) disintegrating tablet 4 mg, 4 mg, Oral, Q6H PRN, Vinod Osuna MD    pantoprazole (PROTONIX) EC tablet 40 mg, 40 mg, Oral, Daily, Vinod Osuna MD, 40 mg at 02/18/24 0834    Pharmacy Consult, , Does not apply, Continuous PRN, Vinod Osuna MD    sodium chloride nasal spray 2 spray, 2 spray, Each Nare, STEPHEN, Vinod Osuna MD    Reviewed chart, notes, vitals, labs and EKG personally reviewed.    ASSESSMENT & PLAN:        MDD (major depressive disorder)    Gastroesophageal reflux disease without esophagitis    Severe episode of recurrent major depressive disorder, without psychotic features    Suicidal Ideation  -SI with plan  -Admit for crisis stabilization  -SP3    Major depressive disorder, recurrent, without psychosis (TRD)  Continue the Prozac, proceed with ECT as planned, provide for supportive individual group psychotherapeutic effort.     Dysthymic disorder  Same as above     PTSD  Continue with the supportive individual group psychotherapeutic effort     Pseudoseizures (history of)  Continue with the supportive individual and group psychotherapeutic effort     Diabetes type 2  Victoza     Restless leg syndrome     GERD  Protonix     Orthostatic hypotension  Reevaluate current medications     Recurrent urinary tract infections  Patient has been prescribed Mobic as a preventative strategy     Nonspecific arthralgias and low back pain  Treat symptomatically with non opiate analgesic    Special precautions: Special Precautions Level 3 (q15 min checks)      Behavioral Health Treatment Plan and Problem List: I have reviewed and approved the Behavioral Health Treatment Plan and Problem list.  The patient has had a chance to review and agrees with the treatment plan.    I have reviewed the copied text and it is accurate as of 02/18/24     Clinician:  Maxwell Cannon MD  02/18/24  11:07 EST

## 2024-02-18 NOTE — PLAN OF CARE
Goal Outcome Evaluation:  Plan of Care Reviewed With: patient  Patient Agreement with Plan of Care: agrees     Progress: improving  Outcome Evaluation: Pt is calm and cooperative with staff. Pt reports good sleep and a good appetite. Pt rates dep 5 and denies ANX/SI/HI/AVH at this time.

## 2024-02-18 NOTE — PLAN OF CARE
Goal Outcome Evaluation:  Plan of Care Reviewed With: patient  Patient Agreement with Plan of Care: agrees     Progress: improving     Pt reports good sleep and good appetite. Pt rates anx 1/10 and dep 6/10. Pt denies SI/HI/AVH.

## 2024-02-19 ENCOUNTER — ANESTHESIA (OUTPATIENT)
Dept: PERIOP | Facility: HOSPITAL | Age: 59
End: 2024-02-19
Payer: MEDICARE

## 2024-02-19 ENCOUNTER — ANESTHESIA EVENT (OUTPATIENT)
Dept: PERIOP | Facility: HOSPITAL | Age: 59
End: 2024-02-19
Payer: MEDICARE

## 2024-02-19 PROCEDURE — 25010000002 MIDAZOLAM PER 1 MG: Performed by: NURSE ANESTHETIST, CERTIFIED REGISTERED

## 2024-02-19 PROCEDURE — 25010000002 SUCCINYLCHOLINE PER 20 MG: Performed by: NURSE ANESTHETIST, CERTIFIED REGISTERED

## 2024-02-19 PROCEDURE — 90870 ELECTROCONVULSIVE THERAPY: CPT | Performed by: PSYCHIATRY & NEUROLOGY

## 2024-02-19 PROCEDURE — 25810000003 LACTATED RINGERS PER 1000 ML: Performed by: ANESTHESIOLOGY

## 2024-02-19 PROCEDURE — GZB4ZZZ OTHER ELECTROCONVULSIVE THERAPY: ICD-10-PCS | Performed by: PSYCHIATRY & NEUROLOGY

## 2024-02-19 PROCEDURE — 25810000003 LACTATED RINGERS PER 1000 ML: Performed by: NURSE ANESTHETIST, CERTIFIED REGISTERED

## 2024-02-19 RX ORDER — SODIUM CHLORIDE, SODIUM LACTATE, POTASSIUM CHLORIDE, CALCIUM CHLORIDE 600; 310; 30; 20 MG/100ML; MG/100ML; MG/100ML; MG/100ML
100 INJECTION, SOLUTION INTRAVENOUS ONCE AS NEEDED
Status: DISCONTINUED | OUTPATIENT
Start: 2024-02-19 | End: 2024-02-19 | Stop reason: HOSPADM

## 2024-02-19 RX ORDER — METOPROLOL TARTRATE 1 MG/ML
INJECTION, SOLUTION INTRAVENOUS AS NEEDED
Status: DISCONTINUED | OUTPATIENT
Start: 2024-02-19 | End: 2024-02-19 | Stop reason: SURG

## 2024-02-19 RX ORDER — MIDAZOLAM HYDROCHLORIDE 1 MG/ML
INJECTION INTRAMUSCULAR; INTRAVENOUS AS NEEDED
Status: DISCONTINUED | OUTPATIENT
Start: 2024-02-19 | End: 2024-02-19 | Stop reason: SURG

## 2024-02-19 RX ORDER — ONDANSETRON 2 MG/ML
4 INJECTION INTRAMUSCULAR; INTRAVENOUS AS NEEDED
Status: DISCONTINUED | OUTPATIENT
Start: 2024-02-19 | End: 2024-02-19 | Stop reason: HOSPADM

## 2024-02-19 RX ORDER — SODIUM CHLORIDE 0.9 % (FLUSH) 0.9 %
10 SYRINGE (ML) INJECTION EVERY 12 HOURS SCHEDULED
Status: DISCONTINUED | OUTPATIENT
Start: 2024-02-19 | End: 2024-02-19 | Stop reason: HOSPADM

## 2024-02-19 RX ORDER — SODIUM CHLORIDE, SODIUM LACTATE, POTASSIUM CHLORIDE, CALCIUM CHLORIDE 600; 310; 30; 20 MG/100ML; MG/100ML; MG/100ML; MG/100ML
INJECTION, SOLUTION INTRAVENOUS CONTINUOUS PRN
Status: DISCONTINUED | OUTPATIENT
Start: 2024-02-19 | End: 2024-02-19 | Stop reason: SURG

## 2024-02-19 RX ORDER — SODIUM CHLORIDE 9 MG/ML
40 INJECTION, SOLUTION INTRAVENOUS AS NEEDED
Status: DISCONTINUED | OUTPATIENT
Start: 2024-02-19 | End: 2024-02-19 | Stop reason: HOSPADM

## 2024-02-19 RX ORDER — IPRATROPIUM BROMIDE AND ALBUTEROL SULFATE 2.5; .5 MG/3ML; MG/3ML
3 SOLUTION RESPIRATORY (INHALATION) ONCE AS NEEDED
Status: DISCONTINUED | OUTPATIENT
Start: 2024-02-19 | End: 2024-02-19 | Stop reason: HOSPADM

## 2024-02-19 RX ORDER — SODIUM CHLORIDE 0.9 % (FLUSH) 0.9 %
10 SYRINGE (ML) INJECTION AS NEEDED
Status: DISCONTINUED | OUTPATIENT
Start: 2024-02-19 | End: 2024-02-19 | Stop reason: HOSPADM

## 2024-02-19 RX ORDER — MIDAZOLAM HYDROCHLORIDE 1 MG/ML
1 INJECTION INTRAMUSCULAR; INTRAVENOUS
Status: DISCONTINUED | OUTPATIENT
Start: 2024-02-19 | End: 2024-02-19 | Stop reason: HOSPADM

## 2024-02-19 RX ORDER — SODIUM CHLORIDE, SODIUM LACTATE, POTASSIUM CHLORIDE, CALCIUM CHLORIDE 600; 310; 30; 20 MG/100ML; MG/100ML; MG/100ML; MG/100ML
125 INJECTION, SOLUTION INTRAVENOUS ONCE
Status: COMPLETED | OUTPATIENT
Start: 2024-02-19 | End: 2024-02-19

## 2024-02-19 RX ORDER — SUCCINYLCHOLINE CHLORIDE 20 MG/ML
INJECTION INTRAMUSCULAR; INTRAVENOUS AS NEEDED
Status: DISCONTINUED | OUTPATIENT
Start: 2024-02-19 | End: 2024-02-19 | Stop reason: SURG

## 2024-02-19 RX ORDER — OXYCODONE HYDROCHLORIDE AND ACETAMINOPHEN 5; 325 MG/1; MG/1
1 TABLET ORAL ONCE AS NEEDED
Status: DISCONTINUED | OUTPATIENT
Start: 2024-02-19 | End: 2024-02-19 | Stop reason: HOSPADM

## 2024-02-19 RX ORDER — KETOROLAC TROMETHAMINE 30 MG/ML
30 INJECTION, SOLUTION INTRAMUSCULAR; INTRAVENOUS EVERY 6 HOURS PRN
Status: DISCONTINUED | OUTPATIENT
Start: 2024-02-19 | End: 2024-02-19 | Stop reason: HOSPADM

## 2024-02-19 RX ADMIN — PANTOPRAZOLE SODIUM 40 MG: 40 TABLET, DELAYED RELEASE ORAL at 09:47

## 2024-02-19 RX ADMIN — CARBIDOPA AND LEVODOPA 5 MG: 50; 200 TABLET, EXTENDED RELEASE ORAL at 17:03

## 2024-02-19 RX ADMIN — METOPROLOL TARTRATE 3 MG: 1 INJECTION, SOLUTION INTRAVENOUS at 08:42

## 2024-02-19 RX ADMIN — MIRTAZAPINE 15 MG: 15 TABLET, FILM COATED ORAL at 20:48

## 2024-02-19 RX ADMIN — NITROFURANTOIN MONOHYDRATE/MACROCRYSTALLINE 100 MG: 25; 75 CAPSULE ORAL at 09:47

## 2024-02-19 RX ADMIN — CARBIDOPA AND LEVODOPA 5 MG: 50; 200 TABLET, EXTENDED RELEASE ORAL at 07:24

## 2024-02-19 RX ADMIN — CARBIDOPA AND LEVODOPA 5 MG: 50; 200 TABLET, EXTENDED RELEASE ORAL at 11:05

## 2024-02-19 RX ADMIN — NITROFURANTOIN MONOHYDRATE/MACROCRYSTALLINE 100 MG: 25; 75 CAPSULE ORAL at 20:47

## 2024-02-19 RX ADMIN — SUCCINYLCHOLINE CHLORIDE 100 MG: 20 INJECTION, SOLUTION INTRAMUSCULAR; INTRAVENOUS at 08:35

## 2024-02-19 RX ADMIN — METHOHEXITAL SODIUM 100 MG: 500 INJECTION, POWDER, LYOPHILIZED, FOR SOLUTION INTRAMUSCULAR; INTRAVENOUS; RECTAL at 08:35

## 2024-02-19 RX ADMIN — MIDAZOLAM HYDROCHLORIDE 2 MG: 1 INJECTION, SOLUTION INTRAMUSCULAR; INTRAVENOUS at 08:42

## 2024-02-19 RX ADMIN — SODIUM CHLORIDE, POTASSIUM CHLORIDE, SODIUM LACTATE AND CALCIUM CHLORIDE 125 ML/HR: 600; 310; 30; 20 INJECTION, SOLUTION INTRAVENOUS at 08:08

## 2024-02-19 RX ADMIN — ESTRADIOL 2 APPLICATOR: 0.1 CREAM VAGINAL at 20:48

## 2024-02-19 RX ADMIN — SODIUM CHLORIDE, POTASSIUM CHLORIDE, SODIUM LACTATE AND CALCIUM CHLORIDE: 600; 310; 30; 20 INJECTION, SOLUTION INTRAVENOUS at 08:22

## 2024-02-19 RX ADMIN — FLUOXETINE HYDROCHLORIDE 40 MG: 20 CAPSULE ORAL at 11:04

## 2024-02-19 NOTE — PLAN OF CARE
Goal Outcome Evaluation:  Plan of Care Reviewed With: patient  Patient Agreement with Plan of Care: agrees  Consent Given to Review Plan with: roomate  Progress: improving  Outcome Evaluation: Therapist met with Patient to review treatment progress and aftercare plans; Patient agreeable.      Problem: Adult Behavioral Health Plan of Care  Goal: Plan of Care Review  Outcome: Ongoing, Progressing  Flowsheets  Taken 2/19/2024 1115  Progress: improving  Plan of Care Reviewed With: patient  Patient Agreement with Plan of Care: agrees  Outcome Evaluation:   Therapist met with Patient to review treatment progress and aftercare plans   Patient agreeable.  Taken 2/14/2024 1101  Consent Given to Review Plan with: roomate  Goal: Optimized Coping Skills in Response to Life Stressors  Outcome: Ongoing, Progressing  Flowsheets (Taken 2/19/2024 1115)  Optimized Coping Skills in Response to Life Stressors: making progress toward outcome  Intervention: Promote Effective Coping Strategies  Flowsheets (Taken 2/19/2024 1115)  Supportive Measures:   active listening utilized   counseling provided   goal-setting facilitated   verbalization of feelings encouraged  Goal: Develops/Participates in Therapeutic [place] to Support Successful Transition  Outcome: Ongoing, Progressing  Intervention: Foster Therapeutic [place]  Flowsheets (Taken 2/19/2024 1115)  Trust Relationship/Rapport:   care explained   reassurance provided   choices provided   thoughts/feelings acknowledged   emotional support provided   empathic listening provided   questions encouraged   questions answered     DATA: Therapist met with Patient individually this date. Patient agreeable to discuss current treatment progress and discharge concerns.     CLINICAL MANUVERING/INTERVENTIONS:  Assisted Patient in processing session content; acknowledged and normalized Patient’s thoughts, feelings, and concerns by utilizing a person-centered approach in efforts to build appropriate  "rapport and a positive therapeutic relationship with open and honest communication. Allowed Patient to ventilate regarding current stressors and triggers for negative emotions and thoughts in a safe nonjudgmental environment with unconditional positive regard, active listening skills, and empathy.     ASSESSMENT: Patient was seen 1-1 for a follow up today. She continues to receive treatment for major depression and chronic SI. Patient's affect is very bright today. She states that she is feeling wonderful, and that she has, \"only felt this good for one day in 36 years of marriage.\" She seemed to be referencing the past during a time when she was quite depressed. She states that she feels very optimistic. This therapist spoke with Patient about setting realistic expectations of happiness. Patient was encouraged to understand that even when things are going well we will all still experience bad days, and that we should not get too discouraged when this happens. She was receptive to this idea. Patient was chatty and smiling during assessment.     PLAN:   Patient will continue stabilization. Patient will continue to receive services offered by Treatment Team.     Patient will follow-up with [facility].     "

## 2024-02-19 NOTE — PLAN OF CARE
Goal Outcome Evaluation:  Plan of Care Reviewed With: patient  Patient Agreement with Plan of Care: agrees  Outcome Evaluation: Pt reports ECT helping with mood/depressive s/s. She reports anxiety 3/10 and depression 6/10. Denies current HI, SI and AVH. She is participating in groups and unit activities but is reluctant to join then participates once she joins. She reports sleep and appetite good.

## 2024-02-19 NOTE — NURSING NOTE
Returned to unit from ECT, pt reports no gag reflex WNL for this patient, will reassess in 10 mins.

## 2024-02-19 NOTE — PLAN OF CARE
Goal Outcome Evaluation:  Plan of Care Reviewed With: patient  Patient Agreement with Plan of Care: agrees     Progress: improving     Pt reports good sleep and good appetite. Pt rates anx 1/10 and dep 4/10. Pt denies SI/HI/AVH.

## 2024-02-19 NOTE — ANESTHESIA POSTPROCEDURE EVALUATION
Patient: Zo Palma    Procedure Summary       Date: 02/19/24 Room / Location: Psychiatric OR  /  [facility]    Anesthesia Start: 0822 Anesthesia Stop: 0846    Procedure: ELECTROCONVULSIVE THERAPY Diagnosis:       Severe episode of recurrent major depressive disorder, without psychotic features      (Severe episode of recurrent major depressive disorder, without psychotic features [F33.2])    Surgeons: Vinod Osuna MD Provider: Cholo Watson DO    Anesthesia Type: general ASA Status: 3            Anesthesia Type: general    Vitals  Vitals Value Taken Time   /72 02/19/24 0918   Temp 97.4 °F (36.3 °C) 02/19/24 0918   Pulse 54 02/19/24 0920   Resp 13 02/19/24 0918   SpO2 98 % 02/19/24 0920   Vitals shown include unfiled device data.        Post Anesthesia Care and Evaluation    Patient location during evaluation: PHASE II  Patient participation: complete - patient participated  Level of consciousness: awake and alert  Pain score: 1  Pain management: adequate    Airway patency: patent  Anesthetic complications: No anesthetic complications  PONV Status: controlled  Cardiovascular status: acceptable  Respiratory status: acceptable and room air  Hydration status: euvolemic  No anesthesia care post op

## 2024-02-19 NOTE — ANESTHESIA PREPROCEDURE EVALUATION
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   Substance History - negative use     OB/GYN negative ob/gyn ROS         Other   arthritis,                 Anesthesia Plan    ASA 3     general   total IV anesthesia  intravenous induction     Anesthetic plan, risks, benefits, and alternatives have been provided, discussed and informed consent has been obtained with: patient.  Pre-procedure education provided  Plan discussed with CRNA.    CODE STATUS:    Code Status (Patient has no pulse and is not breathing): CPR (Attempt to Resuscitate)  Medical Interventions (Patient has pulse or is breathing): Full Support

## 2024-02-19 NOTE — NURSING NOTE
Assessed gag reflex pt reports no issues, she reports no gag reflex is a normal finding for her, no difficulty swallowing at this time.

## 2024-02-19 NOTE — PAYOR COMM NOTE
"Tommie Guallpa (59 y.o. Female)       Date of Birth   1965    Social Security Number       Address   [address] [place] [address]    Home Phone   [phone]    MRN   [id number]       P[employer]   Other    Marital Status                               Admission Date   24    Admission Type   Urgent    Admitting Provider   Vinod Osuna MD    Attending Provider   Vinod Osuna MD    Department, Room/Bed   Psychiatric, 1024/1S       Discharge Date       Discharge Disposition       Discharge Destination                                 Attending Provider: Vinod Osuna MD    Allergies: Chlorzoxazone, Iodine, Phenazopyridine Hcl, Pyridium [Phenazopyridine], Quinine, Valproic Acid, Methocarbamol, Iodinated Contrast Media, Codeine, Diphenhydramine    Isolation: None   Infection: None   Code Status: CPR    Ht: 161.3 cm (63.5\")   Wt: 62.7 kg (138 lb 3.2 oz)    Admission Cmt: None   Principal Problem: MDD (major depressive disorder) [F32.9]                   Active Insurance as of 2024       Primary Coverage       Payor Plan Insurance Group Employer/Plan Group    ANTHEM MEDICARE REPLACEMENT ANTHEM MEDICARE ADVANTAGE [id number]       Payor Plan Address Payor Plan Phone Number Payor Plan Fax Number Effective Dates    [address] [phone]  2024 - None Entered    [facility] [address]         Subscriber Name Subscriber Birth Date Member ID       TOMMIE GUALLPA 1965 [id number]                     Emergency Contacts        (Rel.) Home Phone Work Phone Mobile Phone    VLADIMIR GUALLPA (Daughter) -- -- [phone]    MICHAEL HENRIQUEZ (Friend) [phone] -- --            PLEASE ATTACH THE INCLUDED CLINICAL TO AUTHORIZATION NUMBER [id number]     RETURN FAX NUMBER IF NEEDED [phone]     PATIENT NAME:  TOMMIE GUALLPA  :  1965     PLEASE SEE THE DEMOGRAPHICS INCLUDED FOR ADDITIONAL INFORMATION   "   LAST COVERED DATE IS 02/18/2024.  ADDITIONAL TIME IS BEING REQUESTED FOR ONGOING TREATMENT INCLUDING ECT, SYMPTOM STABILIZATION AND DISCHARGE PLANNING      FACILITY:  [facility]  NPI:  [id number]  TAX ID:  [id number]  ADDRESS:   [place]  [address]     ATTENDING MD:  DR. NANCY AU  NPI:  [id number]  ADDRESS:  SAME AS FACILITY  CLINIC PHONE NUMBER:  [phone]  PLEASE CONTACT UR STAFF MEMBER BELOW IF A MORE SPECIFIC CONTACT PHONE NUMBER FOR THE ATTENDING MD IS NEEDED     UR CONTACT:  WENDY GARZA RN  PHONE:  [phone]  FAX:  [phone]          PRIMARY DIAGNOSIS:  F33.2 - MAJOR DEPRESSIVE DISORDER, RECURRENT, WITHOUT PSYCHOSIS        Current Scheduled Medications  Collapse  Hide  (From now, onward)  Start   Ordered Stop   02/14/24 2100  mirtazapine (REMERON) tablet 15 mg  15 mg,   Oral,   Nightly        References:    [facility]    Pediatrics    02/14/24 0834 --   02/14/24 0900  estradiol (ESTRACE) vaginal cream 2 applicator  2 g,   Vaginal,   Once per day on Monday Wednesday Friday        References:    [facility]    Pediatrics    02/13/24 2204 --   02/14/24 0900  FLUoxetine (PROzac) capsule 40 mg  40 mg,   Oral,   Daily        References:    [facility]    Pediatrics    02/13/24 2204 --   02/14/24 0900  pantoprazole (PROTONIX) EC tablet 40 mg  40 mg,   Oral,   Daily        References:    [facility]    Pediatrics    02/13/24 2204 --   02/14/24 0730  midodrine (PROAMATINE) tablet 5 mg  5 mg,   Oral,   3 Times Daily Before Meals        References:    [facility]    Pediatrics    02/13/24 2204 --   02/13/24 2300  nitrofurantoin (macrocrystal-monohydrate) (MACROBID) capsule 100 mg  100 mg,   Oral,   2 Times Daily        References:    Cross Reactivity Chart    [facility]    Pediatrics    02/13/24 2204 03/14/24 2059 02/13/24 2024  Pharmacy Consult  Does not apply,   Continuous PRN        References:    [facility]    Pediatrics   Question: Consult for Answer: Medication reconciliation          "          MOST RECENT MD PROGRESS NOTE DATED  COPIED BELOW:       Maxwell Cannon MD   Physician  Psychiatry     Progress Notes     Signed     Date of Service: 24  Creation Time: 24     Signed         INPATIENT PSYCHIATRIC PROGRESS NOTE     Name:  Zo Palma  :  1965  MRN:  [id number]  Visit Number:  [id number]  Length of stay:  5     SUBJECTIVE     CC/Focus of Exam: Depression, SI     INTERVAL HISTORY:  Patient reports mood somewhat labile, now reporting depression intermixed with times of \"euphoria.\"  Intermittent SI.  Increased hopefulness, energy, motivation.  Unclear if these reports are related to personality or underlying illness.  Sleep improving. Patient reports tolerating medications well.  No new complaints today.  Plan to continue ECT treatment once Dr. Osuna returns tomorrow.     Depression rating 5/10  Anxiety rating 7/10  Sleep: Better  Withdrawal sx: Denied  Cravin/10     Review of Systems   Constitutional: Negative.    Respiratory: Negative.     Cardiovascular: Negative.    Gastrointestinal: Negative.    Musculoskeletal: Negative.    Psychiatric/Behavioral:  Positive for dysphoric mood, sleep disturbance and suicidal ideas. The patient is nervous/anxious.          OBJECTIVE     Temp:  [97.8 °F (36.6 °C)-98.3 °F (36.8 °C)] 97.8 °F (36.6 °C)  Heart Rate:  [55-71] 55  Resp:  [18] 18  BP: (105-148)/(63-77) 148/68     MENTAL STATUS EXAM:  Appearance: Casually dressed, good hygeine.   Cooperation: Cooperative  Psychomotor: No psychomotor agitation/retardation, No EPS, No motor tics  Speech: normal rate, amount.  Mood: \"A little better today\"   Affect: congruent, anxious  Thought Content: goal directed, no delusional material present  Thought process: linear, organized.  Suicidality: + SI  Homicidality: No HI  Perception: No AH/VH  Insight: fair   Judgment: fair     Lab Results (last 24 hours)         ** No results found for the last 24 hours. **        "                       Imaging Results (Last 24 Hours)         ** No results found for the last 24 hours. **                   ECG/EMG Results (most recent)         Procedure Component Value Units Date/Time     ECG 12 Lead Other; Baseline Cardiac Status [[id number]] Collected: 02/13/24 1938       Updated: 02/14/24 1747       QT Interval 396 ms         QTC Interval 385 ms       Narrative:       Test Reason : Other~  Blood Pressure :   */*   mmHG  Vent. Rate :  57 BPM     Atrial Rate :  57 BPM     P-R Int : 200 ms          QRS Dur :  76 ms      QT Int : 396 ms       P-R-T Axes :  46   2  41 degrees     QTc Int : 385 ms     Sinus bradycardia  Otherwise normal ECG  When compared with ECG of 13-FEB-2024 12:41,  No significant change was found  Confirmed by Rick Richardson ([id number]) on 2/14/2024 5:44:36 PM     Referred By:            Confirmed By: Rick Richardson                ALLERGIES: Chlorzoxazone, Iodine, Phenazopyridine hcl, Pyridium [phenazopyridine], Quinine, Valproic acid, Methocarbamol, Iodinated contrast media, Codeine, and Diphenhydramine        Current Facility-Administered Medications:     aluminum-magnesium hydroxide-simethicone (MAALOX MAX) 400-400-40 MG/5ML suspension 15 mL, 15 mL, Oral, Q6H PRN, Vinod Osuna MD    benzonatate (TESSALON) capsule 100 mg, 100 mg, Oral, TID PRN, Vinod Osuna MD    bisacodyl (DULCOLAX) EC tablet 5 mg, 5 mg, Oral, Daily PRN, Vinod Osuna MD    estradiol (ESTRACE) vaginal cream 2 applicator, 2 g, Vaginal, Once per day on Monday Wednesday Friday, Vinod Osuna MD, 2 applicator at 02/16/24 2022    FLUoxetine (PROzac) capsule 40 mg, 40 mg, Oral, Daily, Vinod Osuna MD, 40 mg at 02/18/24 0834    hydrOXYzine (ATARAX) tablet 10 mg, 10 mg, Oral, TID PRN, Vinod Osuna MD, 10 mg at 02/14/24 0848    ibuprofen (ADVIL,MOTRIN) tablet 400 mg, 400 mg, Oral, Q6H PRN, Vinod Osuna MD, 400 mg at 02/17/24 9757     loperamide (IMODIUM) capsule 2 mg, 2 mg, Oral, Q2H PRN, Vinod Osuna MD    magnesium hydroxide (MILK OF MAGNESIA) suspension 10 mL, 10 mL, Oral, Daily PRN, Vinod Osuna MD    melatonin tablet 3 mg, 3 mg, Oral, Nightly PRN, Vinod Osuna MD, 3 mg at 02/15/24 2034    midodrine (PROAMATINE) tablet 5 mg, 5 mg, Oral, TID AC, Vinod Osuna MD, 5 mg at 02/18/24 0804    mirtazapine (REMERON) tablet 15 mg, 15 mg, Oral, Nightly, Vinod Osuna MD, 15 mg at 02/17/24 2028    nitrofurantoin (macrocrystal-monohydrate) (MACROBID) capsule 100 mg, 100 mg, Oral, BID, Vinod Osuna MD, 100 mg at 02/18/24 0834    ondansetron ODT (ZOFRAN-ODT) disintegrating tablet 4 mg, 4 mg, Oral, Q6H PRN, Vinod Osuna MD    pantoprazole (PROTONIX) EC tablet 40 mg, 40 mg, Oral, Daily, Vinod Osuna MD, 40 mg at 02/18/24 0834    Pharmacy Consult, , Does not apply, Continuous PRN, Vinod Osuna MD    sodium chloride nasal spray 2 spray, 2 spray, Each Nare, PRN, Vinod Osuna MD     Reviewed chart, notes, vitals, labs and EKG personally reviewed.     ASSESSMENT & PLAN:          MDD (major depressive disorder)    Gastroesophageal reflux disease without esophagitis    Severe episode of recurrent major depressive disorder, without psychotic features     Suicidal Ideation  -SI with plan  -Admit for crisis stabilization  -SP3     Major depressive disorder, recurrent, without psychosis (TRD)  Continue the Prozac, proceed with ECT as planned, provide for supportive individual group psychotherapeutic effort.     Dysthymic disorder  Same as above     PTSD  Continue with the supportive individual group psychotherapeutic effort     Pseudoseizures (history of)  Continue with the supportive individual and group psychotherapeutic effort     Diabetes type 2  Victoza     Restless leg syndrome     GERD  Protonix     Orthostatic hypotension  Reevaluate  current medications     Recurrent urinary tract infections  Patient has been prescribed Mobic as a preventative strategy     Nonspecific arthralgias and low back pain  Treat symptomatically with non opiate analgesic     Special precautions: Special Precautions Level 3 (q15 min checks)      Behavioral Health Treatment Plan and Problem List: I have reviewed and approved the Behavioral Health Treatment Plan and Problem list.  The patient has had a chance to review and agrees with the treatment plan.     I have reviewed the copied text and it is accurate as of 24      Clinician:  Maxwell Cannon MD  24  11:07 EST                      PATIENT HAS HAD ONE ECT TREATMENT ON 02/15/2024.  PATIENT WILL HAVE ANOTHER ECT TREATMENT TODAY (2024).        PLEASE SEE ECT NOTE DATED 02/15/2024 COPIED BELOW:     Vinod Au MD   Physician  Psychiatry     Op Note     Signed     Date of Service: 02/15/24 08  Creation Time: 02/15/24 0934  Case Time: Procedures: Surgeons:   02/15/24 08 BIFRONTAL ELECTROCONVULSIVE THERAPY    Vinod Au MD               Signed         ECT OPERATIVE PROCEDURE REPORT        PATIENT NAME: Zo Palma     Assistants: None     Primary Surgeon: PELON Au MD     Preoperative Diagnosis:   Major Depression, Recurrent, Severe Without Psychosis     Postoperative Diagnosis: Same  : 1965     SSN:      MRN#: [id number]     PHYSICIAN: PELON AU M.D.     PROCEDURE DATE: 02/15/24     This is the patient's  first  Treatment.      PROCEDURE:   Bifrontal ECT utilizing Thymatron System IV.   Energy Set  50  %  Charge Delivered  251.6  mC  Current  0.90  A  Stimulus Duration 7.0  Sec  Frequency  40  Hz  Pulse Width 0.50  msec        ANESTHESIA: See anesthesia report for medications and monitoring.                          DETAILS: Impulse at 0801 with a resultant 65 second seizure.           Specimens Removed: NA  Blood Administered:  NA  Estimated Blood Loss: None  Complications: None     Pt STATUS STABLE: 0830 HR     Grafts or Implants: NA     Dictated utilizing Dragon dictation                     PER THERAPY NOTE DATED 02/16/2024 PATIENT WILL FOLLOW UP WITH [facility] AT DISCHARGE

## 2024-02-19 NOTE — OP NOTE
ECT OPERATIVE PROCEDURE REPORT      PATIENT NAME: Zo Palma    Assistants: None    Primary Surgeon: PELON Au MD    Preoperative Diagnosis:   Major Depression, Recurrent, Severe Without Psychosis    Postoperative Diagnosis: Same  : 1965    SSN:     MRN#: [id number]    PHYSICIAN: PELNO AU M.D.    PROCEDURE DATE: 24    This is the patient's second treatment.     PROCEDURE:   Bifrontal ECT utilizing Thymatron System IV.   Energy Set 50%  Charge Delivered 255.4 mC  Current 0.91 A  Stimulus Duration 7.0 sec  Frequency 40 hz  Pulse Width 0.50 msec      ANESTHESIA: See anesthesia report for medications and monitoring.                       DETAILS: Impulse at 0837 with a resultant 56 second seizure.        Specimens Removed: NA  Blood Administered: NA  Estimated Blood Loss: None  Complications: None    Pt STATUS STABLE: 0915 HR    Grafts or Implants: NA    Dictated utilizing Dragon dictation

## 2024-02-20 PROBLEM — F34.1 DYSTHYMIA: Status: ACTIVE | Noted: 2024-02-20

## 2024-02-20 PROCEDURE — 99232 SBSQ HOSP IP/OBS MODERATE 35: CPT | Performed by: PSYCHIATRY & NEUROLOGY

## 2024-02-20 RX ADMIN — CARBIDOPA AND LEVODOPA 5 MG: 50; 200 TABLET, EXTENDED RELEASE ORAL at 16:50

## 2024-02-20 RX ADMIN — PANTOPRAZOLE SODIUM 40 MG: 40 TABLET, DELAYED RELEASE ORAL at 08:23

## 2024-02-20 RX ADMIN — NITROFURANTOIN MONOHYDRATE/MACROCRYSTALLINE 100 MG: 25; 75 CAPSULE ORAL at 08:23

## 2024-02-20 RX ADMIN — FLUOXETINE HYDROCHLORIDE 40 MG: 20 CAPSULE ORAL at 08:23

## 2024-02-20 RX ADMIN — NITROFURANTOIN MONOHYDRATE/MACROCRYSTALLINE 100 MG: 25; 75 CAPSULE ORAL at 20:10

## 2024-02-20 RX ADMIN — MIRTAZAPINE 15 MG: 15 TABLET, FILM COATED ORAL at 20:10

## 2024-02-20 RX ADMIN — CARBIDOPA AND LEVODOPA 5 MG: 50; 200 TABLET, EXTENDED RELEASE ORAL at 08:08

## 2024-02-20 NOTE — PLAN OF CARE
Goal Outcome Evaluation:  Plan of Care Reviewed With: patient  Patient Agreement with Plan of Care: agrees  Consent Given to Review Plan with: roomate  Progress: improving  Outcome Evaluation: Therapist met with Patient to review treatment progress and aftercare plans; Patient agreeable.      Problem: Adult Behavioral Health Plan of Care  Goal: Plan of Care Review  Outcome: Ongoing, Progressing  Flowsheets  Taken 2/20/2024 1534  Progress: improving  Plan of Care Reviewed With: patient  Patient Agreement with Plan of Care: agrees  Outcome Evaluation:   Therapist met with Patient to review treatment progress and aftercare plans   Patient agreeable.  Taken 2/14/2024 1101  Consent Given to Review Plan with: roomate  Goal: Optimized Coping Skills in Response to Life Stressors  Outcome: Ongoing, Progressing  Flowsheets (Taken 2/20/2024 1534)  Optimized Coping Skills in Response to Life Stressors: making progress toward outcome  Intervention: Promote Effective Coping Strategies  Flowsheets (Taken 2/20/2024 1534)  Supportive Measures:   active listening utilized   counseling provided   goal-setting facilitated   verbalization of feelings encouraged  Goal: Develops/Participates in Therapeutic Success to Support Successful Transition  Outcome: Ongoing, Progressing  Flowsheets (Taken 2/20/2024 1534)  Develops/Participates in Therapeutic Success to Support Successful Transition: making progress toward outcome  Intervention: Foster Therapeutic Success  Flowsheets (Taken 2/20/2024 1534)  Trust Relationship/Rapport:   care explained   reassurance provided   thoughts/feelings acknowledged   emotional support provided   empathic listening provided   choices provided   questions answered   questions encouraged     DATA: Therapist met with Patient individually this date. Patient agreeable to discuss current treatment progress and discharge concerns.     CLINICAL MANUVERING/INTERVENTIONS:  Assisted Patient in processing session  content; acknowledged and normalized Patient’s thoughts, feelings, and concerns by utilizing a person-centered approach in efforts to build appropriate rapport and a positive therapeutic relationship with open and honest communication. Allowed Patient to ventilate regarding current stressors and triggers for negative emotions and thoughts in a safe nonjudgmental environment with unconditional positive regard, active listening skills, and empathy.     ASSESSMENT: Patient was seen 1-1 for a follow up today. She continues to receive treatment for SI. She reports feeling well today, which is inconsistent with her reports of continued SI. Her affect is bright. She reports feeling hopeful still. She spoke about plans she is making to improve her environment at home which includes organizing her home. We spoke about ways to stay motivated at home, specifically behavioral activation. She agreed with this and states that she plans to practice this moving forward.     PLAN:   Patient will continue stabilization. Patient will continue to receive services offered by Treatment Team.     Patient will follow-up with [facility].

## 2024-02-20 NOTE — PLAN OF CARE
Goal Outcome Evaluation:  Plan of Care Reviewed With: patient  Patient Agreement with Plan of Care: agrees     Progress: improving     Pt reports good sleep and good appetite. Pt rates anx 2/10 and dep 4/10. Pt denies SI/HI/AVH.

## 2024-02-20 NOTE — PLAN OF CARE
Goal Outcome Evaluation:  Plan of Care Reviewed With: patient  Patient Agreement with Plan of Care: agrees     Progress: improving  Outcome Evaluation: Pt is calm and cooperative with staff. Pt reports good sleep and a good appetite. Pt rates dep 4 and anx 3. Pt denies SI/HI/AVH at this time. Pt reports that she is feeling better since having ECTs. Pt had visitation today with a friend and her service dog. Pt is scheduled for another ECT in the AM.

## 2024-02-20 NOTE — PAYOR COMM NOTE
"Tommie Guallpa (59 y.o. Female)       Date of Birth   1965    Social Security Number       Address   [address] [place] [address]    Home Phone   [phone]    MRN   [id number]       Mandaen   Other    Marital Status                               Admission Date   24    Admission Type   Urgent    Admitting Provider   Vinod Osuna MD    Attending Provider   Vinod Osuna MD    Department, Room/Bed   [facility], 1024/1S       Discharge Date       Discharge Disposition       Discharge Destination                                 Attending Provider: Vinod Osuna MD    Allergies: Chlorzoxazone, Iodine, Phenazopyridine Hcl, Pyridium [Phenazopyridine], Quinine, Valproic Acid, Methocarbamol, Iodinated Contrast Media, Codeine, Diphenhydramine    Isolation: None   Infection: None   Code Status: CPR    Ht: 161.3 cm (63.5\")   Wt: 62.7 kg (138 lb 3.2 oz)    Admission Cmt: None   Principal Problem: Severe episode of recurrent major depressive disorder, without psychotic features [F33.2]                   Active Insurance as of 2024       Primary Coverage       Payor Plan Insurance Group Employer/Plan Group    ANTHEM MEDICARE REPLACEMENT ANTHEM MEDICARE ADVANTAGE [id number]       Payor Plan Address Payor Plan Phone Number Payor Plan Fax Number Effective Dates    [address] [phone]  2024 - None Entered    [facility] [phone]         Subscriber Name Subscriber Birth Date Member ID       TOMMIE GUALLPA 1965 [id number]                     Emergency Contacts        (Rel.) Home Phone Work Phone Mobile Phone    VLADIMIR GUALLPA (Daughter) -- -- [phone]    MICHAEL HENRIQUEZ (Friend) [phone] -- --          PLEASE ATTACH THE INCLUDED CLINICAL TO AUTHORIZATION NUMBER [id number]     RETURN FAX NUMBER IF NEEDED [phone]     PATIENT NAME:  TOMMIE GUALLPA  :  1965     PLEASE SEE THE " "DEMOGRAPHICS INCLUDED FOR ADDITIONAL INFORMATION     LAST COVERED DATE IS 2024.  ADDITIONAL TIME IS BEING REQUESTED FOR ONGOING TREATMENT INCLUDING ECT, SYMPTOM STABILIZATION AND DISCHARGE PLANNING      FACILITY:  [facility] [place]  NPI:  [id number]  TAX ID:  [id number]  ADDRESS:  [address] [place], [place] [address]  [address]     ATTENDING MD:  DR. VINOD AU  NPI:  [id number]  ADDRESS:  SAME AS FACILITY  CLINIC PHONE NUMBER:  [phone]  PLEASE CONTACT UR STAFF MEMBER BELOW IF A MORE SPECIFIC CONTACT PHONE NUMBER FOR THE ATTENDING MD IS NEEDED     UR CONTACT:  WENDY GARZA RN  PHONE:  [phone]  FAX:  [phone]           PRIMARY DIAGNOSIS:  F33.2 - MAJOR DEPRESSIVE DISORDER, RECURRENT, WITHOUT PSYCHOSIS  PLEASE SEE MD PROGRESS NOTE FOR ANY ADDITIONAL DIAGNOSES          MD PROGRESS NOTE DATED 2024 IS COPIED BELOW:       Vinod Au MD   Physician  Psychiatry     Progress Notes     Signed     Date of Service: 24  Creation Time: 24     Signed         INPATIENT PSYCHIATRIC PROGRESS NOTE     Name:  Zo Palma  :  1965  MRN:  [id number]  Visit Number:  [id number]  Length of stay:  7     Behavioral Health Treatment Plan and Problem List: I have reviewed and approved the Behavioral Health Treatment Plan and Problem list.     SUBJECTIVE     CC/ Focus of exam:  depression     Patient's subjective status: \"feeling a lot better\"     INTERVAL HISTORY: The patient reports her depression has improved, that she is feeling \"much better\" but still experiencing SI and is uncertain of her safety if she were to return home as is.  She is requesting additional ECT.   Discussed our reasonable treatment goals given her history of chronic dysphoria and the role of psychosocial domestic stressors at play.  Patient more or less dismisses her financial situation stating the reality is it will resolve itself and that her security is with her Social Security benefits that cannot " "be garnished.  Mutually agreeable treatment plan is for ECT tomorrow looking for patient's return home and resume outpatient treatment later this week.     Depression rating 6/10  Anxiety rating 3/10  Hopelessness: 0/10  Sleep:slept well        ROS: No cardiovascular, GI, or Neurological complaints.         OBJECTIVE         Vitals:     02/20/24 0731   BP: 155/80   Pulse: 75   Resp:     Temp:     SpO2:        Temp:  [97.1 °F (36.2 °C)-98.1 °F (36.7 °C)] 97.2 °F (36.2 °C)  Heart Rate:  [55-75] 75  Resp:  [12-20] 16  BP: (101-155)/(60-80) 155/80     MENTAL STATUS EXAM:         Psychomotor: No psychomotor agitation/retardation, No EPS, No motor tics  Speech-normal rate, amount.  Mood/Affect: Depressed  Thought Processes: coherent  Thought Content: mood congruent  Hallucination(s): none  Hopelessness: Intermittent  Optimistic: minimally  Suicidal Thoughts: reports she is still \"looking for means but less than I was last week\"  Suicidal Plan/Intent: denies imediate intent  Homicidal Thoughts: absent  Orientation: oriented x 3  Memory: recent intact     Lab Results (last 24 hours)         ** No results found for the last 24 hours. **                           Imaging Results (Last 24 Hours)         ** No results found for the last 24 hours. **                Lab and x-ray studies reviewed.     ECG/EMG Results (most recent)         Procedure Component Value Units Date/Time     ECG 12 Lead Other; Baseline Cardiac Status [[id number]] Collected: 02/13/24 1938       Updated: 02/14/24 1747       QT Interval 396 ms         QTC Interval 385 ms       Narrative:       Test Reason : Other~  Blood Pressure :   */*   mmHG  Vent. Rate :  57 BPM     Atrial Rate :  57 BPM     P-R Int : 200 ms          QRS Dur :  76 ms      QT Int : 396 ms       P-R-T Axes :  46   2  41 degrees     QTc Int : 385 ms     Sinus bradycardia  Otherwise normal ECG  When compared with ECG of 13-FEB-2024 12:41,  No significant change was found  Confirmed by Debra, " Rick (2033) on 2/14/2024 5:44:36 PM     Referred By:            Confirmed By: Rick Richardson                ALLERGIES: Chlorzoxazone, Iodine, Phenazopyridine hcl, Pyridium [phenazopyridine], Quinine, Valproic acid, Methocarbamol, Iodinated contrast media, Codeine, and Diphenhydramine     Medications:      estradiol, 2 g, Vaginal, Once per day on Monday Wednesday Friday  FLUoxetine, 40 mg, Oral, Daily  midodrine, 5 mg, Oral, TID AC  mirtazapine, 15 mg, Oral, Nightly  nitrofurantoin (macrocrystal-monohydrate), 100 mg, Oral, BID  pantoprazole, 40 mg, Oral, Daily        ASSESSMENT & PLAN     Major depressive disorder, recurrent, without psychosis (TRD)  Continue the Prozac, proceed with ECT as planned, provide for supportive individual group psychotherapeutic effort.     Dysthymic disorder  Same as above     PTSD  Continue with the supportive individual group psychotherapeutic effort     Pseudoseizures (history of)  Continue with the supportive individual and group psychotherapeutic effort     Diabetes type 2  Victoza     Restless leg syndrome     GERD  Protonix     Orthostatic hypotension  Reevaluate current medications     Recurrent urinary tract infections  Patient has been prescribed Mobic as a preventative strategy     Nonspecific arthralgias and low back pain  Treat symptomatically with non opiate analgesic        Special precautions: Special Precautions Level 3 (q15 min checks)      Behavioral Health Treatment Plan and Problem List: I have reviewed and approved the Behavioral Health Treatment Plan and Problem list.     I spent a total of 26 minutes in direct patient care including  17 minutes face to face with the patient assessment, coordination of care, and counseling the patient on the current and follow-up treatment plans regarding her status and situation.  Patient had no additional questions.      PELON Osuna MD     Clinician:  Vinod Osuna MD  02/20/24  07:59 EST     Dictated utilizing  Jazmin dictation                      Current Scheduled Medications  Collapse  Hide  (From now, onward)    Start   Ordered Stop   24  mirtazapine (REMERON) tablet 15 mg  15 mg,   Oral,   Nightly        References:    [facility]    2434 --   24 09  estradiol (ESTRACE) vaginal cream 2 applicator  2 g,   Vaginal,   Once per day on         References:    [facility]    24 --   24  FLUoxetine (PROzac) capsule 40 mg  40 mg,   Oral,   Daily        References:    [facility]    24 --   24  pantoprazole (PROTONIX) EC tablet 40 mg  40 mg,   Oral,   Daily        References:    [facility]    24 --   24  midodrine (PROAMATINE) tablet 5 mg  5 mg,   Oral,   3 Times Daily Before Meals        References:    [facility]    24 --   24  nitrofurantoin (macrocrystal-monohydrate) (MACROBID) capsule 100 mg  100 mg,   Oral,   2 Times Daily        References:    Cross Reactivity Chart    [facility]    24  Pharmacy Consult  Does not apply,   Continuous PRN        References:    [facility]   Question: Consult for Answer: Medication reconciliation    24               PLEASE SEE ECT NOTE/REPORT DATED 2024 COPIED BELOW:       Vinod Osuna MD   Physician  Psychiatry     Op Note     Signed     Date of Service: 24  Creation Time: 24  Case Time: Procedures: Surgeons:   24 ELECTROCONVULSIVE THERAPY    Vinod Osuna MD               Signed         ECT OPERATIVE PROCEDURE REPORT        PATIENT NAME: Zo Palma     Assistants: None     Primary Surgeon: PELON Osuna MD     Preoperative Diagnosis:   Major Depression, Recurrent, Severe Without Psychosis     Postoperative Diagnosis: Same  : 1965     SSN:       N#: [id number]     PHYSICIAN: PELON AU M.D.     PROCEDURE DATE: 02/19/24     This is the patient's second treatment.      PROCEDURE:   Bifrontal ECT utilizing Thymatron System IV.   Energy Set 50%  Charge Delivered 255.4 mC  Current 0.91 A  Stimulus Duration 7.0 sec  Frequency 40 hz  Pulse Width 0.50 msec        ANESTHESIA: See anesthesia report for medications and monitoring.                        DETAILS: Impulse at 0837 with a resultant 56 second seizure.           Specimens Removed: NA  Blood Administered: NA  Estimated Blood Loss: None  Complications: None     Pt STATUS STABLE: 0915 HR     Grafts or Implants: NA     Dictated utilizing Dragon dictation

## 2024-02-20 NOTE — PROGRESS NOTES
"INPATIENT PSYCHIATRIC PROGRESS NOTE    Name:  Zo Palma  :  1965  MRN:  [id number]  Visit Number:  [id number]  Length of stay:  7    Behavioral Health Treatment Plan and Problem List: I have reviewed and approved the Behavioral Health Treatment Plan and Problem list.    SUBJECTIVE    CC/ Focus of exam:  depression    Patient's subjective status: \"feeling a lot better\"    INTERVAL HISTORY: The patient reports her depression has improved, that she is feeling \"much better\" but still experiencing SI and is uncertain of her safety if she were to return home as is.  She is requesting additional ECT.   Discussed our reasonable treatment goals given her history of chronic dysphoria and the role of psychosocial domestic stressors at play.  Patient more or less dismisses her financial situation stating the reality is it will resolve itself and that her security is with her Social Security benefits that cannot be garnished.  Mutually agreeable treatment plan is for ECT tomorrow looking for patient's return home and resume outpatient treatment later this week.    Depression rating 6/10  Anxiety rating 3/10  Hopelessness: 0/10  Sleep:slept well       ROS: No cardiovascular, GI, or Neurological complaints.       OBJECTIVE    Vitals:    24 0731   BP: 155/80   Pulse: 75   Resp:    Temp:    SpO2:       Temp:  [97.1 °F (36.2 °C)-98.1 °F (36.7 °C)] 97.2 °F (36.2 °C)  Heart Rate:  [55-75] 75  Resp:  [12-20] 16  BP: (101-155)/(60-80) 155/80    MENTAL STATUS EXAM:       Psychomotor: No psychomotor agitation/retardation, No EPS, No motor tics  Speech-normal rate, amount.  Mood/Affect: Depressed  Thought Processes: coherent  Thought Content: mood congruent  Hallucination(s): none  Hopelessness: Intermittent  Optimistic: minimally  Suicidal Thoughts: reports she is still \"looking for means but less than I was last week\"  Suicidal Plan/Intent: denies imediate intent  Homicidal Thoughts: absent  Orientation: oriented x " 3  Memory: recent intact    Lab Results (last 24 hours)       ** No results found for the last 24 hours. **             Imaging Results (Last 24 Hours)       ** No results found for the last 24 hours. **             Lab and x-ray studies reviewed.    ECG/EMG Results (most recent)       Procedure Component Value Units Date/Time    ECG 12 Lead Other; Baseline Cardiac Status [[id number]] Collected: 02/13/24 1938     Updated: 02/14/24 1747     QT Interval 396 ms      QTC Interval 385 ms     Narrative:      Test Reason : Other~  Blood Pressure :   */*   mmHG  Vent. Rate :  57 BPM     Atrial Rate :  57 BPM     P-R Int : 200 ms          QRS Dur :  76 ms      QT Int : 396 ms       P-R-T Axes :  46   2  41 degrees     QTc Int : 385 ms    Sinus bradycardia  Otherwise normal ECG  When compared with ECG of 13-FEB-2024 12:41,  No significant change was found  Confirmed by Rick Richardson (2033) on 2/14/2024 5:44:36 PM    Referred By:            Confirmed By: Rick Richardson             ALLERGIES: Chlorzoxazone, Iodine, Phenazopyridine hcl, Pyridium [phenazopyridine], Quinine, Valproic acid, Methocarbamol, Iodinated contrast media, Codeine, and Diphenhydramine    Medications:     estradiol, 2 g, Vaginal, Once per day on Monday Wednesday Friday  FLUoxetine, 40 mg, Oral, Daily  midodrine, 5 mg, Oral, TID AC  mirtazapine, 15 mg, Oral, Nightly  nitrofurantoin (macrocrystal-monohydrate), 100 mg, Oral, BID  pantoprazole, 40 mg, Oral, Daily       ASSESSMENT & PLAN    Major depressive disorder, recurrent, without psychosis (TRD)  Continue the Prozac, proceed with ECT as planned, provide for supportive individual group psychotherapeutic effort.     Dysthymic disorder  Same as above     PTSD  Continue with the supportive individual group psychotherapeutic effort     Pseudoseizures (history of)  Continue with the supportive individual and group psychotherapeutic effort     Diabetes type 2  Victoza     Restless leg syndrome     GERD  Protonix      Orthostatic hypotension  Reevaluate current medications     Recurrent urinary tract infections  Patient has been prescribed Mobic as a preventative strategy     Nonspecific arthralgias and low back pain  Treat symptomatically with non opiate analgesic      Special precautions: Special Precautions Level 3 (q15 min checks)     Behavioral Health Treatment Plan and Problem List: I have reviewed and approved the Behavioral Health Treatment Plan and Problem list.    I spent a total of 26 minutes in direct patient care including  17 minutes face to face with the patient assessment, coordination of care, and counseling the patient on the current and follow-up treatment plans regarding her status and situation.  Patient had no additional questions.     PELON Osuna MD    Clinician:  Vinod Osuna MD  02/20/24  07:59 EST    Dictated utilizing Dragon dictation

## 2024-02-21 ENCOUNTER — ANESTHESIA (OUTPATIENT)
Dept: PERIOP | Facility: HOSPITAL | Age: 59
End: 2024-02-21
Payer: MEDICARE

## 2024-02-21 ENCOUNTER — ANESTHESIA EVENT (OUTPATIENT)
Dept: PERIOP | Facility: HOSPITAL | Age: 59
End: 2024-02-21
Payer: MEDICARE

## 2024-02-21 PROCEDURE — 90870 ELECTROCONVULSIVE THERAPY: CPT | Performed by: PSYCHIATRY & NEUROLOGY

## 2024-02-21 PROCEDURE — GZB4ZZZ OTHER ELECTROCONVULSIVE THERAPY: ICD-10-PCS | Performed by: PSYCHIATRY & NEUROLOGY

## 2024-02-21 PROCEDURE — 25810000003 LACTATED RINGERS PER 1000 ML: Performed by: NURSE ANESTHETIST, CERTIFIED REGISTERED

## 2024-02-21 PROCEDURE — 25010000002 SUCCINYLCHOLINE PER 20 MG: Performed by: NURSE ANESTHETIST, CERTIFIED REGISTERED

## 2024-02-21 RX ORDER — OXYCODONE HYDROCHLORIDE AND ACETAMINOPHEN 5; 325 MG/1; MG/1
1 TABLET ORAL ONCE AS NEEDED
Status: DISCONTINUED | OUTPATIENT
Start: 2024-02-21 | End: 2024-02-21 | Stop reason: HOSPADM

## 2024-02-21 RX ORDER — SODIUM CHLORIDE 0.9 % (FLUSH) 0.9 %
10 SYRINGE (ML) INJECTION EVERY 12 HOURS SCHEDULED
Status: DISCONTINUED | OUTPATIENT
Start: 2024-02-21 | End: 2024-02-21 | Stop reason: HOSPADM

## 2024-02-21 RX ORDER — SUCCINYLCHOLINE CHLORIDE 20 MG/ML
INJECTION INTRAMUSCULAR; INTRAVENOUS AS NEEDED
Status: DISCONTINUED | OUTPATIENT
Start: 2024-02-21 | End: 2024-02-21 | Stop reason: SURG

## 2024-02-21 RX ORDER — KETOROLAC TROMETHAMINE 30 MG/ML
30 INJECTION, SOLUTION INTRAMUSCULAR; INTRAVENOUS EVERY 6 HOURS PRN
Status: DISCONTINUED | OUTPATIENT
Start: 2024-02-21 | End: 2024-02-21 | Stop reason: HOSPADM

## 2024-02-21 RX ORDER — FENTANYL CITRATE 50 UG/ML
50 INJECTION, SOLUTION INTRAMUSCULAR; INTRAVENOUS
Status: DISCONTINUED | OUTPATIENT
Start: 2024-02-21 | End: 2024-02-21 | Stop reason: HOSPADM

## 2024-02-21 RX ORDER — MIDAZOLAM HYDROCHLORIDE 1 MG/ML
1 INJECTION INTRAMUSCULAR; INTRAVENOUS
Status: DISCONTINUED | OUTPATIENT
Start: 2024-02-21 | End: 2024-02-21 | Stop reason: HOSPADM

## 2024-02-21 RX ORDER — IPRATROPIUM BROMIDE AND ALBUTEROL SULFATE 2.5; .5 MG/3ML; MG/3ML
3 SOLUTION RESPIRATORY (INHALATION) ONCE AS NEEDED
Status: DISCONTINUED | OUTPATIENT
Start: 2024-02-21 | End: 2024-02-21 | Stop reason: HOSPADM

## 2024-02-21 RX ORDER — SODIUM CHLORIDE 9 MG/ML
40 INJECTION, SOLUTION INTRAVENOUS AS NEEDED
Status: DISCONTINUED | OUTPATIENT
Start: 2024-02-21 | End: 2024-02-21 | Stop reason: HOSPADM

## 2024-02-21 RX ORDER — SODIUM CHLORIDE, SODIUM LACTATE, POTASSIUM CHLORIDE, CALCIUM CHLORIDE 600; 310; 30; 20 MG/100ML; MG/100ML; MG/100ML; MG/100ML
INJECTION, SOLUTION INTRAVENOUS CONTINUOUS PRN
Status: DISCONTINUED | OUTPATIENT
Start: 2024-02-21 | End: 2024-02-21 | Stop reason: SURG

## 2024-02-21 RX ORDER — SODIUM CHLORIDE 0.9 % (FLUSH) 0.9 %
10 SYRINGE (ML) INJECTION AS NEEDED
Status: DISCONTINUED | OUTPATIENT
Start: 2024-02-21 | End: 2024-02-21 | Stop reason: HOSPADM

## 2024-02-21 RX ORDER — ONDANSETRON 2 MG/ML
4 INJECTION INTRAMUSCULAR; INTRAVENOUS AS NEEDED
Status: DISCONTINUED | OUTPATIENT
Start: 2024-02-21 | End: 2024-02-21 | Stop reason: HOSPADM

## 2024-02-21 RX ORDER — MEPERIDINE HYDROCHLORIDE 25 MG/ML
12.5 INJECTION INTRAMUSCULAR; INTRAVENOUS; SUBCUTANEOUS
Status: DISCONTINUED | OUTPATIENT
Start: 2024-02-21 | End: 2024-02-21 | Stop reason: HOSPADM

## 2024-02-21 RX ORDER — SODIUM CHLORIDE, SODIUM LACTATE, POTASSIUM CHLORIDE, CALCIUM CHLORIDE 600; 310; 30; 20 MG/100ML; MG/100ML; MG/100ML; MG/100ML
125 INJECTION, SOLUTION INTRAVENOUS ONCE
Status: DISCONTINUED | OUTPATIENT
Start: 2024-02-21 | End: 2024-02-21 | Stop reason: HOSPADM

## 2024-02-21 RX ORDER — SODIUM CHLORIDE, SODIUM LACTATE, POTASSIUM CHLORIDE, CALCIUM CHLORIDE 600; 310; 30; 20 MG/100ML; MG/100ML; MG/100ML; MG/100ML
100 INJECTION, SOLUTION INTRAVENOUS ONCE AS NEEDED
Status: DISCONTINUED | OUTPATIENT
Start: 2024-02-21 | End: 2024-02-21 | Stop reason: HOSPADM

## 2024-02-21 RX ADMIN — Medication 3 MG: at 20:36

## 2024-02-21 RX ADMIN — CARBIDOPA AND LEVODOPA 5 MG: 50; 200 TABLET, EXTENDED RELEASE ORAL at 10:35

## 2024-02-21 RX ADMIN — METHOHEXITAL SODIUM 100 MG: 500 INJECTION, POWDER, LYOPHILIZED, FOR SOLUTION INTRAMUSCULAR; INTRAVENOUS; RECTAL at 08:20

## 2024-02-21 RX ADMIN — FLUOXETINE HYDROCHLORIDE 40 MG: 20 CAPSULE ORAL at 10:37

## 2024-02-21 RX ADMIN — SODIUM CHLORIDE, POTASSIUM CHLORIDE, SODIUM LACTATE AND CALCIUM CHLORIDE: 600; 310; 30; 20 INJECTION, SOLUTION INTRAVENOUS at 08:08

## 2024-02-21 RX ADMIN — SUCCINYLCHOLINE CHLORIDE 100 MG: 20 INJECTION, SOLUTION INTRAMUSCULAR; INTRAVENOUS at 08:21

## 2024-02-21 RX ADMIN — NITROFURANTOIN MONOHYDRATE/MACROCRYSTALLINE 100 MG: 25; 75 CAPSULE ORAL at 20:36

## 2024-02-21 RX ADMIN — MIRTAZAPINE 15 MG: 15 TABLET, FILM COATED ORAL at 20:36

## 2024-02-21 RX ADMIN — ESTRADIOL 2 APPLICATOR: 0.1 CREAM VAGINAL at 21:05

## 2024-02-21 RX ADMIN — NITROFURANTOIN MONOHYDRATE/MACROCRYSTALLINE 100 MG: 25; 75 CAPSULE ORAL at 10:36

## 2024-02-21 RX ADMIN — PANTOPRAZOLE SODIUM 40 MG: 40 TABLET, DELAYED RELEASE ORAL at 10:38

## 2024-02-21 RX ADMIN — CARBIDOPA AND LEVODOPA 5 MG: 50; 200 TABLET, EXTENDED RELEASE ORAL at 18:04

## 2024-02-21 NOTE — PLAN OF CARE
Goal Outcome Evaluation:  Plan of Care Reviewed With: patient  Patient Agreement with Plan of Care: agrees  Consent Given to Review Plan with: roomate  Progress: improving  Outcome Evaluation: Therapist met with Patient to review treatment progress and aftercare plans; Patient agreeable.      Problem: Adult Behavioral Health Plan of Care  Goal: Plan of Care Review  Outcome: Ongoing, Progressing  Flowsheets  Taken 2/21/2024 1352  Progress: improving  Plan of Care Reviewed With: patient  Patient Agreement with Plan of Care: agrees  Outcome Evaluation:   Therapist met with Patient to review treatment progress and aftercare plans   Patient agreeable.  Taken 2/14/2024 1101  Consent Given to Review Plan with: roomate  Goal: Optimized Coping Skills in Response to Life Stressors  Outcome: Ongoing, Progressing  Flowsheets (Taken 2/21/2024 1352)  Optimized Coping Skills in Response to Life Stressors: making progress toward outcome  Intervention: Promote Effective Coping Strategies  Flowsheets (Taken 2/21/2024 1352)  Supportive Measures:   active listening utilized   counseling provided   goal-setting facilitated   verbalization of feelings encouraged  Goal: Develops/Participates in Therapeutic [place] to Support Successful Transition  Outcome: Ongoing, Progressing  Flowsheets (Taken 2/21/2024 1352)  Develops/Participates in Therapeutic [place] to Support Successful Transition: making progress toward outcome  Intervention: Foster Therapeutic [place]  Flowsheets (Taken 2/21/2024 1352)  Trust Relationship/Rapport:   care explained   reassurance provided   choices provided   thoughts/feelings acknowledged   emotional support provided   empathic listening provided   questions answered   questions encouraged     DATA: Therapist met with Patient individually this date. Patient agreeable to discuss current treatment progress and discharge concerns.     This therapist attempted to reach Patient's roommate, Cloud, and provide an  update. However was unable to reach him today.    CLINICAL MANUVERING/INTERVENTIONS:  Assisted Patient in processing session content; acknowledged and normalized Patient’s thoughts, feelings, and concerns by utilizing a person-centered approach in efforts to build appropriate rapport and a positive therapeutic relationship with open and honest communication. Allowed Patient to ventilate regarding current stressors and triggers for negative emotions and thoughts in a safe nonjudgmental environment with unconditional positive regard, active listening skills, and empathy.     ASSESSMENT: Patient was seen 1-1 for a follow up today. She continues to receive treatment for SI. She continues to report improvement. She states that she has not felt this well in many years. Other than providing an update about how she was feeling she reported having nothing else that she needed to discuss today. She denies needing assistance with anything today.     PLAN:   Patient will continue stabilization. Patient will continue to receive services offered by Treatment Team.     Patient will follow[facility] with [facility].

## 2024-02-21 NOTE — PLAN OF CARE
Goal Outcome Evaluation:  Plan of Care Reviewed With: patient  Patient Agreement with Plan of Care: agrees     Progress: improving     Pt reports good slept and good appetite. Pt rates anx 4/10 and dep 6/10. Pt denies SI/HI/AVH.

## 2024-02-21 NOTE — OP NOTE
ECT OPERATIVE PROCEDURE REPORT      PATIENT NAME: Zo Palma    Assistants: None    Primary Surgeon: PELON Au MD    Preoperative Diagnosis:   Major Depression, Recurrent, Severe Without Psychosis    Postoperative Diagnosis: Same  : 1965    SSN:     MRN#: [id number]    PHYSICIAN: PELON AU M.D.    PROCEDURE DATE: 24    This is the patient's third treatment.     PROCEDURE:   Bifrontal ECT utilizing Thymatron System IV.   Energy Set 50%  Charge Delivered to 46.4 mC  Current 0.88 A  Stimulus Duration 7.0 sec  Frequency 40 hz  Pulse Width 0.50 msec      ANESTHESIA: See anesthesia report for medications and monitoring.                           Brevital: 100 100        mg          Succinylcholine: 100        mg    DETAILS: Impulse at 0821 with a resultant 47 second seizure.        Specimens Removed: NA  Blood Administered: NA  Estimated Blood Loss: None  Complications: None    Pt STATUS STABLE: 0900 HR    Grafts or Implants: NA    Dictated utilizing Dragon dictation

## 2024-02-21 NOTE — PLAN OF CARE
Goal Outcome Evaluation:  Plan of Care Reviewed With: patient  Patient Agreement with Plan of Care: agrees     Progress: no change  Outcome Evaluation: PATIENT HAD ECT THIS AM, TOLERATED WELL.  ANXIETY 2, DENIES DEPRESSION.  HAS MAINLY BEEN IN HER BED TODAY SLEEPING.

## 2024-02-21 NOTE — ANESTHESIA POSTPROCEDURE EVALUATION
Patient: Zo Palma    Procedure Summary       Date: 02/21/24 Room / Location: [facility] OR [address] [place] [address] COR OR    Anesthesia Start: 0808 Anesthesia Stop: 0826    Procedure: ELECTROCONVULSIVE THERAPY Diagnosis:       Severe episode of recurrent major depressive disorder, without psychotic features      (Severe episode of recurrent major depressive disorder, without psychotic features [F33.2])    Surgeons: Vinod Osuna MD Provider: Cholo John MD    Anesthesia Type: general ASA Status: 3            Anesthesia Type: general    Vitals  Vitals Value Taken Time   /86 02/21/24 0829   Temp 99 °F (37.2 °C) 02/21/24 0829   Pulse 78 02/21/24 0829   Resp 20 02/21/24 0829   SpO2 92 % 02/21/24 0829           Post Anesthesia Care and Evaluation    Patient location during evaluation: bedside  Patient participation: complete - patient participated  Level of consciousness: awake and alert  Pain score: 1  Pain management: adequate    Airway patency: patent  Anesthetic complications: No anesthetic complications  PONV Status: none  Cardiovascular status: acceptable  Respiratory status: acceptable  Hydration status: acceptable

## 2024-02-22 VITALS
DIASTOLIC BLOOD PRESSURE: 61 MMHG | HEIGHT: 63 IN | RESPIRATION RATE: 18 BRPM | WEIGHT: 138.6 LBS | SYSTOLIC BLOOD PRESSURE: 97 MMHG | TEMPERATURE: 97 F | HEART RATE: 55 BPM | BODY MASS INDEX: 24.56 KG/M2 | OXYGEN SATURATION: 98 %

## 2024-02-22 PROCEDURE — 99239 HOSP IP/OBS DSCHRG MGMT >30: CPT | Performed by: PSYCHIATRY & NEUROLOGY

## 2024-02-22 RX ORDER — HYDROXYZINE HYDROCHLORIDE 10 MG/1
10 TABLET, FILM COATED ORAL 3 TIMES DAILY PRN
Start: 2024-02-22

## 2024-02-22 RX ORDER — MIRTAZAPINE 15 MG/1
15 TABLET, FILM COATED ORAL NIGHTLY
Qty: 30 TABLET | Refills: 0 | Status: SHIPPED | OUTPATIENT
Start: 2024-02-22

## 2024-02-22 RX ORDER — MIRTAZAPINE 15 MG/1
15 TABLET, FILM COATED ORAL NIGHTLY
Qty: 30 TABLET | Refills: 0 | Status: SHIPPED | OUTPATIENT
Start: 2024-02-22 | End: 2024-02-22 | Stop reason: SDUPTHER

## 2024-02-22 RX ADMIN — FLUOXETINE HYDROCHLORIDE 40 MG: 20 CAPSULE ORAL at 08:28

## 2024-02-22 RX ADMIN — NITROFURANTOIN MONOHYDRATE/MACROCRYSTALLINE 100 MG: 25; 75 CAPSULE ORAL at 08:28

## 2024-02-22 RX ADMIN — CARBIDOPA AND LEVODOPA 5 MG: 50; 200 TABLET, EXTENDED RELEASE ORAL at 12:04

## 2024-02-22 RX ADMIN — PANTOPRAZOLE SODIUM 40 MG: 40 TABLET, DELAYED RELEASE ORAL at 08:28

## 2024-02-22 RX ADMIN — CARBIDOPA AND LEVODOPA 5 MG: 50; 200 TABLET, EXTENDED RELEASE ORAL at 08:28

## 2024-02-22 NOTE — DISCHARGE SUMMARY
"  Date of Discharge:  2/22/2024    Discharge Diagnosis:Principal Problem:    Severe episode of recurrent major depressive disorder, without psychotic features  Active Problems:    PTSD (post-traumatic stress disorder)    Dysthymia    Gastroesophageal reflux disease without esophagitis    Orthostatic hypotension      Presenting Problem/History of Present Illness:Patient was admitted to the hospital on February 13 having presented depressed voicing suicidal intent, voluntarily admitted for safety evaluation and treatment, see admission note for details.         Hospital Course:    Patient was admitted for safety and stabilization and was placed on standard precautions.  Routine labs were checked.  Patient was assigned a master's level therapist and provided with an opportunity to participate in group and individual therapy on the unit.  Patient seen on a daily basis for evaluation and supportive therapy.  Patient continued on the fluoxetine 40 mg daily adding in mirtazapine 15 mg at bedtime.  As anticipated patient received bifrontal ECT treatments February 14th, 16th and 20th.  Patient subjective state improved dramatically as she stated: \"I have not felt this way in many years\".  At discharge patient affect approaching normal and she was denying any thoughts of harming herself or others.  Patient planning to follow-up with her therapist in providers in [place] [facility].  See below for medications and other details.    Consults:   Consults       Date and Time Order Name Status Description    2/14/2024  8:34 AM Inpatient Hospitalist Consult Completed             Labs:  Lab Results (all)       Procedure Component Value Units Date/Time    Hepatitis Panel, Acute [[id number]]  (Normal) Collected: 02/14/24 0545    Specimen: Blood Updated: 02/14/24 0658     Hepatitis B Surface Ag Non-Reactive     Hep A IgM Non-Reactive     Hep B C IgM Non-Reactive     Hepatitis C Ab Non-Reactive    Narrative:      Results may be falsely " decreased if patient taking Biotin.     Hemoglobin A1c [[id number]]  (Normal) Collected: 02/14/24 0545    Specimen: Blood Updated: 02/14/24 0645     Hemoglobin A1C 4.90 %     Narrative:      Hemoglobin A1C Ranges:    Increased Risk for Diabetes  5.7% to 6.4%  Diabetes                     >= 6.5%  Diabetic Goal                < 7.0%    Lipid Panel [[id number]]  (Abnormal) Collected: 02/14/24 0545    Specimen: Blood Updated: 02/14/24 0624     Total Cholesterol 178 mg/dL      Triglycerides 106 mg/dL      HDL Cholesterol 55 mg/dL      LDL Cholesterol  104 mg/dL      VLDL Cholesterol 19 mg/dL      LDL/HDL Ratio 1.85    Narrative:      Cholesterol Reference Ranges  (U.S. Department of Health and Human Services ATP III Classifications)    Desirable          <200 mg/dL  Borderline High    200-239 mg/dL  High Risk          >240 mg/dL      Triglyceride Reference Ranges  (U.S. Department of Health and Human Services ATP III Classifications)    Normal           <150 mg/dL  Borderline High  150-199 mg/dL  High             200-499 mg/dL  Very High        >500 mg/dL    HDL Reference Ranges  (U.S. Department of Health and Human Services ATP III Classifications)    Low     <40 mg/dl (major risk factor for CHD)  High    >60 mg/dl ('negative' risk factor for CHD)        LDL Reference Ranges  (U.S. Department of Health and Human Services ATP III Classifications)    Optimal          <100 mg/dL  Near Optimal     100-129 mg/dL  Borderline High  130-159 mg/dL  High             160-189 mg/dL  Very High        >189 mg/dL            Imaging:  Imaging Results (All)       None            Condition on Discharge:  improved    Prognosis: Fair    Vital Signs  Temp:  [97 °F (36.1 °C)-99 °F (37.2 °C)] 97 °F (36.1 °C)  Heart Rate:  [53-90] 55  Resp:  [13-20] 18  BP: ()/(62-86) 120/67    Discharge Disposition  Home or Self Care       Discharge Medications        New Medications        Instructions Start Date   mirtazapine 15 MG tablet  Commonly  known as: REMERON   15 mg, Oral, Nightly             Changes to Medications        Instructions Start Date   hydrOXYzine 10 MG tablet  Commonly known as: ATARAX  What changed:   when to take this  reasons to take this   10 mg, Oral, 3 Times Daily PRN             Continue These Medications        Instructions Start Date   estradiol 0.1 MG/GM vaginal cream  Commonly known as: ESTRACE   2 g, Vaginal, 3 Times Weekly, Mondays, Wednesdays and Fridays      FLUoxetine 40 MG capsule  Commonly known as: PROzac   40 mg, Oral, Daily      ibandronate 150 MG tablet  Commonly known as: BONIVA   150 mg, Oral, Every 30 Days      midodrine 5 MG tablet  Commonly known as: PROAMATINE   5 mg, Oral, 3 Times Daily Before Meals      nitrofurantoin (macrocrystal-monohydrate) 100 MG capsule  Commonly known as: MACROBID   100 mg, Oral, 2 Times Daily      pantoprazole 40 MG EC tablet  Commonly known as: PROTONIX   40 mg, Oral, Daily      prazosin 2 MG capsule  Commonly known as: MINIPRESS   2 mg, Oral, Nightly             Stop These Medications      pramipexole 0.125 MG tablet  Commonly known as: MIRAPEX              Discharge Diet: Regular    Activity at Discharge: No restrictions    Follow-up Appointments:      What's Next         Follow up with Evan Rivas [facility] [place] [address]  [phone]   FEB 26 Psychotherapy with Mely LARA  Monday Feb 26, 2024 11:00 AM (Arrive by 10:30 AM)  Established: Please make sure to bring all medication, insurance cards, ID [facility]  [address] [place] [id number]  [phone]   MAR    4 Cystoscopy with Miguel Glover  Monday Mar 4, 2024 2:30 PM [facility] GASTROENTEROLOGY & UROLOGY  [address] [place] [address]  [phone]   MAR    7 Medicine Check with Merari Solomon  Thursday Mar 7, 2024 11:00 AM (Arrive by 10:30 AM) [facility]  [address] [place] [address]  [id number]  [phone]   MADELINE    3 FOLLOW UP with Damari Fulton  Monday Madeline 3, 2024 9:30 AM (Arrive by 9:15 AM) [facility] ENDOCRINOLOGY  [address] [address]  [phone]           Vinod Osuna MD  02/22/24  08:12 EST  .    Time: I spent greater than 30 minutes on this discharge activity which included: face-to-face encounter with the patient, reviewing the data in the system, coordination of the care with the nursing staff as well as consultants, documentation, and entering orders.      Dictated utilizing Dragon dictation

## 2024-02-22 NOTE — PLAN OF CARE
Goal Outcome Evaluation:  Plan of Care Reviewed With: patient  Patient Agreement with Plan of Care: agrees     Progress: improving  Outcome Evaluation: Patient reports sleeping and eating well. Rates A/D 2/3. Denies SI/HI or hallucinations. Denies feeling helpless,hopeless or worthless. Pt has slept approx. 8.5 hours throughout the night.

## 2024-02-22 NOTE — PAYOR COMM NOTE
"Tommie Guallpa (59 y.o. Female)       Date of Birth   1965    Social Security Number       Address   [address] [place] [address]    Home Phone   [phone]    MRN   [id number]       Taoist   Other    Marital Status                               Admission Date   2/13/24    Admission Type   Urgent    Admitting Provider   Vinod Osuna MD    Attending Provider       Department, Room/Bed   [facility], 1024/1S       Discharge Date   2/22/2024    Discharge Disposition   Home or Self Care    Discharge Destination                                 Attending Provider: (none)   Allergies: Chlorzoxazone, Iodine, Phenazopyridine Hcl, Pyridium [Phenazopyridine], Quinine, Valproic Acid, Methocarbamol, Iodinated Contrast Media, Codeine, Diphenhydramine    Isolation: None   Infection: None   Code Status: CPR    Ht: 160 cm (63\")   Wt: 62.9 kg (138 lb 9.6 oz)    Admission Cmt: None   Principal Problem: Severe episode of recurrent major depressive disorder, without psychotic features [F33.2]                   Active Insurance as of 2/13/2024       Primary Coverage       Payor Plan Insurance Group Employer/Plan Group    ANTHEM MEDICARE REPLACEMENT ANTHEM MEDICARE ADVANTAGE [id number]       Payor Plan Address Payor Plan Phone Number Payor Plan Fax Number Effective Dates    [address] [phone]  1/1/2024 - None Entered    [facility] [address]         Subscriber Name Subscriber Birth Date Member ID       TOMMIE GUALLPA 1965 [id number]                     Emergency Contacts        (Rel.) Home Phone Work Phone Mobile Phone    VLADIMIR GUALLPA (Daughter) -- -- [phone]    MICHAEL HENRIQUEZ (Friend) [phone] -- --                 Discharge Summary        Vinod Osuna MD at 02/22/24 0812            Date of Discharge:  2/22/2024    Discharge Diagnosis:Principal Problem:    Severe episode of recurrent major depressive disorder, " "without psychotic features  Active Problems:    PTSD (post-traumatic stress disorder)    Dysthymia    Gastroesophageal reflux disease without esophagitis    Orthostatic hypotension      Presenting Problem/History of Present Illness:Patient was admitted to the hospital on February 13 having presented depressed voicing suicidal intent, voluntarily admitted for safety evaluation and treatment, see admission note for details.         Hospital Course:    Patient was admitted for safety and stabilization and was placed on standard precautions.  Routine labs were checked.  Patient was assigned a master's level therapist and provided with an opportunity to participate in group and individual therapy on the unit.  Patient seen on a daily basis for evaluation and supportive therapy.  Patient continued on the fluoxetine 40 mg daily adding in mirtazapine 15 mg at bedtime.  As anticipated patient received bifrontal ECT treatments February 14th, 16th and 20th.  Patient subjective state improved dramatically as she stated: \"I have not felt this way in many years\".  At discharge patient affect approaching normal and she was denying any thoughts of harming herself or others.  Patient planning to follow-up with her therapist in providers in [place] [facility].  See below for medications and other details.    Consults:   Consults       Date and Time Order Name Status Description    2/14/2024  8:34 AM Inpatient Hospitalist Consult Completed             Labs:  Lab Results (all)       Procedure Component Value Units Date/Time    Hepatitis Panel, Acute [[id number]]  (Normal) Collected: 02/14/24 0545    Specimen: Blood Updated: 02/14/24 0658     Hepatitis B Surface Ag Non-Reactive     Hep A IgM Non-Reactive     Hep B C IgM Non-Reactive     Hepatitis C Ab Non-Reactive    Narrative:      Results may be falsely decreased if patient taking Biotin.     Hemoglobin A1c [[id number]]  (Normal) Collected: 02/14/24 0545    Specimen: Blood Updated: " 02/14/24 0645     Hemoglobin A1C 4.90 %     Narrative:      Hemoglobin A1C Ranges:    Increased Risk for Diabetes  5.7% to 6.4%  Diabetes                     >= 6.5%  Diabetic Goal                < 7.0%    Lipid Panel [[id number]]  (Abnormal) Collected: 02/14/24 0545    Specimen: Blood Updated: 02/14/24 0624     Total Cholesterol 178 mg/dL      Triglycerides 106 mg/dL      HDL Cholesterol 55 mg/dL      LDL Cholesterol  104 mg/dL      VLDL Cholesterol 19 mg/dL      LDL/HDL Ratio 1.85    Narrative:      Cholesterol Reference Ranges  (U.S. Department of Health and Human Services ATP III Classifications)    Desirable          <200 mg/dL  Borderline High    200-239 mg/dL  High Risk          >240 mg/dL      Triglyceride Reference Ranges  (U.S. Department of Health and Human Services ATP III Classifications)    Normal           <150 mg/dL  Borderline High  150-199 mg/dL  High             200-499 mg/dL  Very High        >500 mg/dL    HDL Reference Ranges  (U.S. Department of Health and Human Services ATP III Classifications)    Low     <40 mg/dl (major risk factor for CHD)  High    >60 mg/dl ('negative' risk factor for CHD)        LDL Reference Ranges  (U.S. Department of Health and Human Services ATP III Classifications)    Optimal          <100 mg/dL  Near Optimal     100-129 mg/dL  Borderline High  130-159 mg/dL  High             160-189 mg/dL  Very High        >189 mg/dL            Imaging:  Imaging Results (All)       None            Condition on Discharge:  improved    Prognosis: Fair    Vital Signs  Temp:  [97 °F (36.1 °C)-99 °F (37.2 °C)] 97 °F (36.1 °C)  Heart Rate:  [53-90] 55  Resp:  [13-20] 18  BP: ()/(62-86) 120/67    Discharge Disposition  Home or Self Care       Discharge Medications        New Medications        Instructions Start Date   mirtazapine 15 MG tablet  Commonly known as: REMERON   15 mg, Oral, Nightly             Changes to Medications        Instructions Start Date   hydrOXYzine 10 MG  tablet  Commonly known as: ATARAX  What changed:   when to take this  reasons to take this   10 mg, Oral, 3 Times Daily PRN             Continue These Medications        Instructions Start Date   estradiol 0.1 MG/GM vaginal cream  Commonly known as: ESTRACE   2 g, Vaginal, 3 Times Weekly, Mondays, Wednesdays and Fridays      FLUoxetine 40 MG capsule  Commonly known as: PROzac   40 mg, Oral, Daily      ibandronate 150 MG tablet  Commonly known as: BONIVA   150 mg, Oral, Every 30 Days      midodrine 5 MG tablet  Commonly known as: PROAMATINE   5 mg, Oral, 3 Times Daily Before Meals      nitrofurantoin (macrocrystal-monohydrate) 100 MG capsule  Commonly known as: MACROBID   100 mg, Oral, 2 Times Daily      pantoprazole 40 MG EC tablet  Commonly known as: PROTONIX   40 mg, Oral, Daily      prazosin 2 MG capsule  Commonly known as: MINIPRESS   2 mg, Oral, Nightly             Stop These Medications      pramipexole 0.125 MG tablet  Commonly known as: MIRAPEX              Discharge Diet: Regular    Activity at Discharge: No restrictions    Follow-up Appointments:      What's Next         [place] with [place] [address]  [phone]   FEB 26 Psychotherapy with Mely LARA  Monday Feb 26, 2024 11:00 AM (Arrive by 10:30 AM)  Established: Please make sure to bring all medication, insurance cards, ID [facility] [address] [place] [address]  [phone]   MAR    4 Cystoscopy with Miguel Glover  Monday Mar 4, 2024 2:30 PM [facility] GASTROENTEROLOGY & UROLOGY  [address] [address]  [phone]   MAR    7 Medicine Check with Merari Solomon  Thursday Mar 7, 2024 11:00 AM (Arrive by 10:30 AM) [facility] [address] [place] [address]  [phone]   MADELINE    3 FOLLOW UP with Damari Fulton  Monday Madeline 3, 2024 9:30 AM (Arrive by 9:15 AM) [facility]  MEDICAL GROUP ENDOCRINOLOGY  [facility] [address]  [facility] [address]  [phone]           Vinod Osuna MD  02/22/24  08:12 EST  .    Time: I spent greater than 30 minutes on this discharge activity which included: face-to-face encounter with the patient, reviewing the data in the system, coordination of the care with the nursing staff as well as consultants, documentation, and entering orders.      Dictated utilizing Dragon dictation    Electronically signed by Vinod Osuna MD at 02/22/24 0843

## 2024-02-22 NOTE — PROGRESS NOTES
Discharge Planning Assessment  [facility]     Patient Name: Zo Palma  MRN: [id number]  Today's Date: 2/22/2024    Admit Date: 2/13/2024    Patient is being discharged home today. She denies SI, HI, and AVH. Patient was educated about the crisis hotline, when to call 911 or present to the nearest emergency room. Safety planning has been completed with Patient's roommate, judson. He will pick her up today. Patient has aftercare arranged with [facility].   LISETH Lei

## 2024-02-26 ENCOUNTER — OFFICE VISIT (OUTPATIENT)
Dept: PSYCHIATRY | Facility: CLINIC | Age: 59
End: 2024-02-26
Payer: MEDICARE

## 2024-02-26 DIAGNOSIS — F43.10 POST TRAUMATIC STRESS DISORDER (PTSD): ICD-10-CM

## 2024-02-26 DIAGNOSIS — F41.0 PANIC ATTACKS: ICD-10-CM

## 2024-02-26 DIAGNOSIS — F33.2 SEVERE EPISODE OF RECURRENT MAJOR DEPRESSIVE DISORDER, WITHOUT PSYCHOTIC FEATURES: Primary | ICD-10-CM

## 2024-02-26 DIAGNOSIS — F41.1 GENERALIZED ANXIETY DISORDER: ICD-10-CM

## 2024-02-26 PROCEDURE — 90837 PSYTX W PT 60 MINUTES: CPT | Performed by: SOCIAL WORKER

## 2024-02-26 NOTE — PROGRESS NOTES
Date: 02/26/2024   Time In: 1042  Time Out: 1142    PROGRESS NOTE  Data:  Zo Palma is a 59 y.o. female who presents today for individual therapy session at [facility]. Chief Complaint: depression, anxiety and PTSD    Patient presents to follow up since discharge from psychiatric  hospitalization on 1/22/24.  Patient reports problems determining dreams from reality since her ECT treatments. She reports improvement with sleep and has been sleeping 8pm-8am. She reports worries about her mood changing and returning to old thoughts. She reports no longer taking the mirtazapine because It is not compatible with weight loss management victoza. She reports fear of gaining weight. She reports getting treatment for her blood pressure but still endorses fear of falling in shower.       Clinical Maneuvering/Intervention:    Assisted patient in processing above session content; acknowledged and normalized patient’s thoughts, feelings, and concerns.  Rationalized patient thought process regarding mood and adjusting from recent hospitalization.  Discussed triggers associated with patient's depression.  Also discussed coping skills for patient to implement such as completing activities of daily living, using support system and challenging anxious thoughts. Patient reports practicing letting the anxious thoughts go. Patient would like to continue working on fears in future appointments. Encouraged patient to discuss medication concerns with her medication provider.    Allowed patient to freely discuss issues without interruption or judgment. Provided safe, confidential environment to facilitate the development of positive therapeutic relationship and encourage open, honest communication. Assisted patient in identifying risk factors which would indicate the need for higher level of care including thoughts to harm self or others and/or self-harming behavior and encouraged patient to contact this office, call  911, or present to the nearest emergency room should any of these events occur. Discussed crisis intervention services and means to access. Patient adamantly and convincingly denies current suicidal or homicidal ideation or perceptual disturbance.    Assessment   Patient appears to maintain relative stability as compared to their baseline.  However, patient continues to struggle with depression and anxiety which continues to cause impairment in important areas of functioning.  As a result, they can be reasonably expected to continue to benefit from treatment and would likely be at increased risk for decompensation otherwise.    Mental Status Exam:   Hygiene:   good  Cooperation:  Cooperative  Eye Contact:  Good  Psychomotor Behavior:  Appropriate  Affect:  Appropriate  Mood: normal  Speech:  Normal  Thought Process:  Goal directed and Linear  Thought Content:  Mood congruent  Suicidal:  None  Homicidal:  None  Hallucinations:  None  Delusion:  None  Memory:  Intact  Orientation:  Person, Place, Time, and Situation  Reliability:  good  Insight:  Good  Judgement:  Good  Impulse Control:  Good  Physical/Medical Issues:  Yes        Patient's Support Network Includes:  children and friend    Functional Status: Moderate impairment     Progress toward goal: Not at goal    Prognosis: Good with Ongoing Treatment          Plan     VISIT DIAGNOSIS:     ICD-10-CM ICD-9-CM   1. Severe episode of recurrent major depressive disorder, without psychotic features  F33.2 296.33   2. Generalized anxiety disorder  F41.1 300.02   3. Post traumatic stress disorder (PTSD)  F43.10 309.81   4. Panic attacks  F41.0 300.01        Patient will continue in individual outpatient therapy with focus on improved functioning and coping skills, maintaining stability, and avoiding decompensation and the need for higher level of care.    Patient will adhere to medication regimen as prescribed and report any side effects. Patient will contact this  office, call 911 or present to the nearest emergency room should suicidal or homicidal ideations occur. Provide Cognitive Behavioral Therapy and Solution Focused Therapy to improve functioning, maintain stability, and avoid decompensation and the need for higher level of care.     Return in about Return in about 2 weeks (around 3/11/2024). or earlier if symptoms worsen or fail to improve.            This document has been electronically signed by Mely Tate LCSW  February 26, 2024 10:42 EST      Part of this note may be an electronic transcription/translation of spoken language to printed text using the Dragon Dictation System.

## 2024-02-27 ENCOUNTER — PROCEDURE VISIT (OUTPATIENT)
Dept: UROLOGY | Facility: CLINIC | Age: 59
End: 2024-02-27
Payer: MEDICARE

## 2024-02-27 VITALS
SYSTOLIC BLOOD PRESSURE: 144 MMHG | HEIGHT: 63 IN | HEART RATE: 66 BPM | DIASTOLIC BLOOD PRESSURE: 82 MMHG | WEIGHT: 143.6 LBS | BODY MASS INDEX: 25.45 KG/M2

## 2024-02-27 DIAGNOSIS — R39.15 URINARY URGENCY: ICD-10-CM

## 2024-02-27 DIAGNOSIS — N39.0 RECURRENT UTI (URINARY TRACT INFECTION): ICD-10-CM

## 2024-02-27 DIAGNOSIS — Z51.89 AFTERCARE: Primary | ICD-10-CM

## 2024-02-27 RX ORDER — GENTAMICIN SULFATE 40 MG/ML
80 INJECTION, SOLUTION INTRAMUSCULAR; INTRAVENOUS ONCE
Status: COMPLETED | OUTPATIENT
Start: 2024-02-27 | End: 2024-02-27

## 2024-02-27 RX ADMIN — GENTAMICIN SULFATE 80 MG: 40 INJECTION, SOLUTION INTRAMUSCULAR; INTRAVENOUS at 14:14

## 2024-02-27 NOTE — PROGRESS NOTES
"Chief Complaint:      Chief Complaint   Patient presents with    Cystoscopy    Recurrent UTI       HPI:   Ms. Zo Palma is a pleasant 58-year-old female who presents to clinic today for evaluation as a new patient consult. Patient has been referred to clinic by PCP with concerns of recurrent UTIs. On initial evaluation, she reports numerous other concerns including overactive bladder/DETRUSOR  instability complicated by bouts of urinary frequency, urgency, and dysuria. She has had pelvic pressure and suprapubic discomfort, flank pain, lower back/abdominal pain, but denies any CVA tenderness. Most recent documented urine culture on 11/03/2023 was E. coli.  Urine Culture  >100,000 CFU/mL Escherichia coli Abnormal        Recently finished a dose of cefdinir, she presents to clinic today for Re-evaluation. Initially,  Patient is unable to give us any urine today for reevaluation. Post  Hydration, able to Catherize patient for clean specimen which is negative for leukocyte estrace, it is negative for nitrites, negative for gross /Microhematuria. Her PVR is 0ml      Prior to that, she had a positive urine culture on 04/27/2023. Patient reports that, She has had ongoing bouts of recurrent UTIs for > over 30 years for which she saw a urologist before and was placed on antibiotic suppressive therapy with complete resolution, until recently.The patient states she is here because she had a UTI that became pyelonephritis. She believes it may be gone, but she is still having \"right kidney pain.\" It has improved.      She has had intermittent bladder infections for the last 36 years. The source has not been found. Her first UTI occurred when she was 7 years of age after being kicked by a horse in the kidney.Patient reports Her bladder has been cauterized, it is very large, and she has had multiple cystoscopies performed. She had bladder sling surgery using mesh in 2005. Her last cystoscopy was 3 years ago when she lived in " [place].     She no longer has the urge to urinate until her bladder is full. She goes 4 to 5 hours without urinating. She last urinated at 8:30 AM this morning. She has had a 12-ounce cup of hot water and multiple sips of water since then. Her goal is 1 gallon of water a day with lemon essential oil. If she were to try to urinate without having the urge, she would have a bladder spasm, but nothing would come out. She denies having a urethral stricture. She has occasional constipation. Her UTIs are always caused by E. coli. When she lived in [place] her well water was contaminated with E. coli. She moved away 2 years ago and still has not been clear of E. coli.      She was on suppressive therapy 2 years ago. Currently she is using D-mannose. She is open to resuming suppressive therapy. She has used Macrobid in the past and it worked well for her. When she has a UTI, her urine is malodorous, she has urinary frequency, urgency, occasional hematuria sometimes with clots, and a burning sensation. She has had 1 or 2 renal lithiasis. She has not noticed evidence of a prolapse. The patient does not get yeast infections. She takes post-, pre-, and probiotics. She can not have intercourse due to dyspareunia. The patient has used estrogen cream for 22 years. She had a hydrodistention procedure in the past.     The patient had a complete hysterectomy.      The patient is a . She has a son and a daughter. Neither want children.     Her mother had uterine cancer. Her daughter has 2 renal arteries.     She denies being allergic to any antibiotics. She is allergic to PYRIDIUM.    She is here for cystoscopy       Past Medical History:     Past Medical History:   Diagnosis Date    Ankle sprain     Anxiety     Arthritis of back     Arthritis of neck     Bursitis of hip     Cervical disc disorder     Conversion disorder     Depression     Fracture of wrist     Fracture, finger     Fracture, foot     Frozen shoulder     GERD  "(gastroesophageal reflux disease)     Hip arthrosis     Hormone disorder     Hyperlipidemia     Insomnia     Interstitial cystitis     Kidney stone     Liver failure     due to depakote    Lumbosacral disc disease     Lupus     Migraines     Orthostatic hypotension     Periarthritis of shoulder     Peripheral neuropathy     Rotator cuff syndrome     Scoliosis 11/2023    Seizures 2001    Conversion Disorder    Subluxation of patella     Suicidal thoughts     Suicide attempt     \"I tried overdosing\"  32 antihistamine tablets per pt 1/27/24    Urinary tract infection        Current Meds:     Current Outpatient Medications   Medication Sig Dispense Refill    estradiol (ESTRACE) 0.1 MG/GM vaginal cream Insert 2 applicators into the vagina 3 (Three) Times a Week. Mondays, Wednesdays and Fridays  Indications: Bladder Inflammation      FLUoxetine (PROzac) 40 MG capsule Take 1 capsule by mouth Daily. Indications: Major Depressive Disorder 30 capsule 2    hydrOXYzine (ATARAX) 10 MG tablet Take 1 tablet by mouth 3 (Three) Times a Day As Needed for Itching or Anxiety. Indications: Feeling Anxious      ibandronate (BONIVA) 150 MG tablet Take 1 tablet by mouth Every 30 (Thirty) Days. 3 tablet 3    midodrine (PROAMATINE) 5 MG tablet Take 1 tablet by mouth 3 (Three) Times a Day Before Meals. Indications: Blood Pressure Drop Upon Standing      mirtazapine (REMERON) 15 MG tablet Take 1 tablet by mouth Every Night. Indications: Major Depressive Disorder 30 tablet 0    nitrofurantoin, macrocrystal-monohydrate, (MACROBID) 100 MG capsule Take 1 capsule by mouth 2 (Two) Times a Day. Indications: Prevention of Frequently Occuring Urinary Tract Infections      pantoprazole (PROTONIX) 40 MG EC tablet Take 1 tablet by mouth Daily. Indications: Gastroesophageal Reflux Disease       No current facility-administered medications for this visit.        Allergies:      Allergies   Allergen Reactions    Chlorzoxazone Unknown - High Severity and " "Swelling     Lorzone, muscle relaxant.    Iodine Anaphylaxis     Topical makes her swell  Anaphylaxis with IV contrast (when she was ~ 9yrs old)    Phenazopyridine Hcl Swelling and Anaphylaxis    Pyridium [Phenazopyridine] Anaphylaxis    Quinine Unknown - High Severity     Has malaria symptoms    Valproic Acid Other (See Comments)     Liver failure  Liver failure  Liver failure; lupus like symptoms and liver failure    Methocarbamol Other (See Comments)     Made her feel \"suicidal\" and gave her less control over her actions.    Iodinated Contrast Media Unknown - Low Severity    Codeine Itching    Diphenhydramine Anxiety     Pt has severe panic attack symptoms when given benadryl IV. Needs to take a benzodiazapine med concurrently when getting benadryl.         Past Surgical History:     Past Surgical History:   Procedure Laterality Date    BARIATRIC SURGERY  2008    Gastric sleeve    BLADDER SURGERY      BREAST SURGERY Bilateral     reduction    CHOLECYSTECTOMY  2010    COLONOSCOPY      CYSTOSCOPY      ELECTROCONVULSIVE THERAPY Bilateral 2/15/2024    Procedure: BIFRONTAL ELECTROCONVULSIVE THERAPY;  Surgeon: Vinod Osuna MD;  Location: [facility] OR;  Service: ECT;  Laterality: Bilateral;    ELECTROCONVULSIVE THERAPY N/A 2/19/2024    Procedure: ELECTROCONVULSIVE THERAPY;  Surgeon: Vinod Osuna MD;  Location: [facility] OR;  Service: ECT;  Laterality: N/A;    ELECTROCONVULSIVE THERAPY N/A 2/21/2024    Procedure: ELECTROCONVULSIVE THERAPY;  Surgeon: Vinod Osuna MD;  Location: [facility] OR;  Service: ECT;  Laterality: N/A;    FOOT SURGERY      FRACTURE SURGERY      Left wrist    GASTRIC SLEEVE LAPAROSCOPIC N/A     HYSTERECTOMY      NECK SURGERY      OOPHORECTOMY      SINUS SURGERY  2021    WISDOM TOOTH EXTRACTION N/A     WRIST SURGERY Left        Social History:     Social History     Socioeconomic History    Marital status:     Number of children: 2    Highest education level: " Master's degree (e.g., MA, MS, Pancho, MEd, MSW, SULY)   Tobacco Use    Smoking status: Never     Passive exposure: Past    Smokeless tobacco: Never   Vaping Use    Vaping Use: Never used   Substance and Sexual Activity    Alcohol use: Yes     Alcohol/week: 1.0 standard drink of alcohol     Types: 1 Drinks containing 0.5 oz of alcohol per week     Comment: Occasionally    Drug use: Never    Sexual activity: Not Currently     Partners: Male     Birth control/protection: Post-menopausal, Hysterectomy       Family History:     Family History   Problem Relation Age of Onset    Cancer Mother         Brain, suspected uterine    Rheumatologic disease Mother     Dementia Father     Cancer Father         Prostate    Learning disabilities Father         Dyslexia    Other Father         Early onset alzheimer    Prostate cancer Father     Cancer Brother         Prostate, bone    Learning disabilities Brother         Dyslexia    Other Brother         Alzheimer    Prostate cancer Maternal Grandfather     Other Paternal Grandfather         Early onset alzheimer       Review of Systems:     Review of Systems   Constitutional: Negative.  Negative for activity change, appetite change, chills, diaphoresis, fatigue and unexpected weight change.   HENT:  Negative for congestion, dental problem, drooling, ear discharge, ear pain, facial swelling, hearing loss, mouth sores, nosebleeds, postnasal drip, rhinorrhea, sinus pressure, sneezing, sore throat, tinnitus, trouble swallowing and voice change.    Eyes: Negative.  Negative for photophobia, pain, discharge, redness, itching and visual disturbance.   Respiratory: Negative.  Negative for apnea, cough, choking, chest tightness, shortness of breath, wheezing and stridor.    Cardiovascular: Negative.  Negative for chest pain, palpitations and leg swelling.   Gastrointestinal: Negative.  Negative for abdominal distention, abdominal pain, anal bleeding, blood in stool, constipation, diarrhea,  nausea, rectal pain and vomiting.   Endocrine: Negative.  Negative for cold intolerance, heat intolerance, polydipsia, polyphagia and polyuria.   Musculoskeletal: Negative.  Negative for arthralgias, back pain, gait problem, joint swelling, myalgias, neck pain and neck stiffness.   Skin: Negative.  Negative for color change, pallor, rash and wound.   Allergic/Immunologic: Negative.  Negative for environmental allergies, food allergies and immunocompromised state.   Neurological: Negative.  Negative for dizziness, tremors, seizures, syncope, facial asymmetry, speech difficulty, weakness, light-headedness, numbness and headaches.   Hematological: Negative.  Negative for adenopathy. Does not bruise/bleed easily.   Psychiatric/Behavioral:  Negative for agitation, behavioral problems, confusion, decreased concentration, dysphoric mood, hallucinations, self-injury, sleep disturbance and suicidal ideas. The patient is not nervous/anxious and is not hyperactive.    All other systems reviewed and are negative.      Physical Exam:     Physical Exam  Constitutional:       Appearance: She is well-developed.   HENT:      Head: Normocephalic and atraumatic.      Right Ear: External ear normal.      Left Ear: External ear normal.   Eyes:      Conjunctiva/sclera: Conjunctivae normal.      Pupils: Pupils are equal, round, and reactive to light.   Cardiovascular:      Rate and Rhythm: Normal rate and regular rhythm.      Heart sounds: Normal heart sounds.   Pulmonary:      Effort: Pulmonary effort is normal.      Breath sounds: Normal breath sounds.   Abdominal:      General: Bowel sounds are normal. There is no distension.      Palpations: Abdomen is soft. There is no mass.      Tenderness: There is no abdominal tenderness. There is no guarding or rebound.   Genitourinary:     General: Normal vulva.      Vagina: No vaginal discharge.   Musculoskeletal:         General: Normal range of motion.   Skin:     General: Skin is warm and  dry.   Neurological:      Mental Status: She is alert.      Deep Tendon Reflexes: Reflexes are normal and symmetric.   Psychiatric:         Behavior: Behavior normal.         Thought Content: Thought content normal.         Judgment: Judgment normal.         I have reviewed the following portions of the patient's history: Allergies, current medications, past family history, past medical history, past social history, past surgical history, problem list, and ROS and confirm it is accurate.    Recent Image (CT and/or KUB):      CT Abdomen and Pelvis: No results found for this or any previous visit.       CT Stone Protocol: No results found for this or any previous visit.       KUB: No results found for this or any previous visit.       Labs (past 3 months):      Admission on 02/08/2024, Discharged on 02/09/2024   Component Date Value Ref Range Status    Glucose 02/08/2024 111 (H)  65 - 99 mg/dL Final    BUN 02/08/2024 20  6 - 20 mg/dL Final    Creatinine 02/08/2024 0.91  0.57 - 1.00 mg/dL Final    Sodium 02/08/2024 135 (L)  136 - 145 mmol/L Final    Potassium 02/08/2024 3.7  3.5 - 5.2 mmol/L Final    Chloride 02/08/2024 100  98 - 107 mmol/L Final    CO2 02/08/2024 26.2  22.0 - 29.0 mmol/L Final    Calcium 02/08/2024 9.1  8.6 - 10.5 mg/dL Final    Total Protein 02/08/2024 7.0  6.0 - 8.5 g/dL Final    Albumin 02/08/2024 3.9  3.5 - 5.2 g/dL Final    ALT (SGPT) 02/08/2024 43 (H)  1 - 33 U/L Final    AST (SGOT) 02/08/2024 30  1 - 32 U/L Final    Alkaline Phosphatase 02/08/2024 84  39 - 117 U/L Final    Total Bilirubin 02/08/2024 0.3  0.0 - 1.2 mg/dL Final    Globulin 02/08/2024 3.1  gm/dL Final    A/G Ratio 02/08/2024 1.3  g/dL Final    BUN/Creatinine Ratio 02/08/2024 22.0  7.0 - 25.0 Final    Anion Gap 02/08/2024 8.8  5.0 - 15.0 mmol/L Final    eGFR 02/08/2024 72.8  >60.0 mL/min/1.73 Final    Ethanol 02/08/2024 <10  0 - 10 mg/dL Final    Ethanol % 02/08/2024 <0.010  % Final    Acetaminophen 02/08/2024 <5.0  0.0 - 30.0  mcg/mL Final    Salicylate 02/08/2024 <0.3  <=30.0 mg/dL Final    THC, Screen, Urine 02/08/2024 Negative  Negative Final    Phencyclidine (PCP), Urine 02/08/2024 Negative  Negative Final    Cocaine Screen, Urine 02/08/2024 Negative  Negative Final    Methamphetamine, Ur 02/08/2024 Negative  Negative Final    Opiate Screen 02/08/2024 Negative  Negative Final    Amphetamine Screen, Urine 02/08/2024 Negative  Negative Final    Benzodiazepine Screen, Urine 02/08/2024 Negative  Negative Final    Tricyclic Antidepressants Screen 02/08/2024 Negative  Negative Final    Methadone Screen, Urine 02/08/2024 Negative  Negative Final    Barbiturates Screen, Urine 02/08/2024 Negative  Negative Final    Oxycodone Screen, Urine 02/08/2024 Negative  Negative Final    Buprenorphine, Screen, Urine 02/08/2024 Negative  Negative Final    Color, UA 02/08/2024 Yellow  Yellow, Straw Final    Appearance, UA 02/08/2024 Clear  Clear Final    pH, UA 02/08/2024 5.5  5.0 - 8.0 Final    Specific Gravity, UA 02/08/2024 1.020  1.005 - 1.030 Final    Glucose, UA 02/08/2024 Negative  Negative Final    Ketones, UA 02/08/2024 Negative  Negative Final    Bilirubin, UA 02/08/2024 Negative  Negative Final    Blood, UA 02/08/2024 Negative  Negative Final    Protein, UA 02/08/2024 Negative  Negative Final    Leuk Esterase, UA 02/08/2024 Negative  Negative Final    Nitrite, UA 02/08/2024 Negative  Negative Final    Urobilinogen, UA 02/08/2024 0.2 E.U./dL  0.2 - 1.0 E.U./dL Final    WBC 02/08/2024 7.61  3.40 - 10.80 10*3/mm3 Final    RBC 02/08/2024 4.38  3.77 - 5.28 10*6/mm3 Final    Hemoglobin 02/08/2024 13.0  12.0 - 15.9 g/dL Final    Hematocrit 02/08/2024 40.4  34.0 - 46.6 % Final    MCV 02/08/2024 92.2  79.0 - 97.0 fL Final    MCH 02/08/2024 29.7  26.6 - 33.0 pg Final    MCHC 02/08/2024 32.2  31.5 - 35.7 g/dL Final    RDW 02/08/2024 12.5  12.3 - 15.4 % Final    RDW-SD 02/08/2024 42.8  37.0 - 54.0 fl Final    MPV 02/08/2024 9.0  6.0 - 12.0 fL Final     Platelets 02/08/2024 286  140 - 450 10*3/mm3 Final    Neutrophil % 02/08/2024 76.0  42.7 - 76.0 % Final    Lymphocyte % 02/08/2024 14.6 (L)  19.6 - 45.3 % Final    Monocyte % 02/08/2024 7.2  5.0 - 12.0 % Final    Eosinophil % 02/08/2024 1.1  0.3 - 6.2 % Final    Basophil % 02/08/2024 0.7  0.0 - 1.5 % Final    Immature Grans % 02/08/2024 0.4  0.0 - 0.5 % Final    Neutrophils, Absolute 02/08/2024 5.79  1.70 - 7.00 10*3/mm3 Final    Lymphocytes, Absolute 02/08/2024 1.11  0.70 - 3.10 10*3/mm3 Final    Monocytes, Absolute 02/08/2024 0.55  0.10 - 0.90 10*3/mm3 Final    Eosinophils, Absolute 02/08/2024 0.08  0.00 - 0.40 10*3/mm3 Final    Basophils, Absolute 02/08/2024 0.05  0.00 - 0.20 10*3/mm3 Final    Immature Grans, Absolute 02/08/2024 0.03  0.00 - 0.05 10*3/mm3 Final    nRBC 02/08/2024 0.0  0.0 - 0.2 /100 WBC Final    Extra Tube 02/08/2024 Hold for add-ons.   Final    Auto resulted.    Extra Tube 02/08/2024 hold for add-on   Final    Auto resulted    Extra Tube 02/08/2024 Hold for add-ons.   Final    Auto resulted.    Fentanyl, Urine 02/08/2024 Negative  Negative Final   Admission on 01/29/2024, Discharged on 01/29/2024   Component Date Value Ref Range Status    Ethanol 01/29/2024 <10  0 - 10 mg/dL Final    Ethanol % 01/29/2024 <0.010  % Final    THC, Screen, Urine 01/29/2024 Negative  Negative Final    Phencyclidine (PCP), Urine 01/29/2024 Negative  Negative Final    Cocaine Screen, Urine 01/29/2024 Negative  Negative Final    Methamphetamine, Ur 01/29/2024 Negative  Negative Final    Opiate Screen 01/29/2024 Negative  Negative Final    Amphetamine Screen, Urine 01/29/2024 Negative  Negative Final    Benzodiazepine Screen, Urine 01/29/2024 Negative  Negative Final    Tricyclic Antidepressants Screen 01/29/2024 Negative  Negative Final    Methadone Screen, Urine 01/29/2024 Negative  Negative Final    Barbiturates Screen, Urine 01/29/2024 Negative  Negative Final    Oxycodone Screen, Urine 01/29/2024 Negative  Negative  Final    Buprenorphine, Screen, Urine 01/29/2024 Negative  Negative Final    Acetaminophen 01/29/2024 <5.0  0.0 - 30.0 mcg/mL Final    Glucose 01/29/2024 87  65 - 99 mg/dL Final    BUN 01/29/2024 15  6 - 20 mg/dL Final    Creatinine 01/29/2024 0.72  0.57 - 1.00 mg/dL Final    Sodium 01/29/2024 140  136 - 145 mmol/L Final    Potassium 01/29/2024 4.0  3.5 - 5.2 mmol/L Final    Chloride 01/29/2024 102  98 - 107 mmol/L Final    CO2 01/29/2024 27.2  22.0 - 29.0 mmol/L Final    Calcium 01/29/2024 9.8  8.6 - 10.5 mg/dL Final    Total Protein 01/29/2024 7.5  6.0 - 8.5 g/dL Final    Albumin 01/29/2024 4.5  3.5 - 5.2 g/dL Final    ALT (SGPT) 01/29/2024 29  1 - 33 U/L Final    AST (SGOT) 01/29/2024 31  1 - 32 U/L Final    Alkaline Phosphatase 01/29/2024 85  39 - 117 U/L Final    Total Bilirubin 01/29/2024 0.2  0.0 - 1.2 mg/dL Final    Globulin 01/29/2024 3.0  gm/dL Final    A/G Ratio 01/29/2024 1.5  g/dL Final    BUN/Creatinine Ratio 01/29/2024 20.8  7.0 - 25.0 Final    Anion Gap 01/29/2024 10.8  5.0 - 15.0 mmol/L Final    eGFR 01/29/2024 96.5  >60.0 mL/min/1.73 Final    Extra Tube 01/29/2024 Hold for add-ons.   Final    Auto resulted.    Extra Tube 01/29/2024 hold for add-on   Final    Auto resulted    Extra Tube 01/29/2024 Hold for add-ons.   Final    Auto resulted.    Extra Tube 01/29/2024 Hold for add-ons.   Final    Auto resulted    WBC 01/29/2024 6.24  3.40 - 10.80 10*3/mm3 Final    RBC 01/29/2024 4.68  3.77 - 5.28 10*6/mm3 Final    Hemoglobin 01/29/2024 13.9  12.0 - 15.9 g/dL Final    Hematocrit 01/29/2024 42.4  34.0 - 46.6 % Final    MCV 01/29/2024 90.6  79.0 - 97.0 fL Final    MCH 01/29/2024 29.7  26.6 - 33.0 pg Final    MCHC 01/29/2024 32.8  31.5 - 35.7 g/dL Final    RDW 01/29/2024 13.0  12.3 - 15.4 % Final    RDW-SD 01/29/2024 42.2  37.0 - 54.0 fl Final    MPV 01/29/2024 9.0  6.0 - 12.0 fL Final    Platelets 01/29/2024 301  140 - 450 10*3/mm3 Final    Neutrophil % 01/29/2024 63.7  42.7 - 76.0 % Final    Lymphocyte %  01/29/2024 23.7  19.6 - 45.3 % Final    Monocyte % 01/29/2024 8.7  5.0 - 12.0 % Final    Eosinophil % 01/29/2024 3.0  0.3 - 6.2 % Final    Basophil % 01/29/2024 0.6  0.0 - 1.5 % Final    Immature Grans % 01/29/2024 0.3  0.0 - 0.5 % Final    Neutrophils, Absolute 01/29/2024 3.97  1.70 - 7.00 10*3/mm3 Final    Lymphocytes, Absolute 01/29/2024 1.48  0.70 - 3.10 10*3/mm3 Final    Monocytes, Absolute 01/29/2024 0.54  0.10 - 0.90 10*3/mm3 Final    Eosinophils, Absolute 01/29/2024 0.19  0.00 - 0.40 10*3/mm3 Final    Basophils, Absolute 01/29/2024 0.04  0.00 - 0.20 10*3/mm3 Final    Immature Grans, Absolute 01/29/2024 0.02  0.00 - 0.05 10*3/mm3 Final    nRBC 01/29/2024 0.0  0.0 - 0.2 /100 WBC Final    Fentanyl, Urine 01/29/2024 Negative  Negative Final   Lab on 12/04/2023   Component Date Value Ref Range Status    Urine Culture 12/04/2023 No growth   Final        Procedure:     Cystoscopy:  Patient presents today for cystourethroscopy.  I went ahead and obtained an informed consent including the risks of anesthesia, bleeding, infection, etc.  After prepping and draping in a sterile fashion in the low dorsal lithotomy position, the urethra was gently anesthetized with 10 mL of 2% viscous Xylocaine jelly.  After an appropriate period of topical anesthesia, I used the Olympus digital 14 [place] flexible cystoscope to examine the anterior urethra which was completely normal.  The ureteral orifices were visualized and normal in position and configuration. There were no stones, tumors, or foreign bodies.  The blue light was enabled and was negative allowing us to see small mucosal lesions. The patient was given 80 mg of gentamicin in an intramuscular fashion as prophylaxis for the cystoscopy and released from the clinic.  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  The mainstay of treatment has been tight cholinergics which basically cause the bladder to have decreased contractility.  We have discussed the side effects of these treatments including dry mouth, double vision, and increasing constipation.            This document has been electronically signed by DESIREE LEGGETT MD February 27, 2024 13:53 EST    Dictated Utilizing Dragon Dictation: Part of this note may be an electronic transcription/translation of spoken language to printed text using the Dragon Dictation System.

## 2024-02-29 ENCOUNTER — TELEPHONE (OUTPATIENT)
Dept: PSYCHIATRY | Facility: CLINIC | Age: 59
End: 2024-02-29
Payer: MEDICARE

## 2024-02-29 NOTE — TELEPHONE ENCOUNTER
Patient called in states she is leaving town on 03/05/2024 and she had to reschedule appointment she said she needs to talk to Merari due to she was inpatient two different facilities had med changes and wants to speak to her to make sure she is on correct med's. She said she has enough med's until appointment on 03/21/2024. Updated med list of current med's. She said Dr. Osuna stopped mirapex and prazosin and tremor has came back in two days.

## 2024-02-29 NOTE — TELEPHONE ENCOUNTER
"Discharge note from 2/22/24 has prazosin under the \"continue these medications\" list. The only med stopped per note is Mirapex.     "

## 2024-03-04 ENCOUNTER — TELEPHONE (OUTPATIENT)
Dept: ENDOCRINOLOGY | Facility: CLINIC | Age: 59
End: 2024-03-04
Payer: MEDICARE

## 2024-03-05 DIAGNOSIS — R73.03 PREDIABETES: Primary | ICD-10-CM

## 2024-03-05 NOTE — TELEPHONE ENCOUNTER
Spoke with patient. Reviewed labs with her. Will repeat CMP, A1C% prior to her visit with me in June2024. Victoza will no longer available through [facility]. Will discus possible alternative with her in June.  Electronically Signed: Damari Fulton MD

## 2024-03-07 ENCOUNTER — TELEMEDICINE (OUTPATIENT)
Dept: PSYCHIATRY | Facility: CLINIC | Age: 59
End: 2024-03-07
Payer: MEDICARE

## 2024-03-07 DIAGNOSIS — F41.1 GENERALIZED ANXIETY DISORDER: ICD-10-CM

## 2024-03-07 DIAGNOSIS — F41.0 PANIC ATTACKS: ICD-10-CM

## 2024-03-07 DIAGNOSIS — F43.10 POST TRAUMATIC STRESS DISORDER (PTSD): ICD-10-CM

## 2024-03-07 DIAGNOSIS — F33.2 SEVERE EPISODE OF RECURRENT MAJOR DEPRESSIVE DISORDER, WITHOUT PSYCHOTIC FEATURES: Primary | ICD-10-CM

## 2024-03-07 NOTE — PROGRESS NOTES
Date: March 7, 2024  Time In: 1102  Time Out: 1140      PROGRESS NOTE  Data:  Zo Palma is a 59 y.o. female who presents today for individual therapy session through the [facility].  The Patient is seen remotely at their home, using Zoom. Patient is being seen via telehealth and stated they are in a secure environment for this session. The patient's condition being diagnosed/treated is appropriate for telemedicine. The provider identified herself as well as her credentials. The patient and/or patients guardian consent to be seen remotely, and when consent is given they understand that the consent allows for patient identifiable information to be sent to a third party as needed. They may refuse to be seen remotely at any time. The electronic data is encrypted and password protected, and the patient has been advised of the potential risks to privacy not withstanding such measures.     Chief Complaint: depression, anxiety and PTSD    Patient reports difficulty with negative thoughts. She reports feeling better since having ETC but is concerned about having follow ups. She reports improvement with determining reality and is no longer having bad dreams.       Clinical Maneuvering/Intervention:    Assisted patient in processing above session content; acknowledged and normalized patient’s thoughts, feelings, and concerns.  Rationalized patient thought process regarding depression and imagining worst case scenarios. Reviewed cognitive distortions  Discussed triggers associated with patient's depression.  Also discussed coping skills for patient to implement such as previously used coping and challenging negative thoughts.     Allowed patient to freely discuss issues without interruption or judgment. Provided safe, confidential environment to facilitate the development of positive therapeutic relationship and encourage open, honest communication. Assisted patient in identifying risk factors which  would indicate the need for higher level of care including thoughts to harm self or others and/or self-harming behavior and encouraged patient to contact this office, call 911, or present to the nearest emergency room should any of these events occur. Discussed crisis intervention services and means to access. Patient adamantly and convincingly denies current suicidal or homicidal ideation or perceptual disturbance.    Assessment   Patient appears to maintain relative stability as compared to their baseline.  However, patient continues to struggle with depression which continues to cause impairment in important areas of functioning.  A result, they can be reasonably expected to continue to benefit from treatment and would likely be at increased risk for decompensation otherwise.    Mental Status Exam:   Hygiene:    mychart video, appears good  Cooperation:  Cooperative  Eye Contact:  Good  Psychomotor Behavior:  Appropriate  Affect:  Appropriate  Mood: normal  Speech:  Normal  Thought Process:  Goal directed and Linear  Thought Content:  Mood congruent  Suicidal:  None  Homicidal:  None  Hallucinations:  None  Delusion:  None  Memory:  Intact  Orientation:  Person, Place, Time, and Situation  Reliability:  good  Insight:  Good  Judgement:  Good  Impulse Control:  Good  Physical/Medical Issues:  Yes            Patient's Support Network Includes:  children and friend    Functional Status: Moderate impairment     Progress toward goal: Not at goal    Prognosis: Good with Ongoing Treatment              Plan     Patient will continue in individual outpatient therapy with focus on improved functioning and coping skills, maintaining stability, and avoiding decompensation and the need for higher level of care.    Patient will adhere to medication regimen as prescribed and report any side effects. Patient will contact this office, call 911 or present to the nearest emergency room should suicidal or homicidal ideations occur.  Provide Cognitive Behavioral Therapy and Solution Focused Therapy to improve functioning, maintain stability, and avoid decompensation and the need for higher level of care.     Return in about 2 weeks, or earlier if symptoms worsen or fail to improve.           VISIT DIAGNOSIS:     ICD-10-CM ICD-9-CM   1. Severe episode of recurrent major depressive disorder, without psychotic features  F33.2 296.33   2. Generalized anxiety disorder  F41.1 300.02   3. Post traumatic stress disorder (PTSD)  F43.10 309.81   4. Panic attacks  F41.0 300.01            This document has been electronically signed by Mely Tate LCSW  March 7, 2024 11:02 EST    Part of this note may be an electronic transcription/translation of spoken language to printed text using the Dragon Dictation System.

## 2024-03-08 ENCOUNTER — TELEPHONE (OUTPATIENT)
Dept: PSYCHIATRY | Facility: CLINIC | Age: 59
End: 2024-03-08
Payer: MEDICARE

## 2024-03-08 NOTE — TELEPHONE ENCOUNTER
Zo Ochoa Dr. saw Mely Tate LCSW this week, and she asked Mely about getting in with you for ECT therapy again? Is this something she needs to call your clinic about, or how should we advise the patient? Thank you so much.

## 2024-03-12 ENCOUNTER — TELEPHONE (OUTPATIENT)
Dept: PSYCHIATRY | Facility: CLINIC | Age: 59
End: 2024-03-12
Payer: MEDICARE

## 2024-03-12 DIAGNOSIS — Z79.899 MEDICATION MANAGEMENT: Primary | ICD-10-CM

## 2024-03-15 DIAGNOSIS — F41.1 GENERALIZED ANXIETY DISORDER: ICD-10-CM

## 2024-03-15 DIAGNOSIS — F33.2 SEVERE EPISODE OF RECURRENT MAJOR DEPRESSIVE DISORDER, WITHOUT PSYCHOTIC FEATURES: ICD-10-CM

## 2024-03-15 RX ORDER — FLUOXETINE HYDROCHLORIDE 40 MG/1
CAPSULE ORAL
Qty: 30 CAPSULE | Refills: 2 | Status: SHIPPED | OUTPATIENT
Start: 2024-03-15

## 2024-03-19 ENCOUNTER — LAB (OUTPATIENT)
Dept: LAB | Facility: HOSPITAL | Age: 59
End: 2024-03-19
Payer: MEDICARE

## 2024-03-19 ENCOUNTER — HOSPITAL ENCOUNTER (OUTPATIENT)
Dept: RESPIRATORY THERAPY | Facility: HOSPITAL | Age: 59
Discharge: HOME | End: 2024-03-19
Payer: MEDICARE

## 2024-03-19 DIAGNOSIS — Z79.899 MEDICATION MANAGEMENT: ICD-10-CM

## 2024-03-19 PROCEDURE — 36415 COLL VENOUS BLD VENIPUNCTURE: CPT

## 2024-03-19 PROCEDURE — 80053 COMPREHEN METABOLIC PANEL: CPT

## 2024-03-19 PROCEDURE — 93005 ELECTROCARDIOGRAM TRACING: CPT | Performed by: NURSE PRACTITIONER

## 2024-03-20 LAB
ALBUMIN SERPL-MCNC: 4.5 G/DL (ref 3.5–5.2)
ALBUMIN/GLOB SERPL: 1.5 G/DL
ALP SERPL-CCNC: 88 U/L (ref 39–117)
ALT SERPL W P-5'-P-CCNC: 21 U/L (ref 1–33)
ANION GAP SERPL CALCULATED.3IONS-SCNC: 11.8 MMOL/L (ref 5–15)
AST SERPL-CCNC: 19 U/L (ref 1–32)
BILIRUB SERPL-MCNC: 0.3 MG/DL (ref 0–1.2)
BUN SERPL-MCNC: 8 MG/DL (ref 6–20)
BUN/CREAT SERPL: 10.1 (ref 7–25)
CALCIUM SPEC-SCNC: 9.6 MG/DL (ref 8.6–10.5)
CHLORIDE SERPL-SCNC: 104 MMOL/L (ref 98–107)
CO2 SERPL-SCNC: 25.2 MMOL/L (ref 22–29)
CREAT SERPL-MCNC: 0.79 MG/DL (ref 0.57–1)
EGFRCR SERPLBLD CKD-EPI 2021: 86.3 ML/MIN/1.73
GLOBULIN UR ELPH-MCNC: 3.1 GM/DL
GLUCOSE SERPL-MCNC: 78 MG/DL (ref 65–99)
POTASSIUM SERPL-SCNC: 4 MMOL/L (ref 3.5–5.2)
PROT SERPL-MCNC: 7.6 G/DL (ref 6–8.5)
SODIUM SERPL-SCNC: 141 MMOL/L (ref 136–145)

## 2024-03-21 ENCOUNTER — TELEPHONE (OUTPATIENT)
Dept: PSYCHIATRY | Facility: CLINIC | Age: 59
End: 2024-03-21

## 2024-03-21 ENCOUNTER — OFFICE VISIT (OUTPATIENT)
Dept: PSYCHIATRY | Facility: CLINIC | Age: 59
End: 2024-03-21
Payer: MEDICARE

## 2024-03-21 VITALS
DIASTOLIC BLOOD PRESSURE: 78 MMHG | OXYGEN SATURATION: 98 % | HEART RATE: 67 BPM | SYSTOLIC BLOOD PRESSURE: 118 MMHG | HEIGHT: 63 IN | BODY MASS INDEX: 25.27 KG/M2 | WEIGHT: 142.6 LBS

## 2024-03-21 DIAGNOSIS — F41.1 GENERALIZED ANXIETY DISORDER: ICD-10-CM

## 2024-03-21 DIAGNOSIS — F33.1 MAJOR DEPRESSIVE DISORDER, RECURRENT EPISODE, MODERATE: Primary | ICD-10-CM

## 2024-03-21 DIAGNOSIS — R45.851 SUICIDAL IDEATION: ICD-10-CM

## 2024-03-21 LAB
QT INTERVAL: 416 MS
QTC INTERVAL: 411 MS

## 2024-03-21 RX ORDER — FLUTICASONE PROPIONATE 50 MCG
2 SPRAY, SUSPENSION (ML) NASAL DAILY
COMMUNITY

## 2024-03-21 RX ORDER — PRAMIPEXOLE DIHYDROCHLORIDE 0.5 MG/1
0.5 TABLET ORAL 2 TIMES DAILY
COMMUNITY
End: 2024-03-21

## 2024-03-21 NOTE — PROGRESS NOTES
Subjective   Zo Palma is a 59 y.o. female who presents today for follow up    Chief Complaint:  Depression and anxiety    History of Present Illness:   History of Present Illness  Zo Palma presents today for follow-up to discuss medication management.  Was recently inpatient x 2 weeks, 1 week at the [facility] and then 1 week at [facility].  Received Spravato x 2 prior to [facility] admission, says that she felt better after the first treatment, but noticed increased depression the day following the second treatment.  He did not feel like continuing with provide treatment was beneficial if he continued with this in severity of symptoms prior treatment.  During admission at [facility], she obtained ECT treatments x 3 (2/14, 2/16 and 2/20).  After receiving ECT, voices that she feels 50 to 60% better.  Has also noticed overall improvement in SI, says that she is no longer afraid of carrying out the act, but does feel that she would be okay without waking up.  Has follow-up appointment scheduled with Dr. Osuna on Monday to discuss more ECT treatment. This that she had to leave prematurely as he was due to be in [place].  Has not been able to find treatment of Mirapex, requesting refill as medication was previously prescribed for tremors.  Continues to struggle with sleep, feels that she obtains too little or not enough sleep.  Unable to quantify the number of hours she sleeps per night.  His appetite varies, some days he eats better than others. PHQ-9 total Score: 13, GURMEET-7 total Score: 7.    Previous Psych Meds: Reports having tried multiple SSRIs and SNRIs along with Abilify, risperidone, Seroquel, Vraylar, Latuda, Lamictal, Trileptal, lorazepam and Depakote (caused liver failure).     The following portions of the patient's history were reviewed and updated as appropriate: allergies, current medications, past family history, past medical history, past social history, past surgical history and problem  "list.      Past Medical History:  Past Medical History:   Diagnosis Date    Ankle sprain     Anxiety     Arthritis of back     Arthritis of neck     Bursitis of hip     Cervical disc disorder     Conversion disorder     Depression     Fracture of wrist     Fracture, finger     Fracture, foot     Frozen shoulder     GERD (gastroesophageal reflux disease)     Hip arthrosis     Hormone disorder     Hyperlipidemia     Insomnia     Interstitial cystitis     Kidney stone     Liver failure     due to depakote    Lumbosacral disc disease     Lupus     Migraines     Orthostatic hypotension     Periarthritis of shoulder     Peripheral neuropathy     Rotator cuff syndrome     Scoliosis 11/2023    Seizures 2001    Conversion Disorder    Subluxation of patella     Suicidal thoughts     Suicide attempt     \"I tried overdosing\"  32 antihistamine tablets per pt 1/27/24    Urinary tract infection        Social History:  Social History     Socioeconomic History    Marital status:     Number of children: 2    Highest education level: Master's degree (e.g., MA, MS, Pancho, MEd, MSW, SULY)   Tobacco Use    Smoking status: Never     Passive exposure: Past    Smokeless tobacco: Never   Vaping Use    Vaping status: Never Used   Substance and Sexual Activity    Alcohol use: Yes     Alcohol/week: 1.0 standard drink of alcohol     Types: 1 Drinks containing 0.5 oz of alcohol per week     Comment: Occasionally    Drug use: Never    Sexual activity: Not Currently     Partners: Male     Birth control/protection: Post-menopausal, Hysterectomy       Family History:  Family History   Problem Relation Age of Onset    Cancer Mother         Brain, suspected uterine    Rheumatologic disease Mother     Dementia Father     Cancer Father         Prostate    Learning disabilities Father         Dyslexia    Other Father         Early onset alzheimer    Prostate cancer Father     Cancer Brother         Prostate, bone    Learning disabilities Brother      "    Dyslexia    Other Brother         Alzheimer    Prostate cancer Maternal Grandfather     Other Paternal Grandfather         Early onset alzheimer       Past Surgical History:  Past Surgical History:   Procedure Laterality Date    BARIATRIC SURGERY  2008    Gastric sleeve    BLADDER SURGERY      BREAST SURGERY Bilateral     reduction    CHOLECYSTECTOMY  2010    COLONOSCOPY      CYSTOSCOPY      ELECTROCONVULSIVE THERAPY Bilateral 2/15/2024    Procedure: BIFRONTAL ELECTROCONVULSIVE THERAPY;  Surgeon: Vinod Osuna MD;  Location:  [facility];  Service: ECT;  Laterality: Bilateral;    ELECTROCONVULSIVE THERAPY N/A 2/19/2024    Procedure: ELECTROCONVULSIVE THERAPY;  Surgeon: Vinod Osuna MD;  Location:  [facility];  Service: ECT;  Laterality: N/A;    ELECTROCONVULSIVE THERAPY N/A 2/21/2024    Procedure: ELECTROCONVULSIVE THERAPY;  Surgeon: Vinod Osuna MD;  Location:  [facility];  Service: ECT;  Laterality: N/A;    FOOT SURGERY      FRACTURE SURGERY      Left wrist    GASTRIC SLEEVE LAPAROSCOPIC N/A     HYSTERECTOMY      NECK SURGERY      OOPHORECTOMY      SINUS SURGERY  2021    WISDOM TOOTH EXTRACTION N/A     WRIST SURGERY Left        Problem List:  Patient Active Problem List   Diagnosis    Anxiety    Bursitis of hip    Cervical spondylosis with radiculopathy    Chronic migraine without aura without status migrainosus, not intractable    Conversion disorder with attacks or seizures, persistent, with psychological stressor    Cramp of limb    Prediabetes    Diverticulosis of colon    Elevated liver enzymes    Eustachian tube dysfunction    Foot drop    Gastroesophageal reflux disease without esophagitis    Hereditary and idiopathic peripheral neuropathy    Hypersomnia    Internal hemorrhoids    Iron deficiency    Lumbar radiculopathy, chronic    Malaise and fatigue    Metabolic syndrome    Medicare annual wellness visit, subsequent    Encounter for neuropsychological testing    Mixed  "hyperlipidemia    Moderate episode of recurrent major depressive disorder    Obstructive sleep apnea    Osteoarthritis of right temporomandibular joint    Osteopenia of left lower leg    Panic disorder    PTSD (post-traumatic stress disorder)    RLS (restless legs syndrome)    Primary insomnia    Sinus drainage    Tinnitus    Urge incontinence    Urinary urgency    Vitamin D deficiency    Postmenopausal symptoms    Postmenopausal osteoporosis    Right leg pain    Edema of right lower extremity    Suicidal ideations    Recurrent UTI (urinary tract infection)    Acute cystitis without hematuria    Bilateral flank pain    Incomplete emptying of bladder    Osteoarthritis of left AC (acromioclavicular) joint    Tear of left glenoid labrum    Infraspinatus tendon partial tear, left, initial encounter    Supraspinatus tendonitis with fraying, left    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    DDD (degenerative disc disease), lumbar    Chronic midline low back pain with bilateral sciatica    Orthostasis    Essential tremor    Age-related cognitive decline    Suicidal ideation    Severe episode of recurrent major depressive disorder, without psychotic features    Constipation    Allergic rhinitis    MDD (major depressive disorder)    Dysthymia       Allergy:   Allergies   Allergen Reactions    Chlorzoxazone Unknown - High Severity and Swelling     Lorzone, muscle relaxant.    Iodine Anaphylaxis     Topical makes her swell  Anaphylaxis with IV contrast (when she was ~ 9yrs old)    Phenazopyridine Hcl Swelling and Anaphylaxis    Pyridium [Phenazopyridine] Anaphylaxis    Quinine Unknown - High Severity     Has malaria symptoms    Valproic Acid Other (See Comments)     Liver failure  Liver failure  Liver failure; lupus like symptoms and liver failure    Methocarbamol Other (See Comments)     Made her feel \"suicidal\" and gave her less control over her actions.    Iodinated Contrast Media Unknown - Low Severity    Codeine " Itching    Diphenhydramine Anxiety     Pt has severe panic attack symptoms when given benadryl IV. Needs to take a benzodiazapine med concurrently when getting benadryl.         Current Medications:   Current Outpatient Medications   Medication Sig Dispense Refill    estradiol (ESTRACE) 0.1 MG/GM vaginal cream Insert 2 g into the vagina 3 (Three) Times a Week. Mondays, Wednesdays and Fridays  Indications: Bladder Inflammation      fluticasone (FLONASE) 50 MCG/ACT nasal spray 2 sprays into the nostril(s) as directed by provider Daily.      hydrOXYzine (ATARAX) 10 MG tablet Take 1 tablet by mouth 3 (Three) Times a Day As Needed for Itching or Anxiety. Indications: Feeling Anxious      ibandronate (BONIVA) 150 MG tablet Take 1 tablet by mouth Every 30 (Thirty) Days. 3 tablet 3    midodrine (PROAMATINE) 5 MG tablet Take 1 tablet by mouth 3 (Three) Times a Day Before Meals. Indications: Blood Pressure Drop Upon Standing      nitrofurantoin, macrocrystal-monohydrate, (MACROBID) 100 MG capsule Take 1 capsule by mouth 2 (Two) Times a Day. Indications: Prevention of Frequently Occuring Urinary Tract Infections      pantoprazole (PROTONIX) 40 MG EC tablet Take 1 tablet by mouth Daily. Indications: Gastroesophageal Reflux Disease      FLUoxetine (PROzac) 40 MG capsule Take 1 capsule by mouth Daily.      mirtazapine (REMERON) 15 MG tablet TAKE 1 TABLET BY MOUTH EVERY NIGHT FOR MAJOR DEPRESSION 30 tablet 0     No current facility-administered medications for this visit.     Review of Systems   Constitutional:  Negative for activity change, appetite change, unexpected weight gain and unexpected weight loss.   Respiratory:  Negative for shortness of breath.    Cardiovascular:  Negative for chest pain.   Psychiatric/Behavioral:  Positive for suicidal ideas, depressed mood and stress. Negative for sleep disturbance. The patient is nervous/anxious.      Physical Exam  Vitals reviewed.   Constitutional:       General: She is not in  "acute distress.     Appearance: Normal appearance.   Neurological:      Mental Status: She is alert.      Gait: Gait normal.     Vitals:   Blood pressure 118/78, pulse 67, height 160 cm (63\"), weight 64.7 kg (142 lb 9.6 oz), SpO2 98%.    Mental Status Exam:   Hygiene:   good  Cooperation:  Cooperative  Eye Contact:  Good  Psychomotor Behavior:  Appropriate  Affect:  Full range and Appropriate  Mood: normal  Hopelessness: Denies  Speech:  Normal and Talkative  Thought Process:  Goal directed and Linear  Thought Content:  Mood congruent  Suicidal:  Suicidal Ideation and without plan or intent  Homicidal:  None  Hallucinations:  None  Delusion:  None  Memory:  Intact  Orientation:  Person, Place, Time, and Situation  Reliability:  good  Insight:  Good  Judgement:  Good  Impulse Control:  Good    Assessment & Plan   Problems Addressed this Visit          Mental Health    Suicidal ideation (Chronic)     Other Visit Diagnoses       Major depressive disorder, recurrent episode, moderate    -  Primary    Generalized anxiety disorder              Diagnoses         Codes Comments    Major depressive disorder, recurrent episode, moderate    -  Primary ICD-10-CM: F33.1  ICD-9-CM: 296.32     Generalized anxiety disorder     ICD-10-CM: F41.1  ICD-9-CM: 300.02     Suicidal ideation     ICD-10-CM: R45.851  ICD-9-CM: V62.84             Visit Diagnoses:    ICD-10-CM ICD-9-CM   1. Major depressive disorder, recurrent episode, moderate  F33.1 296.32   2. Generalized anxiety disorder  F41.1 300.02   3. Suicidal ideation  R45.851 V62.84     Zo presents today to discuss medication regimen. Requesting refill of Mirapex for tremors. Has requested refill from PCP, but has not been able to obtain refill. Has upcoming appointment with Dr. Osuna to discuss ECT on Monday (3/25/24). Encouraged her to keep follow up appointment with him and also discuss Mirapex as medication was discontinued during her admission on 2/22/24. Will continue " with Prozac 40 mg daily and Hydroxyzine 10 mg twice daily as needed. Denies adverse effects of current medication regimen.      -Continue Prozac 40 mg daily for depression and anxiety.   -Continue hydroxyzine 10 mg twice daily as needed for panic/anxiety    -Reviewed previous available documentation and most recent available labs. YOSHI reviewed and is appropriate.    GOALS:  Short Term Goals: Patient will be compliant with medication, and patient will have no significant medication related side effects.  Patient will be engaged in psychotherapy as indicated.  Patient will report subjective improvement of symptoms.  Long term goals: To stabilize mood and treat/improve subjective symptoms, the patient will stay out of the hospital, the patient will be at an optimal level of functioning, and the patient will take all medications as prescribed.  The patient/guardian verbalized understanding and agreement with goals that were mutually set.    TREATMENT PLAN: Continue supportive psychotherapy efforts and medications as indicated for patient's diagnosis.  Pharmacological and Non-Pharmacological treatment options discussed during today's visit. Patient/Guardian acknowledged and verbally consented with current treatment plan and was educated on the importance of compliance with treatment and follow-up appointments.      MEDICATION ISSUES:  Discussed medication options and treatment plan of prescribed medication as well as the risks, benefits, any black box warnings, and side effects including potential falls, possible impaired driving, and metabolic adversities among others. Patient is agreeable to call the office with any worsening of symptoms or onset of side effects, or if any concerns or questions arise.  The contact information for the office is made available to the patient. Patient is agreeable to call 911 or go to the nearest ER should they begin having any SI/HI, or if any urgent concerns arise. No medication side  effects or related complaints today.     MEDS ORDERED DURING VISIT:  No orders of the defined types were placed in this encounter.      FOLLOW UP:  Return if symptoms worsen or fail to improve, for Next scheduled follow up.             This document has been electronically signed by TRACI Khan  April 3, 2024 21:34 EDT    Please note that portions of this note were completed with a voice recognition program. Efforts were made to edit dictation, but occasionally words are mistranscribed

## 2024-03-25 ENCOUNTER — TELEPHONE (OUTPATIENT)
Dept: FAMILY MEDICINE CLINIC | Facility: CLINIC | Age: 59
End: 2024-03-25
Payer: MEDICARE

## 2024-03-25 ENCOUNTER — OFFICE VISIT (OUTPATIENT)
Dept: PSYCHIATRY | Facility: CLINIC | Age: 59
End: 2024-03-25
Payer: MEDICARE

## 2024-03-25 VITALS
SYSTOLIC BLOOD PRESSURE: 145 MMHG | BODY MASS INDEX: 24.1 KG/M2 | WEIGHT: 136 LBS | HEIGHT: 63 IN | HEART RATE: 80 BPM | DIASTOLIC BLOOD PRESSURE: 85 MMHG

## 2024-03-25 DIAGNOSIS — F33.41 RECURRENT MAJOR DEPRESSIVE DISORDER, IN PARTIAL REMISSION: Primary | ICD-10-CM

## 2024-03-25 RX ORDER — FLUOXETINE HYDROCHLORIDE 40 MG/1
40 CAPSULE ORAL DAILY
Start: 2024-03-25

## 2024-03-25 NOTE — TELEPHONE ENCOUNTER
Caller: Zo Palma    Relationship: Self    Best call back number: [phone]     Requested Prescriptions: PIRAPEX 15 MG     [facility]. [address] [phone] [facility] [phone] [facility]     Last office visit with prescribing clinician: 1/29/2024   Last telemedicine visit with prescribing clinician: Visit date not found   Next office visit with prescribing clinician: 4/11/2024     Additional details provided by patient:     Does the patient have less than a 3 day supply:  [] Yes  [x] No    Would you like a call back once the refill request has been completed: [x] Yes [] No    If the office needs to give you a call back, can they leave a voicemail: [x] Yes [] No    Shena Power Rep   03/25/24 15:50 EDT          Patient

## 2024-03-25 NOTE — TELEPHONE ENCOUNTER
Caller: Zo Palma    Relationship to patient: Self    Best call back number: [phone]     PATIENT IS CALLING TO CHECK THE STATUS OF HER REFERRAL TO A NEUROLOGIST.

## 2024-03-25 NOTE — PROGRESS NOTES
"Patient ID: Zo Palma is a 59 y.o. female    SERVICE TYPE: EVALUATION AND MANAGEMENT (greater than 50% of the time spent for supportive psychotherapy).      /85   Pulse 80   Ht 160 cm (62.99\")   Wt 61.7 kg (136 lb)   BMI 24.10 kg/m²     ALLERGIES:  Chlorzoxazone, Iodine, Phenazopyridine hcl, Pyridium [phenazopyridine], Quinine, Valproic acid, Methocarbamol, Iodinated contrast media, Codeine, and Diphenhydramine    CC/ Focus of the visit: depression    HPI:     Patient hospitalized here February 13 to February 22, 2024 receiving 3 bifrontal ECT treatments discharged on Prozac 40 mg and mirtazapine 15 mg daily.  Patient had responded well with the ECT and it seems that she is done well since then but was back today wondering if additional ECT would enhance her current status. Cross sectionally her affect was appropriate. Patient has taken action on her adverse financial situations hiring attorneys to address her 2 major debt issues.  She is recently experienced improved interest and energy and perhaps some degree of insomnia.  This perhaps represents a mild hypomanic state but patient does not have a convincing history of a bipolar disorder. 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  Her dog was obviously very well-trained.  Patient ask about the Miraplix that had been d/c during her recent hospitalization - she had been Rx'ed for familiar tremor not as an adjunct for the antidepressants as originally thought.     Patient advised ECT best held in reserve for now, might well be an option in the future if she were to become more seriously depressed. She seemed to accept this and was planning to continue with her current medication as Rx'ed by Merari AVILES.      PFSH: Continues to live with her friend seems to be a symbiotic relationship benefiting both.    Review of Systems  No cardiopulmonary, GI or " neurological complaints.    SUPPORTIVE PSYCHOTHERAPY: continuing efforts to promote the therapeutic alliance, address the patient’s issues, and strengthen self awareness, insights, and positive coping skills such as Exercising, listen to music, spending time in nature and utilizing resources.     Mental Status Exam  Appearance:  appropriate, well-groomed.  Attitude toward clinician:  cooperative and agreeable   Speech:    Rate:  regular rate and rhythm   Volume: normal  Motor:  no abnormal movements   Mood:  Good  Affect:  euthymic  Thought Processes:  linear, logical, and goal directed  Thought Content:  Normal   Feeling Hopeless: absent  Suicidal Thoughts or Intent:  patient states she occasionally experiences a fleeting thought of harming herself but not convection to do so.   Homicidal Thoughts:  absent  Perceptual Disturbance: no perceptual disturbance  Attention and Concentration:  good  Insight and Judgement:  good  Memory:  memory appears to be intact    LABS:   Recent Results (from the past 168 hour(s))   ECG 12 Lead    Collection Time: 03/19/24  2:01 PM   Result Value Ref Range    QT Interval 416 ms    QTC Interval 411 ms   Comprehensive Metabolic Panel    Collection Time: 03/19/24  2:07 PM    Specimen: Blood   Result Value Ref Range    Glucose 78 65 - 99 mg/dL    BUN 8 6 - 20 mg/dL    Creatinine 0.79 0.57 - 1.00 mg/dL    Sodium 141 136 - 145 mmol/L    Potassium 4.0 3.5 - 5.2 mmol/L    Chloride 104 98 - 107 mmol/L    CO2 25.2 22.0 - 29.0 mmol/L    Calcium 9.6 8.6 - 10.5 mg/dL    Total Protein 7.6 6.0 - 8.5 g/dL    Albumin 4.5 3.5 - 5.2 g/dL    ALT (SGPT) 21 1 - 33 U/L    AST (SGOT) 19 1 - 32 U/L    Alkaline Phosphatase 88 39 - 117 U/L    Total Bilirubin 0.3 0.0 - 1.2 mg/dL    Globulin 3.1 gm/dL    A/G Ratio 1.5 g/dL    BUN/Creatinine Ratio 10.1 7.0 - 25.0    Anion Gap 11.8 5.0 - 15.0 mmol/L    eGFR 86.3 >60.0 mL/min/1.73       MEDICATION ISSUES: Have discussed with the patient the medications Risks,  Benefits, and Side effects including potential falls, possible impaired driving and  metabolic adversities among others. No medication side effects or related complaints today.     TREATMENT PLAN/GOALS: Continue supportive psychotherapy efforts and medications as indicated.     Current Outpatient Medications   Medication Sig Dispense Refill    estradiol (ESTRACE) 0.1 MG/GM vaginal cream Insert 2 g into the vagina 3 (Three) Times a Week. Mondays, Wednesdays and Fridays  Indications: Bladder Inflammation      FLUoxetine (PROzac) 40 MG capsule Take 1 capsule by mouth Daily.      fluticasone (FLONASE) 50 MCG/ACT nasal spray 2 sprays into the nostril(s) as directed by provider Daily.      hydrOXYzine (ATARAX) 10 MG tablet Take 1 tablet by mouth 3 (Three) Times a Day As Needed for Itching or Anxiety. Indications: Feeling Anxious      ibandronate (BONIVA) 150 MG tablet Take 1 tablet by mouth Every 30 (Thirty) Days. 3 tablet 3    midodrine (PROAMATINE) 5 MG tablet Take 1 tablet by mouth 3 (Three) Times a Day Before Meals. Indications: Blood Pressure Drop Upon Standing      nitrofurantoin, macrocrystal-monohydrate, (MACROBID) 100 MG capsule Take 1 capsule by mouth 2 (Two) Times a Day. Indications: Prevention of Frequently Occuring Urinary Tract Infections      pantoprazole (PROTONIX) 40 MG EC tablet Take 1 tablet by mouth Daily. Indications: Gastroesophageal Reflux Disease      mirtazapine (REMERON) 15 MG tablet Take 1 tablet by mouth Every Night. Indications: Major Depressive Disorder (Patient not taking: Reported on 3/25/2024) 30 tablet 0     No current facility-administered medications for this visit.       COLLATERAL PSYCHOTHERAPEUTIC INTERVENTION: continues with Mely Tate LCSW.     RISK:  moderate    Assessment & Plan     Diagnoses and all orders for this visit:    1. Recurrent major depressive disorder, in partial remission (Primary)  -     FLUoxetine (PROzac) 40 MG capsule; Take 1 capsule by mouth Daily.    Patient  has stopped taking the mirtazapine for fear of weight gain.    No follow-ups on file.  Patient to follow-up with her providers in [facility] the [facility].           Patient knows to call if symptoms worsen or fail to improve between appointments.    I spent 30 minutes caring for Zo on this date of service. This time includes time spent by me in the following activities: Patient evaluation, support psychotherapy, decisions, medications, and documentation.     Dictated utilizing Dragon dictation    PELON Osuna MD

## 2024-03-27 RX ORDER — MIRTAZAPINE 15 MG/1
TABLET, FILM COATED ORAL
Qty: 30 TABLET | Refills: 0 | Status: SHIPPED | OUTPATIENT
Start: 2024-03-27

## 2024-03-28 DIAGNOSIS — G25.0 ESSENTIAL TREMOR: Primary | ICD-10-CM

## 2024-04-01 RX ORDER — HYDROXYZINE HYDROCHLORIDE 10 MG/1
10 TABLET, FILM COATED ORAL 3 TIMES DAILY PRN
Start: 2024-04-01

## 2024-04-03 ENCOUNTER — OFFICE VISIT (OUTPATIENT)
Dept: PSYCHIATRY | Facility: CLINIC | Age: 59
End: 2024-04-03
Payer: MEDICARE

## 2024-04-03 DIAGNOSIS — F41.1 GENERALIZED ANXIETY DISORDER: ICD-10-CM

## 2024-04-03 DIAGNOSIS — F33.2 SEVERE EPISODE OF RECURRENT MAJOR DEPRESSIVE DISORDER, WITHOUT PSYCHOTIC FEATURES: Primary | ICD-10-CM

## 2024-04-03 DIAGNOSIS — F43.10 POST TRAUMATIC STRESS DISORDER (PTSD): ICD-10-CM

## 2024-04-03 DIAGNOSIS — F41.0 PANIC ATTACKS: ICD-10-CM

## 2024-04-03 NOTE — PROGRESS NOTES
Date: 04/03/2024   Time In: 903  Time Out: 1000    PROGRESS NOTE  Data:  Zo Palma is a 59 y.o. female who presents today for individual therapy session at [facility]. Chief Complaint: depression, anxiety and PTSD    Patient presents to session with her roommate. Patient reports increased anxiety when going to clerks office, court or being around police. She reports this triggers memories from previous legal issue with family. She reports feeling chest pain like she is heaving a heart attack and reports being unable to think clearly.. She reports concerns about possible bipolar disorder. She reports having periods of increased energy, decreased need for sleep, pressured speech and going 3-4 days without sleep. She reports longest she has gone without sleep was 21 days. She reports difficulty with spending money. She reports then having 2-3 weeks of feeling depressed. Roommate reports that patient appears to be agoraphobic and has problems with spending money. Patient reports making decision to allow children to make financial decisions for her and that this has made her feel a loss of control and that she feels incapable. Patient reports finding medication two days ago and having the thought of taking them all. Patient and roommate reports making the decision to lock them up in his safe so she does not have access. Patient denies having any plans at this time and no intent to act on those thoughts.      Clinical Maneuvering/Intervention:    Assisted patient in processing above session content; acknowledged and normalized patient’s thoughts, feelings, and concerns.  Rationalized patient thought process regarding increased anxiety and trauma responses.  Discussed triggers associated with patient's anxiety.  Also discussed coping skills for patient to implement such as challenging thoughts associated with anxiety. Reviewed the cognitive model. Patient and roommate have safety plan in place so  patient does not have access to medications and will go to emergency room if thoughts return.     Allowed patient to freely discuss issues without interruption or judgment. Provided safe, confidential environment to facilitate the development of positive therapeutic relationship and encourage open, honest communication. Assisted patient in identifying risk factors which would indicate the need for higher level of care including thoughts to harm self or others and/or self-harming behavior and encouraged patient to contact this office, call 911, or present to the nearest emergency room should any of these events occur. Discussed crisis intervention services and means to access. Patient adamantly and convincingly denies current suicidal or homicidal ideation or perceptual disturbance.    Assessment   Patient appears to maintain relative stability as compared to their baseline.  However, patient continues to struggle with depression and anxiety which continues to cause impairment in important areas of functioning.  As a result, they can be reasonably expected to continue to benefit from treatment and would likely be at increased risk for decompensation otherwise.    Mental Status Exam:   Hygiene:   good  Cooperation:  Cooperative  Eye Contact:  Good  Psychomotor Behavior:  Appropriate  Affect:  Appropriate  Mood: depressed and anxious  Speech:  Normal  Thought Process:  Goal directed and Linear  Thought Content:  Mood congruent  Suicidal:  Suicidal Ideation and no active plan or intent  Homicidal:  None  Hallucinations:  None  Delusion:  None  Memory:  Intact  Orientation:  Person, Place, Time, and Situation  Reliability:  good  Insight:  Good  Judgement:  Fair  Impulse Control:  Fair  Physical/Medical Issues:  Yes        Patient's Support Network Includes:  children and friend    Functional Status: Moderate impairment     Progress toward goal: Not at goal    Prognosis: Good with Ongoing Treatment          Plan      VISIT DIAGNOSIS:     ICD-10-CM ICD-9-CM   1. Severe episode of recurrent major depressive disorder, without psychotic features  F33.2 296.33   2. Generalized anxiety disorder  F41.1 300.02   3. Post traumatic stress disorder (PTSD)  F43.10 309.81   4. Panic attacks  F41.0 300.01        Patient will continue in individual outpatient therapy with focus on improved functioning and coping skills, maintaining stability, and avoiding decompensation and the need for higher level of care.    Patient will adhere to medication regimen as prescribed and report any side effects. Patient will contact this office, call 911 or present to the nearest emergency room should suicidal or homicidal ideations occur. Provide Cognitive Behavioral Therapy and Solution Focused Therapy to improve functioning, maintain stability, and avoid decompensation and the need for higher level of care.     Return in about Return in about 2 weeks (around 4/17/2024). or earlier if symptoms worsen or fail to improve.            This document has been electronically signed by Mely Tate LCSW  April 3, 2024 09:03 EDT      Part of this note may be an electronic transcription/translation of spoken language to printed text using the Dragon Dictation System.

## 2024-04-04 ENCOUNTER — OFFICE VISIT (OUTPATIENT)
Dept: NEUROLOGY | Facility: CLINIC | Age: 59
End: 2024-04-04
Payer: MEDICARE

## 2024-04-04 VITALS
DIASTOLIC BLOOD PRESSURE: 80 MMHG | WEIGHT: 139 LBS | OXYGEN SATURATION: 100 % | SYSTOLIC BLOOD PRESSURE: 122 MMHG | HEIGHT: 63 IN | TEMPERATURE: 98.2 F | BODY MASS INDEX: 24.63 KG/M2 | HEART RATE: 64 BPM

## 2024-04-04 DIAGNOSIS — R53.1 WEAKNESS: ICD-10-CM

## 2024-04-04 DIAGNOSIS — R25.1 LIMB TREMOR: ICD-10-CM

## 2024-04-04 DIAGNOSIS — R25.1 TREMOR OF BOTH HANDS: Primary | ICD-10-CM

## 2024-04-04 RX ORDER — PRAMIPEXOLE DIHYDROCHLORIDE 0.5 MG/1
TABLET ORAL
COMMUNITY
Start: 2024-02-08 | End: 2024-04-04

## 2024-04-04 RX ORDER — PRIMIDONE 50 MG/1
50 TABLET ORAL NIGHTLY
Qty: 90 TABLET | Refills: 1 | Status: SHIPPED | OUTPATIENT
Start: 2024-04-04

## 2024-04-04 NOTE — PROGRESS NOTES
"     New Patient Office Visit      Patient Name: Zo Palma  : 1965   MRN: [id number]     Referring Physician: Evan Rivas DO    Chief Complaint:    Chief Complaint   Patient presents with    Establish Care     Tremor; Left hand tremors as well as right hand spasm       History of Present Illness: Zo Palma is a 59 y.o. female who is here today to establish care with Neurology.  She is accompanied to the office visit today by her service dog.  She says she has hand tremors in her hands and legs that are intermittent for years and says the left hand has become worse and more continuous and worse when she is tired, upset or anxious. She says it worsens \"makes the left hand go all over the place\". She says being anxious will make her voice tremor as well. She says her grandfather had parkinson's but nobody in the family with tremors. She says in the past she was on Mirapex and feels it helped when she first started but once she got up to 1 mg it was no longer effective.    Denies falls.  She does note weakness in her lower extremities.  Reports her gait is off as she has one leg shorter than the other and has built up orthotics in her shoes for this.  She does not use any type of assistive device.  Denies any drooling.  Denies any choking or coughing with eating or drinking.  She does have some visual disturbances and says she has had blurry eyes for years but she is taking vitamins and this has made it better.  All of her eye exams have been normal per her report.  She is right hand dominant. She says she drops things at times but denies any tingling or numbness \"I typed for years and I don't have any more carpal tunnel than thew next person\"  -CT of the head without contrast on 2024 was noncontributory    Pertinent Medical History: bradycardia. Orthostatic HTN, anxiety, tremors    Subjective      Review of Systems:   Review of Systems   Neurological:  Positive for tremors.       Past Medical " "History:   Past Medical History:   Diagnosis Date    Ankle sprain     Anxiety     Arthritis of back     Arthritis of neck     Bursitis of hip     Cervical disc disorder     Conversion disorder     Depression     Fracture of wrist     Fracture, finger     Fracture, foot     Frozen shoulder     GERD (gastroesophageal reflux disease)     Hip arthrosis     Hormone disorder     Hyperlipidemia     Insomnia     Interstitial cystitis     Kidney stone     Liver failure     due to depakote    Lumbosacral disc disease     Lupus     Migraines     Orthostatic hypotension     Periarthritis of shoulder     Peripheral neuropathy     Rotator cuff syndrome     Scoliosis 11/2023    Seizures 2001    Conversion Disorder    Subluxation of patella     Suicidal thoughts     Suicide attempt     \"I tried overdosing\"  32 antihistamine tablets per pt 1/27/24    Urinary tract infection        Past Surgical History:   Past Surgical History:   Procedure Laterality Date    BARIATRIC SURGERY  2008    Gastric sleeve    BLADDER SURGERY      BREAST SURGERY Bilateral     reduction    CHOLECYSTECTOMY  2010    COLONOSCOPY      CYSTOSCOPY      ELECTROCONVULSIVE THERAPY Bilateral 2/15/2024    Procedure: BIFRONTAL ELECTROCONVULSIVE THERAPY;  Surgeon: Vinod Osuna MD;  Location: [facility] OR;  Service: ECT;  Laterality: Bilateral;    ELECTROCONVULSIVE THERAPY N/A 2/19/2024    Procedure: ELECTROCONVULSIVE THERAPY;  Surgeon: Vinod Osuna MD;  Location: [facility] OR;  Service: ECT;  Laterality: N/A;    ELECTROCONVULSIVE THERAPY N/A 2/21/2024    Procedure: ELECTROCONVULSIVE THERAPY;  Surgeon: Vinod Osuna MD;  Location: [facility] OR;  Service: ECT;  Laterality: N/A;    FOOT SURGERY      FRACTURE SURGERY      Left wrist    GASTRIC SLEEVE LAPAROSCOPIC N/A     HYSTERECTOMY      NECK SURGERY      OOPHORECTOMY      SINUS SURGERY  2021    WISDOM TOOTH EXTRACTION N/A     WRIST SURGERY Left        Family History:   Family History   Problem " Relation Age of Onset    Cancer Mother         Brain, suspected uterine    Rheumatologic disease Mother     Dementia Father     Cancer Father         Prostate    Learning disabilities Father         Dyslexia    Other Father         Early onset alzheimer    Prostate cancer Father     Cancer Brother         Prostate, bone    Learning disabilities Brother         Dyslexia    Other Brother         Alzheimer    Prostate cancer Maternal Grandfather     Other Paternal Grandfather         Early onset alzheimer       Social History:   Social History     Socioeconomic History    Marital status:     Number of children: 2    Highest education level: Master's degree (e.g., MA, MS, Pancho, MEd, MSW, SULY)   Tobacco Use    Smoking status: Never     Passive exposure: Past    Smokeless tobacco: Never   Vaping Use    Vaping status: Never Used   Substance and Sexual Activity    Alcohol use: Yes     Alcohol/week: 1.0 standard drink of alcohol     Types: 1 Drinks containing 0.5 oz of alcohol per week     Comment: Occasionally    Drug use: Never    Sexual activity: Not Currently     Partners: Male     Birth control/protection: Post-menopausal, Hysterectomy       Medications:     Current Outpatient Medications:     estradiol (ESTRACE) 0.1 MG/GM vaginal cream, Insert 2 g into the vagina 3 (Three) Times a Week. Mondays, Wednesdays and Fridays  Indications: Bladder Inflammation, Disp: , Rfl:     FLUoxetine (PROzac) 40 MG capsule, Take 1 capsule by mouth Daily., Disp: , Rfl:     fluticasone (FLONASE) 50 MCG/ACT nasal spray, 2 sprays into the nostril(s) as directed by provider Daily., Disp: , Rfl:     hydrOXYzine (ATARAX) 10 MG tablet, Take 1 tablet by mouth 3 (Three) Times a Day As Needed for Itching or Anxiety. Indications: Feeling Anxious, Disp: , Rfl:     ibandronate (BONIVA) 150 MG tablet, Take 1 tablet by mouth Every 30 (Thirty) Days., Disp: 3 tablet, Rfl: 3    midodrine (PROAMATINE) 5 MG tablet, Take 1 tablet by mouth 3 (Three)  "Times a Day Before Meals. Indications: Blood Pressure Drop Upon Standing, Disp: , Rfl:     mirtazapine (REMERON) 15 MG tablet, TAKE 1 TABLET BY MOUTH EVERY NIGHT FOR MAJOR DEPRESSION, Disp: 30 tablet, Rfl: 0    nitrofurantoin, macrocrystal-monohydrate, (MACROBID) 100 MG capsule, Take 1 capsule by mouth 2 (Two) Times a Day. Indications: Prevention of Frequently Occuring Urinary Tract Infections, Disp: , Rfl:     pantoprazole (PROTONIX) 40 MG EC tablet, Take 1 tablet by mouth Daily. Indications: Gastroesophageal Reflux Disease, Disp: , Rfl:     primidone (MYSOLINE) 50 MG tablet, Take 1 tablet by mouth Every Night., Disp: 90 tablet, Rfl: 1    Allergies:   Allergies   Allergen Reactions    Chlorzoxazone Unknown - High Severity and Swelling     Lorzone, muscle relaxant.    Iodine Anaphylaxis     Topical makes her swell  Anaphylaxis with IV contrast (when she was ~ 9yrs old)    Phenazopyridine Hcl Swelling and Anaphylaxis    Pyridium [Phenazopyridine] Anaphylaxis    Quinine Unknown - High Severity     Has malaria symptoms    Valproic Acid Other (See Comments)     Liver failure  Liver failure  Liver failure; lupus like symptoms and liver failure    Methocarbamol Other (See Comments)     Made her feel \"suicidal\" and gave her less control over her actions.    Iodinated Contrast Media Unknown - Low Severity    Codeine Itching    Diphenhydramine Anxiety     Pt has severe panic attack symptoms when given benadryl IV. Needs to take a benzodiazapine med concurrently when getting benadryl.        Objective     Physical Exam:  Vital Signs:   Vitals:    04/04/24 1038   BP: 122/80   Pulse: 64   Temp: 98.2 °F (36.8 °C)   SpO2: 100%   Weight: 63 kg (139 lb)   Height: 160 cm (63\")   PainSc: 0-No pain     Body mass index is 24.62 kg/m².     Physical Exam  Constitutional:       Appearance: Normal appearance.   HENT:      Head: Normocephalic.   Eyes:      Extraocular Movements: EOM normal.      Conjunctiva/sclera: Conjunctivae normal. "   Pulmonary:      Effort: Pulmonary effort is normal.   Musculoskeletal:         General: Normal range of motion.   Skin:     General: Skin is warm and dry.   Neurological:      General: No focal deficit present.      Mental Status: She is alert and oriented to person, place, and time.      Cranial Nerves: Cranial nerves 2-12 are intact. No dysarthria.      Motor: Motor function is intact. Tremor present. No pronator drift.      Coordination: Coordination is intact. Romberg sign negative. Finger-Nose-Finger Test and Romberg Test normal.      Gait: Gait is intact. Tandem walk normal.      Deep Tendon Reflexes:      Reflex Scores:       Bicep reflexes are 2+ on the right side and 2+ on the left side.       Brachioradialis reflexes are 2+ on the right side and 2+ on the left side.       Patellar reflexes are 2+ on the right side and 2+ on the left side.     Comments: She is able to go from sitting to standing on first attempt.  She has antalgic gait.  She is not bradykinetic.  Able to complete a turn in 1 step.  Fine tremor to left outstretched hand   Psychiatric:         Attention and Perception: Attention normal.         Mood and Affect: Mood and affect normal.         Speech: Speech normal.         Behavior: Behavior normal. Behavior is cooperative.         Thought Content: Thought content normal.         Cognition and Memory: Cognition normal.         Judgment: Judgment normal.         Neurologic Exam     Mental Status   Oriented to person, place, and time.   Attention: normal. Concentration: normal.   Speech: speech is normal   Level of consciousness: alert  Knowledge: good.     Cranial Nerves   Cranial nerves II through XII intact.     CN III, IV, VI   Extraocular motions are normal.   Nystagmus: none     CN VII   Facial expression full, symmetric.     CN VIII   Hearing: intact    CN IX, X   Palate: symmetric    CN XI   Right sternocleidomastoid strength: normal  Left sternocleidomastoid strength: normal    CN  XII   Tongue: not atrophic  Fasciculations: absent  Tongue deviation: none    Motor Exam   Muscle bulk: normal  Overall muscle tone: normal  Right arm pronator drift: absent  Left arm pronator drift: absent    Strength   Right deltoid: 4/5  Left deltoid: 4/5  Right biceps: 5/5  Left biceps: 4/5  Right triceps: 4/5  Left triceps: 4/5  Right quadriceps: 4/5  Left quadriceps: 4/5  Right hamstrin/5  Left hamstrin/5  Right anterior tibial: 4/5  Left anterior tibial: 4/5  Right posterior tibial: 4/5  Left posterior tibial: 4/5    Gait, Coordination, and Reflexes     Gait  Gait: normal    Coordination   Romberg: negative  Finger to nose coordination: normal  Tandem walking coordination: normal    Tremor   Resting tremor: absent  Intention tremor: present  Action tremor: left arm    Reflexes   Right brachioradialis: 2+  Left brachioradialis: 2+  Right biceps: 2+  Left biceps: 2+  Right patellar: 2+  Left patellar: 2+      Assessment / Plan      Assessment/Plan:   Diagnoses and all orders for this visit:    1. Tremor of both hands (Primary)  -     MRI Brain With & Without Contrast; Future  -     primidone (MYSOLINE) 50 MG tablet; Take 1 tablet by mouth Every Night.  Dispense: 90 tablet; Refill: 1    2. Limb tremor  -     MRI Brain With & Without Contrast; Future    3. Weakness  -     MRI Brain With & Without Contrast; Future       This is a pleasant 59-year-old female patient here to establish care for tremors in her hands and notes that she at times has them in her legs and voice.  Fine tremor to her left outstretched hand noted.  Patient would like to try primidone.  She notes that she was on pramipexole and says when she was on the low dose it seemed to help but then they tapered the dose up to 1 mg and was ineffective so she is no longer taking this.  MRI of the brain with and without contrast has been ordered to rule out any demyelinating disease as well as consideration of any structural or vascular abnormality  which may be contributing to her symptoms.  Primidone 50 mg nightly has been prescribed.  Patient will start with half a tablet x 15 days and then may increase to a whole tab as tolerated.  Indications and side effects discussed with patient she verbalizes understanding.  Patient will call in the interim if she has any questions or concerns or changes.    Follow Up:   Return in about 3 months (around 7/4/2024).    TRACI Reyes, FNP-BC  [facility]

## 2024-04-05 ENCOUNTER — PATIENT ROUNDING (BHMG ONLY) (OUTPATIENT)
Dept: NEUROLOGY | Facility: CLINIC | Age: 59
End: 2024-04-05
Payer: MEDICARE

## 2024-04-13 ENCOUNTER — HOSPITAL ENCOUNTER (OUTPATIENT)
Dept: MRI IMAGING | Facility: HOSPITAL | Age: 59
Discharge: HOME | End: 2024-04-13
Payer: MEDICARE

## 2024-04-13 DIAGNOSIS — R53.1 WEAKNESS: ICD-10-CM

## 2024-04-13 DIAGNOSIS — R25.1 TREMOR OF BOTH HANDS: ICD-10-CM

## 2024-04-13 DIAGNOSIS — R25.1 LIMB TREMOR: ICD-10-CM

## 2024-04-13 PROCEDURE — A9577 INJ MULTIHANCE: HCPCS | Performed by: NURSE PRACTITIONER

## 2024-04-13 PROCEDURE — 70553 MRI BRAIN STEM W/O & W/DYE: CPT

## 2024-04-13 PROCEDURE — 0 GADOBENATE DIMEGLUMINE 529 MG/ML SOLUTION: Performed by: NURSE PRACTITIONER

## 2024-04-13 RX ADMIN — GADOBENATE DIMEGLUMINE 13 ML: 529 INJECTION, SOLUTION INTRAVENOUS at 17:35

## 2024-04-16 DIAGNOSIS — R90.89 ABNORMAL FINDING ON MRI OF BRAIN: Primary | ICD-10-CM

## 2024-04-19 ENCOUNTER — OFFICE VISIT (OUTPATIENT)
Dept: NEUROSURGERY | Facility: CLINIC | Age: 59
End: 2024-04-19
Payer: MEDICARE

## 2024-04-19 VITALS — HEIGHT: 63 IN | BODY MASS INDEX: 24.63 KG/M2 | WEIGHT: 139 LBS

## 2024-04-19 DIAGNOSIS — R90.89 ABNORMAL FINDING ON MRI OF BRAIN: Primary | ICD-10-CM

## 2024-04-19 PROCEDURE — 99203 OFFICE O/P NEW LOW 30 MIN: CPT | Performed by: NEUROLOGICAL SURGERY

## 2024-04-19 NOTE — PROGRESS NOTES
Subjective     Chief Complaint: Abnormal brain MRI    Patient ID: Zo Palma is a 59 y.o. female seen for consultation today at the request of  TRACI Graham    History of Present Illness    This is a 59-year-old woman who is under the care of the [facility] neurology group.  She presented with chief complaints of tremor in her left upper and left lower extremities.  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  Referral to my clinic was accordingly established.    She denies any headaches, nausea, vomiting, strokelike symptoms, or seizures.    The following portions of the patient's history were reviewed and updated as appropriate: allergies, current medications, past family history, past medical history, past social history, past surgical history and problem list.    Family history:   Family History   Problem Relation Age of Onset    Cancer Mother         Brain, suspected uterine    Rheumatologic disease Mother     Dementia Father     Cancer Father         Prostate    Learning disabilities Father         Dyslexia    Other Father         Early onset alzheimer    Prostate cancer Father     Cancer Brother         Prostate, bone    Learning disabilities Brother         Dyslexia    Other Brother         Alzheimer    Prostate cancer Maternal Grandfather     Other Paternal Grandfather         Early onset alzheimer    Depression Son     Learning disabilities Son        Social history:   Social History     Socioeconomic History    Marital status:     Number of children: 2    Highest education level: Master's degree (e.g., MA, MS, Pancho, MEd, MSW, SULY)   Tobacco Use    Smoking status: Never     Passive exposure: Past    Smokeless tobacco: Never   Vaping Use    Vaping status: Never Used   Substance and Sexual Activity    Alcohol use: Yes     Alcohol/week: 1.0 standard drink of alcohol     Types: 1 Drinks containing 0.5 oz of alcohol per week  "    Comment: Occasionally    Drug use: Never    Sexual activity: Not Currently     Partners: Male     Birth control/protection: Post-menopausal, Hysterectomy       Review of Systems    Objective   Height 160 cm (63\"), weight 63 kg (139 lb).  Body mass index is 24.62 kg/m².    Physical Exam  Constitutional:       General: She is not in acute distress.     Appearance: She is well-developed. She is not diaphoretic.   HENT:      Head: Normocephalic and atraumatic.   Pulmonary:      Effort: Pulmonary effort is normal.   Skin:     General: Skin is warm and dry.   Neurological:      Mental Status: She is alert and oriented to person, place, and time.      Cranial Nerves: No cranial nerve deficit.      Comments: She is alert, pleasant, conversant, and appropriate.  She is a good historian.  Speech production is fluent with no paraphasic errors.  Casual gait is unremarkable.         Assessment & Plan     Independent Review of Radiographic Studies:      Available for my review is a MRI of the brain with and without contrast that was performed on 4/13/2024.  There is a linear focus of enhancement in the right frontal lobe which likely represents a small cortical vein.  Otherwise, the MRI of the brain is unremarkable.  There is no hemosiderin to suggest remote hemorrhage.  There are no significant foci of T2/FLAIR signal prolongation.  Other than the cortical vein mentioned above, there are no abnormal foci of enhancement.    Medical Decision Making:      This is a 59-year-old woman with a prominent cortical vein.  This is not pathological and there is no indication for additional workup or imaging.  She can follow-up with the neurologist to continue the diagnosis and treatment of her tremor.  I be happy to see her back on an as-needed basis for new or worsening symptoms.    I reviewed the signs and symptoms of stroke and seizure with her.  I directed her to contact her neurologist or present to the local emergency department " with any sudden onset neurological symptoms.    Diagnoses and all orders for this visit:    1. Abnormal finding on MRI of brain (Primary)        No follow-ups on file.           This document signed by PELON Mckenna MD April 19, 2024 10:34 EDT

## 2024-04-22 ENCOUNTER — OFFICE VISIT (OUTPATIENT)
Dept: FAMILY MEDICINE CLINIC | Facility: CLINIC | Age: 59
End: 2024-04-22
Payer: MEDICARE

## 2024-04-22 VITALS
SYSTOLIC BLOOD PRESSURE: 116 MMHG | BODY MASS INDEX: 25.83 KG/M2 | RESPIRATION RATE: 16 BRPM | WEIGHT: 145.8 LBS | HEIGHT: 63 IN | HEART RATE: 66 BPM | TEMPERATURE: 97.5 F | OXYGEN SATURATION: 100 % | DIASTOLIC BLOOD PRESSURE: 74 MMHG

## 2024-04-22 DIAGNOSIS — F43.10 PTSD (POST-TRAUMATIC STRESS DISORDER): ICD-10-CM

## 2024-04-22 DIAGNOSIS — M54.16 LUMBAR RADICULOPATHY, CHRONIC: ICD-10-CM

## 2024-04-22 DIAGNOSIS — K21.9 GASTROESOPHAGEAL REFLUX DISEASE WITHOUT ESOPHAGITIS: ICD-10-CM

## 2024-04-22 DIAGNOSIS — F33.41 RECURRENT MAJOR DEPRESSIVE DISORDER, IN PARTIAL REMISSION: ICD-10-CM

## 2024-04-22 DIAGNOSIS — G56.23 ULNAR NEUROPATHY OF BOTH UPPER EXTREMITIES: ICD-10-CM

## 2024-04-22 DIAGNOSIS — G25.0 ESSENTIAL TREMOR: ICD-10-CM

## 2024-04-22 DIAGNOSIS — M47.22 CERVICAL SPONDYLOSIS WITH RADICULOPATHY: Primary | ICD-10-CM

## 2024-04-22 PROCEDURE — 3044F HG A1C LEVEL LT 7.0%: CPT | Performed by: FAMILY MEDICINE

## 2024-04-22 PROCEDURE — 99214 OFFICE O/P EST MOD 30 MIN: CPT | Performed by: FAMILY MEDICINE

## 2024-04-22 RX ORDER — PANTOPRAZOLE SODIUM 40 MG/1
40 TABLET, DELAYED RELEASE ORAL DAILY
Qty: 90 TABLET | Refills: 2 | Status: SHIPPED | OUTPATIENT
Start: 2024-04-22

## 2024-04-22 RX ORDER — MIRTAZAPINE 15 MG/1
15 TABLET, FILM COATED ORAL NIGHTLY
Qty: 90 TABLET | Refills: 2 | Status: SHIPPED | OUTPATIENT
Start: 2024-04-22

## 2024-04-22 RX ORDER — PRIMIDONE 50 MG/1
50 TABLET ORAL NIGHTLY
Qty: 90 TABLET | Refills: 1 | Status: SHIPPED | OUTPATIENT
Start: 2024-04-22

## 2024-04-22 RX ORDER — FLUTICASONE PROPIONATE 50 MCG
2 SPRAY, SUSPENSION (ML) NASAL DAILY
Qty: 9.9 ML | Refills: 3 | Status: SHIPPED | OUTPATIENT
Start: 2024-04-22

## 2024-04-22 RX ORDER — FLUOXETINE HYDROCHLORIDE 40 MG/1
40 CAPSULE ORAL DAILY
Start: 2024-04-22

## 2024-04-22 RX ORDER — HYDROXYZINE HYDROCHLORIDE 10 MG/1
10 TABLET, FILM COATED ORAL 3 TIMES DAILY PRN
Start: 2024-04-22

## 2024-04-22 RX ORDER — IBANDRONATE SODIUM 150 MG/1
150 TABLET, FILM COATED ORAL
Qty: 3 TABLET | Refills: 3 | Status: SHIPPED | OUTPATIENT
Start: 2024-04-22

## 2024-04-22 RX ORDER — NITROFURANTOIN 25; 75 MG/1; MG/1
100 CAPSULE ORAL 2 TIMES DAILY
Qty: 180 CAPSULE | Refills: 2 | Status: SHIPPED | OUTPATIENT
Start: 2024-04-22

## 2024-04-22 NOTE — PROGRESS NOTES
Established Patient        Chief Complaint:   Chief Complaint   Patient presents with    Tremors     Pt stated she is still having hand tremors.   Pt is seeing neuro in a couple months.           Zo Palma is a 59 y.o. female    History of Present Illness:   Answers submitted by the patient for this visit:  Other (Submitted on 4/16/2024)  Please describe your symptoms.: Hospital follow up  Have you had these symptoms before?: Yes  How long have you been having these symptoms?: Greater than 2 weeks  Primary Reason for Visit (Submitted on 4/16/2024)  What is the primary reason for your visit?: Other    Here today in follow-up of continued essential tremor, lumbar and cervical radiculopathy, ulnar neuropathy of the left upper extremity, PTSD and GERD.    Patient is status post ECT and treatment of her PTSD and major depressive disorder.  Patient reports profound improvement to her mood since treatment.  She underwent 3 successful treatments.  Denies SI/HI.  Patient already has an established appointment to be seen by neurology, already established with neurosurgery concerning her cervical disc disease additionally.    Denies chest pain, syncope, palpitations or vertigo.  Denies fever, chills or night sweats.  Maintains an active lifestyle, balanced dietary intake; reports good hydration habits.  Denies hematuria/dysuria.  Denies any BRB/BTS.  No new rashes.  Denies orthopnea, epistaxis or hemoptysis.        Subjective     The following portions of the patient's history were reviewed and updated as appropriate: allergies, current medications, past family history, past medical history, past social history, past surgical history and problem list.    Allergies   Allergen Reactions    Chlorzoxazone Unknown - High Severity and Swelling     Lorzone, muscle relaxant.    Iodine Anaphylaxis     Topical makes her swell  Anaphylaxis with IV contrast (when she was ~ 9yrs old)    Phenazopyridine Hcl  "Swelling and Anaphylaxis    Pyridium [Phenazopyridine] Anaphylaxis    Quinine Unknown - High Severity     Has malaria symptoms    Valproic Acid Other (See Comments)     Liver failure  Liver failure  Liver failure; lupus like symptoms and liver failure    Methocarbamol Other (See Comments)     Made her feel \"suicidal\" and gave her less control over her actions.    Iodinated Contrast Media Unknown - Low Severity    Codeine Itching    Diphenhydramine Anxiety     Pt has severe panic attack symptoms when given benadryl IV. Needs to take a benzodiazapine med concurrently when getting benadryl.        Review of Systems  Constitutional: Negative for fever. Negative for chills, diaphoresis, fatigue and unexpected weight change.   HENT: No dysphagia; no changes to vision/hearing/smell/taste; no epistaxis  Eyes: Negative for redness and visual disturbance.   Respiratory: negative for shortness of breath. Negative for chest pain . Negative for cough and chest tightness.   Cardiovascular: Negative for chest pain and palpitations.   Gastrointestinal: Negative for abdominal distention, abdominal pain and blood in stool.   Endocrine: Negative for cold intolerance and heat intolerance.   Genitourinary: Negative for difficulty urinating, dysuria and frequency.   Musculoskeletal: Chronic arthralgias, back pain and myalgias.   Skin: Negative for color change, rash and wound.   Neurological:  paroxysmal positional vertigo, no focal weakness or unrelenting headache.  Paresthesias to the left upper extremity, radiating down the posterior portion of the left upper arm, throughout the elbow and into the forearm, and particularly noticeable to the fourth and fifth phalanx of the left hand.  Hematological: Negative for adenopathy. Does not bruise/bleed easily.   Psychiatric/Behavioral: Negative for confusion. The patient is not nervous/anxious.  She does report some short-term memory issues, more noticeable than usual.    Objective " "    Physical Exam   Vital Signs: /74   Pulse 66   Temp 97.5 °F (36.4 °C)   Resp 16   Ht 160 cm (63\")   Wt 66.1 kg (145 lb 12.8 oz)   SpO2 100%   BMI 25.83 kg/m²   BMI is within normal parameters. No other follow-up for BMI required.    General Appearance: alert, oriented x 3, no acute distress.  Skin: warm and dry.   HEENT: Atraumatic.  pupils round and reactive to light and accommodation, oral mucosa pink and moist.  Nares patent without epistaxis.  External auditory canals are patent tympanic membranes intact.  Neck: supple, no JVD, trachea midline.  No thyromegaly  Lungs: CTA, unlabored breathing effort.  Heart: RRR, normal S1 and S2, no S3, no rub.  Abdomen: soft, non-tender, no palpable bladder, present bowel sounds to auscultation ×4.  No guarding or rigidity.  Extremities: no clubbing, cyanosis or edema.  Good range of motion actively and passively.  Symmetric muscle strength and development  Neuro: normal speech and mental status.  Cranial nerves II through XII intact.  No anosmia. DTR 2+; proprioception intact.  No focal motor/sensory deficits.    Advance Care Planning   ACP discussion was held with the patient during this visit. Patient has an advance directive in EMR which is still valid.        Assessment and Plan      Assessment/Plan:   Diagnoses and all orders for this visit:    1. Cervical spondylosis with radiculopathy (Primary)    2. Recurrent major depressive disorder, in partial remission  -     FLUoxetine (PROzac) 40 MG capsule; Take 1 capsule by mouth Daily.  -     mirtazapine (REMERON) 15 MG tablet; Take 1 tablet by mouth Every Night.  Dispense: 90 tablet; Refill: 2    3. Tremor of both hands  -     primidone (MYSOLINE) 50 MG tablet; Take 1 tablet by mouth Every Night.  Dispense: 90 tablet; Refill: 1    4. Ulnar neuropathy of both upper extremities    5. Lumbar radiculopathy, chronic    6. Essential tremor    7. PTSD (post-traumatic stress disorder)    8. Gastroesophageal reflux " disease without esophagitis  -     pantoprazole (PROTONIX) 40 MG EC tablet; Take 1 tablet by mouth Daily. Indications: Gastroesophageal Reflux Disease  Dispense: 90 tablet; Refill: 2    Other orders  -     fluticasone (FLONASE) 50 MCG/ACT nasal spray; 2 sprays into the nostril(s) as directed by provider Daily.  Dispense: 9.9 mL; Refill: 3  -     hydrOXYzine (ATARAX) 10 MG tablet; Take 1 tablet by mouth 3 (Three) Times a Day As Needed for Itching or Anxiety. Indications: Feeling Anxious  -     ibandronate (BONIVA) 150 MG tablet; Take 1 tablet by mouth Every 30 (Thirty) Days. Indications: Postmenopausal Osteoporosis  Dispense: 3 tablet; Refill: 3  -     nitrofurantoin, macrocrystal-monohydrate, (MACROBID) 100 MG capsule; Take 1 capsule by mouth 2 (Two) Times a Day. Indications: Prevention of Frequently Occuring Urinary Tract Infections  Dispense: 180 capsule; Refill: 2    Keep scheduled follow-up appointment with neurology/neurosurgery.    Continue current dose of primidone.    Vital signs demonstrate hemodynamic stability.    Anti - reflux measures, trigger foods and drinks to avoid: Fatty foods, alcohol, chocolate, coffee, tea, caffeinated soft drinks (decaffeinated coffee still has some caffeine), peppermint and spearmint, spices and vinegar, citrus fruits and juices, tomatoes and tomato sauces, and smoking. Other antireflux measures include weight reduction if overweight, avoid tight clothing around the abdomen, elevate the head of her bed 6 inches (May use a bed wedge which is placed between the mattress in box [place]) or blocks under the head of the bed, don't drink or eat for 2 hours before going to bed and avoid lying down immediately after meals.    Discussed need for reduction in sodium/salt/caffeine intake; improve sleep habits as able; inc formal CV exercise program with goal of vigorous activity most, if not all, days of the week; goal of 150 min of sustained HR CV exercise total per week.    Keep  scheduled appointment with psychiatry.      Discussion Summary:    Discussed plan of care in detail with pt today; pt verb understanding and agrees.    I spent 35 minutes caring for Zo on this date of service. This time includes time spent by me in the following activities:preparing for the visit, performing a medically appropriate examination and/or evaluation , counseling and educating the patient/family/caregiver, ordering medications, tests, or procedures, documenting information in the medical record, and care coordination    I have reviewed and updated all copied forward information, as appropriate.  I attest to the accuracy and relevance of any unchanged information.    Follow up:  Return in about 3 months (around 7/22/2024) for Recheck.     There are no Patient Instructions on file for this visit.    Evan Rivas DO  04/22/24  15:47 EDT

## 2024-04-23 ENCOUNTER — PRIOR AUTHORIZATION (OUTPATIENT)
Dept: FAMILY MEDICINE CLINIC | Facility: CLINIC | Age: 59
End: 2024-04-23
Payer: MEDICARE

## 2024-04-29 ENCOUNTER — OFFICE VISIT (OUTPATIENT)
Dept: PSYCHIATRY | Facility: CLINIC | Age: 59
End: 2024-04-29
Payer: MEDICARE

## 2024-04-29 DIAGNOSIS — F43.10 POST TRAUMATIC STRESS DISORDER (PTSD): ICD-10-CM

## 2024-04-29 DIAGNOSIS — F41.1 GENERALIZED ANXIETY DISORDER: ICD-10-CM

## 2024-04-29 DIAGNOSIS — F33.2 SEVERE EPISODE OF RECURRENT MAJOR DEPRESSIVE DISORDER, WITHOUT PSYCHOTIC FEATURES: Primary | ICD-10-CM

## 2024-04-29 DIAGNOSIS — F41.0 PANIC ATTACKS: ICD-10-CM

## 2024-04-29 PROCEDURE — 90837 PSYTX W PT 60 MINUTES: CPT | Performed by: SOCIAL WORKER

## 2024-04-29 NOTE — PROGRESS NOTES
Date: 04/29/2024   Time In: 948  Time Out: 1048    PROGRESS NOTE  Data:  Zo Palma is a 59 y.o. female who presents today for individual therapy session at [facility]. Chief Complaint: depression, anxiety and PTSD    Patient presents for follow up counseling for severe depression, generalized anxiety, post traumatic stress disorder and panic attacks. Patient reports improvement in dynamics with roommate. She reports feeling less depressed mood and less suicidal ideation. She reports working on managing financial stressors.           Clinical Maneuvering/Intervention:    Assisted patient in processing above session content; acknowledged and normalized patient’s thoughts, feelings, and concerns.  Rationalized patient thought process regarding dynamics with roommate and recent stressors.  Discussed triggers associated with patient's depression.  Also discussed coping skills for patient to implement such as challenging negative thoughts, using her support system and participating in activities. Patient reports working towards goal of being independent. Patient will complete daily mood log for next session.    Allowed patient to freely discuss issues without interruption or judgment. Provided safe, confidential environment to facilitate the development of positive therapeutic relationship and encourage open, honest communication. Assisted patient in identifying risk factors which would indicate the need for higher level of care including thoughts to harm self or others and/or self-harming behavior and encouraged patient to contact this office, call 911, or present to the nearest emergency room should any of these events occur. Discussed crisis intervention services and means to access. Patient adamantly and convincingly denies current suicidal or homicidal ideation or perceptual disturbance.    Assessment   Patient appears to maintain relative stability as compared to their baseline.  However,  patient continues to struggle with depression and anxiety which continues to cause impairment in important areas of functioning.  As a result, they can be reasonably expected to continue to benefit from treatment and would likely be at increased risk for decompensation otherwise.    Mental Status Exam:   Hygiene:   good  Cooperation:  Cooperative  Eye Contact:  Good  Psychomotor Behavior:  Appropriate  Affect:  Appropriate  Mood: normal  Speech:  Normal  Thought Process:  Goal directed and Linear  Thought Content:  Mood congruent  Suicidal:  None  Homicidal:  None  Hallucinations:  None  Delusion:  None  Memory:  Intact  Orientation:  Person, Place, Time, and Situation  Reliability:  good  Insight:  Good  Judgement:  Good  Impulse Control:  Fair  Physical/Medical Issues:  Yes        Patient's Support Network Includes:  children and friend    Functional Status: Moderate impairment     Progress toward goal: Not at goal    Prognosis: Good with Ongoing Treatment          Plan     VISIT DIAGNOSIS:     ICD-10-CM ICD-9-CM   1. Severe episode of recurrent major depressive disorder, without psychotic features  F33.2 296.33   2. Generalized anxiety disorder  F41.1 300.02   3. Post traumatic stress disorder (PTSD)  F43.10 309.81   4. Panic attacks  F41.0 300.01        Patient will continue in individual outpatient therapy with focus on improved functioning and coping skills, maintaining stability, and avoiding decompensation and the need for higher level of care.    Patient will adhere to medication regimen as prescribed and report any side effects. Patient will contact this office, call 911 or present to the nearest emergency room should suicidal or homicidal ideations occur. Provide Cognitive Behavioral Therapy and Solution Focused Therapy to improve functioning, maintain stability, and avoid decompensation and the need for higher level of care.     Return in about Return in about 2 weeks (around 5/13/2024). or earlier if  symptoms worsen or fail to improve.            This document has been electronically signed by Mely Tate LCSW  April 29, 2024 09:46 EDT      Part of this note may be an electronic transcription/translation of spoken language to printed text using the Dragon Dictation System.

## 2024-05-06 ENCOUNTER — OFFICE VISIT (OUTPATIENT)
Dept: PSYCHIATRY | Facility: CLINIC | Age: 59
End: 2024-05-06
Payer: MEDICARE

## 2024-05-06 DIAGNOSIS — F43.10 POST TRAUMATIC STRESS DISORDER (PTSD): ICD-10-CM

## 2024-05-06 DIAGNOSIS — F33.2 SEVERE EPISODE OF RECURRENT MAJOR DEPRESSIVE DISORDER, WITHOUT PSYCHOTIC FEATURES: Primary | ICD-10-CM

## 2024-05-06 DIAGNOSIS — F41.0 PANIC ATTACKS: ICD-10-CM

## 2024-05-06 DIAGNOSIS — F41.1 GENERALIZED ANXIETY DISORDER: ICD-10-CM

## 2024-05-06 PROCEDURE — 90834 PSYTX W PT 45 MINUTES: CPT | Performed by: SOCIAL WORKER

## 2024-05-14 ENCOUNTER — OFFICE VISIT (OUTPATIENT)
Dept: PSYCHIATRY | Facility: CLINIC | Age: 59
End: 2024-05-14
Payer: MEDICARE

## 2024-05-14 DIAGNOSIS — F41.0 PANIC ATTACKS: ICD-10-CM

## 2024-05-14 DIAGNOSIS — F33.2 SEVERE EPISODE OF RECURRENT MAJOR DEPRESSIVE DISORDER, WITHOUT PSYCHOTIC FEATURES: Primary | ICD-10-CM

## 2024-05-14 DIAGNOSIS — F41.1 GENERALIZED ANXIETY DISORDER: ICD-10-CM

## 2024-05-14 DIAGNOSIS — F43.10 POST TRAUMATIC STRESS DISORDER (PTSD): ICD-10-CM

## 2024-05-14 PROCEDURE — 90837 PSYTX W PT 60 MINUTES: CPT | Performed by: SOCIAL WORKER

## 2024-05-14 NOTE — PROGRESS NOTES
"Date: 05/14/2024   Time In: 1230  Time Out: 1325    PROGRESS NOTE  Data:  Zo Palma is a 59 y.o. female who presents today for individual therapy session at [facility]. Chief Complaint: depression, anxiety and PTSD    Patient presents for therapy for severe depression, generalized anxiety, PTSD and panic attacks. Patient reports \"I feel my mood going down\". She reports noticing a change in her energy level and mood. She reports anxiety around finances and places that remind her of past trauma.      Clinical Maneuvering/Intervention:      Assisted patient in processing above session content; acknowledged and normalized patient’s thoughts, feelings, and concerns.  Rationalized patient thought process regarding mood and being triggered.  Discussed triggers associated with patient's depression.  Also discussed coping skills for patient to implement such as challenging negative thoughts, using her support system and participating in activities. She reports using mindfulness skills to cope.    Allowed patient to freely discuss issues without interruption or judgment. Provided safe, confidential environment to facilitate the development of positive therapeutic relationship and encourage open, honest communication. Assisted patient in identifying risk factors which would indicate the need for higher level of care including thoughts to harm self or others and/or self-harming behavior and encouraged patient to contact this office, call 911, or present to the nearest emergency room should any of these events occur. Discussed crisis intervention services and means to access. Patient adamantly and convincingly denies current suicidal or homicidal ideation or perceptual disturbance.    Assessment   Patient appears to maintain relative stability as compared to their baseline.  However, patient continues to struggle with depression which continues to cause impairment in important areas of functioning.  As a " "result, they can be reasonably expected to continue to benefit from treatment and would likely be at increased risk for decompensation otherwise.    Mental Status Exam:   Hygiene:   good  Cooperation:  Cooperative  Eye Contact:  Good  Psychomotor Behavior:  Appropriate  Affect:  Appropriate  Mood:  \"down\"  Speech:  Normal  Thought Process:  Goal directed and Linear  Thought Content:  Mood congruent  Suicidal:  None  Homicidal:  None  Hallucinations:  None  Delusion:  None  Memory:  Intact  Orientation:  Person, Place, Time, and Situation  Reliability:  good  Insight:  Good  Judgement:  Good  Impulse Control:  Good  Physical/Medical Issues:  Yes        Patient's Support Network Includes:  children and friend    Functional Status: Moderate impairment     Progress toward goal: At goal    Prognosis: Good with Ongoing Treatment          Plan     VISIT DIAGNOSIS:     ICD-10-CM ICD-9-CM   1. Severe episode of recurrent major depressive disorder, without psychotic features  F33.2 296.33   2. Generalized anxiety disorder  F41.1 300.02   3. Post traumatic stress disorder (PTSD)  F43.10 309.81   4. Panic attacks  F41.0 300.01        Patient will continue in individual outpatient therapy with focus on improved functioning and coping skills, maintaining stability, and avoiding decompensation and the need for higher level of care.    Patient will adhere to medication regimen as prescribed and report any side effects. Patient will contact this office, call 911 or present to the nearest emergency room should suicidal or homicidal ideations occur. Provide Cognitive Behavioral Therapy and Solution Focused Therapy to improve functioning, maintain stability, and avoid decompensation and the need for higher level of care.     Return in about Return in about 2 weeks (around 5/28/2024). or earlier if symptoms worsen or fail to improve.            This document has been electronically signed by Mely Tate LCSW  May 14, 2024 12:10 " EDT      Part of this note may be an electronic transcription/translation of spoken language to printed text using the Dragon Dictation System.

## 2024-05-29 ENCOUNTER — OFFICE VISIT (OUTPATIENT)
Dept: PSYCHIATRY | Facility: CLINIC | Age: 59
End: 2024-05-29
Payer: MEDICARE

## 2024-05-29 DIAGNOSIS — F43.10 POST TRAUMATIC STRESS DISORDER (PTSD): ICD-10-CM

## 2024-05-29 DIAGNOSIS — F41.1 GENERALIZED ANXIETY DISORDER: ICD-10-CM

## 2024-05-29 DIAGNOSIS — F33.2 SEVERE EPISODE OF RECURRENT MAJOR DEPRESSIVE DISORDER, WITHOUT PSYCHOTIC FEATURES: Primary | ICD-10-CM

## 2024-05-29 DIAGNOSIS — F41.0 PANIC ATTACK: ICD-10-CM

## 2024-05-29 NOTE — PROGRESS NOTES
"Date: 05/29/2024   Time In: 1230  Time Out: 1325    PROGRESS NOTE  Data:  Zo Palma is a 59 y.o. female who presents today for individual therapy session at [facility]. Chief Complaint: depression, anxiety and PTSD    Patient presents for therapy for severe depression, ,generalized anxiety, PTSD and panic attacks. Patient reports \"I'm doing ok\". She reports financial stressors that have impacted her mood. She reports a history of impulsive behaviors including spending money. She reports history of cognitive and concerns about alzheimer's.       Clinical Maneuvering/Intervention:    Assisted patient in processing above session content; acknowledged and normalized patient’s thoughts, feelings, and concerns.  Rationalized patient thought process regarding recent stressors and the impact on her mood.  Discussed triggers associated with patient's anxiety.  Also discussed coping skills for patient to implement such as challenging anxious thoughts.    Allowed patient to freely discuss issues without interruption or judgment. Provided safe, confidential environment to facilitate the development of positive therapeutic relationship and encourage open, honest communication. Assisted patient in identifying risk factors which would indicate the need for higher level of care including thoughts to harm self or others and/or self-harming behavior and encouraged patient to contact this office, call 911, or present to the nearest emergency room should any of these events occur. Discussed crisis intervention services and means to access. Patient adamantly and convincingly denies current suicidal or homicidal ideation or perceptual disturbance.    Assessment   Patient appears to maintain relative stability as compared to their baseline.  However, patient continues to struggle with depression and anxiety which continues to cause impairment in important areas of functioning.  As a result, they can be reasonably " expected to continue to benefit from treatment and would likely be at increased risk for decompensation otherwise.    Mental Status Exam:   Hygiene:   good  Cooperation:  Cooperative  Eye Contact:  Good  Psychomotor Behavior:  Appropriate  Affect:  Appropriate  Mood: normal  Speech:  Normal  Thought Process:  Goal directed and Linear  Thought Content:  Mood congruent  Suicidal:  None  Homicidal:  None  Hallucinations:  None  Delusion:  None  Memory:  Intact  Orientation:  Person, Place, Time, and Situation  Reliability:  good  Insight:  Good  Judgement:  Good  Impulse Control:  Good  Physical/Medical Issues:  Yes        Patient's Support Network Includes:  children and friend    Functional Status: Moderate impairment     Progress toward goal: At goal    Prognosis: Good with Ongoing Treatment          Plan     VISIT DIAGNOSIS:     ICD-10-CM ICD-9-CM   1. Severe episode of recurrent major depressive disorder, without psychotic features  F33.2 296.33   2. Post traumatic stress disorder (PTSD)  F43.10 309.81   3. Generalized anxiety disorder  F41.1 300.02   4. Panic attack  F41.0 300.01        Patient will continue in individual outpatient therapy with focus on improved functioning and coping skills, maintaining stability, and avoiding decompensation and the need for higher level of care.    Patient will adhere to medication regimen as prescribed and report any side effects. Patient will contact this office, call 911 or present to the nearest emergency room should suicidal or homicidal ideations occur. Provide Cognitive Behavioral Therapy and Solution Focused Therapy to improve functioning, maintain stability, and avoid decompensation and the need for higher level of care.     Return in about Return in about 2 weeks (around 6/12/2024). or earlier if symptoms worsen or fail to improve.            This document has been electronically signed by Mely Tate LCSW  May 29, 2024 12:21 EDT      Part of this note may be an  electronic transcription/translation of spoken language to printed text using the Dragon Dictation System.

## 2024-06-03 ENCOUNTER — OFFICE VISIT (OUTPATIENT)
Dept: ENDOCRINOLOGY | Facility: CLINIC | Age: 59
End: 2024-06-03
Payer: MEDICARE

## 2024-06-03 VITALS
BODY MASS INDEX: 26.26 KG/M2 | OXYGEN SATURATION: 100 % | SYSTOLIC BLOOD PRESSURE: 120 MMHG | HEIGHT: 63 IN | HEART RATE: 61 BPM | DIASTOLIC BLOOD PRESSURE: 76 MMHG | WEIGHT: 148.2 LBS

## 2024-06-03 DIAGNOSIS — R73.03 PREDIABETES: Primary | ICD-10-CM

## 2024-06-03 LAB
EXPIRATION DATE: NORMAL
EXPIRATION DATE: NORMAL
GLUCOSE BLDC GLUCOMTR-MCNC: 76 MG/DL (ref 70–130)
HBA1C MFR BLD: 5.1 % (ref 4.5–5.7)
Lab: NORMAL
Lab: NORMAL

## 2024-06-03 NOTE — PROGRESS NOTES
Chief Complaint   Patient presents with    Prediabetes     Follow-up       HPI:   Zo Palma is a 59 y.o.female who returns to endocrine clinic for follow-up evaluation of her prediabetes and weight gain.  Last visit 11/29/2023. Her history is as follows:    Interim Events:   - Pt had obtained Victoza through the [employer] PAP. However, the program ended in August 2023.  She has been using her residual supply.  - Is tolerating 1.8 mg qAM  -Was on primidone for essential tremor recently and gained 15 pounds over period of 1 month.  She weaned herself off the medication.    1) Weight gain:  - h/o sleeve gastrectomy in 2008. Pre-surgery wt was 245 lbs. Post-op was 135 lbs for many years  - By 2018, she had gained back to 175 lbs. She was prescribed Qsymia in 10/2018 and was able to lose 30 lbs (down to 145 lbs)  - Pt had to stop the Qsymia in 01/2022 as she did not have a provider to continue the Rx.  - She then regained wt to 165 lbs. In 2022, Dr. Suresh Edge prescribed Qsymia 15-92 mg dose which she was taking in 02/2023. However, she has not had weight loss on this dose of Qsymia.  - (02/2023) Started Victoza 1.8 mg daily, obtained through PAP. Had lost approximately 31 lbs since 02/2023 with Rx, dietary changes and exercise    2) Prediabetes:  - diagnosed in 2008, A1C% 5.7. Highest A1C% was 6.1% in 11/2019  - reports a h/o reactive hypoglycemia   - Did not tolerate Metformin  - (02/2023) Started Victoza 1.8 mg daily, obtained through PAP  - Pt had obtained Victoza through the [employer] PAP. However, the program ended in August 2023.  She has been using her residual supply.  - Is tolerating 1.8 mg qAM    3) h/o  abnormal endocrine labs:  - In the early 2000's, pt reported being evaluated for multiple hormone deficiencies [place]. She was evaluated with serum, urine, and multiple salivary tests collected throughout the day. Does not appear to have had a Cosyntropin stimulation test.  She was told she had deficiencies in estrogen and adrenal hormones. Pt was prescribed estrogen. Was not prescribed glucocorticoids.     Currently on these supplements that do not contain Biotin:  - Calcium +D3 (600mg / 120 mcg): 1 tab daily  - D-Mannose: 1 tab BID  - Magnesium: 500 mg daily  - doTerra Bone Nutrient: 2 caps daily  - Vit D3: 5000 IU daily  - doTerra TerraZyme: 2 caps daily  - doTerra Onguard: 1 cap daily  - Pantethina: 900 mg daily  - doTerra Yarrow: 2 caps daily  - doTerra Serenity: 1 cap daily  - doTerra Alpha CRS: 2 caps daily  - doTerra Microplex: 2 caps daily  - doTerra E Omega: 2 caps daily  - doTerra MetaPower: 1 cap BID  - potassium 99 mg: daily     - Had patient complete an ACTH stimulation test on 2/9/2023 at 8:27 AM.  Labs showed no evidence of adrenal insufficiency  - Patient without evidence of thyroid hormone deficiency    ACTH stimulation test completed 2/9/2023 at 8:27 AM:  8AM ACTH: 8.7 - normal  Time 0 AM Cortisol: 13.95  Time 30 min Cortisol: 29.30  Time 60 min Cortisol: 34.80    Other history:  - pt's  passed away in 2020  - had a h/o acute liver injury in the past while on Depakote. LFT's in the 300's. H/o fatty liver    Review of Systems   Constitutional:  Negative for fatigue.        Recent weight gain   HENT:  Positive for tinnitus.    Eyes: Negative.    Respiratory: Negative.     Cardiovascular: Negative.    Gastrointestinal: Negative.         Acid reflux   Endocrine: Positive for cold intolerance.   Genitourinary: Negative.    Musculoskeletal: Negative.    Skin: Negative.    Allergic/Immunologic: Positive for environmental allergies.   Neurological:  Positive for tremors.   Hematological:  Bruises/bleeds easily.   Psychiatric/Behavioral: Negative.       The following portions of the patient's history were reviewed and updated as appropriate: allergies, current medications, past family history, past medical history, past social history, past surgical history and problem  "list.    /76   Pulse 61   Ht 160 cm (62.99\")   Wt 67.2 kg (148 lb 3.2 oz)   SpO2 100%   BMI 26.26 kg/m²   Physical Exam  Vitals reviewed.   Constitutional:       General: She is not in acute distress.     Appearance: She is well-developed. She is not diaphoretic.   HENT:      Head: Normocephalic.   Eyes:      Conjunctiva/sclera: Conjunctivae normal.      Pupils: Pupils are equal, round, and reactive to light.   Neck:      Thyroid: No thyromegaly.      Trachea: No tracheal deviation.      Comments: No palpable thyroid nodules    Cardiovascular:      Rate and Rhythm: Normal rate and regular rhythm.      Heart sounds: Normal heart sounds. No murmur heard.  Pulmonary:      Effort: Pulmonary effort is normal. No respiratory distress.      Breath sounds: Normal breath sounds.   Abdominal:      General: Bowel sounds are normal.      Palpations: Abdomen is soft. There is no mass.      Tenderness: There is no abdominal tenderness.   Lymphadenopathy:      Cervical: No cervical adenopathy.   Skin:     General: Skin is warm and dry.      Findings: No erythema.      Comments: No areas of hyperpigmentation   Neurological:      Mental Status: She is alert and oriented to person, place, and time.      Cranial Nerves: No cranial nerve deficit.   Psychiatric:         Behavior: Behavior normal.         LABS/IMAGING: outside records reviewed and summarized in HPI  Lab Results   Component Value Date    HGBA1C 5.1 06/03/2024     Lab Results   Component Value Date    CHOL 178 02/14/2024    CHLPL 218 (H) 11/14/2022    TRIG 106 02/14/2024    HDL 55 02/14/2024     (H) 02/14/2024     Lab Results   Component Value Date    WBC 5.82 06/06/2024    HGB 12.5 06/06/2024    HCT 38.0 06/06/2024    MCV 91.1 06/06/2024     06/06/2024     Lab Results   Component Value Date    GLUCOSE 84 06/06/2024    BUN 13 06/06/2024    CREATININE 0.83 06/06/2024    EGFR 81.3 06/06/2024    BCR 15.7 06/06/2024    K 4.1 06/06/2024    CO2 23.0 " 06/06/2024    CALCIUM 9.1 06/06/2024    ALBUMIN 4.0 06/06/2024    BILITOT <0.2 06/06/2024    AST 16 06/06/2024    ALT 14 06/06/2024     Lab Results   Component Value Date    TSH 0.962 06/07/2024       ASSESSMENT/PLAN:    1) prediabetes: controlled, A1C% 5.10 today  - Patient did not tolerate metformin in the past  - Pt had obtained Victoza through the [employer] PAP. However, the program ended in August 2023.  She has been using her residual supply.  - Is tolerating 1.8 mg qAM.   -Discussed with patient that we may not be able to obtain another GLP-1 agonist through her insurance as these are usually not covered if the patient does not have a known diagnosis of type 2 diabetes with an A1c of 6.5 or higher at time of diagnosis.  -Will assess treatment options at her follow-up once her supply of Victoza has finished.    RTC 6 months    Electronically Signed: Damari Fulton MD

## 2024-06-06 ENCOUNTER — OFFICE VISIT (OUTPATIENT)
Dept: PSYCHIATRY | Facility: CLINIC | Age: 59
End: 2024-06-06
Payer: MEDICARE

## 2024-06-06 ENCOUNTER — HOSPITAL ENCOUNTER (INPATIENT)
Facility: HOSPITAL | Age: 59
LOS: 15 days | Discharge: HOME | DRG: 885 | End: 2024-06-21
Attending: STUDENT IN AN ORGANIZED HEALTH CARE EDUCATION/TRAINING PROGRAM | Admitting: STUDENT IN AN ORGANIZED HEALTH CARE EDUCATION/TRAINING PROGRAM
Payer: MEDICARE

## 2024-06-06 ENCOUNTER — HOSPITAL ENCOUNTER (EMERGENCY)
Facility: HOSPITAL | Age: 59
Discharge: OTHER INSTITUTION | DRG: 885 | End: 2024-06-06
Attending: STUDENT IN AN ORGANIZED HEALTH CARE EDUCATION/TRAINING PROGRAM
Payer: MEDICARE

## 2024-06-06 VITALS
DIASTOLIC BLOOD PRESSURE: 90 MMHG | HEART RATE: 68 BPM | TEMPERATURE: 98 F | RESPIRATION RATE: 18 BRPM | SYSTOLIC BLOOD PRESSURE: 120 MMHG | BODY MASS INDEX: 26.4 KG/M2 | WEIGHT: 149 LBS | OXYGEN SATURATION: 97 % | HEIGHT: 63 IN

## 2024-06-06 VITALS
WEIGHT: 149 LBS | DIASTOLIC BLOOD PRESSURE: 68 MMHG | HEIGHT: 63 IN | HEART RATE: 72 BPM | OXYGEN SATURATION: 99 % | SYSTOLIC BLOOD PRESSURE: 108 MMHG | BODY MASS INDEX: 26.4 KG/M2

## 2024-06-06 DIAGNOSIS — R45.851 SUICIDAL IDEATION: ICD-10-CM

## 2024-06-06 DIAGNOSIS — F33.2 SEVERE EPISODE OF RECURRENT MAJOR DEPRESSIVE DISORDER, WITHOUT PSYCHOTIC FEATURES: Primary | ICD-10-CM

## 2024-06-06 DIAGNOSIS — R45.851 SUICIDAL IDEATIONS: Primary | ICD-10-CM

## 2024-06-06 LAB
ALBUMIN SERPL-MCNC: 4 G/DL (ref 3.5–5.2)
ALBUMIN/GLOB SERPL: 1.5 G/DL
ALP SERPL-CCNC: 86 U/L (ref 39–117)
ALT SERPL W P-5'-P-CCNC: 14 U/L (ref 1–33)
AMPHET+METHAMPHET UR QL: NEGATIVE
AMPHETAMINES UR QL: NEGATIVE
ANION GAP SERPL CALCULATED.3IONS-SCNC: 12 MMOL/L (ref 5–15)
APAP SERPL-MCNC: <5 MCG/ML (ref 0–30)
AST SERPL-CCNC: 16 U/L (ref 1–32)
BARBITURATES UR QL SCN: NEGATIVE
BASOPHILS # BLD AUTO: 0.05 10*3/MM3 (ref 0–0.2)
BASOPHILS NFR BLD AUTO: 0.9 % (ref 0–1.5)
BENZODIAZ UR QL SCN: POSITIVE
BILIRUB SERPL-MCNC: <0.2 MG/DL (ref 0–1.2)
BUN SERPL-MCNC: 13 MG/DL (ref 6–20)
BUN/CREAT SERPL: 15.7 (ref 7–25)
BUPRENORPHINE SERPL-MCNC: NEGATIVE NG/ML
CALCIUM SPEC-SCNC: 9.1 MG/DL (ref 8.6–10.5)
CANNABINOIDS SERPL QL: NEGATIVE
CHLORIDE SERPL-SCNC: 109 MMOL/L (ref 98–107)
CO2 SERPL-SCNC: 23 MMOL/L (ref 22–29)
COCAINE UR QL: NEGATIVE
CREAT SERPL-MCNC: 0.83 MG/DL (ref 0.57–1)
DEPRECATED RDW RBC AUTO: 42.3 FL (ref 37–54)
EGFRCR SERPLBLD CKD-EPI 2021: 81.3 ML/MIN/1.73
EOSINOPHIL # BLD AUTO: 0.07 10*3/MM3 (ref 0–0.4)
EOSINOPHIL NFR BLD AUTO: 1.2 % (ref 0.3–6.2)
ERYTHROCYTE [DISTWIDTH] IN BLOOD BY AUTOMATED COUNT: 12.8 % (ref 12.3–15.4)
ETHANOL BLD-MCNC: <10 MG/DL (ref 0–10)
ETHANOL UR QL: <0.01 %
FENTANYL UR-MCNC: NEGATIVE NG/ML
GLOBULIN UR ELPH-MCNC: 2.7 GM/DL
GLUCOSE SERPL-MCNC: 84 MG/DL (ref 65–99)
HCT VFR BLD AUTO: 38 % (ref 34–46.6)
HGB BLD-MCNC: 12.5 G/DL (ref 12–15.9)
HOLD SPECIMEN: NORMAL
HOLD SPECIMEN: NORMAL
IMM GRANULOCYTES # BLD AUTO: 0.02 10*3/MM3 (ref 0–0.05)
IMM GRANULOCYTES NFR BLD AUTO: 0.3 % (ref 0–0.5)
LYMPHOCYTES # BLD AUTO: 2.14 10*3/MM3 (ref 0.7–3.1)
LYMPHOCYTES NFR BLD AUTO: 36.8 % (ref 19.6–45.3)
MCH RBC QN AUTO: 30 PG (ref 26.6–33)
MCHC RBC AUTO-ENTMCNC: 32.9 G/DL (ref 31.5–35.7)
MCV RBC AUTO: 91.1 FL (ref 79–97)
METHADONE UR QL SCN: NEGATIVE
MONOCYTES # BLD AUTO: 0.47 10*3/MM3 (ref 0.1–0.9)
MONOCYTES NFR BLD AUTO: 8.1 % (ref 5–12)
NEUTROPHILS NFR BLD AUTO: 3.07 10*3/MM3 (ref 1.7–7)
NEUTROPHILS NFR BLD AUTO: 52.7 % (ref 42.7–76)
NRBC BLD AUTO-RTO: 0 /100 WBC (ref 0–0.2)
OPIATES UR QL: NEGATIVE
OXYCODONE UR QL SCN: NEGATIVE
PCP UR QL SCN: NEGATIVE
PLATELET # BLD AUTO: 266 10*3/MM3 (ref 140–450)
PMV BLD AUTO: 9.7 FL (ref 6–12)
POTASSIUM SERPL-SCNC: 4.1 MMOL/L (ref 3.5–5.2)
PROT SERPL-MCNC: 6.7 G/DL (ref 6–8.5)
RBC # BLD AUTO: 4.17 10*6/MM3 (ref 3.77–5.28)
SALICYLATES SERPL-MCNC: <0.3 MG/DL
SODIUM SERPL-SCNC: 144 MMOL/L (ref 136–145)
TRICYCLICS UR QL SCN: NEGATIVE
TSH SERPL DL<=0.05 MIU/L-ACNC: 1.27 UIU/ML (ref 0.27–4.2)
WBC NRBC COR # BLD AUTO: 5.82 10*3/MM3 (ref 3.4–10.8)
WHOLE BLOOD HOLD COAG: NORMAL
WHOLE BLOOD HOLD SPECIMEN: NORMAL

## 2024-06-06 PROCEDURE — 99285 EMERGENCY DEPT VISIT HI MDM: CPT

## 2024-06-06 PROCEDURE — 80307 DRUG TEST PRSMV CHEM ANLYZR: CPT | Performed by: NURSE PRACTITIONER

## 2024-06-06 PROCEDURE — 80179 DRUG ASSAY SALICYLATE: CPT | Performed by: NURSE PRACTITIONER

## 2024-06-06 PROCEDURE — 36415 COLL VENOUS BLD VENIPUNCTURE: CPT

## 2024-06-06 PROCEDURE — 80143 DRUG ASSAY ACETAMINOPHEN: CPT | Performed by: NURSE PRACTITIONER

## 2024-06-06 PROCEDURE — 80053 COMPREHEN METABOLIC PANEL: CPT | Performed by: NURSE PRACTITIONER

## 2024-06-06 PROCEDURE — 93005 ELECTROCARDIOGRAM TRACING: CPT | Performed by: STUDENT IN AN ORGANIZED HEALTH CARE EDUCATION/TRAINING PROGRAM

## 2024-06-06 PROCEDURE — 84443 ASSAY THYROID STIM HORMONE: CPT | Performed by: NURSE PRACTITIONER

## 2024-06-06 PROCEDURE — 85025 COMPLETE CBC W/AUTO DIFF WBC: CPT | Performed by: NURSE PRACTITIONER

## 2024-06-06 PROCEDURE — 80074 ACUTE HEPATITIS PANEL: CPT | Performed by: STUDENT IN AN ORGANIZED HEALTH CARE EDUCATION/TRAINING PROGRAM

## 2024-06-06 PROCEDURE — 82077 ASSAY SPEC XCP UR&BREATH IA: CPT | Performed by: NURSE PRACTITIONER

## 2024-06-06 RX ORDER — SODIUM CHLORIDE 0.9 % (FLUSH) 0.9 %
10 SYRINGE (ML) INJECTION AS NEEDED
Status: DISCONTINUED | OUTPATIENT
Start: 2024-06-06 | End: 2024-06-06 | Stop reason: HOSPADM

## 2024-06-06 RX ORDER — IBUPROFEN 400 MG/1
400 TABLET ORAL EVERY 6 HOURS PRN
Status: DISCONTINUED | OUTPATIENT
Start: 2024-06-06 | End: 2024-06-21 | Stop reason: HOSPADM

## 2024-06-06 RX ORDER — PRIMIDONE 50 MG/1
50 TABLET ORAL NIGHTLY
Status: ON HOLD | COMMUNITY
End: 2024-06-07 | Stop reason: ALTCHOICE

## 2024-06-06 RX ORDER — ACETAMINOPHEN 325 MG/1
650 TABLET ORAL EVERY 6 HOURS PRN
Status: DISCONTINUED | OUTPATIENT
Start: 2024-06-06 | End: 2024-06-21 | Stop reason: HOSPADM

## 2024-06-06 RX ORDER — ECHINACEA PURPUREA EXTRACT 125 MG
2 TABLET ORAL AS NEEDED
Status: DISCONTINUED | OUTPATIENT
Start: 2024-06-06 | End: 2024-06-21 | Stop reason: HOSPADM

## 2024-06-06 RX ORDER — TRAZODONE HYDROCHLORIDE 50 MG/1
50 TABLET ORAL NIGHTLY PRN
Status: DISCONTINUED | OUTPATIENT
Start: 2024-06-06 | End: 2024-06-21 | Stop reason: HOSPADM

## 2024-06-06 RX ORDER — ONDANSETRON 4 MG/1
4 TABLET, ORALLY DISINTEGRATING ORAL EVERY 6 HOURS PRN
Status: DISCONTINUED | OUTPATIENT
Start: 2024-06-06 | End: 2024-06-21 | Stop reason: HOSPADM

## 2024-06-06 RX ORDER — HYDROXYZINE HYDROCHLORIDE 25 MG/1
50 TABLET, FILM COATED ORAL EVERY 6 HOURS PRN
Status: DISCONTINUED | OUTPATIENT
Start: 2024-06-06 | End: 2024-06-21 | Stop reason: HOSPADM

## 2024-06-06 RX ORDER — ALUMINA, MAGNESIA, AND SIMETHICONE 2400; 2400; 240 MG/30ML; MG/30ML; MG/30ML
15 SUSPENSION ORAL EVERY 6 HOURS PRN
Status: DISCONTINUED | OUTPATIENT
Start: 2024-06-06 | End: 2024-06-21 | Stop reason: HOSPADM

## 2024-06-06 RX ORDER — BENZTROPINE MESYLATE 1 MG/ML
1 INJECTION INTRAMUSCULAR; INTRAVENOUS ONCE AS NEEDED
Status: DISCONTINUED | OUTPATIENT
Start: 2024-06-06 | End: 2024-06-21 | Stop reason: HOSPADM

## 2024-06-06 RX ORDER — LOPERAMIDE HYDROCHLORIDE 2 MG/1
2 CAPSULE ORAL
Status: DISCONTINUED | OUTPATIENT
Start: 2024-06-06 | End: 2024-06-21 | Stop reason: HOSPADM

## 2024-06-06 RX ORDER — BENZTROPINE MESYLATE 1 MG/1
2 TABLET ORAL ONCE AS NEEDED
Status: DISCONTINUED | OUTPATIENT
Start: 2024-06-06 | End: 2024-06-21 | Stop reason: HOSPADM

## 2024-06-06 RX ORDER — FAMOTIDINE 20 MG/1
20 TABLET, FILM COATED ORAL 2 TIMES DAILY PRN
Status: DISCONTINUED | OUTPATIENT
Start: 2024-06-06 | End: 2024-06-21 | Stop reason: HOSPADM

## 2024-06-06 RX ORDER — BENZONATATE 100 MG/1
100 CAPSULE ORAL 3 TIMES DAILY PRN
Status: DISCONTINUED | OUTPATIENT
Start: 2024-06-06 | End: 2024-06-21 | Stop reason: HOSPADM

## 2024-06-06 NOTE — CONSULTS
"Zo Palma  1965    Preferred Pronouns: She/Her  Race/Ethnicity: White or   Martial Status: Single  Guardian Name/Contact/etc: Self  Pt Lives With:  Lives with a roommate   Occupation: unemployed, disabled  Appearance: clean and casually dressed, appropriate     Time Called for Assessment: 18:03  Assessment Start and End: 18:48-19:15      DATA:   Clinician received a call from [facility] staff for a behavioral health consult.  The patient is agreeable to speak with the behavioral health team.  Met with patient at bedside. Patient is under 1:1 security monitoring.  The attending treatment team is QUINTIN Haque and Donovan LARA PA-C, Provider.  Patient presents today with chief compliant of suicidal ideation.  Clinician completed assessment with patient and observations are documented as follows.    ASSESSMENT:    Clinician consulted with patient for mental status exam and assessment.  Clinical descriptors are documented as follows.  Clinician completed CSSRS with patient for suicide risk assessment.  The results of patient’s CSSRS documented as follows.    Presenting Problems: Patient stated that she was at her appointment with Merari AVILES for Medication and was told to come over to CDU for assessment.  Patient has a service dog that notices changes with patient that \" popped\" for REBECCA Solomon to observe changes with patient. Patient stated that she has noticed changes in her own behavior the past two weeks. Patient stated that she has had a change in sleeping patterns. Patient stated that she has been unable to go to sleep when ' Its time to go to sleep\"  and slept for 2 days ( 24 hours). Patient stated that she has zero motivation. Patient stated she wants to die and has a plan to take all of her medication she has at home.    Per Shift change,  Patient sent by Merari for assessment. Patient has received ECT several times and is needing to be presented to [facility]. Merari has already spoken with " Salena from [facility] .    Current Stressors: mental health condition     Established Therapy, Medication Management or Other Mental Health Services: [facility]  with Merari AVILES and Mely Tate for Therapy.     Current Psychiatric Medications:     Estradiol 2 g Vaginal 3 Times Weekly, Mondays, Wednesdays and Fridays    FLUoxetine HCl 40 mg Oral Daily    Ibandronate Sodium 150 mg Oral Every 30 Days    Midodrine HCl 5 mg Oral 3 Times Daily Before Meals    Mirtazapine 15 mg Oral Nightly    Nitrofurantoin Monohyd Macro 100 mg Oral 2 Times Daily    Pantoprazole Sodium 40 mg Oral Daily      Mental Status Exam:  Behavior: Appropriate  Psychomotor Movement: Appropriate  Attention and Cooperation: Normal and Cooperative  Mood: neutral and Affect: Congruent to mood  Orientation: alert and oriented to person, place, and time   Thought Process: linear, logical, and goal directed  Thought Content: normal  Delusions:  None    Hallucinations: None and Not demonstrated today   Concentration: Normal  Suicidal Ideation: Present, With plan, and With intent  Homicidal Ideation: Absent  Hopelessness: Mild  Speech: Normal  Eye Contact: Good  Insight: Good  Judgement: Fair    Depression: 8   Anxiety: 2  Sleep: Interrupted  For past two weeks, and this past Monday and Tuesday slept all day  Appetite: Tolerating diet       Hx of Psychiatric or Detox Hospitalizations:  Yes, describe: Out of state, [facility] and [facility]  Most recent inpatient admission: [facility] 02/2024    Suicidal Ideation Assessment:    COLUMBIA-SUICIDE SEVERITY RATING SCALE  Psychiatric Inpatient Setting - Discharge Screener    Ask questions that are bold and underlined Discharge   Ask Questions 1 and 2 YES NO   Wish to be Dead:   Person endorses thoughts about a wish to be dead or not alive anymore, or wish to fall asleep and not wake up.  While you were here in the hospital, have you wished you were dead or wished you could go to sleep and not wake up? X     Suicidal Thoughts:   General non-specific thoughts of wanting to end one's life/die by suicide, “I've thought about killing myself” without general thoughts of ways to kill oneself/associated methods, intent, or plan.   While you were here in the hospital, have you actually had thoughts about killing yourself?  X    If YES to 2, ask questions 3, 4, 5, and 6.  If NO to 2, go directly to question 6   3) Suicidal Thoughts with Method (without Specific Plan or Intent to Act):   Person endorses thoughts of suicide and has thought of a least one method during the assessment period. This is different than a specific plan with time, place or method details worked out. “I thought about taking an overdose but I never made a specific plan as to when where or how I would actually do it….and I would never go through with it.”   Have you been thinking about how you might kill yourself?   X   4) Suicidal Intent (without Specific Plan):   Active suicidal thoughts of killing oneself and patient reports having some intent to act on such thoughts, as opposed to “I have the thoughts but I definitely will not do anything about them.”   Have you had these thoughts and had some intention of acting on them or do you have some intention of acting on them after you leave the hospital?   X   5) Suicide Intent with Specific Plan:   Thoughts of killing oneself with details of plan fully or partially worked out and person has some intent to carry it out.   Have you started to work out or worked out the details of how to kill yourself either for while you were here in the hospital or for after you leave the hospital? Do you intend to carry out this plan?  X      6) Suicide Behavior    While you were here in the hospital, have you done anything, started to do anything, or prepared to do anything to end your life?    Examples: Took pills, cut yourself, tried to hang yourself, took out pills but didn't swallow any because you changed your mind or  "someone took them from you, collected pills, secured a means of obtaining a gun, gave away valuables, wrote a will or suicide note, etc.  X     Suicidal: Present, With plan, and With intent Patient endorses a death wish and has plan and intent , Will overdose on her home medications that she has access to.     Previous Attempts: Three prior suicide attempts  Most Recent Attempt: 02/2024    Psychosocial History    Family Hx of Mental Health/Substance Abuse: No  Patient Trauma/Abuse History: patient reports history, but does not disclose details.    Does this require reporting: No  Patient Identified Support System (List family members, loved ones, guardians, friends, etc): Roommate and friends    Legal History / History of Violence: Denies significant history of legal issues.   Experience with Interpersonal Violence: No  History of Inappropriate Sexual Behavior: No  Current Medical Conditions or Biomedical Complications: Yes anxiety, depression, \"conversion disorder\", Multiple Conditions, Diabetes, Orthostatic hypertension     Social Determinants of Health  Housing Instability and/or Utility Needs: No  Food Insecurity: No  Transportation Needs: No    Substance Use History  Active Use: No       PLAN:  At this time, clinician recommends inpatient treatment based upon the above assessment.   Clinician collaborated with the treatment team who agree to adopt the recommendations.  Clinician discussed recommendations with patient and/or patient support systems, and patient is agreeable to the plan.  Patient is agreeable for referrals to be sent to facilities and agencies for treatment. Patient provided verbal permission to contact [facility] for placement.     Have the levels of care been discussed with the patient? Yes  Level of care recommendation: inpatient   Is patient agreeable to treatment? Yes      Care Coordination Timeline:  19:19 Called Lead QUINTIN Gonzalez at [facility] to present patient for admission  19:47 Patient was " accepted by Dr. Diez for admission, Call report to [phone] Ext [phone]. Patient will be going to [facility]  20:13 STAR: 21:45  20:18 Updated patient on acceptance and ETA of transport, Patient continues to be willing and voluntary.  20:19 Updated ED Treatment team on patient acceptance to [facility], Facilitated RN to RN reporting       SIGNATURE  Giovany Quinn MA [facility]  06/06/2024

## 2024-06-06 NOTE — PROGRESS NOTES
Subjective   Zo Palma is a 59 y.o. female who presents today for follow up    Chief Complaint:  Depression and anxiety    History of Present Illness:   History of Present Illness  Zo Palma presents today for medication management follow up along with her service dog Jesús. Reports increase in depression that has continued to worsen over the past week. Having increased SI with current plan to overdose on prescription medication she has at home. Was admitted to [facility] in Feb, received 3 ECT treatments that were beneficial (50-60% improvement in symptoms). Tried Spravato prior to ECT that increased depression and SI. Says that her symptoms are becoming as severe as they were at time of past admission. Has low energy, increased sleep during day with very little sleep at night. She is agreeable to psych evaluation in ER.     Previous Psych Meds: Reports having tried multiple SSRIs and SNRIs along with Abilify, risperidone, Seroquel, Vraylar, Latuda, Lamictal, Trileptal, lorazepam and Depakote (caused liver failure), Spravato     The following portions of the patient's history were reviewed and updated as appropriate: allergies, current medications, past family history, past medical history, past social history, past surgical history and problem list.      Past Medical History:  Past Medical History:   Diagnosis Date    Ankle sprain     Anxiety     Arteriovenous malformation 2004    Temporal hemangiona - right    Arthritis of back     Arthritis of neck     Brain concussion     Bursitis of hip     Cervical disc disorder     Conversion disorder     Depression     Fracture of wrist     Fracture, finger     Fracture, foot     Frozen shoulder     GERD (gastroesophageal reflux disease)     Hip arthrosis     Hormone disorder     Hyperlipidemia     Insomnia     Interstitial cystitis     Kidney stone     Liver failure     due to depakote    Lumbosacral disc disease     Lupus     Migraines     Orthostatic hypotension   "   Periarthritis of shoulder     Peripheral neuropathy     Rotator cuff syndrome     Scoliosis 11/2023    Seizures 2001    Conversion Disorder    Subluxation of patella     Suicidal thoughts     Suicide attempt     \"I tried overdosing\"  32 antihistamine tablets per pt 1/27/24    Urinary tract infection        Social History:  Social History     Socioeconomic History    Marital status:     Number of children: 2    Highest education level: Master's degree (e.g., MA, MS, Pancho, MEd, MSW, SULY)   Tobacco Use    Smoking status: Never     Passive exposure: Past    Smokeless tobacco: Never   Vaping Use    Vaping status: Never Used   Substance and Sexual Activity    Alcohol use: Yes     Alcohol/week: 1.0 standard drink of alcohol     Types: 1 Drinks containing 0.5 oz of alcohol per week     Comment: Occasionally    Drug use: Never    Sexual activity: Not Currently     Partners: Male     Birth control/protection: Post-menopausal, Hysterectomy       Family History:  Family History   Problem Relation Age of Onset    Cancer Mother         Brain, suspected uterine    Rheumatologic disease Mother     Dementia Father     Cancer Father         Prostate    Learning disabilities Father         Dyslexia    Other Father         Early onset alzheimer    Prostate cancer Father     Cancer Brother         Prostate, bone    Learning disabilities Brother         Dyslexia    Other Brother         Alzheimer    Prostate cancer Maternal Grandfather     Other Paternal Grandfather         Early onset alzheimer    Depression Son     Learning disabilities Son        Past Surgical History:  Past Surgical History:   Procedure Laterality Date    BARIATRIC SURGERY  2008    Gastric sleeve    BLADDER SURGERY      BREAST SURGERY Bilateral     reduction    CHOLECYSTECTOMY  2010    COLONOSCOPY      CYSTOSCOPY      ELECTROCONVULSIVE THERAPY Bilateral 2/15/2024    Procedure: BIFRONTAL ELECTROCONVULSIVE THERAPY;  Surgeon: Vinod Osuna MD;  " Location:  [facility];  Service: ECT;  Laterality: Bilateral;    ELECTROCONVULSIVE THERAPY N/A 2/19/2024    Procedure: ELECTROCONVULSIVE THERAPY;  Surgeon: Vinod Osuna MD;  Location:  [facility];  Service: ECT;  Laterality: N/A;    ELECTROCONVULSIVE THERAPY N/A 2/21/2024    Procedure: ELECTROCONVULSIVE THERAPY;  Surgeon: Vinod Osuna MD;  Location:  [facility];  Service: ECT;  Laterality: N/A;    FOOT SURGERY      FRACTURE SURGERY      Left wrist    GASTRIC SLEEVE LAPAROSCOPIC N/A     HYSTERECTOMY      NECK SURGERY      OOPHORECTOMY      SINUS SURGERY  2021    WISDOM TOOTH EXTRACTION N/A     WRIST SURGERY Left        Problem List:  Patient Active Problem List   Diagnosis    Anxiety    Bursitis of hip    Cervical spondylosis with radiculopathy    Chronic migraine without aura without status migrainosus, not intractable    Conversion disorder with attacks or seizures, persistent, with psychological stressor    Cramp of limb    Prediabetes    Diverticulosis of colon    Elevated liver enzymes    Eustachian tube dysfunction    Foot drop    Gastroesophageal reflux disease without esophagitis    Hereditary and idiopathic peripheral neuropathy    Hypersomnia    Internal hemorrhoids    Iron deficiency    Lumbar radiculopathy, chronic    Malaise and fatigue    Metabolic syndrome    Medicare annual wellness visit, subsequent    Encounter for neuropsychological testing    Mixed hyperlipidemia    Moderate episode of recurrent major depressive disorder    Obstructive sleep apnea    Osteoarthritis of right temporomandibular joint    Osteopenia of left lower leg    Panic disorder    PTSD (post-traumatic stress disorder)    RLS (restless legs syndrome)    Primary insomnia    Sinus drainage    Tinnitus    Urge incontinence    Urinary urgency    Vitamin D deficiency    Postmenopausal symptoms    Postmenopausal osteoporosis    Right leg pain    Edema of right lower extremity    Suicidal ideations    Recurrent UTI  "(urinary tract infection)    Acute cystitis without hematuria    Bilateral flank pain    Incomplete emptying of bladder    Osteoarthritis of left AC (acromioclavicular) joint    Tear of left glenoid labrum    Infraspinatus tendon partial tear, left, initial encounter    Supraspinatus tendonitis with fraying, left    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    DDD (degenerative disc disease), lumbar    Chronic midline low back pain with bilateral sciatica    Orthostasis    Essential tremor    Age-related cognitive decline    Suicidal ideation    Severe episode of recurrent major depressive disorder, without psychotic features    Constipation    Allergic rhinitis    MDD (major depressive disorder)    Dysthymia    Ulnar neuropathy of both upper extremities       Allergy:   Allergies   Allergen Reactions    Chlorzoxazone Unknown - High Severity and Swelling     Lorzone, muscle relaxant.    Iodine Anaphylaxis     Topical makes her swell  Anaphylaxis with IV contrast (when she was ~ 9yrs old)    Phenazopyridine Hcl Swelling and Anaphylaxis    Pyridium [Phenazopyridine] Anaphylaxis    Quinine Unknown - High Severity     Has malaria symptoms    Valproic Acid Other (See Comments)     Liver failure  Liver failure  Liver failure; lupus like symptoms and liver failure    Methocarbamol Other (See Comments)     Made her feel \"suicidal\" and gave her less control over her actions.    Iodinated Contrast Media Unknown - Low Severity    Codeine Itching    Diphenhydramine Anxiety     Pt has severe panic attack symptoms when given benadryl IV. Needs to take a benzodiazapine med concurrently when getting benadryl.         Current Medications:   No current facility-administered medications for this visit.     Current Outpatient Medications   Medication Sig Dispense Refill    estradiol (ESTRACE) 0.1 MG/GM vaginal cream Insert 2 g into the vagina 3 (Three) Times a Week. Mondays, Wednesdays and Fridays  Indications: Bladder Inflammation " "     FLUoxetine (PROzac) 40 MG capsule Take 1 capsule by mouth Daily.      ibandronate (BONIVA) 150 MG tablet Take 1 tablet by mouth Every 30 (Thirty) Days. Indications: Postmenopausal Osteoporosis 3 tablet 3    midodrine (PROAMATINE) 5 MG tablet Take 1 tablet by mouth 3 (Three) Times a Day Before Meals. Indications: Blood Pressure Drop Upon Standing      mirtazapine (REMERON) 15 MG tablet Take 1 tablet by mouth Every Night. 90 tablet 2    nitrofurantoin, macrocrystal-monohydrate, (MACROBID) 100 MG capsule Take 1 capsule by mouth 2 (Two) Times a Day. Indications: Prevention of Frequently Occuring Urinary Tract Infections 180 capsule 2    pantoprazole (PROTONIX) 40 MG EC tablet Take 1 tablet by mouth Daily. Indications: Gastroesophageal Reflux Disease 90 tablet 2     Facility-Administered Medications Ordered in Other Visits   Medication Dose Route Frequency Provider Last Rate Last Admin    sodium chloride 0.9 % flush 10 mL  10 mL Intravenous PRN Angel Kendall APRN         Review of Systems   Constitutional:  Negative for activity change, appetite change, unexpected weight gain and unexpected weight loss.   Respiratory:  Negative for shortness of breath.    Cardiovascular:  Negative for chest pain.   Psychiatric/Behavioral:  Positive for sleep disturbance, suicidal ideas and depressed mood.      Physical Exam  Vitals reviewed.   Constitutional:       General: She is not in acute distress.     Appearance: Normal appearance.   Neurological:      Mental Status: She is alert.      Gait: Gait normal.     Vitals:   Blood pressure 108/68, pulse 72, height 160 cm (63\"), weight 67.6 kg (149 lb), SpO2 99%.    Mental Status Exam:   Hygiene:   good  Cooperation:  Cooperative  Eye Contact:  Good  Psychomotor Behavior:  Appropriate  Affect:  Full range and Appropriate  Mood: depressed  Hopelessness: Denies  Speech:  Normal  Thought Process:  Goal directed and Linear  Thought Content:  Mood congruent  Suicidal:  Suicidal " Ideation  Homicidal:  None  Hallucinations:  None  Delusion:  None  Memory:  Intact  Orientation:  Person, Place, Time, and Situation  Reliability:  good  Insight:  Good  Judgement:  Good  Impulse Control:  Good    Assessment & Plan   Problems Addressed this Visit          Mental Health    Suicidal ideation (Chronic)    Severe episode of recurrent major depressive disorder, without psychotic features - Primary     Diagnoses         Codes Comments    Severe episode of recurrent major depressive disorder, without psychotic features    -  Primary ICD-10-CM: F33.2  ICD-9-CM: 296.33     Suicidal ideation     ICD-10-CM: R45.851  ICD-9-CM: V62.84             Visit Diagnoses:    ICD-10-CM ICD-9-CM   1. Severe episode of recurrent major depressive disorder, without psychotic features  F33.2 296.33   2. Suicidal ideation  R45.851 V62.84       Zo presents today for medication management follow up. Reports increased depression with SI. Has plan to overdose on prescription medication she currently has at home. Agreeable to further psychiatric evaluation in ER, accompanied to ER by provider.     -Reviewed previous available documentation and most recent available labs. YOSHI reviewed and is appropriate.    GOALS:  Short Term Goals: Patient will be compliant with medication, and patient will have no significant medication related side effects.  Patient will be engaged in psychotherapy as indicated.  Patient will report subjective improvement of symptoms.  Long term goals: To stabilize mood and treat/improve subjective symptoms, the patient will stay out of the hospital, the patient will be at an optimal level of functioning, and the patient will take all medications as prescribed.  The patient/guardian verbalized understanding and agreement with goals that were mutually set.    TREATMENT PLAN: Continue supportive psychotherapy efforts and medications as indicated for patient's diagnosis.  Pharmacological and  Non-Pharmacological treatment options discussed during today's visit. Patient/Guardian acknowledged and verbally consented with current treatment plan and was educated on the importance of compliance with treatment and follow-up appointments.      MEDICATION ISSUES:  Discussed medication options and treatment plan of prescribed medication as well as the risks, benefits, any black box warnings, and side effects including potential falls, possible impaired driving, and metabolic adversities among others. Patient is agreeable to call the office with any worsening of symptoms or onset of side effects, or if any concerns or questions arise.  The contact information for the office is made available to the patient. Patient is agreeable to call 911 or go to the nearest ER should they begin having any SI/HI, or if any urgent concerns arise. No medication side effects or related complaints today.     MEDS ORDERED DURING VISIT:  No orders of the defined types were placed in this encounter.      FOLLOW UP:  Return if symptoms worsen or fail to improve.             This document has been electronically signed by TRACI Khan  June 6, 2024 17:12 EDT    Please note that portions of this note were completed with a voice recognition program. Efforts were made to edit dictation, but occasionally words are mistranscribed

## 2024-06-06 NOTE — ED PROVIDER NOTES
"I personally went and spoke with the patient, she is resting no acute distress stable vital signs pt Name: Zo Palma  MRN: [id number]  : 1965  Date of Encounter: 2024    PCP: Evan Rivas DO      Subjective    History of Present Illness:    Chief Complaint: Suicidal ideation    History of Present Illness: Zo Palma is a 59 y.o. female who presents to the ER complaining of suicidal ideation.  Patient was being treated in outpatient [facility] office when she spoke about suicidal ideation and a plan to overdose.  [facility] staff requested patient come to the emergency room for evaluation and placement.  Patient is agreeable to be assessed and is requesting to be transferred to [facility] for possible ECT.        Nurses Notes reviewed and agree, including vitals, allergies, social history and prior medical history.       Allergies:    Chlorzoxazone, Iodine, Phenazopyridine hcl, Pyridium [phenazopyridine], Quinine, Valproic acid, Methocarbamol, Iodinated contrast media, Codeine, and Diphenhydramine    Past Medical History:   Diagnosis Date    Ankle sprain     Anxiety     Arteriovenous malformation 2004    Temporal hemangiona - right    Arthritis of back     Arthritis of neck     Brain concussion     Bursitis of hip     Cervical disc disorder     Conversion disorder     Depression     Fracture of wrist     Fracture, finger     Fracture, foot     Frozen shoulder     GERD (gastroesophageal reflux disease)     Hip arthrosis     Hormone disorder     Hyperlipidemia     Insomnia     Interstitial cystitis     Kidney stone     Liver failure     due to depakote    Lumbosacral disc disease     Lupus     Migraines     Orthostatic hypotension     Periarthritis of shoulder     Peripheral neuropathy     Rotator cuff syndrome     Scoliosis 2023    Seizures     Conversion Disorder    Subluxation of patella     Suicidal thoughts     Suicide attempt     \"I tried overdosing\"  32 antihistamine " tablets per pt 1/27/24    Urinary tract infection        Past Surgical History:   Procedure Laterality Date    BARIATRIC SURGERY  2008    Gastric sleeve    BLADDER SURGERY      BREAST SURGERY Bilateral     reduction    CHOLECYSTECTOMY  2010    COLONOSCOPY      CYSTOSCOPY      ELECTROCONVULSIVE THERAPY Bilateral 2/15/2024    Procedure: BIFRONTAL ELECTROCONVULSIVE THERAPY;  Surgeon: Vinod Osuna MD;  Location:  [facility] OR;  Service: ECT;  Laterality: Bilateral;    ELECTROCONVULSIVE THERAPY N/A 2/19/2024    Procedure: ELECTROCONVULSIVE THERAPY;  Surgeon: Vinod Osuna MD;  Location:  [facility] OR;  Service: ECT;  Laterality: N/A;    ELECTROCONVULSIVE THERAPY N/A 2/21/2024    Procedure: ELECTROCONVULSIVE THERAPY;  Surgeon: Vinod Osuna MD;  Location:  [facility] OR;  Service: ECT;  Laterality: N/A;    FOOT SURGERY      FRACTURE SURGERY      Left wrist    GASTRIC SLEEVE LAPAROSCOPIC N/A     HYSTERECTOMY      NECK SURGERY      OOPHORECTOMY      SINUS SURGERY  2021    WISDOM TOOTH EXTRACTION N/A     WRIST SURGERY Left        Social History     Socioeconomic History    Marital status:     Number of children: 2    Highest education level: Master's degree (e.g., MA, MS, Pancho, MEd, MSW, SULY)   Tobacco Use    Smoking status: Never     Passive exposure: Past    Smokeless tobacco: Never   Vaping Use    Vaping status: Never Used   Substance and Sexual Activity    Alcohol use: Yes     Alcohol/week: 1.0 standard drink of alcohol     Types: 1 Drinks containing 0.5 oz of alcohol per week     Comment: Occasionally    Drug use: Never    Sexual activity: Not Currently     Partners: Male     Birth control/protection: Post-menopausal, Hysterectomy       Family History   Problem Relation Age of Onset    Cancer Mother         Brain, suspected uterine    Rheumatologic disease Mother     Dementia Father     Cancer Father         Prostate    Learning disabilities Father         Dyslexia    Other Father          Early onset alzheimer    Prostate cancer Father     Cancer Brother         Prostate, bone    Learning disabilities Brother         Dyslexia    Other Brother         Alzheimer    Prostate cancer Maternal Grandfather     Other Paternal Grandfather         Early onset alzheimer    Depression Son     Learning disabilities Son        REVIEW OF SYSTEMS:     All systems reviewed and not pertinent unless noted.    Review of Systems   Psychiatric/Behavioral:  Positive for suicidal ideas.    All other systems reviewed and are negative.      Objective    Physical Exam  Vitals and nursing note reviewed.   Constitutional:       Appearance: Normal appearance.   HENT:      Head: Normocephalic and atraumatic.   Eyes:      Extraocular Movements: Extraocular movements intact.      Pupils: Pupils are equal, round, and reactive to light.   Cardiovascular:      Rate and Rhythm: Normal rate and regular rhythm.      Pulses: Normal pulses.      Heart sounds: Normal heart sounds.   Pulmonary:      Effort: Pulmonary effort is normal.      Breath sounds: Normal breath sounds.   Abdominal:      General: Abdomen is flat. Bowel sounds are normal.      Palpations: Abdomen is soft.   Musculoskeletal:      Cervical back: Normal range of motion and neck supple.   Skin:     Capillary Refill: Capillary refill takes less than 2 seconds.   Neurological:      General: No focal deficit present.      Mental Status: She is alert and oriented to person, place, and time. Mental status is at baseline.      GCS: GCS eye subscore is 4. GCS verbal subscore is 5. GCS motor subscore is 6.      Sensory: Sensation is intact.      Motor: Motor function is intact.      Gait: Gait is intact.   Psychiatric:         Attention and Perception: Attention and perception normal.         Mood and Affect: Mood and affect normal.         Speech: Speech normal.         Behavior: Behavior normal. Behavior is cooperative.               Procedures    ED Course:    ED Course as of  06/06/24 2026   Thu Jun 06, 2024   1742 Benzodiazepine Screen, Urine(!): Positive [TM]   1742 CBC & Differential [TM]   1745 I received care of this patient from [facility] pending laboratory testing and behavioral health evaluation.  Denies any significant life changes that have caused these thoughts states that she is just having some intrusive thoughts of suicide.   [TM]   1803 Ethanol: <10 [TM]   1803 Comprehensive Metabolic Panel(!) [TM]   1803 Salicylate Level [TM]   1803 Acetaminophen Level [TM]   1803 CBC & Differential [TM]   1803 Urine Drug Screen - Urine, Clean Catch(!) [TM]   1814 TSH Baseline: 1.270 [TM]   2025 Patient accepted at [facility] by Dr. KWON [TM]      ED Course User Index  [TM] Donovan Acuña PA-C       LAB Results:    Lab Results (last 24 hours)       Procedure Component Value Units Date/Time    Urine Drug Screen - Urine, Clean Catch [[id number]]  (Abnormal) Collected: 06/06/24 1700    Specimen: Urine, Clean Catch Updated: 06/06/24 1732     THC, Screen, Urine Negative     Phencyclidine (PCP), Urine Negative     Cocaine Screen, Urine Negative     Methamphetamine, Ur Negative     Opiate Screen Negative     Amphetamine Screen, Urine Negative     Benzodiazepine Screen, Urine Positive     Tricyclic Antidepressants Screen Negative     Methadone Screen, Urine Negative     Barbiturates Screen, Urine Negative     Oxycodone Screen, Urine Negative     Buprenorphine, Screen, Urine Negative    Narrative:      Cutoff For Drugs Screened:    Amphetamines               500 ng/ml  Barbiturates               200 ng/ml  Benzodiazepines            150 ng/ml  Cocaine                    150 ng/ml  Methadone                  200 ng/ml  Opiates                    100 ng/ml  Phencyclidine               25 ng/ml  THC                         50 ng/ml  Methamphetamine            500 ng/ml  Tricyclic Antidepressants  300 ng/ml  Oxycodone                  100 ng/ml  Buprenorphine               10  ng/ml    The normal value for all drugs tested is negative. This report includes unconfirmed screening results, with the cutoff values listed, to be used for medical treatment purposes only.  Unconfirmed results must not be used for non-medical purposes such as employment or legal testing.  Clinical consideration should be applied to any drug of abuse test, particularly when unconfirmed results are used.      Fentanyl, Urine - Urine, Clean Catch [[id number]]  (Normal) Collected: 06/06/24 1700    Specimen: Urine, Clean Catch Updated: 06/06/24 1728     Fentanyl, Urine Negative    Narrative:      Negative Threshold:      Fentanyl 5 ng/mL     The normal value for the drug tested is negative. This report includes final unconfirmed screening results to be used for medical treatment purposes only. Unconfirmed results must not be used for non-medical purposes such as employment or legal testing. Clinical consideration should be applied to any drug of abuse test, particularly when unconfirmed results are used.           CBC & Differential [[id number]]  (Normal) Collected: 06/06/24 1724    Specimen: Blood Updated: 06/06/24 1741    Narrative:      The following orders were created for panel order CBC & Differential.  Procedure                               Abnormality         Status                     ---------                               -----------         ------                     CBC Auto Differential[[id number]]        Normal              Final result                 Please view results for these tests on the individual orders.    Comprehensive Metabolic Panel [[id number]]  (Abnormal) Collected: 06/06/24 1724    Specimen: Blood Updated: 06/06/24 1803     Glucose 84 mg/dL      BUN 13 mg/dL      Creatinine 0.83 mg/dL      Sodium 144 mmol/L      Potassium 4.1 mmol/L      Chloride 109 mmol/L      CO2 23.0 mmol/L      Calcium 9.1 mg/dL      Total Protein 6.7 g/dL      Albumin 4.0 g/dL      ALT (SGPT) 14 U/L      AST (SGOT) 16  U/L      Alkaline Phosphatase 86 U/L      Total Bilirubin <0.2 mg/dL      Globulin 2.7 gm/dL      A/G Ratio 1.5 g/dL      BUN/Creatinine Ratio 15.7     Anion Gap 12.0 mmol/L      eGFR 81.3 mL/min/1.73     Narrative:      GFR Normal >60  Chronic Kidney Disease <60  Kidney Failure <15      Acetaminophen Level [[id number]]  (Normal) Collected: 06/06/24 1724    Specimen: Blood Updated: 06/06/24 1803     Acetaminophen <5.0 mcg/mL     Narrative:      Toxic = Greater than 150 mcg/mL    Ethanol [[id number]] Collected: 06/06/24 1724    Specimen: Blood Updated: 06/06/24 1803     Ethanol <10 mg/dL      Ethanol % <0.010 %     Narrative:      This result is for medical use only and should not be used for forensic purposes.    Salicylate Level [[id number]]  (Normal) Collected: 06/06/24 1724    Specimen: Blood Updated: 06/06/24 1803     Salicylate <0.3 mg/dL     TSH [[id number]]  (Normal) Collected: 06/06/24 1724    Specimen: Blood Updated: 06/06/24 1814     TSH 1.270 uIU/mL     CBC Auto Differential [[id number]]  (Normal) Collected: 06/06/24 1724    Specimen: Blood Updated: 06/06/24 1741     WBC 5.82 10*3/mm3      RBC 4.17 10*6/mm3      Hemoglobin 12.5 g/dL      Hematocrit 38.0 %      MCV 91.1 fL      MCH 30.0 pg      MCHC 32.9 g/dL      RDW 12.8 %      RDW-SD 42.3 fl      MPV 9.7 fL      Platelets 266 10*3/mm3      Neutrophil % 52.7 %      Lymphocyte % 36.8 %      Monocyte % 8.1 %      Eosinophil % 1.2 %      Basophil % 0.9 %      Immature Grans % 0.3 %      Neutrophils, Absolute 3.07 10*3/mm3      Lymphocytes, Absolute 2.14 10*3/mm3      Monocytes, Absolute 0.47 10*3/mm3      Eosinophils, Absolute 0.07 10*3/mm3      Basophils, Absolute 0.05 10*3/mm3      Immature Grans, Absolute 0.02 10*3/mm3      nRBC 0.0 /100 WBC              If labs were ordered, I have independently reviewed the results and considered them in the diagnosis and treatment plan for the patient    RADIOLOGY    No radiology results from the last 24 hrs     If I have  ordered, I have independently reviewed the above noted radiographic studies.  Please see the radiologist dictation for the official interpretation    Medications given to patient in the ER    Medications   sodium chloride 0.9 % flush 10 mL (has no administration in time range)                 Shared Decision Making: After my consideration the clinical presentation and laboratory/radiology studies obtained, I discussed the findings with the patient/patient representative who is in agreement with the treatment plan and final disposition. Risks and benefits of discharge and/or observation admission were discussed.  Final disposition of the patient will be transferred to [facility]      Medications prescribed: None       Medical Decision Making   Zo Palma is a 59 y.o. female who presents to the ER complaining of suicidal ideation.  Patient was being treated in outpatient [facility] office when she spoke about suicidal ideation and a plan to overdose.  [facility] staff requested patient come to the emergency room for evaluation and placement.  Patient is agreeable to be assessed and is requesting to be transferred to [facility] for possible ECT.    DDX: includes but is not limited to: Suicidal ideation, anxiety, depression, other    Problems Addressed:  Suicidal ideations: complicated acute illness or injury    Amount and/or Complexity of Data Reviewed  Labs: ordered. Decision-making details documented in ED Course.    Risk  Prescription drug management.          Final diagnoses:   Suicidal ideations         Please note that portions of this document were completed using voice recognition dictation software.       Donovan Acuña PA-C  06/06/24 2026

## 2024-06-07 ENCOUNTER — TELEPHONE (OUTPATIENT)
Dept: NEUROSURGERY | Facility: CLINIC | Age: 59
End: 2024-06-07
Payer: MEDICARE

## 2024-06-07 ENCOUNTER — APPOINTMENT (OUTPATIENT)
Dept: GENERAL RADIOLOGY | Facility: HOSPITAL | Age: 59
DRG: 885 | End: 2024-06-07
Payer: MEDICARE

## 2024-06-07 PROBLEM — F33.2 MDD (MAJOR DEPRESSIVE DISORDER), RECURRENT EPISODE, SEVERE: Status: ACTIVE | Noted: 2024-06-07

## 2024-06-07 PROBLEM — G89.29 CHRONIC BACK PAIN: Status: ACTIVE | Noted: 2024-06-07

## 2024-06-07 PROBLEM — M54.9 CHRONIC BACK PAIN: Status: ACTIVE | Noted: 2024-06-07

## 2024-06-07 LAB
HAV IGM SERPL QL IA: NORMAL
HBV CORE IGM SERPL QL IA: NORMAL
HBV SURFACE AG SERPL QL IA: NORMAL
HCV AB SER QL: NORMAL
QT INTERVAL: 428 MS
QTC INTERVAL: 420 MS
TSH SERPL DL<=0.05 MIU/L-ACNC: 0.96 UIU/ML (ref 0.27–4.2)

## 2024-06-07 PROCEDURE — 72020 X-RAY EXAM OF SPINE 1 VIEW: CPT | Performed by: RADIOLOGY

## 2024-06-07 PROCEDURE — 99222 1ST HOSP IP/OBS MODERATE 55: CPT

## 2024-06-07 PROCEDURE — 71046 X-RAY EXAM CHEST 2 VIEWS: CPT | Performed by: RADIOLOGY

## 2024-06-07 PROCEDURE — 71046 X-RAY EXAM CHEST 2 VIEWS: CPT

## 2024-06-07 PROCEDURE — 72020 X-RAY EXAM OF SPINE 1 VIEW: CPT

## 2024-06-07 PROCEDURE — 84443 ASSAY THYROID STIM HORMONE: CPT | Performed by: PSYCHIATRY & NEUROLOGY

## 2024-06-07 PROCEDURE — 99223 1ST HOSP IP/OBS HIGH 75: CPT | Performed by: PSYCHIATRY & NEUROLOGY

## 2024-06-07 RX ORDER — ESTRADIOL 0.1 MG/G
1 CREAM VAGINAL 3 TIMES WEEKLY
Status: DISCONTINUED | OUTPATIENT
Start: 2024-06-07 | End: 2024-06-21 | Stop reason: HOSPADM

## 2024-06-07 RX ORDER — NITROFURANTOIN 25; 75 MG/1; MG/1
100 CAPSULE ORAL DAILY
Status: DISCONTINUED | OUTPATIENT
Start: 2024-06-07 | End: 2024-06-21 | Stop reason: HOSPADM

## 2024-06-07 RX ORDER — FLUOXETINE HYDROCHLORIDE 40 MG/1
40 CAPSULE ORAL EVERY MORNING
COMMUNITY
End: 2024-06-21 | Stop reason: HOSPADM

## 2024-06-07 RX ORDER — NITROFURANTOIN 25; 75 MG/1; MG/1
100 CAPSULE ORAL DAILY
COMMUNITY

## 2024-06-07 RX ORDER — PANTOPRAZOLE SODIUM 40 MG/1
40 TABLET, DELAYED RELEASE ORAL DAILY
Status: DISCONTINUED | OUTPATIENT
Start: 2024-06-07 | End: 2024-06-21 | Stop reason: HOSPADM

## 2024-06-07 RX ORDER — ESTRADIOL 0.1 MG/G
1 CREAM VAGINAL 3 TIMES WEEKLY
COMMUNITY

## 2024-06-07 RX ORDER — MIRTAZAPINE 15 MG/1
30 TABLET, FILM COATED ORAL NIGHTLY
Status: DISCONTINUED | OUTPATIENT
Start: 2024-06-07 | End: 2024-06-21 | Stop reason: HOSPADM

## 2024-06-07 RX ORDER — ACETAMINOPHEN 500 MG
500 TABLET ORAL DAILY PRN
COMMUNITY

## 2024-06-07 RX ORDER — ESTRADIOL 0.1 MG/G
1 CREAM VAGINAL 3 TIMES WEEKLY
Status: DISCONTINUED | OUTPATIENT
Start: 2024-06-07 | End: 2024-06-07 | Stop reason: CLARIF

## 2024-06-07 RX ORDER — MIRTAZAPINE 15 MG/1
15 TABLET, FILM COATED ORAL NIGHTLY
Status: CANCELLED | OUTPATIENT
Start: 2024-06-07

## 2024-06-07 RX ORDER — FLUOXETINE HYDROCHLORIDE 20 MG/1
40 CAPSULE ORAL EVERY MORNING
Status: DISCONTINUED | OUTPATIENT
Start: 2024-06-07 | End: 2024-06-13

## 2024-06-07 RX ADMIN — ESTRADIOL 1 APPLICATOR: 0.1 CREAM VAGINAL at 21:26

## 2024-06-07 RX ADMIN — NITROFURANTOIN MONOHYDRATE/MACROCRYSTALLINE 100 MG: 25; 75 CAPSULE ORAL at 11:26

## 2024-06-07 RX ADMIN — PANTOPRAZOLE SODIUM 40 MG: 40 TABLET, DELAYED RELEASE ORAL at 11:26

## 2024-06-07 RX ADMIN — FLUOXETINE HYDROCHLORIDE 40 MG: 20 CAPSULE ORAL at 11:26

## 2024-06-07 RX ADMIN — MIRTAZAPINE 30 MG: 15 TABLET, FILM COATED ORAL at 21:26

## 2024-06-07 NOTE — NURSING NOTE
PATIENT DID NOT WANT MATERIALS OR TO WATCH VIDEO AGAIN.  SHE HAD ECT ALREADY THIS YEAR AND HAD NO FURTHER QUESTIONS.     To get better and follow your care plan as instructed.

## 2024-06-07 NOTE — H&P
"INITIAL PSYCHIATRIC HISTORY & PHYSICAL    Patient Identification:  Name:    Zo Palma   Age:   59 y.o.  Sex:   female  :   1965  MRN:   [id number]  Visit Number:   [id number]  Primary Care Physician:   Evan Rivas DO  Admission Date: 2024    SUBJECTIVE    CC/Focus of Exam: depression    HPI:   [facility] admission for Zo Palma .     Ms. Palma is a 59-year-old  (3 years after 36 years marriage) mother of 35-year-old son (in the state of [place]) and 33-year-old daughter (Vicenta Del Castillo) college-educated with a masters degree and developmental genetics, chronically depressed (30 years)  female referred from the [facility] having stated suicidal intent with a plan to overdose.  Patient hospitalized here  through  for major depression receiving 3 bifrontal ect treatments achieving a remission of her depressive symptomatology.    As she explains she i did \"amazingly\" well in the interval until 2 weeks prior to this admission when she became depressed, feeling hopeless worthless and thoughts she would be better off dead.  No specific precipitating event or psychosocial stressor associated with the recurrence.  Patient has been taking the fluoxetine 40 mg and mirtazapine 15 mg daily in the interim.  There has been no intervening new medical issues.  Patient is under the conviction that ect will be beneficial as it had been in the recent past.  Patient aware of the risk benefits issues involved with ect.   Interestingly patient has resolved the financial stressors as were so prominent at the time of her last admission.  She states she is negotiated with [employer] achieving a reduction of the principle owed and a payment schedule she can afford with the rent she is receiving from the house she could not sell in [place].  No change of her home domestic situation which is stable by her account.  Patient admitted " on a voluntary basis and participating develop a treatment plan.    Available medical/psychiatric records reviewed and incorporated into the current document.     PAST PSYCHIATRIC HX: See HPI and prior record.    SUBSTANCE USE HX: Not an apparent issue with this patient.           FAMILY HX: No family history of affect a disorder nor suicides among first-degree relatives.  Father had early onset dementia as did his father and the patient's brother who is now 70.       SOCIAL HX:   Patient has received  security disability on the basis of her mental health since 2011.  Patient has no current or past legal issues having resolved the credit card debt problem as she described.  She states her relationships with her children are stable, she plans a trip to the [facility] at the end of June to celebrate the daughter-in-law's graduation from a residency program.  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  The patient is planning to train another.  Relationship with her roommate is reported as stable, he is supportive. It would seem that her domestic life, her relationships, are reasonably stable and not significant cause for depression.      Past Medical History:   Diagnosis Date    Ankle sprain     Anxiety     Arteriovenous malformation 2004    Temporal hemangiona - right    Arthritis of back     Arthritis of neck     Brain concussion     Bursitis of hip     Cervical disc disorder     Conversion disorder     Depression     Fracture of wrist     Fracture, finger     Fracture, foot     Frozen shoulder     GERD (gastroesophageal reflux disease)     Hip arthrosis     Hormone disorder     Hyperlipidemia     Insomnia     Interstitial cystitis     Kidney stone     Liver failure     due to depakote    Lumbosacral disc disease     Lupus     Migraines     Orthostatic hypotension     Periarthritis of shoulder     Peripheral neuropathy     Rotator cuff  "syndrome     Scoliosis 11/2023    Seizures 2001    Conversion Disorder    Subluxation of patella     Suicidal thoughts     Suicide attempt     \"I tried overdosing\"  32 antihistamine tablets per pt 1/27/24    Urinary tract infection        Past Surgical History:   Procedure Laterality Date    BARIATRIC SURGERY  2008    Gastric sleeve    BLADDER SURGERY      BREAST SURGERY Bilateral     reduction    CHOLECYSTECTOMY  2010    COLONOSCOPY      CYSTOSCOPY      ELECTROCONVULSIVE THERAPY Bilateral 2/15/2024    Procedure: BIFRONTAL ELECTROCONVULSIVE THERAPY;  Surgeon: Vinod Osuna MD;  Location:  [facility];  Service: ECT;  Laterality: Bilateral;    ELECTROCONVULSIVE THERAPY N/A 2/19/2024    Procedure: ELECTROCONVULSIVE THERAPY;  Surgeon: Vinod Osuna MD;  Location:  [facility];  Service: ECT;  Laterality: N/A;    ELECTROCONVULSIVE THERAPY N/A 2/21/2024    Procedure: ELECTROCONVULSIVE THERAPY;  Surgeon: Vinod Osuna MD;  Location:  [facility];  Service: ECT;  Laterality: N/A;    FOOT SURGERY      FRACTURE SURGERY      Left wrist    GASTRIC SLEEVE LAPAROSCOPIC N/A     HYSTERECTOMY      NECK SURGERY      OOPHORECTOMY      SINUS SURGERY  2021    WISDOM TOOTH EXTRACTION N/A     WRIST SURGERY Left        Family History   Problem Relation Age of Onset    Cancer Mother         Brain, suspected uterine    Rheumatologic disease Mother     Dementia Father     Cancer Father         Prostate    Learning disabilities Father         Dyslexia    Other Father         Early onset alzheimer    Prostate cancer Father     Cancer Brother         Prostate, bone    Learning disabilities Brother         Dyslexia    Other Brother         Alzheimer    Prostate cancer Maternal Grandfather     Other Paternal Grandfather         Early onset alzheimer    Depression Son     Learning disabilities Son          Medications Prior to Admission   Medication Sig Dispense Refill Last Dose    estradiol (ESTRACE) 0.1 MG/GM vaginal " cream Insert 1 g into the vagina 3 (Three) Times a Week.   Past Week    FLUoxetine (PROzac) 40 MG capsule Take 1 capsule by mouth Every Morning.   6/6/2024    Liraglutide (VICTOZA) 18 MG/3ML solution pen-injector injection Inject 1.8 mg under the skin into the appropriate area as directed Daily.   6/6/2024    nitrofurantoin, macrocrystal-monohydrate, (MACROBID) 100 MG capsule Take 1 capsule by mouth Daily. Prophylaxis of Frequently Occuring Urinary Tract Infection   6/6/2024    pantoprazole (PROTONIX) 40 MG EC tablet Take 1 tablet by mouth Daily. Indications: Gastroesophageal Reflux Disease 90 tablet 2 6/6/2024    acetaminophen (TYLENOL) 500 MG tablet Take 1 tablet by mouth Daily As Needed for Mild Pain or Moderate Pain.   Unknown    ibandronate (BONIVA) 150 MG tablet Take 1 tablet by mouth Every 30 (Thirty) Days. Indications: Postmenopausal Osteoporosis 3 tablet 3 6/1/2024    mirtazapine (REMERON) 15 MG tablet Take 1 tablet by mouth Every Night. 90 tablet 2 6/5/2024           ALLERGIES:  Chlorzoxazone, Iodine, Phenazopyridine hcl, Pyridium [phenazopyridine], Quinine, Valproic acid, Methocarbamol, Iodinated contrast media, Codeine, and Diphenhydramine    Temp:  [97.4 °F (36.3 °C)-98 °F (36.7 °C)] 97.4 °F (36.3 °C)  Heart Rate:  [] 71  Resp:  [18] 18  BP: (107-133)/(66-90) 125/75    REVIEW OF SYSTEMS:  Review of Systems   Constitutional: Negative.    HENT: Negative.     Eyes: Negative.    Respiratory: Negative.     Cardiovascular: Negative.    Gastrointestinal: Negative.    Endocrine: Negative.    Genitourinary: Negative.    Musculoskeletal: Negative.  Positive for back pain.   Skin: Negative.    Allergic/Immunologic: Negative.    Neurological: Negative.         Familiar, essential    Hematological: Negative.       See HPI for psychiatric ROS  OBJECTIVE    PHYSICAL EXAM:  Physical Exam  Constitutional:       Appearance: Normal appearance.   HENT:      Head: Normocephalic and atraumatic.      Right Ear: External  ear normal.      Left Ear: External ear normal.      Nose: Nose normal.      Mouth/Throat:      Pharynx: Oropharynx is clear.   Eyes:      Extraocular Movements: Extraocular movements intact.      Conjunctiva/sclera: Conjunctivae normal.      Pupils: Pupils are equal, round, and reactive to light.   Cardiovascular:      Rate and Rhythm: Normal rate and regular rhythm.   Pulmonary:      Effort: Pulmonary effort is normal.   Abdominal:      General: Abdomen is flat.      Palpations: Abdomen is soft.   Musculoskeletal:         General: Normal range of motion.      Cervical back: Normal range of motion.   Skin:     General: Skin is warm and dry.   Neurological:      General: No focal deficit present.      Mental Status: She is alert.         MENTAL STATUS EXAM:   Hygiene:   good  Cooperation:  Cooperative  Eye Contact:  Good  Psychomotor Behavior:  Appropriate  Affect:   Depressed  Hopelessness: 10  Speech:  Normal  Thought Progression: Linear  Thought Content:  Mood congruent  Suicidal:  Suicidal Ideation, Suicidal plan, and Death wish  Homicidal:  None  Hallucinations:  None  Delusion:  None  Memory:  Intact  Orientation:  Person, Place, Time, and Situation  Reliability:  good  Insight:  Good  Judgement:  Good  Impulse Control:  Good    Imaging Results (Last 24 Hours)       ** No results found for the last 24 hours. **         MRI done April 4 2024  IMPRESSION:     1. No parenchymal mass, hemorrhage, or midline shift.  2. No contrast-enhancing mass.  3. Minimal sphenoid sinus disease.  4. Linear focus of vague enhancement in the right frontal lobe linear in  appearance and probably physiologic but could represent small venous  malformation.   Patient reports follow-up evaluations with neurologist and a neurosurgeon found this finding to be insignificant.    ECG/EMG Results (most recent)       Procedure Component Value Units Date/Time    ECG 12 Lead Other; Baseline Cardiac Status [[id number]] Collected: 06/07/24 0037      Updated: 06/07/24 0948     QT Interval 428 ms      QTC Interval 420 ms     Narrative:      Test Reason : Other~  Blood Pressure :   */*   mmHG  Vent. Rate :  58 BPM     Atrial Rate :  58 BPM     P-R Int : 194 ms          QRS Dur :  82 ms      QT Int : 428 ms       P-R-T Axes :  57   7  45 degrees     QTc Int : 420 ms    Sinus bradycardia  Otherwise normal ECG  Confirmed by Rachel Hawkins ([id number]) on 6/7/2024 9:48:46 AM    Referred By:            Confirmed By: Rachel Hawkins             Lab Results   Component Value Date    GLUCOSE 84 06/06/2024    BUN 13 06/06/2024    CREATININE 0.83 06/06/2024    BCR 15.7 06/06/2024    CO2 23.0 06/06/2024    CALCIUM 9.1 06/06/2024    ALBUMIN 4.0 06/06/2024    AST 16 06/06/2024    ALT 14 06/06/2024       Lab Results   Component Value Date    WBC 5.82 06/06/2024    HGB 12.5 06/06/2024    HCT 38.0 06/06/2024    MCV 91.1 06/06/2024     06/06/2024       Pain Management Panel  More data exists         Latest Ref Rng & Units 6/6/2024 2/8/2024   Pain Management Panel   Amphetamine, Urine Qual Negative Negative  Negative    Barbiturates Screen, Urine Negative Negative  Negative    Benzodiazepine Screen, Urine Negative Positive  Negative    Buprenorphine, Screen, Urine Negative Negative  Negative    Cocaine Screen, Urine Negative Negative  Negative    Fentanyl, Urine Negative Negative  Negative    Methadone Screen , Urine Negative Negative  Negative    Methamphetamine, Ur Negative Negative  Negative        Brief Urine Lab Results  (Last result in the past 365 days)        Color   Clarity   Blood   Leuk Est   Nitrite   Protein   CREAT   Urine HCG        02/13/24 1240 Yellow   Clear   Negative   Negative   Negative   Negative                   Reviewed labs and studies done with this admission.       ASSESSMENT & PLAN:      Hospital bed: Yes patient has chronic back problems.     Severe episode of recurrent major depressive disorder, without psychotic features  We will continue the  Prozac/mirtazapine and proceed with a pre-ECT workup plus a supportive individual and group psychotherapeutic effort.      Gastroesophageal reflux disease without esophagitis  Protonix          Chronic back pain  PRN ibuprofen        The patient has been admitted for safety and stabilization.  Patient will be monitored for suicidality daily and maintained on Special Precautions Level 3 (q15 min checks) . The patient will have individual and group therapy with a master's level therapist. A master treatment plan will be developed and agreed upon by the patient and his/her treatment team.  The patient's estimated length of stay in the hospital is 5-7 days.       I spent a total of 75 minutes in direct patient care, greater than 40 minutes (greater than 50%) were spent face-to-face with assessment, coordination of care, counseling,  and answering any questions the patient had regarding her status and the treatment plan.     PELON Osuna MD    06/07/24  10:09 AM EDT

## 2024-06-07 NOTE — CONSULTS
[facility] Medicine Services  CONSULT NOTE     Inpatient Hospitalist Consult  Consult performed by: Boy Vergara PA-C  Consult ordered by: Vinod Osuna MD        Patient Identification:  Name:  Zo Palma  Age:  59 y.o.  Sex:  female  :  1965  MRN:  [id number]  Visit Number:  [id number]  Primary care provider:  Evan Rivas DO    Length of stay:  1    Subjective     Chief Complaint:  No chief complaint on file.      History of presenting illness:     Zo Palma is a 59 y.o. female admitted by psychiatry secondary to MDD, SI.  PMHx is significant for MDD, PTSD, T2DM, RLS, GERD, orthostatic hypotension, chronic low back pain     Hospital medicine was consulted for ECT clearance.    Most recent vital signs were temp 97.4, heart rate 71, respiratory rate 18, /75, SpO2 97% on RA.  Labs reviewed and unremarkable.  Chest x-ray, L-spine x-ray are pending.  Patient does have an MRI brain with and without contrast dated 2024 which showed a linear focus of vague enhancement in the right frontal lobe linear in appearance.  EKG was sinus bradycardia with rate 58.    She was seen and examined in geriatric psych. Resting comfortably. She reports physically she feels well and at baseline. No new chest pain or palpitations. She is not short of breath. No recent illness, cough, fever, or chills. No abdominal pain, N/V/D. No leg swelling. No new headaches or blurred vision. She reports no significant change to medical history since last admission and ECT at this facility. She does have hardware in her neck from previous surgery. No other reported history of spinal trauma or surgeries. No history of difficulty with anesthesia. No significant cardiac history including CHF, valvular disease, arrhythmia, CAD. She has history of pseudoseizures due to conversion disorder and is not on anticonvulsant. No history of significant head trauma, skull fractures, or  neurosurgical procedures. 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 She recently saw neurosurgery at [facility] and had MRI of the brain for further evaluation of essential tremor. She states she was told the scan showed either a dilated blood vessel or another AVM.      ---------------------------------------------------------------------------------------------------------------------  Review of Systems   Constitutional:  Negative for chills and fever.   HENT:  Negative for congestion and rhinorrhea.    Eyes:  Negative for visual disturbance.   Respiratory:  Negative for cough and shortness of breath.    Cardiovascular:  Negative for chest pain, palpitations and leg swelling.   Gastrointestinal:  Negative for abdominal pain, diarrhea, nausea and vomiting.   Genitourinary:  Negative for difficulty urinating and dysuria.   Musculoskeletal:  Negative for arthralgias and myalgias.   Skin:  Negative for rash and wound.   Neurological:  Negative for headaches.          ---------------------------------------------------------------------------------------------------------------------   Past History:  Past Medical History:   Diagnosis Date    Ankle sprain     Anxiety     Arteriovenous malformation 2004    Temporal hemangiona - right    Arthritis of back     Arthritis of neck     Brain concussion     Bursitis of hip     Cervical disc disorder     Conversion disorder     Depression     Fracture of wrist     Fracture, finger     Fracture, foot     Frozen shoulder     GERD (gastroesophageal reflux disease)     Hip arthrosis     Hormone disorder     Hyperlipidemia     Insomnia     Interstitial cystitis     Kidney stone     Liver failure     due to depakote    Lumbosacral disc disease     Lupus     Migraines     Orthostatic hypotension     Periarthritis of shoulder     Peripheral neuropathy     Rotator cuff syndrome     Scoliosis 11/2023    Seizures 2001    Conversion Disorder    Subluxation of patella   "   Suicidal thoughts     Suicide attempt     \"I tried overdosing\"  32 antihistamine tablets per pt 1/27/24    Urinary tract infection      Past Surgical History:   Procedure Laterality Date    BARIATRIC SURGERY  2008    Gastric sleeve    BLADDER SURGERY      BREAST SURGERY Bilateral     reduction    CHOLECYSTECTOMY  2010    COLONOSCOPY      CYSTOSCOPY      ELECTROCONVULSIVE THERAPY Bilateral 2/15/2024    Procedure: BIFRONTAL ELECTROCONVULSIVE THERAPY;  Surgeon: Vinod Osuna MD;  Location:  [facility];  Service: ECT;  Laterality: Bilateral;    ELECTROCONVULSIVE THERAPY N/A 2/19/2024    Procedure: ELECTROCONVULSIVE THERAPY;  Surgeon: Vinod Osuna MD;  Location:  [facility];  Service: ECT;  Laterality: N/A;    ELECTROCONVULSIVE THERAPY N/A 2/21/2024    Procedure: ELECTROCONVULSIVE THERAPY;  Surgeon: Vinod Osuna MD;  Location:  [facility];  Service: ECT;  Laterality: N/A;    FOOT SURGERY      FRACTURE SURGERY      Left wrist    GASTRIC SLEEVE LAPAROSCOPIC N/A     HYSTERECTOMY      NECK SURGERY      OOPHORECTOMY      SINUS SURGERY  2021    WISDOM TOOTH EXTRACTION N/A     WRIST SURGERY Left      Family History   Problem Relation Age of Onset    Cancer Mother         Brain, suspected uterine    Rheumatologic disease Mother     Dementia Father     Cancer Father         Prostate    Learning disabilities Father         Dyslexia    Other Father         Early onset alzheimer    Prostate cancer Father     Cancer Brother         Prostate, bone    Learning disabilities Brother         Dyslexia    Other Brother         Alzheimer    Prostate cancer Maternal Grandfather     Other Paternal Grandfather         Early onset alzheimer    Depression Son     Learning disabilities Son      Social History     Socioeconomic History    Marital status:     Number of children: 2    Highest education level: Master's degree (e.g., MA, MS, Pancho, MEd, MSW, SULY)   Tobacco Use    Smoking status: Never     " Passive exposure: Past    Smokeless tobacco: Never   Vaping Use    Vaping status: Never Used   Substance and Sexual Activity    Alcohol use: Yes     Alcohol/week: 1.0 standard drink of alcohol     Types: 1 Drinks containing 0.5 oz of alcohol per week     Comment: Occasionally    Drug use: Never    Sexual activity: Not Currently     Partners: Male     Birth control/protection: Post-menopausal, Hysterectomy     ---------------------------------------------------------------------------------------------------------------------   Allergies:  Chlorzoxazone, Iodine, Phenazopyridine hcl, Pyridium [phenazopyridine], Quinine, Valproic acid, Methocarbamol, Iodinated contrast media, Codeine, and Diphenhydramine  ---------------------------------------------------------------------------------------------------------------------   Prior to Admission Medications       Prescriptions Last Dose Informant Patient Reported? Taking?    acetaminophen (TYLENOL) 500 MG tablet Unknown Self Yes No    Take 1 tablet by mouth Daily As Needed for Mild Pain or Moderate Pain.    estradiol (ESTRACE) 0.1 MG/GM vaginal cream Past Week Self, Pharmacy Yes Yes    Insert 1 g into the vagina 3 (Three) Times a Week.    FLUoxetine (PROzac) 40 MG capsule 6/6/2024 Self, Pharmacy Yes Yes    Take 1 capsule by mouth Every Morning.    ibandronate (BONIVA) 150 MG tablet 6/1/2024 Self, Pharmacy No No    Take 1 tablet by mouth Every 30 (Thirty) Days. Indications: Postmenopausal Osteoporosis    Liraglutide (VICTOZA) 18 MG/3ML solution pen-injector injection 6/6/2024 Self, Other Yes Yes    Inject 1.8 mg under the skin into the appropriate area as directed Daily.    mirtazapine (REMERON) 15 MG tablet 6/5/2024 Self, Pharmacy No No    Take 1 tablet by mouth Every Night.    nitrofurantoin, macrocrystal-monohydrate, (MACROBID) 100 MG capsule 6/6/2024 Self, Pharmacy Yes Yes    Take 1 capsule by mouth Daily. Prophylaxis of Frequently Occuring Urinary Tract Infection     pantoprazole (PROTONIX) 40 MG EC tablet 6/6/2024 Self, Pharmacy No Yes    Take 1 tablet by mouth Daily. Indications: Gastroesophageal Reflux Disease          ---------------------------------------------------------------------------------------------------------------------     Objective      Procedures:      [facility] Meds:  FLUoxetine, 40 mg, Oral, QAM  mirtazapine, 30 mg, Oral, Nightly  nitrofurantoin (macrocrystal-monohydrate), 100 mg, Oral, Daily  pantoprazole, 40 mg, Oral, Daily         ---------------------------------------------------------------------------------------------------------------------   Vital Signs:  Temp:  [97.4 °F (36.3 °C)-98 °F (36.7 °C)] 97.4 °F (36.3 °C)  Heart Rate:  [] 71  Resp:  [18] 18  BP: (107-133)/(66-90) 125/75  No data found.  SpO2 Percentage    06/06/24 2300 06/07/24 0200 06/07/24 0815   SpO2: 96% 96% 97%     SpO2:  [96 %-99 %] 97 %  on   ;   Device (Oxygen Therapy): room air    Body mass index is 26.54 kg/m².  Wt Readings from Last 3 Encounters:   06/06/24 67.9 kg (149 lb 12.8 oz)   06/06/24 67.6 kg (149 lb)   06/06/24 67.6 kg (149 lb)      No intake or output data in the 24 hours ending 06/07/24 1220  Diet: Regular/House; Safe Tray; Fluid Consistency: Thin (IDDSI 0)  ---------------------------------------------------------------------------------------------------------------------   Physical exam:  Physical Exam  Vitals and nursing note reviewed.   Constitutional:       General: She is not in acute distress.  HENT:      Head: Normocephalic and atraumatic.   Eyes:      Extraocular Movements: Extraocular movements intact.   Cardiovascular:      Rate and Rhythm: Normal rate and regular rhythm.   Pulmonary:      Effort: Pulmonary effort is normal.      Breath sounds: Normal breath sounds.   Abdominal:      Palpations: Abdomen is soft.      Tenderness: There is no abdominal tenderness.   Musculoskeletal:      Right lower leg: No edema.      Left lower leg: No edema.  "  Skin:     General: Skin is warm and dry.   Neurological:      Mental Status: She is alert. Mental status is at baseline.   Psychiatric:         Behavior: Behavior normal.       ---------------------------------------------------------------------------------------------------------------------   EKG:       I have personally reviewed the EKG.   ---------------------------------------------------------------------------------------------------------------------             Results from last 7 days   Lab Units 06/06/24  1724   WBC 10*3/mm3 5.82   HEMOGLOBIN g/dL 12.5   HEMATOCRIT % 38.0   MCV fL 91.1   MCHC g/dL 32.9   PLATELETS 10*3/mm3 266     Results from last 7 days   Lab Units 06/06/24  1724   SODIUM mmol/L 144   POTASSIUM mmol/L 4.1   CHLORIDE mmol/L 109*   CO2 mmol/L 23.0   BUN mg/dL 13   CREATININE mg/dL 0.83   CALCIUM mg/dL 9.1   GLUCOSE mg/dL 84   ALBUMIN g/dL 4.0   BILIRUBIN mg/dL <0.2   ALK PHOS U/L 86   AST (SGOT) U/L 16   ALT (SGPT) U/L 14   Estimated Creatinine Clearance: 67.5 mL/min (by C-G formula based on SCr of 0.83 mg/dL).  No results found for: \"AMMONIA\"    No results found for: \"HGBA1C\", \"POCGLU\"  Lab Results   Component Value Date    HGBA1C 5.1 06/03/2024     Lab Results   Component Value Date    TSH 1.270 06/06/2024    FREET4 1.18 02/13/2024       No results found for: \"BLOODCX\"  No results found for: \"URINECX\"  No results found for: \"WOUNDCX\"  No results found for: \"STOOLCX\"  No results found for: \"RESPCX\"  Pain Management Panel  More data exists         Latest Ref Rng & Units 6/6/2024 2/8/2024   Pain Management Panel   Amphetamine, Urine Qual Negative Negative  Negative    Barbiturates Screen, Urine Negative Negative  Negative    Benzodiazepine Screen, Urine Negative Positive  Negative    Buprenorphine, Screen, Urine Negative Negative  Negative    Cocaine Screen, Urine Negative Negative  Negative    Fentanyl, Urine Negative Negative  Negative    Methadone Screen , Urine Negative Negative  " Negative    Methamphetamine, Ur Negative Negative  Negative      I have personally reviewed the above laboratory results.   ---------------------------------------------------------------------------------------------------------------------  Imaging Results (Last 7 Days)       ** No results found for the last 168 hours. **            I have personally reviewed the above radiology results.   ----------------------------------------------------------------------------------------------------------------------    Assessment/Plan       Recurrent MDD, severe  Continue management per primary  Medicine was consulted for ECT clearance  Patient admitted here in February 2024 and underwent ECT at that time which she tolerated well and reports good result.  No significant cardiac history, no Hx significant trauma, skull fractures.  Does have titanium hardware in her neck, no other reported spinal surgeries. Tolerated anesthesia well in the past.  EKG was unremarkable. Laboratory work up unremarkable.   Given patient has tolerated ECT well in the past and appearing stable and at baseline currently if imaging workup unremarkable will be cleared from medical standpoint to proceed with ECT.  Regarding right frontal lobe abnormality on recent MRI brain would recommend neurology consultation prior to ECT.    T2DM  GERD  RLS  Orthostatic hypotension  History of recurrent UTI on Macrobid chronically  PPI continued  Macrobid continued  Patient appears to be on Victoza as an outpatient.  Will monitor blood sugar trend and treat as indicated.  Currently very well-controlled.    Thank you for the consult and allowing us to participate in the care of your patient, hospital medicine will continue to follow. Please do not hesitate to call with any questions or concerns.      Boy Vergara PA-C  06/07/24  12:20 EDT    Attestation: I personally discussed the patient's history of presenting illness, physical examination, assessment, and  plan with my attending physician, Dr. Niño.

## 2024-06-07 NOTE — PLAN OF CARE
Problem: Adult Behavioral Health Plan of Care  Goal: Plan of Care Review  Outcome: Ongoing, Progressing  Flowsheets (Taken 6/7/2024 1528)  Consent Given to Review Plan with: Roommate Cloud  Progress: no change  Plan of Care Reviewed With: (roommate)   patient   other (see comments)  Patient Agreement with Plan of Care: agrees  Outcome Evaluation: New admit. Completed social history, integrated summary  Goal: Patient-Specific Goal (Individualization)  Outcome: Ongoing, Progressing  Flowsheets  Taken 6/7/2024 1528  Patient-Specific Goals (Include Timeframe): Patient will identify 1-4 individual sessions, family education, aftercare planning  Individualized Care Needs: Therapist will offer 1-4 individual sessions, family education, aftercare planning  Anxieties, Fears or Concerns: none reported  Taken 6/7/2024 1517  Patient Personal Strengths:   expressive of needs   expressive of emotions   positive vocational history   positive educational history   realistic evaluation of current/future capabilities   motivated for recovery   motivated for treatment   intellectual cognitive skills   resourceful   resilient   stable living environment   family/social support   coping skills  Patient Vulnerabilities: adverse childhood experience(s)  Goal: Optimized Coping Skills in Response to Life Stressors  Outcome: Ongoing, Progressing  Intervention: Promote Effective Coping Strategies  Flowsheets (Taken 6/7/2024 0740 by Candelario Betancourt, RN)  Supportive Measures:   goal-setting facilitated   problem-solving facilitated   relaxation techniques promoted   self-care encouraged   self-reflection promoted   self-responsibility promoted   verbalization of feelings encouraged  Goal: Develops/Participates in Therapeutic Dwight to Support Successful Transition  Outcome: Ongoing, Progressing  Intervention: Foster Therapeutic Dwight  Flowsheets (Taken 6/7/2024 1528)  Trust Relationship/Rapport:   care explained   reassurance  provided   choices provided   thoughts/feelings acknowledged   emotional support provided   empathic listening provided   questions answered   questions encouraged  Intervention: Mutually Develop Transition Plan  Flowsheets  Taken 6/7/2024 1528  Outpatient/Agency/Support Group Needs:   outpatient psychiatric care (specify)   outpatient medication management   outpatient counseling  Transition Support:   crisis management plan promoted   community resources reviewed   follow-up care discussed   crisis management plan verbalized   follow-up care coordinated  Anticipated Discharge Disposition: home with family  Taken 6/7/2024 1526  Discharge Coordination/Progress: Patient has [place] [employer] Replacement for discharge planning. She consents for continued treatment at [facility]  Concerns Comments: NA  Transportation Anticipated: family or friend will provide  Transportation Concerns: no car  Current Discharge Risk: psychiatric illness  Concerns to be Addressed:   mental health   discharge planning   medication   home safety   suicidal  Readmission Within the Last 30 Days: no previous admission in last 30 days  Patient/Family Anticipated Services at Transition:   outpatient care   mental health services  Patient's Choice of Community Agency(s): [facility]  Patient/Family Anticipates Transition to: home with family  Offered/Gave Vendor List: no   Goal Outcome Evaluation:  Plan of Care Reviewed With: patient, other (see comments) (roommate)  Patient Agreement with Plan of Care: agrees  Consent Given to Review Plan with: Roommate Cloud  Progress: no change  Outcome Evaluation: New admit. Completed social history, integrated summary

## 2024-06-07 NOTE — TELEPHONE ENCOUNTER
Caller: DR AU    Relationship to patient: DR OFFICE     Best call back number: [phone]    Chief complaint: ETC RESULTS    Type of visit: FOLLOW UP EXT    Requested date: F/A AFTER 06/ 10    If rescheduling, when is the original appointment:      Additional notes:MARY ALICE DUBOSE CALLED FROM DR AU OFFICE- STATES PATIENT HAS ETC SCHEDULED FOR 06/10/24 - WANTS TO KNOW IF DR MCCARTNEY IS OK WITH TAKING THE ETC FOLLOW UP APPT- PATIENT IS NOT SCHEDULE WITH NEUROLOGY UNTIL  07/08    DR AU WOULD LIKE FOR DR MCCARTNEY TO CALL HIM BACK IN POSSIBLE    THANK YOU    CAN CALL PATIENT TO SCHEDULE  [phone]

## 2024-06-07 NOTE — PLAN OF CARE
Goal Outcome Evaluation:  Plan of Care Reviewed With: patient  Patient Agreement with Plan of Care: agrees     Progress: no change  Outcome Evaluation: Patient new admit this shfit for SI with a plan to OD. Pt reports that her depression has gotten progressively worse of the last few weeks and been severe for about a week. Pt reports that she has been having SI for the last 2 days. Pt reports that she went and seen her PMHNP today and told her about the feelings that she has been having and she sent her to the [facility] ED for evaluation. Pt denies any new stressors that have caused her depression to become worse. Pt reports that she has had ECT in the past and believes that she needs more to help improve her depression. Rates A/D 2/9. Reports a good appetite and poor sleep. Denies HI or hallucinations.

## 2024-06-07 NOTE — PROGRESS NOTES
"[id number]    DATA:      Therapist met individually with patient this date to introduce role and to discuss hospitalization expectations. Patient agreeable. Reviewed medical record and staffed case with treatment team this date. No major issues identified.       Clinical Maneuvering/Intervention:     Therapist assisted patient in processing above session content; acknowledged and normalized patient’s thoughts, feelings, and concerns.  Discussed the therapist/patient relationship and explain the parameters and limitations of relative confidentiality.  Also discussed the importance of active participation, and honesty to the treatment process.  Encouraged the patient to discuss/vent their feelings, frustrations, and fears concerning their ongoing medical issues and validated their feelings.     Allowed patient to freely discuss issues without interruption or judgment. Provided safe, confidential environment to facilitate the development of positive therapeutic relationship and encourage open, honest communication.     Therapist addressed discharge safety planning this date. Assisted patient in identifying risk factors which would indicate the need for higher level of care after discharge;  including thoughts to harm self or others and/or self-harming behavior. Encouraged patient to call [phone],  [phone], or present to the nearest emergency room should any of these events occur. Discussed crisis intervention services and means to access.  Encouraged securing any objects of harm.       Therapist completed integrated summary, treatment plan, and initiated social history this date.  Therapist is strongly encouraging family involvement in treatment.       ASSESSMENT:      The patient is a 59 year old  female. Per Dr. Osuna's evaluation, \"Ms. Palma is a 59-year-old  (3 years after 36 years marriage) mother of 35-year-old son (in the state of [place]) and 33-year-old daughter ([place]inn[place]morenita Del Castillo) college-educated with " "a masters degree and developmental genetics, chronically depressed (30 years)  female referred from the [facility] having stated suicidal intent with a plan to overdose.Patient hospitalized here February 14 through 22 for major depression receiving 3 bifrontal ect treatments achieving a remission of her depressive symptomatology.  As she explains she i did \"amazingly\" well in the interval until 2 weeks prior to this admission when she became depressed, feeling hopeless worthless and thoughts she would be better off dead.  No specific precipitating event or psychosocial stressor associated with the recurrence.  Patient has been taking the fluoxetine 40 mg and mirtazapine 15 mg daily in the interim.  There has been no intervening new medical issues.  Patient is under the conviction that ect will be beneficial as it had been in the recent past.  Patient aware of the risk benefits issues involved with ect. Interestingly patient has resolved the financial stressors as were so prominent at the time of her last admission.  She states she is negotiated with credit card companies achieving a reduction of the principle owed and a payment schedule she can afford with the rent she is receiving from the house she could not sell in [place].  No change of her home domestic situation which is stable by her account. Patient admitted on a voluntary basis and participating develop a treatment plan.\"    Today, patient was seen 1-1 at bedside. Patient calm, cooperative, and oriented x 4. Patient noted to have appropriate affect, congruent mood, linear thought content. Patient reports that she came [facility] the [facility] due to evaluation of ECT. Patient has received ECT before and found it beneficial.  Patient was resting in bed today.  She was oriented and reports that she is agreeable to ECT.  Session was brief today due to patient sleeping. Patient reports that she attends outpatient treatment at [facility] and that this therapist could speak with her roommate Umair.      Therapist contacted [place] this date at [phone]; [place] was supportive this date. She confirms that patient still lives there with him.  He reports that patient has plane tickets to go to [facility] on Friday.  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  Umair just wanted to make Dr. Osuna aware.  Umair confirms that after patient's trip, she will return home with him and that all medications and firearms/weapons are locked up. No other issues identified this date.         PLAN:       Patient to remain hospitalized this date.      Treatment team will focus efforts on stabilizing patient's acute symptoms while providing education on healthy coping and crisis management to reduce hospitalizations.   Patient requires daily psychiatrist evaluation and 24/7 nursing supervision to promote patient  safety.     Therapist will offer 1-4 individual sessions, family education, and appropriate referral.     Therapist recommends continued evaluation.  Patient consents to [facility] [place] up.  Patient to return home after discharge. However, she has a trip to [facility] and plane tickets scheduled for Friday 6/14.  Roommate Umair confirms safety planning at home.  Patient will receive medical work up for ECT, if all goes well patient should start ECT early next week. Therapist will attempt to update [facility] on Tuesday when this therapist returns.

## 2024-06-07 NOTE — PLAN OF CARE
Goal Outcome Evaluation:  Plan of Care Reviewed With: patient  Patient Agreement with Plan of Care: agrees     Progress: no change  Outcome Evaluation: PATIENT HAS BEEN IN HER ROOM MOST OF THE SHIFT SLEEPING.  SCHEDULED FOR ECT MONDAY.  SI+ BUT HAS NO PLANS WHILE HERE.

## 2024-06-08 PROCEDURE — 99232 SBSQ HOSP IP/OBS MODERATE 35: CPT | Performed by: PSYCHIATRY & NEUROLOGY

## 2024-06-08 RX ADMIN — MIRTAZAPINE 30 MG: 15 TABLET, FILM COATED ORAL at 20:51

## 2024-06-08 RX ADMIN — FLUOXETINE HYDROCHLORIDE 40 MG: 20 CAPSULE ORAL at 06:21

## 2024-06-08 NOTE — PROGRESS NOTES
"INPATIENT PSYCHIATRIC PROGRESS NOTE    Name:  Zo Palma  :  1965  MRN:  [id number]  Visit Number:  [id number]  Length of stay:  2    SUBJECTIVE  CC/Focus of Exam: severe depression    INTERVAL HISTORY:  Patient is seen for follow-up, this is hospital day 2, patient has history of major depressive disorder recurrent severe, she recently was admitted to [facility], she presented again with the depressive symptoms and thoughts of suicide with plans of overdose.  Per staff she is scheduled to get ECT on Monday.  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      Today upon rounding patient is minimally interactive and when she interacted she stated that she just wants to go home, she rated depression as 8 out of 10, anxiety as 2 out of 10, denies suicidal ideations,Patient told last night to the staff that she was sleeping poorly and eating okay.  She was up and awake at 2:20 AM.  Depression rating 8/10  Anxiety rating 2/10  Sleep: poor      Review of Systems   Psychiatric/Behavioral:  Positive for dysphoric mood and sleep disturbance. The patient is nervous/anxious.    All other systems reviewed and are negative.      OBJECTIVE    Temp:  [96.8 °F (36 °C)-97.6 °F (36.4 °C)] 97.6 °F (36.4 °C)  Heart Rate:  [64-70] 70  Resp:  [16] 16  BP: ()/(62-67) 93/62    MENTAL STATUS EXAM:  Appearance: Casually dressed, good hygeine.   Cooperation: Cooperative  Psychomotor: No psychomotor agitation/retardation, No EPS, No motor tics  Speech: normal rate, amount.  Mood: \"ok\"   Affect: flat  Thought Content: goal directed, no delusional material present  Thought process: linear, organized.  Suicidality: No SI  Homicidality: No HI  Perception: No AH/VH  Insight: fair   Judgment: fair    Lab Results (last 24 hours)       ** No results found for the last 24 hours. **               Imaging Results (Last 24 Hours)       ** No results found for the last 24 hours. **         "       ECG/EMG Results (most recent)       Procedure Component Value Units Date/Time    ECG 12 Lead Other; Baseline Cardiac Status [[id number]] Collected: 06/07/24 0037     Updated: 06/07/24 0948     QT Interval 428 ms      QTC Interval 420 ms     Narrative:      Test Reason : Other~  Blood Pressure :   */*   mmHG  Vent. Rate :  58 BPM     Atrial Rate :  58 BPM     P-R Int : 194 ms          QRS Dur :  82 ms      QT Int : 428 ms       P-R-T Axes :  57   7  45 degrees     QTc Int : 420 ms    Sinus bradycardia  Otherwise normal ECG  Confirmed by Rachel Hawkins ([id number]) on 6/7/2024 9:48:46 AM    Referred By:            Confirmed By: Rachel Hawkins             ALLERGIES: Chlorzoxazone, Iodine, Phenazopyridine hcl, Pyridium [phenazopyridine], Quinine, Valproic acid, Methocarbamol, Iodinated contrast media, Codeine, and Diphenhydramine      Current Facility-Administered Medications:     acetaminophen (TYLENOL) tablet 650 mg, 650 mg, Oral, Q6H PRN, Maria G Diez MD    aluminum-magnesium hydroxide-simethicone (MAALOX MAX) 400-400-40 MG/5ML suspension 15 mL, 15 mL, Oral, Q6H PRN, Maria G Diez MD    benzonatate (TESSALON) capsule 100 mg, 100 mg, Oral, TID PRN, Maria G Diez MD    benztropine (COGENTIN) tablet 2 mg, 2 mg, Oral, Once PRN **OR** benztropine (COGENTIN) injection 1 mg, 1 mg, Intramuscular, Once PRN, Maria G Diez MD    estradiol (ESTRACE) vaginal cream 1 applicator, 1 g, Vaginal, Once per day on Monday Wednesday Friday, Vinod Osuna MD, 1 applicator at 06/07/24 2126    famotidine (PEPCID) tablet 20 mg, 20 mg, Oral, BID PRN, Maria G Diez MD    FLUoxetine (PROzac) capsule 40 mg, 40 mg, Oral, QAM, Vinod Osuna MD, 40 mg at 06/08/24 0621    hydrOXYzine (ATARAX) tablet 50 mg, 50 mg, Oral, Q6H PRN, Diez, Maria G Covarrubias MD    ibuprofen (ADVIL,MOTRIN) tablet 400 mg, 400 mg, Oral, Q6H PRN, Chary, Maria G Covarrubias MD    Liraglutide (VICTOZA) injection 1.8 mg, 1.8 mg,  Subcutaneous, Daily, Vinod Osuna MD    loperamide (IMODIUM) capsule 2 mg, 2 mg, Oral, Q2H PRN, Maria G Diez MD    magnesium hydroxide (MILK OF MAGNESIA) suspension 10 mL, 10 mL, Oral, Daily PRN, Maria G Diez MD    mirtazapine (REMERON) tablet 30 mg, 30 mg, Oral, Nightly, Vinod Osuna MD, 30 mg at 06/07/24 2126    nitrofurantoin (macrocrystal-monohydrate) (MACROBID) capsule 100 mg, 100 mg, Oral, Daily, Vinod Osuna MD, 100 mg at 06/07/24 1126    ondansetron ODT (ZOFRAN-ODT) disintegrating tablet 4 mg, 4 mg, Translingual, Q6H PRN, Maria G Diez MD    pantoprazole (PROTONIX) EC tablet 40 mg, 40 mg, Oral, Daily, Vinod Osuna MD, 40 mg at 06/07/24 1126    sodium chloride nasal spray 2 spray, 2 spray, Each Nare, PRN, Maria G Diez MD    traZODone (DESYREL) tablet 50 mg, 50 mg, Oral, Nightly PRN, Maria G Diez MD    Reviewed chart, notes, vitals, labs and EKG personally  estradiol, 1 g, Vaginal, Once per day on Monday Wednesday Friday  FLUoxetine, 40 mg, Oral, QAM  Liraglutide, 1.8 mg, Subcutaneous, Daily  mirtazapine, 30 mg, Oral, Nightly  nitrofurantoin (macrocrystal-monohydrate), 100 mg, Oral, Daily  pantoprazole, 40 mg, Oral, Daily       ASSESSMENT & PLAN:    Severe episode of recurrent major depressive disorder, without psychotic features    ECT, fluoxetine 40 mg p.o. every morning, mirtazapine 30 mg p.o. nightly      Gastroesophageal reflux disease without esophagitis  Protonix 40 mg p.o. daily  Hormone replacement estradiol 1 g originally once per day on Monday Wednesday and Friday.        Chronic back pain-as needed ibuprofen    Diabetes-Victoza 1.8 mg subcutaneously daily    Special precautions: Special Precautions Level 3 (q15 min checks)     Behavioral Health Treatment Plan and Problem List: I have reviewed and approved the Behavioral Health Treatment Plan and Problem list.  The patient has had a chance to review and agrees  with the treatment plan.     Clinician:  Dayna Hi MD  06/08/24  17:14 EDT

## 2024-06-08 NOTE — PLAN OF CARE
Goal Outcome Evaluation:  Plan of Care Reviewed With: patient  Patient Agreement with Plan of Care: agrees     Progress: no change  Outcome Evaluation: Patient was calm and cooperative during assessment.  She reported that she was eating well and sleeping poorly.  She rated her anxiety as 2/10 and her depression as 8/10.  She reported SI with no plan but denied any HI/AVH.  She spent the majority of the evening in the dayroom watching television and interacting with her peers.

## 2024-06-08 NOTE — PLAN OF CARE
Goal Outcome Evaluation:  Plan of Care Reviewed With: patient  Patient Agreement with Plan of Care: agrees     Progress: no change  Outcome Evaluation: PATIENT HAS BEEN IN BED ALL DAY SLEEPING EXCEPT FOR MEALS.  DENIES SI THIS SHIFT.  AWAITING ECT MONDAY 6/10/2024

## 2024-06-09 PROCEDURE — 99232 SBSQ HOSP IP/OBS MODERATE 35: CPT | Performed by: PSYCHIATRY & NEUROLOGY

## 2024-06-09 RX ADMIN — FLUOXETINE HYDROCHLORIDE 40 MG: 20 CAPSULE ORAL at 06:16

## 2024-06-09 RX ADMIN — NITROFURANTOIN MONOHYDRATE/MACROCRYSTALLINE 100 MG: 25; 75 CAPSULE ORAL at 08:52

## 2024-06-09 RX ADMIN — MIRTAZAPINE 30 MG: 15 TABLET, FILM COATED ORAL at 20:41

## 2024-06-09 RX ADMIN — PANTOPRAZOLE SODIUM 40 MG: 40 TABLET, DELAYED RELEASE ORAL at 08:52

## 2024-06-09 NOTE — PLAN OF CARE
Goal Outcome Evaluation:  Plan of Care Reviewed With: patient  Patient Agreement with Plan of Care: agrees     Progress: improving

## 2024-06-09 NOTE — PLAN OF CARE
Goal Outcome Evaluation:  Plan of Care Reviewed With: patient  Patient Agreement with Plan of Care: agrees     Progress: improving     Pt reports good sleep and fair appetite. Pt rates anx 2/10 and dep 8/10. Pt denies SI/HI/AVH.

## 2024-06-09 NOTE — PROGRESS NOTES
"INPATIENT PSYCHIATRIC PROGRESS NOTE    Name:  Zo Palma  :  1965  MRN:  [id number]  Visit Number:  [id number]  Length of stay:  3    SUBJECTIVE  CC/Focus of Exam: severe depression    INTERVAL HISTORY:  Patient is seen for follow-up, patient is sitting in the day room watching TV programming and with a peer, she reported sleeping fairly well, but staff she slept for 7 hours, she ate 75% of breakfast this morning, she ate well yesterday, she stated that her anxiety is at 2 out of 10, depression is 0 out of 10 wanting to continue her ECT tomorrow.    Review of Systems   Psychiatric/Behavioral:  The patient is nervous/anxious.    All other systems reviewed and are negative.      OBJECTIVE    Temp:  [97 °F (36.1 °C)-98.2 °F (36.8 °C)] 98.2 °F (36.8 °C)  Heart Rate:  [60-70] 70  Resp:  [16-18] 16  BP: (103-114)/(63-73) 103/72    MENTAL STATUS EXAM:  Appearance: Casually dressed, good hygeine.   Cooperation: Cooperative  Psychomotor: No psychomotor agitation/retardation, No EPS, No motor tics  Speech: normal rate, amount.  Mood: \"GOOD\"   Affect: congruent, appropriate  Thought Content: goal directed, no delusional material present  Thought process: linear, organized.  Suicidality: No SI  Homicidality: No HI  Perception: No AH/VH  Insight: fair   Judgment: fair    Lab Results (last 24 hours)       ** No results found for the last 24 hours. **               Imaging Results (Last 24 Hours)       ** No results found for the last 24 hours. **               ECG/EMG Results (most recent)       Procedure Component Value Units Date/Time    ECG 12 Lead Other; Baseline Cardiac Status [[id number]] Collected: 24 0037     Updated: 24     QT Interval 428 ms      QTC Interval 420 ms     Narrative:      Test Reason : Other~  Blood Pressure :   */*   mmHG  Vent. Rate :  58 BPM     Atrial Rate :  58 BPM     P-R Int : 194 ms          QRS Dur :  82 ms      QT Int : 428 ms       P-R-T Axes :  57   7  45 degrees     " QTc Int : 420 ms    Sinus bradycardia  Otherwise normal ECG  Confirmed by Rachel Hawkins ([id number]) on 6/7/2024 9:48:46 AM    Referred By:            Confirmed By: Rachel Hawkins             ALLERGIES: Chlorzoxazone, Iodine, Phenazopyridine hcl, Pyridium [phenazopyridine], Quinine, Valproic acid, Methocarbamol, Iodinated contrast media, Codeine, and Diphenhydramine      Current Facility-Administered Medications:     acetaminophen (TYLENOL) tablet 650 mg, 650 mg, Oral, Q6H PRN, DiezMaria G MD    aluminum-magnesium hydroxide-simethicone (MAALOX MAX) 400-400-40 MG/5ML suspension 15 mL, 15 mL, Oral, Q6H PRN, Maria G Diez MD    benzonatate (TESSALON) capsule 100 mg, 100 mg, Oral, TID PRN, Maria G Diez MD    benztropine (COGENTIN) tablet 2 mg, 2 mg, Oral, Once PRN **OR** benztropine (COGENTIN) injection 1 mg, 1 mg, Intramuscular, Once PRN, Chary, Maria G Covarrubias MD    estradiol (ESTRACE) vaginal cream 1 applicator, 1 g, Vaginal, Once per day on Monday Wednesday Friday, Vinod Osuna MD, 1 applicator at 06/07/24 2126    famotidine (PEPCID) tablet 20 mg, 20 mg, Oral, BID PRN, Maria G Diez MD    FLUoxetine (PROzac) capsule 40 mg, 40 mg, Oral, QAM, Vinod Osuna MD, 40 mg at 06/09/24 0616    hydrOXYzine (ATARAX) tablet 50 mg, 50 mg, Oral, Q6H PRN, DiezMaria G MD    ibuprofen (ADVIL,MOTRIN) tablet 400 mg, 400 mg, Oral, Q6H PRN, DiezMaria G MD    Liraglutide (VICTOZA) injection 1.8 mg, 1.8 mg, Subcutaneous, Daily, Vinod Osuna MD, 1.8 mg at 06/09/24 0853    loperamide (IMODIUM) capsule 2 mg, 2 mg, Oral, Q2H PRN, Diez, Maria G Covarrubias MD    magnesium hydroxide (MILK OF MAGNESIA) suspension 10 mL, 10 mL, Oral, Daily PRN, Maria G Diez MD    mirtazapine (REMERON) tablet 30 mg, 30 mg, Oral, Nightly, Vinod Osuna MD, 30 mg at 06/08/24 2051    nitrofurantoin (macrocrystal-monohydrate) (MACROBID) capsule 100 mg, 100 mg, Oral, Daily,  Vinod Osuna MD, 100 mg at 06/09/24 0852    ondansetron ODT (ZOFRAN-ODT) disintegrating tablet 4 mg, 4 mg, Translingual, Q6H PRN, Maria G Diez MD    pantoprazole (PROTONIX) EC tablet 40 mg, 40 mg, Oral, Daily, Vinod Osuna MD, 40 mg at 06/09/24 0852    sodium chloride nasal spray 2 spray, 2 spray, Each Nare, PRN, Maria G Diez MD    traZODone (DESYREL) tablet 50 mg, 50 mg, Oral, Nightly PRN, Maria G Diez MD    Reviewed chart, notes, vitals, labs and EKG personally    ASSESSMENT & PLAN:    Major depressive disorder severe recurrent without psychotic features  ECT scheduled for tomorrow  Fluoxetine 40 mg p.o. every morning  Mirtazapine 30 mg p.o. nightly    Gastroesophageal reflux disease without esophagitis  Protonix 40 mg p.o. daily    Hormone replacement therapy-estradiol 1 g cream apply Mondays Wednesdays and Fridays    Chronic back pain  As needed ibuprofen    Diabetes Victoza 1.8 mg subcutaneously daily      Special precautions: Special Precautions Level 3 (q15 min checks)     Behavioral Health Treatment Plan and Problem List: I have reviewed and approved the Behavioral Health Treatment Plan and Problem list.  The patient has had a chance to review and agrees with the treatment plan.     Clinician:  Dayna Hi MD  06/09/24  14:41 EDT

## 2024-06-10 ENCOUNTER — ANESTHESIA EVENT (OUTPATIENT)
Dept: PERIOP | Facility: HOSPITAL | Age: 59
End: 2024-06-10
Payer: MEDICARE

## 2024-06-10 ENCOUNTER — ANESTHESIA (OUTPATIENT)
Dept: PERIOP | Facility: HOSPITAL | Age: 59
End: 2024-06-10
Payer: MEDICARE

## 2024-06-10 PROCEDURE — 25010000002 KETOROLAC TROMETHAMINE PER 15 MG: Performed by: NURSE ANESTHETIST, CERTIFIED REGISTERED

## 2024-06-10 PROCEDURE — 25010000002 ONDANSETRON PER 1 MG: Performed by: NURSE ANESTHETIST, CERTIFIED REGISTERED

## 2024-06-10 PROCEDURE — 90870 ELECTROCONVULSIVE THERAPY: CPT | Performed by: PSYCHIATRY & NEUROLOGY

## 2024-06-10 PROCEDURE — 25010000002 SUCCINYLCHOLINE PER 20 MG: Performed by: NURSE ANESTHETIST, CERTIFIED REGISTERED

## 2024-06-10 PROCEDURE — 25010000002 MIDAZOLAM PER 1 MG: Performed by: NURSE ANESTHETIST, CERTIFIED REGISTERED

## 2024-06-10 PROCEDURE — 25810000003 LACTATED RINGERS PER 1000 ML: Performed by: ANESTHESIOLOGY

## 2024-06-10 RX ORDER — SODIUM CHLORIDE, SODIUM LACTATE, POTASSIUM CHLORIDE, CALCIUM CHLORIDE 600; 310; 30; 20 MG/100ML; MG/100ML; MG/100ML; MG/100ML
100 INJECTION, SOLUTION INTRAVENOUS ONCE AS NEEDED
Status: DISCONTINUED | OUTPATIENT
Start: 2024-06-10 | End: 2024-06-10 | Stop reason: HOSPADM

## 2024-06-10 RX ORDER — ONDANSETRON 2 MG/ML
INJECTION INTRAMUSCULAR; INTRAVENOUS AS NEEDED
Status: DISCONTINUED | OUTPATIENT
Start: 2024-06-10 | End: 2024-06-10 | Stop reason: SURG

## 2024-06-10 RX ORDER — OXYCODONE HYDROCHLORIDE AND ACETAMINOPHEN 5; 325 MG/1; MG/1
1 TABLET ORAL ONCE AS NEEDED
Status: DISCONTINUED | OUTPATIENT
Start: 2024-06-10 | End: 2024-06-10 | Stop reason: HOSPADM

## 2024-06-10 RX ORDER — MIDAZOLAM HYDROCHLORIDE 1 MG/ML
INJECTION INTRAMUSCULAR; INTRAVENOUS AS NEEDED
Status: DISCONTINUED | OUTPATIENT
Start: 2024-06-10 | End: 2024-06-10 | Stop reason: SURG

## 2024-06-10 RX ORDER — SODIUM CHLORIDE 0.9 % (FLUSH) 0.9 %
10 SYRINGE (ML) INJECTION EVERY 12 HOURS SCHEDULED
Status: DISCONTINUED | OUTPATIENT
Start: 2024-06-10 | End: 2024-06-10 | Stop reason: HOSPADM

## 2024-06-10 RX ORDER — SODIUM CHLORIDE 0.9 % (FLUSH) 0.9 %
10 SYRINGE (ML) INJECTION AS NEEDED
Status: DISCONTINUED | OUTPATIENT
Start: 2024-06-10 | End: 2024-06-10 | Stop reason: HOSPADM

## 2024-06-10 RX ORDER — IPRATROPIUM BROMIDE AND ALBUTEROL SULFATE 2.5; .5 MG/3ML; MG/3ML
3 SOLUTION RESPIRATORY (INHALATION) ONCE AS NEEDED
Status: DISCONTINUED | OUTPATIENT
Start: 2024-06-10 | End: 2024-06-10 | Stop reason: HOSPADM

## 2024-06-10 RX ORDER — SUCCINYLCHOLINE CHLORIDE 20 MG/ML
INJECTION INTRAMUSCULAR; INTRAVENOUS AS NEEDED
Status: DISCONTINUED | OUTPATIENT
Start: 2024-06-10 | End: 2024-06-10 | Stop reason: SURG

## 2024-06-10 RX ORDER — SODIUM CHLORIDE, SODIUM LACTATE, POTASSIUM CHLORIDE, CALCIUM CHLORIDE 600; 310; 30; 20 MG/100ML; MG/100ML; MG/100ML; MG/100ML
125 INJECTION, SOLUTION INTRAVENOUS ONCE
Status: COMPLETED | OUTPATIENT
Start: 2024-06-10 | End: 2024-06-10

## 2024-06-10 RX ORDER — ONDANSETRON 2 MG/ML
4 INJECTION INTRAMUSCULAR; INTRAVENOUS AS NEEDED
Status: DISCONTINUED | OUTPATIENT
Start: 2024-06-10 | End: 2024-06-10 | Stop reason: HOSPADM

## 2024-06-10 RX ORDER — SODIUM CHLORIDE 9 MG/ML
40 INJECTION, SOLUTION INTRAVENOUS AS NEEDED
Status: DISCONTINUED | OUTPATIENT
Start: 2024-06-10 | End: 2024-06-10 | Stop reason: HOSPADM

## 2024-06-10 RX ORDER — KETOROLAC TROMETHAMINE 30 MG/ML
INJECTION, SOLUTION INTRAMUSCULAR; INTRAVENOUS AS NEEDED
Status: DISCONTINUED | OUTPATIENT
Start: 2024-06-10 | End: 2024-06-10 | Stop reason: SURG

## 2024-06-10 RX ADMIN — KETOROLAC TROMETHAMINE 30 MG: 30 INJECTION, SOLUTION INTRAMUSCULAR; INTRAVENOUS at 09:07

## 2024-06-10 RX ADMIN — ESTRADIOL 1 APPLICATOR: 0.1 CREAM VAGINAL at 20:47

## 2024-06-10 RX ADMIN — MIDAZOLAM HYDROCHLORIDE 2 MG: 1 INJECTION, SOLUTION INTRAMUSCULAR; INTRAVENOUS at 09:07

## 2024-06-10 RX ADMIN — ONDANSETRON 4 MG: 2 INJECTION INTRAMUSCULAR; INTRAVENOUS at 09:07

## 2024-06-10 RX ADMIN — NITROFURANTOIN MONOHYDRATE/MACROCRYSTALLINE 100 MG: 25; 75 CAPSULE ORAL at 10:14

## 2024-06-10 RX ADMIN — PANTOPRAZOLE SODIUM 40 MG: 40 TABLET, DELAYED RELEASE ORAL at 10:13

## 2024-06-10 RX ADMIN — FLUOXETINE HYDROCHLORIDE 40 MG: 20 CAPSULE ORAL at 10:13

## 2024-06-10 RX ADMIN — SODIUM CHLORIDE, POTASSIUM CHLORIDE, SODIUM LACTATE AND CALCIUM CHLORIDE: 600; 310; 30; 20 INJECTION, SOLUTION INTRAVENOUS at 08:52

## 2024-06-10 RX ADMIN — SUCCINYLCHOLINE CHLORIDE 100 MG: 20 INJECTION, SOLUTION INTRAMUSCULAR; INTRAVENOUS at 09:02

## 2024-06-10 RX ADMIN — MIRTAZAPINE 30 MG: 15 TABLET, FILM COATED ORAL at 20:47

## 2024-06-10 RX ADMIN — METHOHEXITAL SODIUM 100 MG: 500 INJECTION, POWDER, LYOPHILIZED, FOR SOLUTION INTRAMUSCULAR; INTRAVENOUS; RECTAL at 09:02

## 2024-06-10 NOTE — ANESTHESIA PREPROCEDURE EVALUATION
Anesthesia Evaluation     Patient summary reviewed and Nursing notes reviewed   no history of anesthetic complications:   NPO Solid Status: > 8 hours  NPO Liquid Status: > 8 hours           Airway   Mallampati: II  TM distance: >3 FB  Neck ROM: full  Dental - normal exam     Pulmonary     breath sounds clear to auscultation  (+) ,sleep apnea (before weight loss)  Cardiovascular   Exercise tolerance: good (4-7 METS)    ECG reviewed  Rhythm: regular  Rate: normal    (+) hyperlipidemia      Neuro/Psych  (+) seizures (conversion disorder. not seizures), headaches, numbness, psychiatric history Depression  GI/Hepatic/Renal/Endo    (+) GERD, liver disease (liver failure with depakote), renal disease- stones, diabetes mellitus type 2    Musculoskeletal     (+) arthralgias, back pain, chronic pain, neck pain, radiculopathy  Abdominal     Abdomen: soft.   Substance History - negative use     OB/GYN negative ob/gyn ROS         Other   arthritis,     ROS/Med Hx Other:   Sinus bradycardia  Otherwise normal ECG  Confirmed by Rachel Hawkins ([id number]) on 6/7/2024 9:48:46 AM                Anesthesia Plan    ASA 3     general     intravenous induction     Anesthetic plan, risks, benefits, and alternatives have been provided, discussed and informed consent has been obtained with: patient.  Pre-procedure education provided  Plan discussed with CRNA.    CODE STATUS:    Code Status (Patient has no pulse and is not breathing): CPR (Attempt to Resuscitate)  Medical Interventions (Patient has pulse or is breathing): Full Support

## 2024-06-10 NOTE — ANESTHESIA POSTPROCEDURE EVALUATION
Patient: Zo Palma    Procedure Summary       Date: 06/10/24 Room / Location: [facility] OR 05 /  [facility] OR    Anesthesia Start: 0852 Anesthesia Stop: 0911    Procedure: BIFRONTAL ELECTROCONVULSIVE THERAPY (Bilateral) Diagnosis:       Severe episode of recurrent major depressive disorder, without psychotic features      (Severe episode of recurrent major depressive disorder, without psychotic features [F33.2])    Surgeons: Vinod Osuna MD Provider: Lai Jamison MD    Anesthesia Type: general ASA Status: 3            Anesthesia Type: general    Vitals  Vitals Value Taken Time   /73 06/10/24 0942   Temp 97.9 °F (36.6 °C) 06/10/24 0942   Pulse 63 06/10/24 0942   Resp 13 06/10/24 0942   SpO2 97 % 06/10/24 0942           Post Anesthesia Care and Evaluation    Patient location during evaluation: PHASE II  Patient participation: complete - patient participated  Level of consciousness: awake and alert  Pain score: 1  Pain management: adequate    Airway patency: patent  Anesthetic complications: No anesthetic complications  PONV Status: controlled  Cardiovascular status: acceptable  Respiratory status: acceptable  Hydration status: acceptable

## 2024-06-10 NOTE — ANESTHESIA POSTPROCEDURE EVALUATION
Patient: Zo Palma    Procedure Summary       Date: 06/10/24 Room / Location: [facility] OR 05 /  [facility] OR    Anesthesia Start: 0852 Anesthesia Stop: 0911    Procedure: BIFRONTAL ELECTROCONVULSIVE THERAPY (Bilateral) Diagnosis:       Severe episode of recurrent major depressive disorder, without psychotic features      (Severe episode of recurrent major depressive disorder, without psychotic features [F33.2])    Surgeons: Vinod Osuna MD Provider: Lai Jamison MD    Anesthesia Type: general ASA Status: 3            Anesthesia Type: general    Vitals  Vitals Value Taken Time   /73 06/10/24 0942   Temp 97.9 °F (36.6 °C) 06/10/24 0942   Pulse 63 06/10/24 0942   Resp 13 06/10/24 0942   SpO2 97 % 06/10/24 0942           Post Anesthesia Care and Evaluation    Patient location during evaluation: PACU  Patient participation: complete - patient participated  Level of consciousness: awake  Pain score: 0  Pain management: satisfactory to patient    Airway patency: patent  Anesthetic complications: No anesthetic complications  PONV Status: none  Cardiovascular status: hemodynamically stable  Respiratory status: nasal cannula  Hydration status: acceptable

## 2024-06-10 NOTE — NURSING NOTE
Patient returned from ECT. Gag reflex intact, assessment complete. Patient is AOx3, educated Patient on asking for assistance prior to ambulating.20g IV in R wrist, flushed and saline locked. Will continue to monitor patient.

## 2024-06-10 NOTE — PLAN OF CARE
Goal Outcome Evaluation:  Plan of Care Reviewed With: patient  Patient Agreement with Plan of Care: agrees  Progress: improving  Outcome Evaluation: Therapist met with Patient to review treatment progress; Patient agreeable.      Problem: Adult Behavioral Health Plan of Care  Goal: Plan of Care Review  Outcome: Ongoing, Progressing  Flowsheets  Taken 6/10/2024 1134 by Corina Durant MSW  Progress: improving  Plan of Care Reviewed With: patient  Patient Agreement with Plan of Care: agrees  Outcome Evaluation:   Therapist met with Patient to review treatment progress   Patient agreeable.  Taken 6/7/2024 1528 by Marysol García  Consent Given to Review Plan with: Roommate Cloud  Goal: Optimized Coping Skills in Response to Life Stressors  Outcome: Ongoing, Progressing  Flowsheets (Taken 6/10/2024 1134)  Optimized Coping Skills in Response to Life Stressors: making progress toward outcome  Intervention: Promote Effective Coping Strategies  Flowsheets (Taken 6/10/2024 1134)  Supportive Measures:   active listening utilized   counseling provided   goal-setting facilitated   verbalization of feelings encouraged  Goal: Develops/Participates in Therapeutic Franklin to Support Successful Transition  Outcome: Ongoing, Progressing  Flowsheets (Taken 6/10/2024 1134)  Develops/Participates in Therapeutic Franklin to Support Successful Transition: making progress toward outcome  Intervention: Foster Therapeutic Franklin  Flowsheets (Taken 6/10/2024 1134)  Trust Relationship/Rapport:   care explained   reassurance provided   choices provided   emotional support provided   thoughts/feelings acknowledged   empathic listening provided   questions answered   questions encouraged     DATA: Therapist met with Patient individually on this date. Therapist introduced self as covering therapist and explained that Patient's primary therapist would not be present today. Patient agreeable to discuss treatment progress and discharge  concerns.     CLINICAL MANUVERING/INTERVENTIONS: Assisted Patient in processing session content; acknowledged and normalized Patient’s thoughts, feelings, and concerns by utilizing a person-centered approach in efforts to build appropriate rapport and a positive therapeutic relationship with open and honest communication. Allowed Patient to ventilate regarding current stressors and triggers for negative emotions and thoughts in a safe nonjudgmental environment with unconditional positive regard, active listening skills, and empathy.     ASSESSMENT: Patient was seen 1-1 for a follow up today. She continues to receive treatment for depression. She received her first ECT of this admission today. This therapist spoke with her after. She reported feeling tired and having a slight headache. She was encouraged to rest and to come to group this afternoon if she felt like it. She denied any needs today and voiced plans to get some rest.     PLAN: Patient will continue stabilization. Patient will continue to receive services offered by Treatment Team.     Patient will follow-up with [facility].

## 2024-06-10 NOTE — PAYOR COMM NOTE
"Tommie Palma (59 y.o. Female)       Date of Birth   1965    Social Security Number       Address   [address] [place] [address]    Home Phone   [phone]    MRN   [id number]       Mosque   Other    Marital Status                               Admission Date   24    Admission Type   Urgent    Admitting Provider   Maria G Diez MD    Attending Provider   Vinod Osuna MD    Department, Room/Bed   [facility], 1025/1S       Discharge Date       Discharge Disposition       Discharge Destination                                 Attending Provider: Vinod Osuna MD    Allergies: Chlorzoxazone, Iodine, Phenazopyridine Hcl, Pyridium [Phenazopyridine], Quinine, Valproic Acid, Methocarbamol, Iodinated Contrast Media, Codeine, Diphenhydramine    Isolation: None   Infection: None   Code Status: CPR    Ht: 160 cm (63\")   Wt: 67 kg (147 lb 9.6 oz)    Admission Cmt: None   Principal Problem: MDD (major depressive disorder), recurrent episode, severe [F33.2]                   Active Insurance as of 2024       Primary Coverage       Payor Plan Insurance Group Employer/Plan Group    ANTHEM MEDICARE REPLACEMENT ANTHEM MEDICARE ADVANTAGE [id number]       Payor Plan Address Payor Plan Phone Number Payor Plan Fax Number Effective Dates    [address] [phone]  2024 - None Entered    [facility] [id number]         Subscriber Name Subscriber Birth Date Member ID       TOMMIE PALMA 1965 [id number]                     Emergency Contacts        (Rel.) Home Phone Work Phone Mobile Phone    VLADIMIR PALMA (Daughter) -- -- [phone]    MICHAEL HENRIQUEZ (Friend) [phone] -- --          PLEASE ATTACH THIS CLINICAL UPDATE TO AUTHORIZATION NUMBER [id number]    RETURN FAX NUMBER [phone]      PATIENT NAME:  TOMMIE PALMA  :  1965    THE LAST COVERED DAY FOR THIS PATIENT/MEMBER IS 2024.  " ADDITIONAL TIME IS BEING REQUESTED FOR SYMPTOM STABILIZATION, ONGOING MEDICATION EVALUATION AND ECT.    FACILITY:  [facility]  NPI:  [id number]  TAX ID:  [id number]  ADDRESS:  [facility] [place]  [address]    ATTENDING MD:  DR. NANCY AU  NPI:  [id number]  ADDRESS:  SAME AS FACILITY  PLEASE CONTACT THE UR STAFF LISTED BELOW IF A SPECIFIC CONTACT PHONE NUMBER FOR THE ATTENDING MD IS NEEDED    UR CONTACT:  KRISTIN BIRD RN  PHONE:  [phone]  FAX:  [phone]      DIAGNOSIS:  F33.2 - MAJOR DEPRESSION, RECURRENT, SEVERE WITHOUT PSYCHOSIS        MD PROGRESS NOTE DATED 2024 (LAST COVERED DAY) IS COPIED BELOW:       Dayna Hi MD   Physician  Psychiatry     Progress Notes     Signed     Date of Service: 24  Creation Time: 24     Signed       Expand All Collapse All    INPATIENT PSYCHIATRIC PROGRESS NOTE     Name:  Zo Palma  :  1965  MRN:  [id number]  Visit Number:  [id number]  Length of stay:  2     SUBJECTIVE  CC/Focus of Exam: severe depression     INTERVAL HISTORY:  Patient is seen for follow-up, this is hospital day 2, patient has history of major depressive disorder recurrent severe, she recently was admitted to [facility] and [facility], she presented again with the depressive symptoms and thoughts of suicide with plans of overdose.  Per staff she is scheduled to get ECT on Monday.  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       Today upon rounding patient is minimally interactive and when she interacted she stated that she just wants to go home, she rated depression as 8 out of 10, anxiety as 2 out of 10, denies suicidal ideations,Patient told last night to the staff that she was sleeping poorly and eating okay.  She was up and awake at 2:20 AM.  Depression rating 8/10  Anxiety rating 2/10  Sleep: poor        Review of Systems   Psychiatric/Behavioral:  Positive for dysphoric mood and sleep  "disturbance. The patient is nervous/anxious.    All other systems reviewed and are negative.        OBJECTIVE     Temp:  [96.8 °F (36 °C)-97.6 °F (36.4 °C)] 97.6 °F (36.4 °C)  Heart Rate:  [64-70] 70  Resp:  [16] 16  BP: ()/(62-67) 93/62     MENTAL STATUS EXAM:  Appearance: Casually dressed, good hygeine.   Cooperation: Cooperative  Psychomotor: No psychomotor agitation/retardation, No EPS, No motor tics  Speech: normal rate, amount.  Mood: \"ok\"   Affect: flat  Thought Content: goal directed, no delusional material present  Thought process: linear, organized.  Suicidality: No SI  Homicidality: No HI  Perception: No AH/VH  Insight: fair   Judgment: fair     Lab Results (last 24 hours)         ** No results found for the last 24 hours. **                              Imaging Results (Last 24 Hours)         ** No results found for the last 24 hours. **                   ECG/EMG Results (most recent)         Procedure Component Value Units Date/Time     ECG 12 Lead Other; Baseline Cardiac Status [[id number]] Collected: 06/07/24 0037       Updated: 06/07/24 0948       QT Interval 428 ms         QTC Interval 420 ms       Narrative:       Test Reason : Other~  Blood Pressure :   */*   mmHG  Vent. Rate :  58 BPM     Atrial Rate :  58 BPM     P-R Int : 194 ms          QRS Dur :  82 ms      QT Int : 428 ms       P-R-T Axes :  57   7  45 degrees     QTc Int : 420 ms     Sinus bradycardia  Otherwise normal ECG  Confirmed by Rachel Hawkins ([id number]) on 6/7/2024 9:48:46 AM     Referred By:            Confirmed By: Rachel Hawkins                ALLERGIES: Chlorzoxazone, Iodine, Phenazopyridine hcl, Pyridium [phenazopyridine], Quinine, Valproic acid, Methocarbamol, Iodinated contrast media, Codeine, and Diphenhydramine       Current Medications      Current Facility-Administered Medications:     acetaminophen (TYLENOL) tablet 650 mg, 650 mg, Oral, Q6H PRN, Maria G Diez MD    aluminum-magnesium hydroxide-simethicone " (MAALOX MAX) 400-400-40 MG/5ML suspension 15 mL, 15 mL, Oral, Q6H PRN, Maria G Diez MD    benzonatate (TESSALON) capsule 100 mg, 100 mg, Oral, TID PRN, Maria G Diez MD    benztropine (COGENTIN) tablet 2 mg, 2 mg, Oral, Once PRN **OR** benztropine (COGENTIN) injection 1 mg, 1 mg, Intramuscular, Once PRN, Maria G Diez MD    estradiol (ESTRACE) vaginal cream 1 applicator, 1 g, Vaginal, Once per day on Monday Wednesday Friday, Vinod Osuna MD, 1 applicator at 06/07/24 2126    famotidine (PEPCID) tablet 20 mg, 20 mg, Oral, BID PRN, Maria G Diez MD    FLUoxetine (PROzac) capsule 40 mg, 40 mg, Oral, QAM, Vinod Osuna MD, 40 mg at 06/08/24 0621    hydrOXYzine (ATARAX) tablet 50 mg, 50 mg, Oral, Q6H PRN, Diez, Maria G Covarrubias MD    ibuprofen (ADVIL,MOTRIN) tablet 400 mg, 400 mg, Oral, Q6H PRN, Maria G Diez MD    Liraglutide (VICTOZA) injection 1.8 mg, 1.8 mg, Subcutaneous, Daily, Vinod Osuna MD    loperamide (IMODIUM) capsule 2 mg, 2 mg, Oral, Q2H PRN, Maria G Diez MD    magnesium hydroxide (MILK OF MAGNESIA) suspension 10 mL, 10 mL, Oral, Daily PRN, Chary, Maria G Covarrubias MD    mirtazapine (REMERON) tablet 30 mg, 30 mg, Oral, Nightly, Vinod Osuna MD, 30 mg at 06/07/24 2126    nitrofurantoin (macrocrystal-monohydrate) (MACROBID) capsule 100 mg, 100 mg, Oral, Daily, Vinod Osuna MD, 100 mg at 06/07/24 1126    ondansetron ODT (ZOFRAN-ODT) disintegrating tablet 4 mg, 4 mg, Translingual, Q6H PRN, Maria G Diez MD    pantoprazole (PROTONIX) EC tablet 40 mg, 40 mg, Oral, Daily, Vinod Osuna MD, 40 mg at 06/07/24 1126    sodium chloride nasal spray 2 spray, 2 spray, Each Nare, PRN, Maria G Diez MD    traZODone (DESYREL) tablet 50 mg, 50 mg, Oral, Nightly PRN, Maria G Diez MD        Reviewed chart, notes, vitals, labs and EKG personally    Scheduled Medication   estradiol, 1 g, Vaginal,  Once per day on Monday Wednesday Friday  FLUoxetine, 40 mg, Oral, QAM  Liraglutide, 1.8 mg, Subcutaneous, Daily  mirtazapine, 30 mg, Oral, Nightly  nitrofurantoin (macrocrystal-monohydrate), 100 mg, Oral, Daily  pantoprazole, 40 mg, Oral, Daily         ASSESSMENT & PLAN:     Severe episode of recurrent major depressive disorder, without psychotic features    ECT, fluoxetine 40 mg p.o. every morning, mirtazapine 30 mg p.o. nightly       Gastroesophageal reflux disease without esophagitis  Protonix 40 mg p.o. daily  Hormone replacement estradiol 1 g originally once per day on Monday Wednesday and Friday.        Chronic back pain-as needed ibuprofen     Diabetes-Victoza 1.8 mg subcutaneously daily     Special precautions: Special Precautions Level 3 (q15 min checks)      Behavioral Health Treatment Plan and Problem List: I have reviewed and approved the Behavioral Health Treatment Plan and Problem list.  The patient has had a chance to review and agrees with the treatment plan.      Clinician:  Dayna Hi MD  06/08/24  17:14 EDT                             NURSING NARRATIVE NOTE DATED EARLY AM ON 06/08/2024 IS COPIED BELOW:       Marylu Singer RN   Registered Nurse  Psychiatry     Plan of Care     Signed     Date of Service: 06/08/24 0228  Creation Time: 06/08/24 0228     Signed         Goal Outcome Evaluation:  Plan of Care Reviewed With: patient  Patient Agreement with Plan of Care: agrees  Progress: no change  Outcome Evaluation: Patient was calm and cooperative during assessment.  She reported that she was eating well and sleeping poorly.  She rated her anxiety as 2/10 and her depression as 8/10.  She reported SI with no plan but denied any HI/AVH.  She spent the majority of the evening in the dayroom watching television and interacting with her peers.                                   MD PROGRESS NOTE DATED 06/09/2024 IS COPIED BELOW:       Dayna Hi MD   Physician  Psychiatry    "  Progress Notes     Signed     Date of Service: 24  Creation Time: 24     Signed       Expand All Collapse All    INPATIENT PSYCHIATRIC PROGRESS NOTE     Name:  Zo Palma  :  1965  MRN:  [id number]  Visit Number:  [id number]  Length of stay:  3     SUBJECTIVE  CC/Focus of Exam: severe depression     INTERVAL HISTORY:  Patient is seen for follow-up, patient is sitting in the day room watching TV programming and with a peer, she reported sleeping fairly well, but staff she slept for 7 hours, she ate 75% of breakfast this morning, she ate well yesterday, she stated that her anxiety is at 2 out of 10, depression is 0 out of 10 wanting to continue her ECT tomorrow.     Review of Systems   Psychiatric/Behavioral:  The patient is nervous/anxious.    All other systems reviewed and are negative.        OBJECTIVE     Temp:  [97 °F (36.1 °C)-98.2 °F (36.8 °C)] 98.2 °F (36.8 °C)  Heart Rate:  [60-70] 70  Resp:  [16-18] 16  BP: (103-114)/(63-73) 103/72     MENTAL STATUS EXAM:  Appearance: Casually dressed, good hygeine.   Cooperation: Cooperative  Psychomotor: No psychomotor agitation/retardation, No EPS, No motor tics  Speech: normal rate, amount.  Mood: \"GOOD\"   Affect: congruent, appropriate  Thought Content: goal directed, no delusional material present  Thought process: linear, organized.  Suicidality: No SI  Homicidality: No HI  Perception: No AH/VH  Insight: fair   Judgment: fair     Lab Results (last 24 hours)         ** No results found for the last 24 hours. **                              Imaging Results (Last 24 Hours)         ** No results found for the last 24 hours. **                   ECG/EMG Results (most recent)         Procedure Component Value Units Date/Time     ECG 12 Lead Other; Baseline Cardiac Status [[id number]] Collected: 24       Updated: 24       QT Interval 428 ms         QTC Interval 420 ms       Narrative:       Test Reason : Other~  Blood " Pressure :   */*   mmHG  Vent. Rate :  58 BPM     Atrial Rate :  58 BPM     P-R Int : 194 ms          QRS Dur :  82 ms      QT Int : 428 ms       P-R-T Axes :  57   7  45 degrees     QTc Int : 420 ms     Sinus bradycardia  Otherwise normal ECG  Confirmed by Rachel Hawkins ([id number]) on 6/7/2024 9:48:46 AM     Referred By:            Confirmed By: Rachel Hawkins                ALLERGIES: Chlorzoxazone, Iodine, Phenazopyridine hcl, Pyridium [phenazopyridine], Quinine, Valproic acid, Methocarbamol, Iodinated contrast media, Codeine, and Diphenhydramine       Current Medications      Current Facility-Administered Medications:     acetaminophen (TYLENOL) tablet 650 mg, 650 mg, Oral, Q6H PRN, DiezMaria G MD    aluminum-magnesium hydroxide-simethicone (MAALOX MAX) 400-400-40 MG/5ML suspension 15 mL, 15 mL, Oral, Q6H PRN, Maria G Diez MD    benzonatate (TESSALON) capsule 100 mg, 100 mg, Oral, TID PRN, Maria G Diez MD    benztropine (COGENTIN) tablet 2 mg, 2 mg, Oral, Once PRN **OR** benztropine (COGENTIN) injection 1 mg, 1 mg, Intramuscular, Once PRN, Maria G Diez MD    estradiol (ESTRACE) vaginal cream 1 applicator, 1 g, Vaginal, Once per day on Monday Wednesday Friday, Vinod Osuna MD, 1 applicator at 06/07/24 2126    famotidine (PEPCID) tablet 20 mg, 20 mg, Oral, BID PRN, Maria G Diez MD    FLUoxetine (PROzac) capsule 40 mg, 40 mg, Oral, QAM, Vinod Osuna MD, 40 mg at 06/09/24 0616    hydrOXYzine (ATARAX) tablet 50 mg, 50 mg, Oral, Q6H PRN, Diez, Maria G Covarrubias MD    ibuprofen (ADVIL,MOTRIN) tablet 400 mg, 400 mg, Oral, Q6H PRN, Diez, Maria G Covarrubias MD    Liraglutide (VICTOZA) injection 1.8 mg, 1.8 mg, Subcutaneous, Daily, Vinod Osuna MD, 1.8 mg at 06/09/24 0853    loperamide (IMODIUM) capsule 2 mg, 2 mg, Oral, Q2H PRN, aMria G Diez MD    magnesium hydroxide (MILK OF MAGNESIA) suspension 10 mL, 10 mL, Oral, Daily PRN, Maria G Diez  MD Marci    mirtazapine (REMERON) tablet 30 mg, 30 mg, Oral, Nightly, Vinod Osuna MD, 30 mg at 06/08/24 2051    nitrofurantoin (macrocrystal-monohydrate) (MACROBID) capsule 100 mg, 100 mg, Oral, Daily, Vinod Osuna MD, 100 mg at 06/09/24 0852    ondansetron ODT (ZOFRAN-ODT) disintegrating tablet 4 mg, 4 mg, Translingual, Q6H PRN, Diez, Maria G Covarrubias MD    pantoprazole (PROTONIX) EC tablet 40 mg, 40 mg, Oral, Daily, Vinod Osuna MD, 40 mg at 06/09/24 0852    sodium chloride nasal spray 2 spray, 2 spray, Each Nare, PRN, Chary, Maria G Covarrubias MD    traZODone (DESYREL) tablet 50 mg, 50 mg, Oral, Nightly PRN, Diez, Maria G Covarrubias MD        Reviewed chart, notes, vitals, labs and EKG personally     ASSESSMENT & PLAN:     Major depressive disorder severe recurrent without psychotic features  ECT scheduled for tomorrow  Fluoxetine 40 mg p.o. every morning  Mirtazapine 30 mg p.o. nightly     Gastroesophageal reflux disease without esophagitis  Protonix 40 mg p.o. daily     Hormone replacement therapy-estradiol 1 g cream apply Mondays Wednesdays and Fridays     Chronic back pain  As needed ibuprofen     Diabetes Victoza 1.8 mg subcutaneously daily        Special precautions: Special Precautions Level 3 (q15 min checks)      Behavioral Health Treatment Plan and Problem List: I have reviewed and approved the Behavioral Health Treatment Plan and Problem list.  The patient has had a chance to review and agrees with the treatment plan.      Clinician:  Dayna Hi MD  06/09/24  14:41 EDT                          ECT OPERATIVE PROCEDURE REPORT FROM 06/10/2024 IS COPIED BELOW:       Vinod Osuna MD   Physician  Psychiatry     Op Note     Signed     Date of Service: 06/10/24 0901  Creation Time: 06/10/24 0954  Case Time: Procedures: Surgeons:   06/10/24 0901 BIFRONTAL ELECTROCONVULSIVE THERAPY    Vinod Osuna MD               Signed         ECT OPERATIVE  PROCEDURE REPORT        PATIENT NAME: Zo Palma     Assistants: None     Primary Surgeon: PELON Au MD     Preoperative Diagnosis:   Major Depression, Recurrent, Severe Without Psychosis     Postoperative Diagnosis: Same  : 1965     SSN:      MRN#: [id number]     PHYSICIAN: PELON AU M.D.     PROCEDURE DATE: 06/10/24     This is the patient's first treatment.      PROCEDURE:   Bifrontal ECT utilizing Thymatron System IV.   Energy Set 50%  Charge Delivered 251.6 mC  Current 0.90 A  Stimulus Duration 7.0 sec  Frequency 40 hz  Pulse Width 0.50 msec        ANESTHESIA: See anesthesia report for medications and monitoring.                           Brevital: 100        mg          Succinylcholine: 100        mg     DETAILS: Impulse at 0902 with a resultant 60 second seizure.           Specimens Removed: NA  Blood Administered: NA  Estimated Blood Loss: None  Complications: None     Pt STATUS STABLE: 0930 HR     Grafts or Implants: NA     Dictated utilizing Dragon dictation                     Current Scheduled Medications  Collapse  Hide  (From now, onward)    Start   Ordered Stop   24 2100  mirtazapine (REMERON) tablet 30 mg  30 mg,   Oral,   Nightly        References:    [facility]    Pediatrics    24 1115 --   24 1600  Liraglutide (VICTOZA) injection 1.8 mg  1.8 mg,   Subcutaneous,   Daily        References:    [facility]    Pediatrics    24 1505 --   24 1600  estradiol (ESTRACE) vaginal cream 1 applicator  1 g,   Vaginal,   Once per day on         References:    [facility]    Pediatrics    24 1505 --   24 1215  pantoprazole (PROTONIX) EC tablet 40 mg  40 mg,   Oral,   Daily        References:    [facility]    Pediatrics    24 1115 --   24 1215  FLUoxetine (PROzac) capsule 40 mg  40 mg,   Oral,   Every Morning        References:    [facility]    24 1115 --   24 1215  nitrofurantoin  (macrocrystal-monohydrate) (MACROBID) capsule 100 mg  100 mg,   Oral,   Daily        References:    Cross Reactivity Chart    [facility]    Pediatrics    06/07/24 1115             NO PSYCHOTROPIC PRN MEDICATIONS REQUIRED/RECEIVED

## 2024-06-10 NOTE — PLAN OF CARE
Goal Outcome Evaluation:  Plan of Care Reviewed With: patient  Patient Agreement with Plan of Care: agrees     Progress: improving  Outcome Evaluation: Pt is calm and cooperative with staff. Pt reports good sleep and a good appetite. Pt rates ANX 5, DEP 8 and SI/HI/AVH at this time. Pt reports no issues after the ECT this AM.

## 2024-06-10 NOTE — OP NOTE
ECT OPERATIVE PROCEDURE REPORT      PATIENT NAME: Zo Palma    Assistants: None    Primary Surgeon: PELON Au MD    Preoperative Diagnosis:   Major Depression, Recurrent, Severe Without Psychosis    Postoperative Diagnosis: Same  : 1965    SSN:     MRN#: [id number]    PHYSICIAN: PELON AU M.D.    PROCEDURE DATE: 06/10/24    This is the patient's first treatment.     PROCEDURE:   Bifrontal ECT utilizing Thymatron System IV.   Energy Set 50%  Charge Delivered 251.6 mC  Current 0.90 A  Stimulus Duration 7.0 sec  Frequency 40 hz  Pulse Width 0.50 msec      ANESTHESIA: See anesthesia report for medications and monitoring.                           Brevital: 100        mg          Succinylcholine: 100        mg    DETAILS: Impulse at 0902 with a resultant 60 second seizure.        Specimens Removed: NA  Blood Administered: NA  Estimated Blood Loss: None  Complications: None    Pt STATUS STABLE: 0930 HR    Grafts or Implants: NA    Dictated utilizing Dragon dictation

## 2024-06-11 PROCEDURE — 99232 SBSQ HOSP IP/OBS MODERATE 35: CPT | Performed by: PSYCHIATRY & NEUROLOGY

## 2024-06-11 RX ADMIN — NITROFURANTOIN MONOHYDRATE/MACROCRYSTALLINE 100 MG: 25; 75 CAPSULE ORAL at 08:14

## 2024-06-11 RX ADMIN — PANTOPRAZOLE SODIUM 40 MG: 40 TABLET, DELAYED RELEASE ORAL at 08:14

## 2024-06-11 RX ADMIN — MIRTAZAPINE 30 MG: 15 TABLET, FILM COATED ORAL at 20:17

## 2024-06-11 RX ADMIN — FLUOXETINE HYDROCHLORIDE 40 MG: 20 CAPSULE ORAL at 06:07

## 2024-06-11 NOTE — PROGRESS NOTES
"INPATIENT PSYCHIATRIC PROGRESS NOTE    Name:  Zo Palma  :  1965  MRN:  [id number]  Visit Number:  [id number]  Length of stay:  5    Behavioral Health Treatment Plan and Problem List: I have reviewed and approved the Behavioral Health Treatment Plan and Problem list.    SUBJECTIVE    CC/ Focus of exam: Depression    Patient's subjective status: \"About the same\"    INTERVAL HISTORY: The patient remains depressed with minimal if any subjective improvement.  Patient does sleep well and appetite is normal.  No psychotic component.  Discussed options with patient wanting to proceed with additional ect.     Depression rating 8/10  Anxiety rating 3-4/10 -worries that perhaps a treatment will be effective \"this time\".  Hopelessness: \"I'm hoping to get better\"  Sleep:8 hours     ROS: No cardiovascular, GI, or Neurological complaints.       OBJECTIVE    Vitals:    24 0731   BP: 112/72   Pulse: 63   Resp:    Temp:    SpO2:       Temp:  [97.5 °F (36.4 °C)-98.1 °F (36.7 °C)] 98 °F (36.7 °C)  Heart Rate:  [55-73] 63  Resp:  [12-16] 16  BP: ()/(55-73) 112/72    MENTAL STATUS EXAM:       Psychomotor: No psychomotor agitation/retardation, No EPS, No motor tics  Speech-normal rate, amount.  Mood/Affect: Depressed  Thought Processes: coherent  Thought Content: mood congruent  Hallucination(s): none  Hopelessness: Intermittent  Optimistic: minimally  Suicidal Thoughts: No  Suicidal Plan/Intent: No  Homicidal Thoughts: absent  Orientation: oriented x 3  Memory: recent intact    Lab Results (last 24 hours)       ** No results found for the last 24 hours. **             Imaging Results (Last 24 Hours)       ** No results found for the last 24 hours. **             Lab and x-ray studies reviewed.    ECG/EMG Results (most recent)       Procedure Component Value Units Date/Time    ECG 12 Lead Other; Baseline Cardiac Status [[id number]] Collected: 24 0037     Updated: 24 09     QT Interval 428 ms      QTC " Interval 420 ms     Narrative:      Test Reason : Other~  Blood Pressure :   */*   mmHG  Vent. Rate :  58 BPM     Atrial Rate :  58 BPM     P-R Int : 194 ms          QRS Dur :  82 ms      QT Int : 428 ms       P-R-T Axes :  57   7  45 degrees     QTc Int : 420 ms    Sinus bradycardia  Otherwise normal ECG  Confirmed by Rachel Hawkins ([id number]) on 6/7/2024 9:48:46 AM    Referred By:            Confirmed By: Rachel Hawkins             ALLERGIES: Chlorzoxazone, Iodine, Phenazopyridine hcl, Pyridium [phenazopyridine], Quinine, Valproic acid, Methocarbamol, Iodinated contrast media, Codeine, and Diphenhydramine    Medications:     estradiol, 1 g, Vaginal, Once per day on Monday Wednesday Friday  FLUoxetine, 40 mg, Oral, QAM  Liraglutide, 1.8 mg, Subcutaneous, Daily  mirtazapine, 30 mg, Oral, Nightly  nitrofurantoin (macrocrystal-monohydrate), 100 mg, Oral, Daily  pantoprazole, 40 mg, Oral, Daily       ASSESSMENT & PLAN       Severe episode of recurrent major depressive disorder, without psychotic features  We will continue the Prozac/mirtazapine and proceed with a ect plus a supportive individual and group psychotherapeutic effort.       Gastroesophageal reflux disease without esophagitis  Protonix           Chronic back pain  PRN ibuprofen      Special precautions: Special Precautions Level 3 (q15 min checks)     Behavioral Health Treatment Plan and Problem List: I have reviewed and approved the Behavioral Health Treatment Plan and Problem list.    I spent a total of 26 minutes in direct patient care including  17 minutes face to face with the patient assessment, coordination of care, and counseling the patient on the current and follow-up treatment plans regarding her status and situation.  Patient had no additional questions.     PELON Osuna MD    Clinician:  Vinod Osuna MD  06/11/24  08:43 EDT    Dictated utilizing Dragon dictation

## 2024-06-11 NOTE — PLAN OF CARE
Goal Outcome Evaluation:  Plan of Care Reviewed With: patient  Patient Agreement with Plan of Care: agrees  Progress: no change  Outcome Evaluation: Therapist met with Patient to review treatment progress and aftercare plans; Patient agreeable.      Problem: Adult Behavioral Health Plan of Care  Goal: Plan of Care Review  Outcome: Ongoing, Progressing  Flowsheets  Taken 6/11/2024 1421 by Corina Durant MSW  Progress: no change  Plan of Care Reviewed With: patient  Patient Agreement with Plan of Care: agrees  Outcome Evaluation:   Therapist met with Patient to review treatment progress and aftercare plans   Patient agreeable.  Taken 6/7/2024 1528 by Marysol García  Consent Given to Review Plan with: Roommate Cloud  Goal: Optimized Coping Skills in Response to Life Stressors  Outcome: Ongoing, Progressing  Flowsheets (Taken 6/11/2024 1421)  Optimized Coping Skills in Response to Life Stressors: making progress toward outcome  Intervention: Promote Effective Coping Strategies  Flowsheets (Taken 6/11/2024 1421)  Supportive Measures:   active listening utilized   counseling provided   goal-setting facilitated   verbalization of feelings encouraged  Goal: Develops/Participates in Therapeutic [place] to Support Successful Transition  Outcome: Ongoing, Progressing  Intervention: Foster Therapeutic [place]  Flowsheets (Taken 6/11/2024 1421)  Trust Relationship/Rapport:   care explained   reassurance provided   thoughts/feelings acknowledged   choices provided   emotional support provided   empathic listening provided   questions answered   questions encouraged     DATA: Therapist met with Patient individually on this date. Therapist introduced self as covering therapist and explained that Patient's primary therapist would not be present today. Patient agreeable to discuss treatment progress and discharge concerns.     CLINICAL MANUVERING/INTERVENTIONS: Assisted Patient in processing session content;  "acknowledged and normalized Patient’s thoughts, feelings, and concerns by utilizing a person-centered approach in efforts to build appropriate rapport and a positive therapeutic relationship with open and honest communication. Allowed Patient to ventilate regarding current stressors and triggers for negative emotions and thoughts in a safe nonjudgmental environment with unconditional positive regard, active listening skills, and empathy.    ASSESSMENT: Patient was seen 1-1 for a follow up today. She continues to receive treatment for SI. She reports no improvement today. She has been up in the day room and attends groups and activities on the unit. She interacts appropriately with tears and is social with staff. She is usually restricted when encouraged to open up and discuss issues that could be contributing to her depression. Patient shows a \"been there don that attitude\" when therapy efforts are applied. Patient states that she has been in \"continuous therapy\" for years.       PLAN: Patient will continue stabilization. Patient will continue to receive services offered by Treatment Team.     Patient will follow-up with KAR Valdez.     "

## 2024-06-11 NOTE — PLAN OF CARE
Goal Outcome Evaluation:  Plan of Care Reviewed With: patient  Patient Agreement with Plan of Care: agrees     Progress: no change  Outcome Evaluation: Pt is calm and cooperative with staff. Pt reports good sleep and a good appetite. Pt rates ANX 4, DEP 8 and SI/HI/AVH at this time.

## 2024-06-11 NOTE — PLAN OF CARE
Goal Outcome Evaluation:  Plan of Care Reviewed With: patient  Patient Agreement with Plan of Care: agrees     Progress: improving     Pt reports good sleep and good appetite. Pt rates anx 3/10 and dep 8/10. Pt denies SI/HI/AVH.

## 2024-06-12 ENCOUNTER — ANESTHESIA EVENT (OUTPATIENT)
Dept: PERIOP | Facility: HOSPITAL | Age: 59
End: 2024-06-12
Payer: MEDICARE

## 2024-06-12 ENCOUNTER — ANESTHESIA (OUTPATIENT)
Dept: PERIOP | Facility: HOSPITAL | Age: 59
End: 2024-06-12
Payer: MEDICARE

## 2024-06-12 LAB — GLUCOSE BLDC GLUCOMTR-MCNC: 99 MG/DL (ref 70–130)

## 2024-06-12 PROCEDURE — 25010000002 SUCCINYLCHOLINE PER 20 MG: Performed by: NURSE ANESTHETIST, CERTIFIED REGISTERED

## 2024-06-12 PROCEDURE — 82948 REAGENT STRIP/BLOOD GLUCOSE: CPT

## 2024-06-12 PROCEDURE — 25810000003 LACTATED RINGERS PER 1000 ML: Performed by: ANESTHESIOLOGY

## 2024-06-12 PROCEDURE — 25810000003 LACTATED RINGERS PER 1000 ML: Performed by: NURSE ANESTHETIST, CERTIFIED REGISTERED

## 2024-06-12 PROCEDURE — 90870 ELECTROCONVULSIVE THERAPY: CPT | Performed by: PSYCHIATRY & NEUROLOGY

## 2024-06-12 PROCEDURE — 25010000002 MIDAZOLAM PER 1 MG: Performed by: NURSE ANESTHETIST, CERTIFIED REGISTERED

## 2024-06-12 RX ORDER — METOPROLOL TARTRATE 1 MG/ML
INJECTION, SOLUTION INTRAVENOUS AS NEEDED
Status: DISCONTINUED | OUTPATIENT
Start: 2024-06-12 | End: 2024-06-12 | Stop reason: SURG

## 2024-06-12 RX ORDER — SUCCINYLCHOLINE CHLORIDE 20 MG/ML
INJECTION INTRAMUSCULAR; INTRAVENOUS AS NEEDED
Status: DISCONTINUED | OUTPATIENT
Start: 2024-06-12 | End: 2024-06-12 | Stop reason: SURG

## 2024-06-12 RX ORDER — SODIUM CHLORIDE 0.9 % (FLUSH) 0.9 %
10 SYRINGE (ML) INJECTION AS NEEDED
Status: DISCONTINUED | OUTPATIENT
Start: 2024-06-12 | End: 2024-06-12 | Stop reason: HOSPADM

## 2024-06-12 RX ORDER — SODIUM CHLORIDE, SODIUM LACTATE, POTASSIUM CHLORIDE, CALCIUM CHLORIDE 600; 310; 30; 20 MG/100ML; MG/100ML; MG/100ML; MG/100ML
INJECTION, SOLUTION INTRAVENOUS CONTINUOUS PRN
Status: DISCONTINUED | OUTPATIENT
Start: 2024-06-12 | End: 2024-06-12 | Stop reason: SURG

## 2024-06-12 RX ORDER — SODIUM CHLORIDE, SODIUM LACTATE, POTASSIUM CHLORIDE, CALCIUM CHLORIDE 600; 310; 30; 20 MG/100ML; MG/100ML; MG/100ML; MG/100ML
100 INJECTION, SOLUTION INTRAVENOUS ONCE AS NEEDED
Status: DISCONTINUED | OUTPATIENT
Start: 2024-06-12 | End: 2024-06-12

## 2024-06-12 RX ORDER — IPRATROPIUM BROMIDE AND ALBUTEROL SULFATE 2.5; .5 MG/3ML; MG/3ML
3 SOLUTION RESPIRATORY (INHALATION) ONCE AS NEEDED
Status: DISCONTINUED | OUTPATIENT
Start: 2024-06-12 | End: 2024-06-12

## 2024-06-12 RX ORDER — KETOROLAC TROMETHAMINE 30 MG/ML
30 INJECTION, SOLUTION INTRAMUSCULAR; INTRAVENOUS EVERY 6 HOURS PRN
Status: DISCONTINUED | OUTPATIENT
Start: 2024-06-12 | End: 2024-06-12

## 2024-06-12 RX ORDER — SODIUM CHLORIDE 0.9 % (FLUSH) 0.9 %
10 SYRINGE (ML) INJECTION EVERY 12 HOURS SCHEDULED
Status: DISCONTINUED | OUTPATIENT
Start: 2024-06-12 | End: 2024-06-12 | Stop reason: HOSPADM

## 2024-06-12 RX ORDER — SODIUM CHLORIDE 9 MG/ML
40 INJECTION, SOLUTION INTRAVENOUS AS NEEDED
Status: DISCONTINUED | OUTPATIENT
Start: 2024-06-12 | End: 2024-06-12 | Stop reason: HOSPADM

## 2024-06-12 RX ORDER — ONDANSETRON 2 MG/ML
4 INJECTION INTRAMUSCULAR; INTRAVENOUS AS NEEDED
Status: DISCONTINUED | OUTPATIENT
Start: 2024-06-12 | End: 2024-06-12

## 2024-06-12 RX ORDER — SODIUM CHLORIDE, SODIUM LACTATE, POTASSIUM CHLORIDE, CALCIUM CHLORIDE 600; 310; 30; 20 MG/100ML; MG/100ML; MG/100ML; MG/100ML
125 INJECTION, SOLUTION INTRAVENOUS ONCE
Status: COMPLETED | OUTPATIENT
Start: 2024-06-12 | End: 2024-06-12

## 2024-06-12 RX ORDER — MIDAZOLAM HYDROCHLORIDE 1 MG/ML
INJECTION INTRAMUSCULAR; INTRAVENOUS AS NEEDED
Status: DISCONTINUED | OUTPATIENT
Start: 2024-06-12 | End: 2024-06-12 | Stop reason: SURG

## 2024-06-12 RX ORDER — OXYCODONE HYDROCHLORIDE AND ACETAMINOPHEN 5; 325 MG/1; MG/1
1 TABLET ORAL ONCE AS NEEDED
Status: DISCONTINUED | OUTPATIENT
Start: 2024-06-12 | End: 2024-06-12

## 2024-06-12 RX ADMIN — MIRTAZAPINE 30 MG: 15 TABLET, FILM COATED ORAL at 20:49

## 2024-06-12 RX ADMIN — ESTRADIOL 1 APPLICATOR: 0.1 CREAM VAGINAL at 20:49

## 2024-06-12 RX ADMIN — IBUPROFEN 400 MG: 400 TABLET, FILM COATED ORAL at 10:53

## 2024-06-12 RX ADMIN — SUCCINYLCHOLINE CHLORIDE 100 MG: 20 INJECTION, SOLUTION INTRAMUSCULAR; INTRAVENOUS at 08:35

## 2024-06-12 RX ADMIN — NITROFURANTOIN MONOHYDRATE/MACROCRYSTALLINE 100 MG: 25; 75 CAPSULE ORAL at 09:56

## 2024-06-12 RX ADMIN — METHOHEXITAL SODIUM 100 MG: 500 INJECTION, POWDER, LYOPHILIZED, FOR SOLUTION INTRAMUSCULAR; INTRAVENOUS; RECTAL at 08:34

## 2024-06-12 RX ADMIN — SODIUM CHLORIDE, POTASSIUM CHLORIDE, SODIUM LACTATE AND CALCIUM CHLORIDE 125 ML/HR: 600; 310; 30; 20 INJECTION, SOLUTION INTRAVENOUS at 08:02

## 2024-06-12 RX ADMIN — MIDAZOLAM HYDROCHLORIDE 2 MG: 1 INJECTION, SOLUTION INTRAMUSCULAR; INTRAVENOUS at 08:39

## 2024-06-12 RX ADMIN — SODIUM CHLORIDE, POTASSIUM CHLORIDE, SODIUM LACTATE AND CALCIUM CHLORIDE: 600; 310; 30; 20 INJECTION, SOLUTION INTRAVENOUS at 08:28

## 2024-06-12 RX ADMIN — METOPROLOL TARTRATE 2.5 MG: 1 INJECTION, SOLUTION INTRAVENOUS at 08:39

## 2024-06-12 RX ADMIN — PANTOPRAZOLE SODIUM 40 MG: 40 TABLET, DELAYED RELEASE ORAL at 09:56

## 2024-06-12 RX ADMIN — FLUOXETINE HYDROCHLORIDE 40 MG: 20 CAPSULE ORAL at 09:56

## 2024-06-12 NOTE — PLAN OF CARE
Problem: Adult Behavioral Health Plan of Care  Goal: Optimized Coping Skills in Response to Life Stressors  Outcome: Ongoing, Progressing  Intervention: Promote Effective Coping Strategies  Flowsheets (Taken 6/12/2024 0032 by Candelario Betancourt RN)  Supportive Measures:   active listening utilized   goal-setting facilitated   positive reinforcement provided   problem-solving facilitated   relaxation techniques promoted   self-care encouraged   self-reflection promoted   self-responsibility promoted   verbalization of feelings encouraged      1050    DATA:    Therapist met with the patient individually. Therapist continues reviewing plan of care and aftercare plan.  The patient was agreeable. Staffed case with Dr. Osuna this date.     ASSESSMENT:    Patient was seen for follow up of Severe episode of recurrent major depressive disorder, without psychotic features       Today, patient was seen 1-1 at bedside. The patient's affect appeared depressed, mood congruent, thought content normal. Patient had just returned to unit from #2nd ECT.  Patient reports no major side affects, but slight headache.  RN Parul assisting patient with getting medication for headache this date.  Therapist continues to provide emotional support and encouragement.  Patient denied needs this date. Therapist discussed her apparent planned trip to [place] on Friday. Patient reports that she has been thinking about this. She was agreeable to rest today and let therapist know tomorrow what she needs to do.  She does report that she took out flight insurance and that she can reschedule with therapist's help tomorrow if need be.  No other issues identified this date. Therapist encouraged patient to ask to see this therapist later if she feels up to talking. Patient receptive.     Therapist contacted Umair this date at [phone];  Therapist provided clinical update following today's ECT.  Umair remains supportive. He inquired about patient's trip  on Friday. Therapist reviewed the plan is to meet with patient tomorrow to see what she wants to do, and recommendations of Dr. Osuna.  Umair verbalized understanding and was agreeable for therapist to update him tomorrow. No other issues identified this date.      CLINICAL MANEUVERING:    Therapist provided safe, secure environment for patient to share.  Provided reflective listening and psychoeducation.  Assisted patient in processing the above session. Assisted patient in identifying any questions or needs to be addressed today.         Plan:     Patient to remain hospitalized this date.      Treatment team will focus efforts on stabilizing patient's acute symptoms while providing education on healthy coping and crisis management to reduce hospitalizations.   Patient requires daily psychiatrist evaluation and 24/7 nursing supervision to promote patient  safety.     Therapist will offer 1-4 individual sessions, family education, and appropriate referral.        Patient consents to [facility] follow up. Patient to return home after discharge. However, she has a trip to [place] and plane tickets scheduled for Friday 6/14 and may need to adjust this flight. Roommate Umair confirms safety planning at home.

## 2024-06-12 NOTE — OP NOTE
ECT OPERATIVE PROCEDURE REPORT      PATIENT NAME: Zo Palma    Assistants: None    Primary Surgeon: PELON Au MD    Preoperative Diagnosis:   Major Depression, Recurrent, Severe Without Psychosis    Postoperative Diagnosis: Same  : 1965    SSN:     MRN#: [id number]    PHYSICIAN: PELON AU M.D.    PROCEDURE DATE: 24    This is the patient's second treatment.     PROCEDURE:   Bifrontal ECT utilizing Thymatron System IV.   Energy Set 50%  Charge Delivered 251.6 mC  Current 0.90 A  Stimulus Duration 7.0 sec  Frequency 40 hz  Pulse Width 0.50 msec      ANESTHESIA: See anesthesia report for medications and monitoring.                           Brevital: 100        mg          Succinylcholine: 100        mg    DETAILS: Impulse at 0902 with a resultant 60 second seizure.      Specimens Removed: NA  Blood Administered: NA  Estimated Blood Loss: None  Complications: None    Pt STATUS STABLE: 0 930 HR    Grafts or Implants: NA    Dictated utilizing Dragon dictation

## 2024-06-12 NOTE — PLAN OF CARE
Goal Outcome Evaluation:  Plan of Care Reviewed With: patient  Patient Agreement with Plan of Care: agrees     Progress: improving     Pt reports good sleep and good appetite. Pt rates anx 3/10 and dep 9/10. Pt denies SI/HI/AVH.

## 2024-06-12 NOTE — PLAN OF CARE
"Goal Outcome Evaluation:  Plan of Care Reviewed With: patient  Patient Agreement with Plan of Care: agrees     Progress: no change  Outcome Evaluation: PATIENT HAS SECOND ECT THIS AM, TOLERATED WELL BUT HAD SLIGHT HA WITH RELIEF NOTED AFTER MOTRIN GIVEN.  HAS BEEN IN BED MOST OF THE DAY SLEEPING.  DENIES SI BUT STATES \"DID NOT SLEEP GOOD LAST NIGHT\".                               "

## 2024-06-12 NOTE — ANESTHESIA POSTPROCEDURE EVALUATION
Patient: Zo Palma    Procedure Summary       Date: 06/12/24 Room / Location: [facility] OR  /  [facility] OR    Anesthesia Start: 0828 Anesthesia Stop: 0843    Procedure: ELECTROCONVULSIVE THERAPY Diagnosis:       Severe episode of recurrent major depressive disorder, without psychotic features      (Severe episode of recurrent major depressive disorder, without psychotic features [F33.2])    Surgeons: Vinod Osuna MD Provider: Lai Jamison MD    Anesthesia Type: general ASA Status: 3            Anesthesia Type: general    Vitals  Vitals Value Taken Time   /75 06/12/24 0911   Temp 97.8 °F (36.6 °C) 06/12/24 0911   Pulse 58 06/12/24 0913   Resp 21 06/12/24 0911   SpO2 98 % 06/12/24 0913   Vitals shown include unfiled device data.        Post Anesthesia Care and Evaluation    Patient location during evaluation: PHASE II  Patient participation: complete - patient participated  Level of consciousness: awake and alert  Pain score: 1  Pain management: adequate    Airway patency: patent  Anesthetic complications: No anesthetic complications  PONV Status: controlled  Cardiovascular status: acceptable  Respiratory status: acceptable  Hydration status: acceptable

## 2024-06-12 NOTE — NURSING NOTE
PATIENT RETURNED FROM ECT.  ALERT & ORIENTED.  DENIES ANY PAINS, ABLE MOVE ALL EXT WELL.  GOOD GAG REFLEX.  VS STABLE.

## 2024-06-13 PROCEDURE — 99232 SBSQ HOSP IP/OBS MODERATE 35: CPT | Performed by: PSYCHIATRY & NEUROLOGY

## 2024-06-13 RX ORDER — FLUOXETINE HYDROCHLORIDE 20 MG/1
60 CAPSULE ORAL EVERY MORNING
Status: DISCONTINUED | OUTPATIENT
Start: 2024-06-14 | End: 2024-06-21 | Stop reason: HOSPADM

## 2024-06-13 RX ADMIN — BREXPIPRAZOLE 0.5 MG: 1 TABLET ORAL at 11:25

## 2024-06-13 RX ADMIN — MIRTAZAPINE 30 MG: 15 TABLET, FILM COATED ORAL at 21:04

## 2024-06-13 RX ADMIN — FLUOXETINE HYDROCHLORIDE 40 MG: 20 CAPSULE ORAL at 06:20

## 2024-06-13 RX ADMIN — NITROFURANTOIN MONOHYDRATE/MACROCRYSTALLINE 100 MG: 25; 75 CAPSULE ORAL at 08:09

## 2024-06-13 RX ADMIN — PANTOPRAZOLE SODIUM 40 MG: 40 TABLET, DELAYED RELEASE ORAL at 08:09

## 2024-06-13 NOTE — PAYOR COMM NOTE
"Tommie Palma (59 y.o. Female)       Date of Birth   1965    Social Security Number       Address   [address] [place] [address]    Home Phone   [phone]    MRN   [id number]       Caodaism   Other    Marital Status                               Admission Date   24    Admission Type   Urgent    Admitting Provider   Maria G Diez MD    Attending Provider   Vinod Osuna MD    Department, Room/Bed   [facility], 1023/1S       Discharge Date       Discharge Disposition       Discharge Destination                                 Attending Provider: Vinod Osuna MD    Allergies: Chlorzoxazone, Iodine, Phenazopyridine Hcl, Pyridium [Phenazopyridine], Quinine, Valproic Acid, Methocarbamol, Iodinated Contrast Media, Codeine, Diphenhydramine    Isolation: None   Infection: None   Code Status: CPR    Ht: 160 cm (63\")   Wt: 68.4 kg (150 lb 12.8 oz)    Admission Cmt: None   Principal Problem: MDD (major depressive disorder), recurrent episode, severe [F33.2]                   Active Insurance as of 2024       Primary Coverage       Payor Plan Insurance Group Employer/Plan Group    ANTHEM MEDICARE REPLACEMENT ANTHEM MEDICARE ADVANTAGE [id number]       Payor Plan Address Payor Plan Phone Number Payor Plan Fax Number Effective Dates    [address] [phone]  2024 - None Entered    [facility] [address]         Subscriber Name Subscriber Birth Date Member ID       TOMMIE PALMA 1965 [id number]                     Emergency Contacts        (Rel.) Home Phone Work Phone Mobile Phone    VLADIMIR PALMA (Daughter) -- -- [phone]    MICHAEL HENRIQUEZ (Friend) [phone] -- --                 Physician Progress Notes (last 48 hours)        Vinod Osuna MD at 24 1024          INPATIENT PSYCHIATRIC PROGRESS NOTE    Name:  Tommie Palma  :  1965  MRN:  [id number]  Visit Number:  " "[id number]  Length of stay:  7    Behavioral Health Treatment Plan and Problem List: I have reviewed and approved the Behavioral Health Treatment Plan and Problem list.    SUBJECTIVE    CC/ Focus of exam: Depression    Patient's subjective status: \"I am getting worried the treatments will help\"    INTERVAL HISTORY: The patient is distressed, discouraged that she has not felt better with the initial 2 ect treatments have helped remaining depressed but without a sense of hopelessness and no active suicidal intent.  Discussed treatment options that included increasing the fluoxetine dose and adding an Rexulti as an adjunct along with continuing the ect.  Patient concurs.     At interview at least superficially objectively patient's affect is somewhat appropriate.    Depression rating 8/10  Anxiety rating 8/10  Hopelessness: 5/10  Sleep: 8 hours         ROS: No cardiovascular, GI, or Neurological complaints.       OBJECTIVE    Vitals:    06/13/24 0748   BP: 107/73   Pulse: 77   Resp: 18   Temp: 98.9 °F (37.2 °C)   SpO2: 97%      Temp:  [97 °F (36.1 °C)-98.9 °F (37.2 °C)] 98.9 °F (37.2 °C)  Heart Rate:  [60-77] 77  Resp:  [14-20] 18  BP: (105-148)/(70-80) 107/73    MENTAL STATUS EXAM:       Psychomotor: No psychomotor agitation/retardation, No EPS, No motor tics  Speech-normal rate, amount.  Mood/Affect: Depressed  Thought Processes: coherent  Thought Content: mood congruent  Hallucination(s): none  Hopelessness: Intermittent  Optimistic: no  Suicidal Thoughts: No  Suicidal Plan/Intent: No  Homicidal Thoughts: absent  Orientation: oriented x 3  Memory: recent intact    Lab Results (last 24 hours)       ** No results found for the last 24 hours. **             Imaging Results (Last 24 Hours)       ** No results found for the last 24 hours. **             Lab and x-ray studies reviewed.    ECG/EMG Results (most recent)       Procedure Component Value Units Date/Time    ECG 12 Lead Other; Baseline Cardiac Status " [[id number]] Collected: 06/07/24 0037     Updated: 06/07/24 0948     QT Interval 428 ms      QTC Interval 420 ms     Narrative:      Test Reason : Other~  Blood Pressure :   */*   mmHG  Vent. Rate :  58 BPM     Atrial Rate :  58 BPM     P-R Int : 194 ms          QRS Dur :  82 ms      QT Int : 428 ms       P-R-T Axes :  57   7  45 degrees     QTc Int : 420 ms    Sinus bradycardia  Otherwise normal ECG  Confirmed by Rachel Hawkins ([id number]) on 6/7/2024 9:48:46 AM    Referred By:            Confirmed By: Rachel Hawkins             ALLERGIES: Chlorzoxazone, Iodine, Phenazopyridine hcl, Pyridium [phenazopyridine], Quinine, Valproic acid, Methocarbamol, Iodinated contrast media, Codeine, and Diphenhydramine    Medications:     estradiol, 1 g, Vaginal, Once per day on Monday Wednesday Friday  FLUoxetine, 40 mg, Oral, QAM  Liraglutide, 1.8 mg, Subcutaneous, Daily  mirtazapine, 30 mg, Oral, Nightly  nitrofurantoin (macrocrystal-monohydrate), 100 mg, Oral, Daily  pantoprazole, 40 mg, Oral, Daily       ASSESSMENT & PLAN     Severe episode of recurrent major depressive disorder, without psychotic features  Will be increasing the fluoxetine dose and adding an Rexulti as an adjunct along with continuing the ect.plus a supportive individual and group psychotherapeutic effort.       Gastroesophageal reflux disease without esophagitis  Protonix           Chronic back pain  PRN ibuprofen      Special precautions: Special Precautions Level 3 (q15 min checks)     Behavioral Health Treatment Plan and Problem List: I have reviewed and approved the Behavioral Health Treatment Plan and Problem list.    I spent a total of 26 minutes in direct patient care including  17 minutes face to face with the patient assessment, coordination of care, and counseling the patient on the current and follow-up treatment plans regarding her status and situation.  Patient had no additional questions.     PELON Osuna MD    Clinician:  Vinod Mcghee  "MD Enrrique  06/13/24  10:24 EDT    Dictated utilizing Dragon dictation       Electronically signed by Vinod Osuna MD at 06/13/24 1036     THERAPISTS NOTE BELOW      Marysol García   Therapist  Psychiatry     Progress Notes     Signed     Date of Service: 06/13/24 1030  Creation Time: 06/13/24 1030     Signed         1030     Patient reports that she seen Dr. Osuna. They have decided that patient will stay in the hospital today and continue ECT. Therapist assisted patient with getting her cell phone so that she could retrieve her itinerary and cancel flight. Patient was \"cashed out\" and the money was placed in her account. Patient can call back and reschedule a flight at a later time.       Therapist contacted Hale this date at [phone], patient was able to give him an update regarding her plans for continued ECT and that she cancelled her flight. No issues identified.      Patient was able to contact her friend Mayelin and also alert her that she will not be flying out to [place] this Friday.  No issues identified.           Marysol García   Therapist  Psychiatry     Plan of Care     Signed     Date of Service: 06/13/24 1003  Creation Time: 06/13/24 1003     Signed            Problem: Adult Behavioral Health Plan of Care  Goal: Optimized Coping Skills in Response to Life Stressors  Outcome: Ongoing, Progressing  Intervention: Promote Effective Coping Strategies  Flowsheets (Taken 6/13/2024 0748 by Candelario Betancourt, RN)  Supportive Measures:   active listening utilized   positive reinforcement provided   problem-solving facilitated   relaxation techniques promoted   self-care encouraged   self-reflection promoted   self-responsibility promoted   verbalization of feelings encouraged        0959     DATA:     Therapist met with the patient individually. Therapist continues reviewing plan of care and aftercare plan.  The patient was agreeable. Staffed case with Dr. Osuna " "this date.      ASSESSMENT:     Patient was seen for follow up of Severe episode of recurrent major depressive disorder, without psychotic features        Today, patient was seen 1-1 in the office. Patient noted to have depressed affect, congruent mood, linear thought processes.  Patient receptive to meeting with therapist and laid on the couch in the office during the visit. Patient reported that the last time she had ECT it was effective immediately. She reports that she has not had the same experience this time, which has been unsettling.  Patient reports that she will meet with Dr. Osuna but will likely need to continue ECT Friday and cancel her flight on Friday.       Patient denies SI/HI/AVH.  She reports that she slept poorly last night due to her roommate needing to get up and down to use the rest room.  Therapist offered to see if there could be alternative room assignment. Patient shrugged and stated \"it is what it is.\"  Patient continues to demonstrate a blase or negativistic thinking pattern at times.       CLINICAL MANEUVERING:     Therapist provided safe, secure environment for patient to share.  Provided reflective listening and psychoeducation.  Assisted patient in processing the above session. Assisted patient in identifying any questions or needs to be addressed today.           Plan:      Patient to remain hospitalized this date.      Treatment team will focus efforts on stabilizing patient's acute symptoms while providing education on healthy coping and crisis management to reduce hospitalizations.   Patient requires daily psychiatrist evaluation and 24/7 nursing supervision to promote patient  safety.     Therapist will offer 1-4 individual sessions, family education, and appropriate referral.        Patient consents to [facility] follow up. Patient to return home after discharge. Patient's roommate Gregg confirms safe guarding at home; no access to firearms, weapons and medications are " locked and administered by [place].      Therapist will assist patient in rescheduling her flight if it is determined that she will remain hospitalized after her visit with Dr. Osuna.

## 2024-06-13 NOTE — PLAN OF CARE
"  Problem: Adult Behavioral Health Plan of Care  Goal: Optimized Coping Skills in Response to Life Stressors  Outcome: Ongoing, Progressing  Intervention: Promote Effective Coping Strategies  Flowsheets (Taken 6/13/2024 4334 by Candelario Betancourt RN)  Supportive Measures:   active listening utilized   positive reinforcement provided   problem-solving facilitated   relaxation techniques promoted   self-care encouraged   self-reflection promoted   self-responsibility promoted   verbalization of feelings encouraged      4622    DATA:     Therapist met with the patient individually. Therapist continues reviewing plan of care and aftercare plan.  The patient was agreeable. Staffed case with Dr. Osuna this date.      ASSESSMENT:     Patient was seen for follow up of Severe episode of recurrent major depressive disorder, without psychotic features        Today, patient was seen 1-1 in the office. Patient noted to have depressed affect, congruent mood, linear thought processes.  Patient receptive to meeting with therapist and laid on the couch in the office during the visit. Patient reported that the last time she had ECT it was effective immediately. She reports that she has not had the same experience this time, which has been unsettling.  Patient reports that she will meet with Dr. Osuna but will likely need to continue ECT Friday and cancel her flight on Friday.      Patient denies SI/HI/AVH.  She reports that she slept poorly last night due to her roommate needing to get up and down to use the rest room.  Therapist offered to see if there could be alternative room assignment. Patient shrugged and stated \"it is what it is.\"  Patient continues to demonstrate a blase or negativistic thinking pattern at times.       CLINICAL MANEUVERING:     Therapist provided safe, secure environment for patient to share.  Provided reflective listening and psychoeducation.  Assisted patient in processing the above session. Assisted " patient in identifying any questions or needs to be addressed today.           Plan:      Patient to remain hospitalized this date.      Treatment team will focus efforts on stabilizing patient's acute symptoms while providing education on healthy coping and crisis management to reduce hospitalizations.   Patient requires daily psychiatrist evaluation and 24/7 nursing supervision to promote patient  safety.     Therapist will offer 1-4 individual sessions, family education, and appropriate referral.        Patient consents to [facility] follow up. Patient to return home after discharge. Patient's roommate Macon confirms safe guarding at home; no access to firearms, weapons and medications are locked and administered by Macon.     Therapist will assist patient in rescheduling her flight if it is determined that she will remain hospitalized after her visit with Dr. Osuna.

## 2024-06-13 NOTE — PROGRESS NOTES
"INPATIENT PSYCHIATRIC PROGRESS NOTE    Name:  Zo Palma  :  1965  MRN:  [id number]  Visit Number:  [id number]  Length of stay:  7    Behavioral Health Treatment Plan and Problem List: I have reviewed and approved the Behavioral Health Treatment Plan and Problem list.    SUBJECTIVE    CC/ Focus of exam: Depression    Patient's subjective status: \"I am getting worried the treatments will help\"    INTERVAL HISTORY: The patient is distressed, discouraged that she has not felt better with the initial 2 ect treatments have helped remaining depressed but without a sense of hopelessness and no active suicidal intent.  Discussed treatment options that included increasing the fluoxetine dose and adding an Rexulti as an adjunct along with continuing the ect.  Patient concurs.     At interview at least superficially objectively patient's affect is somewhat appropriate.    Depression rating 810  Anxiety rating 8/10  Hopelessness: 10  Sleep: 8 hours         ROS: No cardiovascular, GI, or Neurological complaints.       OBJECTIVE    Vitals:    24 0748   BP: 107/73   Pulse: 77   Resp: 18   Temp: 98.9 °F (37.2 °C)   SpO2: 97%      Temp:  [97 °F (36.1 °C)-98.9 °F (37.2 °C)] 98.9 °F (37.2 °C)  Heart Rate:  [60-77] 77  Resp:  [14-20] 18  BP: (105-148)/(70-80) 107/73    MENTAL STATUS EXAM:       Psychomotor: No psychomotor agitation/retardation, No EPS, No motor tics  Speech-normal rate, amount.  Mood/Affect: Depressed  Thought Processes: coherent  Thought Content: mood congruent  Hallucination(s): none  Hopelessness: Intermittent  Optimistic: no  Suicidal Thoughts: No  Suicidal Plan/Intent: No  Homicidal Thoughts: absent  Orientation: oriented x 3  Memory: recent intact    Lab Results (last 24 hours)       ** No results found for the last 24 hours. **             Imaging Results (Last 24 Hours)       ** No results found for the last 24 hours. **             Lab and x-ray studies reviewed.    ECG/EMG Results (most " recent)       Procedure Component Value Units Date/Time    ECG 12 Lead Other; Baseline Cardiac Status [[id number]] Collected: 06/07/24 0037     Updated: 06/07/24 0948     QT Interval 428 ms      QTC Interval 420 ms     Narrative:      Test Reason : Other~  Blood Pressure :   */*   mmHG  Vent. Rate :  58 BPM     Atrial Rate :  58 BPM     P-R Int : 194 ms          QRS Dur :  82 ms      QT Int : 428 ms       P-R-T Axes :  57   7  45 degrees     QTc Int : 420 ms    Sinus bradycardia  Otherwise normal ECG  Confirmed by Rachel Hawkins ([id number]) on 6/7/2024 9:48:46 AM    Referred By:            Confirmed By: Rachel Hawkins             ALLERGIES: Chlorzoxazone, Iodine, Phenazopyridine hcl, Pyridium [phenazopyridine], Quinine, Valproic acid, Methocarbamol, Iodinated contrast media, Codeine, and Diphenhydramine    Medications:     estradiol, 1 g, Vaginal, Once per day on Monday Wednesday Friday  FLUoxetine, 40 mg, Oral, QAM  Liraglutide, 1.8 mg, Subcutaneous, Daily  mirtazapine, 30 mg, Oral, Nightly  nitrofurantoin (macrocrystal-monohydrate), 100 mg, Oral, Daily  pantoprazole, 40 mg, Oral, Daily       ASSESSMENT & PLAN     Severe episode of recurrent major depressive disorder, without psychotic features  Will be increasing the fluoxetine dose and adding an Rexulti as an adjunct along with continuing the ect.plus a supportive individual and group psychotherapeutic effort.       Gastroesophageal reflux disease without esophagitis  Protonix           Chronic back pain  PRN ibuprofen      Special precautions: Special Precautions Level 3 (q15 min checks)     Behavioral Health Treatment Plan and Problem List: I have reviewed and approved the Behavioral Health Treatment Plan and Problem list.    I spent a total of 26 minutes in direct patient care including  17 minutes face to face with the patient assessment, coordination of care, and counseling the patient on the current and follow-up treatment plans regarding her status and  situation.  Patient had no additional questions.     PELON Osuna MD    Clinician:  Vinod Osuna MD  06/13/24  10:24 EDT    Dictated utilizing Dragon dictation

## 2024-06-13 NOTE — PLAN OF CARE
Goal Outcome Evaluation:  Plan of Care Reviewed With: patient  Patient Agreement with Plan of Care: agrees     Progress: no change  Outcome Evaluation: PATIENT HAS BEEN IN ROOM ALL DAY, GOES TO DINING ROOM FOR MEALS.  REFUSED ALL GROUPS TODAY.  DID NOT SLEEP GOOD LAST NIGHT BECAUSE OF HER ROOMMATE.  DENIES SI, AWAITING ECT IN AM, DID NOT HAVE ANY MORE QUESTIONS FOR ECT.  PATIENT DISLODGED IV EARLIER, WILL RESTART BEFORE ECT IN AM.

## 2024-06-13 NOTE — PLAN OF CARE
Goal Outcome Evaluation:  Plan of Care Reviewed With: patient  Patient Agreement with Plan of Care: agrees     Progress: no change  Outcome Evaluation: Pt rports fair sleep and a good appetite. Rates A/D 3/8. Denies SI/HI or hallucinations. Denies feeling helpless or hopeless. Reports feeling worthless. Pt has slept about 8 hours this shift.

## 2024-06-13 NOTE — PROGRESS NOTES
"1030    Patient reports that she seen Dr. Osuna. They have decided that patient will stay in the hospital today and continue ECT. Therapist assisted patient with getting her cell phone so that she could retrieve her itinerary and cancel flight. Patient was \"cashed out\" and the money was placed in her account. Patient can call back and reschedule a flight at a later time.      Therapist contacted [facility] this date at [phone], patient was able to give him an update regarding her plans for continued ECT and that she cancelled her flight. No issues identified.     Patient was able to contact her friend Mayelin and also alert her that she will not be flying out to [place] this Friday.  No issues identified.          "

## 2024-06-14 ENCOUNTER — ANESTHESIA (OUTPATIENT)
Dept: PERIOP | Facility: HOSPITAL | Age: 59
End: 2024-06-14
Payer: MEDICARE

## 2024-06-14 ENCOUNTER — ANESTHESIA EVENT (OUTPATIENT)
Dept: PERIOP | Facility: HOSPITAL | Age: 59
End: 2024-06-14
Payer: MEDICARE

## 2024-06-14 PROCEDURE — 25810000003 LACTATED RINGERS PER 1000 ML: Performed by: ANESTHESIOLOGY

## 2024-06-14 PROCEDURE — 90870 ELECTROCONVULSIVE THERAPY: CPT | Performed by: PSYCHIATRY & NEUROLOGY

## 2024-06-14 PROCEDURE — 25010000002 KETOROLAC TROMETHAMINE PER 15 MG: Performed by: NURSE ANESTHETIST, CERTIFIED REGISTERED

## 2024-06-14 PROCEDURE — 25810000003 LACTATED RINGERS PER 1000 ML: Performed by: NURSE ANESTHETIST, CERTIFIED REGISTERED

## 2024-06-14 PROCEDURE — 25010000002 MIDAZOLAM PER 1 MG: Performed by: NURSE ANESTHETIST, CERTIFIED REGISTERED

## 2024-06-14 PROCEDURE — 25010000002 SUCCINYLCHOLINE PER 20 MG: Performed by: NURSE ANESTHETIST, CERTIFIED REGISTERED

## 2024-06-14 PROCEDURE — 25010000002 ONDANSETRON PER 1 MG: Performed by: NURSE ANESTHETIST, CERTIFIED REGISTERED

## 2024-06-14 RX ORDER — ONDANSETRON 2 MG/ML
4 INJECTION INTRAMUSCULAR; INTRAVENOUS AS NEEDED
Status: DISCONTINUED | OUTPATIENT
Start: 2024-06-14 | End: 2024-06-14 | Stop reason: HOSPADM

## 2024-06-14 RX ORDER — SUCCINYLCHOLINE CHLORIDE 20 MG/ML
INJECTION INTRAMUSCULAR; INTRAVENOUS AS NEEDED
Status: DISCONTINUED | OUTPATIENT
Start: 2024-06-14 | End: 2024-06-14 | Stop reason: SURG

## 2024-06-14 RX ORDER — KETOROLAC TROMETHAMINE 30 MG/ML
INJECTION, SOLUTION INTRAMUSCULAR; INTRAVENOUS AS NEEDED
Status: DISCONTINUED | OUTPATIENT
Start: 2024-06-14 | End: 2024-06-14 | Stop reason: SURG

## 2024-06-14 RX ORDER — SODIUM CHLORIDE 9 MG/ML
40 INJECTION, SOLUTION INTRAVENOUS AS NEEDED
Status: DISCONTINUED | OUTPATIENT
Start: 2024-06-14 | End: 2024-06-14 | Stop reason: HOSPADM

## 2024-06-14 RX ORDER — IPRATROPIUM BROMIDE AND ALBUTEROL SULFATE 2.5; .5 MG/3ML; MG/3ML
3 SOLUTION RESPIRATORY (INHALATION) ONCE AS NEEDED
Status: DISCONTINUED | OUTPATIENT
Start: 2024-06-14 | End: 2024-06-14 | Stop reason: HOSPADM

## 2024-06-14 RX ORDER — SODIUM CHLORIDE 0.9 % (FLUSH) 0.9 %
10 SYRINGE (ML) INJECTION EVERY 12 HOURS SCHEDULED
Status: DISCONTINUED | OUTPATIENT
Start: 2024-06-14 | End: 2024-06-14 | Stop reason: HOSPADM

## 2024-06-14 RX ORDER — SODIUM CHLORIDE, SODIUM LACTATE, POTASSIUM CHLORIDE, CALCIUM CHLORIDE 600; 310; 30; 20 MG/100ML; MG/100ML; MG/100ML; MG/100ML
INJECTION, SOLUTION INTRAVENOUS CONTINUOUS PRN
Status: DISCONTINUED | OUTPATIENT
Start: 2024-06-14 | End: 2024-06-14 | Stop reason: SURG

## 2024-06-14 RX ORDER — SODIUM CHLORIDE, SODIUM LACTATE, POTASSIUM CHLORIDE, CALCIUM CHLORIDE 600; 310; 30; 20 MG/100ML; MG/100ML; MG/100ML; MG/100ML
100 INJECTION, SOLUTION INTRAVENOUS ONCE AS NEEDED
Status: DISCONTINUED | OUTPATIENT
Start: 2024-06-14 | End: 2024-06-14 | Stop reason: HOSPADM

## 2024-06-14 RX ORDER — SODIUM CHLORIDE 0.9 % (FLUSH) 0.9 %
10 SYRINGE (ML) INJECTION AS NEEDED
Status: DISCONTINUED | OUTPATIENT
Start: 2024-06-14 | End: 2024-06-14 | Stop reason: HOSPADM

## 2024-06-14 RX ORDER — MEPERIDINE HYDROCHLORIDE 25 MG/ML
12.5 INJECTION INTRAMUSCULAR; INTRAVENOUS; SUBCUTANEOUS
Status: DISCONTINUED | OUTPATIENT
Start: 2024-06-14 | End: 2024-06-14 | Stop reason: HOSPADM

## 2024-06-14 RX ORDER — ONDANSETRON 2 MG/ML
INJECTION INTRAMUSCULAR; INTRAVENOUS AS NEEDED
Status: DISCONTINUED | OUTPATIENT
Start: 2024-06-14 | End: 2024-06-14 | Stop reason: SURG

## 2024-06-14 RX ORDER — SODIUM CHLORIDE, SODIUM LACTATE, POTASSIUM CHLORIDE, CALCIUM CHLORIDE 600; 310; 30; 20 MG/100ML; MG/100ML; MG/100ML; MG/100ML
125 INJECTION, SOLUTION INTRAVENOUS ONCE
Status: COMPLETED | OUTPATIENT
Start: 2024-06-14 | End: 2024-06-14

## 2024-06-14 RX ORDER — OXYCODONE HYDROCHLORIDE AND ACETAMINOPHEN 5; 325 MG/1; MG/1
1 TABLET ORAL ONCE AS NEEDED
Status: DISCONTINUED | OUTPATIENT
Start: 2024-06-14 | End: 2024-06-14 | Stop reason: HOSPADM

## 2024-06-14 RX ORDER — MIDAZOLAM HYDROCHLORIDE 1 MG/ML
INJECTION INTRAMUSCULAR; INTRAVENOUS AS NEEDED
Status: DISCONTINUED | OUTPATIENT
Start: 2024-06-14 | End: 2024-06-14 | Stop reason: SURG

## 2024-06-14 RX ORDER — FENTANYL CITRATE 50 UG/ML
50 INJECTION, SOLUTION INTRAMUSCULAR; INTRAVENOUS
Status: DISCONTINUED | OUTPATIENT
Start: 2024-06-14 | End: 2024-06-14 | Stop reason: HOSPADM

## 2024-06-14 RX ADMIN — MIRTAZAPINE 30 MG: 15 TABLET, FILM COATED ORAL at 20:41

## 2024-06-14 RX ADMIN — METHOHEXITAL SODIUM 100 MG: 500 INJECTION, POWDER, LYOPHILIZED, FOR SOLUTION INTRAMUSCULAR; INTRAVENOUS; RECTAL at 08:40

## 2024-06-14 RX ADMIN — BREXPIPRAZOLE 0.5 MG: 1 TABLET ORAL at 10:12

## 2024-06-14 RX ADMIN — SODIUM CHLORIDE, POTASSIUM CHLORIDE, SODIUM LACTATE AND CALCIUM CHLORIDE: 600; 310; 30; 20 INJECTION, SOLUTION INTRAVENOUS at 08:29

## 2024-06-14 RX ADMIN — SODIUM CHLORIDE, POTASSIUM CHLORIDE, SODIUM LACTATE AND CALCIUM CHLORIDE 125 ML/HR: 600; 310; 30; 20 INJECTION, SOLUTION INTRAVENOUS at 08:19

## 2024-06-14 RX ADMIN — NITROFURANTOIN MONOHYDRATE/MACROCRYSTALLINE 100 MG: 25; 75 CAPSULE ORAL at 10:15

## 2024-06-14 RX ADMIN — SUCCINYLCHOLINE CHLORIDE 100 MG: 20 INJECTION, SOLUTION INTRAMUSCULAR; INTRAVENOUS at 08:40

## 2024-06-14 RX ADMIN — ESTRADIOL 1 APPLICATOR: 0.1 CREAM VAGINAL at 20:41

## 2024-06-14 RX ADMIN — KETOROLAC TROMETHAMINE 30 MG: 30 INJECTION, SOLUTION INTRAMUSCULAR; INTRAVENOUS at 08:44

## 2024-06-14 RX ADMIN — MIDAZOLAM HYDROCHLORIDE 2 MG: 1 INJECTION, SOLUTION INTRAMUSCULAR; INTRAVENOUS at 08:44

## 2024-06-14 RX ADMIN — ONDANSETRON 4 MG: 2 INJECTION INTRAMUSCULAR; INTRAVENOUS at 08:44

## 2024-06-14 NOTE — PLAN OF CARE
Problem: Adult Behavioral Health Plan of Care  Goal: Plan of Care Review  Outcome: Ongoing, Progressing  Flowsheets  Taken 6/14/2024 1157  Progress: no change  Plan of Care Reviewed With: patient  Patient Agreement with Plan of Care: agrees  Taken 6/7/2024 1528  Consent Given to Review Plan with: Roommate Umair      1150:     Therapist met with patient 1-1 following ECT. She has returned to the unit and denies side affects this morning. Patient agreeable to stay the weekend and reports that she anticipates ECT again on Monday.  Patient denied needs. Therapist will check with Lead RN Chen to see if we have any clothes in patient's size.  Patient continues to appear somewhat guarded.  Patient continues to be somewhat negativistic. She fully consents to ECT, but appears to wonder if it will be as successful as last time. Patient's outpatient treatment is scheduled with [facility]. She will return home with her roommate Umair who reports patient's home is safe guarded from firearms, weapons, and medications.  Cloud locks medications and administers to the patient.  Therapist will be available if she would like a therapy session later today.

## 2024-06-14 NOTE — PAYOR COMM NOTE
"Tommie Palma (59 y.o. Female)       Date of Birth   1965    Social Security Number       Address   [address] [place] [address]    Home Phone   [phone]    MRN   [id number]       [employer]   Other    Marital Status                               Admission Date   24    Admission Type   Urgent    Admitting Provider   Maria G Diez MD    Attending Provider   Vinod Osuna MD    Department, Room/Bed   [facility], 1023/1S       Discharge Date       Discharge Disposition       Discharge Destination                                 Attending Provider: Vinod Osuna MD    Allergies: Chlorzoxazone, Iodine, Phenazopyridine Hcl, Pyridium [Phenazopyridine], Quinine, Valproic Acid, Methocarbamol, Iodinated Contrast Media, Codeine, Diphenhydramine    Isolation: None   Infection: None   Code Status: CPR    Ht: 160 cm (63\")   Wt: 67.6 kg (149 lb)    Admission Cmt: None   Principal Problem: MDD (major depressive disorder), recurrent episode, severe [F33.2]                   Active Insurance as of 2024       Primary Coverage       Payor Plan Insurance Group Employer/Plan Group    ANTHEM MEDICARE REPLACEMENT ANTHEM MEDICARE ADVANTAGE [id number]       Payor Plan Address Payor Plan Phone Number Payor Plan Fax Number Effective Dates    [address] [phone]  2024 - None Entered    [facility] [id number]         Subscriber Name Subscriber Birth Date Member ID       TOMMIE PALMA 1965 [id number]                     Emergency Contacts        (Rel.) Home Phone Work Phone Mobile Phone    VLADIMIR PALMA (Daughter) -- -- [phone]    MICHAEL HENRIQUEZ (Friend) [phone] -- --                 Physician Progress Notes (last 48 hours)        Vinod Osuna MD at 24 1024          INPATIENT PSYCHIATRIC PROGRESS NOTE    Name:  Tommie Palma  :  1965  MRN:  [id number]  Visit Number:  " "[id number]  Length of stay:  7    Behavioral Health Treatment Plan and Problem List: I have reviewed and approved the Behavioral Health Treatment Plan and Problem list.    SUBJECTIVE    CC/ Focus of exam: Depression    Patient's subjective status: \"I am getting worried the treatments will help\"    INTERVAL HISTORY: The patient is distressed, discouraged that she has not felt better with the initial 2 ect treatments have helped remaining depressed but without a sense of hopelessness and no active suicidal intent.  Discussed treatment options that included increasing the fluoxetine dose and adding an Rexulti as an adjunct along with continuing the ect.  Patient concurs.     At interview at least superficially objectively patient's affect is somewhat appropriate.    Depression rating 8/10  Anxiety rating 8/10  Hopelessness: 5/10  Sleep: 8 hours         ROS: No cardiovascular, GI, or Neurological complaints.       OBJECTIVE    Vitals:    06/13/24 0748   BP: 107/73   Pulse: 77   Resp: 18   Temp: 98.9 °F (37.2 °C)   SpO2: 97%      Temp:  [97 °F (36.1 °C)-98.9 °F (37.2 °C)] 98.9 °F (37.2 °C)  Heart Rate:  [60-77] 77  Resp:  [14-20] 18  BP: (105-148)/(70-80) 107/73    MENTAL STATUS EXAM:       Psychomotor: No psychomotor agitation/retardation, No EPS, No motor tics  Speech-normal rate, amount.  Mood/Affect: Depressed  Thought Processes: coherent  Thought Content: mood congruent  Hallucination(s): none  Hopelessness: Intermittent  Optimistic: no  Suicidal Thoughts: No  Suicidal Plan/Intent: No  Homicidal Thoughts: absent  Orientation: oriented x 3  Memory: recent intact    Lab Results (last 24 hours)       ** No results found for the last 24 hours. **             Imaging Results (Last 24 Hours)       ** No results found for the last 24 hours. **             Lab and x-ray studies reviewed.    ECG/EMG Results (most recent)       Procedure Component Value Units Date/Time    ECG 12 Lead Other; Baseline Cardiac Status " [[id number]] Collected: 06/07/24 0037     Updated: 06/07/24 0948     QT Interval 428 ms      QTC Interval 420 ms     Narrative:      Test Reason : Other~  Blood Pressure :   */*   mmHG  Vent. Rate :  58 BPM     Atrial Rate :  58 BPM     P-R Int : 194 ms          QRS Dur :  82 ms      QT Int : 428 ms       P-R-T Axes :  57   7  45 degrees     QTc Int : 420 ms    Sinus bradycardia  Otherwise normal ECG  Confirmed by Rachel Hawkins ([id number]) on 6/7/2024 9:48:46 AM    Referred By:            Confirmed By: Rachel Hawkins             ALLERGIES: Chlorzoxazone, Iodine, Phenazopyridine hcl, Pyridium [phenazopyridine], Quinine, Valproic acid, Methocarbamol, Iodinated contrast media, Codeine, and Diphenhydramine    Medications:     estradiol, 1 g, Vaginal, Once per day on Monday Wednesday Friday  FLUoxetine, 40 mg, Oral, QAM  Liraglutide, 1.8 mg, Subcutaneous, Daily  mirtazapine, 30 mg, Oral, Nightly  nitrofurantoin (macrocrystal-monohydrate), 100 mg, Oral, Daily  pantoprazole, 40 mg, Oral, Daily       ASSESSMENT & PLAN     Severe episode of recurrent major depressive disorder, without psychotic features  Will be increasing the fluoxetine dose and adding an Rexulti as an adjunct along with continuing the ect.plus a supportive individual and group psychotherapeutic effort.       Gastroesophageal reflux disease without esophagitis  Protonix           Chronic back pain  PRN ibuprofen      Special precautions: Special Precautions Level 3 (q15 min checks)     Behavioral Health Treatment Plan and Problem List: I have reviewed and approved the Behavioral Health Treatment Plan and Problem list.    I spent a total of 26 minutes in direct patient care including  17 minutes face to face with the patient assessment, coordination of care, and counseling the patient on the current and follow-up treatment plans regarding her status and situation.  Patient had no additional questions.     PELON Osuna MD    Clinician:  Vinod Mcghee  "MD Enrrique  06/13/24  10:24 EDT    Dictated utilizing Dragon dictation       Electronically signed by Vinod Osuna MD at 06/13/24 1036        Candelario Betancourt RN   Registered Nurse  Nursing     Plan of Care     Signed     Date of Service: 06/13/24 1649  Creation Time: 06/13/24 1649     Signed         Goal Outcome Evaluation:  Plan of Care Reviewed With: patient  Patient Agreement with Plan of Care: agrees  Progress: no change  Outcome Evaluation: PATIENT HAS BEEN IN ROOM ALL DAY, GOES TO DINING ROOM FOR MEALS.  REFUSED ALL GROUPS TODAY.  DID NOT SLEEP GOOD LAST NIGHT BECAUSE OF HER ROOMMATE.  DENIES SI, AWAITING ECT IN AM, DID NOT HAVE ANY MORE QUESTIONS FOR ECT.  PATIENT DISLODGED IV EARLIER, WILL RESTART BEFORE ECT IN AM.                              Nery Nelson, RN   Registered Nurse  Emergency Medicine     Plan of Care     Signed     Date of Service: 06/14/24 0532  Creation Time: 06/14/24 0532     Signed         Goal Outcome Evaluation:  Plan of Care Reviewed With: patient  Patient Agreement with Plan of Care: agrees  Outcome Evaluation: Pt up in the dayroom at the start of the shift.  States that her mood is \"resigned\" elaborating that her condition is just how it's going to remain.  States that she is having an ECT in the a.m., but does not think it is going to help.  Rated per anxiety 3 and depression 8.  Stated that her appetite is good and her sleep as well.  VS stable.  No c/o pain.  She slept well overnight.  Total sleep time 6.5 hours.                              Livan Peng DO   Anesthesiologist  Specialty: Anesthesiology     Anesthesia Postprocedure Evaluation     Signed     Date of Service: 06/14/24 0928  Creation Time: 06/14/24 0928     Signed         Patient: Zo Palma     Procedure Summary         Date: 06/14/24 Room / Location:  [facility]  /  [facility]     Anesthesia Start: 0829 Anesthesia Stop: 0848     Procedure: ELECTROCONVULSIVE THERAPY Diagnosis:       Severe " episode of recurrent major depressive disorder, without psychotic features      (Severe episode of recurrent major depressive disorder, without psychotic features [F33.2])     Surgeons: Vinod Osuna MD Provider: Lai Jamison MD     Anesthesia Type: general ASA Status: 3               Anesthesia Type: general     Vitals  Vitals Value Taken Time   /78 06/14/24 0919   Temp 98 °F (36.7 °C) 06/14/24 0919   Pulse 67 06/14/24 0920   Resp 14 06/14/24 0919   SpO2 98 % 06/14/24 0920   Vitals shown include unfiled device data.           Post Anesthesia Care and Evaluation     Patient location during evaluation: PHASE II  Patient participation: complete - patient participated  Level of consciousness: awake and alert  Pain score: 1  Pain management: adequate     Airway patency: patent  Anesthetic complications: No anesthetic complications  PONV Status: controlled  Cardiovascular status: acceptable  Respiratory status: acceptable  Hydration status: acceptable

## 2024-06-14 NOTE — ANESTHESIA POSTPROCEDURE EVALUATION
Patient: Zo Palma    Procedure Summary       Date: 06/14/24 Room / Location: [facility] OR  /  [facility]    Anesthesia Start: 0829 Anesthesia Stop: 0848    Procedure: ELECTROCONVULSIVE THERAPY Diagnosis:       Severe episode of recurrent major depressive disorder, without psychotic features      (Severe episode of recurrent major depressive disorder, without psychotic features [F33.2])    Surgeons: Vinod Osuna MD Provider: Lai Jamison MD    Anesthesia Type: general ASA Status: 3            Anesthesia Type: general    Vitals  Vitals Value Taken Time   /78 06/14/24 0919   Temp 98 °F (36.7 °C) 06/14/24 0919   Pulse 67 06/14/24 0920   Resp 14 06/14/24 0919   SpO2 98 % 06/14/24 0920   Vitals shown include unfiled device data.        Post Anesthesia Care and Evaluation    Patient location during evaluation: PHASE II  Patient participation: complete - patient participated  Level of consciousness: awake and alert  Pain score: 1  Pain management: adequate    Airway patency: patent  Anesthetic complications: No anesthetic complications  PONV Status: controlled  Cardiovascular status: acceptable  Respiratory status: acceptable  Hydration status: acceptable

## 2024-06-14 NOTE — PLAN OF CARE
Goal Outcome Evaluation:  Plan of Care Reviewed With: patient  Patient Agreement with Plan of Care: agrees     Progress: no change  Outcome Evaluation: PATIENT HAD ECT THIS MORNING & TOLERATED WELL.  HAS BEEN IN BED SLEEPING MOST OF THE DAY,

## 2024-06-14 NOTE — PROGRESS NOTES
Patient is scheduled for ECT Monday, June 17, will be continuing his psychiatric medications over the weekend that include fluoxetine, adjunct brexpiprazole, and mirtazapine.  Will aim to meet with the patient Monday morning prior to the scheduled ECT treatment.

## 2024-06-14 NOTE — NURSING NOTE
PATIENT RETURNED TO UNIT FROM ECT.  ALERT & ORIENTED.  VS STABLE, DENIES ANY PAIN, GAG REFLEX GOOD, MOVES ALL EXT WITHOUT PAIN.

## 2024-06-14 NOTE — OP NOTE
ECT OPERATIVE PROCEDURE REPORT      PATIENT NAME: Zo Palma    Assistants: None    Primary Surgeon: PELON Au MD    Preoperative Diagnosis:    Recurrent major depressive disorder without psychosis    Postoperative Diagnosis: Same  : 1965    SSN:     MRN#: [id number]    PHYSICIAN: PELON AU M.D.    PROCEDURE DATE: 24    This is the patient's third treatment.     PROCEDURE:   Bifrontal ECT utilizing Thymatron System IV.   Energy Set 50%  Charge Delivered 248.0 mC  Current 0.89 A  Stimulus Duration 7.0 sec  Frequency 40 hz  Pulse Width 0.50 msec      ANESTHESIA: See anesthesia report for medications and monitoring.                           Brevital: 100        mg          Succinylcholine: 100        mg    DETAILS: Impulse at 0839 with a resultant 45 second seizure.        Specimens Removed: NA  Blood Administered: NA  Estimated Blood Loss: None  Complications: None    Pt STATUS STABLE: 0900 HR    Grafts or Implants: NA    Dictated utilizing Dragon dictation

## 2024-06-14 NOTE — PLAN OF CARE
"Goal Outcome Evaluation:  Plan of Care Reviewed With: patient  Patient Agreement with Plan of Care: agrees        Outcome Evaluation: Pt up in the dayroom at the start of the shift.  States that her mood is \"resigned\" elaborating that her condition is just how it's going to remain.  States that she is having an ECT in the a.m., but does not think it is going to help.  Rated per anxiety 3 and depression 8.  Stated that her appetite is good and her sleep as well.  VS stable.  No c/o pain.  She slept well overnight.  Total sleep time 6.5 hours.                               "

## 2024-06-15 PROCEDURE — 99232 SBSQ HOSP IP/OBS MODERATE 35: CPT | Performed by: PSYCHIATRY & NEUROLOGY

## 2024-06-15 RX ADMIN — NITROFURANTOIN MONOHYDRATE/MACROCRYSTALLINE 100 MG: 25; 75 CAPSULE ORAL at 08:52

## 2024-06-15 RX ADMIN — PANTOPRAZOLE SODIUM 40 MG: 40 TABLET, DELAYED RELEASE ORAL at 08:52

## 2024-06-15 RX ADMIN — FLUOXETINE HYDROCHLORIDE 60 MG: 20 CAPSULE ORAL at 06:18

## 2024-06-15 RX ADMIN — MIRTAZAPINE 30 MG: 15 TABLET, FILM COATED ORAL at 20:02

## 2024-06-15 RX ADMIN — BREXPIPRAZOLE 0.5 MG: 1 TABLET ORAL at 08:53

## 2024-06-15 NOTE — PLAN OF CARE
Goal Outcome Evaluation:  Plan of Care Reviewed With: patient  Patient Agreement with Plan of Care: agrees     Progress: improving     Patient rates depression 8 and anxiety 3, denies thoughts of harming self and others, and denies hallucinations.

## 2024-06-15 NOTE — PLAN OF CARE
Goal Outcome Evaluation:  Plan of Care Reviewed With: patient  Patient Agreement with Plan of Care: agrees     Progress: no change  Outcome Evaluation: Pt reports fair sleep and a good appetite. Rates A/D 3/8. Denies SI/HI or hallucinations. Pt has slept about 8.5 hours this shift.

## 2024-06-15 NOTE — PROGRESS NOTES
"      Inpatient Psych Progress Note     Clinician: Max Osuna MD  Admission Date: 6/6/2024  07:12 EDT 06/15/24    Behavioral Health Treatment Plan and Problem List: I have reviewed and approved the Behavioral Health Treatment Plan and Problem list.    Allergies  Allergies   Allergen Reactions    Chlorzoxazone Unknown - High Severity and Swelling     Lorzone, muscle relaxant.    Iodine Anaphylaxis     Topical makes her swell  Anaphylaxis with IV contrast (when she was ~ 9yrs old)    Phenazopyridine Hcl Swelling and Anaphylaxis    Pyridium [Phenazopyridine] Anaphylaxis    Quinine Unknown - High Severity     Has malaria symptoms    Valproic Acid Other (See Comments)     Liver failure  Liver failure  Liver failure; lupus like symptoms and liver failure    Methocarbamol Other (See Comments)     Made her feel \"suicidal\" and gave her less control over her actions.    Iodinated Contrast Media Unknown - Low Severity    Codeine Itching    Diphenhydramine Anxiety     Pt has severe panic attack symptoms when given benadryl IV. Needs to take a benzodiazapine med concurrently when getting benadryl.        Hospital Day: 9 days      Assessment completed within view of staff    History  CC/clinical focus: depression     Interval HPI: Patient seen and evaluated by me.  Chart reviewed.  No acute problems overnight. Patient seems to be in good spirits this morning. Sleep has been fair, appetite good. She subjectively rates anxiety and depression 3/10 at this time. Denies SI/HI/AVH. Denying any side effects from treatment thus far.   Med Compliant.  ROS otherwise as below.    Review of Systems   Constitutional: Negative.    HENT: Negative.     Eyes: Negative.    Respiratory: Negative.     Cardiovascular: Negative.    Gastrointestinal: Negative.    Endocrine: Negative.    Genitourinary: Negative.    Musculoskeletal: Negative.    Skin: Negative.    Allergic/Immunologic: Negative.    Neurological: Negative.         /76 (BP " "Location: Left arm, Patient Position: Standing)   Pulse 79   Temp 97.1 °F (36.2 °C) (Temporal)   Resp 16   Ht 160 cm (63\")   Wt 67.6 kg (149 lb)   SpO2 99%   BMI 26.39 kg/m²     Mental Status Exam  Mood:  better today  Affect: mood-congruent   Thought Processes:  linear  Thought Content: normal  Hallucinations: no  Suicidal Thoughts: denies  Suicidal Plan/Intent: denies  Hopelesness:no  Homicidal Thoughts:  denies      Medical Decision Making:   Labs:     Lab Results (last 24 hours)       ** No results found for the last 24 hours. **              Radiology:     Imaging Results (Last 24 Hours)       ** No results found for the last 24 hours. **              EKG:     ECG/EMG Results (most recent)       Procedure Component Value Units Date/Time    ECG 12 Lead Other; Baseline Cardiac Status [[id number]] Collected: 06/07/24 0037     Updated: 06/07/24 0948     QT Interval 428 ms      QTC Interval 420 ms     Narrative:      Test Reason : Other~  Blood Pressure :   */*   mmHG  Vent. Rate :  58 BPM     Atrial Rate :  58 BPM     P-R Int : 194 ms          QRS Dur :  82 ms      QT Int : 428 ms       P-R-T Axes :  57   7  45 degrees     QTc Int : 420 ms    Sinus bradycardia  Otherwise normal ECG  Confirmed by Rachel Hawkins ([id number]) on 6/7/2024 9:48:46 AM    Referred By:            Confirmed By: Rachel Hawkins             Medications:  Brexpiprazole, 0.5 mg, Oral, Daily  estradiol, 1 g, Vaginal, Once per day on Monday Wednesday Friday  FLUoxetine, 60 mg, Oral, QAM  Liraglutide, 1.8 mg, Subcutaneous, Daily  mirtazapine, 30 mg, Oral, Nightly  nitrofurantoin (macrocrystal-monohydrate), 100 mg, Oral, Daily  pantoprazole, 40 mg, Oral, Daily           All medications reviewed.      Assessment and Plan:      Severe episode of recurrent major depressive disorder, without psychotic features  - Fluoxetine 60 mg daily  - Rexulti 0.5 mg daily  - Mirtazapine 30 mg qhs  - ECT, next session scheduled 06/17     Gastroesophageal reflux " disease without esophagitis  - Protonix     Chronic back pain  - PRN ibuprofen      Continue hospitalization for safety and stabilization.  Continue current level of Special Precautions (q15 minute checks).        This note was generated by a scribe, Jesus Marsh. The work documented in this note was completed, reviewed, and approved by the attending psychiatrist as designated Dr. Max Osuna electronic signature.     IMax MD, personally performed the services described in this documentation as scribed by the above named individual and is both accurate and complete as of 6/15/2024.

## 2024-06-16 PROCEDURE — 99232 SBSQ HOSP IP/OBS MODERATE 35: CPT | Performed by: PSYCHIATRY & NEUROLOGY

## 2024-06-16 RX ADMIN — BREXPIPRAZOLE 0.5 MG: 1 TABLET ORAL at 08:26

## 2024-06-16 RX ADMIN — PANTOPRAZOLE SODIUM 40 MG: 40 TABLET, DELAYED RELEASE ORAL at 08:49

## 2024-06-16 RX ADMIN — MIRTAZAPINE 30 MG: 15 TABLET, FILM COATED ORAL at 20:31

## 2024-06-16 RX ADMIN — NITROFURANTOIN MONOHYDRATE/MACROCRYSTALLINE 100 MG: 25; 75 CAPSULE ORAL at 08:48

## 2024-06-16 RX ADMIN — FLUOXETINE HYDROCHLORIDE 60 MG: 20 CAPSULE ORAL at 06:24

## 2024-06-16 NOTE — PLAN OF CARE
Goal Outcome Evaluation:  Plan of Care Reviewed With: patient  Patient Agreement with Plan of Care: agrees        Outcome Evaluation: Pt has remained calm, cooperative. Denies issues with appetite or sleep. Anixety 3. Depression 8. Denies SI/HI/AVH. Scheduled for ECT tomorrow. Pt concerned that she hasn't responded well to recent sessions. Pt has joined other patient's in the dayroom for TV, and napped this shift.

## 2024-06-16 NOTE — PROGRESS NOTES
"      Inpatient Psych Progress Note     Clinician: Max Osuna MD  Admission Date: 6/6/2024  07:06 EDT 06/16/24    Behavioral Health Treatment Plan and Problem List: I have reviewed and approved the Behavioral Health Treatment Plan and Problem list.    Allergies  Allergies   Allergen Reactions    Chlorzoxazone Unknown - High Severity and Swelling     Lorzone, muscle relaxant.    Iodine Anaphylaxis     Topical makes her swell  Anaphylaxis with IV contrast (when she was ~ 9yrs old)    Phenazopyridine Hcl Swelling and Anaphylaxis    Pyridium [Phenazopyridine] Anaphylaxis    Quinine Unknown - High Severity     Has malaria symptoms    Valproic Acid Other (See Comments)     Liver failure  Liver failure  Liver failure; lupus like symptoms and liver failure    Methocarbamol Other (See Comments)     Made her feel \"suicidal\" and gave her less control over her actions.    Iodinated Contrast Media Unknown - Low Severity    Codeine Itching    Diphenhydramine Anxiety     Pt has severe panic attack symptoms when given benadryl IV. Needs to take a benzodiazapine med concurrently when getting benadryl.        Hospital Day: 10 days      Assessment completed within view of staff    History  CC/clinical focus: depression     Interval HPI: Patient seen and evaluated by me.  Chart reviewed.  Calm and cooperative on assessment today. No acute problems. Subjectively rates anxiety 3/10 and depression 8/10 at this time. No SI/HI/AVH. Tolerating medications ok.   Med Compliant.  ROS otherwise as below.    Review of Systems   Constitutional: Negative.    HENT: Negative.     Eyes: Negative.    Respiratory: Negative.     Cardiovascular: Negative.    Gastrointestinal: Negative.    Endocrine: Negative.    Genitourinary: Negative.    Musculoskeletal: Negative.    Skin: Negative.    Allergic/Immunologic: Negative.    Neurological: Negative.    Hematological: Negative.         BP 92/70 (BP Location: Right arm, Patient Position: Standing)   Pulse " "75   Temp 96.9 °F (36.1 °C) (Temporal)   Resp 18   Ht 160 cm (63\")   Wt 67.6 kg (149 lb)   SpO2 96%   BMI 26.39 kg/m²     Mental Status Exam  Mood:  \"hanging in there\"  Affect: mood-congruent   Thought Processes:  linear  Thought Content: normal  Hallucinations: no  Suicidal Thoughts: denies  Suicidal Plan/Intent: denies  Hopelesness:no  Homicidal Thoughts:  denies      Medical Decision Making:   Labs:     Lab Results (last 24 hours)       ** No results found for the last 24 hours. **              Radiology:     Imaging Results (Last 24 Hours)       ** No results found for the last 24 hours. **              EKG:     ECG/EMG Results (most recent)       Procedure Component Value Units Date/Time    ECG 12 Lead Other; Baseline Cardiac Status [[id number]] Collected: 06/07/24 0037     Updated: 06/07/24 0948     QT Interval 428 ms      QTC Interval 420 ms     Narrative:      Test Reason : Other~  Blood Pressure :   */*   mmHG  Vent. Rate :  58 BPM     Atrial Rate :  58 BPM     P-R Int : 194 ms          QRS Dur :  82 ms      QT Int : 428 ms       P-R-T Axes :  57   7  45 degrees     QTc Int : 420 ms    Sinus bradycardia  Otherwise normal ECG  Confirmed by Rachel Hawkins ([id number]) on 6/7/2024 9:48:46 AM    Referred By:            Confirmed By: Rachel Hawkins             Medications:  Brexpiprazole, 0.5 mg, Oral, Daily  estradiol, 1 g, Vaginal, Once per day on Monday Wednesday Friday  FLUoxetine, 60 mg, Oral, QAM  Liraglutide, 1.8 mg, Subcutaneous, Daily  mirtazapine, 30 mg, Oral, Nightly  nitrofurantoin (macrocrystal-monohydrate), 100 mg, Oral, Daily  pantoprazole, 40 mg, Oral, Daily           All medications reviewed.      Assessment and Plan:      Severe episode of recurrent major depressive disorder, without psychotic features  - Fluoxetine 60 mg daily  - Rexulti 0.5 mg daily  - Mirtazapine 30 mg qhs  - ECT, next session scheduled 06/17     Gastroesophageal reflux disease without esophagitis  - Protonix     Chronic " back pain  - PRN ibuprofen      Continue hospitalization for safety and stabilization.  Continue current level of Special Precautions (q15 minute checks).        This note was generated by a scribe, Jesus Marsh. The work documented in this note was completed, reviewed, and approved by the attending psychiatrist as designated Dr. Max Osuna electronic signature.     IMax MD, personally performed the services described in this documentation as scribed by the above named individual and is both accurate and complete as of 6/16/2024.

## 2024-06-16 NOTE — PLAN OF CARE
Goal Outcome Evaluation:  Plan of Care Reviewed With: patient  Patient Agreement with Plan of Care: agrees     Progress: improving  Outcome Evaluation: Calm and cooperative. Reports good appetite and good sleep. Rates anxiety 3 and depression 8. Denies SI/HI/AVH. Has slept approximately 8 hours at this point in shift.

## 2024-06-17 ENCOUNTER — ANESTHESIA (OUTPATIENT)
Dept: PERIOP | Facility: HOSPITAL | Age: 59
End: 2024-06-17
Payer: MEDICARE

## 2024-06-17 ENCOUNTER — ANESTHESIA EVENT (OUTPATIENT)
Dept: PERIOP | Facility: HOSPITAL | Age: 59
End: 2024-06-17
Payer: MEDICARE

## 2024-06-17 PROCEDURE — 25810000003 LACTATED RINGERS PER 1000 ML: Performed by: ANESTHESIOLOGY

## 2024-06-17 PROCEDURE — 25010000002 KETOROLAC TROMETHAMINE PER 15 MG: Performed by: NURSE ANESTHETIST, CERTIFIED REGISTERED

## 2024-06-17 PROCEDURE — 25010000002 MIDAZOLAM PER 1 MG: Performed by: NURSE ANESTHETIST, CERTIFIED REGISTERED

## 2024-06-17 PROCEDURE — 90870 ELECTROCONVULSIVE THERAPY: CPT | Performed by: PSYCHIATRY & NEUROLOGY

## 2024-06-17 PROCEDURE — 25010000002 ONDANSETRON PER 1 MG: Performed by: NURSE ANESTHETIST, CERTIFIED REGISTERED

## 2024-06-17 PROCEDURE — 25010000002 SUCCINYLCHOLINE PER 20 MG: Performed by: NURSE ANESTHETIST, CERTIFIED REGISTERED

## 2024-06-17 PROCEDURE — 25810000003 LACTATED RINGERS PER 1000 ML: Performed by: NURSE ANESTHETIST, CERTIFIED REGISTERED

## 2024-06-17 PROCEDURE — 25010000002 ESMOLOL 100 MG/10ML SOLUTION: Performed by: NURSE ANESTHETIST, CERTIFIED REGISTERED

## 2024-06-17 RX ORDER — ESMOLOL HYDROCHLORIDE 10 MG/ML
INJECTION INTRAVENOUS AS NEEDED
Status: DISCONTINUED | OUTPATIENT
Start: 2024-06-17 | End: 2024-06-17 | Stop reason: SURG

## 2024-06-17 RX ORDER — SUCCINYLCHOLINE CHLORIDE 20 MG/ML
INJECTION INTRAMUSCULAR; INTRAVENOUS AS NEEDED
Status: DISCONTINUED | OUTPATIENT
Start: 2024-06-17 | End: 2024-06-17 | Stop reason: SURG

## 2024-06-17 RX ORDER — SODIUM CHLORIDE 0.9 % (FLUSH) 0.9 %
10 SYRINGE (ML) INJECTION AS NEEDED
Status: DISCONTINUED | OUTPATIENT
Start: 2024-06-17 | End: 2024-06-17 | Stop reason: HOSPADM

## 2024-06-17 RX ORDER — SODIUM CHLORIDE, SODIUM LACTATE, POTASSIUM CHLORIDE, CALCIUM CHLORIDE 600; 310; 30; 20 MG/100ML; MG/100ML; MG/100ML; MG/100ML
125 INJECTION, SOLUTION INTRAVENOUS ONCE
Status: COMPLETED | OUTPATIENT
Start: 2024-06-17 | End: 2024-06-17

## 2024-06-17 RX ORDER — KETOROLAC TROMETHAMINE 30 MG/ML
INJECTION, SOLUTION INTRAMUSCULAR; INTRAVENOUS AS NEEDED
Status: DISCONTINUED | OUTPATIENT
Start: 2024-06-17 | End: 2024-06-17 | Stop reason: SURG

## 2024-06-17 RX ORDER — FENTANYL CITRATE 50 UG/ML
50 INJECTION, SOLUTION INTRAMUSCULAR; INTRAVENOUS
Status: DISCONTINUED | OUTPATIENT
Start: 2024-06-17 | End: 2024-06-17 | Stop reason: HOSPADM

## 2024-06-17 RX ORDER — SODIUM CHLORIDE 0.9 % (FLUSH) 0.9 %
10 SYRINGE (ML) INJECTION EVERY 12 HOURS SCHEDULED
Status: DISCONTINUED | OUTPATIENT
Start: 2024-06-17 | End: 2024-06-17 | Stop reason: HOSPADM

## 2024-06-17 RX ORDER — SODIUM CHLORIDE 9 MG/ML
40 INJECTION, SOLUTION INTRAVENOUS AS NEEDED
Status: DISCONTINUED | OUTPATIENT
Start: 2024-06-17 | End: 2024-06-17 | Stop reason: HOSPADM

## 2024-06-17 RX ORDER — MEPERIDINE HYDROCHLORIDE 25 MG/ML
12.5 INJECTION INTRAMUSCULAR; INTRAVENOUS; SUBCUTANEOUS
Status: DISCONTINUED | OUTPATIENT
Start: 2024-06-17 | End: 2024-06-17 | Stop reason: HOSPADM

## 2024-06-17 RX ORDER — ONDANSETRON 2 MG/ML
INJECTION INTRAMUSCULAR; INTRAVENOUS AS NEEDED
Status: DISCONTINUED | OUTPATIENT
Start: 2024-06-17 | End: 2024-06-17 | Stop reason: SURG

## 2024-06-17 RX ORDER — SODIUM CHLORIDE, SODIUM LACTATE, POTASSIUM CHLORIDE, CALCIUM CHLORIDE 600; 310; 30; 20 MG/100ML; MG/100ML; MG/100ML; MG/100ML
INJECTION, SOLUTION INTRAVENOUS CONTINUOUS PRN
Status: DISCONTINUED | OUTPATIENT
Start: 2024-06-17 | End: 2024-06-17 | Stop reason: SURG

## 2024-06-17 RX ORDER — ONDANSETRON 2 MG/ML
4 INJECTION INTRAMUSCULAR; INTRAVENOUS AS NEEDED
Status: DISCONTINUED | OUTPATIENT
Start: 2024-06-17 | End: 2024-06-17 | Stop reason: HOSPADM

## 2024-06-17 RX ORDER — MIDAZOLAM HYDROCHLORIDE 1 MG/ML
INJECTION INTRAMUSCULAR; INTRAVENOUS AS NEEDED
Status: DISCONTINUED | OUTPATIENT
Start: 2024-06-17 | End: 2024-06-17 | Stop reason: SURG

## 2024-06-17 RX ORDER — IPRATROPIUM BROMIDE AND ALBUTEROL SULFATE 2.5; .5 MG/3ML; MG/3ML
3 SOLUTION RESPIRATORY (INHALATION) ONCE AS NEEDED
Status: DISCONTINUED | OUTPATIENT
Start: 2024-06-17 | End: 2024-06-17 | Stop reason: HOSPADM

## 2024-06-17 RX ORDER — SODIUM CHLORIDE, SODIUM LACTATE, POTASSIUM CHLORIDE, CALCIUM CHLORIDE 600; 310; 30; 20 MG/100ML; MG/100ML; MG/100ML; MG/100ML
100 INJECTION, SOLUTION INTRAVENOUS ONCE AS NEEDED
Status: DISCONTINUED | OUTPATIENT
Start: 2024-06-17 | End: 2024-06-17 | Stop reason: HOSPADM

## 2024-06-17 RX ORDER — OXYCODONE HYDROCHLORIDE AND ACETAMINOPHEN 5; 325 MG/1; MG/1
1 TABLET ORAL ONCE AS NEEDED
Status: DISCONTINUED | OUTPATIENT
Start: 2024-06-17 | End: 2024-06-17 | Stop reason: HOSPADM

## 2024-06-17 RX ADMIN — SODIUM CHLORIDE, POTASSIUM CHLORIDE, SODIUM LACTATE AND CALCIUM CHLORIDE: 600; 310; 30; 20 INJECTION, SOLUTION INTRAVENOUS at 10:41

## 2024-06-17 RX ADMIN — ESMOLOL HYDROCHLORIDE 10 MG: 100 INJECTION, SOLUTION INTRAVENOUS at 11:04

## 2024-06-17 RX ADMIN — MIDAZOLAM HYDROCHLORIDE 2 MG: 1 INJECTION, SOLUTION INTRAMUSCULAR; INTRAVENOUS at 11:02

## 2024-06-17 RX ADMIN — PANTOPRAZOLE SODIUM 40 MG: 40 TABLET, DELAYED RELEASE ORAL at 08:20

## 2024-06-17 RX ADMIN — ONDANSETRON 4 MG: 2 INJECTION INTRAMUSCULAR; INTRAVENOUS at 11:02

## 2024-06-17 RX ADMIN — METHOHEXITAL SODIUM 100 MG: 500 INJECTION, POWDER, LYOPHILIZED, FOR SOLUTION INTRAMUSCULAR; INTRAVENOUS; RECTAL at 10:57

## 2024-06-17 RX ADMIN — BREXPIPRAZOLE 0.5 MG: 1 TABLET ORAL at 08:20

## 2024-06-17 RX ADMIN — ESTRADIOL 1 APPLICATOR: 0.1 CREAM VAGINAL at 20:05

## 2024-06-17 RX ADMIN — MIRTAZAPINE 30 MG: 15 TABLET, FILM COATED ORAL at 20:05

## 2024-06-17 RX ADMIN — SUCCINYLCHOLINE CHLORIDE 100 MG: 20 INJECTION, SOLUTION INTRAMUSCULAR; INTRAVENOUS at 10:57

## 2024-06-17 RX ADMIN — SODIUM CHLORIDE, POTASSIUM CHLORIDE, SODIUM LACTATE AND CALCIUM CHLORIDE 125 ML/HR: 600; 310; 30; 20 INJECTION, SOLUTION INTRAVENOUS at 10:02

## 2024-06-17 RX ADMIN — KETOROLAC TROMETHAMINE 30 MG: 30 INJECTION, SOLUTION INTRAMUSCULAR; INTRAVENOUS at 11:02

## 2024-06-17 RX ADMIN — NITROFURANTOIN MONOHYDRATE/MACROCRYSTALLINE 100 MG: 25; 75 CAPSULE ORAL at 08:20

## 2024-06-17 NOTE — PLAN OF CARE
Goal Outcome Evaluation:  Plan of Care Reviewed With: patient  Patient Agreement with Plan of Care: agrees     Progress: improving  Outcome Evaluation: Pt has been calm and cooperative this shift. Pt had ECT this AM and tolerated well. Pt rates anxiety 3/10 and depression 8/10. Pt denies SI/HI/AVH. Pt reports good appetite but poor sleep. Pt has had no complaints this shift.

## 2024-06-17 NOTE — ANESTHESIA POSTPROCEDURE EVALUATION
Patient: Zo Palma    Procedure Summary       Date: 06/17/24 Room / Location: [facility] OR  /  [facility] OR    Anesthesia Start: 1041 Anesthesia Stop: 1105    Procedure: ELECTROCONVULSIVE THERAPY Diagnosis:       Severe episode of recurrent major depressive disorder, without psychotic features      (Severe episode of recurrent major depressive disorder, without psychotic features [F33.2])    Surgeons: Vinod Osuna MD Provider: Lai Jamison MD    Anesthesia Type: general ASA Status: 3            Anesthesia Type: general    Vitals  Vitals Value Taken Time   /84 06/17/24 1135   Temp 97.6 °F (36.4 °C) 06/17/24 1135   Pulse 69 06/17/24 1135   Resp 17 06/17/24 1135   SpO2 98 % 06/17/24 1135           Post Anesthesia Care and Evaluation    Patient location during evaluation: PACU  Patient participation: complete - patient participated  Level of consciousness: awake  Pain score: 0  Pain management: satisfactory to patient    Airway patency: patent  Anesthetic complications: No anesthetic complications  PONV Status: none  Cardiovascular status: hemodynamically stable  Respiratory status: nasal cannula  Hydration status: acceptable

## 2024-06-17 NOTE — PLAN OF CARE
Goal Outcome Evaluation:  Plan of Care Reviewed With: patient  Patient Agreement with Plan of Care: agrees     Progress: improving  Outcome Evaluation: Calm and cooperative. Reports good appetite and sleep. Rates anxiety 3, depression 8. Denies SI/HI/AVH. ECT scheduled for this AM. Has slept approximately 6 hours at this point in shift.

## 2024-06-17 NOTE — OP NOTE
ECT OPERATIVE PROCEDURE REPORT      PATIENT NAME: Zo Palma    Assistants: None    Primary Surgeon: PELON Au MD    Preoperative Diagnosis:   Major Depression, Recurrent, Severe Without Psychosis    Postoperative Diagnosis: Same  : 1965    SSN:     MRN#: [id number]    PHYSICIAN: PELON AU M.D.    PROCEDURE DATE: 24    This is the patient's fourth treatment.     PROCEDURE:   Bifrontal ECT utilizing Thymatron System IV.   Energy Set 50%  Charge Delivered 249.1 mC  Current 0.89 A  Stimulus Duration 7.0 sec  Frequency 40 hz  Pulse Width 0.50 msec      ANESTHESIA: See anesthesia report for medications and monitoring.                           Brevital: 100        mg          Succinylcholine: 100        mg    DETAILS: Impulse at 1057 with a resultant 41 second seizure.        Specimens Removed: NA  Blood Administered: NA  Estimated Blood Loss: None  Complications: None    Pt STATUS STABLE: 1100 HR    Grafts or Implants: NA    Dictated utilizing Dragon dictation

## 2024-06-17 NOTE — PROGRESS NOTES
06-17-24    Data:  Attempted to meet with patient for individual, patient was asleep in her room. Patient completed ECT today. Reviewed patients chart information.

## 2024-06-17 NOTE — PROGRESS NOTES
Patient seen prior to ect this morning, voices continuing depression with minimal subjective improvement of her mood.  Patient wanting to continue with a series of ect 6 treatments.  Patient's appetite, sleep, interactions with peers and staff appropriate.

## 2024-06-17 NOTE — PAYOR COMM NOTE
"Tommie Palma (59 y.o. Female)       Date of Birth   1965    Social Security Number       Address   [address] [place] [address]    Home Phone   [phone]    MRN   [id number]       M[employer]   Other    Marital Status                               Admission Date   6/6/24    Admission Type   Urgent    Admitting Provider   Maria G Diez MD    Attending Provider   Vinod Osuna MD    Department, Room/Bed   [facility], 1023/1S       Discharge Date       Discharge Disposition       Discharge Destination                                 Attending Provider: Vinod Osuna MD    Allergies: Chlorzoxazone, Iodine, Phenazopyridine Hcl, Pyridium [Phenazopyridine], Quinine, Valproic Acid, Methocarbamol, Iodinated Contrast Media, Codeine, Diphenhydramine    Isolation: None   Infection: None   Code Status: CPR    Ht: 160 cm (63\")   Wt: 67.4 kg (148 lb 9.6 oz)    Admission Cmt: None   Principal Problem: MDD (major depressive disorder), recurrent episode, severe [F33.2]                   Active Insurance as of 6/6/2024       Primary Coverage       Payor Plan Insurance Group Employer/Plan Group    ANTHEM MEDICARE REPLACEMENT ANTHEM MEDICARE ADVANTAGE [id number]       Payor Plan Address Payor Plan Phone Number Payor Plan Fax Number Effective Dates    [address] [phone]  1/1/2024 - None Entered    [facility] [address]         Subscriber Name Subscriber Birth Date Member ID       TOMMIE PALMA 1965 [id number]                     Emergency Contacts        (Rel.) Home Phone Work Phone Mobile Phone    VLADIMIR PALMA (Daughter) -- -- [phone]    MICHAEL HENRIQUEZ (Friend) [phone] -- --                 Physician Progress Notes (last 48 hours)        Vinod Osuna MD at 06/17/24 0819          Patient seen prior to ect this morning, voices continuing depression with minimal subjective improvement of her mood.  " "Patient wanting to continue with a series of ect 6 treatments.  Patient's appetite, sleep, interactions with peers and staff appropriate.    Electronically signed by Vinod Osuna MD at 06/17/24 0821       Max Osuna MD at 06/16/24 0706                Inpatient Psych Progress Note     Clinician: Max Osuna MD  Admission Date: 6/6/2024  07:06 EDT 06/16/24    Behavioral Health Treatment Plan and Problem List: I have reviewed and approved the Behavioral Health Treatment Plan and Problem list.    Allergies  Allergies   Allergen Reactions    Chlorzoxazone Unknown - High Severity and Swelling     Lorzone, muscle relaxant.    Iodine Anaphylaxis     Topical makes her swell  Anaphylaxis with IV contrast (when she was ~ 9yrs old)    Phenazopyridine Hcl Swelling and Anaphylaxis    Pyridium [Phenazopyridine] Anaphylaxis    Quinine Unknown - High Severity     Has malaria symptoms    Valproic Acid Other (See Comments)     Liver failure  Liver failure  Liver failure; lupus like symptoms and liver failure    Methocarbamol Other (See Comments)     Made her feel \"suicidal\" and gave her less control over her actions.    Iodinated Contrast Media Unknown - Low Severity    Codeine Itching    Diphenhydramine Anxiety     Pt has severe panic attack symptoms when given benadryl IV. Needs to take a benzodiazapine med concurrently when getting benadryl.        Hospital Day: 10 days      Assessment completed within view of staff    History  CC/clinical focus: depression     Interval HPI: Patient seen and evaluated by me.  Chart reviewed.  Calm and cooperative on assessment today. No acute problems. Subjectively rates anxiety 3/10 and depression 8/10 at this time. No SI/HI/AVH. Tolerating medications ok.   Med Compliant.  ROS otherwise as below.    Review of Systems   Constitutional: Negative.    HENT: Negative.     Eyes: Negative.    Respiratory: Negative.     Cardiovascular: Negative.    Gastrointestinal: Negative.  " "  Endocrine: Negative.    Genitourinary: Negative.    Musculoskeletal: Negative.    Skin: Negative.    Allergic/Immunologic: Negative.    Neurological: Negative.    Hematological: Negative.         BP 92/70 (BP Location: Right arm, Patient Position: Standing)   Pulse 75   Temp 96.9 °F (36.1 °C) (Temporal)   Resp 18   Ht 160 cm (63\")   Wt 67.6 kg (149 lb)   SpO2 96%   BMI 26.39 kg/m²     Mental Status Exam  Mood:  \"hanging in there\"  Affect: mood-congruent   Thought Processes:  linear  Thought Content: normal  Hallucinations: no  Suicidal Thoughts: denies  Suicidal Plan/Intent: denies  Hopelesness:no  Homicidal Thoughts:  denies      Medical Decision Making:   Labs:     Lab Results (last 24 hours)       ** No results found for the last 24 hours. **              Radiology:     Imaging Results (Last 24 Hours)       ** No results found for the last 24 hours. **              EKG:     ECG/EMG Results (most recent)       Procedure Component Value Units Date/Time    ECG 12 Lead Other; Baseline Cardiac Status [[id number]] Collected: 06/07/24 0037     Updated: 06/07/24 0948     QT Interval 428 ms      QTC Interval 420 ms     Narrative:      Test Reason : Other~  Blood Pressure :   */*   mmHG  Vent. Rate :  58 BPM     Atrial Rate :  58 BPM     P-R Int : 194 ms          QRS Dur :  82 ms      QT Int : 428 ms       P-R-T Axes :  57   7  45 degrees     QTc Int : 420 ms    Sinus bradycardia  Otherwise normal ECG  Confirmed by Rachel Hawkins ([id number]) on 6/7/2024 9:48:46 AM    Referred By:            Confirmed By: Rachel Hawkins             Medications:  Brexpiprazole, 0.5 mg, Oral, Daily  estradiol, 1 g, Vaginal, Once per day on Monday Wednesday Friday  FLUoxetine, 60 mg, Oral, QAM  Liraglutide, 1.8 mg, Subcutaneous, Daily  mirtazapine, 30 mg, Oral, Nightly  nitrofurantoin (macrocrystal-monohydrate), 100 mg, Oral, Daily  pantoprazole, 40 mg, Oral, Daily           All medications reviewed.      Assessment and Plan:      Severe " episode of recurrent major depressive disorder, without psychotic features  - Fluoxetine 60 mg daily  - Rexulti 0.5 mg daily  - Mirtazapine 30 mg qhs  - ECT, next session scheduled 06/17     Gastroesophageal reflux disease without esophagitis  - Protonix     Chronic back pain  - PRN ibuprofen      Continue hospitalization for safety and stabilization.  Continue current level of Special Precautions (q15 minute checks).        This note was generated by a scribe, Jesus Marsh. The work documented in this note was completed, reviewed, and approved by the attending psychiatrist as designated Dr. Max Osuna electronic signature.     I, Max Osuna MD, personally performed the services described in this documentation as scribed by the above named individual and is both accurate and complete as of 6/16/2024.      Electronically signed by Max Osuna MD at 06/16/24 9517

## 2024-06-17 NOTE — NURSING NOTE
Pt returned to unit from ECT via stretcher. Patient is alert and oriented X 3.  Gag reflex present. Head to toe assessment completed at this time.  Pt ambulates into room with no assistance.

## 2024-06-17 NOTE — ANESTHESIA PREPROCEDURE EVALUATION
Anesthesia Evaluation     Patient summary reviewed and Nursing notes reviewed   no history of anesthetic complications:   NPO Solid Status: > 8 hours  NPO Liquid Status: > 8 hours           Airway   Mallampati: II  TM distance: >3 FB  Neck ROM: full  Dental - normal exam     Pulmonary     breath sounds clear to auscultation  (+) ,sleep apnea (before weight loss)  Cardiovascular   Exercise tolerance: good (4-7 METS)    ECG reviewed  Rhythm: regular  Rate: normal    (+) hyperlipidemia      Neuro/Psych  (+) seizures (conversion disorder. not seizures), headaches, numbness, psychiatric history Depression  GI/Hepatic/Renal/Endo    (+) GERD, liver disease (liver failure with depakote), renal disease- stones, diabetes mellitus type 2    Musculoskeletal (-) negative ROS    (+) arthralgias, back pain, chronic pain, neck pain, radiculopathy  Abdominal     Abdomen: soft.   Substance History - negative use     OB/GYN negative ob/gyn ROS         Other   arthritis,     ROS/Med Hx Other:   Sinus bradycardia  Otherwise normal ECG  Confirmed by Rachel Hawkins ([id number]) on 6/7/2024 9:48:46 AM                    Anesthesia Plan    ASA 3     general     intravenous induction     Anesthetic plan, risks, benefits, and alternatives have been provided, discussed and informed consent has been obtained with: patient.  Pre-procedure education provided  Plan discussed with CRNA.      CODE STATUS:    Code Status (Patient has no pulse and is not breathing): CPR (Attempt to Resuscitate)  Medical Interventions (Patient has pulse or is breathing): Full Support

## 2024-06-18 PROCEDURE — 99232 SBSQ HOSP IP/OBS MODERATE 35: CPT | Performed by: PSYCHIATRY & NEUROLOGY

## 2024-06-18 RX ORDER — SODIUM CHLORIDE 0.9 % (FLUSH) 0.9 %
10 SYRINGE (ML) INJECTION AS NEEDED
Status: CANCELLED | OUTPATIENT
Start: 2024-06-19

## 2024-06-18 RX ORDER — SODIUM CHLORIDE 0.9 % (FLUSH) 0.9 %
10 SYRINGE (ML) INJECTION EVERY 12 HOURS SCHEDULED
Status: CANCELLED | OUTPATIENT
Start: 2024-06-19

## 2024-06-18 RX ORDER — SODIUM CHLORIDE 9 MG/ML
40 INJECTION, SOLUTION INTRAVENOUS AS NEEDED
Status: CANCELLED | OUTPATIENT
Start: 2024-06-19

## 2024-06-18 RX ADMIN — BREXPIPRAZOLE 0.5 MG: 1 TABLET ORAL at 08:38

## 2024-06-18 RX ADMIN — NITROFURANTOIN MONOHYDRATE/MACROCRYSTALLINE 100 MG: 25; 75 CAPSULE ORAL at 08:38

## 2024-06-18 RX ADMIN — MIRTAZAPINE 30 MG: 15 TABLET, FILM COATED ORAL at 20:19

## 2024-06-18 RX ADMIN — PANTOPRAZOLE SODIUM 40 MG: 40 TABLET, DELAYED RELEASE ORAL at 08:38

## 2024-06-18 RX ADMIN — FLUOXETINE HYDROCHLORIDE 60 MG: 20 CAPSULE ORAL at 06:02

## 2024-06-18 NOTE — PROGRESS NOTES
"INPATIENT PSYCHIATRIC PROGRESS NOTE    Name:  Zo Palma  :  1965  MRN:  [id number]  Visit Number:  [id number]  Length of stay:  12    Behavioral Health Treatment Plan and Problem List: I have reviewed and approved the Behavioral Health Treatment Plan and Problem list.    SUBJECTIVE    CC/ Focus of exam:  depression    Patient's subjective status: \"I'm doing better, got hope\"    INTERVAL HISTORY: The patient voicing improvement, good news for she had not been improving.  She says she her depression is about the same but anxiety is much better.  Patient wants to proceed with the anticipated ect treatment tomorrow hoping to be discharged this coming .    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 Looking forward to the future.     Depression rating 7-8/10  Anxiety rating 1-2/10  Hopelessness: 0/10  Sleep:\"like a rock\"         ROS: No cardiovascular, GI, or Neurological complaints.       OBJECTIVE    Vitals:    24 0750   BP: 104/70   Pulse: 85   Resp:    Temp:    SpO2:       Temp:  [97 °F (36.1 °C)-98.2 °F (36.8 °C)] 97.9 °F (36.6 °C)  Heart Rate:  [62-85] 85  Resp:  [16-20] 16  BP: (101-144)/(63-84) 104/70    MENTAL STATUS EXAM:       Psychomotor: No psychomotor agitation/retardation, No EPS, No motor tics  Speech-normal rate, amount.  Mood/Affect: Depressed  Thought Processes: coherent  Thought Content: rational  Hallucination(s): none  Hopelessness: Intermittent  Optimistic: minimally  Suicidal Thoughts: No  Suicidal Plan/Intent: No  Homicidal Thoughts: absent  Orientation: oriented x 3  Memory: recent intact    Lab Results (last 24 hours)       ** No results found for the last 24 hours. **             Imaging Results (Last 24 Hours)       ** No results found for the last 24 hours. **             Lab and x-ray studies reviewed.    ECG/EMG Results (most recent)       " Procedure Component Value Units Date/Time    ECG 12 Lead Other; Baseline Cardiac Status [[id number]] Collected: 06/07/24 0037     Updated: 06/07/24 0948     QT Interval 428 ms      QTC Interval 420 ms     Narrative:      Test Reason : Other~  Blood Pressure :   */*   mmHG  Vent. Rate :  58 BPM     Atrial Rate :  58 BPM     P-R Int : 194 ms          QRS Dur :  82 ms      QT Int : 428 ms       P-R-T Axes :  57   7  45 degrees     QTc Int : 420 ms    Sinus bradycardia  Otherwise normal ECG  Confirmed by Rachel Hawkins ([id number]) on 6/7/2024 9:48:46 AM    Referred By:            Confirmed By: Rachel Hawkins             ALLERGIES: Chlorzoxazone, Iodine, Phenazopyridine hcl, Pyridium [phenazopyridine], Quinine, Valproic acid, Methocarbamol, Iodinated contrast media, Codeine, and Diphenhydramine    Medications:     Brexpiprazole, 0.5 mg, Oral, Daily  estradiol, 1 g, Vaginal, Once per day on Monday Wednesday Friday  FLUoxetine, 60 mg, Oral, QAM  Liraglutide, 1.8 mg, Subcutaneous, Daily  mirtazapine, 30 mg, Oral, Nightly  nitrofurantoin (macrocrystal-monohydrate), 100 mg, Oral, Daily  pantoprazole, 40 mg, Oral, Daily       ASSESSMENT & PLAN     Severe episode of recurrent major depressive disorder, without psychotic features  Will be increasing the fluoxetine dose and adding an Rexulti as an adjunct along with continuing the ect.plus a supportive individual and group psychotherapeutic effort.       Gastroesophageal reflux disease without esophagitis  Protonix           Chronic back pain  PRN ibuprofen      Special precautions: Special Precautions Level 3 (q15 min checks)     Behavioral Health Treatment Plan and Problem List: I have reviewed and approved the Behavioral Health Treatment Plan and Problem list.    I spent a total of 26 minutes in direct patient care including  17 minutes face to face with the patient assessment, coordination of care, and counseling the patient on the current and follow-up treatment plans regarding  her status and situation.  Patient had no additional questions.     PELON Osuna MD    Clinician:  Vinod Osuna MD  06/18/24  08:49 EDT    Dictated utilizing Dragon dictation

## 2024-06-18 NOTE — PLAN OF CARE
"  Problem: Adult Behavioral Health Plan of Care  Goal: Optimized Coping Skills in Response to Life Stressors  Outcome: Ongoing, Progressing  Intervention: Promote Effective Coping Strategies  Flowsheets (Taken 6/18/2024 1058)  Supportive Measures:   active listening utilized   counseling provided      1050    DATA:     Therapist met with the patient individually. Therapist continues reviewing plan of care and aftercare plan.  The patient was agreeable. Staffed case with Dr. Osuna this date.      ASSESSMENT:     Patient was seen for follow up of Severe episode of recurrent major depressive disorder, without psychotic features        Today, patient was seen 1-1 in the office. Patient noted to have restricted affect, congruent mood, linear thought processes.  Patient receptive to meeting with therapist and laid on the couch in the office during the visit.  Patient reports that she is feeling some better. She reports to Dr. Osuna that anxiety has improved and that has helped.  Patient rates depression at 8/10 and anxiety at 2/10. Patient reports she was frustrated at first because ECT showed a quicker improvement during her last hospitalization.  Patient anticipates her last ECT in the morning and then will likely discharge Thursday.  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  Patient reports that she attends therapy at [facility] and sees Mely. She reports that they do talk therapy and talk about whatever is going on at the time.      Patient denies SI/HI/AVH.  She reports that she slept poorly last night due to her roommate needing to get up and down to use the rest room.  Therapist offered to see if there could be alternative room assignment. Patient shrugged and stated \"it is what it is.\"  Patient continues to demonstrate a blase or negativistic thinking pattern at times.       CLINICAL MANEUVERING:   "   Therapist provided safe, secure environment for patient to share.  Provided reflective listening and psychoeducation.  Assisted patient in processing the above session. Assisted patient in identifying any questions or needs to be addressed today.           Plan:      Patient to remain hospitalized this date.      Treatment team will focus efforts on stabilizing patient's acute symptoms while providing education on healthy coping and crisis management to reduce hospitalizations.   Patient requires daily psychiatrist evaluation and 24/7 nursing supervision to promote patient  safety.     Therapist will offer 1-4 individual sessions, family education, and appropriate referral.        Patient consents to [facility] follow up. Patient to return home after discharge. Patient's roommate [place] confirms safe guarding at home; no access to firearms, weapons and medications are locked and administered by Cloud.

## 2024-06-18 NOTE — PLAN OF CARE
Goal Outcome Evaluation:  Plan of Care Reviewed With: patient  Patient Agreement with Plan of Care: agrees     Progress: improving     Pt reports poor sleep and good appetite. Pt rates anx 2/10 and dep 8/10. Pt denies SI/HI/AVH.

## 2024-06-18 NOTE — PLAN OF CARE
Goal Outcome Evaluation:  Plan of Care Reviewed With: patient  Patient Agreement with Plan of Care: agrees        Outcome Evaluation: Pt denies issues with appetite or sleep. States, slept well last night. Depression 8. Anxiety 3. Continues to state she is not sure that ECT's have helped with her symptoms. Denies SI/HI/AVH.    Pt has remained pleasant, and calm this shift. Much of shift has been spend lying in her bed. Pt has been out-of-bed at various times for TV, and meals. Appetite good.

## 2024-06-19 ENCOUNTER — ANESTHESIA (OUTPATIENT)
Dept: PERIOP | Facility: HOSPITAL | Age: 59
End: 2024-06-19
Payer: MEDICARE

## 2024-06-19 ENCOUNTER — ANESTHESIA EVENT (OUTPATIENT)
Dept: PERIOP | Facility: HOSPITAL | Age: 59
End: 2024-06-19
Payer: MEDICARE

## 2024-06-19 PROCEDURE — 25810000003 LACTATED RINGERS PER 1000 ML: Performed by: ANESTHESIOLOGY

## 2024-06-19 PROCEDURE — 25010000002 SUCCINYLCHOLINE PER 20 MG: Performed by: NURSE ANESTHETIST, CERTIFIED REGISTERED

## 2024-06-19 PROCEDURE — 90870 ELECTROCONVULSIVE THERAPY: CPT | Performed by: PSYCHIATRY & NEUROLOGY

## 2024-06-19 PROCEDURE — 25010000002 ONDANSETRON PER 1 MG: Performed by: NURSE ANESTHETIST, CERTIFIED REGISTERED

## 2024-06-19 PROCEDURE — 94761 N-INVAS EAR/PLS OXIMETRY MLT: CPT

## 2024-06-19 PROCEDURE — 25010000002 MIDAZOLAM PER 1 MG: Performed by: NURSE ANESTHETIST, CERTIFIED REGISTERED

## 2024-06-19 PROCEDURE — 25010000002 KETOROLAC TROMETHAMINE PER 15 MG: Performed by: NURSE ANESTHETIST, CERTIFIED REGISTERED

## 2024-06-19 RX ORDER — ONDANSETRON 2 MG/ML
4 INJECTION INTRAMUSCULAR; INTRAVENOUS AS NEEDED
Status: DISCONTINUED | OUTPATIENT
Start: 2024-06-19 | End: 2024-06-19 | Stop reason: HOSPADM

## 2024-06-19 RX ORDER — FENTANYL CITRATE 50 UG/ML
50 INJECTION, SOLUTION INTRAMUSCULAR; INTRAVENOUS
Status: DISCONTINUED | OUTPATIENT
Start: 2024-06-19 | End: 2024-06-19 | Stop reason: HOSPADM

## 2024-06-19 RX ORDER — MIDAZOLAM HYDROCHLORIDE 1 MG/ML
INJECTION INTRAMUSCULAR; INTRAVENOUS AS NEEDED
Status: DISCONTINUED | OUTPATIENT
Start: 2024-06-19 | End: 2024-06-19 | Stop reason: SURG

## 2024-06-19 RX ORDER — IPRATROPIUM BROMIDE AND ALBUTEROL SULFATE 2.5; .5 MG/3ML; MG/3ML
3 SOLUTION RESPIRATORY (INHALATION) ONCE AS NEEDED
Status: DISCONTINUED | OUTPATIENT
Start: 2024-06-19 | End: 2024-06-19 | Stop reason: HOSPADM

## 2024-06-19 RX ORDER — SODIUM CHLORIDE 9 MG/ML
40 INJECTION, SOLUTION INTRAVENOUS AS NEEDED
Status: DISCONTINUED | OUTPATIENT
Start: 2024-06-19 | End: 2024-06-19 | Stop reason: HOSPADM

## 2024-06-19 RX ORDER — SODIUM CHLORIDE 0.9 % (FLUSH) 0.9 %
10 SYRINGE (ML) INJECTION EVERY 12 HOURS SCHEDULED
Status: DISCONTINUED | OUTPATIENT
Start: 2024-06-19 | End: 2024-06-19 | Stop reason: HOSPADM

## 2024-06-19 RX ORDER — KETOROLAC TROMETHAMINE 30 MG/ML
INJECTION, SOLUTION INTRAMUSCULAR; INTRAVENOUS AS NEEDED
Status: DISCONTINUED | OUTPATIENT
Start: 2024-06-19 | End: 2024-06-19 | Stop reason: SURG

## 2024-06-19 RX ORDER — MEPERIDINE HYDROCHLORIDE 25 MG/ML
12.5 INJECTION INTRAMUSCULAR; INTRAVENOUS; SUBCUTANEOUS
Status: DISCONTINUED | OUTPATIENT
Start: 2024-06-19 | End: 2024-06-19 | Stop reason: HOSPADM

## 2024-06-19 RX ORDER — SUCCINYLCHOLINE CHLORIDE 20 MG/ML
INJECTION INTRAMUSCULAR; INTRAVENOUS AS NEEDED
Status: DISCONTINUED | OUTPATIENT
Start: 2024-06-19 | End: 2024-06-19 | Stop reason: SURG

## 2024-06-19 RX ORDER — OXYCODONE HYDROCHLORIDE AND ACETAMINOPHEN 5; 325 MG/1; MG/1
1 TABLET ORAL ONCE AS NEEDED
Status: DISCONTINUED | OUTPATIENT
Start: 2024-06-19 | End: 2024-06-19 | Stop reason: HOSPADM

## 2024-06-19 RX ORDER — SODIUM CHLORIDE, SODIUM LACTATE, POTASSIUM CHLORIDE, CALCIUM CHLORIDE 600; 310; 30; 20 MG/100ML; MG/100ML; MG/100ML; MG/100ML
125 INJECTION, SOLUTION INTRAVENOUS ONCE
Status: COMPLETED | OUTPATIENT
Start: 2024-06-19 | End: 2024-06-19

## 2024-06-19 RX ORDER — SODIUM CHLORIDE 0.9 % (FLUSH) 0.9 %
10 SYRINGE (ML) INJECTION AS NEEDED
Status: DISCONTINUED | OUTPATIENT
Start: 2024-06-19 | End: 2024-06-19 | Stop reason: HOSPADM

## 2024-06-19 RX ORDER — ONDANSETRON 2 MG/ML
INJECTION INTRAMUSCULAR; INTRAVENOUS AS NEEDED
Status: DISCONTINUED | OUTPATIENT
Start: 2024-06-19 | End: 2024-06-19 | Stop reason: SURG

## 2024-06-19 RX ORDER — SODIUM CHLORIDE, SODIUM LACTATE, POTASSIUM CHLORIDE, CALCIUM CHLORIDE 600; 310; 30; 20 MG/100ML; MG/100ML; MG/100ML; MG/100ML
100 INJECTION, SOLUTION INTRAVENOUS ONCE AS NEEDED
Status: DISCONTINUED | OUTPATIENT
Start: 2024-06-19 | End: 2024-06-19 | Stop reason: HOSPADM

## 2024-06-19 RX ADMIN — NITROFURANTOIN MONOHYDRATE/MACROCRYSTALLINE 100 MG: 25; 75 CAPSULE ORAL at 08:13

## 2024-06-19 RX ADMIN — SUCCINYLCHOLINE CHLORIDE 100 MG: 20 INJECTION, SOLUTION INTRAMUSCULAR; INTRAVENOUS at 11:24

## 2024-06-19 RX ADMIN — KETOROLAC TROMETHAMINE 30 MG: 30 INJECTION, SOLUTION INTRAMUSCULAR; INTRAVENOUS at 11:29

## 2024-06-19 RX ADMIN — MIRTAZAPINE 30 MG: 15 TABLET, FILM COATED ORAL at 20:54

## 2024-06-19 RX ADMIN — METHOHEXITAL SODIUM 100 MG: 500 INJECTION, POWDER, LYOPHILIZED, FOR SOLUTION INTRAMUSCULAR; INTRAVENOUS; RECTAL at 11:24

## 2024-06-19 RX ADMIN — ONDANSETRON 4 MG: 2 INJECTION INTRAMUSCULAR; INTRAVENOUS at 11:29

## 2024-06-19 RX ADMIN — MIDAZOLAM HYDROCHLORIDE 2 MG: 1 INJECTION, SOLUTION INTRAMUSCULAR; INTRAVENOUS at 11:29

## 2024-06-19 RX ADMIN — PANTOPRAZOLE SODIUM 40 MG: 40 TABLET, DELAYED RELEASE ORAL at 08:13

## 2024-06-19 RX ADMIN — BREXPIPRAZOLE 0.5 MG: 1 TABLET ORAL at 08:13

## 2024-06-19 RX ADMIN — ESTRADIOL 1 APPLICATOR: 0.1 CREAM VAGINAL at 20:54

## 2024-06-19 RX ADMIN — SODIUM CHLORIDE, POTASSIUM CHLORIDE, SODIUM LACTATE AND CALCIUM CHLORIDE: 600; 310; 30; 20 INJECTION, SOLUTION INTRAVENOUS at 11:11

## 2024-06-19 NOTE — OP NOTE
ECT OPERATIVE PROCEDURE REPORT      PATIENT NAME: Zo Palma    Assistants: None    Primary Surgeon: PELON Au MD    Preoperative Diagnosis:   Major Depression, Recurrent, Severe Without Psychosis    Postoperative Diagnosis: Same  : 1965    SSN:     MRN#: [id number]    PHYSICIAN: PELON AU M.D.    PROCEDURE DATE: 24    This is the patient's fifth treatment.     PROCEDURE:   Bifrontal ECT utilizing Thymatron System IV.   Energy Set 50%  Charge Delivered 255.7 mC  Current 0.91 A  Stimulus Duration 7.0 sec  Frequency 40 hz  Pulse Width 0.50 msec      ANESTHESIA: See anesthesia report for medications and monitoring.      DETAILS: Impulse at 1124 with a resultant 72 second seizure.        Specimens Removed: NA  Blood Administered: NA  Estimated Blood Loss: None  Complications: None    Pt STATUS STABLE: 1200 HR    Grafts or Implants: NA    Dictated utilizing Dragon dictation

## 2024-06-19 NOTE — PLAN OF CARE
Goal Outcome Evaluation:  Plan of Care Reviewed With: patient  Patient Agreement with Plan of Care: agrees     Progress: improving  Outcome Evaluation: Pt is calm and cooperative with staff. Pt reports good sleep and a good appetite. Pt rates dep 7 and anx 5. Pt denies SI/HI/AVH at this time. Pt reports issues with the ECT this Am.

## 2024-06-19 NOTE — NURSING NOTE
Patient returned to the unit from ECT. Gag reflex intact, assessment complete. Patient is AOx3, educated Patient about asking for assistance prior to ambulating. 22g IV in LWR, flushed and saline locked. Will continue to monitor patient.

## 2024-06-19 NOTE — ANESTHESIA POSTPROCEDURE EVALUATION
Patient: Zo Palma    Procedure Summary       Date: 06/19/24 Room / Location:  [facility] OR 05 / [facility] OR    Anesthesia Start: 1111 Anesthesia Stop: 1133    Procedure: ELECTROCONVULSIVE THERAPY Diagnosis: (F33.2)    Surgeons: Vinod Osuna MD Provider: Lai Jamison MD    Anesthesia Type: general ASA Status: 3            Anesthesia Type: general    Vitals  Vitals Value Taken Time   /80 06/19/24 1206   Temp 97.6 °F (36.4 °C) 06/19/24 1135   Pulse 71 06/19/24 1210   Resp 18 06/19/24 1205   SpO2 96 % 06/19/24 1210   Vitals shown include unfiled device data.        Post Anesthesia Care and Evaluation    Patient location during evaluation: PHASE II  Patient participation: complete - patient participated  Level of consciousness: awake and alert  Pain score: 1  Pain management: adequate    Airway patency: patent  Anesthetic complications: No anesthetic complications  PONV Status: controlled  Cardiovascular status: acceptable  Respiratory status: acceptable  Hydration status: acceptable

## 2024-06-19 NOTE — ANESTHESIA PREPROCEDURE EVALUATION
Anesthesia Evaluation     Patient summary reviewed and Nursing notes reviewed   no history of anesthetic complications:   NPO Solid Status: > 8 hours  NPO Liquid Status: > 8 hours           Airway   Mallampati: II  TM distance: >3 FB  Neck ROM: full  Dental - normal exam     Pulmonary     breath sounds clear to auscultation  (+) ,sleep apnea (before weight loss)  Cardiovascular   Exercise tolerance: good (4-7 METS)    ECG reviewed  Rhythm: regular  Rate: normal    (+) hyperlipidemia      Neuro/Psych  (+) seizures (conversion disorder. not seizures), headaches, numbness, psychiatric history Depression  GI/Hepatic/Renal/Endo    (+) GERD, liver disease (liver failure with depakote), renal disease- stones, diabetes mellitus type 2    Musculoskeletal     (+) arthralgias, back pain, neck pain  Abdominal     Abdomen: soft.   Substance History - negative use     OB/GYN negative ob/gyn ROS         Other        ROS/Med Hx Other:   Sinus bradycardia  Otherwise normal ECG  Confirmed by Rachel Hawkins ([id number]) on 6/7/2024 9:48:46 AM                    Anesthesia Plan    ASA 3     general     intravenous induction     Anesthetic plan, risks, benefits, and alternatives have been provided, discussed and informed consent has been obtained with: patient.  Pre-procedure education provided  Plan discussed with CRNA.      CODE STATUS:    Code Status (Patient has no pulse and is not breathing): CPR (Attempt to Resuscitate)  Medical Interventions (Patient has pulse or is breathing): Full Support

## 2024-06-19 NOTE — PROGRESS NOTES
1428:     Therapist staffed with RN staff and visited patient at bedside.  Patient had ECT today and reports that she is resting.  She reports that she has a slight headache, but denies acute symptoms.  Patient was made aware she will like stay tomorrow and we would evaluate if she is ready for discharge Friday.  Patient verbalized understand and denied needs today. Therapist continues to provide emotional support.  No distress noted.

## 2024-06-19 NOTE — PAYOR COMM NOTE
"Tommie Palma (59 y.o. Female)       Date of Birth   1965    Social Security Number       Address   [address] [place] [address]    Home Phone   [phone]    MRN   [id number]       Restoration   Other    Marital Status                               Admission Date   24    Admission Type   Urgent    Admitting Provider   Maria G Diez MD    Attending Provider   Vinod Osuna MD    Department, Room/Bed   [facility], 1023/1S       Discharge Date       Discharge Disposition       Discharge Destination                                 Attending Provider: Vinod Osuna MD    Allergies: Chlorzoxazone, Iodine, Phenazopyridine Hcl, Pyridium [Phenazopyridine], Quinine, Valproic Acid, Methocarbamol, Iodinated Contrast Media, Codeine, Diphenhydramine    Isolation: None   Infection: None   Code Status: CPR    Ht: 160 cm (63\")   Wt: 68 kg (150 lb)    Admission Cmt: None   Principal Problem: MDD (major depressive disorder), recurrent episode, severe [F33.2]                   Active Insurance as of 2024       Primary Coverage       Payor Plan Insurance Group Employer/Plan Group    ANTHEM MEDICARE REPLACEMENT ANTHEM MEDICARE ADVANTAGE [id number]       Payor Plan Address Payor Plan Phone Number Payor Plan Fax Number Effective Dates    [address] [phone]  2024 - None Entered    [facility] [id number]         Subscriber Name Subscriber Birth Date Member ID       TOMMIE PALMA 1965 [id number]                     Emergency Contacts        (Rel.) Home Phone Work Phone Mobile Phone    VLADIMIR PALMA (Daughter) -- -- [phone]    MICHAEL HENRIQUEZ (Friend) [phone] -- --                 Physician Progress Notes (last 48 hours)        Vinod Osuna MD at 24 0849          INPATIENT PSYCHIATRIC PROGRESS NOTE    Name:  Tommie Palma  :  1965  MRN:  [id number]  Visit Number:  " "[id number]  Length of stay:  12    Behavioral Health Treatment Plan and Problem List: I have reviewed and approved the Behavioral Health Treatment Plan and Problem list.    SUBJECTIVE    CC/ Focus of exam:  depression    Patient's subjective status: \"I'm doing better, got hope\"    INTERVAL HISTORY: The patient voicing improvement, good news for she had not been improving.  She says she her depression is about the same but anxiety is much better.  Patient wants to proceed with the anticipated ect treatment tomorrow hoping to be discharged this coming Thursday, June 20.    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 Looking forward to the future.     Depression rating 7-8/10  Anxiety rating 1-2/10  Hopelessness: 0/10  Sleep:\"like a rock\"         ROS: No cardiovascular, GI, or Neurological complaints.       OBJECTIVE    Vitals:    06/18/24 0750   BP: 104/70   Pulse: 85   Resp:    Temp:    SpO2:       Temp:  [97 °F (36.1 °C)-98.2 °F (36.8 °C)] 97.9 °F (36.6 °C)  Heart Rate:  [62-85] 85  Resp:  [16-20] 16  BP: (101-144)/(63-84) 104/70    MENTAL STATUS EXAM:       Psychomotor: No psychomotor agitation/retardation, No EPS, No motor tics  Speech-normal rate, amount.  Mood/Affect: Depressed  Thought Processes: coherent  Thought Content: rational  Hallucination(s): none  Hopelessness: Intermittent  Optimistic: minimally  Suicidal Thoughts: No  Suicidal Plan/Intent: No  Homicidal Thoughts: absent  Orientation: oriented x 3  Memory: recent intact    Lab Results (last 24 hours)       ** No results found for the last 24 hours. **             Imaging Results (Last 24 Hours)       ** No results found for the last 24 hours. **             Lab and x-ray studies reviewed.    ECG/EMG Results (most recent)       Procedure Component Value Units Date/Time    ECG 12 Lead Other; Baseline Cardiac Status [[id number]] Collected: " 06/07/24 0037     Updated: 06/07/24 0948     QT Interval 428 ms      QTC Interval 420 ms     Narrative:      Test Reason : Other~  Blood Pressure :   */*   mmHG  Vent. Rate :  58 BPM     Atrial Rate :  58 BPM     P-R Int : 194 ms          QRS Dur :  82 ms      QT Int : 428 ms       P-R-T Axes :  57   7  45 degrees     QTc Int : 420 ms    Sinus bradycardia  Otherwise normal ECG  Confirmed by Rachel Hawkins ([id number]) on 6/7/2024 9:48:46 AM    Referred By:            Confirmed By: Rachel Hawkins             ALLERGIES: Chlorzoxazone, Iodine, Phenazopyridine hcl, Pyridium [phenazopyridine], Quinine, Valproic acid, Methocarbamol, Iodinated contrast media, Codeine, and Diphenhydramine    Medications:     Brexpiprazole, 0.5 mg, Oral, Daily  estradiol, 1 g, Vaginal, Once per day on Monday Wednesday Friday  FLUoxetine, 60 mg, Oral, QAM  Liraglutide, 1.8 mg, Subcutaneous, Daily  mirtazapine, 30 mg, Oral, Nightly  nitrofurantoin (macrocrystal-monohydrate), 100 mg, Oral, Daily  pantoprazole, 40 mg, Oral, Daily       ASSESSMENT & PLAN     Severe episode of recurrent major depressive disorder, without psychotic features  Will be increasing the fluoxetine dose and adding an Rexulti as an adjunct along with continuing the ect.plus a supportive individual and group psychotherapeutic effort.       Gastroesophageal reflux disease without esophagitis  Protonix           Chronic back pain  PRN ibuprofen      Special precautions: Special Precautions Level 3 (q15 min checks)     Behavioral Health Treatment Plan and Problem List: I have reviewed and approved the Behavioral Health Treatment Plan and Problem list.    I spent a total of 26 minutes in direct patient care including  17 minutes face to face with the patient assessment, coordination of care, and counseling the patient on the current and follow-up treatment plans regarding her status and situation.  Patient had no additional questions.     PELON Osuna  MD    Clinician:  Vinod Osuna MD  06/18/24  08:49 EDT    Dictated utilizing Dragon dictation       Electronically signed by Vinod Osuna MD at 06/18/24 0859       Vinod Osuna MD at 06/17/24 0819          Patient seen prior to ect this morning, voices continuing depression with minimal subjective improvement of her mood.  Patient wanting to continue with a series of ect 6 treatments.  Patient's appetite, sleep, interactions with peers and staff appropriate.    Electronically signed by Vinod Osuna MD at 06/17/24 0821        Marysol García   Therapist  Psychiatry     Plan of Care     Signed     Date of Service: 06/18/24 1059  Creation Time: 06/18/24 1059     Signed            Problem: Adult Behavioral Health Plan of Care  Goal: Optimized Coping Skills in Response to Life Stressors  Outcome: Ongoing, Progressing  Intervention: Promote Effective Coping Strategies  Flowsheets (Taken 6/18/2024 1058)  Supportive Measures:   active listening utilized   counseling provided        1050     DATA:     Therapist met with the patient individually. Therapist continues reviewing plan of care and aftercare plan.  The patient was agreeable. Staffed case with Dr. Osuna this date.      ASSESSMENT:     Patient was seen for follow up of Severe episode of recurrent major depressive disorder, without psychotic features        Today, patient was seen 1-1 in the office. Patient noted to have restricted affect, congruent mood, linear thought processes.  Patient receptive to meeting with therapist and laid on the couch in the office during the visit.  Patient reports that she is feeling some better. She reports to Dr. Osuna that anxiety has improved and that has helped.  Patient rates depression at 8/10 and anxiety at 2/10. Patient reports she was frustrated at first because ECT showed a quicker improvement during her last hospitalization.  Patient anticipates her last ECT in  "the morning and then will likely discharge Thursday.  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  Patient reports that she attends therapy at [facility] and sees Mely. She reports that they do talk therapy and talk about whatever is going on at the time.      Patient denies SI/HI/AVH.  She reports that she slept poorly last night due to her roommate needing to get up and down to use the rest room.  Therapist offered to see if there could be alternative room assignment. Patient shrugged and stated \"it is what it is.\"  Patient continues to demonstrate a blase or negativistic thinking pattern at times.       CLINICAL MANEUVERING:     Therapist provided safe, secure environment for patient to share.  Provided reflective listening and psychoeducation.  Assisted patient in processing the above session. Assisted patient in identifying any questions or needs to be addressed today.           Plan:      Patient to remain hospitalized this date.      Treatment team will focus efforts on stabilizing patient's acute symptoms while providing education on healthy coping and crisis management to reduce hospitalizations.   Patient requires daily psychiatrist evaluation and 24/7 nursing supervision to promote patient  safety.     Therapist will offer 1-4 individual sessions, family education, and appropriate referral.        Patient consents to [facility] follow up. Patient to return home after discharge. Patient's roommate [place] confirms safe guarding at home; no access to firearms, weapons and medications are locked and administered by [employer].                                 "

## 2024-06-20 PROCEDURE — 94761 N-INVAS EAR/PLS OXIMETRY MLT: CPT

## 2024-06-20 PROCEDURE — 99232 SBSQ HOSP IP/OBS MODERATE 35: CPT | Performed by: PSYCHIATRY & NEUROLOGY

## 2024-06-20 RX ADMIN — MIRTAZAPINE 30 MG: 15 TABLET, FILM COATED ORAL at 21:40

## 2024-06-20 RX ADMIN — BREXPIPRAZOLE 0.5 MG: 1 TABLET ORAL at 08:23

## 2024-06-20 RX ADMIN — FLUOXETINE HYDROCHLORIDE 60 MG: 20 CAPSULE ORAL at 06:21

## 2024-06-20 RX ADMIN — NITROFURANTOIN MONOHYDRATE/MACROCRYSTALLINE 100 MG: 25; 75 CAPSULE ORAL at 08:23

## 2024-06-20 RX ADMIN — PANTOPRAZOLE SODIUM 40 MG: 40 TABLET, DELAYED RELEASE ORAL at 08:23

## 2024-06-20 NOTE — PLAN OF CARE
"  Problem: Adult Behavioral Health Plan of Care  Goal: Optimized Coping Skills in Response to Life Stressors  Outcome: Ongoing, Progressing  Intervention: Promote Effective Coping Strategies  Flowsheets (Taken 6/20/2024 6124 by Candelario Betancourt, RN)  Supportive Measures:   active listening utilized   goal-setting facilitated   positive reinforcement provided   problem-solving facilitated   relaxation techniques promoted   self-care encouraged   self-reflection promoted   self-responsibility promoted   verbalization of feelings encouraged      1000    DATA:     Therapist met with the patient individually. Therapist continues reviewing plan of care and aftercare plan.  The patient was agreeable. Staffed case with patient, patient's roommate Umair, and RN staff this date.       ASSESSMENT:     Patient was seen for follow up of Severe episode of recurrent major depressive disorder, without psychotic features        Today, patient was seen 1-1 at bedside. Patient calm, cooperative and oriented.  Patient noted to have restricted affect, congruent mood, coherent thought content.  Patient resting in bed and reports, \"It's a good day to nap.\"  Patient denies SI/HI/AVH or new symptoms.  She plans to see Dr. Osuna tomorrow to determine if she is ready to discharge.  Patient reports that she will initially go home with Umair and then reschedule or plans of going out of town soon.      Therapist brought patient some information about DBT coping skills, and CBT coping skills as this therapist believes that focusing more on therapy techniques may be helpful to patient.     Therapist contacted Umair at [phone];  Provided clinical update. Umair was supportive.  He reports that he is concerned about patient's treatment plan moving forward.  He reports that he believes that she needs to be seen more than once a month by her outpatient therapist.  He continues to be aware that patient plans to return home with him at discharge " and later reschedule service dog training.  Therapist will follow up with patient and see if she would also like to see her outpatient therapist more often and make adjustment with [facility] if possible.  No other significant issues identified. Therapist will plan to follow up with Grundy tomorrow.      1030: Therapist updated patient following contact with Grundy and reviewed his concerns about therapy frequency at [facility]. Therapist offered to contact their office to see if more therapy sessions could be scheduled. Patient declined and stated she could call there if needed in the future.  Patient was invited to recreational therapy group as well, and declined this date opting to rest.      CLINICAL MANEUVERING:     Therapist provided safe, secure environment for patient to share.  Provided reflective listening and psychoeducation.  Assisted patient in processing the above session. Assisted patient in identifying any questions or needs to be addressed today.           Plan:      Patient to remain hospitalized this date.      Treatment team will focus efforts on stabilizing patient's acute symptoms while providing education on healthy coping and crisis management to reduce hospitalizations.   Patient requires daily psychiatrist evaluation and 24/7 nursing supervision to promote patient  safety.     Therapist will offer 1-4 individual sessions, family education, and appropriate referral.        Patient consents to [facility] follow up. Patient to return home after discharge. Patient's roommate Grundy confirms safe guarding at home; no access to firearms, weapons and medications are locked and administered by Cloud.

## 2024-06-20 NOTE — PLAN OF CARE
Goal Outcome Evaluation:  Plan of Care Reviewed With: patient  Patient Agreement with Plan of Care: agrees     Progress: no change  Outcome Evaluation: patient has been out of her room most of the shift.  very cooperative, talking with staff and other patients on unit.  denies si.

## 2024-06-20 NOTE — PLAN OF CARE
Goal Outcome Evaluation:  Plan of Care Reviewed With: patient  Patient Agreement with Plan of Care: agrees     Progress: no change  Outcome Evaluation: Pt reports fair sleep and a good appetite. Rates A/D 3/8. Denies SI/HI or hallucinations. Pt has slept about 8.5 hour this shift.

## 2024-06-20 NOTE — PROGRESS NOTES
"INPATIENT PSYCHIATRIC PROGRESS NOTE    Name:  Zo Palma  :  1965  MRN:  [id number]  Visit Number:  [id number]  Length of stay:  14    SUBJECTIVE    CC/Focus of Exam: Depression    INTERVAL HISTORY:  First time seeing patient.  Chart, notes, vitals, labs and EKG personally reviewed.    Patient reports initial depressive symptoms are improving.  She appears to be occasionally confused or tangential, at times making random or contradictory statements.  Overall, she appears to be more stable compared to initial documentation.  She is unsure if she will have another ECT treatment tomorrow.  She denies SI.  She is interested in finding a therapist closer to home, saying she was referred to a therapist in [place] and prefers not to drive that far.    Depression rating 4/10  Anxiety rating 4/10  Sleep: Good  Withdrawal sx: Denied  Cravin/10    Review of Systems   Constitutional: Negative.    Respiratory: Negative.     Cardiovascular: Negative.    Gastrointestinal: Negative.    Musculoskeletal: Negative.    Psychiatric/Behavioral:  Positive for dysphoric mood. The patient is nervous/anxious.        OBJECTIVE    Temp:  [97.5 °F (36.4 °C)-98.9 °F (37.2 °C)] 98.7 °F (37.1 °C)  Heart Rate:  [60-83] 68  Resp:  [15-19] 16  BP: (100-142)/(62-90) 109/64    MENTAL STATUS EXAM:  Appearance: Casually dressed, good hygeine.   Cooperation: Cooperative  Psychomotor: No psychomotor agitation/retardation, No EPS, No motor tics  Speech: normal rate, amount.  Mood: \"Feeling better\"   Affect: congruent, appropriate  Thought Content: goal directed, no delusional material present  Thought process: linear, organized.  Suicidality: No SI  Homicidality: No HI  Perception: No AH/VH  Insight: fair   Judgment: fair    Lab Results (last 24 hours)       ** No results found for the last 24 hours. **               Imaging Results (Last 24 Hours)       ** No results found for the last 24 hours. **               ECG/EMG Results (most " recent)       Procedure Component Value Units Date/Time    ECG 12 Lead Other; Baseline Cardiac Status [[id number]] Collected: 06/07/24 0037     Updated: 06/07/24 0948     QT Interval 428 ms      QTC Interval 420 ms     Narrative:      Test Reason : Other~  Blood Pressure :   */*   mmHG  Vent. Rate :  58 BPM     Atrial Rate :  58 BPM     P-R Int : 194 ms          QRS Dur :  82 ms      QT Int : 428 ms       P-R-T Axes :  57   7  45 degrees     QTc Int : 420 ms    Sinus bradycardia  Otherwise normal ECG  Confirmed by Rachel Hawkins ([id number]) on 6/7/2024 9:48:46 AM    Referred By:            Confirmed By: Rachel Hawkins             ALLERGIES: Chlorzoxazone, Iodine, Phenazopyridine hcl, Pyridium [phenazopyridine], Quinine, Valproic acid, Methocarbamol, Iodinated contrast media, Codeine, and Diphenhydramine      Current Facility-Administered Medications:     acetaminophen (TYLENOL) tablet 650 mg, 650 mg, Oral, Q6H PRN, Vinod Osuna MD    aluminum-magnesium hydroxide-simethicone (MAALOX MAX) 400-400-40 MG/5ML suspension 15 mL, 15 mL, Oral, Q6H PRN, Vinod Osuna MD    benzonatate (TESSALON) capsule 100 mg, 100 mg, Oral, TID PRN, Vinod Osuna MD    benztropine (COGENTIN) tablet 2 mg, 2 mg, Oral, Once PRN **OR** benztropine (COGENTIN) injection 1 mg, 1 mg, Intramuscular, Once PRN, Vinod Osuna MD    Brexpiprazole tablet 0.5 mg, 0.5 mg, Oral, Daily, Vinod Osuna MD, 0.5 mg at 06/20/24 0823    estradiol (ESTRACE) vaginal cream 1 applicator, 1 g, Vaginal, Once per day on Monday Wednesday Friday, Vinod Osuna MD, 1 applicator at 06/19/24 2054    famotidine (PEPCID) tablet 20 mg, 20 mg, Oral, BID PRN, Vinod Osuna MD    FLUoxetine (PROzac) capsule 60 mg, 60 mg, Oral, QAM, Vinod Osuna MD, 60 mg at 06/20/24 0621    hydrOXYzine (ATARAX) tablet 50 mg, 50 mg, Oral, Q6H PRN, Vinod Osuna MD    ibuprofen (ADVIL,MOTRIN)  tablet 400 mg, 400 mg, Oral, Q6H PRN, Vinod Osuna MD, 400 mg at 06/12/24 1053    Liraglutide (VICTOZA) injection 1.8 mg, 1.8 mg, Subcutaneous, Daily, Vinod Osuna MD, 1.8 mg at 06/20/24 0821    loperamide (IMODIUM) capsule 2 mg, 2 mg, Oral, Q2H PRN, Vinod Osuna MD    magnesium hydroxide (MILK OF MAGNESIA) suspension 10 mL, 10 mL, Oral, Daily PRN, Vinod Osuna MD    mirtazapine (REMERON) tablet 30 mg, 30 mg, Oral, Nightly, Vinod Osuna MD, 30 mg at 06/19/24 2054    nitrofurantoin (macrocrystal-monohydrate) (MACROBID) capsule 100 mg, 100 mg, Oral, Daily, Vinod Osuna MD, 100 mg at 06/20/24 0823    ondansetron ODT (ZOFRAN-ODT) disintegrating tablet 4 mg, 4 mg, Translingual, Q6H PRN, Vinod Osuna MD    pantoprazole (PROTONIX) EC tablet 40 mg, 40 mg, Oral, Daily, Vinod Osuna MD, 40 mg at 06/20/24 0823    sodium chloride nasal spray 2 spray, 2 spray, Each Nare, PRN, Vinod Osuna MD    traZODone (DESYREL) tablet 50 mg, 50 mg, Oral, Nightly PRN, Vinod Osuna MD    Reviewed chart, notes, vitals, labs and EKG personally reviewed.    ASSESSMENT & PLAN:        MDD (major depressive disorder), recurrent episode, severe    Gastroesophageal reflux disease without esophagitis    Severe episode of recurrent major depressive disorder, without psychotic features    Chronic back pain    Severe episode of recurrent major depressive disorder, without psychotic features  Increased the fluoxetine dose and adding an Rexulti as an adjunct along with continuing the ect.plus a supportive individual and group psychotherapeutic effort.       Gastroesophageal reflux disease without esophagitis  Protonix           Chronic back pain  PRN ibuprofen    Special precautions: Special Precautions Level 3 (q15 min checks)     Behavioral Health Treatment Plan and Problem List: I have reviewed and approved the Behavioral  Health Treatment Plan and Problem list.  The patient has had a chance to review and agrees with the treatment plan.    I have reviewed the copied text and it is accurate as of 06/20/24     Clinician:  Maxwell Cannon MD  06/20/24  09:24 EDT

## 2024-06-21 VITALS
HEIGHT: 63 IN | BODY MASS INDEX: 26.58 KG/M2 | SYSTOLIC BLOOD PRESSURE: 98 MMHG | RESPIRATION RATE: 18 BRPM | WEIGHT: 150 LBS | HEART RATE: 75 BPM | OXYGEN SATURATION: 97 % | DIASTOLIC BLOOD PRESSURE: 64 MMHG | TEMPERATURE: 98.3 F

## 2024-06-21 PROCEDURE — 99239 HOSP IP/OBS DSCHRG MGMT >30: CPT | Performed by: PSYCHIATRY & NEUROLOGY

## 2024-06-21 RX ORDER — ARIPIPRAZOLE 2 MG/1
2 TABLET ORAL DAILY
Qty: 30 TABLET | Refills: 0 | Status: SHIPPED | OUTPATIENT
Start: 2024-06-21

## 2024-06-21 RX ORDER — TRAZODONE HYDROCHLORIDE 50 MG/1
50 TABLET ORAL NIGHTLY PRN
Qty: 30 TABLET | Refills: 0 | Status: SHIPPED | OUTPATIENT
Start: 2024-06-21

## 2024-06-21 RX ORDER — FLUOXETINE HYDROCHLORIDE 20 MG/1
20 CAPSULE ORAL EVERY MORNING
Qty: 90 CAPSULE | Refills: 0 | Status: SHIPPED | OUTPATIENT
Start: 2024-06-22 | End: 2024-06-21

## 2024-06-21 RX ORDER — MIRTAZAPINE 30 MG/1
30 TABLET, FILM COATED ORAL NIGHTLY
Qty: 30 TABLET | Refills: 0 | Status: SHIPPED | OUTPATIENT
Start: 2024-06-21

## 2024-06-21 RX ORDER — FLUOXETINE HYDROCHLORIDE 20 MG/1
60 CAPSULE ORAL EVERY MORNING
Qty: 90 CAPSULE | Refills: 0 | Status: SHIPPED | OUTPATIENT
Start: 2024-06-22

## 2024-06-21 RX ADMIN — NITROFURANTOIN MONOHYDRATE/MACROCRYSTALLINE 100 MG: 25; 75 CAPSULE ORAL at 08:20

## 2024-06-21 RX ADMIN — BREXPIPRAZOLE 0.5 MG: 1 TABLET ORAL at 08:20

## 2024-06-21 RX ADMIN — FLUOXETINE HYDROCHLORIDE 60 MG: 20 CAPSULE ORAL at 06:24

## 2024-06-21 RX ADMIN — PANTOPRAZOLE SODIUM 40 MG: 40 TABLET, DELAYED RELEASE ORAL at 08:20

## 2024-06-21 NOTE — DISCHARGE SUMMARY
Date of Discharge:  6/21/2024    Discharge Diagnosis:Principal Problem:    MDD (major depressive disorder), recurrent episode, severe  Active Problems:    Gastroesophageal reflux disease without esophagitis    Severe episode of recurrent major depressive disorder, without psychotic features    Chronic back pain        Presenting Problem/History of Present Illness:Patient was admitted to the hospital on June 6 having presented depressed having voiced suicidal ideation, voluntarily admitted for safety evaluation and treatment, see admission note for details.         Hospital Course:    Patient was admitted for safety and stabilization and was placed on standard precautions.  Routine labs were checked.  Patient was assigned a master's level therapist and provided with an opportunity to participate in group and individual therapy on the unit.  Patient seen on a daily basis for evaluation and supportive therapy.  Patient's depressive symptomatology improved during her hospital stay but did not do so as dramatically as it had with her last hospitalization and series of ect.  Patient did receive bifrontal ECT treatments on Felicia 10, 12th, 14th, 17th and 19th for total of 5 treatments.  Her psychotropic medication format evolved to fluoxetine 60 mg, brexpiprazole 0.5 mg daily and mirtazapine 30 mg at bedtime.  At discharge patient thoughts of harming herself or others and certainly no psychotic symptomatology.  See below for follow-up details of medications.    Consults:   Consults       Date and Time Order Name Status Description    6/7/2024 11:15 AM Inpatient Hospitalist Consult Completed             Labs:  Lab Results (all)       Procedure Component Value Units Date/Time    POC Glucose Once [[id number]]  (Normal) Collected: 06/12/24 1012    Specimen: Blood Updated: 06/12/24 1018     Glucose 99 mg/dL     TSH [[id number]]  (Normal) Collected: 06/07/24 1258    Specimen: Blood Updated: 06/07/24 1359     TSH 0.962 uIU/mL      Hepatitis Panel, Acute [[id number]]  (Normal) Collected: 06/06/24 1724    Specimen: Blood Updated: 06/07/24 1301     Hepatitis B Surface Ag Non-Reactive     Hep A IgM Non-Reactive     Hep B C IgM Non-Reactive     Hepatitis C Ab Non-Reactive    Narrative:      Results may be falsely decreased if patient taking Biotin.             Imaging:  Imaging Results (All)       Procedure Component Value Units Date/Time    XR Spine Lumbar Lateral [[id number]] Collected: 06/07/24 1338     Updated: 06/07/24 1341    Narrative:      EXAM:    XR Lumbosacral Spine, 2 or 3 Views     EXAM DATE:    6/7/2024 12:21 PM     CLINICAL HISTORY:    ELECTROCONVULSIVE THERAPY     TECHNIQUE:    Frontal and lateral views of the lumbar spine and sacrum.     COMPARISON:    2/13/2024     FINDINGS:    VERTEBRAE:  Grade 1 anterolisthesis L5 on S1.  No acute fracture.    SACRUM/COCCYX:  Unremarkable as visualized.  No acute fracture.    DISC SPACES:  No acute findings.  No significant narrowing.    SOFT TISSUES:  Unremarkable.    OTHER FINDINGS:  No acute lumbar abnormality.       Impression:      1.  Grade 1 anterolisthesis L5 on S1.  2.  No acute lumbar abnormality.        This report was finalized on 6/7/2024 1:39 PM by Dr. Yan Ennis MD.       XR Chest PA & Lateral [[id number]] Collected: 06/07/24 1337     Updated: 06/07/24 1339    Narrative:      EXAMINATION: XR CHEST PA AND LATERAL-      CLINICAL INDICATION: ELECTROCONVULSIVE THERAPY        COMPARISON: 3/13/2024     TECHNIQUE: XR CHEST PA AND LATERAL-      FINDINGS:  LUNGS: Lungs are adequately aerated.      HEART AND MEDIASTINUM: Heart and mediastinal contours are unremarkable        SKELETON: Bony and soft tissue structures are unremarkable.             Impression:      No radiographic evidence of acute cardiac or pulmonary disease.        This report was finalized on 6/7/2024 1:37 PM by Dr. Yan Ennis MD.               Condition on Discharge:  improved    Prognosis: Fair    Vital Signs  Temp:   [98.3 °F (36.8 °C)-98.5 °F (36.9 °C)] 98.3 °F (36.8 °C)  Heart Rate:  [62-75] 75  Resp:  [16-18] 18  BP: ()/(64-77) 98/64    Discharge Disposition  Home or Self Care       Discharge Medications        New Medications        Instructions Start Date   Brexpiprazole 1 MG tablet   Take one-half tablet by mouth Daily. Indications: Major Depressive Disorder   Start Date: June 22, 2024     traZODone 50 MG tablet  Commonly known as: DESYREL   50 mg, Oral, Nightly PRN             Changes to Medications        Instructions Start Date   FLUoxetine 20 MG capsule  Commonly known as: PROzac  What changed:   medication strength  how much to take   20 mg, Oral, Every Morning   Start Date: June 22, 2024     mirtazapine 30 MG tablet  Commonly known as: REMERON  What changed:   medication strength  how much to take   30 mg, Oral, Nightly             Continue These Medications        Instructions Start Date   acetaminophen 500 MG tablet  Commonly known as: TYLENOL   500 mg, Oral, Daily PRN      estradiol 0.1 MG/GM vaginal cream  Commonly known as: ESTRACE   1 g, Vaginal, 3 Times Weekly      ibandronate 150 MG tablet  Commonly known as: BONIVA   150 mg, Oral, Every 30 Days      Liraglutide 18 MG/3ML solution pen-injector injection  Commonly known as: VICTOZA   1.8 mg, Subcutaneous, Daily      nitrofurantoin (macrocrystal-monohydrate) 100 MG capsule  Commonly known as: MACROBID   100 mg, Oral, Daily, Prophylaxis of Frequently Occuring Urinary Tract Infection      pantoprazole 40 MG EC tablet  Commonly known as: PROTONIX   40 mg, Oral, Daily               Discharge Diet: Regular    Activity at Discharge: No restrictions    Follow-up Appointments:    JUN 26 Psychotherapy with Mely LARA  Wednesday Jun 26, 2024 1:30 PM  Established: Please make sure to bring all medication, insurance cards, ID [facility] BE[facility]AVIORAL HE[facility]  [address] [place] [id number] [phone]           JUL 8 Follow Up  with Yeimi Phan  Monday Jul 8, 2024 9:30 AM  Arrive 15 minutes prior to appointment. [facility] NEUROLOGY  [address]  [address]  [facility] [address]  [phone]   JUL 9 Psychotherapy with Mely M  Tuesday Jul 9, 2024 11:00 AM  Established: Please make sure to bring all medication, insurance cards, ID [facility] BEHAVIORAL HEALTH  [address]  [facility] [address]  [phone]   JUL 22 Psychotherapy with Mely M  Monday Jul 22, 2024 11:00 AM  Established: Please make sure to bring all medication, insurance cards, ID [facility] BEHAVIORAL HEALTH  [address]  [facility] [address]  [phone]   JUL 23 Hospital Follow Up with Evan Rivas  Tuesday Jul 23, 2024 11:00 AM [facility] FAMILY MEDICINE  [address] [place] [address]  [phone]   AUG    5 Psychotherapy with Mely M  Monday Aug 5, 2024 11:00 AM  Established: Please make sure to bring all medication, insurance cards, ID [facility] BEHAVIORAL HEALTH  [address]  [facility] [address]  [phone]   DEC    9 FOLLOW UP with Damari Fulton  Monday Dec 9, 2024 9:00 AM [facility] ENDOCRINOLOGY  [address]  [address]  [facility] [address]           Vinod Osuna MD  06/21/24  12:16 EDT  .    Time: I spent greater than 30 minutes on this discharge activity which included: face-to-face encounter with the patient, reviewing the data in the system, coordination of the care with the nursing staff as well as consultants, documentation, and entering orders.      Dictated utilizing Dragon dictation

## 2024-06-21 NOTE — DISCHARGE INSTR - APPOINTMENTS
If you are interested in changing outpatient therapy providers please see the below options:     [facility]:  [phone]  [facility] [phone]  [facility] [phone]

## 2024-06-21 NOTE — PLAN OF CARE
Goal Outcome Evaluation:  Plan of Care Reviewed With: patient  Patient Agreement with Plan of Care: agrees     Progress: no change  Outcome Evaluation: Pt reports sleeping and eating well. Rates A/D 3/8. Denies SI/HI or hallucinations. Denies feeling helpless,hopeless or worthless. Pt has slept about 9 hours this shift.

## 2024-06-21 NOTE — PROGRESS NOTES
1224:      Therapist received message from Dr. Osuna; moved up patient's therapy appointment to next Wednesday.  Patient was agreeable.  No other issues identified.

## 2024-06-21 NOTE — PROGRESS NOTES
1948      Therapist met 1-1 in the office. Patient calm/cooperative, oriented x 4.  Patient noted to have appropriate affect, congruent mood, normal thought content. Patient denies SI/HI/AVH and acute symptoms.  Patient rates depression at 8/10 and anxiety at 3/10.  Patient appears future oriented this date and reports that she would like to go home so she can get back to life and tend to her tasks at home. She reports that she feels safe returning home today and would seek help again if she could not.     Therapist spoke with patient about her history of depression and therapy treatment.; patient reports that she has done years of therapy.  She reports that she has done the best with a therapist she had about 10 years ago in [place].  They did imagery therapy and she was able to reconnect with ancestors in her past. Patient reports that this was very healing, but found that she some how lost the connection.  Therapist continues to provide support.  Therapist believes that patient did well opening up today, and likely has to forge a bond with a therapist before being open. Patient appears articulate and motivated. Therapist continues to encourage her to be open in her outpatient therapy. Patient receptive. Patient contacted her roommate Umair during session and alerted him to her possible discharge today.  Franklin appeared to inquire about the treatment plan going forward.  Patient reports that she believes that he is referring to her current outpatient therapist and if she needs to make a move to therapy closer to home.  Patient currently living in [place], but attends in [place] because that is were her medication provider Evan Rivas DO is located.  Therapist reviewed local therapy options in [facility] including [facility], [facility], [facility], [facility], etc.  Patient opted to have the numbers at discharge to think about these options in the future.  Therapist gained consent for patient's  medication provider Evan Rivas DO, updated navigator to attempt appointment.      Therapist has involved both patient and Grundy in safety planning and confirmed safe guarding at home of any firearm, weapon, medication.  Cloud helps administer medications.     Assisted patient in identifying risk factors which would indicate the need for higher level of care including thoughts to harm self or others and/or self-harming behavior and encouraged patient to call [phone], call 911, or present to the nearest emergency room should any of these events occur. Discussed crisis intervention services and means to access.  Patient adamantly and convincingly denies current suicidal or homicidal ideation or perceptual disturbance.    Therapist completed the following safety plan with the patient       SAFETY/MENTAL HEALTH PLAN    1. Recognizing warning signs: Warning signs that a crisis may be developing such as thoughts, images, mood, situation, behaviors:       If I feel like I can't control it.     2. Internal coping strategies: Things that the patient can do to take their mind off problems without contacting another person such as relaxation techniques, physical activity, etc:     Patient reports that she likes to stay out side, tend to bees and work on activities outside.  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     3. Socialization strategies for distraction and support: People and social settings that provide distraction or support     Patient reports that she is closest with Umair and he is her main support.         4. Social contact for assistance in resolving crisis: People the patient can ask for help       Patient reports that she has a large network of friends that she calls. She reports that she relates to them because they have the same issues with depression, etc. Patient would also call Grundy if need of support/help     5. Professionals or agencies contacts to help resolve crisis: Professionals  or agencies the patient can contact during a crisis - clinician name/location/phone/emergency contact number, local urgent care services with address/phone, National Suicide Prevention Lifeline (900), Emergency contact 911:        Patient reports that she would go to [facility], but she would likely come back to this hospital directly.     6. Means restriction: Ways to make the environment safe     Patient/Cloud report no access to firearms, weapons, Cloud locks medications and helps administer.

## 2024-06-22 NOTE — PAYOR COMM NOTE
"Tommie Guallpa (59 y.o. Female)       Date of Birth   1965    Social Security Number       Address   [address] [place] [address]    Home Phone   [phone]    MRN   [id number]       Yazidi   Other    Marital Status                               Admission Date   6/6/24    Admission Type   Urgent    Admitting Provider   Maria G Diez MD    Attending Provider       Department, Room/Bed   [facility], 1023/1S       Discharge Date   6/21/2024    Discharge Disposition   Home or Self Care    Discharge Destination                                 Attending Provider: (none)   Allergies: Chlorzoxazone, Iodine, Phenazopyridine Hcl, Pyridium [Phenazopyridine], Quinine, Valproic Acid, Methocarbamol, Iodinated Contrast Media, Codeine, Diphenhydramine    Isolation: None   Infection: None   Code Status: Prior    Ht: 160 cm (63\")   Wt: 68 kg (150 lb)    Admission Cmt: None   Principal Problem: MDD (major depressive disorder), recurrent episode, severe [F33.2]                   Active Insurance as of 6/6/2024       Primary Coverage       Payor Plan Insurance Group Employer/Plan Group    ANTH MEDICARE REPLACEMENT ANTHEM MEDICARE ADVANTAGE [id number]       Payor Plan Address Payor Plan Phone Number Payor Plan Fax Number Effective Dates    [id number] [phone]  1/1/2024 - None Entered    [facility] [address]         Subscriber Name Subscriber Birth Date Member ID       TOMMIE GUALLPA 1965 [id number]                     Emergency Contacts        (Rel.) Home Phone Work Phone Mobile Phone    VLADIMIR GUALLPA (Daughter) -- -- [phone]    MICHAEL HENRIQUEZ (Friend) [phone] -- --              PLEASE ATTACH THIS DISCHARGE INFORMATION TO AUTH. # [id number]     DISCHARGE DATE:  06/21/2024    Discharge Diagnosis:Principal Problem:    MDD (major depressive disorder), recurrent episode, severe (F33.2)  MDD (major depressive disorder), recurrent " episode, severe ICD-10-CM: F33.2  ICD-9-CM: 296.33   6/7/2024 - Present     Active Problems:    Gastroesophageal reflux disease without esophagitis    Severe episode of recurrent major depressive disorder, without psychotic features    Chronic back pain       FOLLOW UP:     Marysol García   Therapist  Psychiatry     Progress Notes     Signed     Date of Service: 06/21/24 1224  Creation Time: 06/21/24 1224     Signed         1224:       Therapist received message from Dr. Osuna; moved up patient's therapy appointment to next Wednesday.  Patient was agreeable.  No other issues identified.                    JUN 26 Psychotherapy with Mely M  Wednesday Jun 26, 2024 1:30 PM  Established: Please make sure to bring all medication, insurance cards, ID [facility]  [address] [address]  [phone]   JUL 8 Follow Up with Yeimi Phan  Monday Jul 8, 2024 9:30 AM  Arrive 15 minutes prior to appointment. [facility] NEUROLOGY  [address] [facility] [address]  [address]  [facility] [address]  [phone]   JUL 9 Psychotherapy with Mely M  Tuesday Jul 9, 2024 11:00 AM  Established: Please make sure to bring all medication, insurance cards, ID [facility]  [address] [address]  [phone]   JUL 22 Psychotherapy with Mely M  Monday Jul 22, 2024 11:00 AM  Established: Please make sure to bring all medication, insurance cards, ID [facility]  [address] [address]  [phone]   JUL 23 Hospital Follow Up with Evan Rivas  Tuesday Jul 23, 2024 11:00 AM [facility]  [address] [place] [address] [address] [place] [address]  [phone]   AUG    5 Psychotherapy with Mely M  Monday Aug 5, 2024 11:00 AM  Established: Please make sure to bring all medication, insurance cards, ID  [facility]  [address]  [facility] [address] [phone]   DEC    9 FOLLOW UP with Damari Fulton  Monday Dec 9, 2024 9:00 AM [facility] ENDOCRINOLOGY  [address]  [address]  [facility] [id number] [phone]       Additional Information  If you are interested in changing outpatient therapy providers please see the below options:      [facility]:  [phone]  [facility]: [phone]  [facility] [phone]       Medication List  Medication List    Morning Afternoon Evening Bedtime As Needed    acetaminophen 500 MG tablet  Commonly known as: TYLENOL  Take 1 tablet by mouth Daily As Needed for Mild Pain or Moderate Pain.  For: Pain         ARIPiprazole 2 MG tablet  Commonly known as: Abilify  Take 1 tablet by mouth Daily. Indications: Major Depressive Disorder  For: Major Depressive Disorder  Signed by: Vinod Osuna         estradiol 0.1 MG/GM vaginal cream  Commonly known as: ESTRACE  Insert 1 g into the vagina 3 (Three) Times a Week.  For: Vulvovaginal Atrophy  Last time this was given: 1 applicator on June 19, 2024  8:54 PM         FLUoxetine 20 MG capsule  Commonly known as: PROzac  Take 3 capsules by mouth Every Morning. Indications: Depression  For: Depression  What changed:  medication strength  how much to take  Last time this was given: 60 mg on June 21, 2024  6:24 AM  Signed by: Vinod Osuna         ibandronate 150 MG tablet  Commonly known as: BONIVA  Take 1 tablet by mouth Every 30 (Thirty) Days. Indications: Postmenopausal Osteoporosis  For: Postmenopausal Osteoporosis  Signed by: Evan Rivas CONTINUE AS BEFORE ADMISSION        Liraglutide 18 MG/3ML solution pen-injector injection  Commonly known as: VICTOZA  Inject 1.8 mg under the skin into the appropriate area as directed Daily.  For: Type 2 Diabetes  Last time this was given: 1.8 mg on June 21, 2024  8:20 AM          mirtazapine 30 MG tablet  Commonly known as: REMERON  Take 1 tablet by mouth Every Night. Indications: Major Depressive Disorder  For: Major Depressive Disorder  What changed:  medication strength  how much to take  Last time this was given: 30 mg on June 20, 2024  9:40 PM  Signed by: Viond Osuna         nitrofurantoin (macrocrystal-monohydrate) 100 MG capsule  Commonly known as: MACROBID  Take 1 capsule by mouth Daily. Prophylaxis of Frequently Occuring Urinary Tract Infection  For: Prevention of Frequently Occuring Urinary Tract Infections  Last time this was given: 100 mg on June 21, 2024  8:20 AM         pantoprazole 40 MG EC tablet  Commonly known as: PROTONIX  Take 1 tablet by mouth Daily. Indications: Gastroesophageal Reflux Disease  For diagnoses: Acid reflux  For: Gastroesophageal Reflux Disease  Last time this was given: 40 mg on June 21, 2024  8:20 AM  Signed by: Evan Rivas         traZODone 50 MG tablet  Commonly known as: DESYREL  Take 1 tablet by mouth At Night As Needed for Sleep. Indications: Trouble Sleeping  For: Trouble Sleeping  Signed by: Marysol Valdez   Therapist  Psychiatry     Progress Notes     Addendum     Date of Service: 06/21/24 0930  Creation Time: 06/21/24 0930 0930        Therapist met 1-1 in the office. Patient calm/cooperative, oriented x 4.  Patient noted to have appropriate affect, congruent mood, normal thought content. Patient denies SI/HI/AVH and acute symptoms.  Patient rates depression at 8/10 and anxiety at 3/10.  Patient appears future oriented this date and reports that she would like to go home so she can get back to life and tend to her tasks at home. She reports that she feels safe returning home today and would seek help again if she could not.      Therapist spoke with patient about her history of depression and therapy treatment.; patient reports that she has done years of therapy.  She reports that she has done  the best with a therapist she had about 10 years ago in [place].  They did imagery therapy and she was able to reconnect with ancestors in her past. Patient reports that this was very healing, but found that she some how lost the connection.  Therapist continues to provide support.  Therapist believes that patient did well opening up today, and likely has to forge a bond with a therapist before being open. Patient appears articulate and motivated. Therapist continues to encourage her to be open in her outpatient therapy. Patient receptive. Patient contacted her roommate Umair during session and alerted him to her possible discharge today.  Nicholas appeared to inquire about the treatment plan going forward.  Patient reports that she believes that he is referring to her current outpatient therapist and if she needs to make a move to therapy closer to home.  Patient currently living in [place], but attends in [place] because that is were her medication provider Evan Rivas DO is located.  Therapist reviewed local therapy options in [facility] including [facility], [facility], [facility], [facility], etc.  Patient opted to have the numbers at discharge to think about these options in the future.  Therapist gained consent for patient's medication provider Evan Rivas DO, updated navigator to attempt appointment.       Therapist has involved both patient and Nicholas in safety planning and confirmed safe guarding at home of any firearm, weapon, medication.  Cloud helps administer medications.      Assisted patient in identifying risk factors which would indicate the need for higher level of care including thoughts to harm self or others and/or self-harming behavior and encouraged patient to call 988, call 911, or present to the nearest emergency room should any of these events occur. Discussed crisis intervention services and means to access.  Patient adamantly and convincingly denies current suicidal or  homicidal ideation or perceptual disturbance.     Therapist completed the following safety plan with the patient         SAFETY/MENTAL HEALTH PLAN     1. Recognizing warning signs: Warning signs that a crisis may be developing such as thoughts, images, mood, situation, behaviors:        If I feel like I can't control it.      2. Internal coping strategies: Things that the patient can do to take their mind off problems without contacting another person such as relaxation techniques, physical activity, etc:      Patient reports that she likes to stay out side, tend to bees and work on activities outside.  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      3. Socialization strategies for distraction and support: People and social settings that provide distraction or support      Patient reports that she is closest with [place] and he is her main support.          4. Social contact for assistance in resolving crisis: People the patient can ask for help        Patient reports that she has a large network of friends that she calls. She reports that she relates to them because they have the same issues with depression, etc. Patient would also call [place] if need of support/help      5. Professionals or agencies contacts to help resolve crisis: Professionals or agencies the patient can contact during a crisis - clinician name/location/phone/emergency contact number, local urgent care services with address/phone, National Suicide Prevention Lifeline ), Emergency contact 911:        Patient reports that she would go to [facility], but she would likely come back to this hospital directly.      6. Means restriction: Ways to make the environment safe      Patient/Cloud report no access to firearms, weapons, Cloud locks medications and helps administer.             Vinod Osuna MD   Physician  Psychiatry     Discharge Summary     Signed     Date of Service: 06/21/24 1216  Creation Time: 06/21/24 1216      Signed            Date of Discharge:  6/21/2024     Discharge Diagnosis:Principal Problem:    MDD (major depressive disorder), recurrent episode, severe  Active Problems:    Gastroesophageal reflux disease without esophagitis    Severe episode of recurrent major depressive disorder, without psychotic features    Chronic back pain           Presenting Problem/History of Present Illness:Patient was admitted to the hospital on June 6 having presented depressed having voiced suicidal ideation, voluntarily admitted for safety evaluation and treatment, see admission note for details.           Hospital Course:     Patient was admitted for safety and stabilization and was placed on standard precautions.  Routine labs were checked.  Patient was assigned a master's level therapist and provided with an opportunity to participate in group and individual therapy on the unit.  Patient seen on a daily basis for evaluation and supportive therapy.  Patient's depressive symptomatology improved during her hospital stay but did not do so as dramatically as it had with her last hospitalization and series of ect.  Patient did receive bifrontal ECT treatments on Felicia 10, 12th, 14th, 17th and 19th for total of 5 treatments.  Her psychotropic medication format evolved to fluoxetine 60 mg, brexpiprazole 0.5 mg daily and mirtazapine 30 mg at bedtime.  At discharge patient thoughts of harming herself or others and certainly no psychotic symptomatology.  See below for follow-up details of medications.     Consults:   Consults         Date and Time Order Name Status Description     6/7/2024 11:15 AM Inpatient Hospitalist Consult Completed                  Labs:  Lab Results (all)         Procedure Component Value Units Date/Time     POC Glucose Once [[id number]]  (Normal) Collected: 06/12/24 1012     Specimen: Blood Updated: 06/12/24 1018       Glucose 99 mg/dL       TSH [[id number]]  (Normal) Collected: 06/07/24 1258     Specimen: Blood  Updated: 06/07/24 1359       TSH 0.962 uIU/mL       Hepatitis Panel, Acute [[id number]]  (Normal) Collected: 06/06/24 1724     Specimen: Blood Updated: 06/07/24 1301       Hepatitis B Surface Ag Non-Reactive       Hep A IgM Non-Reactive       Hep B C IgM Non-Reactive       Hepatitis C Ab Non-Reactive     Narrative:       Results may be falsely decreased if patient taking Biotin.                 Imaging:  Imaging Results (All)         Procedure Component Value Units Date/Time     XR Spine Lumbar Lateral [[id number]] Collected: 06/07/24 1338       Updated: 06/07/24 1341     Narrative:       EXAM:    XR Lumbosacral Spine, 2 or 3 Views     EXAM DATE:    6/7/2024 12:21 PM     CLINICAL HISTORY:    ELECTROCONVULSIVE THERAPY     TECHNIQUE:    Frontal and lateral views of the lumbar spine and sacrum.     COMPARISON:    2/13/2024     FINDINGS:    VERTEBRAE:  Grade 1 anterolisthesis L5 on S1.  No acute fracture.    SACRUM/COCCYX:  Unremarkable as visualized.  No acute fracture.    DISC SPACES:  No acute findings.  No significant narrowing.    SOFT TISSUES:  Unremarkable.    OTHER FINDINGS:  No acute lumbar abnormality.        Impression:       1.  Grade 1 anterolisthesis L5 on S1.  2.  No acute lumbar abnormality.        This report was finalized on 6/7/2024 1:39 PM by Dr. aYn Ennis MD.        XR Chest PA & Lateral [[id number]] Collected: 06/07/24 1337       Updated: 06/07/24 1339     Narrative:       EXAMINATION: XR CHEST PA AND LATERAL-      CLINICAL INDICATION: ELECTROCONVULSIVE THERAPY        COMPARISON: 3/13/2024     TECHNIQUE: XR CHEST PA AND LATERAL-      FINDINGS:  LUNGS: Lungs are adequately aerated.      HEART AND MEDIASTINUM: Heart and mediastinal contours are unremarkable        SKELETON: Bony and soft tissue structures are unremarkable.              Impression:       No radiographic evidence of acute cardiac or pulmonary disease.        This report was finalized on 6/7/2024 1:37 PM by Dr. Yan Ennis MD.                    Condition on Discharge:  improved     Prognosis: Fair     Vital Signs  Temp:  [98.3 °F (36.8 °C)-98.5 °F (36.9 °C)] 98.3 °F (36.8 °C)  Heart Rate:  [62-75] 75  Resp:  [16-18] 18  BP: ()/(64-77) 98/64     Discharge Disposition  Home or Self Care         Discharge Medications          New Medications         Instructions Start Date   Brexpiprazole 1 MG tablet    Take one-half tablet by mouth Daily. Indications: Major Depressive Disorder    Start Date: June 22, 2024      traZODone 50 MG tablet  Commonly known as: DESYREL    50 mg, Oral, Nightly PRN                  Changes to Medications         Instructions Start Date   FLUoxetine 20 MG capsule  Commonly known as: PROzac  What changed:   medication strength  how much to take    20 mg, Oral, Every Morning    Start Date: June 22, 2024      mirtazapine 30 MG tablet  Commonly known as: REMERON  What changed:   medication strength  how much to take    30 mg, Oral, Nightly                  Continue These Medications         Instructions Start Date   acetaminophen 500 MG tablet  Commonly known as: TYLENOL    500 mg, Oral, Daily PRN        estradiol 0.1 MG/GM vaginal cream  Commonly known as: ESTRACE    1 g, Vaginal, 3 Times Weekly        ibandronate 150 MG tablet  Commonly known as: BONIVA    150 mg, Oral, Every 30 Days        Liraglutide 18 MG/3ML solution pen-injector injection  Commonly known as: VICTOZA    1.8 mg, Subcutaneous, Daily        nitrofurantoin (macrocrystal-monohydrate) 100 MG capsule  Commonly known as: MACROBID    100 mg, Oral, Daily, Prophylaxis of Frequently Occuring Urinary Tract Infection        pantoprazole 40 MG EC tablet  Commonly known as: PROTONIX    40 mg, Oral, Daily                     Discharge Diet: Regular     Activity at Discharge: No restrictions     Follow-up Appointments:     JUN 26 Psychotherapy with Mely LARA  Wednesday Jun 26, 2024 1:30 PM  Established: Please make sure to bring all medication, insurance cards, ID  [facility]  [address] [facility] [address]  [facility] [address]  [phone]               JUL 8 Follow Up with Yeimi Phan  Monday Jul 8, 2024 9:30 AM  Arrive 15 minutes prior to appointment. [facility] NEUROLOGY  [address] [facility] [address]  [address]  [facility] [address]  [phone]   JUL 9 Psychotherapy with Mely M  Tuesday Jul 9, 2024 11:00 AM  Established: Please make sure to bring all medication, insurance cards, ID [facility]  [address]  [facility] [address]  [phone]   JUL 22 Psychotherapy with Mely M  Monday Jul 22, 2024 11:00 AM  Established: Please make sure to bring all medication, insurance cards, ID [facility]  [address]  [facility] [address]  [phone]   JUL 23 Hospital Follow Up with Evan Rivas  Tuesday Jul 23, 2024 11:00 AM [facility]  [address] [place] [address]  [phone]   AUG    5 Psychotherapy with Mely M  Monday Aug 5, 2024 11:00 AM  Established: Please make sure to bring all medication, insurance cards, ID [facility]  [address]  [facility] [address]  [phone]   DEC    9 FOLLOW UP with Damari Fulton  Monday Dec 9, 2024 9:00 AM [facility] ENDOCRINOLOGY  [address] [facility] [address]  [facility] [address]               Vinod Osuna MD  06/21/24  12:16 EDT  .    Time: I spent greater than 30 minutes on this discharge activity which included: face-to-face encounter with the patient, reviewing the data in the system, coordination of the care with the nursing staff as well as consultants, documentation, and entering orders.       Dictated utilizing Dragon dictation

## 2024-06-26 ENCOUNTER — TELEMEDICINE (OUTPATIENT)
Dept: PSYCHIATRY | Facility: CLINIC | Age: 59
End: 2024-06-26
Payer: MEDICARE

## 2024-06-26 DIAGNOSIS — F41.1 GENERALIZED ANXIETY DISORDER: ICD-10-CM

## 2024-06-26 DIAGNOSIS — F33.2 SEVERE EPISODE OF RECURRENT MAJOR DEPRESSIVE DISORDER, WITHOUT PSYCHOTIC FEATURES: ICD-10-CM

## 2024-06-26 DIAGNOSIS — F41.0 PANIC ATTACKS: Primary | ICD-10-CM

## 2024-06-26 DIAGNOSIS — F43.10 POST TRAUMATIC STRESS DISORDER (PTSD): ICD-10-CM

## 2024-06-26 PROCEDURE — 90832 PSYTX W PT 30 MINUTES: CPT | Performed by: SOCIAL WORKER

## 2024-06-26 NOTE — PROGRESS NOTES
Date: June 26, 2024  Time In: 1335  Time Out: 1402      PROGRESS NOTE  Data:  Zo Palma is a 59 y.o. female who presents today for individual therapy session through the [facility].  The Patient is seen remotely at their home, using Zoom. Patient is being seen via telehealth and stated they are in a secure environment for this session. The patient's condition being diagnosed/treated is appropriate for telemedicine. The provider identified herself as well as her credentials. The patient and/or patients guardian consent to be seen remotely, and when consent is given they understand that the consent allows for patient identifiable information to be sent to a third party as needed. They may refuse to be seen remotely at any time. The electronic data is encrypted and password protected, and the patient has been advised of the potential risks to privacy not withstanding such measures.     Chief Complaint: depression, anxiety and PTSD    Patient presents for Document Agilityt video with roommate present.Patient presents for therapy for severe depression, generalized anxiety, panic attacks and PTSD. Patient reports recent hospitalization for suicidal thoughts. She reports having 6 more rounds of ECT and does not think it was effective. Patient reports still feeling depressed but no longer suicidal. She reports feeling flat and waiting to recover from her treatments. She reports not feeling present since she was discharged. Patient roommate reports patient's kids are working on getting control of her finances.        Clinical Maneuvering/Intervention:    Assisted patient in processing above session content; acknowledged and normalized patient’s thoughts, feelings, and concerns.  Rationalized patient thought process regarding recent events.  Discussed triggers associated with patient's depression.  Also discussed coping skills for patient to implement such as grounding exercises, previously discussed coping,  challenging negative thoughts, using her support system.. Patient reports she will be traveling out of state until 7/16, advised patient we can continue therapy when she is back in [place]. Discussed participating in PHP/IOP when she returns.    Allowed patient to freely discuss issues without interruption or judgment. Provided safe, confidential environment to facilitate the development of positive therapeutic relationship and encourage open, honest communication. Assisted patient in identifying risk factors which would indicate the need for higher level of care including thoughts to harm self or others and/or self-harming behavior and encouraged patient to contact this office, call 911, or present to the nearest emergency room should any of these events occur. Discussed crisis intervention services and means to access. Patient adamantly and convincingly denies current suicidal or homicidal ideation or perceptual disturbance.    Assessment   Patient appears to maintain relative stability as compared to their baseline.  However, patient continues to struggle with depression and anxiety which continues to cause impairment in important areas of functioning.  A result, they can be reasonably expected to continue to benefit from treatment and would likely be at increased risk for decompensation otherwise.    Mental Status Exam:   Hygiene:    mychart video  Cooperation:  Cooperative  Eye Contact:  Fair  Psychomotor Behavior:  Appropriate  Affect:  Appropriate  Mood: depressed  Speech:  Normal  Thought Process:  Goal directed and Linear  Thought Content:  Mood congruent  Suicidal:  None  Homicidal:  None  Hallucinations:  None  Delusion:  None  Memory:  Intact  Orientation:  Person, Place, Time, and Situation  Reliability:  good  Insight:  Good  Judgement:  Good  Impulse Control:  Good  Physical/Medical Issues:  Yes            Patient's Support Network Includes:  children and friend    Functional Status: Moderate  impairment     Progress toward goal: Not at goal    Prognosis: Good with Ongoing Treatment              Plan     Patient will continue in individual outpatient therapy with focus on improved functioning and coping skills, maintaining stability, and avoiding decompensation and the need for higher level of care.    Patient will adhere to medication regimen as prescribed and report any side effects. Patient will contact this office, call 911 or present to the nearest emergency room should suicidal or homicidal ideations occur. Provide Cognitive Behavioral Therapy and Solution Focused Therapy to improve functioning, maintain stability, and avoid decompensation and the need for higher level of care.     Return in about 2 weeks, or earlier if symptoms worsen or fail to improve.           VISIT DIAGNOSIS:     ICD-10-CM ICD-9-CM   1. Panic attacks  F41.0 300.01   2. Severe episode of recurrent major depressive disorder, without psychotic features  F33.2 296.33   3. Post traumatic stress disorder (PTSD)  F43.10 309.81   4. Generalized anxiety disorder  F41.1 300.02            This document has been electronically signed by Mely Tate LCSW  June 26, 2024 13:32 EDT    Part of this note may be an electronic transcription/translation of spoken language to printed text using the Dragon Dictation System.

## 2024-07-16 ENCOUNTER — OFFICE VISIT (OUTPATIENT)
Dept: NEUROLOGY | Facility: CLINIC | Age: 59
End: 2024-07-16
Payer: MEDICARE

## 2024-07-16 VITALS
WEIGHT: 151 LBS | SYSTOLIC BLOOD PRESSURE: 110 MMHG | TEMPERATURE: 98.2 F | DIASTOLIC BLOOD PRESSURE: 60 MMHG | HEIGHT: 63 IN | BODY MASS INDEX: 26.75 KG/M2 | HEART RATE: 88 BPM | OXYGEN SATURATION: 99 %

## 2024-07-16 DIAGNOSIS — R20.0 NUMBNESS AND TINGLING OF UPPER AND LOWER EXTREMITIES OF BOTH SIDES: Primary | ICD-10-CM

## 2024-07-16 DIAGNOSIS — R41.89 SUBJECTIVE MEMORY COMPLAINTS: ICD-10-CM

## 2024-07-16 DIAGNOSIS — R20.2 NUMBNESS AND TINGLING OF UPPER AND LOWER EXTREMITIES OF BOTH SIDES: Primary | ICD-10-CM

## 2024-07-16 PROCEDURE — 99214 OFFICE O/P EST MOD 30 MIN: CPT | Performed by: NURSE PRACTITIONER

## 2024-07-16 PROCEDURE — 1160F RVW MEDS BY RX/DR IN RCRD: CPT | Performed by: NURSE PRACTITIONER

## 2024-07-16 PROCEDURE — 1159F MED LIST DOCD IN RCRD: CPT | Performed by: NURSE PRACTITIONER

## 2024-07-16 RX ORDER — DONEPEZIL HYDROCHLORIDE 5 MG/1
5 TABLET, FILM COATED ORAL NIGHTLY
Qty: 90 TABLET | Refills: 1 | Status: SHIPPED | OUTPATIENT
Start: 2024-07-16

## 2024-07-16 NOTE — PROGRESS NOTES
"     Follow Up Office Visit      Patient Name: Zo Palma  : 1965   MRN: [id number]     Chief Complaint:    Chief Complaint   Patient presents with    Follow-up     Tremor; patient dc'd Primidone as she felt it was not effective; c/o memory concerns       History of Present Illness: Zo Palma is a 59 y.o. female who is here today to follow up with tremors. She is accompanied to the visit today by her emotional support dog. She says she is less concerned about the tremors. She did not think the Primidone 50 mg helped so she tapered off this after a month.   She says she had ECT therapy last month and feels she is having some memory issues since. She says it did not help her depression. She says she is not driving as she can not recall where things are. She says her short term memory is worse. She is using a notebook and takes notes of the things she has done. She says her medication is divided up in morning and evening doses and she is able to manage this ok as she says she tries to stay on track and if her routine is not interrupted she is doing ok. If she gets off track she is forgetting to do things like brush her teeth or wash her face. She has trouble with word finding. Forgets recent conversations. She is repeating questions and forgets that she has ordered something and will order duplicates. She misplaces items. She is able to make calls and search on internet. She says appt's and such need to be written down. She can not remember if she ate of not. Discussed neuropsych testing but says she has had this in the past.   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  -MMSE score today was 29/30    She says she has been having nerve pain and tingling in her elbow areas and in her feet. She says she had EMG years ago for her neck pain/problems \"had to deal with the cervical disc issues. She  says she staggers at times due to the numbness in her " "feet at times.       -MRI Brain W/WO contrast 4/13/2024:    IMPRESSION:     1. No parenchymal mass, hemorrhage, or midline shift.  2. No contrast-enhancing mass.  3. Minimal sphenoid sinus disease.  4. Linear focus of vague enhancement in the right frontal lobe linear in  appearance and probably physiologic but could represent small venous  malformation.    History of Present Illness has been carried forward from her 4/16/2024 office note as follows: Zo Palma is a 59 y.o. female who is here today to establish care with Neurology.  She is accompanied to the office visit today by her service dog.  She says she has hand tremors in her hands and legs that are intermittent for years and says the left hand has become worse and more continuous and worse when she is tired, upset or anxious. She says it worsens \"makes the left hand go all over the place\". She says being anxious will make her voice tremor as well. She says her grandfather had parkinson's but nobody in the family with tremors. She says in the past she was on Mirapex and feels it helped when she first started but once she got up to 1 mg it was no longer effective.    Denies falls.  She does note weakness in her lower extremities.  Reports her gait is off as she has one leg shorter than the other and has built up orthotics in her shoes for this.  She does not use any type of assistive device.  Denies any drooling.  Denies any choking or coughing with eating or drinking.  She does have some visual disturbances and says she has had blurry eyes for years but she is taking vitamins and this has made it better.  All of her eye exams have been normal per her report.  She is right hand dominant. She says she drops things at times but denies any tingling or numbness \"I typed for years and I don't have any more carpal tunnel than thew next person\"  -CT of the head without contrast on 2/13/2024 was noncontributory    Pertinent Medical History: bradycardia. " Orthostatic HTN, anxiety, tremors  Multiple family members with dementia-father and brothers    Subjective      Review of Systems:   Review of Systems   Neurological:  Positive for tremors, numbness and memory problem.       I have reviewed and the following portions of the patient's history were updated as appropriate: past family history, past medical history, past social history, past surgical history and problem list.    Medications:     Current Outpatient Medications:     acetaminophen (TYLENOL) 500 MG tablet, Take 1 tablet by mouth Daily As Needed for Mild Pain or Moderate Pain. Indications: Pain, Disp: , Rfl:     estradiol (ESTRACE) 0.1 MG/GM vaginal cream, Insert 1 g into the vagina 3 (Three) Times a Week. Indications: Vulvovaginal Atrophy, Disp: , Rfl:     FLUoxetine (PROzac) 20 MG capsule, Take 3 capsules by mouth Every Morning. Indications: Depression, Disp: 90 capsule, Rfl: 0    ibandronate (BONIVA) 150 MG tablet, Take 1 tablet by mouth Every 30 (Thirty) Days. Indications: Postmenopausal Osteoporosis, Disp: 3 tablet, Rfl: 3    Liraglutide (VICTOZA) 18 MG/3ML solution pen-injector injection, Inject 1.8 mg under the skin into the appropriate area as directed Daily. Indications: Type 2 Diabetes, Disp: , Rfl:     nitrofurantoin, macrocrystal-monohydrate, (MACROBID) 100 MG capsule, Take 1 capsule by mouth Daily. Prophylaxis of Frequently Occuring Urinary Tract Infection  Indications: Prevention of Frequently Occuring Urinary Tract Infections, Disp: , Rfl:     pantoprazole (PROTONIX) 40 MG EC tablet, Take 1 tablet by mouth Daily. Indications: Gastroesophageal Reflux Disease, Disp: 90 tablet, Rfl: 2    traZODone (DESYREL) 50 MG tablet, Take 1 tablet by mouth At Night As Needed for Sleep. Indications: Trouble Sleeping, Disp: 30 tablet, Rfl: 0    donepezil (Aricept) 5 MG tablet, Take 1 tablet by mouth Every Night., Disp: 90 tablet, Rfl: 1    Allergies:   Allergies   Allergen Reactions    Chlorzoxazone Unknown -  "High Severity and Swelling     Lorzone, muscle relaxant.    Iodine Anaphylaxis     Topical makes her swell  Anaphylaxis with IV contrast (when she was ~ 9yrs old)    Phenazopyridine Hcl Swelling and Anaphylaxis    Pyridium [Phenazopyridine] Anaphylaxis    Quinine Unknown - High Severity     Has malaria symptoms    Valproic Acid Other (See Comments)     Liver failure  Liver failure  Liver failure; lupus like symptoms and liver failure    Methocarbamol Other (See Comments)     Made her feel \"suicidal\" and gave her less control over her actions.    Iodinated Contrast Media Unknown - Low Severity    Codeine Itching    Diphenhydramine Anxiety     Pt has severe panic attack symptoms when given benadryl IV. Needs to take a benzodiazapine med concurrently when getting benadryl.        Objective     Physical Exam:  Vital Signs:   Vitals:    07/16/24 1110   BP: 110/60   Pulse: 88   Temp: 98.2 °F (36.8 °C)   SpO2: 99%   Weight: 68.5 kg (151 lb)   Height: 160 cm (63\")   PainSc: 0-No pain     Body mass index is 26.75 kg/m².    Physical Exam  Constitutional:       Appearance: Normal appearance.   HENT:      Head: Normocephalic.   Eyes:      Conjunctiva/sclera: Conjunctivae normal.   Pulmonary:      Effort: Pulmonary effort is normal.   Musculoskeletal:         General: Normal range of motion.   Skin:     General: Skin is warm and dry.   Neurological:      General: No focal deficit present.      Mental Status: She is alert and oriented to person, place, and time.      Cranial Nerves: Cranial nerves 2-12 are intact. No dysarthria.      Motor: Motor function is intact. No tremor or pronator drift.      Coordination: Coordination is intact. Romberg sign negative. Finger-Nose-Finger Test normal.      Gait: Gait is intact.   Psychiatric:         Attention and Perception: Attention normal.         Mood and Affect: Mood and affect normal.         Speech: Speech normal.         Behavior: Behavior normal. Behavior is cooperative.         " Thought Content: Thought content normal.         Cognition and Memory: Cognition normal. Memory is impaired.         Judgment: Judgment normal.         Neurologic Exam     Mental Status   Oriented to person, place, and time.   Oriented to year, month, date, day and season.   Registration: recalls 3 of 3 objects. Recall at 5 minutes: recalls 2 of 3 objects.   Attention: normal. Concentration: normal.   Speech: speech is normal   Level of consciousness: alert  Knowledge: good.   Able to name object. Able to read. Able to repeat. Able to write. Normal comprehension.     Cranial Nerves   Cranial nerves II through XII intact.     Gait, Coordination, and Reflexes     Gait  Gait: normal    Coordination   Finger to nose coordination: normal    Tremor   Resting tremor: absent  Intention tremor: present  Action tremor: absent       Assessment / Plan      Assessment/Plan:   Diagnoses and all orders for this visit:    1. Numbness and tingling of upper and lower extremities of both sides (Primary)  -     EMG & Nerve Conduction Test; Future  -     Vitamin B12; Future  -     Folate; Future    2. Subjective memory complaints  -     donepezil (Aricept) 5 MG tablet; Take 1 tablet by mouth Every Night.  Dispense: 90 tablet; Refill: 1         Pt states she is concerned with her tremors as much at this point and is not concerned about treatment. She would like to try something for her memory and will start B complex OTC. Will also draw B12 and folate levels today. Pt would like to start something for memory. Discussed with pt that may not benefit if cause if from her ECT and she needs to follow up on this with that provider. Will try Donepazil 5 mg daily. Indications and side effects discussed with patient she verbalizes understanding.  Will order EMG/NCV for her tingling and numbness in her BUE's and BLE's.   Patient will call in the interim if she has any questions or concerns or changes.  This note was dictated using Dragon voice  recognition software.   Follow Up:   Return in about 3 months (around 10/16/2024).    TRACI Reyes, FNP-[facility]

## 2024-07-17 LAB
FOLATE SERPL-MCNC: 15.2 NG/ML (ref 4.78–24.2)
VIT B12 SERPL-MCNC: 505 PG/ML (ref 211–946)

## 2024-07-22 ENCOUNTER — TELEPHONE (OUTPATIENT)
Dept: PSYCHIATRY | Facility: CLINIC | Age: 59
End: 2024-07-22
Payer: MEDICARE

## 2024-07-22 ENCOUNTER — OFFICE VISIT (OUTPATIENT)
Dept: PSYCHIATRY | Facility: CLINIC | Age: 59
End: 2024-07-22
Payer: MEDICARE

## 2024-07-22 DIAGNOSIS — F33.2 SEVERE EPISODE OF RECURRENT MAJOR DEPRESSIVE DISORDER, WITHOUT PSYCHOTIC FEATURES: ICD-10-CM

## 2024-07-22 DIAGNOSIS — F41.0 PANIC ATTACKS: Primary | ICD-10-CM

## 2024-07-22 DIAGNOSIS — F41.1 GENERALIZED ANXIETY DISORDER: ICD-10-CM

## 2024-07-22 DIAGNOSIS — F43.10 POST TRAUMATIC STRESS DISORDER (PTSD): ICD-10-CM

## 2024-07-22 PROCEDURE — 90837 PSYTX W PT 60 MINUTES: CPT | Performed by: SOCIAL WORKER

## 2024-07-22 RX ORDER — FLUOXETINE HYDROCHLORIDE 20 MG/1
60 CAPSULE ORAL EVERY MORNING
Qty: 90 CAPSULE | Refills: 0 | Status: SHIPPED | OUTPATIENT
Start: 2024-07-22

## 2024-07-22 NOTE — TELEPHONE ENCOUNTER
Pt is yessica with Dr. Platt and Madeline for f/u she is going to call in if she needs anything  Thank you, all

## 2024-07-22 NOTE — TELEPHONE ENCOUNTER
She was inpatient since I saw her last (at [facility]), she may need to schedule with Dr Osuna to follow up.

## 2024-07-22 NOTE — PROGRESS NOTES
Date: 07/22/2024   Time In: 1103  Time Out: 1157    PROGRESS NOTE  Data:  Zo Palma is a 59 y.o. female who presents today for individual therapy session at [facility]. Chief Complaint: depression, anxiety and PTSD    Patient presents with support dog. Patient reports having a pseudo seizure due to her conversion disorder since last session. She reports also being diagnosed with dementia and is now taking Aricept. 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 She reports Aricept has been giving her insomnia and she has not had much sleep for the 4-5 days. She reports changing the Aricept to morning to see if this will help with her sleep schedule. She reports depressed mood and impulsive thoughts. She reports she is not engaging in impulsive behaviors but does endorse the thoughts. She reports no longer taking Remeron or trazodone. She reports getting a pill dispenser to help with remembering to take medications.       Clinical Maneuvering/Intervention:    Assisted patient in processing above session content; acknowledged and normalized patient’s thoughts, feelings, and concerns.  Rationalized patient thought process regarding depression and the recent changes with functioning.  Discussed triggers associated with patient's impulsive thoughts.  Also discussed coping skills for patient to implement such as previously discussed coping, challenging the negative thoughts, mindfulness and using her support system. Discussed PHP/IOP, patient prefers individual therapy. Discussed with patient transitioning to another therapist in this clinic, since current therapist is leaving the practice.    Allowed patient to freely discuss issues without interruption or judgment. Provided safe, confidential environment to facilitate the development of positive therapeutic relationship and encourage open, honest  communication. Assisted patient in identifying risk factors which would indicate the need for higher level of care including thoughts to harm self or others and/or self-harming behavior and encouraged patient to contact this office, call 911, or present to the nearest emergency room should any of these events occur. Discussed crisis intervention services and means to access. Patient adamantly and convincingly denies current suicidal or homicidal ideation or perceptual disturbance.    Assessment   Patient appears to maintain relative stability as compared to their baseline.  However, patient continues to struggle with depression and anxiety which continues to cause impairment in important areas of functioning.  As a result, they can be reasonably expected to continue to benefit from treatment and would likely be at increased risk for decompensation otherwise.    Mental Status Exam:   Hygiene:   good  Cooperation:  Cooperative  Eye Contact:  Good  Psychomotor Behavior:  Appropriate  Affect:  Appropriate  Mood: depressed  Speech:  Normal  Thought Process:  Goal directed and Linear  Thought Content:  Mood congruent  Suicidal:   reports passive thoughts, no plans or intent  Homicidal:  None  Hallucinations:  None  Delusion:  None  Memory:  Deficits  Orientation:  Person, Place, Time, and Situation  Reliability:  good  Insight:  Good  Judgement:  Fair  Impulse Control:  Fair  Physical/Medical Issues:  Yes        Patient's Support Network Includes:  children and friend    Functional Status: Moderate impairment     Progress toward goal: Not at goal    Prognosis: Good with Ongoing Treatment          Plan     VISIT DIAGNOSIS:     ICD-10-CM ICD-9-CM   1. Panic attacks  F41.0 300.01   2. Severe episode of recurrent major depressive disorder, without psychotic features  F33.2 296.33   3. Post traumatic stress disorder (PTSD)  F43.10 309.81   4. Generalized anxiety disorder  F41.1 300.02        Patient will continue in individual  outpatient therapy with focus on improved functioning and coping skills, maintaining stability, and avoiding decompensation and the need for higher level of care.    Patient will adhere to medication regimen as prescribed and report any side effects. Patient will contact this office, call 911 or present to the nearest emergency room should suicidal or homicidal ideations occur. Provide Cognitive Behavioral Therapy and Solution Focused Therapy to improve functioning, maintain stability, and avoid decompensation and the need for higher level of care.     Return in about Return in about 2 weeks (around 8/5/2024). or earlier if symptoms worsen or fail to improve.            This document has been electronically signed by Mely Tate LCSW  July 22, 2024 11:09 EDT      Part of this note may be an electronic transcription/translation of spoken language to printed text using the Dragon Dictation System.

## 2024-07-23 ENCOUNTER — OFFICE VISIT (OUTPATIENT)
Dept: FAMILY MEDICINE CLINIC | Facility: CLINIC | Age: 59
End: 2024-07-23
Payer: MEDICARE

## 2024-07-23 ENCOUNTER — TELEPHONE (OUTPATIENT)
Dept: NEUROLOGY | Facility: CLINIC | Age: 59
End: 2024-07-23

## 2024-07-23 VITALS
DIASTOLIC BLOOD PRESSURE: 78 MMHG | WEIGHT: 148 LBS | RESPIRATION RATE: 16 BRPM | HEIGHT: 63 IN | TEMPERATURE: 97.2 F | BODY MASS INDEX: 26.22 KG/M2 | HEART RATE: 68 BPM | OXYGEN SATURATION: 98 % | SYSTOLIC BLOOD PRESSURE: 118 MMHG

## 2024-07-23 DIAGNOSIS — F43.10 PTSD (POST-TRAUMATIC STRESS DISORDER): ICD-10-CM

## 2024-07-23 DIAGNOSIS — R41.89 SUBJECTIVE MEMORY COMPLAINTS: Primary | ICD-10-CM

## 2024-07-23 DIAGNOSIS — L24.7 CONTACT DERMATITIS AND ECZEMA DUE TO PLANT: Primary | ICD-10-CM

## 2024-07-23 DIAGNOSIS — F33.2 SEVERE EPISODE OF RECURRENT MAJOR DEPRESSIVE DISORDER, WITHOUT PSYCHOTIC FEATURES: ICD-10-CM

## 2024-07-23 PROCEDURE — 1160F RVW MEDS BY RX/DR IN RCRD: CPT | Performed by: FAMILY MEDICINE

## 2024-07-23 PROCEDURE — 1126F AMNT PAIN NOTED NONE PRSNT: CPT | Performed by: FAMILY MEDICINE

## 2024-07-23 PROCEDURE — 1159F MED LIST DOCD IN RCRD: CPT | Performed by: FAMILY MEDICINE

## 2024-07-23 PROCEDURE — 3044F HG A1C LEVEL LT 7.0%: CPT | Performed by: FAMILY MEDICINE

## 2024-07-23 PROCEDURE — 99214 OFFICE O/P EST MOD 30 MIN: CPT | Performed by: FAMILY MEDICINE

## 2024-07-23 RX ORDER — TRIAMCINOLONE ACETONIDE 1 MG/G
1 OINTMENT TOPICAL 2 TIMES DAILY
Qty: 30 G | Refills: 1 | Status: SHIPPED | OUTPATIENT
Start: 2024-07-23

## 2024-07-23 RX ORDER — TRAZODONE HYDROCHLORIDE 50 MG/1
50 TABLET ORAL NIGHTLY PRN
Qty: 90 TABLET | Refills: 1 | Status: SHIPPED | OUTPATIENT
Start: 2024-07-23

## 2024-07-23 NOTE — TELEPHONE ENCOUNTER
Hub to relay  Left detailed message informing patient referral has been placed.  No office in [place] for specific referral.  Office is in [place] near [facility].  Someone will contact her with appointment soon.

## 2024-07-23 NOTE — TELEPHONE ENCOUNTER
Caller: Zo Palma    Relationship: Self    Best call back number: [phone]    What orders are you requesting (i.e. lab or imaging): NEURO PSYCH EXAM    In what timeframe would the patient need to come in: ASAP    Where will you receive your lab/imaging services: NEAR [place] IF POSSIBLE     Additional notes: PATIENT REMEMBERS DISCUSSING GETTING A NEURO PSYCH EVALUATION AT LAST VISIT BUT HAS NOT BEEN CONTACTED. I CANNOT SEE AN ORDER FOR THAT OR REFERRAL IN [facility]. DOES PROVIDER WANT PATIENT TO HAVE A NEW NEURO PSYCH TEST SINCE STARTING THIS MEDICATION. LAST TEST WAS ABOUT 7-8 YEARS AGO.

## 2024-07-23 NOTE — PROGRESS NOTES
"                      Established Patient        Chief Complaint:   Chief Complaint   Patient presents with    Hospital Follow Up Visit     Pt was in hospital on 6/12 for electroconvulsive therapy, she states it was not helping after a week and was discharged from hospital.     Poison Ivy     Pt has an itchy left ear maybe poison oak or sissy.         Zo Palma is a 59 y.o. female    History of Present Illness:   Patient is here today in follow-up of recent ECT attempt to treat her major depressive disorder.  Has had numerous episodes of treatment, she reports most recent provided little sustained or meaningful improvement.  She denies SI/HI today.    Recently started on donepezil, noticeable cognitive deficit after ECT.  She denies any substantial improvement to her symptoms at this time, however it has not been long since she started medication.    Contact dermatitis to L ear.  She attributes this to her outdoor/indoor cat, as she fears that it had contact with poison ivy and then consequently touched her left ear.  The ear has become erythematous, intensely pruritic.    Subjective     The following portions of the patient's history were reviewed and updated as appropriate: allergies, current medications, past family history, past medical history, past social history, past surgical history and problem list.    Allergies   Allergen Reactions    Chlorzoxazone Unknown - High Severity and Swelling     Lorzone, muscle relaxant.    Iodine Anaphylaxis     Topical makes her swell  Anaphylaxis with IV contrast (when she was ~ 9yrs old)    Phenazopyridine Hcl Swelling and Anaphylaxis    Pyridium [Phenazopyridine] Anaphylaxis    Quinine Unknown - High Severity     Has malaria symptoms    Valproic Acid Other (See Comments)     Liver failure  Liver failure  Liver failure; lupus like symptoms and liver failure    Methocarbamol Other (See Comments)     Made her feel \"suicidal\" and gave her less control over her actions.    " "Iodinated Contrast Media Unknown - Low Severity    Codeine Itching    Diphenhydramine Anxiety     Pt has severe panic attack symptoms when given benadryl IV. Needs to take a benzodiazapine med concurrently when getting benadryl.        Review of Systems  Constitutional: Negative for fever. Negative for chills, diaphoresis, fatigue and unexpected weight change.   HENT: No dysphagia; no changes to vision/hearing/smell/taste; no epistaxis  Eyes: Negative for redness and visual disturbance.   Respiratory: negative for shortness of breath. Negative for chest pain . Negative for cough and chest tightness.   Cardiovascular: Negative for chest pain and palpitations.   Gastrointestinal: Negative for abdominal distention, abdominal pain and blood in stool.   Endocrine: Negative for cold intolerance and heat intolerance.   Genitourinary: Negative for difficulty urinating, dysuria and frequency.   Musculoskeletal: Chronic arthralgias, back pain and myalgias.   Skin: as per above.  Neurological:  paroxysmal positional vertigo, no focal weakness or unrelenting headache.    Hematological: Negative for adenopathy. Does not bruise/bleed easily.   Psychiatric/Behavioral: Negative for confusion. The patient is not nervous/anxious.  She does report some short-term memory issues, more noticeable than usual.    Objective     Physical Exam   Vital Signs: /78   Pulse 68   Temp 97.2 °F (36.2 °C) (Temporal)   Resp 16   Ht 160 cm (63\")   Wt 67.1 kg (148 lb)   SpO2 98%   BMI 26.22 kg/m²   BMI is within normal parameters. No other follow-up for BMI required.    General Appearance: alert, oriented x 3, no acute distress.  Skin: warm and dry.  Erythematous area to the posterior left ear, predominantly involving the upper half.  Signs of excoriation noted.  HEENT: Atraumatic.  pupils round and reactive to light and accommodation, oral mucosa pink and moist.  Nares patent without epistaxis.  External auditory canals are patent tympanic " membranes intact.  Neck: supple, no JVD, trachea midline.  No thyromegaly  Lungs: CTA, unlabored breathing effort.  Heart: RRR, normal S1 and S2, no S3, no rub.  Abdomen: soft, non-tender, no palpable bladder, present bowel sounds to auscultation ×4.  No guarding or rigidity.  Extremities: no clubbing, cyanosis or edema.  Good range of motion actively and passively.  Symmetric muscle strength and development  Neuro: normal speech and mental status.  Cranial nerves II through XII intact.  No anosmia. DTR 2+; proprioception intact.  No focal motor/sensory deficits.        Assessment and Plan      Assessment/Plan:   Diagnoses and all orders for this visit:    1. Contact dermatitis and eczema due to plant (Primary)    2. PTSD (post-traumatic stress disorder)    3. Severe episode of recurrent major depressive disorder, without psychotic features    Other orders  -     traZODone (DESYREL) 50 MG tablet; Take 1 tablet by mouth At Night As Needed for Sleep. Indications: Trouble Sleeping  Dispense: 90 tablet; Refill: 1  -     triamcinolone (KENALOG) 0.1 % ointment; Apply 1 Application topically to the appropriate area as directed 2 (Two) Times a Day.  Dispense: 30 g; Refill: 1      Pt not currently driving.    Topical triamcinolone ointment to area of involvement to left ear.    Keep scheduled f/u appt with psychiatry.    Continue PPI therapy, likely the reason no GI symptoms with donepezil.    Discussion Summary:    Discussed plan of care in detail with pt today; pt verb understanding and agrees.    I spent 30 minutes caring for Zo on this date of service. This time includes time spent by me in the following activities:preparing for the visit, performing a medically appropriate examination and/or evaluation , counseling and educating the patient/family/caregiver, ordering medications, tests, or procedures, documenting information in the medical record, and care coordination    I confirm accuracy of unchanged  data/findings which have been carried forward from previous visit, as well as I have updated appropriately those that have changed.      Follow up:  No follow-ups on file.     There are no Patient Instructions on file for this visit.    Evan Rivas DO  07/23/24  11:36 EDT

## 2024-08-07 ENCOUNTER — PROCEDURE VISIT (OUTPATIENT)
Dept: ORTHOPEDIC SURGERY | Facility: CLINIC | Age: 59
End: 2024-08-07
Payer: MEDICARE

## 2024-08-07 DIAGNOSIS — G56.01 CARPAL TUNNEL SYNDROME ON RIGHT: ICD-10-CM

## 2024-08-07 DIAGNOSIS — R20.2 PARESTHESIA: Primary | ICD-10-CM

## 2024-08-26 ENCOUNTER — TELEPHONE (OUTPATIENT)
Dept: FAMILY MEDICINE CLINIC | Facility: CLINIC | Age: 59
End: 2024-08-26
Payer: MEDICARE

## 2024-08-26 DIAGNOSIS — Z78.0 POSTMENOPAUSAL: Primary | ICD-10-CM

## 2024-08-26 RX ORDER — ESTRADIOL 0.1 MG/G
1 CREAM VAGINAL 3 TIMES WEEKLY
Qty: 42.5 G | Refills: 1 | Status: SHIPPED | OUTPATIENT
Start: 2024-08-26

## 2024-08-26 NOTE — TELEPHONE ENCOUNTER
----- Message from [facility] Attributor sent at 8/22/2024  7:57 PM EDT -----  Regarding: Estradiol Refill  Contact: [phone]  Dr. Rivas,    I have been unsuccessful in getting my estradiol prescription filled. It says I can not refill it through My Chart and [facility] does not have it listed on my prescription list.    Please send a prescription to [facility]  estradiol 0.1 MG/GM vaginal cream    2 mg 3x weekly    Thank you,    Zo Palma

## 2024-08-29 ENCOUNTER — OFFICE VISIT (OUTPATIENT)
Dept: PSYCHIATRY | Facility: CLINIC | Age: 59
End: 2024-08-29
Payer: MEDICARE

## 2024-08-29 DIAGNOSIS — F33.2 SEVERE EPISODE OF RECURRENT MAJOR DEPRESSIVE DISORDER, WITHOUT PSYCHOTIC FEATURES: Primary | ICD-10-CM

## 2024-08-29 DIAGNOSIS — F43.10 POST TRAUMATIC STRESS DISORDER (PTSD): ICD-10-CM
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
## 2024-10-03 ENCOUNTER — HOSPITAL ENCOUNTER (INPATIENT)
Facility: HOSPITAL | Age: 59
LOS: 11 days | Discharge: HOME | DRG: 882 | End: 2024-10-14
Attending: PSYCHIATRY & NEUROLOGY | Admitting: PSYCHIATRY & NEUROLOGY
Payer: MEDICARE

## 2024-10-03 ENCOUNTER — HOSPITAL ENCOUNTER (EMERGENCY)
Facility: HOSPITAL | Age: 59
Discharge: OTHER INSTITUTION | DRG: 882 | End: 2024-10-03
Attending: STUDENT IN AN ORGANIZED HEALTH CARE EDUCATION/TRAINING PROGRAM
Payer: MEDICARE

## 2024-10-03 ENCOUNTER — OFFICE VISIT (OUTPATIENT)
Dept: PSYCHIATRY | Facility: CLINIC | Age: 59
End: 2024-10-03
Payer: MEDICARE

## 2024-10-03 VITALS
DIASTOLIC BLOOD PRESSURE: 82 MMHG | BODY MASS INDEX: 24.8 KG/M2 | SYSTOLIC BLOOD PRESSURE: 135 MMHG | HEIGHT: 63 IN | WEIGHT: 140 LBS | RESPIRATION RATE: 18 BRPM | HEART RATE: 59 BPM | OXYGEN SATURATION: 97 % | TEMPERATURE: 98.1 F

## 2024-10-03 DIAGNOSIS — F43.10 POST TRAUMATIC STRESS DISORDER (PTSD): Primary | ICD-10-CM

## 2024-10-03 DIAGNOSIS — R45.851 SUICIDAL IDEATIONS: Primary | ICD-10-CM

## 2024-10-03 LAB
ALBUMIN SERPL-MCNC: 4.4 G/DL (ref 3.5–5.2)
ALBUMIN/GLOB SERPL: 1.9 G/DL
ALP SERPL-CCNC: 77 U/L (ref 39–117)
ALT SERPL W P-5'-P-CCNC: 21 U/L (ref 1–33)
AMPHET+METHAMPHET UR QL: NEGATIVE
AMPHETAMINES UR QL: NEGATIVE
ANION GAP SERPL CALCULATED.3IONS-SCNC: 10.5 MMOL/L (ref 5–15)
APAP SERPL-MCNC: <5 MCG/ML (ref 0–30)
AST SERPL-CCNC: 23 U/L (ref 1–32)
BARBITURATES UR QL SCN: NEGATIVE
BASOPHILS # BLD AUTO: 0.04 10*3/MM3 (ref 0–0.2)
BASOPHILS NFR BLD AUTO: 0.7 % (ref 0–1.5)
BENZODIAZ UR QL SCN: POSITIVE
BILIRUB SERPL-MCNC: 0.5 MG/DL (ref 0–1.2)
BILIRUB UR QL STRIP: NEGATIVE
BUN SERPL-MCNC: 12 MG/DL (ref 6–20)
BUN/CREAT SERPL: 14.1 (ref 7–25)
BUPRENORPHINE SERPL-MCNC: NEGATIVE NG/ML
CALCIUM SPEC-SCNC: 9.6 MG/DL (ref 8.6–10.5)
CANNABINOIDS SERPL QL: NEGATIVE
CHLORIDE SERPL-SCNC: 102 MMOL/L (ref 98–107)
CLARITY UR: ABNORMAL
CO2 SERPL-SCNC: 25.5 MMOL/L (ref 22–29)
COCAINE UR QL: NEGATIVE
COLOR UR: YELLOW
CREAT SERPL-MCNC: 0.85 MG/DL (ref 0.57–1)
DEPRECATED RDW RBC AUTO: 42.1 FL (ref 37–54)
EGFRCR SERPLBLD CKD-EPI 2021: 79 ML/MIN/1.73
EOSINOPHIL # BLD AUTO: 0.04 10*3/MM3 (ref 0–0.4)
EOSINOPHIL NFR BLD AUTO: 0.7 % (ref 0.3–6.2)
ERYTHROCYTE [DISTWIDTH] IN BLOOD BY AUTOMATED COUNT: 12.7 % (ref 12.3–15.4)
ETHANOL BLD-MCNC: <10 MG/DL (ref 0–10)
ETHANOL UR QL: <0.01 %
FENTANYL UR-MCNC: NEGATIVE NG/ML
GLOBULIN UR ELPH-MCNC: 2.3 GM/DL
GLUCOSE SERPL-MCNC: 90 MG/DL (ref 65–99)
GLUCOSE UR STRIP-MCNC: NEGATIVE MG/DL
HCT VFR BLD AUTO: 40.2 % (ref 34–46.6)
HGB BLD-MCNC: 13 G/DL (ref 12–15.9)
HGB UR QL STRIP.AUTO: NEGATIVE
HOLD SPECIMEN: NORMAL
HOLD SPECIMEN: NORMAL
IMM GRANULOCYTES # BLD AUTO: 0.02 10*3/MM3 (ref 0–0.05)
IMM GRANULOCYTES NFR BLD AUTO: 0.4 % (ref 0–0.5)
KETONES UR QL STRIP: ABNORMAL
LEUKOCYTE ESTERASE UR QL STRIP.AUTO: NEGATIVE
LYMPHOCYTES # BLD AUTO: 1.58 10*3/MM3 (ref 0.7–3.1)
LYMPHOCYTES NFR BLD AUTO: 29.4 % (ref 19.6–45.3)
MAGNESIUM SERPL-MCNC: 2 MG/DL (ref 1.6–2.6)
MCH RBC QN AUTO: 29 PG (ref 26.6–33)
MCHC RBC AUTO-ENTMCNC: 32.3 G/DL (ref 31.5–35.7)
MCV RBC AUTO: 89.5 FL (ref 79–97)
METHADONE UR QL SCN: NEGATIVE
MONOCYTES # BLD AUTO: 0.5 10*3/MM3 (ref 0.1–0.9)
MONOCYTES NFR BLD AUTO: 9.3 % (ref 5–12)
NEUTROPHILS NFR BLD AUTO: 3.2 10*3/MM3 (ref 1.7–7)
NEUTROPHILS NFR BLD AUTO: 59.5 % (ref 42.7–76)
NITRITE UR QL STRIP: NEGATIVE
NRBC BLD AUTO-RTO: 0 /100 WBC (ref 0–0.2)
OPIATES UR QL: NEGATIVE
OXYCODONE UR QL SCN: NEGATIVE
PCP UR QL SCN: NEGATIVE
PH UR STRIP.AUTO: 5.5 [PH] (ref 5–8)
PLATELET # BLD AUTO: 242 10*3/MM3 (ref 140–450)
PMV BLD AUTO: 9.3 FL (ref 6–12)
POTASSIUM SERPL-SCNC: 4.3 MMOL/L (ref 3.5–5.2)
PROT SERPL-MCNC: 6.7 G/DL (ref 6–8.5)
PROT UR QL STRIP: NEGATIVE
RBC # BLD AUTO: 4.49 10*6/MM3 (ref 3.77–5.28)
SALICYLATES SERPL-MCNC: <0.3 MG/DL
SODIUM SERPL-SCNC: 138 MMOL/L (ref 136–145)
SP GR UR STRIP: 1.02 (ref 1–1.03)
TRICYCLICS UR QL SCN: NEGATIVE
TSH SERPL DL<=0.05 MIU/L-ACNC: 2.94 UIU/ML (ref 0.27–4.2)
UROBILINOGEN UR QL STRIP: ABNORMAL
WBC NRBC COR # BLD AUTO: 5.38 10*3/MM3 (ref 3.4–10.8)
WHOLE BLOOD HOLD COAG: NORMAL
WHOLE BLOOD HOLD SPECIMEN: NORMAL

## 2024-10-03 PROCEDURE — 85025 COMPLETE CBC W/AUTO DIFF WBC: CPT | Performed by: STUDENT IN AN ORGANIZED HEALTH CARE EDUCATION/TRAINING PROGRAM

## 2024-10-03 PROCEDURE — 80053 COMPREHEN METABOLIC PANEL: CPT | Performed by: STUDENT IN AN ORGANIZED HEALTH CARE EDUCATION/TRAINING PROGRAM

## 2024-10-03 PROCEDURE — 80179 DRUG ASSAY SALICYLATE: CPT | Performed by: STUDENT IN AN ORGANIZED HEALTH CARE EDUCATION/TRAINING PROGRAM

## 2024-10-03 PROCEDURE — 84443 ASSAY THYROID STIM HORMONE: CPT | Performed by: STUDENT IN AN ORGANIZED HEALTH CARE EDUCATION/TRAINING PROGRAM

## 2024-10-03 PROCEDURE — 99285 EMERGENCY DEPT VISIT HI MDM: CPT

## 2024-10-03 PROCEDURE — 80307 DRUG TEST PRSMV CHEM ANLYZR: CPT | Performed by: STUDENT IN AN ORGANIZED HEALTH CARE EDUCATION/TRAINING PROGRAM

## 2024-10-03 PROCEDURE — 36415 COLL VENOUS BLD VENIPUNCTURE: CPT

## 2024-10-03 PROCEDURE — 83735 ASSAY OF MAGNESIUM: CPT | Performed by: STUDENT IN AN ORGANIZED HEALTH CARE EDUCATION/TRAINING PROGRAM

## 2024-10-03 PROCEDURE — 81003 URINALYSIS AUTO W/O SCOPE: CPT | Performed by: STUDENT IN AN ORGANIZED HEALTH CARE EDUCATION/TRAINING PROGRAM

## 2024-10-03 PROCEDURE — 93005 ELECTROCARDIOGRAM TRACING: CPT | Performed by: STUDENT IN AN ORGANIZED HEALTH CARE EDUCATION/TRAINING PROGRAM

## 2024-10-03 PROCEDURE — 80143 DRUG ASSAY ACETAMINOPHEN: CPT | Performed by: STUDENT IN AN ORGANIZED HEALTH CARE EDUCATION/TRAINING PROGRAM

## 2024-10-03 PROCEDURE — 82077 ASSAY SPEC XCP UR&BREATH IA: CPT | Performed by: STUDENT IN AN ORGANIZED HEALTH CARE EDUCATION/TRAINING PROGRAM

## 2024-10-03 RX ORDER — ALUMINA, MAGNESIA, AND SIMETHICONE 2400; 2400; 240 MG/30ML; MG/30ML; MG/30ML
15 SUSPENSION ORAL EVERY 6 HOURS PRN
Status: DISCONTINUED | OUTPATIENT
Start: 2024-10-03 | End: 2024-10-14 | Stop reason: HOSPADM

## 2024-10-03 RX ORDER — TRAZODONE HYDROCHLORIDE 50 MG/1
50 TABLET, FILM COATED ORAL NIGHTLY PRN
Status: DISCONTINUED | OUTPATIENT
Start: 2024-10-03 | End: 2024-10-05

## 2024-10-03 RX ORDER — HYDROXYZINE HYDROCHLORIDE 25 MG/1
50 TABLET, FILM COATED ORAL EVERY 6 HOURS PRN
Status: DISCONTINUED | OUTPATIENT
Start: 2024-10-03 | End: 2024-10-14 | Stop reason: HOSPADM

## 2024-10-03 RX ORDER — BENZTROPINE MESYLATE 1 MG/1
2 TABLET ORAL DAILY PRN
Status: DISCONTINUED | OUTPATIENT
Start: 2024-10-03 | End: 2024-10-14 | Stop reason: HOSPADM

## 2024-10-03 RX ORDER — ACETAMINOPHEN 325 MG/1
650 TABLET ORAL EVERY 4 HOURS PRN
Status: DISCONTINUED | OUTPATIENT
Start: 2024-10-03 | End: 2024-10-14 | Stop reason: HOSPADM

## 2024-10-03 RX ORDER — LOPERAMIDE HCL 2 MG
2 CAPSULE ORAL
Status: DISCONTINUED | OUTPATIENT
Start: 2024-10-03 | End: 2024-10-14 | Stop reason: HOSPADM

## 2024-10-03 RX ORDER — BENZTROPINE MESYLATE 1 MG/ML
1 INJECTION, SOLUTION INTRAMUSCULAR; INTRAVENOUS DAILY PRN
Status: DISCONTINUED | OUTPATIENT
Start: 2024-10-03 | End: 2024-10-14 | Stop reason: HOSPADM

## 2024-10-03 RX ORDER — POLYETHYLENE GLYCOL 3350 17 G/17G
17 POWDER, FOR SOLUTION ORAL DAILY PRN
Status: DISCONTINUED | OUTPATIENT
Start: 2024-10-03 | End: 2024-10-14 | Stop reason: HOSPADM

## 2024-10-03 RX ADMIN — HYDROXYZINE HYDROCHLORIDE 50 MG: 25 TABLET ORAL at 21:40

## 2024-10-03 RX ADMIN — TRAZODONE HYDROCHLORIDE 50 MG: 50 TABLET ORAL at 21:40

## 2024-10-03 RX ADMIN — ALUMINUM HYDROXIDE, MAGNESIUM HYDROXIDE, DIMETHICONE 15 ML: 400; 400; 40 SUSPENSION ORAL at 21:40
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
## 2024-10-03 NOTE — PLAN OF CARE
Goal Outcome Evaluation:  Plan of Care Reviewed With: patient  Patient Agreement with Plan of Care: agrees     Progress: no change  Outcome Evaluation: New admit to unit.
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
## 2024-10-04 RX ORDER — LIRAGLUTIDE 6 MG/ML
1.8 INJECTION SUBCUTANEOUS DAILY
Status: DISCONTINUED | OUTPATIENT
Start: 2024-10-04 | End: 2024-10-14 | Stop reason: HOSPADM

## 2024-10-04 RX ORDER — PANTOPRAZOLE SODIUM 40 MG/1
40 TABLET, DELAYED RELEASE ORAL DAILY
Status: DISCONTINUED | OUTPATIENT
Start: 2024-10-04 | End: 2024-10-14 | Stop reason: HOSPADM

## 2024-10-04 RX ORDER — NITROFURANTOIN 25; 75 MG/1; MG/1
100 CAPSULE ORAL DAILY
Status: COMPLETED | OUTPATIENT
Start: 2024-10-04 | End: 2024-10-13

## 2024-10-04 RX ORDER — DONEPEZIL HYDROCHLORIDE 5 MG/1
5 TABLET, FILM COATED ORAL DAILY
Status: DISCONTINUED | OUTPATIENT
Start: 2024-10-04 | End: 2024-10-14 | Stop reason: HOSPADM

## 2024-10-04 RX ADMIN — NITROFURANTOIN MONOHYDRATE/MACROCRYSTALLINE 100 MG: 25; 75 CAPSULE ORAL at 11:36

## 2024-10-04 RX ADMIN — LIRAGLUTIDE 1.8 MG: 6 INJECTION SUBCUTANEOUS at 12:55

## 2024-10-04 RX ADMIN — HYDROXYZINE HYDROCHLORIDE 50 MG: 25 TABLET ORAL at 20:47

## 2024-10-04 RX ADMIN — TRAZODONE HYDROCHLORIDE 50 MG: 50 TABLET ORAL at 20:47

## 2024-10-04 RX ADMIN — DONEPEZIL HYDROCHLORIDE 5 MG: 5 TABLET, FILM COATED ORAL at 11:36

## 2024-10-04 RX ADMIN — PANTOPRAZOLE SODIUM 40 MG: 40 TABLET, DELAYED RELEASE ORAL at 11:36

## 2024-10-04 RX ADMIN — FLUOXETINE HYDROCHLORIDE 80 MG: 20 CAPSULE ORAL at 11:37

## 2024-10-04 NOTE — SIGNIFICANT NOTE
10/04/24 1602   Group Therapy Session   Group Attendance attended group session   Time Session Began 1500   Time Session Ended 1545   Total Time (minutes) 45   Group Type recreation   Group Topic Covered balanced lifestyle   Group Session Detail Patient participated in activity and discussion highlighting the pros and cons of regular exercise for a balanced lifestyle. Patient participated in seated leg exercise routine.   Patient Participation/Contribution able to recall/repeat info presented;cooperative with task;discussed personal experience with topic;expressed understanding of topic;listened actively;offered helpful suggestions to peers;organized   Affect During Group affect consistent with mood;appropriate to situation   Degree of Insight high

## 2024-10-04 NOTE — PLAN OF CARE
Goal Outcome Evaluation:  Plan of Care Reviewed With: patient  Patient Agreement with Plan of Care: agrees        Outcome Evaluation: Pt calm and cooperative. Pt reports anxiety of 6/10 and depression of 9/10. Pt reports SI with plan in place but does not disclose. Pt denies HI and AVH at this time. PRN maalox, trazodone and atarax given overnight and pt slept well. Will continue plan of care.
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
## 2024-10-04 NOTE — PLAN OF CARE
Goal Outcome Evaluation:  Plan of Care Reviewed With: patient        Progress: improving     Pt has been sitting at the table working a puzzle with another pt between groups today. Pt has cooperative with us. Pt spoke with the Dr about her plans, they are after April 10,2025. But she will reevaluate at that time. Pt is still endorsing SI but no HI or AVH.

## 2024-10-04 NOTE — SIGNIFICANT NOTE
10/04/24 1222   Group Therapy Session   Group Attendance attended group session   Time Session Began 1130   Time Session Ended 1215   Total Time (minutes) 45   Group Type recreation   Group Topic Covered cognitive activities   Group Session Detail Patient participated in activity to promote critical thinking and problem solving abilities. Patient participated in activity to promote preserving current cognititive functioning.   Patient Participation/Contribution able to recall/repeat info presented;cooperative with task;discussed personal experience with topic;expressed understanding of topic;listened actively;offered helpful suggestions to peers;organized   Affect During Group affect consistent with mood;appropriate to situation   Degree of Insight high

## 2024-10-04 NOTE — SIGNIFICANT NOTE
10/04/24 1550   Group Therapy Session   Group Attendance attended group session   Time Session Began 1400   Time Session Ended 1500   Total Time (minutes) 60   Group Type psychotherapeutic;psychoeducation   Group Topic Covered cognitive therapy techniques;cognitive activities   Literature/Videos Given topic handouts   Literature/Videos Given Comments Cognitive Distortions   Group Session Detail Patients participated in reviewing a Cognitive Distortions exercise that identifies and explains some of the most common cognitive distortions. The included examples demonstrate these thinking errors in daily life. The goal is to educate patients on how to recognize cognitive distortions and their impact on mood. By minimizing or avoiding these irrational thought patterns, patients can maintain a more balanced perspective, lower anxiety, and feel better about themselves.   Patient Participation/Contribution cooperative with task;discussed personal experience with topic;expressed understanding of topic;listened actively;organized   Affect During Group affect consistent with mood   Degree of Insight high

## 2024-10-04 NOTE — PLAN OF CARE
Problem: Adult Behavioral Health Plan of Care  Goal: Plan of Care Review  Outcome: Progressing  Flowsheets (Taken 10/4/2024 1336)  Progress: no change  Plan of Care Reviewed With: patient  Patient Agreement with Plan of Care: agrees  Outcome Evaluation: New admit. Completed social history and integrated summary.  Goal: Patient-Specific Goal (Individualization)  Outcome: Progressing  Flowsheets  Taken 10/4/2024 1336  Individualized Care Needs: Patient will deny SI/HI prior to discharge. Patient will identify 1 healthy coping skill and consent to appropriate aftercare plan prior to discharge.  Anxieties, Fears or Concerns: Therapist to offer 1-4 therapy sessions, aftercare, planning, safety planning, family education, group therapy, and brief CBT/MI interventions.  Taken 10/4/2024 1316  Patient Personal Strengths:   interests/hobbies   independent living skills   stable living environment   tolerant   resilient   resourceful   self-awareness   positive educational history   no history of violence   medication/treatment adherence   ability to maintain sobriety  Patient Vulnerabilities:   adverse childhood experience(s)   history of unsuccessful treatment  Goal: Optimized Coping Skills in Response to Life Stressors  Outcome: Progressing  Intervention: Promote Effective Coping Strategies  Flowsheets (Taken 10/4/2024 1336)  Supportive Measures:   active listening utilized   positive reinforcement provided   self-responsibility promoted   verbalization of feelings encouraged   problem-solving facilitated   counseling provided   decision-making supported   goal-setting facilitated   self-care encouraged   self-reflection promoted  Goal: Develops/Participates in Therapeutic [place] to Support Successful Transition  Outcome: Progressing  Intervention: Foster Therapeutic [place]  Flowsheets (Taken 10/4/2024 1336)  Trust Relationship/Rapport:   care explained   reassurance provided   thoughts/feelings acknowledged   choices  provided   emotional support provided   empathic listening provided   questions answered   questions encouraged  Intervention: Mutually Develop Transition Plan  Flowsheets  Taken 10/4/2024 5938  Outpatient/Agency/Support Group Needs:   outpatient counseling   outpatient medication management  Transition Support: follow-up care discussed  Anticipated Discharge Disposition: home with family  Patient's Choice of Community Agency(s): [facility]  Taken 10/4/2024 6744  Transportation Anticipated:   agency   car, drives self  Transportation Concerns: none  Current Discharge Risk: psychiatric illness  Concerns to be Addressed: suicidal  Readmission Within the Last 30 Days: no previous admission in last 30 days  Patient/Family Anticipated Services at Transition: mental health services  Patient/Family Anticipates Transition to: home with family   Goal Outcome Evaluation:  Plan of Care Reviewed With: patient  Patient Agreement with Plan of Care: agrees     Progress: no change  Outcome Evaluation: New admit. Completed social history and integrated summary.

## 2024-10-05 PROCEDURE — 99231 SBSQ HOSP IP/OBS SF/LOW 25: CPT

## 2024-10-05 RX ORDER — TRAZODONE HYDROCHLORIDE 50 MG/1
75 TABLET, FILM COATED ORAL NIGHTLY
Status: DISCONTINUED | OUTPATIENT
Start: 2024-10-05 | End: 2024-10-08

## 2024-10-05 RX ADMIN — FLUOXETINE HYDROCHLORIDE 80 MG: 20 CAPSULE ORAL at 08:24

## 2024-10-05 RX ADMIN — LIRAGLUTIDE 1.8 MG: 6 INJECTION SUBCUTANEOUS at 08:24

## 2024-10-05 RX ADMIN — NITROFURANTOIN MONOHYDRATE/MACROCRYSTALLINE 100 MG: 25; 75 CAPSULE ORAL at 08:24

## 2024-10-05 RX ADMIN — HYDROXYZINE HYDROCHLORIDE 50 MG: 25 TABLET ORAL at 21:35

## 2024-10-05 RX ADMIN — TRAZODONE HYDROCHLORIDE 75 MG: 50 TABLET ORAL at 20:19

## 2024-10-05 RX ADMIN — DONEPEZIL HYDROCHLORIDE 5 MG: 5 TABLET, FILM COATED ORAL at 08:24

## 2024-10-05 RX ADMIN — PANTOPRAZOLE SODIUM 40 MG: 40 TABLET, DELAYED RELEASE ORAL at 06:33

## 2024-10-05 NOTE — THERAPY TREATMENT NOTE
"DATA:    Therapist met with the patient individually. Therapist continues reviewing plan of care and aftercare plan.  The patient was agreeable.    ASSESSMENT:    Patient was seen for follow up of SI.    Today, patient was seen 1-1 in office. The patient's mood appears appropriate to circumstances, affect congruent, thought content normal. Patient reports sleep is Poor and appetite is Fair. On scale 1-10, patient rates depression 9 and anxiety 2. Patient does/does not report hallucinations: None. Patient reports that she didn't sleep well and that she essentially cat napped, sleep 10 minutes lay there for an hour. Patient expresses she is wanting to process her trauma while she is inpatient. Patient voices that she believes it's better for her inpatient incase she \"flips out\" while processing trauma.      CLINICAL MANEUVERING:    Therapist provided safe, secure environment for patient to share.  Provided reflective listening and psychoeducation.  Assisted patient in processing the above session. Assisted patient in identifying any questions or needs to be addressed today. Therapist provided feedback regarding trauma treatment and discussed the appropriate treatment modalities. Therapist utilized supportive psychotherapy.     Concern was voiced regarding substance use and educated patient on risks associated with use. Patient was advised to abstain from use and educated on community resources that can help with sobriety and recovery.        Plan:     Patient to remain hospitalized this date.      Treatment team will focus efforts on stabilizing patient's acute symptoms while providing education on healthy coping and crisis management to reduce hospitalizations.   Patient requires daily psychiatrist evaluation and 24/7 nursing supervision to promote patient  safety.     Therapist will offer 1-4 individual sessions, family education, and appropriate referral.     Therapist recommends continued assessment.   "

## 2024-10-05 NOTE — SIGNIFICANT NOTE
10/05/24 1053   Group Therapy Session   Group Attendance attended group session   Time Session Began 1030   Time Session Ended 1100   Total Time (minutes) 30   Group Type psychotherapeutic   Group Topic Covered cognitive therapy techniques;cognitive activities   Literature/Videos Given topic handouts   Literature/Videos Given Comments Silver Lining   Group Session Detail Patients are asked to list five things that make life enjoyable or worthwhile. Then they are asked to describe their most recent difficult situation, or a recent time something did not go their way. Finally, they reflect on and describe silver linings of the difficult situation.   Patient Participation/Contribution cooperative with task   Patient Participation Detail Literature provided to be completed independently.   Affect During Group affect consistent with mood   Degree of Insight moderate

## 2024-10-05 NOTE — PLAN OF CARE
Goal Outcome Evaluation:           Progress: improving  Outcome Evaluation: Patient has been calm and cooperative on this shift. Patient rates anxiety as a 3/10 and depression as a 8/10. Patient denies SI, HI, and AVH at this time.

## 2024-10-05 NOTE — SIGNIFICANT NOTE
10/04/24 0904   Group Therapy Session   Group Attendance attended group session   Time Session Began 1030   Time Session Ended 1100   Total Time (minutes) 30   Group Type psychotherapeutic   Group Topic Covered cognitive therapy techniques;cognitive activities   Literature/Videos Given topic handouts   Literature/Videos Given Comments What needs to be changed in my life?   Group Session Detail Patients participated in a worksheet exercise which aids in identifying sources of stress, dissatisfaction, or frustration. Patients then identify responsibilities that are perceived as overwhelming. They then identify potential resources that have been overlooked and can reduce stress and increase shared responsibilities. Finally, they resolve conflicted feelings and adapt to the new life circumstances.   Patient Participation/Contribution cooperative with task   Patient Participation Detail Literature provided to be completed independently.   Affect During Group affect consistent with mood   Degree of Insight moderate

## 2024-10-05 NOTE — SIGNIFICANT NOTE
10/05/24 1458   Group Therapy Session   Group Attendance attended group session   Time Session Began 1400   Time Session Ended 1500   Total Time (minutes) 60   Group Type psychotherapeutic   Group Topic Covered cognitive therapy techniques;cognitive activities   Literature/Videos Given Comments Lifeboat Activity   Group Session Detail Patients participated in a lifeboat exercise utilizing their critical thinking, morals and values to make difficult decisions based on a potential real-life scenario. Patients participated in discussion examining what morals and values came into play with their choices. Patients reviewed how their morals and values are shaped. Patients encouraged to examine each situation that they've experienced through a new lens.   Patient Participation/Contribution cooperative with task;discussed personal experience with topic;expressed understanding of topic;organized;listened actively   Affect During Group affect consistent with mood   Degree of Insight moderate

## 2024-10-05 NOTE — PROGRESS NOTES
"    Inpatient Psych Progress Note     Clinician: Mika Phan MD  Admission Date: 10/3/2024  11:56 EDT 10/05/24    Behavioral Health Treatment Plan and Problem List: I have reviewed and approved the Behavioral Health Treatment Plan and Problem list.    Allergies  Allergies   Allergen Reactions    Chlorzoxazone Unknown - High Severity and Swelling     Lorzone, muscle relaxant.    Iodine Anaphylaxis     Topical makes her swell  Anaphylaxis with IV contrast (when she was ~ 9yrs old)    Phenazopyridine Hcl Swelling and Anaphylaxis    Pyridium [Phenazopyridine] Anaphylaxis    Quinine Unknown - High Severity     Has malaria symptoms    Valproic Acid Other (See Comments)     Liver failure  Liver failure  Liver failure; lupus like symptoms and liver failure    Methocarbamol Other (See Comments)     Made her feel \"suicidal\" and gave her less control over her actions.    Iodinated Contrast Media Unknown - Low Severity    Codeine Itching    Diphenhydramine Anxiety     Pt has severe panic attack symptoms when given benadryl IV. Needs to take a benzodiazapine med concurrently when getting benadryl.        Hospital Day: 2 days      Assessment completed within view of staff    History  CC/clinical focus: Depression, trauma     Interval HPI: Patient seen and evaluated by me.  Chart reviewed. Discussed with treatment team and unit staff. No major issues overnight. Med compliant. No PRNs required. Pt has been participating in therapeutic activities without issue. Interactions with staff and peers have been appropriate.     On interview today, patient was engaging in interview.  She reports not sleeping well last night.  She reports having trouble falling asleep and staying asleep.  We discussed available options.  She is amenable to increasing the dose of trazodone to help deal with the insomnia.  She otherwise reports still struggling to deal with her trauma.  She hopes therapy will help her find ways to deal with the trauma.  She " "denies feelings of hopelessness or worthlessness.  She reports low energy due to not getting enough sleep.  She reports normal appetite and concentration.  She describes her mood as \" short-tempered\".  She denies suicidal or homicidal thoughts.  She denies auditory or visual hallucinations.    ROS otherwise as below.      Interval Review of Systems:   General ROS: negative for - fever or malaise  Endocrine ROS: negative for - palpitations  Respiratory ROS: no cough, shortness of breath, or wheezing  Cardiovascular ROS: no chest pain or dyspnea on exertion  Gastrointestinal ROS: no abdominal pain, no black or bloody stools    /63 (BP Location: Right arm, Patient Position: Sitting)   Pulse 59   Temp 97.8 °F (36.6 °C) (Oral)   Resp 17   Ht 160 cm (63\")   Wt 64.4 kg (142 lb 1.3 oz)   SpO2 98%   BMI 25.17 kg/m²     Mental Status Exam  Behavior: Cooperative  Psychomotor activity: Calm  Orientation: x4  Mood: Short-tempered  Affect: mood congruent  Thought Process: linear, organized  Thought Content: No delusions voiced  Hallucinations: Denies AVH, no RIS observed  Concentration: Okay  Suicidal Ideation: Denies  Homicidal Ideation: Denies  Hopelessness: Moderate  Insight: Fair  Judgement: Fair      Medical Decision Making:   Labs:     Lab Results (last 24 hours)       ** No results found for the last 24 hours. **              Radiology:     Imaging Results (Last 24 Hours)       ** No results found for the last 24 hours. **              EKG:     ECG/EMG Results (most recent)       None             Medications:  donepezil, 5 mg, Oral, Daily  FLUoxetine, 80 mg, Oral, QAM  Liraglutide, 1.8 mg, Subcutaneous, Daily  nitrofurantoin (macrocrystal-monohydrate), 100 mg, Oral, Daily  pantoprazole, 40 mg, Oral, Daily  traZODone, 75 mg, Oral, Nightly           All medications reviewed.    Assessment and Plan:     Suicidal ideation    PTSD (post-traumatic stress disorder)    Dysthymia    MDD (major depressive disorder), " recurrent episode, severe      Plan:   -Continue hospitalization for safety and stabilization.  -Continue current precautions and safety checks.  -Encourage participation in therapeutic activities on the unit to increase coping skills for stressful life events. Including group and individual therapy.  -Continue to discuss case in treatment team meetings and continue discharge planning.  -Risk/benefit/alternatives to medications have been discussed with the patient.  We will continue to monitor for negative and positive effects to medications.   -Patient agrees with the following plan for today:     Medications: Increase trazodone from 50 mg nightly as needed to 75 mg nightly to help with insomnia.  Continue the rest of medications as scheduled.    Continue hospitalization for safety and stabilization.  Continue current level of Special Precautions (q15 minute checks).    Mika Phan MD   October 5, 2024 11:56 EDT

## 2024-10-05 NOTE — PLAN OF CARE
Goal Outcome Evaluation:  Plan of Care Reviewed With: patient  Patient Agreement with Plan of Care: agrees     Progress: no change  Outcome Evaluation: no acute events during shift at this time. VSS. pt denies SI/HI/AVH. pt reports anxiety & depression 8/10. PRN atarax and trazodone given. no other changes in pt condition at this time. continue plan of care.

## 2024-10-06 PROCEDURE — 99231 SBSQ HOSP IP/OBS SF/LOW 25: CPT

## 2024-10-06 RX ADMIN — TRAZODONE HYDROCHLORIDE 75 MG: 50 TABLET ORAL at 20:50

## 2024-10-06 RX ADMIN — DONEPEZIL HYDROCHLORIDE 5 MG: 5 TABLET, FILM COATED ORAL at 08:27

## 2024-10-06 RX ADMIN — NITROFURANTOIN MONOHYDRATE/MACROCRYSTALLINE 100 MG: 25; 75 CAPSULE ORAL at 08:27

## 2024-10-06 RX ADMIN — FLUOXETINE HYDROCHLORIDE 80 MG: 20 CAPSULE ORAL at 08:27

## 2024-10-06 RX ADMIN — LIRAGLUTIDE 1.8 MG: 6 INJECTION SUBCUTANEOUS at 08:27

## 2024-10-06 RX ADMIN — PANTOPRAZOLE SODIUM 40 MG: 40 TABLET, DELAYED RELEASE ORAL at 06:51

## 2024-10-06 NOTE — PLAN OF CARE
"Goal Outcome Evaluation:  Plan of Care Reviewed With: patient  Patient Agreement with Plan of Care: agrees     Progress: no change     The pt reports a \"distressing\" day. She rates her anxiety level a 8/10 and her depression level a \"13\"/10. Endorses chronic SI. She contracts for safety and denies HI/AVH. Reports that she planned to open her \"trauma boxes\" while inpatient. Reports that she fears becoming \"completely debilitated\" if she opens them. Reports trouble falling and staying asleep. The pt is medication compliant and PRN Trazodone and Atarax are administered.The pt is encouraged to make her needs known. She is encouraged to stay goal oriented and future focused. Encouragement and support are provided.                           "

## 2024-10-06 NOTE — PROGRESS NOTES
"    Inpatient Psych Progress Note     Clinician: Mika Phan MD  Admission Date: 10/3/2024  13:22 EDT 10/06/24    Behavioral Health Treatment Plan and Problem List: I have reviewed and approved the Behavioral Health Treatment Plan and Problem list.    Allergies  Allergies   Allergen Reactions    Chlorzoxazone Unknown - High Severity and Swelling     Lorzone, muscle relaxant.    Iodine Anaphylaxis     Topical makes her swell  Anaphylaxis with IV contrast (when she was ~ 9yrs old)    Phenazopyridine Hcl Swelling and Anaphylaxis    Pyridium [Phenazopyridine] Anaphylaxis    Quinine Unknown - High Severity     Has malaria symptoms    Valproic Acid Other (See Comments)     Liver failure  Liver failure  Liver failure; lupus like symptoms and liver failure    Methocarbamol Other (See Comments)     Made her feel \"suicidal\" and gave her less control over her actions.    Iodinated Contrast Media Unknown - Low Severity    Codeine Itching    Diphenhydramine Anxiety     Pt has severe panic attack symptoms when given benadryl IV. Needs to take a benzodiazapine med concurrently when getting benadryl.        Hospital Day: 3 days      Assessment completed within view of staff    History  CC/clinical focus: Depression, trauma     Interval HPI: Patient seen and evaluated by me.  Chart reviewed. Discussed with treatment team and unit staff. No major issues overnight. Med compliant. No PRNs required. Pt has been participating in therapeutic activities without issue. Interactions with staff and peers have been appropriate.     On interview today, patient reports no acute concerns.  She does have exploring her trauma through therapy.  She reports trouble still with sleeping.  She reports essential oils helps her sleep better at home.  She has been enjoying going to groups.  She reports some hopelessness with regards to when she will get better from her trauma.  She reports improved energy levels compared to yesterday.  She reports " "improving appetite.  She reports her mood as okay.  She denies suicidal or homicidal thoughts.  She denies auditory or visual hallucinations.    ROS otherwise as below.      Interval Review of Systems:   General ROS: negative for - fever or malaise  Endocrine ROS: negative for - palpitations  Respiratory ROS: no cough, shortness of breath, or wheezing  Cardiovascular ROS: no chest pain or dyspnea on exertion  Gastrointestinal ROS: no abdominal pain, no black or bloody stools    /67 (BP Location: Left arm, Patient Position: Sitting)   Pulse 66   Temp 98.9 °F (37.2 °C) (Oral)   Resp 16   Ht 160 cm (63\")   Wt 64.4 kg (142 lb 1.3 oz)   SpO2 99%   BMI 25.17 kg/m²     Mental Status Exam  Behavior: Cooperative  Psychomotor activity: Calm  Orientation: x4  Mood: Okay  Affect: mood congruent  Thought Process: linear, organized  Thought Content: No delusions voiced  Hallucinations: Denies AVH, no RIS observed  Concentration: Okay  Suicidal Ideation: Denies  Homicidal Ideation: Denies  Hopelessness: Moderate  Insight: Fair   Judgement: Fair      Medical Decision Making:   Labs:     Lab Results (last 24 hours)       ** No results found for the last 24 hours. **              Radiology:     Imaging Results (Last 24 Hours)       ** No results found for the last 24 hours. **              EKG:     ECG/EMG Results (most recent)       None             Medications:  donepezil, 5 mg, Oral, Daily  FLUoxetine, 80 mg, Oral, QAM  Liraglutide, 1.8 mg, Subcutaneous, Daily  nitrofurantoin (macrocrystal-monohydrate), 100 mg, Oral, Daily  pantoprazole, 40 mg, Oral, Daily  traZODone, 75 mg, Oral, Nightly           All medications reviewed.    Assessment and Plan:     Suicidal ideation    PTSD (post-traumatic stress disorder)    Dysthymia    MDD (major depressive disorder), recurrent episode, severe      Plan:   -Continue hospitalization for safety and stabilization.  -Continue current precautions and safety checks.  -Encourage " participation in therapeutic activities on the unit to increase coping skills for stressful life events. Including group and individual therapy.  -Continue to discuss case in treatment team meetings and continue discharge planning.  -Risk/benefit/alternatives to medications have been discussed with the patient.  We will continue to monitor for negative and positive effects to medications.   -Patient agrees with the following plan for today:     Medications: No changes today to medications.  Continue monitoring.    Continue hospitalization for safety and stabilization.  Continue current level of Special Precautions (q15 minute checks).    Mika Phan MD   October 6, 2024 13:29 EDT

## 2024-10-06 NOTE — PLAN OF CARE
Goal Outcome Evaluation:           Progress: improving  Outcome Evaluation: Patient has been calm and cooperative on this shift. Patient rates anxiety as a 5/10 and depression as a 10/10. Patient denies SI, HI and AVH at this time.

## 2024-10-06 NOTE — SIGNIFICANT NOTE
10/06/24 1333   Group Therapy Session   Group Attendance attended group session   Time Session Began 1130   Time Session Ended 1200   Total Time (minutes) 30   Group Type recreation   Group Topic Covered cognitive activities   Group Session Detail Patient participated in activity to promote memory recall as well as critical thinking.   Patient Participation/Contribution able to recall/repeat info presented;cooperative with task;discussed personal experience with topic;expressed understanding of topic;listened actively;offered helpful suggestions to peers;organized   Affect During Group affect consistent with mood;appropriate to situation   Degree of Insight high

## 2024-10-07 RX ADMIN — LIRAGLUTIDE 1.8 MG: 6 INJECTION SUBCUTANEOUS at 08:39

## 2024-10-07 RX ADMIN — DONEPEZIL HYDROCHLORIDE 5 MG: 5 TABLET, FILM COATED ORAL at 08:39

## 2024-10-07 RX ADMIN — TRAZODONE HYDROCHLORIDE 75 MG: 50 TABLET ORAL at 20:56

## 2024-10-07 RX ADMIN — FLUOXETINE HYDROCHLORIDE 80 MG: 20 CAPSULE ORAL at 08:39

## 2024-10-07 RX ADMIN — HYDROXYZINE HYDROCHLORIDE 50 MG: 25 TABLET ORAL at 20:58

## 2024-10-07 RX ADMIN — NITROFURANTOIN MONOHYDRATE/MACROCRYSTALLINE 100 MG: 25; 75 CAPSULE ORAL at 08:39

## 2024-10-07 RX ADMIN — PANTOPRAZOLE SODIUM 40 MG: 40 TABLET, DELAYED RELEASE ORAL at 06:34

## 2024-10-07 NOTE — PLAN OF CARE
Goal Outcome Evaluation:  Plan of Care Reviewed With: patient  Patient Agreement with Plan of Care: agrees        Outcome Evaluation: Patient denies A/VH,SI,HI, reports anxiety 3, depression 9. Attending groups, eating and drinking well. No negative behaviors this shift. Will continue to monitor.

## 2024-10-07 NOTE — SIGNIFICANT NOTE
10/07/24 1511   Group Therapy Session   Group Attendance attended group session   Time Session Began 1030   Time Session Ended 1100   Total Time (minutes) 30   Group Type psychotherapeutic   Group Topic Covered cognitive activities   Literature/Videos Given topic handouts   Literature/Videos Given Comments Self Care Assessment   Group Session Detail Patients participated in a worksheet to help patient reflect on their current self-care practices, recognize areas where they could improve, and generate ideas for new self-care activities they would enjoy. Patients participated in group discussion about the importance of self-care and discussing tips that include setting specific goals, setting boundaries to protect self-care, making self-care a habit, and more.   Patient Participation/Contribution cooperative with task   Patient Participation Detail Literature provided to be completed idependently   Affect During Group affect consistent with mood   Degree of Insight moderate

## 2024-10-07 NOTE — SIGNIFICANT NOTE
10/07/24 1551   Group Therapy Session   Group Attendance attended group session   Time Session Began 1400   Time Session Ended 1500   Total Time (minutes) 60   Group Type psychotherapeutic   Group Topic Covered cognitive activities   Literature/Videos Given topic handouts   Literature/Videos Given Comments Communication Drawing   Group Session Detail Patients participated in a drawing exercise that creates a segue for many important communication topics. Patients were asked: What's the role of a listener in good communication? What happens when a speaker isn't careful? How might they be misunderstood?   Patient Participation/Contribution cooperative with task   Patient Participation Detail Literature provided to be completed independently   Affect During Group affect consistent with mood   Degree of Insight moderate

## 2024-10-07 NOTE — SIGNIFICANT NOTE
10/07/24 1333   Group Therapy Session   Group Attendance attended group session   Time Session Began 1130   Time Session Ended 1200   Total Time (minutes) 30   Group Type recreation   Group Topic Covered relaxation techniques;self care activities   Group Session Detail Patient participated in journaling activity to promote use of positive coping skills as well as connecting with peers.   Patient Participation/Contribution able to recall/repeat info presented;cooperative with task;discussed personal experience with topic;expressed understanding of topic;listened actively;offered helpful suggestions to peers;organized   Affect During Group affect consistent with mood;appropriate to situation   Degree of Insight high

## 2024-10-07 NOTE — PLAN OF CARE
"Goal Outcome Evaluation:  Plan of Care Reviewed With: patient  Patient Agreement with Plan of Care: agrees        Outcome Evaluation: Pt calm and cooperative to care and engages well with staff and peers. Pt rates anxiety as 6/10 and depression 9/10, stating \"this is good for me\". Pt denies SI, HI and AVH at this time. Pt reports that she is eating well but has had difficulty sleeping recently, but reports that as \"normal\" for her. PRN trazodone given overnight. Pt slept well without noted awakenings. Will continue plan of care.                                "

## 2024-10-08 LAB
ALBUMIN SERPL-MCNC: 4.2 G/DL (ref 3.5–5.2)
ALBUMIN/GLOB SERPL: 1.6 G/DL
ALP SERPL-CCNC: 70 U/L (ref 39–117)
ALT SERPL W P-5'-P-CCNC: 27 U/L (ref 1–33)
ANION GAP SERPL CALCULATED.3IONS-SCNC: 9.8 MMOL/L (ref 5–15)
AST SERPL-CCNC: 26 U/L (ref 1–32)
BASOPHILS # BLD AUTO: 0.05 10*3/MM3 (ref 0–0.2)
BASOPHILS NFR BLD AUTO: 1 % (ref 0–1.5)
BILIRUB SERPL-MCNC: 0.3 MG/DL (ref 0–1.2)
BUN SERPL-MCNC: 12 MG/DL (ref 6–20)
BUN/CREAT SERPL: 14.3 (ref 7–25)
CALCIUM SPEC-SCNC: 9.7 MG/DL (ref 8.6–10.5)
CHLORIDE SERPL-SCNC: 104 MMOL/L (ref 98–107)
CO2 SERPL-SCNC: 26.2 MMOL/L (ref 22–29)
CREAT SERPL-MCNC: 0.84 MG/DL (ref 0.57–1)
DEPRECATED RDW RBC AUTO: 41.1 FL (ref 37–54)
EGFRCR SERPLBLD CKD-EPI 2021: 80.2 ML/MIN/1.73
EOSINOPHIL # BLD AUTO: 0.05 10*3/MM3 (ref 0–0.4)
EOSINOPHIL NFR BLD AUTO: 1 % (ref 0.3–6.2)
ERYTHROCYTE [DISTWIDTH] IN BLOOD BY AUTOMATED COUNT: 12.5 % (ref 12.3–15.4)
GLOBULIN UR ELPH-MCNC: 2.6 GM/DL
GLUCOSE SERPL-MCNC: 80 MG/DL (ref 65–99)
HCT VFR BLD AUTO: 42.3 % (ref 34–46.6)
HGB BLD-MCNC: 13.7 G/DL (ref 12–15.9)
IMM GRANULOCYTES # BLD AUTO: 0.01 10*3/MM3 (ref 0–0.05)
IMM GRANULOCYTES NFR BLD AUTO: 0.2 % (ref 0–0.5)
LYMPHOCYTES # BLD AUTO: 1.3 10*3/MM3 (ref 0.7–3.1)
LYMPHOCYTES NFR BLD AUTO: 25.1 % (ref 19.6–45.3)
MCH RBC QN AUTO: 29.3 PG (ref 26.6–33)
MCHC RBC AUTO-ENTMCNC: 32.4 G/DL (ref 31.5–35.7)
MCV RBC AUTO: 90.4 FL (ref 79–97)
MONOCYTES # BLD AUTO: 0.38 10*3/MM3 (ref 0.1–0.9)
MONOCYTES NFR BLD AUTO: 7.3 % (ref 5–12)
NEUTROPHILS NFR BLD AUTO: 3.39 10*3/MM3 (ref 1.7–7)
NEUTROPHILS NFR BLD AUTO: 65.4 % (ref 42.7–76)
NRBC BLD AUTO-RTO: 0 /100 WBC (ref 0–0.2)
PLATELET # BLD AUTO: 243 10*3/MM3 (ref 140–450)
PMV BLD AUTO: 9.2 FL (ref 6–12)
POTASSIUM SERPL-SCNC: 4.6 MMOL/L (ref 3.5–5.2)
PROT SERPL-MCNC: 6.8 G/DL (ref 6–8.5)
RBC # BLD AUTO: 4.68 10*6/MM3 (ref 3.77–5.28)
SODIUM SERPL-SCNC: 140 MMOL/L (ref 136–145)
WBC NRBC COR # BLD AUTO: 5.18 10*3/MM3 (ref 3.4–10.8)

## 2024-10-08 PROCEDURE — 85025 COMPLETE CBC W/AUTO DIFF WBC: CPT | Performed by: PSYCHIATRY & NEUROLOGY

## 2024-10-08 PROCEDURE — 80053 COMPREHEN METABOLIC PANEL: CPT | Performed by: PSYCHIATRY & NEUROLOGY

## 2024-10-08 RX ORDER — TRAZODONE HYDROCHLORIDE 50 MG/1
100 TABLET, FILM COATED ORAL NIGHTLY
Status: DISCONTINUED | OUTPATIENT
Start: 2024-10-09 | End: 2024-10-14 | Stop reason: HOSPADM

## 2024-10-08 RX ORDER — TRAZODONE HYDROCHLORIDE 50 MG/1
25 TABLET, FILM COATED ORAL ONCE AS NEEDED
Status: COMPLETED | OUTPATIENT
Start: 2024-10-08 | End: 2024-10-13

## 2024-10-08 RX ADMIN — LIRAGLUTIDE 1.8 MG: 6 INJECTION SUBCUTANEOUS at 10:19

## 2024-10-08 RX ADMIN — HYDROXYZINE HYDROCHLORIDE 50 MG: 25 TABLET ORAL at 20:09

## 2024-10-08 RX ADMIN — PANTOPRAZOLE SODIUM 40 MG: 40 TABLET, DELAYED RELEASE ORAL at 05:48

## 2024-10-08 RX ADMIN — DONEPEZIL HYDROCHLORIDE 5 MG: 5 TABLET, FILM COATED ORAL at 10:17

## 2024-10-08 RX ADMIN — FLUOXETINE HYDROCHLORIDE 80 MG: 20 CAPSULE ORAL at 10:17

## 2024-10-08 RX ADMIN — NITROFURANTOIN MONOHYDRATE/MACROCRYSTALLINE 100 MG: 25; 75 CAPSULE ORAL at 10:17

## 2024-10-08 RX ADMIN — TRAZODONE HYDROCHLORIDE 75 MG: 50 TABLET ORAL at 20:10

## 2024-10-08 NOTE — PLAN OF CARE
"Goal Outcome Evaluation:  Plan of Care Reviewed With: patient  Patient Agreement with Plan of Care: agrees        Outcome Evaluation: Pt affect incongruent with content of speech. Pt reports anxiety of 8/10 and depression of 7/10. When asked about SI, pt states \"it just depends how things shake out. If they decide they can help me or not. If not what's the point?\" Pt denies HI and AVH. Pt reports upper right epigastric pain of 4/10 and reports \"liver pain\". Pt inquires about repeat lab studies to test liver function. PRN medication offered and pt declined. Will continue plan of care.                               "

## 2024-10-08 NOTE — PLAN OF CARE
Goal Outcome Evaluation:  Plan of Care Reviewed With: patient  Patient Agreement with Plan of Care: agrees        Outcome Evaluation: No negative behaviors this shift

## 2024-10-08 NOTE — PROGRESS NOTES
INPATIENT PSYCHIATRIC PROGRESS NOTE    Name:  Zo Palma  :  1965  MRN:  [id number]  Visit Number:  [id number]  Hospital Day: 5 days    This provider is located at home address on file with [facility]. This visit is being done on behalf of [facility] using a secure platform for a video visit in a secure and private environment. The Patient is located at [facility]-on a locked Behavioral Health unit. The patient's condition being diagnosed/treated is appropriate for telemedicine. The provider identified self as well as credentials. The patient, and/or patient's guardian, consent to being seen remotely, and when consent is given they understand that the consent allows for patient identifiable information to be sent to a third party as needed. They may refuse to be seen remotely at any time. The electronic data is encrypted and password protected, and the patient and/or guardian has been advised of the potential risks to privacy not withstanding such measures.    SUBJECTIVE    CC/Focus of Exam: Depression and suicidal ideation with a plan to drive off a yohan.     INTERVAL HISTORY:   Patient seen chart reviewed and case discussed in treatment team. Staff report no major behavioral problems overnight.  Patient attending groups and appropriate with peers and staff on the unit.    PRNs overnight given: None    On interview today patient tells me that she has not move forward in the trauma work that she had hoped to during this hospitalization.  She voices understanding and the goals of the therapeutic interventions here on the inpatient setting, but tells me that she is willing to talk with the individual therapist again about her potential therapeutic interventions here on the unit.    In regards to her psychiatric medications she is tolerating the Prozac increase okay,  She reports to me that she does have a suicidal plan and intent and discussed the chronic nature  of this.          Review of Systems    No dizziness, no seizures, no headaches, no nausea/vomiting.    OBJECTIVE      PHYSICAL EXAM:  Vital signs: Reviewed and listed below  Gait and station: Steady   Muscle tone/strength: Symmetrical movements of extremities    Temp:  [98.4 °F (36.9 °C)] 98.4 °F (36.9 °C)  Heart Rate:  [61-68] 68  Resp:  [16-17] 17  BP: (109-120)/(66) 109/66    MENTAL STATUS EXAM:  Appearance: Casually dressed, fair grooming.   Behavior: Cooperative with interview, good eye contact  Psychomotor: No psychomotor agitation, No EPS reported or observed  Speech: Regular rate, rhythm and tone.  Mood: Depressed  Affect: Blunted  Thought Content: no delusional material present  Thought process: generally goal directed  Suicidality: See HPI  Homicidality: No HI  Perception: No AH/VH  Insight: fair   Judgment: limited       Lab Results (last 24 hours)       Procedure Component Value Units Date/Time    Comprehensive Metabolic Panel [[id number]] Collected: 10/08/24 1109    Specimen: Blood Updated: 10/08/24 1138     Glucose 80 mg/dL      BUN 12 mg/dL      Creatinine 0.84 mg/dL      Sodium 140 mmol/L      Potassium 4.6 mmol/L      Chloride 104 mmol/L      CO2 26.2 mmol/L      Calcium 9.7 mg/dL      Total Protein 6.8 g/dL      Albumin 4.2 g/dL      ALT (SGPT) 27 U/L      AST (SGOT) 26 U/L      Alkaline Phosphatase 70 U/L      Total Bilirubin 0.3 mg/dL      Globulin 2.6 gm/dL      A/G Ratio 1.6 g/dL      BUN/Creatinine Ratio 14.3     Anion Gap 9.8 mmol/L      eGFR 80.2 mL/min/1.73     Narrative:      GFR Normal >60  Chronic Kidney Disease <60  Kidney Failure <15      CBC & Differential [[id number]]  (Normal) Collected: 10/08/24 1109    Specimen: Blood Updated: 10/08/24 1119    Narrative:      The following orders were created for panel order CBC & Differential.  Procedure                               Abnormality         Status                     ---------                               -----------         ------                      CBC Auto Differential[[id number]]        Normal              Final result                 Please view results for these tests on the individual orders.    CBC Auto Differential [[id number]]  (Normal) Collected: 10/08/24 1109    Specimen: Blood Updated: 10/08/24 1119     WBC 5.18 10*3/mm3      RBC 4.68 10*6/mm3      Hemoglobin 13.7 g/dL      Hematocrit 42.3 %      MCV 90.4 fL      MCH 29.3 pg      MCHC 32.4 g/dL      RDW 12.5 %      RDW-SD 41.1 fl      MPV 9.2 fL      Platelets 243 10*3/mm3      Neutrophil % 65.4 %      Lymphocyte % 25.1 %      Monocyte % 7.3 %      Eosinophil % 1.0 %      Basophil % 1.0 %      Immature Grans % 0.2 %      Neutrophils, Absolute 3.39 10*3/mm3      Lymphocytes, Absolute 1.30 10*3/mm3      Monocytes, Absolute 0.38 10*3/mm3      Eosinophils, Absolute 0.05 10*3/mm3      Basophils, Absolute 0.05 10*3/mm3      Immature Grans, Absolute 0.01 10*3/mm3      nRBC 0.0 /100 WBC                Imaging Results (Last 24 Hours)       ** No results found for the last 24 hours. **               ECG/EMG Results (most recent)       None             ALLERGIES: Chlorzoxazone, Iodine, Phenazopyridine hcl, Pyridium [phenazopyridine], Quinine, Valproic acid, Methocarbamol, Iodinated contrast media, Codeine, and Diphenhydramine      Current Facility-Administered Medications:     acetaminophen (TYLENOL) tablet 650 mg, 650 mg, Oral, Q4H PRN, Radha Tinsley MD    aluminum-magnesium hydroxide-simethicone (MAALOX MAX) 400-400-40 MG/5ML suspension 15 mL, 15 mL, Oral, Q6H PRN, Radha Tinsley MD, 15 mL at 10/03/24 2140    benztropine (COGENTIN) tablet 2 mg, 2 mg, Oral, Daily PRN **OR** benztropine (COGENTIN) injection 1 mg, 1 mg, Intramuscular, Daily PRN, Radha Tinsley MD    donepezil (ARICEPT) tablet 5 mg, 5 mg, Oral, Daily, Radha Tinsley MD, 5 mg at 10/08/24 1017    FLUoxetine (PROzac) capsule 80 mg, 80 mg, Oral, Laureano JACKSON Kari, MD, 80 mg at 10/08/24 1017    hydrOXYzine (ATARAX) tablet 50 mg,  50 mg, Oral, Q6H PRN, Radha Tinsley MD, 50 mg at 10/07/24 2058    influenza virus vacc split PF FLUZONE 0.5 mL, 0.5 mL, Intramuscular, During Hospitalization, Radha Tinsley MD    Liraglutide (VICTOZA) injection 1.8 mg, 1.8 mg, Subcutaneous, Daily, Radha Tinsley MD, 1.8 mg at 10/08/24 1019    loperamide (IMODIUM) capsule 2 mg, 2 mg, Oral, Q2H PRN, Radha Tinsley MD    magnesium hydroxide (MILK OF MAGNESIA) suspension 10 mL, 10 mL, Oral, Daily PRN, Radha Tinsley MD    nitrofurantoin (macrocrystal-monohydrate) (MACROBID) capsule 100 mg, 100 mg, Oral, Daily, Radha Tinsley MD, 100 mg at 10/08/24 1017    pantoprazole (PROTONIX) EC tablet 40 mg, 40 mg, Oral, Daily, Radha Tinsley MD, 40 mg at 10/08/24 0548    polyethylene glycol (MIRALAX) packet 17 g, 17 g, Oral, Daily PRN, Radha Tinsley MD    traZODone (DESYREL) tablet 75 mg, 75 mg, Oral, Nightly, Mika Phan MD, 75 mg at 10/07/24 2056          ASSESSMENT & PLAN:      Progress towards treatment plans and goals: Compliant with medications, attending groups, and individual therapy.  Rationale for continued level of care: Patient continues to need inpatient level of care for stabilization of psychiatric symptoms.  Patient would potentially decompensate further at a lower level of care.       Diagnosis:      Suicidal ideation    PTSD (post-traumatic stress disorder)    Dysthymia    MDD (major depressive disorder), recurrent episode, severe        Plan:    -Continue hospitalization for safety and stabilization.  -Continue current precautions and safety checks.  -Encourage participation in therapeutic activities on the unit to increase coping skills for stressful life events. Including group and individual therapy.  -Continue to discuss case in treatment team meetings and continue discharge planning.  -Risk/benefit/alternatives to medications have been discussed with the patient.  We will continue to monitor for negative and positive effects to medications.    -Patient agrees with the following plan for today:    Medications:  Continue current medications without change    Other: Continue discharge planning.        donepezil, 5 mg, Oral, Daily  FLUoxetine, 80 mg, Oral, QAM  Liraglutide, 1.8 mg, Subcutaneous, Daily  nitrofurantoin (macrocrystal-monohydrate), 100 mg, Oral, Daily  pantoprazole, 40 mg, Oral, Daily  traZODone, 75 mg, Oral, Nightly          I spent 24 minutes before, during and after on this encounter date of service, discussing case with treatment team, reviewing chart, interviewing and evaluating the patient, educating and counseling the patient, assessing patients immediate risk, weighing risks associated with most appropriate level of care, formulating assessment and plan, documentation, writing orders, and communication with RN and staff.      Psychiatrist:  Radha Tinsley MD  10/08/24  14:55 EDT

## 2024-10-08 NOTE — THERAPY DISCHARGE NOTE
"DATA:    Therapist met with the patient individually. Therapist continues reviewing plan of care and aftercare plan.  The patient was agreeable.    ASSESSMENT:    Patient was seen for follow up of SI.      Today, patient was seen 1-1 in office. The patient's mood appears appropriate to circumstances, affect congruent, thought content normal. Patient reports sleep is Poor and appetite is Poor. On scale 1-10, patient rates depression 10 and anxiety 5. Patient does/does not report hallucinations: None. Patient continues to endorse SI. Patient voices her desire to work on her trauma and presents it as she needs someone to \"open pandora's box, take everything in there and put it into smaller boxes with labels.\" Patient reports that she consistently has issues with her trauma but is unable to identify any triggers because they just happen \"whenever\". Patient reports her living environment where it presents that she has no control in that aspect.      CLINICAL MANEUVERING:    Therapist provided safe, secure environment for patient to share.  Provided reflective listening and psychoeducation.  Assisted patient in processing the above session. Assisted patient in identifying any questions or needs to be addressed today. Therapist normalized and validated patient's feelings. Therapist provided recommendations for trauma treatment for patient. Therapist assisted in making connections with patient on her searching for some semblance of control in her life.     Plan:     Patient to remain hospitalized this date.      Treatment team will focus efforts on stabilizing patient's acute symptoms while providing education on healthy coping and crisis management to reduce hospitalizations.   Patient requires daily psychiatrist evaluation and 24/7 nursing supervision to promote patient  safety.     Therapist will offer 1-4 individual sessions, family education, and appropriate referral.     Therapist recommends continued assessment.     "

## 2024-10-08 NOTE — PROGRESS NOTES
"INPATIENT PSYCHIATRIC PROGRESS NOTE    Name:  Zo Palma  :  1965  MRN:  [id number]  Visit Number:  [id number]  Hospital Day: 5 days    This provider is located at home address on file with [facility]. This visit is being done on behalf of [facility] using a secure platform for a video visit in a secure and private environment. The Patient is located at [facility]-on a locked Behavioral Health unit. The patient's condition being diagnosed/treated is appropriate for telemedicine. The provider identified self as well as credentials. The patient, and/or patient's guardian, consent to being seen remotely, and when consent is given they understand that the consent allows for patient identifiable information to be sent to a third party as needed. They may refuse to be seen remotely at any time. The electronic data is encrypted and password protected, and the patient and/or guardian has been advised of the potential risks to privacy not withstanding such measures.    SUBJECTIVE    CC/Focus of Exam: Then, suicidal ideation with a plan.    INTERVAL HISTORY:   Patient seen chart reviewed and case discussed in treatment team. Staff report no major behavioral problems overnight.  Patient attending groups and appropriate with peers and staff on the unit.    PRNs overnight given:none     On interview today patient tells me that she is not feeling great overall.  He discussed potential for discharge today, however she tells me she has talked with a therapist first, as she does not feel she has been able to get the therapeutic interventions that she was hoping for during this hospitalization, despite having conversations with the therapist about appropriateness of inpatient therapy interventions on an acute setting.    In regard to her medication she would like to continue without change at this time however she does say that she feels like her \"liver is failing\" I asked her " more about that she says that she has had problems with her liver in the past due to medications and she feels that since we increased the Prozac that her liver is causing her discomfort due to the medication.      Review of Systems    No dizziness, no seizures, no headaches, no nausea/vomiting.    OBJECTIVE      PHYSICAL EXAM:  Vital signs: Reviewed and listed below  Gait and station: Steady   Muscle tone/strength: Symmetrical movements of extremities    Temp:  [98.4 °F (36.9 °C)] 98.4 °F (36.9 °C)  Heart Rate:  [61-68] 68  Resp:  [16-17] 17  BP: (109-120)/(66) 109/66    MENTAL STATUS EXAM:  Appearance: Casually dressed, fair grooming.   Behavior: Cooperative with interview, good eye contact  Psychomotor: No psychomotor agitation, No EPS reported or observed  Speech: Regular rate, rhythm and tone.  Mood: Depressed  Affect:  blunted  Thought Content: no delusional material present  Thought process: generally goal directed  Suicidality: Patient struggles with suicidal ideation on a daily basis, currently holding a plan to drive her car off a bridge in April after her son's wedding.  Homicidality: No HI  Perception: No AH/VH  Insight: limited  Judgment: limited       Lab Results (last 24 hours)       Procedure Component Value Units Date/Time    Comprehensive Metabolic Panel [[id number]] Collected: 10/08/24 1109    Specimen: Blood Updated: 10/08/24 1138     Glucose 80 mg/dL      BUN 12 mg/dL      Creatinine 0.84 mg/dL      Sodium 140 mmol/L      Potassium 4.6 mmol/L      Chloride 104 mmol/L      CO2 26.2 mmol/L      Calcium 9.7 mg/dL      Total Protein 6.8 g/dL      Albumin 4.2 g/dL      ALT (SGPT) 27 U/L      AST (SGOT) 26 U/L      Alkaline Phosphatase 70 U/L      Total Bilirubin 0.3 mg/dL      Globulin 2.6 gm/dL      A/G Ratio 1.6 g/dL      BUN/Creatinine Ratio 14.3     Anion Gap 9.8 mmol/L      eGFR 80.2 mL/min/1.73     Narrative:      GFR Normal >60  Chronic Kidney Disease <60  Kidney Failure <15      CBC &  Differential [[id number]]  (Normal) Collected: 10/08/24 1109    Specimen: Blood Updated: 10/08/24 1119    Narrative:      The following orders were created for panel order CBC & Differential.  Procedure                               Abnormality         Status                     ---------                               -----------         ------                     CBC Auto Differential[[id number]]        Normal              Final result                 Please view results for these tests on the individual orders.    CBC Auto Differential [[id number]]  (Normal) Collected: 10/08/24 1109    Specimen: Blood Updated: 10/08/24 1119     WBC 5.18 10*3/mm3      RBC 4.68 10*6/mm3      Hemoglobin 13.7 g/dL      Hematocrit 42.3 %      MCV 90.4 fL      MCH 29.3 pg      MCHC 32.4 g/dL      RDW 12.5 %      RDW-SD 41.1 fl      MPV 9.2 fL      Platelets 243 10*3/mm3      Neutrophil % 65.4 %      Lymphocyte % 25.1 %      Monocyte % 7.3 %      Eosinophil % 1.0 %      Basophil % 1.0 %      Immature Grans % 0.2 %      Neutrophils, Absolute 3.39 10*3/mm3      Lymphocytes, Absolute 1.30 10*3/mm3      Monocytes, Absolute 0.38 10*3/mm3      Eosinophils, Absolute 0.05 10*3/mm3      Basophils, Absolute 0.05 10*3/mm3      Immature Grans, Absolute 0.01 10*3/mm3      nRBC 0.0 /100 WBC                Imaging Results (Last 24 Hours)       ** No results found for the last 24 hours. **               ECG/EMG Results (most recent)       None             ALLERGIES: Chlorzoxazone, Iodine, Phenazopyridine hcl, Pyridium [phenazopyridine], Quinine, Valproic acid, Methocarbamol, Iodinated contrast media, Codeine, and Diphenhydramine      Current Facility-Administered Medications:     acetaminophen (TYLENOL) tablet 650 mg, 650 mg, Oral, Q4H PRN, Radha Tinsley MD    aluminum-magnesium hydroxide-simethicone (MAALOX MAX) 400-400-40 MG/5ML suspension 15 mL, 15 mL, Oral, Q6H PRN, Radha Tinsley MD, 15 mL at 10/03/24 2140    benztropine (COGENTIN) tablet 2 mg, 2  mg, Oral, Daily PRN **OR** benztropine (COGENTIN) injection 1 mg, 1 mg, Intramuscular, Daily PRN, Radha Tinsley MD    donepezil (ARICEPT) tablet 5 mg, 5 mg, Oral, Daily, Radha Tinsley MD, 5 mg at 10/08/24 1017    FLUoxetine (PROzac) capsule 80 mg, 80 mg, Oral, QAM, Radha Tinsley MD, 80 mg at 10/08/24 1017    hydrOXYzine (ATARAX) tablet 50 mg, 50 mg, Oral, Q6H PRN, Radha Tinsley MD, 50 mg at 10/07/24 2058    influenza virus vacc split PF FLUZONE 0.5 mL, 0.5 mL, Intramuscular, During Hospitalization, Radha Tinsley MD    Liraglutide (VICTOZA) injection 1.8 mg, 1.8 mg, Subcutaneous, Daily, Radha Tinsley MD, 1.8 mg at 10/08/24 1019    loperamide (IMODIUM) capsule 2 mg, 2 mg, Oral, Q2H PRN, Radha Tinsley MD    magnesium hydroxide (MILK OF MAGNESIA) suspension 10 mL, 10 mL, Oral, Daily PRN, Radha Tinsley MD    nitrofurantoin (macrocrystal-monohydrate) (MACROBID) capsule 100 mg, 100 mg, Oral, Daily, Radha Tinsley MD, 100 mg at 10/08/24 1017    pantoprazole (PROTONIX) EC tablet 40 mg, 40 mg, Oral, Daily, Radha Tinsley MD, 40 mg at 10/08/24 0548    polyethylene glycol (MIRALAX) packet 17 g, 17 g, Oral, Daily PRN, Radha Tinsley MD    traZODone (DESYREL) tablet 75 mg, 75 mg, Oral, Nightly, Mika Phan MD, 75 mg at 10/07/24 2056          ASSESSMENT & PLAN:      Progress towards treatment plans and goals: Compliant with medications, attending groups, and individual therapy.  Rationale for continued level of care: Patient continues to need inpatient level of care for stabilization of psychiatric symptoms.  Patient would potentially decompensate further at a lower level of care.       Diagnosis:      Suicidal ideation    PTSD (post-traumatic stress disorder)    Dysthymia    MDD (major depressive disorder), recurrent episode, severe        Plan:    -Continue hospitalization for safety and stabilization.  -Continue current precautions and safety checks.  -Encourage participation in therapeutic activities on  the unit to increase coping skills for stressful life events. Including group and individual therapy.  -Continue to discuss case in treatment team meetings and continue discharge planning.  -Risk/benefit/alternatives to medications have been discussed with the patient.  We will continue to monitor for negative and positive effects to medications.   -Patient agrees with the following plan for today:    Medications: Continue current medications without change    Other: We will order repeat labs.     Update:   Treatment team discussed the potential to step patient down today, and follow up with potential options for more intense therapy on an outpatient basis.  Potentially a PHP and specifically  trauma therapy.     This is discussed with patient and ultimately she states to members of the treatment team that she would not be able to keep herself safe.  She states that she would not make it home if she were to be discharged at this time.    Discharge for today will be postponed for her safety and continued stabilization,      donepezil, 5 mg, Oral, Daily  FLUoxetine, 80 mg, Oral, QAM  Liraglutide, 1.8 mg, Subcutaneous, Daily  nitrofurantoin (macrocrystal-monohydrate), 100 mg, Oral, Daily  pantoprazole, 40 mg, Oral, Daily  traZODone, 75 mg, Oral, Nightly          I spent 22  minutes before, during and after on this encounter date of service, discussing case with treatment team, reviewing chart, interviewing and evaluating the patient, educating and counseling the patient, assessing patients immediate risk, weighing risks associated with most appropriate level of care, formulating assessment and plan, documentation, writing orders, and communication with RN and staff.      Psychiatrist:  Radha Tinsley MD  10/08/24  16:38 EDT

## 2024-10-08 NOTE — SIGNIFICANT NOTE
10/08/24 1201   Pertinent Diagnosis/Presenting Problems   Presenting Problems/Reason for Referral Suicidal ideation   Previous Problems Impacting Recreation Patient reported no perceived barriers to leisure participation.   Lifestyle/Recreational History/Interest   Profession/Job Unemployed   Current or Previous  Service none   Current Living Situation other (see comments)  (With roommate)   Transportation Situation car, drives self   Current Educational Level Graduate degree   Support Network Patient reported a good support network.   Recreational History and Interests   How Important is Recreation to You? high importance   Satisfied With Leisure Lifestyle? yes   Participate Regularly in Leisure Activity? yes   Are You a Social Person? no   Do You Prefer To Be Alone? yes   Do You Like New Experiences? yes   Current Hobbies/Interests gardening;group/social activities;arts/crafts   Art/Crafts other (see comments)  (Gm)   Group/Social Activities volunteering   Past Hobbies/Interests other (see comments)  (Patient reported no past hobbies)   Barriers To Leisure Activity   Barriers to Leisure Activity mental health concerns;financial concerns   Coping/Wellbeing   Describe Yourself Patient described herself as a free thinker.   Personal Strengths Resilience, motivation, personal insight, hobbies, independent living skills   Current State of Wellbeing increased stressors;poor coping skills   How Do You [place] With Life Stressors? 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   Factors Impacting Current State of Wellbeing coping skills/strategies;financial issues;depression;increase in stressors   Therapeutic Recreation Participation   Recreation Therapy Participation arts/crafts;exercise/fitness;games;meditation;music;reading;puzzles;television/movies/videos;trivia;writing/journaling/blogging   Art/Crafts drawing;painting   Exercise/Fitness strengthening/weight training;group exercise activity    Games board games;card games;brain games/fitness   Music listening to music   Reading books   Objectives of Recreation Participation motivation and activity level through successful participation;positive attitudes leading to a healthy leisure lifestyle   Orientation to Therapeutic Recreation patient;received explanation of recreation therapy programs;completed therapeutic recreation assessment   Recreation Therapy Summary of Participation Patient pleasant and able to complete assessment. Patient did not identify leisure goals for hospitalization.   Therapeutic Recreation Assessment/Plan   Patient/Family Goal (Therapeutic Recreation) Increase awareness on the importance of leisure participation as a form of self-care.  Increase knowledge and utilization of leisure activities as coping skills  Decrease symptom burden through leisure participation  Improve mood and reduce anxiety   Recreation Therapy Goals/Objectives coping skills/strategies;gross motor skills;functional leisure skills;health promotion;health maintenance;leisure awareness;leisure skills/knowledge;leisure participation;problem-solving;relaxation response   Recreation Plan structured leisure participation   Referrals to Community Recreation Programs List available on request

## 2024-10-09 RX ADMIN — PANTOPRAZOLE SODIUM 40 MG: 40 TABLET, DELAYED RELEASE ORAL at 06:09

## 2024-10-09 RX ADMIN — LIRAGLUTIDE 1.8 MG: 6 INJECTION SUBCUTANEOUS at 08:31

## 2024-10-09 RX ADMIN — HYDROXYZINE HYDROCHLORIDE 50 MG: 25 TABLET ORAL at 20:51

## 2024-10-09 RX ADMIN — FLUOXETINE HYDROCHLORIDE 80 MG: 20 CAPSULE ORAL at 08:29

## 2024-10-09 RX ADMIN — NITROFURANTOIN MONOHYDRATE/MACROCRYSTALLINE 100 MG: 25; 75 CAPSULE ORAL at 08:29

## 2024-10-09 RX ADMIN — DONEPEZIL HYDROCHLORIDE 5 MG: 5 TABLET, FILM COATED ORAL at 08:29

## 2024-10-09 RX ADMIN — TRAZODONE HYDROCHLORIDE 100 MG: 50 TABLET ORAL at 20:51

## 2024-10-09 NOTE — SIGNIFICANT NOTE
10/09/24 1515   Group Therapy Session   Group Attendance attended group session   Time Session Began 1400   Time Session Ended 1500   Total Time (minutes) 60   Group Type psychoeducation;psychotherapeutic   Group Topic Covered cognitive therapy techniques;cognitive activities   Literature/Videos Given topic handouts   Literature/Videos Given Comments Letter to a relationship   Group Session Detail Patients particpated in an excersise encouraging them to write a letter they won't send to a relationship or self to discuss something that may be upsetting to them. The concept is that over time, unexpressed feelings can lead to resentment, hurt, or grief. Patients where then encouraged to share their thoughts and feelings after writing this letter.   Patient Participation/Contribution cooperative with task;organized;listened actively;expressed understanding of topic   Affect During Group affect consistent with mood   Degree of Insight moderate

## 2024-10-09 NOTE — SIGNIFICANT NOTE
10/09/24 1116   Group Therapy Session   Group Attendance attended group session   Time Session Began 1030   Time Session Ended 1100   Total Time (minutes) 30   Group Type psychoeducation;psychotherapeutic   Group Topic Covered cognitive therapy techniques   Literature/Videos Given topic handouts   Literature/Videos Given Comments Challenging Anxious Thoughts   Group Session Detail Patients provided independent activity that they will learn about the concept of irrational and rational thoughts, as they relate to anxiety and write about their own experiences.   Patient Participation/Contribution cooperative with task   Affect During Group affect consistent with mood   Degree of Insight moderate

## 2024-10-09 NOTE — PLAN OF CARE
"Goal Outcome Evaluation:  Plan of Care Reviewed With: patient  Patient Agreement with Plan of Care: agrees        Outcome Evaluation: Pt frustrated and irritable for much of shift. Pt reports being frustrated with attempted discharge yesterday. Pt states \"they don't tell me shit\". Pt reports anxiety of 6/10 and depression of 11/10. When asked about SI, pt reports \"it's always there\". Pt denies HI and AVH. Pt continues to report \"liver\" pain of 4/10. Pt declined PRN pain medication. PRN atarax and trazodone given overnight. Pt reports not sleeping well overnight. Will continue plan of care.                               "

## 2024-10-09 NOTE — PROGRESS NOTES
INPATIENT PSYCHIATRIC PROGRESS NOTE    Name:  Zo Palma  :  1965  MRN:  [id number]  Visit Number:  [id number]  Hospital Day: 6 days    This provider is located at home address on file with [facility]. This visit is being done on behalf of [facility] using a secure platform for a video visit in a secure and private environment. The Patient is located at [facility]-on a locked Behavioral Health unit. The patient's condition being diagnosed/treated is appropriate for telemedicine. The provider identified self as well as credentials. The patient, and/or patient's guardian, consent to being seen remotely, and when consent is given they understand that the consent allows for patient identifiable information to be sent to a third party as needed. They may refuse to be seen remotely at any time. The electronic data is encrypted and password protected, and the patient and/or guardian has been advised of the potential risks to privacy not withstanding such measures.    SUBJECTIVE    CC/Focus of Exam: Depression, plan for suicide, history of trauma,    INTERVAL HISTORY:   Patient seen chart reviewed and case discussed in treatment team. Staff report no major behavioral problems overnight.  patient attending groups and appropriate with peers and staff on the unit.      Yesterday patient was set for discharge however she informed the therapist that she did not believe that she would make it home if she were to be discharged due to the way that she was feeling.   Discharge was canceled for her safety.     PRNs overnight given: Hydroxyzine    On interview today patient I discussed her need to feel in control of the discharge planning options including blood therapy options she has and she leaves here.  She will plan to speak with the discharge planner as well as a therapist again.  She does not feel as though she is safe to be discharged home at this time.        Review of  Systems    No dizziness, no seizures, no headaches, no nausea/vomiting.    OBJECTIVE      PHYSICAL EXAM:  Vital signs: Reviewed and listed below  Gait and station: Steady   Muscle tone/strength: Symmetrical movements of extremities    Temp:  [99.5 °F (37.5 °C)] 99.5 °F (37.5 °C)  Heart Rate:  [60-82] 82  Resp:  [16] 16  BP: (104-114)/(65-67) 114/67    MENTAL STATUS EXAM:  Appearance: Casually dressed, fair grooming.   Behavior: Cooperative with interview, good eye contact  Psychomotor: No psychomotor agitation, No EPS reported or observed  Speech: Regular rate, rhythm and tone.  Mood: Depression  Affect: Congruent  Thought Content: no delusional material present  Thought process: generally goal directed  Suicidality: See HPI   Homicidality: No HI  Perception: No AH/VH  Insight: fair   Judgment: limited       Lab Results (last 24 hours)       ** No results found for the last 24 hours. **               Imaging Results (Last 24 Hours)       ** No results found for the last 24 hours. **               ECG/EMG Results (most recent)       None             ALLERGIES: Chlorzoxazone, Iodine, Phenazopyridine hcl, Pyridium [phenazopyridine], Quinine, Valproic acid, Methocarbamol, Iodinated contrast media, Codeine, and Diphenhydramine      Current Facility-Administered Medications:     acetaminophen (TYLENOL) tablet 650 mg, 650 mg, Oral, Q4H PRN, Radha Tinsley MD    aluminum-magnesium hydroxide-simethicone (MAALOX MAX) 400-400-40 MG/5ML suspension 15 mL, 15 mL, Oral, Q6H PRN, Radha Tinsley MD, 15 mL at 10/03/24 2140    benztropine (COGENTIN) tablet 2 mg, 2 mg, Oral, Daily PRN **OR** benztropine (COGENTIN) injection 1 mg, 1 mg, Intramuscular, Daily PRN, Radha Tinsley MD    donepezil (ARICEPT) tablet 5 mg, 5 mg, Oral, Daily, Radha Tinsley MD, 5 mg at 10/09/24 0829    FLUoxetine (PROzac) capsule 80 mg, 80 mg, Oral, QAMLaureano Kari, MD, 80 mg at 10/09/24 0829    hydrOXYzine (ATARAX) tablet 50 mg, 50 mg, Oral, Q6H PRN,  Radha Tinsley MD, 50 mg at 10/08/24 2009    influenza virus vacc split PF FLUZONE 0.5 mL, 0.5 mL, Intramuscular, During Hospitalization, Radha Tinsley MD    Liraglutide (VICTOZA) injection 1.8 mg, 1.8 mg, Subcutaneous, Daily, Radha Tinsley MD, 1.8 mg at 10/09/24 0831    loperamide (IMODIUM) capsule 2 mg, 2 mg, Oral, Q2H PRN, Radha Tinsley MD    magnesium hydroxide (MILK OF MAGNESIA) suspension 10 mL, 10 mL, Oral, Daily PRN, Radha Tinsley MD    nitrofurantoin (macrocrystal-monohydrate) (MACROBID) capsule 100 mg, 100 mg, Oral, Daily, Radha Tinsley MD, 100 mg at 10/09/24 0829    pantoprazole (PROTONIX) EC tablet 40 mg, 40 mg, Oral, Daily, Radha Tinsley MD, 40 mg at 10/09/24 0609    polyethylene glycol (MIRALAX) packet 17 g, 17 g, Oral, Daily PRN, Radha Tinsley MD    traZODone (DESYREL) tablet 100 mg, 100 mg, Oral, Nightly, Shasha Guthrie MD    traZODone (DESYREL) tablet 25 mg, 25 mg, Oral, Once PRN, Shasha Guthrie MD          ASSESSMENT & PLAN:      Progress towards treatment plans and goals: Compliant with medications, attending groups, and individual therapy.  Rationale for continued level of care: Patient continues to need inpatient level of care for stabilization of psychiatric symptoms.  Patient is voice that she does not feel safe to be discharged.    Diagnosis:      Suicidal ideation    PTSD (post-traumatic stress disorder)    Dysthymia    MDD (major depressive disorder), recurrent episode, severe        Plan:    -Continue hospitalization for safety and stabilization.  -Continue current precautions and safety checks.  -Encourage participation in therapeutic activities on the unit to increase coping skills for stressful life events. Including group and individual therapy.  -Continue to discuss case in treatment team meetings and continue discharge planning.  -Risk/benefit/alternatives to medications have been discussed with the patient.  We will continue to monitor for negative and positive  effects to medications.   -Patient agrees with the following plan for today:    Medications: Continue current medication    Other: Continue discharge planning, treatment team we will review in detail options she has available for her for her trauma work and make recommendations and the plan.  We will make a discharge target date set for Monday.  This will allow patient to be scheduled for her sessions, and mentally prepare for the trauma work ahead in a safe environment.      donepezil, 5 mg, Oral, Daily  FLUoxetine, 80 mg, Oral, QAM  Liraglutide, 1.8 mg, Subcutaneous, Daily  nitrofurantoin (macrocrystal-monohydrate), 100 mg, Oral, Daily  pantoprazole, 40 mg, Oral, Daily  traZODone, 100 mg, Oral, Nightly          I spent 22 minutes before, during and after on this encounter date of service, discussing case with treatment team, reviewing chart, interviewing and evaluating the patient, educating and counseling the patient, assessing patients immediate risk, weighing risks associated with most appropriate level of care, formulating assessment and plan, documentation, writing orders, and communication with RN and staff.      Psychiatrist:  Radha Tinsley MD  10/09/24  14:08 EDT

## 2024-10-09 NOTE — THERAPY TREATMENT NOTE
DATA:    Therapist met with the patient individually. Therapist continues reviewing plan of care and aftercare plan.  The patient was agreeable.    ASSESSMENT:    Patient was seen for follow up of SI.        Today, patient was seen 1-1 in office. The patient's mood appears appropriate to circumstances, affect congruent, thought content normal. Patient reports sleep is Fair and appetite is Fair. On scale 1-10, patient rates depression 10 and anxiety 5. Patient does/does not report hallucinations: None. Patient reports the is just living life and hoping that we can get it figured out for her to start therapy for her trauma. Patient reports she is ready to take the next steps to unbox her trauma.      CLINICAL MANEUVERING:    Therapist provided safe, secure environment for patient to share.  Provided reflective listening and psychoeducation.  Assisted patient in processing the above session. Assisted patient in identifying any questions or needs to be addressed today. Therapist normalized and validated patients feelings. Therapist utilized supportive psychotherapy. Therapist discussed trauma based specific resources for patient.      Plan:     Patient to remain hospitalized this date.      Treatment team will focus efforts on stabilizing patient's acute symptoms while providing education on healthy coping and crisis management to reduce hospitalizations.   Patient requires daily psychiatrist evaluation and 24/7 nursing supervision to promote patient  safety.     Therapist will offer 1-4 individual sessions, family education, and appropriate referral.     Therapist recommends continued assessment.

## 2024-10-09 NOTE — PLAN OF CARE
Goal Outcome Evaluation:  Plan of Care Reviewed With: patient  Patient Agreement with Plan of Care: agrees        Outcome Evaluation: Patient denies A/VH,SI,HI, attending groups, no negative behaviors this shift. Will continue to monitor

## 2024-10-10 RX ADMIN — NITROFURANTOIN MONOHYDRATE/MACROCRYSTALLINE 100 MG: 25; 75 CAPSULE ORAL at 09:33

## 2024-10-10 RX ADMIN — PANTOPRAZOLE SODIUM 40 MG: 40 TABLET, DELAYED RELEASE ORAL at 06:44

## 2024-10-10 RX ADMIN — TRAZODONE HYDROCHLORIDE 100 MG: 50 TABLET ORAL at 21:12

## 2024-10-10 RX ADMIN — DONEPEZIL HYDROCHLORIDE 5 MG: 5 TABLET, FILM COATED ORAL at 09:33

## 2024-10-10 RX ADMIN — FLUOXETINE HYDROCHLORIDE 80 MG: 20 CAPSULE ORAL at 09:33

## 2024-10-10 RX ADMIN — HYDROXYZINE HYDROCHLORIDE 50 MG: 25 TABLET ORAL at 21:12

## 2024-10-10 RX ADMIN — LIRAGLUTIDE 1.8 MG: 6 INJECTION SUBCUTANEOUS at 09:33

## 2024-10-10 NOTE — SIGNIFICANT NOTE
10/10/24 1514   Group Therapy Session   Group Attendance attended group session   Time Session Began 1400   Time Session Ended 1500   Total Time (minutes) 60   Group Type psychotherapeutic   Group Topic Covered cognitive therapy techniques;cognitive activities   Literature/Videos Given topic handouts   Literature/Videos Given Comments ABC Coping   Group Session Detail Patients participated in utilizing critical thinking to brainstorm different coping strategies based on the letter in the alphabet. Patients participated in discussing healthy vs. unhealthy coping strategies. Patients participated in identifying scenarios when it's appropriate to utilize coping skills.   Patient Participation/Contribution cooperative with task;discussed personal experience with topic;expressed understanding of topic;listened actively;organized   Affect During Group affect consistent with mood   Degree of Insight moderate

## 2024-10-10 NOTE — THERAPY TREATMENT NOTE
"DATA:    Therapist met with the patient individually. Therapist continues reviewing plan of care and aftercare plan.  The patient was agreeable.    ASSESSMENT:    Patient was seen for follow up of SI.       Today, patient was seen 1-1 in office. The patient's mood appears appropriate to circumstances, affect congruent, thought content normal. Patient reports sleep is Good and appetite is Good. On scale 1-10, patient rates depression  \"10 or 11\"  and anxiety 4. Patient does/does not report hallucinations: None. Patient denies AVH. Patient continues to have her SI plan. Patient expresses being \"bummed\" that she hasn't discharged yet. Patient denies anything that she would like to discuss at this time.     CLINICAL MANEUVERING:    Therapist provided safe, secure environment for patient to share.  Provided reflective listening and psychoeducation.  Assisted patient in processing the above session. Assisted patient in identifying any questions or needs to be addressed today. Therapist normalized and validated patient's feelings. Therapist utilized MI to build rapport. Therapist utilized supportive psychotherapy.      Concern was voiced regarding substance use and educated patient on risks associated with use. Patient was advised to abstain from use and educated on community resources that can help with sobriety and recovery.        Plan:     Patient to remain hospitalized this date.      Treatment team will focus efforts on stabilizing patient's acute symptoms while providing education on healthy coping and crisis management to reduce hospitalizations.   Patient requires daily psychiatrist evaluation and 24/7 nursing supervision to promote patient  safety.     Therapist will offer 1-4 individual sessions, family education, and appropriate referral.     Therapist recommends continued assessment.   "

## 2024-10-10 NOTE — SIGNIFICANT NOTE
10/10/24 1035   Group Therapy Session   Group Attendance attended group session   Time Session Began 1030   Time Session Ended 1100   Total Time (minutes) 30   Group Type psychotherapeutic   Group Topic Covered cognitive therapy techniques;cognitive activities   Literature/Videos Given topic handouts   Literature/Videos Given Comments Triggers   Group Session Detail Patients utilized a worksheet reviewing triggers with a simple definition and tips, while guiding them through the process of identifying their own triggers. After identifying triggers, patients will be prompted to develop strategies to either avoid or cope with their triggers.   Patient Participation/Contribution cooperative with task   Patient Participation Detail Literature provided to be completed independently.   Affect During Group affect consistent with mood   Degree of Insight moderate

## 2024-10-10 NOTE — PLAN OF CARE
Goal Outcome Evaluation:           Progress: improving  Outcome Evaluation: Patient calm and cooperative on this shift. Patient rates anxiety as a 4/10 and depression as a 10/10. Patient denies SI, HI and AVH.

## 2024-10-10 NOTE — PLAN OF CARE
"Goal Outcome Evaluation:  Plan of Care Reviewed With: patient  Patient Agreement with Plan of Care: agrees     Progress: no change     The pt presents with a constricted affect and endorses anxiety, rated a 5/10, and depression rated a 9/10. She denies SI/HI/AVH, and she is medication compliant.PRN Atarax is administered upon request. Reports that her sleep is \"crap\". Reports that she feels as though she is being \"shuffled off and referred to other people.\"                               "

## 2024-10-11 RX ADMIN — CARIPRAZINE 1.5 MG: 1.5 CAPSULE, GELATIN COATED ORAL at 10:57

## 2024-10-11 RX ADMIN — DONEPEZIL HYDROCHLORIDE 5 MG: 5 TABLET, FILM COATED ORAL at 08:42

## 2024-10-11 RX ADMIN — LIRAGLUTIDE 1.8 MG: 6 INJECTION SUBCUTANEOUS at 08:41

## 2024-10-11 RX ADMIN — HYDROXYZINE HYDROCHLORIDE 50 MG: 25 TABLET ORAL at 21:31

## 2024-10-11 RX ADMIN — FLUOXETINE HYDROCHLORIDE 80 MG: 20 CAPSULE ORAL at 08:41

## 2024-10-11 RX ADMIN — NITROFURANTOIN MONOHYDRATE/MACROCRYSTALLINE 100 MG: 25; 75 CAPSULE ORAL at 08:41

## 2024-10-11 RX ADMIN — PANTOPRAZOLE SODIUM 40 MG: 40 TABLET, DELAYED RELEASE ORAL at 07:40

## 2024-10-11 RX ADMIN — TRAZODONE HYDROCHLORIDE 100 MG: 50 TABLET ORAL at 21:31

## 2024-10-11 RX ADMIN — ACETAMINOPHEN 650 MG: 325 TABLET, FILM COATED ORAL at 10:57

## 2024-10-11 NOTE — SIGNIFICANT NOTE
10/11/24 1110   Group Therapy Session   Group Attendance attended group session   Time Session Began 1030   Time Session Ended 1100   Total Time (minutes) 30   Group Type recreation   Group Topic Covered structured socialization   Group Session Detail Patient participated in activity to promote discussion about common interests.   Patient Participation/Contribution able to recall/repeat info presented;cooperative with task;discussed personal experience with topic;expressed understanding of topic;listened actively;offered helpful suggestions to peers;organized   Affect During Group irritable   Degree of Insight high

## 2024-10-11 NOTE — SIGNIFICANT NOTE
10/11/24 1502   Group Therapy Session   Group Attendance attended group session   Time Session Began 1400   Time Session Ended 1500   Total Time (minutes) 60   Group Type psychotherapeutic   Group Topic Covered cognitive therapy techniques;cognitive activities   Literature/Videos Given Comments Attitude Antonym   Group Session Detail Patients participate in an exercise discussing Attitude. Attitude change is a huge part of recovery with mental health issues. No one has a perfect attitude, so everyone can benefit from looking at negative attitude traits and then considering ways to change in a positive manner. This group exercise looks at some common negative attitude traits and the opposite, more positive attitude traits to strive for. A list is provided, and the goal is to try to come up with the antonym or oppositive positive attitude trait.   Patient Participation/Contribution cooperative with task;discussed personal experience with topic;expressed understanding of topic;organized;listened actively   Affect During Group affect consistent with mood   Degree of Insight moderate

## 2024-10-11 NOTE — SIGNIFICANT NOTE
Behavioral Health  completed social determinants of health assessment with patient. SW evaluated patient's needs to determine best plan of action for discharge. SW will establish plan for discharge with patient and treatment team.     No needs or concerns were voiced during assessment. PT states that she lives with her roommate who pays for all major expenses, except for food. PT reports there have been times where she experienced minor food insecurity but overall has been stable. Pt plans to return home after discharge.     PT states that she is feeling hopeless but did not elaborate further. SW shared this statement with the treatment team.    Pt is currently established with [facility] for therapy and [facility] for medication management. PT requesting assistance establishing care for a trauma based provider and inquired about [facility]. SW to inquire about these services and make appointments once a discharge date is determined.     SW clarified plan with patient and explored more plan options, and will assist patient in defining discharge needs.     10/11/24 1320   Living Situation   Current Living Arrangements home   Potentially Unsafe Housing Conditions none   Food Insecurity   Within the past 12 months, you worried that your food would run out before you got the money to buy more. Sometimes   Within the past 12 months, the food you bought just didn't last and you didn't have money to get more. Sometimes   Transportation Needs   In the past 12 months, has lack of transportation kept you from medical appointments or from getting medications? no   In the past 12 months, has lack of transportation kept you from meetings, work, or from getting things needed for daily living? No   Utilities   In the past 12 months has the electric, gas, oil, or water company threatened to shut off services in your home? No   Abuse Screen (yes response referral indicated)   Feels Unsafe at Home or Work/School no   Feels Threatened  "by Someone no   Does Anyone Try to Keep You From Having Contact with Others or Doing Things Outside Your Home? no   Physical Signs of Abuse Present no   Financial Resource Strain   How hard is it for you to pay for the very basics like food, housing, medical care, and heating? Not very   Employment   Do you want help finding or keeping work or a job? I do not need or want help   Family and Community Support   If for any reason you need help with day-to-day activities such as bathing, preparing meals, shopping, managing finances, etc., do you get the help you need? I don't need any help   How often do you feel lonely or isolated from those around you? Rarely  (\"Like to be by myself\")   Education   Preferred Language English   Do you want help with school or training? For example, starting or completing job training or getting a high school diploma, GED or equivalent No   Physical Activity   On average, how many days per week do you engage in moderate to strenuous exercise (like a brisk walk)? 1 day   On average, how many minutes do you engage in exercise at this level? Pt Unable  (Depends on the day.)   Alcohol Use   Q1: How often do you have a drink containing alcohol? Monthly or l   Q2: How many drinks containing alcohol do you have on a typical day when you are drinking? 1 or 2   Q3: How often do you have six or more drinks on one occasion? Never   Stress   Do you feel stress - tense, restless, nervous, or anxious, or unable to sleep at night because your mind is troubled all the time - these days? Very much   Mental Health   Little Interest or Pleasure in Doing Things 3-->nearly every day   Feeling Down, Depressed or Hopeless 3-->nearly every day  (\"Hopeless at the moment\")   Disabilities   Concentrating, Remembering or Making Decisions Difficulty yes   Concentration Management \"Memory sucks because I compartamentalize\"   Doing Errands Independently Difficulty (such as shopping) no     Electronically Signed " By:  Care Transitions, Marlene Li, LISETH      Date/Time: 10/11/24 13:51

## 2024-10-11 NOTE — PROGRESS NOTES
INPATIENT PSYCHIATRIC PROGRESS NOTE    Name:  Zo Palma  :  1965  MRN:  [id number]  Visit Number:  [id number]  Hospital Day: 7 days    This provider is located at home address on file with [facility]. This visit is being done on behalf of [facility] using a secure platform for a video visit in a secure and private environment. The Patient is located at [facility]-on a locked Behavioral Health unit. The patient's condition being diagnosed/treated is appropriate for telemedicine. The provider identified self as well as credentials. The patient, and/or patient's guardian, consent to being seen remotely, and when consent is given they understand that the consent allows for patient identifiable information to be sent to a third party as needed. They may refuse to be seen remotely at any time. The electronic data is encrypted and password protected, and the patient and/or guardian has been advised of the potential risks to privacy not withstanding such measures.    SUBJECTIVE    CC/Focus of Exam: Depression with suicidal ideation    INTERVAL HISTORY:   Patient seen chart reviewed and case discussed in treatment team. Staff report no major behavioral problems overnight.  Patient attending groups and appropriate with peers and staff on the unit.    PRNs overnight given: Hydroxyzine    On interview today patient tells me that she is doing about the same.   She endorses continued suicidal ideation  She would like to continue the current medication but change.        Review of Systems    No dizziness, no seizures, no headaches, no nausea/vomiting.    OBJECTIVE      PHYSICAL EXAM:  Vital signs: Reviewed and listed below  Gait and station: Steady   Muscle tone/strength: Symmetrical movements of extremities    Temp:  [98 °F (36.7 °C)-98.2 °F (36.8 °C)] 98.2 °F (36.8 °C)  Heart Rate:  [56-68] 68  Resp:  [16] 16  BP: (109-121)/(63-76) 109/63    MENTAL STATUS EXAM:  Appearance:  Casually dressed, fair grooming.   Behavior: Cooperative with interview, good eye contact  Psychomotor: No psychomotor agitation, No EPS reported or observed  Speech: Regular rate, rhythm and tone.  Mood: Depressed  Affect: Congruent  Thought Content: no delusional material present  Thought process: generally goal directed  Suicidality: See HPI   Homicidality: No HI  Perception: No AH/VH  Insight: Limited  Judgment: limited       Lab Results (last 24 hours)       ** No results found for the last 24 hours. **               Imaging Results (Last 24 Hours)       ** No results found for the last 24 hours. **               ECG/EMG Results (most recent)       None             ALLERGIES: Chlorzoxazone, Iodine, Phenazopyridine hcl, Pyridium [phenazopyridine], Quinine, Valproic acid, Methocarbamol, Iodinated contrast media, Codeine, and Diphenhydramine      Current Facility-Administered Medications:     acetaminophen (TYLENOL) tablet 650 mg, 650 mg, Oral, Q4H PRN, Radha Tinsley MD    aluminum-magnesium hydroxide-simethicone (MAALOX MAX) 400-400-40 MG/5ML suspension 15 mL, 15 mL, Oral, Q6H PRN, Radha Tinsley MD, 15 mL at 10/03/24 2140    benztropine (COGENTIN) tablet 2 mg, 2 mg, Oral, Daily PRN **OR** benztropine (COGENTIN) injection 1 mg, 1 mg, Intramuscular, Daily PRN, Radha Tinsley MD    donepezil (ARICEPT) tablet 5 mg, 5 mg, Oral, Daily, Radha Tinsley MD, 5 mg at 10/10/24 0933    FLUoxetine (PROzac) capsule 80 mg, 80 mg, Oral, QAM, Radha Tinsley MD, 80 mg at 10/10/24 0933    hydrOXYzine (ATARAX) tablet 50 mg, 50 mg, Oral, Q6H PRN, Radha Tinsley MD, 50 mg at 10/10/24 2112    influenza virus vacc split PF FLUZONE 0.5 mL, 0.5 mL, Intramuscular, During Hospitalization, Radha Tinsley MD    Liraglutide (VICTOZA) injection 1.8 mg, 1.8 mg, Subcutaneous, Daily, Radha Tinsley MD, 1.8 mg at 10/10/24 0933    loperamide (IMODIUM) capsule 2 mg, 2 mg, Oral, Q2H PRN, Radha Tinsley MD    magnesium hydroxide (MILK OF  MAGNESIA) suspension 10 mL, 10 mL, Oral, Daily PRN, Radha Tinsley MD    nitrofurantoin (macrocrystal-monohydrate) (MACROBID) capsule 100 mg, 100 mg, Oral, Daily, Radha Tinsley MD, 100 mg at 10/10/24 0933    pantoprazole (PROTONIX) EC tablet 40 mg, 40 mg, Oral, Daily, Radha Tinsley MD, 40 mg at 10/10/24 0644    polyethylene glycol (MIRALAX) packet 17 g, 17 g, Oral, Daily PRN, Radha Tinsley MD    traZODone (DESYREL) tablet 100 mg, 100 mg, Oral, Nightly, Shasha Guthrie MD, 100 mg at 10/10/24 2112    traZODone (DESYREL) tablet 25 mg, 25 mg, Oral, Once PRN, Shasha Guthrie MD          ASSESSMENT & PLAN:      Progress towards treatment plans and goals: Compliant with medications, attending groups, and individual therapy.  Rationale for continued level of care: Patient continues to need inpatient level of care for stabilization of psychiatric symptoms.  Patient would potentially decompensate further at a lower level of care.       Diagnosis:      Suicidal ideation    PTSD (post-traumatic stress disorder)    Dysthymia    MDD (major depressive disorder), recurrent episode, severe        Plan:    -Continue hospitalization for safety and stabilization.  -Continue current precautions and safety checks.  -Encourage participation in therapeutic activities on the unit to increase coping skills for stressful life events. Including group and individual therapy.  -Continue to discuss case in treatment team meetings and continue discharge planning.  -Risk/benefit/alternatives to medications have been discussed with the patient.  We will continue to monitor for negative and positive effects to medications.   -Patient agrees with the following plan for today:    Medications: Continue current medications without change    Other: Continue discharge planning      donepezil, 5 mg, Oral, Daily  FLUoxetine, 80 mg, Oral, QAM  Liraglutide, 1.8 mg, Subcutaneous, Daily  nitrofurantoin (macrocrystal-monohydrate), 100 mg, Oral,  Daily  pantoprazole, 40 mg, Oral, Daily  traZODone, 100 mg, Oral, Nightly          I spent 18 minutes before, during and after on this encounter date of service, discussing case with treatment team, reviewing chart, interviewing and evaluating the patient, educating and counseling the patient, assessing patients immediate risk, weighing risks associated with most appropriate level of care, formulating assessment and plan, documentation, writing orders, and communication with RN and staff.      Psychiatrist:  Radha Tinsley MD  10/10/24  23:19 EDT

## 2024-10-11 NOTE — PLAN OF CARE
"Goal Outcome Evaluation:  Plan of Care Reviewed With: patient  Patient Agreement with Plan of Care: agrees     Progress: no change                        Pt reports SI as \"always\". Pt  reports  she has a plans for when out of [place], but not currently on the unit. Pt denies HI. Pt reports her anxiety 3/10 and depression \"12\"/10. Will continue with pts care plan.           " No Patient had recent SA

## 2024-10-11 NOTE — PROGRESS NOTES
INPATIENT PSYCHIATRIC PROGRESS NOTE    Name:  Zo Palma  :  1965  MRN:  [id number]  Visit Number:  [id number]  Hospital Day: 8 days    This provider is located at home address on file with [facility]. This visit is being done on behalf of [facility] using a secure platform for a video visit in a secure and private environment. The Patient is located at [facility]-on a locked Behavioral Health unit. The patient's condition being diagnosed/treated is appropriate for telemedicine. The provider identified self as well as credentials. The patient, and/or patient's guardian, consent to being seen remotely, and when consent is given they understand that the consent allows for patient identifiable information to be sent to a third party as needed. They may refuse to be seen remotely at any time. The electronic data is encrypted and password protected, and the patient and/or guardian has been advised of the potential risks to privacy not withstanding such measures.    SUBJECTIVE    CC/Focus of Exam: Depression, SI,    INTERVAL HISTORY:   Patient seen chart reviewed and case discussed in treatment team. Staff report no major behavioral problems overnight.  Patient attending groups and appropriate with peers and staff on the unit.    PRNs overnight given: Hydroxyzine    On interview today patient tells me that she believes there has been little change in her depression since being hospitalized.  We discussed medication options and past medications that she has tried ultimately we decided on Vraylar and patient is interested in a trial of this medication.      Review of Systems    No dizziness, no seizures, no headaches, no nausea/vomiting.    OBJECTIVE      PHYSICAL EXAM:  Vital signs: Reviewed and listed below  Gait and station: Steady   Muscle tone/strength: Symmetrical movements of extremities    Temp:  [97.5 °F (36.4 °C)-98.2 °F (36.8 °C)] 97.5 °F (36.4 °C)  Heart  Rate:  [56-68] 56  Resp:  [16] 16  BP: (109-117)/(63-73) 117/73    MENTAL STATUS EXAM:  Appearance: Casually dressed, fair grooming.   Behavior: Cooperative with interview, good eye contact  Psychomotor: No psychomotor agitation, No EPS reported or observed  Speech: Regular rate, rhythm and tone.  Mood: Depression  Affect: Congruent  Thought Content: no delusional material present  Thought process: generally goal directed  Suicidality: See HPI   Homicidality: No HI  Perception: No AH/VH  Insight: fair   Judgment: limited       Lab Results (last 24 hours)       ** No results found for the last 24 hours. **               Imaging Results (Last 24 Hours)       ** No results found for the last 24 hours. **               ECG/EMG Results (most recent)       None             ALLERGIES: Chlorzoxazone, Iodine, Phenazopyridine hcl, Pyridium [phenazopyridine], Quinine, Valproic acid, Methocarbamol, Iodinated contrast media, Codeine, and Diphenhydramine      Current Facility-Administered Medications:     acetaminophen (TYLENOL) tablet 650 mg, 650 mg, Oral, Q4H PRN, Radha Tinsley MD, 650 mg at 10/11/24 1057    aluminum-magnesium hydroxide-simethicone (MAALOX MAX) 400-400-40 MG/5ML suspension 15 mL, 15 mL, Oral, Q6H PRN, Radha Tinsley MD, 15 mL at 10/03/24 2140    benztropine (COGENTIN) tablet 2 mg, 2 mg, Oral, Daily PRN **OR** benztropine (COGENTIN) injection 1 mg, 1 mg, Intramuscular, Daily PRN, Radha Tinsley MD    Cariprazine HCl (VRAYLAR) capsule capsule 1.5 mg, 1.5 mg, Oral, Daily, Radha Tinsley MD, 1.5 mg at 10/11/24 1057    donepezil (ARICEPT) tablet 5 mg, 5 mg, Oral, Daily, Radha Tinsley MD, 5 mg at 10/11/24 0842    FLUoxetine (PROzac) capsule 80 mg, 80 mg, Oral, QAM, Radha Tinsley MD, 80 mg at 10/11/24 0841    hydrOXYzine (ATARAX) tablet 50 mg, 50 mg, Oral, Q6H PRN, Radha Tinsley MD, 50 mg at 10/10/24 2112    influenza virus vacc split PF FLUZONE 0.5 mL, 0.5 mL, Intramuscular, During Hospitalization,  Radha Tinsley MD    Liraglutide (VICTOZA) injection 1.8 mg, 1.8 mg, Subcutaneous, Daily, Radha Tinsley MD, 1.8 mg at 10/11/24 0841    loperamide (IMODIUM) capsule 2 mg, 2 mg, Oral, Q2H PRN, Radha Tinsley MD    magnesium hydroxide (MILK OF MAGNESIA) suspension 10 mL, 10 mL, Oral, Daily PRN, Radha Tinsley MD    nitrofurantoin (macrocrystal-monohydrate) (MACROBID) capsule 100 mg, 100 mg, Oral, Daily, Radha Tinsley MD, 100 mg at 10/11/24 0841    pantoprazole (PROTONIX) EC tablet 40 mg, 40 mg, Oral, Daily, Radha Tinsley MD, 40 mg at 10/11/24 0740    polyethylene glycol (MIRALAX) packet 17 g, 17 g, Oral, Daily PRN, Radha Tinsley MD    traZODone (DESYREL) tablet 100 mg, 100 mg, Oral, Nightly, Shasha Guthrie MD, 100 mg at 10/10/24 2112    traZODone (DESYREL) tablet 25 mg, 25 mg, Oral, Once PRN, Shasha Guthrie MD          ASSESSMENT & PLAN:      Progress towards treatment plans and goals: Compliant with medications, attending groups, and individual therapy.  Rationale for continued level of care: Patient continues to need inpatient level of care for stabilization of psychiatric symptoms.  Patient would potentially decompensate further at a lower level of care.       Diagnosis:      Suicidal ideation    PTSD (post-traumatic stress disorder)    Dysthymia    MDD (major depressive disorder), recurrent episode, severe        Plan:    -Continue hospitalization for safety and stabilization.  -Continue current precautions and safety checks.  -Encourage participation in therapeutic activities on the unit to increase coping skills for stressful life events. Including group and individual therapy.  -Continue to discuss case in treatment team meetings and continue discharge planning.  -Risk/benefit/alternatives to medications have been discussed with the patient.  We will continue to monitor for negative and positive effects to medications.   -Patient agrees with the following plan for today:    Medications: Start trial of  Vraylar for depression augmentation    Other: Continue discharge planning patient to discuss options with members of the treatment team today to begin arranging follow-up.      Cariprazine HCl, 1.5 mg, Oral, Daily  donepezil, 5 mg, Oral, Daily  FLUoxetine, 80 mg, Oral, QAM  Liraglutide, 1.8 mg, Subcutaneous, Daily  nitrofurantoin (macrocrystal-monohydrate), 100 mg, Oral, Daily  pantoprazole, 40 mg, Oral, Daily  traZODone, 100 mg, Oral, Nightly          I spent 20 minutes before, during and after on this encounter date of service, discussing case with treatment team, reviewing chart, interviewing and evaluating the patient, educating and counseling the patient, assessing patients immediate risk, weighing risks associated with most appropriate level of care, formulating assessment and plan, documentation, writing orders, and communication with RN and staff.      Psychiatrist:  Radha Tinsley MD  10/11/24  18:14 EDT

## 2024-10-11 NOTE — PLAN OF CARE
"Goal Outcome Evaluation:  Plan of Care Reviewed With: patient  Patient Agreement with Plan of Care: agrees     Progress: no change  Outcome Evaluation: no acute events duirng shift at this time. VSS. pt denies HI/AVH. pt reports SI \"always\" and has a plan if discharged. pt reports anxiety 5/10 and depression \"11 or 12\"/10. PRN atarax given. no other changes in pt condition at this time. continue plan of care.                               "

## 2024-10-11 NOTE — CASE MANAGEMENT/SOCIAL WORK
Pt requesting to find a therapy provider who specializes in trauma. PT reports that she wanted to try where her roommate goes : [facility]. SW obtained PT consent, called, and spoke with , Park. Park states that [facility] takes [employer] insurance, but is not Medicare certified. SHABANA inquired about a sliding scale for private pay. Park provided the following prices:    Intake: $90.67  30 min session: $39.52  45 min session: $52.15  60 min session: $76.63.    Sw consulted with PT about the private pay option, and PT stated that she wanted to schedule the intake appointment. Park states that SW would get a call back from the scheduling team on Monday or Tuesday. SW verbalized understanding. SW to follow up if no contact has been made by Tuesday.    Electronically Signed By:  Marlene Neville MSW    Date/Time: 10/11/24 16:07

## 2024-10-11 NOTE — SIGNIFICANT NOTE
10/11/24 1457   Group Therapy Session   Group Attendance attended group session   Time Session Began 1030   Time Session Ended 1100   Total Time (minutes) 30   Group Type psychotherapeutic   Group Topic Covered cognitive therapy techniques;cognitive activities   Literature/Videos Given topic handouts   Literature/Videos Given Comments Three Good People   Group Session Detail Patients participated in identifying strengths in a fictional character, an inspiring person they know, and themselves. Recognizing strengths in others primes clients to begin thinking about their own strengths. The Three Good People worksheet may be used with individuals or groups. After completing the activity, patients were asked: What strengths do you share with the fictional character and the person you know?  What strengths do you possess that the others do not?  What is a strength of yours that you often overlook?  Is there an area of your life where you could better put your strengths to use?   Patient Participation/Contribution cooperative with task   Patient Participation Detail Literature provided to be completed independently.   Affect During Group affect consistent with mood   Degree of Insight moderate

## 2024-10-11 NOTE — SIGNIFICANT NOTE
10/11/24 1230   Group Therapy Session   Group Attendance attended group session   Time Session Began 1130   Time Session Ended 1205   Total Time (minutes) 35   Group Type recreation   Group Topic Covered self care activities   Literature/Videos Given topic handouts   Literature/Videos Given Comments Information provided about flow state.   Group Session Detail Patient participated in discussion about the benefits of achieving a flow state and identifying hobbies that provide intrinsic motivation.   Patient Participation/Contribution closed eyes for most of session;refused to participate;did not discuss personal experience;did not share thoughts verbally   Patient Participation Detail Patient spent group with head laying on the table.   Affect During Group affect consistent with mood;appropriate to situation   Degree of Insight moderate

## 2024-10-12 PROCEDURE — 99232 SBSQ HOSP IP/OBS MODERATE 35: CPT | Performed by: STUDENT IN AN ORGANIZED HEALTH CARE EDUCATION/TRAINING PROGRAM

## 2024-10-12 RX ORDER — TEMAZEPAM 15 MG/1
15 CAPSULE ORAL NIGHTLY
Status: DISCONTINUED | OUTPATIENT
Start: 2024-10-12 | End: 2024-10-13

## 2024-10-12 RX ADMIN — HYDROXYZINE HYDROCHLORIDE 50 MG: 25 TABLET ORAL at 20:46

## 2024-10-12 RX ADMIN — CARIPRAZINE 1.5 MG: 1.5 CAPSULE, GELATIN COATED ORAL at 08:23

## 2024-10-12 RX ADMIN — LIRAGLUTIDE 1.8 MG: 6 INJECTION SUBCUTANEOUS at 08:23

## 2024-10-12 RX ADMIN — NITROFURANTOIN MONOHYDRATE/MACROCRYSTALLINE 100 MG: 25; 75 CAPSULE ORAL at 08:23

## 2024-10-12 RX ADMIN — DONEPEZIL HYDROCHLORIDE 5 MG: 5 TABLET, FILM COATED ORAL at 08:23

## 2024-10-12 RX ADMIN — ACETAMINOPHEN 650 MG: 325 TABLET, FILM COATED ORAL at 20:47

## 2024-10-12 RX ADMIN — TEMAZEPAM 15 MG: 15 CAPSULE ORAL at 20:46

## 2024-10-12 RX ADMIN — TRAZODONE HYDROCHLORIDE 100 MG: 50 TABLET ORAL at 20:46

## 2024-10-12 RX ADMIN — PANTOPRAZOLE SODIUM 40 MG: 40 TABLET, DELAYED RELEASE ORAL at 07:16

## 2024-10-12 RX ADMIN — FLUOXETINE HYDROCHLORIDE 80 MG: 20 CAPSULE ORAL at 08:23

## 2024-10-12 NOTE — SIGNIFICANT NOTE
"Received a phone call from nurse Rodney around 3:00 PM today saying that the patient made a noose using bedsheet, this was discovered during routine room search by the nurse.      -The treatment team immediately decided the patient warrants one-to-one continuous observation level of care.    -The patient remains voluntary, however, due to the significance of the event in the hospital as well as the high risk of suicidality, the patient should immediately be placed on a 72-hour hold sedative and not allowed to leave until assessed by the treatment team.   -In addition, regular bedsheets are to be all removed. And exchange with thick, difficult to tear blankets.     Later spoke with patient on the phone. Pt says she genuinely was suicidal and wanted to end her life with the noose, however; she was assigned a roommate that evening and that stopped her from doing it. She said \"I was quite close to doing it\". Offered support and patient reported to me she is having a lot of trouble falling asleep at night, asked for medicine to \"knock me out\".     -Will go ahead and order Temazepam 15mg at bedtime for patient.  Although patient is already on several sedative medications, this is an exceptional circumstance and warrants additional medication to help the patient fall asleep at bedtime.  "

## 2024-10-12 NOTE — PLAN OF CARE
"Goal Outcome Evaluation:  Plan of Care Reviewed With: patient  Plan of Care Reviewed With: patient  Patient Agreement with Plan of Care: agrees     Progress: no change                      Pt reports that she \"always has SI\" and \"looking for ways\". Pt has voiced her plan about driving her car off of the road after a certain date in April. Pt spoke w/ Dr. Jamison earlier to day and made statements like \"it is what it is\", \"I can't do anything without depending on someone else to allow me\", pt also voiced that she has \"noticed no change\" with her medication changes.  discussed these issue w/ the pt. This nurse discussed that staff has been working on getting the pt set up w/ a trauma therapist as she has requested, and this will be followed up on Monday. Pt verbalized understanding. Pt denies HI/AVH. Reports her anxiety is 3/10 and depression 11/10.   During room checks later today a torn sheet that had been tied into a loop was found in this pts bed by QUINTIN Stevens.  and Soumya- Therapist notified and staff notified. Pt was placed on 1:1 and linens changed to bath mats. Pt spoke with Soumya and this nurse about the sheet, see note by Soumya- Therapist. Will continue with pts care plan.           "

## 2024-10-12 NOTE — SIGNIFICANT NOTE
"Clinician met with patient in office. QUINTIN Borja present. Clinician informed patient that the ripped bed sheet had been found tied in a loop in her bed. Patient confirms that she prepared this as her \"way out.\" Patient reports that she thought about where she would hang herself (between shower and shower entrance) but did not attempt. Patient reports that having a roommate present stopped her. Patient reports that increased hopelessness was her trigger. Clinician informed patient that she would be on one-to-one monitoring going forward.    JAIMIE Brooks  10/12/2024  "

## 2024-10-12 NOTE — NURSING NOTE
During room checks today. A ripped piece of bed sheet was found at the head of the bed, partially hidden by the pillow and mattress. The bottom sheet had a clean tear across it. The piece had a knot tied into it and made a loop. I have emailed the on call Dr to make him aware. He has put the pt on a 1-1 for this. House has been made aware. Email with pictures to the Dr for him to see. Staff will put in a safe report. All staff on [facility] made aware at this time.

## 2024-10-12 NOTE — PLAN OF CARE
Goal Outcome Evaluation:  Plan of Care Reviewed With: patient  Plan of Care Reviewed With: patient  Patient Agreement with Plan of Care: agrees     Progress: improving  Outcome Evaluation: Pt has been calm and compliant, Pt denies SI/HI/AVH and pain. Pt has rested in their room t/o the night, care plan ongoing.

## 2024-10-12 NOTE — SIGNIFICANT NOTE
10/12/24 1141   Group Therapy Session   Group Attendance attended group session   Time Session Began 1030   Time Session Ended 1100   Total Time (minutes) 30   Group Type psychotherapeutic   Group Topic Covered cognitive activities   Literature/Videos Given topic handouts   Literature/Videos Given Comments Strengths Exploration   Group Session Detail Patients will identify their strengths, and then explore their roles in different areas of life: relationships, professional life, and personal fulfillment. Patients will think about ways in which they currently use their strengths, along with new ways they could begin using them.   Patient Participation/Contribution cooperative with task   Patient Participation Detail Literature provided to be completed independently   Affect During Group affect consistent with mood   Degree of Insight moderate

## 2024-10-12 NOTE — PROGRESS NOTES
"INPATIENT PSYCHIATRIC PROGRESS NOTE    Name:  Zo Palma  :  1965  MRN:  [id number]  Visit Number:  [id number]  Hospital Day: 9 days    This provider is located at home address on file with [facility]. This visit is being done on behalf of [facility] using a secure platform for a video visit in a secure and private environment. The Patient is located at [facility]-on a locked Behavioral Health unit. The patient's condition being diagnosed/treated is appropriate for telemedicine. The provider identified self as well as credentials. The patient, and/or patient's guardian, consent to being seen remotely, and when consent is given they understand that the consent allows for patient identifiable information to be sent to a third party as needed. They may refuse to be seen remotely at any time. The electronic data is encrypted and password protected, and the patient and/or guardian has been advised of the potential risks to privacy not withstanding such measures.    SUBJECTIVE    CC/Focus of Exam: Depression, SI,    INTERVAL HISTORY:   Patient seen chart reviewed and case discussed in treatment team. Staff report no major behavioral problems overnight.  Patient attending groups and appropriate with peers and staff on the unit.    PRNs overnight given: Hydroxyzine.  The patient also took scheduled trazodone at bedtime.    On evaluation, the patient appears to have a blunted affect and is somewhat indifferent.  Reports stay has been disappointing and reports the wait time is too long for a trauma therapist.  Patient complains also about the hospital cost.  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  Reports it is a struggle every day to \"stay alive\".  Reports no side effects from Vraylar but also has not seen an effect.  Reports actively going to groups and participating.  She reports her sleep is " still not optimal at this time, and I offered melatonin to her but she politely declines.  Reports the hospital dose of melatonin is too high.    The patient continues to endorse suicidality with possible intent or plan.  Please see event note for the nurses finding a noose in the patient's room.          Review of Systems    No dizziness, no seizures, no headaches, no nausea/vomiting.    OBJECTIVE      PHYSICAL EXAM:  Vital signs: Reviewed and listed below  Gait and station: Steady   Muscle tone/strength: Symmetrical movements of extremities    Temp:  [98.2 °F (36.8 °C)-98.3 °F (36.8 °C)] 98.3 °F (36.8 °C)  Heart Rate:  [54-63] 63  Resp:  [18] 18  BP: (107-121)/(69-78) 107/69    MENTAL STATUS EXAM:  Appearance: Casually dressed, fair grooming.   Behavior: Cooperative with interview, good eye contact  Psychomotor: No psychomotor agitation, No EPS reported or observed  Speech: Regular rate, rhythm and tone.  Mood: Depression  Affect: Congruent  Thought Content: no delusional material present  Thought process: generally goal directed  Suicidality: See HPI   Homicidality: No HI  Perception: No AH/VH  Insight: fair   Judgment: limited       Lab Results (last 24 hours)       ** No results found for the last 24 hours. **               Imaging Results (Last 24 Hours)       ** No results found for the last 24 hours. **               ECG/EMG Results (most recent)       None             ALLERGIES: Chlorzoxazone, Iodine, Phenazopyridine hcl, Pyridium [phenazopyridine], Quinine, Valproic acid, Methocarbamol, Iodinated contrast media, Codeine, and Diphenhydramine      Current Facility-Administered Medications:     acetaminophen (TYLENOL) tablet 650 mg, 650 mg, Oral, Q4H PRN, Radha Tinsley MD, 650 mg at 10/11/24 1057    aluminum-magnesium hydroxide-simethicone (MAALOX MAX) 400-400-40 MG/5ML suspension 15 mL, 15 mL, Oral, Q6H PRN, Radha Tinsley MD, 15 mL at 10/03/24 0550    benztropine (COGENTIN) tablet 2 mg, 2 mg, Oral, Daily  PRN **OR** benztropine (COGENTIN) injection 1 mg, 1 mg, Intramuscular, Daily PRN, Radha Tinsley MD    Cariprazine HCl (VRAYLAR) capsule capsule 1.5 mg, 1.5 mg, Oral, Daily, Radha Tinsley MD, 1.5 mg at 10/12/24 0823    donepezil (ARICEPT) tablet 5 mg, 5 mg, Oral, Daily, Radha Tinsley MD, 5 mg at 10/12/24 0823    FLUoxetine (PROzac) capsule 80 mg, 80 mg, Oral, QAM, Radha Tinsley MD, 80 mg at 10/12/24 0823    hydrOXYzine (ATARAX) tablet 50 mg, 50 mg, Oral, Q6H PRN, Radha Tinsley MD, 50 mg at 10/11/24 2131    influenza virus vacc split PF FLUZONE 0.5 mL, 0.5 mL, Intramuscular, During Hospitalization, Radha Tinsley MD    Liraglutide (VICTOZA) injection 1.8 mg, 1.8 mg, Subcutaneous, Daily, Radha Tinsley MD, 1.8 mg at 10/12/24 0823    loperamide (IMODIUM) capsule 2 mg, 2 mg, Oral, Q2H PRN, Radha Tinsley MD    magnesium hydroxide (MILK OF MAGNESIA) suspension 10 mL, 10 mL, Oral, Daily PRN, Radha Tinsley MD    nitrofurantoin (macrocrystal-monohydrate) (MACROBID) capsule 100 mg, 100 mg, Oral, Daily, Radha Tinsley MD, 100 mg at 10/12/24 0823    pantoprazole (PROTONIX) EC tablet 40 mg, 40 mg, Oral, Daily, Radha Tinsley MD, 40 mg at 10/12/24 0716    polyethylene glycol (MIRALAX) packet 17 g, 17 g, Oral, Daily PRN, Radha Tinsley MD    traZODone (DESYREL) tablet 100 mg, 100 mg, Oral, Nightly, Shasha Guthrie MD, 100 mg at 10/11/24 2131    traZODone (DESYREL) tablet 25 mg, 25 mg, Oral, Once PRN, Shasha Guthrie MD          ASSESSMENT & PLAN:      Progress towards treatment plans and goals: Compliant with medications, attending groups, and individual therapy.  Rationale for continued level of care: Patient continues to need inpatient level of care for stabilization of psychiatric symptoms.  Patient would potentially decompensate further at a lower level of care.       Diagnosis:      Suicidal ideation    PTSD (post-traumatic stress disorder)    Dysthymia    MDD (major depressive disorder), recurrent episode,  severe        Plan:    -Continue hospitalization for safety and stabilization.  -Continue current precautions and safety checks.  -Encourage participation in therapeutic activities on the unit to increase coping skills for stressful life events. Including group and individual therapy.  -Continue to discuss case in treatment team meetings and continue discharge planning.  -Risk/benefit/alternatives to medications have been discussed with the patient.  We will continue to monitor for negative and positive effects to medications.   -The suicide risk for the patient continues to be high.  In fact, the patient was preparing to do something while in the unit.  The patient is placed on continuous one-to-one observation.  Should be placed on 72-hour hold if demanding to leave until assessed by treatment team.  -Patient agrees with the following plan for today:    Medications:   -Continue Vraylar 1.5 mg for depression augmentation, studies have shown that 3 mg is slightly more effective for unipolar depression so potentially could titrate up down the road.  -Add temazepam 15 mg at bedtime for profound insomnia    Other: Continue discharge planning patient to discuss options with members of the treatment team today to begin arranging follow-up.      Cariprazine HCl, 1.5 mg, Oral, Daily  donepezil, 5 mg, Oral, Daily  FLUoxetine, 80 mg, Oral, QAM  Liraglutide, 1.8 mg, Subcutaneous, Daily  nitrofurantoin (macrocrystal-monohydrate), 100 mg, Oral, Daily  pantoprazole, 40 mg, Oral, Daily  traZODone, 100 mg, Oral, Nightly          I spent 20 minutes before, during and after on this encounter date of service, discussing case with treatment team, reviewing chart, interviewing and evaluating the patient, educating and counseling the patient, assessing patients immediate risk, weighing risks associated with most appropriate level of care, formulating assessment and plan, documentation, writing orders, and communication with RN and  staff.      Psychiatrist:  Weilun Wang, MD  10/12/24  12:45 EDT

## 2024-10-12 NOTE — THERAPY TREATMENT NOTE
"DATA:    Therapist met with the patient individually. Therapist continues reviewing plan of care and aftercare plan. The patient was agreeable.    ASSESSMENT:    Patient was seen for follow up of suicidal ideations.      Today, patient was seen 1-1 in office. The patient's mood appears appropriate to circumstances, affect congruent, thought content normal. Patient reports sleep is good and appetite is fair. On scale 1-10, patient rates depression \"11 out of 10\" and anxiety 3. Patient denies AVH. Patient reports that she still has a plan to end her life after her son's wedding. Patient reports that she is motivated for healing and looks forward to an appropriate treatment plan for trauma therapy.      CLINICAL MANEUVERING:    Therapist provided safe, secure environment for patient to share. Provided reflective listening and psychoeducation. Assisted patient in processing the above session. Assisted patient in identifying any questions or needs to be addressed today. Therapist normalized and validated patient's feelings. Provided supportive psychotherapy.    Plan:     Patient to remain hospitalized this date.      Treatment team will focus efforts on stabilizing patient's acute symptoms while providing education on healthy coping and crisis management to reduce hospitalizations. Patient requires daily psychiatrist evaluation and 24/7 nursing supervision to promote patient safety.     Therapist will offer 1-4 individual sessions, family education, and appropriate referral.     Therapist recommends continued assessment.  "

## 2024-10-13 PROCEDURE — 99233 SBSQ HOSP IP/OBS HIGH 50: CPT | Performed by: STUDENT IN AN ORGANIZED HEALTH CARE EDUCATION/TRAINING PROGRAM

## 2024-10-13 RX ORDER — TEMAZEPAM 15 MG/1
30 CAPSULE ORAL NIGHTLY
Status: DISCONTINUED | OUTPATIENT
Start: 2024-10-13 | End: 2024-10-14 | Stop reason: HOSPADM

## 2024-10-13 RX ADMIN — TRAZODONE HYDROCHLORIDE 100 MG: 50 TABLET ORAL at 20:53

## 2024-10-13 RX ADMIN — PANTOPRAZOLE SODIUM 40 MG: 40 TABLET, DELAYED RELEASE ORAL at 06:29

## 2024-10-13 RX ADMIN — TEMAZEPAM 30 MG: 15 CAPSULE ORAL at 20:53

## 2024-10-13 RX ADMIN — CARIPRAZINE 1.5 MG: 1.5 CAPSULE, GELATIN COATED ORAL at 09:09

## 2024-10-13 RX ADMIN — HYDROXYZINE HYDROCHLORIDE 50 MG: 25 TABLET ORAL at 20:53

## 2024-10-13 RX ADMIN — DONEPEZIL HYDROCHLORIDE 5 MG: 5 TABLET, FILM COATED ORAL at 09:09

## 2024-10-13 RX ADMIN — TRAZODONE HYDROCHLORIDE 25 MG: 50 TABLET ORAL at 20:53

## 2024-10-13 RX ADMIN — LIRAGLUTIDE 1.8 MG: 6 INJECTION SUBCUTANEOUS at 09:09

## 2024-10-13 RX ADMIN — NITROFURANTOIN MONOHYDRATE/MACROCRYSTALLINE 100 MG: 25; 75 CAPSULE ORAL at 09:09

## 2024-10-13 RX ADMIN — FLUOXETINE HYDROCHLORIDE 80 MG: 20 CAPSULE ORAL at 09:09

## 2024-10-13 NOTE — PLAN OF CARE
"Goal Outcome Evaluation:  Plan of Care Reviewed With: patient  Plan of Care Reviewed With: patient  Patient Agreement with Plan of Care: agrees     Progress: no change  Outcome Evaluation: pt reports active SI with 2nd plan on unit. pt refuses to disclose plan. screw found missing from patient's door. pt placed in paper scrubs and remains on 1:1 obs.pt denies HI/AVH. pt reports anxiety 8/10 and depression \"11\"/10. PRN tylenol and atarax given. no other changes in pt condition at this time. continue plan of care.                             "

## 2024-10-13 NOTE — PROGRESS NOTES
"INPATIENT PSYCHIATRIC PROGRESS NOTE    Name:  Zo Palma  :  1965  MRN:  [id number]  Visit Number:  [id number]  Hospital Day: 10 days    This provider is located [facility] This visit is being done on behalf of [facility] using a secure platform for a video visit in a secure and private environment. The Patient is located at [facility]-on a locked Behavioral Health unit. The patient's condition being diagnosed/treated is appropriate for telemedicine. The provider identified self as well as credentials. The patient, and/or patient's guardian, consent to being seen remotely, and when consent is given they understand that the consent allows for patient identifiable information to be sent to a third party as needed. They may refuse to be seen remotely at any time. The electronic data is encrypted and password protected, and the patient and/or guardian has been advised of the potential risks to privacy not withstanding such measures.    SUBJECTIVE    CC/Focus of Exam: Depression, SI,    INTERVAL HISTORY:   Patient seen chart reviewed and case discussed in treatment team.      The staff does report significant concern overnight in regards to the patient actually has a backup plan of killing herself while on the unit.  The patient says to me \"you got to have a backup\".      Of note, the patient is already on a 1-1, the bed sheets have been changed to thick untearable sheets, the patient has been given only paper scrubs, and the patient will not be allowed to leave and will be put on a 72-hour hold should she demand to leave.    The patient took as needed Atarax and Tylenol overnight.  The patient did get her scheduled Restoril for sleep last night.     Confronted the patient about her second plan.  The patient would not disclose to me what her second plan exactly is.  I offered supportive psychotherapy and empathy towards her internal " psychological pain.  And encouraged her to disclose plan with us so that we can help her as well as preventing her from doing it.  The patient politely declines to disclose it at this time.  I reiterated to her that one-to-one will be constant.  The patient voices understanding.    She reports still had a hard time going to sleep last night, despite being given temazepam.  In fact she says she did not feel a thing.  She reports to me she had had a ECT before but that gave her cognitive impairment and would never have ECT again.  Reports Spravato did not do anything either.  I spoke with her at length about transcranial magnetic stimulation.  I told her given the fact that she has had a stroke in the past and has profound depression and has tried numerous medications, there is a high likelihood of insurance approving this treatment and she needs to find a provider in [place].  She seems to have some hope about the future of this treatment.  She does report pseudoseizures, and tells me that EEG has never picked up actual seizures.  As a result, I told her likely she does not have contraindications for TMS but that is up to the treating provider.    At the end of the interview, the patient verbally agrees to me that she will keep herself today.  However, we should continue one-to-one indefinitely for now.      Review of Systems    No dizziness, no seizures, no headaches, no nausea/vomiting.    OBJECTIVE      PHYSICAL EXAM:  Vital signs: Reviewed and listed below  Gait and station: Steady   Muscle tone/strength: Symmetrical movements of extremities    Temp:  [97.4 °F (36.3 °C)-98.5 °F (36.9 °C)] 97.4 °F (36.3 °C)  Heart Rate:  [55-78] 78  Resp:  [18] 18  BP: (123-132)/(76-79) 123/79    MENTAL STATUS EXAM:  Appearance: Wearing paper scrubs  Behavior: Cooperative with interview, good eye contact  Psychomotor: No psychomotor agitation, No EPS reported or observed  Speech: Regular rate, rhythm and tone.  Mood: Flat  affect, dysphoric  Affect: Congruent  Thought Content: no delusional material present  Thought process: generally goal directed  Suicidality: The patient has suicidal ideation with plan and intent at the moment  Homicidality: No HI  Perception: No AH/VH  Insight: fair   Judgment: limited       Lab Results (last 24 hours)       ** No results found for the last 24 hours. **               Imaging Results (Last 24 Hours)       ** No results found for the last 24 hours. **               ECG/EMG Results (most recent)       None             ALLERGIES: Chlorzoxazone, Iodine, Phenazopyridine hcl, Pyridium [phenazopyridine], Quinine, Valproic acid, Methocarbamol, Iodinated contrast media, Codeine, and Diphenhydramine      Current Facility-Administered Medications:     acetaminophen (TYLENOL) tablet 650 mg, 650 mg, Oral, Q4H PRN, Radha Tinsley MD, 650 mg at 10/12/24 2047    aluminum-magnesium hydroxide-simethicone (MAALOX MAX) 400-400-40 MG/5ML suspension 15 mL, 15 mL, Oral, Q6H PRN, Radha Tinsley MD, 15 mL at 10/03/24 2140    benztropine (COGENTIN) tablet 2 mg, 2 mg, Oral, Daily PRN **OR** benztropine (COGENTIN) injection 1 mg, 1 mg, Intramuscular, Daily PRN, Radha Tinsley MD    Cariprazine HCl (VRAYLAR) capsule capsule 1.5 mg, 1.5 mg, Oral, Daily, Radha Tinsley MD, 1.5 mg at 10/13/24 0909    donepezil (ARICEPT) tablet 5 mg, 5 mg, Oral, Daily, Radha Tinsley MD, 5 mg at 10/13/24 0909    FLUoxetine (PROzac) capsule 80 mg, 80 mg, Oral, QAM, Radha Tinsley MD, 80 mg at 10/13/24 0909    hydrOXYzine (ATARAX) tablet 50 mg, 50 mg, Oral, Q6H PRN, Radha Tinsley MD, 50 mg at 10/12/24 2046    influenza virus vacc split PF FLUZONE 0.5 mL, 0.5 mL, Intramuscular, During Hospitalization, Radha Tinsley MD    Liraglutide (VICTOZA) injection 1.8 mg, 1.8 mg, Subcutaneous, Daily, Radha Tinsley MD, 1.8 mg at 10/13/24 0909    loperamide (IMODIUM) capsule 2 mg, 2 mg, Oral, Q2H PRN, Radha Tinsley MD    magnesium hydroxide (MILK OF  MAGNESIA) suspension 10 mL, 10 mL, Oral, Daily PRN, Radha Tinsley MD    pantoprazole (PROTONIX) EC tablet 40 mg, 40 mg, Oral, Daily, Radha Tinsley MD, 40 mg at 10/13/24 0629    polyethylene glycol (MIRALAX) packet 17 g, 17 g, Oral, Daily PRN, Radha Tinsley MD    temazepam (RESTORIL) capsule 30 mg, 30 mg, Oral, Nightly, Wang, Weilun, MD    traZODone (DESYREL) tablet 100 mg, 100 mg, Oral, Nightly, Shasha Guthrie MD, 100 mg at 10/12/24 2046    traZODone (DESYREL) tablet 25 mg, 25 mg, Oral, Once PRN, Shasha Guthrie MD          ASSESSMENT & PLAN:      Progress towards treatment plans and goals: Compliant with medications, attending groups, and individual therapy.  Rationale for continued level of care: Patient continues to need inpatient level of care for stabilization of psychiatric symptoms.  Patient would potentially decompensate further at a lower level of care.       Diagnosis:      Suicidal ideation    PTSD (post-traumatic stress disorder)    Dysthymia    MDD (major depressive disorder), recurrent episode, severe        Plan:    -Continue hospitalization for safety and stabilization.  -Continue current precautions and safety checks.  -Encourage participation in therapeutic activities on the unit to increase coping skills for stressful life events. Including group and individual therapy.  -Continue to discuss case in treatment team meetings and continue discharge planning.  -Risk/benefit/alternatives to medications have been discussed with the patient.  We will continue to monitor for negative and positive effects to medications.   -The suicide risk for the patient continues to be high.  In fact, the patient was preparing to do something while in the unit.  The patient is placed on continuous one-to-one observation.  -Patient has a second plan of committing suicide on the unit, this is communicated to the whole treatment team.  -She is currently voluntary.  However, she should be placed on 72-hour hold if  demanding to leave until assessed by treatment team.  - Paper scrubs only, no normal bed sheets only used thick bedsheets that are hard to tear  -Patient agrees with the following plan for today:    Medications:   -Continue Vraylar 1.5 mg for depression augmentation, studies have shown that 3 mg is slightly more effective for unipolar depression so potentially could titrate up down the road.  -Increase temazepam to 30 mg at bedtime for  profound insomnia.  Patient reports has not felt a thing with 15 mg.  I discussed the risks benefits and side effects with the patient and she agrees to proceed.      Cariprazine HCl, 1.5 mg, Oral, Daily  donepezil, 5 mg, Oral, Daily  FLUoxetine, 80 mg, Oral, QAM  Liraglutide, 1.8 mg, Subcutaneous, Daily  pantoprazole, 40 mg, Oral, Daily  temazepam, 30 mg, Oral, Nightly  traZODone, 100 mg, Oral, Nightly          I spent 30 minutes before, during and after on this encounter date of service, discussing case with treatment team, reviewing chart, interviewing and evaluating the patient, educating and counseling the patient, assessing patients immediate risk, weighing risks associated with most appropriate level of care, formulating assessment and plan, documentation, writing orders, and communication with RN and staff.      Psychiatrist:  Weilun Wang, MD  10/13/24  11:23 EDT

## 2024-10-13 NOTE — PLAN OF CARE
"Goal Outcome Evaluation:  Plan of Care Reviewed With: patient  Plan of Care Reviewed With: patient  Patient Agreement with Plan of Care: agrees     Progress: no change                         Pt reports SI \"always\" with a \" 2nd plan on the unit\" that the pt will not disclose. Pt states \" always have to have a backup plan\".  Pt denies HI/AVH. Pt reports her anxiety is 8/10 and depression is 12/10. Pt has remained 1:1 @ all times this shift and has remained calm and cooperative. Will cont. Pts care plan.        "

## 2024-10-14 VITALS
WEIGHT: 142.08 LBS | RESPIRATION RATE: 16 BRPM | HEART RATE: 66 BPM | DIASTOLIC BLOOD PRESSURE: 74 MMHG | TEMPERATURE: 97.5 F | SYSTOLIC BLOOD PRESSURE: 117 MMHG | OXYGEN SATURATION: 100 % | HEIGHT: 63 IN | BODY MASS INDEX: 25.18 KG/M2

## 2024-10-14 PROCEDURE — 99238 HOSP IP/OBS DSCHRG MGMT 30/<: CPT | Performed by: SPECIALIST

## 2024-10-14 RX ORDER — VENLAFAXINE HYDROCHLORIDE 37.5 MG/1
75 CAPSULE, EXTENDED RELEASE ORAL
Status: DISCONTINUED | OUTPATIENT
Start: 2024-10-14 | End: 2024-10-14 | Stop reason: HOSPADM

## 2024-10-14 RX ADMIN — DONEPEZIL HYDROCHLORIDE 5 MG: 5 TABLET, FILM COATED ORAL at 08:39

## 2024-10-14 RX ADMIN — PANTOPRAZOLE SODIUM 40 MG: 40 TABLET, DELAYED RELEASE ORAL at 06:42

## 2024-10-14 RX ADMIN — FLUOXETINE HYDROCHLORIDE 80 MG: 20 CAPSULE ORAL at 08:38

## 2024-10-14 RX ADMIN — VENLAFAXINE HYDROCHLORIDE 75 MG: 37.5 CAPSULE, EXTENDED RELEASE ORAL at 09:02

## 2024-10-14 RX ADMIN — LIRAGLUTIDE 1.8 MG: 6 INJECTION SUBCUTANEOUS at 08:38

## 2024-10-14 RX ADMIN — CARIPRAZINE 1.5 MG: 1.5 CAPSULE, GELATIN COATED ORAL at 08:38

## 2024-10-14 NOTE — PLAN OF CARE
Goal Outcome Evaluation:           Progress: no change  Outcome Evaluation: Pt rates anxiety 3/10 and depression 10/10. Pt denied SI to this nurse during assessment but later admitted SI with no plan to MD. Pt denies HI/AVH. Pt awaiting transfer to higher level of care at this time.

## 2024-10-14 NOTE — PROGRESS NOTES
"INPATIENT PSYCHIATRIC PROGRESS NOTE    Name:  Zo Palma  :  1965  MRN:  [id number]  Visit Number:  [id number]  Hospital Day: 11 days    This provider is located at home address on file with [facility]. This visit is being done on behalf of [facility] using a secure platform for a video visit in a secure and private environment. The Patient is located at [facility]-on a locked Behavioral Health unit. The patient's condition being diagnosed/treated is appropriate for telemedicine. The provider identified self as well as credentials. The patient, and/or patient's guardian, consent to being seen remotely, and when consent is given they understand that the consent allows for patient identifiable information to be sent to a third party as needed. They may refuse to be seen remotely at any time. The electronic data is encrypted and password protected, and the patient and/or guardian has been advised of the potential risks to privacy not withstanding such measures.    SUBJECTIVE    CC/Focus of Exam: Depression and anxiety    INTERVAL HISTORY:   Patient seen chart reviewed and case discussed in treatment team. Staff report no major behavioral problems overnight.  Patient attending groups and appropriate with peers and staff on the unit.    PRNs overnight given: Atarax and trazodone    On interview today patient tells me that she is not feeling much better and was seen to be very negative throughout interaction with me this morning stating \"nothing has changed, I came here to get some trauma help and that is not doing any better\".  When asked about depression and anxiety and suicidal ideations she did admit that that those were the main reasons for her to come to the hospital as well.  When asked how she would rate her depression on a scale from 1-10, she stated that she will rated 11.  She is already on the maximum dose of her antidepressant as she is taking 80 mg of " Prozac and Vraylar recently was added as an augmenting agent as well however, she reports that she does not feel that her medications are working at all as she has history of chronic depression which does appear to be treatment resistant.  She reports that she has had combination antidepressants in the past as well but she has not done well on them.  She rates her anxiety to be 3 however, she continues to report that her depression has been so high that she has been having suicidal ideations but when I asked her about intent or plan, she stated that she does not have a plan at this time and staff report the patient has not shown any engagement in self-harm behavior.  She has been going to therapy groups with though it does not appear that she has been actively participating as she has been in exhibiting pessimistic attitude and at times has been making self-defeating statements.  No agitation and aggression or self-harm behavior has been reported.  I did discuss with her the possibility of medication adjustment and particularly considering her a candidate for dual antidepressant therapy since she is already on the maximum dose of Prozac at 80 mg a day and Vraylar is also been added as an augmenting agent and she was agreeable to adding a second antidepressant patient mentions that she has ready tried Wellbutrin as an augmenting agent along with Prozac in the past and that it did not do very well for her.  I did discuss adding Effexor at a low dose into her Prozac to help her with depression and anxiety and she was open to that adjustment.  Will use risk and benefits of the medication and alternative treatment options were discussed with the patient and she voiced understanding and agreed to give medication a trial.  Signed consent will be obtained by the nursing staff as well.      Review of Systems    No dizziness, no seizures, no headaches, no nausea/vomiting.    OBJECTIVE      PHYSICAL EXAM:  Vital signs:  Reviewed and listed below  Gait and station: Steady   Muscle tone/strength: Symmetrical movements of extremities    Temp:  [98 °F (36.7 °C)] 98 °F (36.7 °C)  Heart Rate:  [73] 73  Resp:  [16] 16  BP: (121)/(91) 121/91    MENTAL STATUS EXAM:  Appearance: Casually dressed, fair hygiene.   Behavior: Cooperative with interview, good eye contact  Psychomotor: No psychomotor agitation, No EPS reported or observed  Speech: Regular rate, rhythm and tone.  Mood: Anxious and depressed  Affect: Congruent  Thought Content: no delusional material present  Thought process: generally goal directed  Suicidality: See HPI   Homicidality: No HI  Perception: No AH/VH  Insight: fair   Judgment: limited       Lab Results (last 24 hours)       ** No results found for the last 24 hours. **               Imaging Results (Last 24 Hours)       ** No results found for the last 24 hours. **               ECG/EMG Results (most recent)       None             ALLERGIES: Chlorzoxazone, Iodine, Phenazopyridine hcl, Pyridium [phenazopyridine], Quinine, Valproic acid, Methocarbamol, Iodinated contrast media, Codeine, and Diphenhydramine      Current Facility-Administered Medications:     acetaminophen (TYLENOL) tablet 650 mg, 650 mg, Oral, Q4H PRN, Radha Tinsley MD, 650 mg at 10/12/24 2047    aluminum-magnesium hydroxide-simethicone (MAALOX MAX) 400-400-40 MG/5ML suspension 15 mL, 15 mL, Oral, Q6H PRN, Radha Tinsley MD, 15 mL at 10/03/24 2140    benztropine (COGENTIN) tablet 2 mg, 2 mg, Oral, Daily PRN **OR** benztropine (COGENTIN) injection 1 mg, 1 mg, Intramuscular, Daily PRN, Radha Tinsley MD    Cariprazine HCl (VRAYLAR) capsule capsule 1.5 mg, 1.5 mg, Oral, Daily, Radha Tinsley MD, 1.5 mg at 10/14/24 0838    donepezil (ARICEPT) tablet 5 mg, 5 mg, Oral, Daily, Radha Tinsley MD, 5 mg at 10/14/24 0839    FLUoxetine (PROzac) capsule 80 mg, 80 mg, Oral, Laureano JACKSON Kari, MD, 80 mg at 10/14/24 0838    hydrOXYzine (ATARAX) tablet 50 mg, 50  mg, Oral, Q6H PRN, Radha Tinsley MD, 50 mg at 10/13/24 2053    influenza virus vacc split PF FLUZONE 0.5 mL, 0.5 mL, Intramuscular, During Hospitalization, Radha Tinsley MD    Liraglutide (VICTOZA) injection 1.8 mg, 1.8 mg, Subcutaneous, Daily, Radha Tinsley MD, 1.8 mg at 10/14/24 0838    loperamide (IMODIUM) capsule 2 mg, 2 mg, Oral, Q2H PRN, Radha Tinsley MD    magnesium hydroxide (MILK OF MAGNESIA) suspension 10 mL, 10 mL, Oral, Daily PRN, Radha Tinsley MD    pantoprazole (PROTONIX) EC tablet 40 mg, 40 mg, Oral, Daily, Radha Tinsley MD, 40 mg at 10/14/24 0642    polyethylene glycol (MIRALAX) packet 17 g, 17 g, Oral, Daily PRN, Radha Tinsley MD    temazepam (RESTORIL) capsule 30 mg, 30 mg, Oral, Nightly, Wang, Weil[place], MD, 30 mg at 10/13/24 2053    traZODone (DESYREL) tablet 100 mg, 100 mg, Oral, Nightly, Shasha Guthrie MD, 100 mg at 10/13/24 2053    venlafaxine XR (EFFEXOR-XR) 24 hr capsule 75 mg, 75 mg, Oral, Daily With Breakfast, Shasha Guthrie MD, 75 mg at 10/14/24 0902          ASSESSMENT & PLAN:      Progress towards treatment plans and goals: Compliant with medications, attending groups, and individual therapy.  Rationale for continued level of care: Patient continues to need inpatient level of care for stabilization of psychiatric symptoms.  Patient would potentially decompensate further at a lower level of care.       Diagnosis:      Suicidal ideation    PTSD (post-traumatic stress disorder)    Dysthymia    MDD (major depressive disorder), recurrent episode, severe        Plan:    -Continue hospitalization for safety and stabilization.  -Continue current precautions and safety checks.  -Encourage participation in therapeutic activities on the unit to increase coping skills for stressful life events. Including group and individual therapy.  -Continue to discuss case in treatment team meetings and continue discharge planning.  -Risk/benefit/alternatives to medications have been discussed with  the patient.  We will continue to monitor for negative and positive effects to medications.   -Patient agrees with the following plan for today:    Medications:      Cariprazine HCl, 1.5 mg, Oral, Daily  donepezil, 5 mg, Oral, Daily  FLUoxetine, 80 mg, Oral, QAM  Liraglutide, 1.8 mg, Subcutaneous, Daily  pantoprazole, 40 mg, Oral, Daily  temazepam, 30 mg, Oral, Nightly  traZODone, 100 mg, Oral, Nightly  venlafaxine XR, 75 mg, Oral, Daily With Breakfast          I spent 20 minutes before, during and after on this encounter date of service, discussing case with treatment team, reviewing chart, interviewing and evaluating the patient, educating and counseling the patient, assessing patients immediate risk, weighing risks associated with most appropriate level of care, formulating assessment and plan, documentation, writing orders, and communication with RN and staff.      Psychiatrist:  Shasha Guthrie MD  10/14/24  09:29 EDT

## 2024-10-14 NOTE — PLAN OF CARE
Goal Outcome Evaluation:  Plan of Care Reviewed With: patient  Plan of Care Reviewed With: patient  Patient Agreement with Plan of Care: agrees     Progress: no change  Outcome Evaluation: Pt's belongings including home med returned and sent with sheriff damon.  Pt ambulatory off of unit in no distress to care of security and [employer].

## 2024-10-14 NOTE — SIGNIFICANT NOTE
10/14/24 1211   Group Therapy Session   Group Attendance attended group session   Time Session Began 1030   Time Session Ended 1100   Total Time (minutes) 30   Group Type psychotherapeutic   Group Topic Covered cognitive therapy techniques;cognitive activities   Literature/Videos Given topic handouts   Literature/Videos Given Comments Analyzing the probability of a feared event   Group Session Detail Patients participated in an individual exercise with the goal of developing an awareness of the irrational nature of the fear and anxiety. Examine the probability of the negative expectation occurring and its consequences. Identify distorted self-talk that mediates the anxiety response. Recognize that the feared outcome will not terminate the ability to function. Then, resolve the core conflict that is the source of the anxiety.   Patient Participation/Contribution cooperative with task   Patient Participation Detail Literature provided to be completed independently.   Affect During Group affect consistent with mood   Degree of Insight moderate

## 2024-10-14 NOTE — PLAN OF CARE
Goal Outcome Evaluation:  Plan of Care Reviewed With: patient  Patient Agreement with Plan of Care: agrees        Outcome Evaluation: Pt rates anxety 4/10 and depression 11/10. Pt denies HI/AVH. Pt endorses SI but has no plan currently. PRN atarax and trazodone during shift. Will continue plan of care.

## 2024-10-14 NOTE — NURSING NOTE
During shift assessment, pt denied SI or any plans to this nurse. Then when pt met with MD, pt admitted to SI thoughts but denied a plan.

## 2024-10-14 NOTE — CASE MANAGEMENT/SOCIAL WORK
Pt to be petitioned to [facility] for a higher level of care. No further discharge planning required for this admission.    Electronically Signed By:  Care TransitionsMarlene MSW    Date/Time: 10/14/24 13:11

## 2024-10-17 NOTE — DISCHARGE SUMMARY
PSYCHIATRIC DISCHARGE SUMMARY     Patient Identification:  Name:  Zo Palma  Age:  59 y.o.  Sex:  female  :  1965  MRN:  [id number]  Visit Number:  [id number]    Patient is being seen on this date of discharge using telemedicine technology. This provider is located at her home address on file with [facility]. This visit is being done on behalf of [facility] using a secure platform for a video visit in a secure and private environment. The Patient is located at [facility]-on a locked Behavioral Health unit. The patient's condition being diagnosed/treated is appropriate for telemedicine. The provider identified herself as well as her credentials. The patient, and/or patient's guardian, consent to being seen remotely, and when consent is given they understand that the consent allows for patient identifiable information to be sent to a third party as needed. They may refuse to be seen remotely at any time. The electronic data is encrypted and password protected, and the patient and/or guardian has been advised of the potential risks to privacy not withstanding such measures.      Date of Admission:10/3/2024   Date of Discharge:  10/17/2024    Discharge Diagnosis:Principal Problem:    Suicidal ideation  Active Problems:    PTSD (post-traumatic stress disorder)    Dysthymia    MDD (major depressive disorder), recurrent episode, severe      Admission Diagnosis:  Suicidal ideation [R45.851]     Reason for Admission:  (Per Psychiatric Evaluation HPI)  59 years old white female with history of mood disorder who was initially admitted to the hospital with increasing depression and anxiety and feelings of hopelessness and helplessness and suicidal ideations.  She was seen to be a significant danger to self and is such a recommendation for inpatient level of care for safety and stabilization was initiated and patient was admitted acute inpatient psychiatric unit and  appropriate precautions were initiated.    Hospital Course      Zo Villatoro admitted to [facility].  Patient was acclimated to the unit rules and schedule.  A comprehensive psychiatric evaluation and treatment plan were complete and safety and comfort measures implemented.  Home medications were reconciled. YOSHI was obtained and reviewed when indicated.    Physical examination was completed and admission labs and EKG reviewed in ER prior to admission. Lab results reviewed as part of admission. Physical exam on unit completed as indicated and documented in Admission Note.    During hospitalization, patient showed improvement during this hospitalization.  Patient was compliant with medications which were maximized as needed to address psychiatric symptoms.  As patient improved they actively participated in groups and therapeutic activities on the unit as well as individual therapy with Master level therapist.       Psychiatric Medication: Risk vs benefit and alternatives to medications were discussed with patient, who agreed with the following medication changes and tolerated them adequately.  Medications were maximized to discharge medication dosages to target psychiatric symptoms.  Psychiatric Medication changes include the followin) Prozac  2) trazodone  3) Atarax    At time of discharge, patient was medically stable, cognitively intact and showed improvement in psychiatric functioning.  Patient denied SI, thoughts of self-harm, or HI. Patient was future oriented.  Patient showed no signs or symptoms of overt psychosis and denied auditory or visual hallucinations at time of discharge.  Patient reported no negative side effects to their medication regiment.  Patient was sleeping through the night. Patient was calm and cooperative with staff and peers. Patient was able to perform ADLs without assistance.  No abnormal movements noted and stable gait observed.  It was felt  by the treatment team that patient had reached maximum benefit from inpatient hospitalization, had adequately met the treatment goals, and consistently denies Suicidal Ideation for at least 48 hours prior to discharge.  Patient made comments that nothing was changing here and after discussion with the treatment team it was noticed that the patient would benefit from long-term health psychiatric hospitalization and as such it is decided that patient will be transferred to [facility].  At that send dictation  Mental Status Exam upon discharge:   Mood was euthymic   Affect: appropriate and generally stable  Thought Content: no delusional material present.  Thought process: Generally goal directed and organized.  Suicidality: No SI  Homicidality: No HI  Perception: No AH/VH or overt signs of psychosis  Insight and Judgement: over all improved since admission     Procedures Performed         Consults:   Consults       No orders found from 9/4/2024 to 10/4/2024.            Pertinent Test Results:  Lab Results (last 72 hours)       ** No results found for the last 72 hours. **              ECG/EMG Results (most recent)       None             EKG: .  EKG: Completed and reviewed in ER prior to admission.    Condition on Discharge:      Vital Signs         Discharge Disposition:  Psychiatric Hospital or Unit (DC - External or Confucianist)    Discharge Medications:     Discharge Medications        ASK your doctor about these medications        Instructions Start Date   acetaminophen 500 MG tablet  Commonly known as: TYLENOL   500 mg, Oral, Daily PRN      donepezil 5 MG tablet  Commonly known as: Aricept   5 mg, Oral, Nightly      estradiol 0.1 MG/GM vaginal cream  Commonly known as: ESTRACE   1 applicator, Vaginal, 3 Times Weekly      FLUoxetine 20 MG capsule  Commonly known as: PROzac   60 mg, Oral, Every Morning      ibandronate 150 MG tablet  Commonly known as: BONIVA   150 mg, Oral, Every 30 Days      Liraglutide 18  MG/3ML solution pen-injector injection  Commonly known as: VICTOZA   1.8 mg, Subcutaneous, Daily      nitrofurantoin (macrocrystal-monohydrate) 100 MG capsule  Commonly known as: MACROBID   100 mg, Oral, Daily, Prophylaxis of Frequently Occuring Urinary Tract Infection      pantoprazole 40 MG EC tablet  Commonly known as: PROTONIX   40 mg, Oral, Daily      traZODone 50 MG tablet  Commonly known as: DESYREL   50 mg, Oral, Nightly PRN      triamcinolone 0.1 % ointment  Commonly known as: KENALOG   1 Application, Topical, 2 Times Daily               Discharge Diet:  Regular healthy balanced diet recommended.    Activity at Discharge:  As tolerated    Follow-up Appointments  Future Appointments   Date Time Provider Department Center   10/18/2024  9:00 AM Katia Mcdaniels [facility]   10/23/2024 12:45 PM Radha Tinsley MD [facility]   11/1/2024 10:00 AM Katia Mcdaniels [facility]   11/5/2024  9:30 AM Yeimi Phan APRN [facility]   11/15/2024 10:00 AM Katia Mcdaniels [facility]   11/20/2024 11:00 AM Brian Platt MD [facility]   11/22/2024 10:45 AM Evan Rivas DO [facility]   12/6/2024 10:00 AM Katia Mcdaniels [facility]   12/9/2024  9:00 AM Damari Fulton MD [facility]     Test Results Pending at Discharge   None    Time: I spent   <30 minutes on this discharge activity which included: face-to-face encounter with the patient, reviewing the data in the system, coordination of the care with the nursing staff as well as consultants, documentation, and entering orders.           Shasha Guthrie MD  Psychiatrist   [facility]

## 2024-10-23 ENCOUNTER — APPOINTMENT (OUTPATIENT)
Dept: GENERAL RADIOLOGY | Facility: HOSPITAL | Age: 59
End: 2024-10-23
Payer: MEDICARE

## 2024-10-23 ENCOUNTER — APPOINTMENT (OUTPATIENT)
Dept: CT IMAGING | Facility: HOSPITAL | Age: 59
End: 2024-10-23
Payer: MEDICARE

## 2024-10-23 ENCOUNTER — HOSPITAL ENCOUNTER (EMERGENCY)
Facility: HOSPITAL | Age: 59
Discharge: HOME | End: 2024-10-23
Attending: STUDENT IN AN ORGANIZED HEALTH CARE EDUCATION/TRAINING PROGRAM | Admitting: STUDENT IN AN ORGANIZED HEALTH CARE EDUCATION/TRAINING PROGRAM
Payer: MEDICARE

## 2024-10-23 VITALS
SYSTOLIC BLOOD PRESSURE: 140 MMHG | OXYGEN SATURATION: 99 % | BODY MASS INDEX: 24.8 KG/M2 | DIASTOLIC BLOOD PRESSURE: 78 MMHG | HEART RATE: 55 BPM | RESPIRATION RATE: 14 BRPM | TEMPERATURE: 98 F | WEIGHT: 140 LBS | HEIGHT: 63 IN

## 2024-10-23 DIAGNOSIS — F41.9 ANXIETY: Primary | ICD-10-CM

## 2024-10-23 DIAGNOSIS — F44.9 CONVERSION DISORDER: ICD-10-CM

## 2024-10-23 LAB
ALBUMIN SERPL-MCNC: 4.1 G/DL (ref 3.5–5.2)
ALBUMIN/GLOB SERPL: 1.5 G/DL
ALP SERPL-CCNC: 64 U/L (ref 39–117)
ALT SERPL W P-5'-P-CCNC: 21 U/L (ref 1–33)
AMPHET+METHAMPHET UR QL: NEGATIVE
AMPHETAMINES UR QL: NEGATIVE
ANION GAP SERPL CALCULATED.3IONS-SCNC: 9.9 MMOL/L (ref 5–15)
APAP SERPL-MCNC: <5 MCG/ML (ref 0–30)
AST SERPL-CCNC: 23 U/L (ref 1–32)
BARBITURATES UR QL SCN: NEGATIVE
BASOPHILS # BLD AUTO: 0.07 10*3/MM3 (ref 0–0.2)
BASOPHILS NFR BLD AUTO: 1.1 % (ref 0–1.5)
BENZODIAZ UR QL SCN: NEGATIVE
BILIRUB SERPL-MCNC: 0.4 MG/DL (ref 0–1.2)
BILIRUB UR QL STRIP: NEGATIVE
BUN SERPL-MCNC: 8 MG/DL (ref 6–20)
BUN/CREAT SERPL: 8.5 (ref 7–25)
BUPRENORPHINE SERPL-MCNC: NEGATIVE NG/ML
CALCIUM SPEC-SCNC: 9.5 MG/DL (ref 8.6–10.5)
CANNABINOIDS SERPL QL: NEGATIVE
CHLORIDE SERPL-SCNC: 105 MMOL/L (ref 98–107)
CLARITY UR: CLEAR
CO2 SERPL-SCNC: 25.1 MMOL/L (ref 22–29)
COCAINE UR QL: NEGATIVE
COLOR UR: YELLOW
CREAT SERPL-MCNC: 0.94 MG/DL (ref 0.57–1)
DEPRECATED RDW RBC AUTO: 42.4 FL (ref 37–54)
EGFRCR SERPLBLD CKD-EPI 2021: 70 ML/MIN/1.73
EOSINOPHIL # BLD AUTO: 0.11 10*3/MM3 (ref 0–0.4)
EOSINOPHIL NFR BLD AUTO: 1.7 % (ref 0.3–6.2)
ERYTHROCYTE [DISTWIDTH] IN BLOOD BY AUTOMATED COUNT: 12.5 % (ref 12.3–15.4)
ETHANOL BLD-MCNC: <10 MG/DL (ref 0–10)
ETHANOL UR QL: <0.01 %
FENTANYL UR-MCNC: NEGATIVE NG/ML
GLOBULIN UR ELPH-MCNC: 2.7 GM/DL
GLUCOSE SERPL-MCNC: 93 MG/DL (ref 65–99)
GLUCOSE UR STRIP-MCNC: NEGATIVE MG/DL
HCT VFR BLD AUTO: 39.6 % (ref 34–46.6)
HGB BLD-MCNC: 12.3 G/DL (ref 12–15.9)
HGB UR QL STRIP.AUTO: NEGATIVE
IMM GRANULOCYTES # BLD AUTO: 0.01 10*3/MM3 (ref 0–0.05)
IMM GRANULOCYTES NFR BLD AUTO: 0.2 % (ref 0–0.5)
KETONES UR QL STRIP: NEGATIVE
LEUKOCYTE ESTERASE UR QL STRIP.AUTO: NEGATIVE
LITHIUM SERPL-SCNC: 1 MMOL/L (ref 0.6–1.2)
LYMPHOCYTES # BLD AUTO: 1.48 10*3/MM3 (ref 0.7–3.1)
LYMPHOCYTES NFR BLD AUTO: 22.4 % (ref 19.6–45.3)
MCH RBC QN AUTO: 28.5 PG (ref 26.6–33)
MCHC RBC AUTO-ENTMCNC: 31.1 G/DL (ref 31.5–35.7)
MCV RBC AUTO: 91.7 FL (ref 79–97)
METHADONE UR QL SCN: NEGATIVE
MONOCYTES # BLD AUTO: 0.57 10*3/MM3 (ref 0.1–0.9)
MONOCYTES NFR BLD AUTO: 8.6 % (ref 5–12)
NEUTROPHILS NFR BLD AUTO: 4.38 10*3/MM3 (ref 1.7–7)
NEUTROPHILS NFR BLD AUTO: 66 % (ref 42.7–76)
NITRITE UR QL STRIP: NEGATIVE
NRBC BLD AUTO-RTO: 0 /100 WBC (ref 0–0.2)
OPIATES UR QL: NEGATIVE
OXYCODONE UR QL SCN: NEGATIVE
PCP UR QL SCN: NEGATIVE
PH UR STRIP.AUTO: 7 [PH] (ref 5–8)
PLATELET # BLD AUTO: 259 10*3/MM3 (ref 140–450)
PMV BLD AUTO: 9.4 FL (ref 6–12)
POTASSIUM SERPL-SCNC: 3.7 MMOL/L (ref 3.5–5.2)
PROT SERPL-MCNC: 6.8 G/DL (ref 6–8.5)
PROT UR QL STRIP: NEGATIVE
QT INTERVAL: 468 MS
QTC INTERVAL: 439 MS
RBC # BLD AUTO: 4.32 10*6/MM3 (ref 3.77–5.28)
SALICYLATES SERPL-MCNC: <0.3 MG/DL
SODIUM SERPL-SCNC: 140 MMOL/L (ref 136–145)
SP GR UR STRIP: <=1.005 (ref 1–1.03)
TRICYCLICS UR QL SCN: NEGATIVE
UROBILINOGEN UR QL STRIP: NORMAL
WBC NRBC COR # BLD AUTO: 6.62 10*3/MM3 (ref 3.4–10.8)

## 2024-10-23 PROCEDURE — 71045 X-RAY EXAM CHEST 1 VIEW: CPT | Performed by: RADIOLOGY

## 2024-10-23 PROCEDURE — 82077 ASSAY SPEC XCP UR&BREATH IA: CPT | Performed by: STUDENT IN AN ORGANIZED HEALTH CARE EDUCATION/TRAINING PROGRAM

## 2024-10-23 PROCEDURE — 99284 EMERGENCY DEPT VISIT MOD MDM: CPT

## 2024-10-23 PROCEDURE — 85025 COMPLETE CBC W/AUTO DIFF WBC: CPT | Performed by: STUDENT IN AN ORGANIZED HEALTH CARE EDUCATION/TRAINING PROGRAM

## 2024-10-23 PROCEDURE — 93005 ELECTROCARDIOGRAM TRACING: CPT | Performed by: STUDENT IN AN ORGANIZED HEALTH CARE EDUCATION/TRAINING PROGRAM

## 2024-10-23 PROCEDURE — 70450 CT HEAD/BRAIN W/O DYE: CPT | Performed by: RADIOLOGY

## 2024-10-23 PROCEDURE — 80053 COMPREHEN METABOLIC PANEL: CPT | Performed by: STUDENT IN AN ORGANIZED HEALTH CARE EDUCATION/TRAINING PROGRAM

## 2024-10-23 PROCEDURE — 80307 DRUG TEST PRSMV CHEM ANLYZR: CPT | Performed by: STUDENT IN AN ORGANIZED HEALTH CARE EDUCATION/TRAINING PROGRAM

## 2024-10-23 PROCEDURE — 80143 DRUG ASSAY ACETAMINOPHEN: CPT | Performed by: STUDENT IN AN ORGANIZED HEALTH CARE EDUCATION/TRAINING PROGRAM

## 2024-10-23 PROCEDURE — 80178 ASSAY OF LITHIUM: CPT | Performed by: STUDENT IN AN ORGANIZED HEALTH CARE EDUCATION/TRAINING PROGRAM

## 2024-10-23 PROCEDURE — 80179 DRUG ASSAY SALICYLATE: CPT | Performed by: STUDENT IN AN ORGANIZED HEALTH CARE EDUCATION/TRAINING PROGRAM

## 2024-10-23 PROCEDURE — 70450 CT HEAD/BRAIN W/O DYE: CPT

## 2024-10-23 PROCEDURE — 81003 URINALYSIS AUTO W/O SCOPE: CPT | Performed by: STUDENT IN AN ORGANIZED HEALTH CARE EDUCATION/TRAINING PROGRAM

## 2024-10-23 PROCEDURE — 71045 X-RAY EXAM CHEST 1 VIEW: CPT

## 2024-10-23 NOTE — NURSING NOTE
Presented pt to Dr. Orozco. He feels patient does not meet admission criteria at this time. He recommends pt stop taking lithium. New order to dc pt at this time with outpatient resources. [id number].

## 2024-10-23 NOTE — NURSING NOTE
[facility] She reports they increased her prozac and started her on lithium. She reports since then she hasn't been able to eat or sleep. She reports she has been shaking and getting lightheaded. She reports she was afraid she had serotonin syndrome. Patient denies si, hi, and avh. She denies any substance use. She rates anxiety 3/10 and depression 8/10. Patient is alert and oriented.

## 2024-10-23 NOTE — ED PROVIDER NOTES
"Subjective   History of Present Illness  59-year-old female with past medical history of anxiety, depression, conversion disorder, and chronic headaches presents to the ER due to concerns for what she thought was possible serotonin syndrome.  Patient noted shaking and headache.  Patient noted she recently had her antidepressant increased.  Patient expressed a concern for serotonin syndrome.  No fever.  No chills.  No hyperreflexia.  No altered mental status.  No obvious aggravating or alleviating factors.  No meningismal symptoms.  Vital stable.  Afebrile      Review of Systems   Neurological:  Positive for dizziness, tremors and headaches.   All other systems reviewed and are negative.      Past Medical History:   Diagnosis Date    Ankle sprain     Anxiety     Arteriovenous malformation 2004    Temporal hemangiona - right    Arthritis of back     Arthritis of neck     Brain concussion     Bursitis of hip     Cervical disc disorder     Conversion disorder     Depression     Difficulty walking 3/2024    Diverticulosis     Fracture of wrist     Fracture, finger     Fracture, foot     Frozen shoulder     GERD (gastroesophageal reflux disease)     Hip arthrosis     HL (hearing loss)     Hormone disorder     Hyperlipidemia     Insomnia     Interstitial cystitis     Kidney stone     Liver failure     due to depakote    Lumbosacral disc disease     Lupus     Memory loss     Migraines     Movement disorder 3/2014    Essential tremor    Orthostatic hypotension     Osteopenia     Periarthritis of shoulder     Peripheral neuropathy     Rotator cuff syndrome     Scoliosis 11/2023    Seizures 2001    Conversion Disorder    Sleep apnea     Subluxation of patella     Suicidal thoughts     Suicide attempt     \"I tried overdosing\"  32 antihistamine tablets per pt 1/27/24    Tremor 2/2022    Urinary tract infection     Weight loss        Allergies   Allergen Reactions    Chlorzoxazone Unknown - High Severity and Swelling     Lorzone, " "muscle relaxant.    Iodine Anaphylaxis     Topical makes her swell  Anaphylaxis with IV contrast (when she was ~ 9yrs old)    Phenazopyridine Hcl Swelling and Anaphylaxis    Pyridium [Phenazopyridine] Anaphylaxis    Quinine Unknown - High Severity     Has malaria symptoms    Valproic Acid Other (See Comments)     Liver failure  Liver failure  Liver failure; lupus like symptoms and liver failure    Methocarbamol Other (See Comments)     Made her feel \"suicidal\" and gave her less control over her actions.    Iodinated Contrast Media Unknown - Low Severity    Codeine Itching    Diphenhydramine Anxiety     Pt has severe panic attack symptoms when given benadryl IV. Needs to take a benzodiazapine med concurrently when getting benadryl.        Past Surgical History:   Procedure Laterality Date    ABDOMINAL SURGERY      BARIATRIC SURGERY  2008    Gastric sleeve    BLADDER SURGERY      BREAST SURGERY Bilateral     reduction    CHOLECYSTECTOMY  2010    COLONOSCOPY      COSMETIC SURGERY      CYSTOSCOPY      ELECTROCONVULSIVE THERAPY Bilateral 02/15/2024    Procedure: BIFRONTAL ELECTROCONVULSIVE THERAPY;  Surgeon: Vinod Osuna MD;  Location: [facility] OR;  Service: ECT;  Laterality: Bilateral;    ELECTROCONVULSIVE THERAPY N/A 02/19/2024    Procedure: ELECTROCONVULSIVE THERAPY;  Surgeon: Vinod Osuna MD;  Location: [facility] OR;  Service: ECT;  Laterality: N/A;    ELECTROCONVULSIVE THERAPY N/A 02/21/2024    Procedure: ELECTROCONVULSIVE THERAPY;  Surgeon: Vinod Osuna MD;  Location: [facility] OR;  Service: ECT;  Laterality: N/A;    ELECTROCONVULSIVE THERAPY Bilateral 06/10/2024    Procedure: BIFRONTAL ELECTROCONVULSIVE THERAPY;  Surgeon: Vinod Osuna MD;  Location: [facility] OR;  Service: ECT;  Laterality: Bilateral;    ELECTROCONVULSIVE THERAPY N/A 06/12/2024    Procedure: ELECTROCONVULSIVE THERAPY;  Surgeon: Vinod Osuna MD;  Location: [facility] OR;  Service: ECT;  Laterality: " N/A;    ELECTROCONVULSIVE THERAPY N/A 06/14/2024    Procedure: ELECTROCONVULSIVE THERAPY;  Surgeon: Vinod Osuna MD;  Location:  [facility] OR;  Service: ECT;  Laterality: N/A;    ELECTROCONVULSIVE THERAPY N/A 06/17/2024    Procedure: ELECTROCONVULSIVE THERAPY;  Surgeon: Vinod Osuna MD;  Location:  [facility] OR;  Service: ECT;  Laterality: N/A;    ELECTROCONVULSIVE THERAPY N/A 06/19/2024    Procedure: ELECTROCONVULSIVE THERAPY;  Surgeon: Vinod Osuna MD;  Location:  [facility] OR;  Service: ECT;  Laterality: N/A;    ENDOSCOPY      FOOT SURGERY Bilateral     6 hammer toes    FRACTURE SURGERY      Left wrist    GASTRIC SLEEVE LAPAROSCOPIC N/A     HYSTERECTOMY      NECK SURGERY      OOPHORECTOMY Bilateral     SINUS SURGERY  2021    WISDOM TOOTH EXTRACTION N/A     WRIST SURGERY Left        Family History   Problem Relation Age of Onset    Cancer Mother         Brain, suspected uterine    Rheumatologic disease Mother     Dementia Father         Family predisposed    Cancer Father         Prostate    Learning disabilities Father         Dyslexia    Other Father         Early onset alzheimer    Prostate cancer Father     Cancer Brother         Prostate, bone    Learning disabilities Brother         Dyslexia    Other Brother         Alzheimer    Dementia Brother     Prostate cancer Maternal Grandfather     Parkinsonism Maternal Grandfather     Other Paternal Grandfather         Early onset alzheimer    Depression Son     Learning disabilities Son        Social History     Socioeconomic History    Marital status:     Number of children: 2    Highest education level: Master's degree (e.g., MA, MS, Pancho, MEd, MSW, SULY)   Tobacco Use    Smoking status: Never     Passive exposure: Past    Smokeless tobacco: Never   Vaping Use    Vaping status: Never Used   Substance and Sexual Activity    Alcohol use: Yes     Alcohol/week: 1.0 standard drink of alcohol     Types: 1 Drinks containing 0.5 oz of  alcohol per week     Comment: Occasionally    Drug use: Never    Sexual activity: Not Currently     Partners: Male     Birth control/protection: Post-menopausal, Hysterectomy           Objective   Physical Exam  Constitutional:       General: She is not in acute distress.     Appearance: Normal appearance. She is not ill-appearing.   HENT:      Head: Normocephalic and atraumatic.      Right Ear: External ear normal.      Left Ear: External ear normal.      Nose: Nose normal.      Mouth/Throat:      Mouth: Mucous membranes are moist.   Eyes:      Extraocular Movements: Extraocular movements intact.      Pupils: Pupils are equal, round, and reactive to light.   Cardiovascular:      Rate and Rhythm: Normal rate and regular rhythm.      Heart sounds: No murmur heard.  Pulmonary:      Effort: Pulmonary effort is normal. No respiratory distress.      Breath sounds: Normal breath sounds. No wheezing.   Abdominal:      General: Bowel sounds are normal.      Palpations: Abdomen is soft.      Tenderness: There is no abdominal tenderness.   Musculoskeletal:         General: No deformity or signs of injury. Normal range of motion.      Cervical back: Normal range of motion and neck supple.   Skin:     General: Skin is warm and dry.      Findings: No erythema.   Neurological:      General: No focal deficit present.      Mental Status: She is alert and oriented to person, place, and time. Mental status is at baseline.      Cranial Nerves: No cranial nerve deficit.   Psychiatric:         Mood and Affect: Mood normal.         Behavior: Behavior normal.         Thought Content: Thought content normal.         Procedures           ED Course  ED Course as of 10/23/24 1349   Wed Oct 23, 2024   1130 EKG done sinus bradycardia.  53 bpm.  No exacerbation.  QTc 439.  Electronically signed by Sandor Mann DO, 10/23/24, 11:30 AM EDT.   [SF]      ED Course User Index  [SF] Sandor Mann DO                                   XR Chest 1  View    Result Date: 10/23/2024  No radiographic evidence of acute cardiac or pulmonary disease.    This report was finalized on 10/23/2024 11:20 AM by Dr. Yan Ennis MD.      CT Head Without Contrast    Result Date: 10/23/2024    Unremarkable exam demonstrating no CT evidence of acute intracranial findings.  This report was finalized on 10/23/2024 11:05 AM by Dr. Yan Ennis MD.         Results for orders placed or performed during the hospital encounter of 10/23/24   Comprehensive Metabolic Panel    Collection Time: 10/23/24 10:30 AM    Specimen: Blood   Result Value Ref Range    Glucose 93 65 - 99 mg/dL    BUN 8 6 - 20 mg/dL    Creatinine 0.94 0.57 - 1.00 mg/dL    Sodium 140 136 - 145 mmol/L    Potassium 3.7 3.5 - 5.2 mmol/L    Chloride 105 98 - 107 mmol/L    CO2 25.1 22.0 - 29.0 mmol/L    Calcium 9.5 8.6 - 10.5 mg/dL    Total Protein 6.8 6.0 - 8.5 g/dL    Albumin 4.1 3.5 - 5.2 g/dL    ALT (SGPT) 21 1 - 33 U/L    AST (SGOT) 23 1 - 32 U/L    Alkaline Phosphatase 64 39 - 117 U/L    Total Bilirubin 0.4 0.0 - 1.2 mg/dL    Globulin 2.7 gm/dL    A/G Ratio 1.5 g/dL    BUN/Creatinine Ratio 8.5 7.0 - 25.0    Anion Gap 9.9 5.0 - 15.0 mmol/L    eGFR 70.0 >60.0 mL/min/1.73   Acetaminophen Level    Collection Time: 10/23/24 10:30 AM    Specimen: Blood   Result Value Ref Range    Acetaminophen <5.0 0.0 - 30.0 mcg/mL   Salicylate Level    Collection Time: 10/23/24 10:30 AM    Specimen: Blood   Result Value Ref Range    Salicylate <0.3 <=30.0 mg/dL   Lithium Level    Collection Time: 10/23/24 10:30 AM    Specimen: Blood   Result Value Ref Range    Lithium 1.0 0.6 - 1.2 mmol/L   Ethanol    Collection Time: 10/23/24 10:30 AM    Specimen: Blood   Result Value Ref Range    Ethanol <10 0 - 10 mg/dL    Ethanol % <0.010 %   CBC Auto Differential    Collection Time: 10/23/24 10:30 AM    Specimen: Blood   Result Value Ref Range    WBC 6.62 3.40 - 10.80 10*3/mm3    RBC 4.32 3.77 - 5.28 10*6/mm3    Hemoglobin 12.3 12.0 - 15.9 g/dL     Hematocrit 39.6 34.0 - 46.6 %    MCV 91.7 79.0 - 97.0 fL    MCH 28.5 26.6 - 33.0 pg    MCHC 31.1 (L) 31.5 - 35.7 g/dL    RDW 12.5 12.3 - 15.4 %    RDW-SD 42.4 37.0 - 54.0 fl    MPV 9.4 6.0 - 12.0 fL    Platelets 259 140 - 450 10*3/mm3    Neutrophil % 66.0 42.7 - 76.0 %    Lymphocyte % 22.4 19.6 - 45.3 %    Monocyte % 8.6 5.0 - 12.0 %    Eosinophil % 1.7 0.3 - 6.2 %    Basophil % 1.1 0.0 - 1.5 %    Immature Grans % 0.2 0.0 - 0.5 %    Neutrophils, Absolute 4.38 1.70 - 7.00 10*3/mm3    Lymphocytes, Absolute 1.48 0.70 - 3.10 10*3/mm3    Monocytes, Absolute 0.57 0.10 - 0.90 10*3/mm3    Eosinophils, Absolute 0.11 0.00 - 0.40 10*3/mm3    Basophils, Absolute 0.07 0.00 - 0.20 10*3/mm3    Immature Grans, Absolute 0.01 0.00 - 0.05 10*3/mm3    nRBC 0.0 0.0 - 0.2 /100 WBC   ECG 12 Lead Drug Monitoring    Collection Time: 10/23/24 10:53 AM   Result Value Ref Range    QT Interval 468 ms    QTC Interval 439 ms   Urinalysis With Microscopic If Indicated (No Culture) - Urine, Clean Catch    Collection Time: 10/23/24 12:17 PM    Specimen: Urine, Clean Catch   Result Value Ref Range    Color, UA Yellow Yellow, Straw    Appearance, UA Clear Clear    pH, UA 7.0 5.0 - 8.0    Specific Gravity, UA <=1.005 1.005 - 1.030    Glucose, UA Negative Negative    Ketones, UA Negative Negative    Bilirubin, UA Negative Negative    Blood, UA Negative Negative    Protein, UA Negative Negative    Leuk Esterase, UA Negative Negative    Nitrite, UA Negative Negative    Urobilinogen, UA 0.2 E.U./dL 0.2 - 1.0 E.U./dL   Urine Drug Screen - Urine, Clean Catch    Collection Time: 10/23/24 12:17 PM    Specimen: Urine, Clean Catch   Result Value Ref Range    THC, Screen, Urine Negative Negative    Phencyclidine (PCP), Urine Negative Negative    Cocaine Screen, Urine Negative Negative    Methamphetamine, Ur Negative Negative    Opiate Screen Negative Negative    Amphetamine Screen, Urine Negative Negative    Benzodiazepine Screen, Urine Negative Negative     Tricyclic Antidepressants Screen Negative Negative    Methadone Screen, Urine Negative Negative    Barbiturates Screen, Urine Negative Negative    Oxycodone Screen, Urine Negative Negative    Buprenorphine, Screen, Urine Negative Negative   Fentanyl, Urine - Urine, Clean Catch    Collection Time: 10/23/24 12:17 PM    Specimen: Urine, Clean Catch   Result Value Ref Range    Fentanyl, Urine Negative Negative                 Medical Decision Making  CBC and CMP are unremarkable.  Urinalysis unremarkable.  UDS unremarkable.  Head CT without contrast revealed no acute abnormality.  The patient's  called out due to concerns for possible tonic-clonic activity.  When I arrived into the room, the patient had nonspecific jerking motions.  Patient responded appropriately to nasal saline applied to the periorbital region.  Patient quickly  became responsive.  Nonepileptiform activity likely.  Patient's  revealed she has a history of conversion disorder.  It is likely this is a exacerbation of her conversion disorder or possible worsening anxiety.    Behavioral health evaluated this patient and the recommended to discontinue her lithium at this time.  Patient will follow-up with her clinic in the outpatient setting.    Work up and results were discussed throughly with the patient.  The patient will be discharged for further monitoring and management with their PCP.  Red flags, warning signs, worsening symptoms, and when to return to the ER discussed with and understood by the patient.  Patient will follow up with their PCP in a timely manner.  Vitals stable at discharge.        MDM:    Escalation of care including admission/observation considered    - Discussions of management with other providers:  None    - Discussed/reviewed with Radiology regarding test interpretation    - Independent interpretation: Labs, XR, and EKG    - Additional patient history obtained from: Family    - Review of external non-ED record  (if available):  Prior Inpt record, Office record, Outpt record, Prior Outpt labs, PCP record, Outside ED record, Other    - Chronic conditions affecting care: See HPI and medical Hx.    - Social Determinants of health significantly affecting care:  None        Medical Decision Making Discussion:      The patient has been given very strict return precautions to return to the emergency department should there be any acute change or worsening of their condition.  I have explained my findings and the patient has expressed understanding to me.  I explained that the work-up performed in the ED has been based on the specific complaint and concern, as the nature of emergency medicine is complaint driven and they understand that new symptoms may arise.  I have told them that, should there be any new symptoms, worsening or changing symptoms, a new work-up may be indicated that they are encouraged to return to the emergency department or promptly contact their primary care physician. We have employed a shared decision-making process as the discussion of their disposition.  The patient has been educated as to the nature of the visit, the tests and work-up performed and the findings from today's visit. At this time, there does not appear to be any acute emergent process that necessitates admission to the hospital, however, the patient understands that this can change unexpectedly. At this time, the patient is stable for discharge home and agrees to follow-up with her primary care physician in the next 24 to 48 hours or earlier should they be able to obtain an appointment.    The patient was counseled regarding diagnostic results and treatment plan and patient has indicated understanding of these instructions.      Please follow up with your primary care physician in the next 1-3 days. If this is not possible, please return to the ED for re-evaluation.    It is IMPORTANT to see your DOCTOR OR PRIMARY CARE PROVIDER. Emergency  Care may be incomplete without proper follow-up.  Your evaluation in the emergency department is focused on a specific clinical complaint, at a given moment in time.  Symptoms sometimes change or new symptoms might arise after you leave the Emergency Department. It is important that you call your doctor if you become worse in any way, or promptly return to the Emergency Department should any new, or worsening/changing symptom occur.  You are strongly urged to follow-up with your physician to assure complete and thorough care. PLEASE CALL YOUR DOCTORS OFFICE TODAY, INFORM THEM THAT YOU WERE SEEN IN THE EMERGENCY DEPARTMENT, AND THAT YOU NEED TO BE SEEN IMMEDIATELY FOR CLOSE FOLLOW UP.  If you do not have a primary care doctor we encourage you to proactively seek a local physician for close follow up.  Consider local clinics, free or sliding scale clinics, local [facility] [place], [place].  You can always return to the Emergency Department if you are having difficulty coordinating close follow up.  If medications were prescribed you should fill them at your local pharmacy immediately and take only as prescribed.  Bring your new medications to your doctors follow up visit to discuss any changes that would be necessary. RETURN TO THE EMERGENCY DEPARTMENT IMMEDIATELY FOR ANY NEW OR WORSENING SYMPTOMS.    ADDITIONAL DISCHARGE INSTRUCTIONS:     - If you received IV contrast today with a CT or MRI please stay well hydrated for the next 48-72 hours to protect your kidneys.    - If you have been prescribed an antibiotic, taking an over the counter probiotic may help with gastrointestinal side effects and antibiotic associated diarrhea.    - Return to the emergency department or seek immediate medical care if any of the following occur:           - Symptoms do not improve in 8-12 hours. If this occurs, please return to the emergency department for re-evaluation or your symptoms or repeat abdominal examination         - Symptoms worsen  at any time.         - If you are unable to follow up with a medical provider as instructed.         - You develop any worrisome symptoms such as fever, chills, uncontrolled pain, change or worsening of your pain symptoms, intractable vomiting, uncontrolled diarrhea, blood in your stool, dark/tarry stool, new neurological symptoms etc.    If you have been prescribed a narcotic pain medication, please take a stool softener to prevent constipation.     During your ED visit, you may have been given narcotics (such as morphine, dilaudid, percocet, vicodin) or sedatives (such as ativan, versed). These medications can impair your reflexes so, DO NOT DRIVE or USE ANY MACHINERY for at least 6 hours if you have been given these medications.    REMINDER FOR METFORMIN USERS: If you are using metformin (also known as Glucophage) and if you received a CT scan in which you received IV contrast, you must hold your metformin for a total of 72 hrs.  This will prevent any adverse interactions between the two agents.             Problems Addressed:  Anxiety: complicated acute illness or injury  Conversion disorder: complicated acute illness or injury    Amount and/or Complexity of Data Reviewed  Labs: ordered. Decision-making details documented in ED Course.  Radiology: ordered. Decision-making details documented in ED Course.  ECG/medicine tests: ordered.        Final diagnoses:   Anxiety   Conversion disorder       ED Disposition  ED Disposition       ED Disposition   Discharge    Condition   Stable    Comment   --               Evan Rivas, DO  [address] [place] [address]  [phone]    In 1 week      [facility] EMERGENCY DEPARTMENT  [address] [place] [id number] [phone]    If symptoms worsen         Medication List        Changed      donepezil 5 MG tablet  Commonly known as: Aricept  Take 1 tablet by mouth Every Night.  What changed: when to take this                 Sandor Mann,  DO  10/23/24 1246       Sandor Mann, DO  10/23/24 134

## 2024-10-30 NOTE — ANESTHESIA PREPROCEDURE EVALUATION
Anesthesia Evaluation     Patient summary reviewed and Nursing notes reviewed   no history of anesthetic complications:   NPO Solid Status: > 8 hours  NPO Liquid Status: > 8 hours           Airway   Mallampati: II  TM distance: >3 FB  Neck ROM: full  Dental - normal exam     Pulmonary     breath sounds clear to auscultation  (+) ,sleep apnea (before weight loss)  Cardiovascular   Exercise tolerance: good (4-7 METS)    ECG reviewed  Rhythm: regular  Rate: normal    (+) hyperlipidemia      Neuro/Psych  (+) seizures (conversion disorder. not seizures), headaches, numbness, psychiatric history Depression  GI/Hepatic/Renal/Endo    (+) GERD, liver disease (liver failure with depakote), renal disease- stones, diabetes mellitus type 2    Musculoskeletal     (+) arthralgias, back pain, chronic pain, neck pain, radiculopathy  Abdominal     Abdomen: soft.   Substance History - negative use     OB/GYN negative ob/gyn ROS         Other   arthritis,     ROS/Med Hx Other:   Sinus bradycardia  Otherwise normal ECG  Confirmed by Rachel Hawkins ([id number]) on 6/7/2024 9:48:46 AM                    Anesthesia Plan    ASA 3     general     intravenous induction     Anesthetic plan, risks, benefits, and alternatives have been provided, discussed and informed consent has been obtained with: patient.  Pre-procedure education provided  Plan discussed with CRNA.      CODE STATUS:    Code Status (Patient has no pulse and is not breathing): CPR (Attempt to Resuscitate)  Medical Interventions (Patient has pulse or is breathing): Full Support       Never smoker

## 2024-11-20 ENCOUNTER — OFFICE VISIT (OUTPATIENT)
Dept: NEUROLOGY | Facility: CLINIC | Age: 59
End: 2024-11-20
Payer: MEDICARE

## 2024-11-20 ENCOUNTER — OFFICE VISIT (OUTPATIENT)
Dept: PSYCHIATRY | Facility: CLINIC | Age: 59
End: 2024-11-20
Payer: MEDICARE

## 2024-11-20 VITALS
SYSTOLIC BLOOD PRESSURE: 110 MMHG | DIASTOLIC BLOOD PRESSURE: 70 MMHG | TEMPERATURE: 97.7 F | HEIGHT: 63 IN | HEART RATE: 65 BPM | WEIGHT: 143 LBS | OXYGEN SATURATION: 100 % | BODY MASS INDEX: 25.34 KG/M2

## 2024-11-20 VITALS
HEART RATE: 66 BPM | OXYGEN SATURATION: 99 % | WEIGHT: 144.6 LBS | DIASTOLIC BLOOD PRESSURE: 70 MMHG | SYSTOLIC BLOOD PRESSURE: 112 MMHG | BODY MASS INDEX: 25.62 KG/M2 | HEIGHT: 63 IN

## 2024-11-20 DIAGNOSIS — R25.1 TREMOR OF BOTH HANDS: Primary | ICD-10-CM

## 2024-11-20 DIAGNOSIS — R41.89 SUBJECTIVE MEMORY COMPLAINTS: ICD-10-CM

## 2024-11-20 DIAGNOSIS — F43.10 POST TRAUMATIC STRESS DISORDER (PTSD): ICD-10-CM

## 2024-11-20 DIAGNOSIS — F33.2 SEVERE EPISODE OF RECURRENT MAJOR DEPRESSIVE DISORDER, WITHOUT PSYCHOTIC FEATURES: Primary | ICD-10-CM

## 2024-11-20 DIAGNOSIS — G47.09 OTHER INSOMNIA: ICD-10-CM

## 2024-11-20 DIAGNOSIS — F41.1 GENERALIZED ANXIETY DISORDER: ICD-10-CM

## 2024-11-20 DIAGNOSIS — F41.0 PANIC ATTACKS: ICD-10-CM

## 2024-11-20 PROCEDURE — 1160F RVW MEDS BY RX/DR IN RCRD: CPT | Performed by: PSYCHIATRY & NEUROLOGY

## 2024-11-20 PROCEDURE — 1159F MED LIST DOCD IN RCRD: CPT | Performed by: PSYCHIATRY & NEUROLOGY

## 2024-11-20 PROCEDURE — 99214 OFFICE O/P EST MOD 30 MIN: CPT | Performed by: PSYCHIATRY & NEUROLOGY

## 2024-11-20 RX ORDER — DONEPEZIL HYDROCHLORIDE 10 MG/1
10 TABLET, FILM COATED ORAL NIGHTLY
Qty: 90 TABLET | Refills: 2 | Status: SHIPPED | OUTPATIENT
Start: 2024-11-20

## 2024-11-20 RX ORDER — MIRTAZAPINE 15 MG/1
15 TABLET, FILM COATED ORAL
COMMUNITY
Start: 2024-11-06 | End: 2024-11-20

## 2024-11-20 RX ORDER — LITHIUM CARBONATE 300 MG/1
CAPSULE ORAL
COMMUNITY
Start: 2024-10-15 | End: 2024-11-20
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
## 2024-11-20 NOTE — PROGRESS NOTES
Follow Up Office Visit      Patient Name: Zo Palma  : 1965   MRN: [id number]     Chief Complaint:    Chief Complaint   Patient presents with    Tremors       History of Present Illness: Zo Palma is a 59 y.o. female who is here today to follow up with tremors in her hands and these are worse in the left hand when she is stressed and fatigued. She says these wax and wane. She says drinking and eating can be difficult. She felt like this worsened some after her ECT treatments but says Lithium made this even worse. She felt the memory was worse after her ECT treatments. She says she is doing well on Donepazil 5 mg at night. She says she is independent with her ADL's. She still drives. She says she has a roommate who handles her med's. She has left the burners on. She says she she gets disoriented when out and so she uses a GPS everywhere she goes. She is able to make calls on her phone. She says her kids help her manage her bills. She has short term memory issues  with recent conversations and names.     -She has been on Primidone, Propranolol and Topamax (Topamax was for migraines and did not tolerate)  - She is right hand dominant  -She had neuropsych testing on 2024.  Impression: Demonstrated borderline impaired visual constructive ability.  All other cognitive functions including learning, memory, processing speed, auditory attention, verbal fluency, word retrieval and visual perception are low average to high average.  Global cognitive ability is average.  Neurocognitive testing is not consistent with a neurodegenerative condition.  Suspect her perceived memory loss is attributed to fluctuations in attention related to her significant anxiety, depression, mood related symptoms and sleep dysregulation.      History of Present Illness has been carried forward from her 2024 office note as follows: Zo Palma is a 59 y.o. female who is here today to follow up with tremors. She is  "accompanied to the visit today by her emotional support dog. She says she is less concerned about the tremors. She did not think the Primidone 50 mg helped so she tapered off this after a month.   She says she had ECT therapy last month and feels she is having some memory issues since. She says it did not help her depression. She says she is not driving as she can not recall where things are. She says her short term memory is worse. She is using a notebook and takes notes of the things she has done. She says her medication is divided up in morning and evening doses and she is able to manage this ok as she says she tries to stay on track and if her routine is not interrupted she is doing ok. If she gets off track she is forgetting to do things like brush her teeth or wash her face. She has trouble with word finding. Forgets recent conversations. She is repeating questions and forgets that she has ordered something and will order duplicates. She misplaces items. She is able to make calls and search on internet. She says appt's and such need to be written down. She can not remember if she ate of not. Discussed neuropsych testing but says she has had this in the past.   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  -MMSE score today was 29/30    She says she has been having nerve pain and tingling in her elbow areas and in her feet. She says she had EMG years ago for her neck pain/problems \"had to deal with the cervical disc issues. She  says she staggers at times due to the numbness in her feet at times.       -MRI Brain W/WO contrast 4/13/2024:    IMPRESSION:     1. No parenchymal mass, hemorrhage, or midline shift.  2. No contrast-enhancing mass.  3. Minimal sphenoid sinus disease.  4. Linear focus of vague enhancement in the right frontal lobe linear in  appearance and probably physiologic but could represent small venous  malformation.    History of Present " "Illness has been carried forward from her 4/16/2024 office note as follows: Zo Palma is a 59 y.o. female who is here today to establish care with Neurology.  She is accompanied to the office visit today by her service dog.  She says she has hand tremors in her hands and legs that are intermittent for years and says the left hand has become worse and more continuous and worse when she is tired, upset or anxious. She says it worsens \"makes the left hand go all over the place\". She says being anxious will make her voice tremor as well. She says her grandfather had parkinson's but nobody in the family with tremors. She says in the past she was on Mirapex and feels it helped when she first started but once she got up to 1 mg it was no longer effective.    Denies falls.  She does note weakness in her lower extremities.  Reports her gait is off as she has one leg shorter than the other and has built up orthotics in her shoes for this.  She does not use any type of assistive device.  Denies any drooling.  Denies any choking or coughing with eating or drinking.  She does have some visual disturbances and says she has had blurry eyes for years but she is taking vitamins and this has made it better.  All of her eye exams have been normal per her report.  She is right hand dominant. She says she drops things at times but denies any tingling or numbness \"I typed for years and I don't have any more carpal tunnel than thew next person\"  -CT of the head without contrast on 2/13/2024 was noncontributory    Pertinent Medical History: bradycardia. Orthostatic HTN, anxiety, tremors, depression, anxiety, PTSD, RLS, suicidal ideations, ECT therapy, kidney stone, lupus, migraines, orthostatic hypotension  Multiple family members with dementia-father and brothers    Subjective      Review of Systems:   Review of Systems   Neurological:  Positive for tremors and memory problem.       I have reviewed and the following portions of the " patient's history were updated as appropriate: past family history, past medical history, past social history, past surgical history and problem list.    Medications:     Current Outpatient Medications:     acetaminophen (TYLENOL) 500 MG tablet, Take 1 tablet by mouth Daily As Needed for Mild Pain or Moderate Pain. Indications: Pain, Disp: , Rfl:     donepezil (Aricept) 10 MG tablet, Take 1 tablet by mouth Every Night., Disp: 90 tablet, Rfl: 2    estradiol (ESTRACE) 0.1 MG/GM vaginal cream, Insert 1 g into the vagina 3 (Three) Times a Week. Indications: Vulvovaginal Atrophy, Disp: 42.5 g, Rfl: 1    FLUoxetine (PROzac) 20 MG capsule, Take 3 capsules by mouth Every Morning. Indications: Depression, Disp: 90 capsule, Rfl: 0    ibandronate (BONIVA) 150 MG tablet, Take 1 tablet by mouth Every 30 (Thirty) Days. Indications: Postmenopausal Osteoporosis, Disp: 3 tablet, Rfl: 3    Liraglutide (VICTOZA) 18 MG/3ML solution pen-injector injection, Inject 1.8 mg under the skin into the appropriate area as directed Daily. Indications: Type 2 Diabetes, Disp: , Rfl:     mirtazapine (REMERON) 15 MG tablet, 1 tablet., Disp: , Rfl:     nitrofurantoin, macrocrystal-monohydrate, (MACROBID) 100 MG capsule, Take 1 capsule by mouth Daily. Prophylaxis of Frequently Occuring Urinary Tract Infection  Indications: Prevention of Frequently Occuring Urinary Tract Infections, Disp: , Rfl:     pantoprazole (PROTONIX) 40 MG EC tablet, Take 1 tablet by mouth Daily. Indications: Gastroesophageal Reflux Disease, Disp: 90 tablet, Rfl: 2    promethazine (PHENERGAN), , Disp: , Rfl:     traZODone (DESYREL) 50 MG tablet, Take 1 tablet by mouth At Night As Needed for Sleep. Indications: Trouble Sleeping, Disp: 90 tablet, Rfl: 1    Allergies:   Allergies   Allergen Reactions    Chlorzoxazone Unknown - High Severity and Swelling     Lorzone, muscle relaxant.    Iodine Anaphylaxis     Topical makes her swell  Anaphylaxis with IV contrast (when she was ~ 9yrs  "old)    Phenazopyridine Hcl Swelling and Anaphylaxis    Pyridium [Phenazopyridine] Anaphylaxis    Quinine Unknown - High Severity     Has malaria symptoms    Valproic Acid Other (See Comments)     Liver failure  Liver failure  Liver failure; lupus like symptoms and liver failure    Methocarbamol Other (See Comments)     Made her feel \"suicidal\" and gave her less control over her actions.    Iodinated Contrast Media Unknown - Low Severity    Codeine Itching    Diphenhydramine Anxiety     Pt has severe panic attack symptoms when given benadryl IV. Needs to take a benzodiazapine med concurrently when getting benadryl.        Objective     Physical Exam:  Vital Signs:   Vitals:    11/20/24 0818   BP: 110/70   Pulse: 65   Temp: 97.7 °F (36.5 °C)   SpO2: 100%   Weight: 64.9 kg (143 lb)   Height: 160 cm (63\")     Body mass index is 25.33 kg/m².    Physical Exam  Constitutional:       Appearance: Normal appearance.   HENT:      Head: Normocephalic.   Eyes:      Conjunctiva/sclera: Conjunctivae normal.   Pulmonary:      Effort: Pulmonary effort is normal.   Musculoskeletal:         General: Normal range of motion.   Skin:     General: Skin is warm and dry.   Neurological:      General: No focal deficit present.      Mental Status: She is alert and oriented to person, place, and time.      Cranial Nerves: Cranial nerves 2-12 are intact. No dysarthria.      Motor: Motor function is intact. Tremor present.      Coordination: Coordination is intact.      Gait: Gait is intact.   Psychiatric:         Attention and Perception: Attention normal.         Mood and Affect: Mood and affect normal.         Speech: Speech normal.         Behavior: Behavior normal. Behavior is cooperative.         Thought Content: Thought content normal.         Cognition and Memory: Cognition normal. She exhibits impaired recent memory.         Judgment: Judgment normal.         Neurological Exam  Mental Status  Alert. Oriented to person, place, and time. " Speech is normal. no dysarthria present.    Coordination    Finger-to-nose, rapid alternating movements and heel-to-shin normal bilaterally without dysmetria. Tremor.    Gait   Normal gait.      Assessment / Plan      Assessment/Plan:   Diagnoses and all orders for this visit:    1. Tremor of both hands (Primary)    2. Subjective memory complaints  -     donepezil (Aricept) 10 MG tablet; Take 1 tablet by mouth Every Night.  Dispense: 90 tablet; Refill: 2         Donepezil has been increased to 10 mg nightly. Indications and side effects discussed with patient she verbalizes understanding.  Discussed tremors with patient as she has tried prior therapies which were either not efficacious or did not tolerate the medications.  She is trying to learn to live with her tremors.  She notes that she has tried weights on her wrist in the past and it made her tremors worse.  Also discussed referral to Occupational Therapy if she would like in the future.  Patient will let us know.  Patient will call in the interim if she has any questions or concerns or changes.    Follow Up:   Return in about 6 months (around 5/20/2025).    TRACI Reyes, FNP-[facility] Neurology and Sleep Medicine

## 2024-11-25 RX ORDER — TRAZODONE HYDROCHLORIDE 100 MG/1
100 TABLET ORAL NIGHTLY PRN
Qty: 30 TABLET | Refills: 0 | Status: SHIPPED | OUTPATIENT
Start: 2024-11-25

## 2024-11-25 RX ORDER — IMIPRAMINE HYDROCHLORIDE 50 MG/1
TABLET, FILM COATED ORAL
Qty: 30 TABLET | Refills: 0 | Status: SHIPPED | OUTPATIENT
Start: 2024-11-25

## 2024-11-25 RX ORDER — OLANZAPINE 2.5 MG/1
2.5 TABLET, FILM COATED ORAL NIGHTLY
Qty: 30 TABLET | Refills: 0 | Status: SHIPPED | OUTPATIENT
Start: 2024-11-25 | End: 2025-11-25

## 2024-12-02 DIAGNOSIS — K21.9 GASTROESOPHAGEAL REFLUX DISEASE WITHOUT ESOPHAGITIS: ICD-10-CM

## 2024-12-02 RX ORDER — PANTOPRAZOLE SODIUM 40 MG/1
TABLET, DELAYED RELEASE ORAL
Qty: 90 TABLET | Refills: 2 | Status: SHIPPED | OUTPATIENT
Start: 2024-12-02

## 2024-12-03 RX ORDER — NITROFURANTOIN 25; 75 MG/1; MG/1
CAPSULE ORAL
Qty: 180 CAPSULE | Refills: 2 | Status: SHIPPED | OUTPATIENT
Start: 2024-12-03

## 2024-12-09 ENCOUNTER — OFFICE VISIT (OUTPATIENT)
Dept: ENDOCRINOLOGY | Facility: CLINIC | Age: 59
End: 2024-12-09
Payer: MEDICARE

## 2024-12-09 ENCOUNTER — OFFICE VISIT (OUTPATIENT)
Dept: UROLOGY | Facility: CLINIC | Age: 59
End: 2024-12-09
Payer: MEDICARE

## 2024-12-09 VITALS
HEIGHT: 63 IN | SYSTOLIC BLOOD PRESSURE: 112 MMHG | HEART RATE: 55 BPM | TEMPERATURE: 97.1 F | DIASTOLIC BLOOD PRESSURE: 66 MMHG | WEIGHT: 147 LBS | BODY MASS INDEX: 26.05 KG/M2 | RESPIRATION RATE: 14 BRPM | OXYGEN SATURATION: 98 %

## 2024-12-09 VITALS
HEART RATE: 60 BPM | SYSTOLIC BLOOD PRESSURE: 114 MMHG | HEIGHT: 63 IN | BODY MASS INDEX: 26.22 KG/M2 | WEIGHT: 148 LBS | DIASTOLIC BLOOD PRESSURE: 75 MMHG

## 2024-12-09 DIAGNOSIS — N39.41 URGE INCONTINENCE: Primary | ICD-10-CM

## 2024-12-09 DIAGNOSIS — R73.03 PREDIABETES: Primary | ICD-10-CM

## 2024-12-09 LAB
EXPIRATION DATE: ABNORMAL
GLUCOSE BLDC GLUCOMTR-MCNC: 69 MG/DL (ref 70–130)
Lab: ABNORMAL

## 2024-12-09 PROCEDURE — 1160F RVW MEDS BY RX/DR IN RCRD: CPT | Performed by: UROLOGY

## 2024-12-09 PROCEDURE — 99214 OFFICE O/P EST MOD 30 MIN: CPT | Performed by: UROLOGY

## 2024-12-09 PROCEDURE — 82947 ASSAY GLUCOSE BLOOD QUANT: CPT | Performed by: INTERNAL MEDICINE

## 2024-12-09 PROCEDURE — 99213 OFFICE O/P EST LOW 20 MIN: CPT | Performed by: INTERNAL MEDICINE

## 2024-12-09 PROCEDURE — 1159F MED LIST DOCD IN RCRD: CPT | Performed by: UROLOGY

## 2024-12-09 PROCEDURE — 3044F HG A1C LEVEL LT 7.0%: CPT | Performed by: INTERNAL MEDICINE

## 2024-12-09 NOTE — PROGRESS NOTES
"Chief Complaint:      Chief Complaint   Patient presents with    Bladder Leakage        HPI:   59 y.o. female referred as a new patient for urinary incontinence.  She had a sling placed in [facility] via laparoscopy it worked for 20 years she now leaks but is not really stress incontinence is mostly bedwetting she has allergies to Pyridium she is  4 para 2.  She has normal bowel movements.  She is on a significant array of psychiatric meds including olanzapine, imipramine, I am at a set up for urodynamics first to be sure were dealing with clinically significant genuine stress incontinence rather than drug effects.    Past Medical History:     Past Medical History:   Diagnosis Date    Ankle sprain     Anxiety     Arteriovenous malformation     Temporal hemangiona - right    Arthritis of back     Arthritis of neck     Brain concussion     Bursitis of hip     Cervical disc disorder     Chronic pain disorder     Conversion disorder     CTS (carpal tunnel syndrome)     Depression     Difficulty walking 3/2024    Diverticulosis     Fracture of wrist     Fracture, finger     Fracture, foot     Frozen shoulder     GERD (gastroesophageal reflux disease)     Hip arthrosis     HL (hearing loss)     Hormone disorder     Hyperlipidemia     Insomnia     Interstitial cystitis     Kidney stone     Liver failure     due to depakote    Lumbosacral disc disease     Lupus     Memory loss     Migraines     Movement disorder 3/2014    Essential tremor    Orthostatic hypotension     Osteopenia     Panic disorder     Periarthritis of shoulder     Peripheral neuropathy     PTSD (post-traumatic stress disorder) 1985    Rotator cuff syndrome     Scoliosis 2023    Seizures     Conversion Disorder    Sleep apnea     Subluxation of patella     Suicidal thoughts     Suicide attempt     \"I tried overdosing\"  32 antihistamine tablets per pt 24    Tremor 2022    Urinary tract infection     Weight loss  "       Current Meds:     Current Outpatient Medications   Medication Sig Dispense Refill    acetaminophen (TYLENOL) 500 MG tablet Take 1 tablet by mouth Daily As Needed for Mild Pain or Moderate Pain. Indications: Pain      donepezil (Aricept) 10 MG tablet Take 1 tablet by mouth Every Night. 90 tablet 2    estradiol (ESTRACE) 0.1 MG/GM vaginal cream Insert 1 g into the vagina 3 (Three) Times a Week. Indications: Vulvovaginal Atrophy 42.5 g 1    FLUoxetine (PROzac) 20 MG capsule Take 3 capsules by mouth Every Morning. Indications: Depression 90 capsule 0    ibandronate (BONIVA) 150 MG tablet Take 1 tablet by mouth Every 30 (Thirty) Days. Indications: Postmenopausal Osteoporosis 3 tablet 3    imipramine (TOFRANIL) 50 MG tablet Take 0.5 tablets PO nightly for one week, then increase to 1 tablet PO nightly. (Patient taking differently: Take 0.5 tablets PO nightly for one week, then increase to 1 tablet PO nightly. Starting 12/6/24) 30 tablet 0    Liraglutide (VICTOZA) 18 MG/3ML solution pen-injector injection Inject 1.8 mg under the skin into the appropriate area as directed Daily. Indications: Type 2 Diabetes      nitrofurantoin, macrocrystal-monohydrate, (MACROBID) 100 MG capsule TAKE ONE CAPSULE BY MOUTH TWICE A DAY FOR PREVENTION OF FREQUENTLY OCCURING URINARY TRACT INFECTIONS 180 capsule 2    OLANZapine (ZyPREXA) 2.5 MG tablet Take 1 tablet by mouth Every Night. 30 tablet 0    pantoprazole (PROTONIX) 40 MG EC tablet TAKE ONE TABLET BY MOUTH EVERY DAY FOR GASTROESOPHAGEAL REFLUX DISEASE 90 tablet 2    promethazine (PHENERGAN)       traZODone (DESYREL) 100 MG tablet Take 1 tablet by mouth At Night As Needed for Sleep. Indications: Trouble Sleeping 30 tablet 0     No current facility-administered medications for this visit.        Allergies:      Allergies   Allergen Reactions    Chlorzoxazone Swelling and Unknown - High Severity     Lorzone, muscle relaxant.    Iodinated Contrast Media Anaphylaxis    Iodine  "Anaphylaxis     Topical makes her swell  Anaphylaxis with IV contrast (when she was ~ 9yrs old)    Phenazopyridine Hcl Swelling and Anaphylaxis    Pyridium [Phenazopyridine] Anaphylaxis    Quinine Unknown - High Severity     Has malaria symptoms    Valproic Acid Other (See Comments)     Lupus like symptoms and liver failure    Methocarbamol Mental Status Change     Made her feel \"suicidal\" and gave her less control over her actions.    Codeine Itching    Diphenhydramine Anxiety     Pt has severe panic attack symptoms when given benadryl IV. Needs to take a benzodiazapine med concurrently when getting benadryl.         Past Surgical History:     Past Surgical History:   Procedure Laterality Date    ABDOMINAL SURGERY      BARIATRIC SURGERY  2008    Gastric sleeve    BLADDER SURGERY      BREAST SURGERY Bilateral     reduction    CHOLECYSTECTOMY  2010    COLONOSCOPY      COSMETIC SURGERY      CYSTOSCOPY      ELECTROCONVULSIVE THERAPY Bilateral 02/15/2024    Procedure: BIFRONTAL ELECTROCONVULSIVE THERAPY;  Surgeon: Vinod Osuna MD;  Location: [facility] OR;  Service: ECT;  Laterality: Bilateral;    ELECTROCONVULSIVE THERAPY N/A 02/19/2024    Procedure: ELECTROCONVULSIVE THERAPY;  Surgeon: Vinod Osuna MD;  Location: [facility] OR;  Service: ECT;  Laterality: N/A;    ELECTROCONVULSIVE THERAPY N/A 02/21/2024    Procedure: ELECTROCONVULSIVE THERAPY;  Surgeon: Vinod Osuna MD;  Location:  [facility] OR;  Service: ECT;  Laterality: N/A;    ELECTROCONVULSIVE THERAPY Bilateral 06/10/2024    Procedure: BIFRONTAL ELECTROCONVULSIVE THERAPY;  Surgeon: Vinod Osuna MD;  Location: [facility] OR;  Service: ECT;  Laterality: Bilateral;    ELECTROCONVULSIVE THERAPY N/A 06/12/2024    Procedure: ELECTROCONVULSIVE THERAPY;  Surgeon: Vinod Osuna MD;  Location: [facility] OR;  Service: ECT;  Laterality: N/A;    ELECTROCONVULSIVE THERAPY N/A 06/14/2024    Procedure: ELECTROCONVULSIVE THERAPY;  " Surgeon: Vinod Osuna MD;  Location:  [facility] OR;  Service: ECT;  Laterality: N/A;    ELECTROCONVULSIVE THERAPY N/A 06/17/2024    Procedure: ELECTROCONVULSIVE THERAPY;  Surgeon: Vinod Osuna MD;  Location:  [facility] OR;  Service: ECT;  Laterality: N/A;    ELECTROCONVULSIVE THERAPY N/A 06/19/2024    Procedure: ELECTROCONVULSIVE THERAPY;  Surgeon: Vinod Osuna MD;  Location:  [facility] OR;  Service: ECT;  Laterality: N/A;    ENDOSCOPY      FOOT SURGERY Bilateral     6 hammer toes    FRACTURE SURGERY      Left wrist    GASTRECTOMY      GASTRIC RESTRICTION SURGERY  2007    GASTRIC SLEEVE LAPAROSCOPIC N/A     HYSTERECTOMY      NECK SURGERY      OOPHORECTOMY Bilateral     SINUS SURGERY  2021    WISDOM TOOTH EXTRACTION N/A     WRIST SURGERY Left        Social History:     Social History     Socioeconomic History    Marital status:     Number of children: 2    Highest education level: Master's degree (e.g., MA, MS, Pancho, MEd, MSW, SULY)   Tobacco Use    Smoking status: Never     Passive exposure: Past    Smokeless tobacco: Never   Vaping Use    Vaping status: Never Used   Substance and Sexual Activity    Alcohol use: Yes     Alcohol/week: 1.0 standard drink of alcohol     Types: 1 Drinks containing 0.5 oz of alcohol per week     Comment: Occasionally    Drug use: Never    Sexual activity: Not Currently     Partners: Male     Birth control/protection: Post-menopausal, Hysterectomy       Family History:     Family History   Problem Relation Age of Onset    Cancer Mother         Brain, suspected uterine    Rheumatologic disease Mother     Dementia Father         Family predisposed    Cancer Father         Prostate    Learning disabilities Father         Dyslexia    Other Father         Early onset alzheimer    Prostate cancer Father     Cancer Brother         Prostate, bone    Learning disabilities Brother         Dyslexia    Other Brother         Alzheimer    Dementia Brother     Prostate  cancer Maternal Grandfather     Parkinsonism Maternal Grandfather     Other Paternal Grandfather         Early onset alzheimer    Depression Son     Learning disabilities Son        Review of Systems:     Review of Systems   Constitutional: Negative.  Negative for activity change, appetite change, chills, diaphoresis, fatigue and unexpected weight change.   HENT:  Negative for congestion, dental problem, drooling, ear discharge, ear pain, facial swelling, hearing loss, mouth sores, nosebleeds, postnasal drip, rhinorrhea, sinus pressure, sneezing, sore throat, tinnitus, trouble swallowing and voice change.    Eyes: Negative.  Negative for photophobia, pain, discharge, redness, itching and visual disturbance.   Respiratory: Negative.  Negative for apnea, cough, choking, chest tightness, shortness of breath, wheezing and stridor.    Cardiovascular: Negative.  Negative for chest pain, palpitations and leg swelling.   Gastrointestinal: Negative.  Negative for abdominal distention, abdominal pain, anal bleeding, blood in stool, constipation, diarrhea, nausea, rectal pain and vomiting.   Endocrine: Negative.  Negative for cold intolerance, heat intolerance, polydipsia, polyphagia and polyuria.   Genitourinary:  Positive for difficulty urinating and frequency.   Musculoskeletal: Negative.  Negative for arthralgias, back pain, gait problem, joint swelling, myalgias, neck pain and neck stiffness.   Skin: Negative.  Negative for color change, pallor, rash and wound.   Allergic/Immunologic: Negative.  Negative for environmental allergies, food allergies and immunocompromised state.   Neurological: Negative.  Negative for dizziness, tremors, seizures, syncope, facial asymmetry, speech difficulty, weakness, light-headedness, numbness and headaches.   Hematological: Negative.  Negative for adenopathy. Does not bruise/bleed easily.   Psychiatric/Behavioral:  Negative for agitation, behavioral problems, confusion, decreased  concentration, dysphoric mood, hallucinations, self-injury, sleep disturbance and suicidal ideas. The patient is not nervous/anxious and is not hyperactive.    All other systems reviewed and are negative.      Physical Exam:     Physical Exam  Constitutional:       Appearance: She is well-developed.   HENT:      Head: Normocephalic and atraumatic.      Right Ear: External ear normal.      Left Ear: External ear normal.   Eyes:      Conjunctiva/sclera: Conjunctivae normal.      Pupils: Pupils are equal, round, and reactive to light.   Cardiovascular:      Rate and Rhythm: Normal rate and regular rhythm.      Heart sounds: Normal heart sounds.   Pulmonary:      Effort: Pulmonary effort is normal.      Breath sounds: Normal breath sounds.   Abdominal:      General: Bowel sounds are normal. There is no distension.      Palpations: Abdomen is soft. There is no mass.      Tenderness: There is no abdominal tenderness. There is no guarding or rebound.   Genitourinary:     Vagina: No vaginal discharge.   Musculoskeletal:         General: Normal range of motion.   Skin:     General: Skin is warm and dry.   Neurological:      Mental Status: She is alert.      Deep Tendon Reflexes: Reflexes are normal and symmetric.   Psychiatric:         Behavior: Behavior normal.         Thought Content: Thought content normal.         Judgment: Judgment normal.         I have reviewed the following portions of the patient's history: Allergies, current medications, past family history, past medical history, past social history, past surgical history, problem list, and ROS and confirm it is accurate.    Recent Image (CT and/or KUB):      CT Abdomen and Pelvis: No results found for this or any previous visit.       CT Stone Protocol: No results found for this or any previous visit.       KUB: No results found for this or any previous visit.       Labs (past 3 months):      Office Visit on 12/09/2024   Component Date Value Ref Range Status     Glucose 12/09/2024 69 (A)  70 - 130 mg/dL Final    Lot Number 12/09/2024 [id number]   Final    Expiration Date 12/09/2024 07/18/2025   Final   Admission on 10/23/2024, Discharged on 10/23/2024   Component Date Value Ref Range Status    Glucose 10/23/2024 93  65 - 99 mg/dL Final    BUN 10/23/2024 8  6 - 20 mg/dL Final    Creatinine 10/23/2024 0.94  0.57 - 1.00 mg/dL Final    Sodium 10/23/2024 140  136 - 145 mmol/L Final    Potassium 10/23/2024 3.7  3.5 - 5.2 mmol/L Final    Chloride 10/23/2024 105  98 - 107 mmol/L Final    CO2 10/23/2024 25.1  22.0 - 29.0 mmol/L Final    Calcium 10/23/2024 9.5  8.6 - 10.5 mg/dL Final    Total Protein 10/23/2024 6.8  6.0 - 8.5 g/dL Final    Albumin 10/23/2024 4.1  3.5 - 5.2 g/dL Final    ALT (SGPT) 10/23/2024 21  1 - 33 U/L Final    AST (SGOT) 10/23/2024 23  1 - 32 U/L Final    Alkaline Phosphatase 10/23/2024 64  39 - 117 U/L Final    Total Bilirubin 10/23/2024 0.4  0.0 - 1.2 mg/dL Final    Globulin 10/23/2024 2.7  gm/dL Final    A/G Ratio 10/23/2024 1.5  g/dL Final    BUN/Creatinine Ratio 10/23/2024 8.5  7.0 - 25.0 Final    Anion Gap 10/23/2024 9.9  5.0 - 15.0 mmol/L Final    eGFR 10/23/2024 70.0  >60.0 mL/min/1.73 Final    Acetaminophen 10/23/2024 <5.0  0.0 - 30.0 mcg/mL Final    Salicylate 10/23/2024 <0.3  <=30.0 mg/dL Final    Lithium 10/23/2024 1.0  0.6 - 1.2 mmol/L Final    Color, UA 10/23/2024 Yellow  Yellow, Straw Final    Appearance, UA 10/23/2024 Clear  Clear Final    pH, UA 10/23/2024 7.0  5.0 - 8.0 Final    Specific Gravity, UA 10/23/2024 <=1.005  1.005 - 1.030 Final    Glucose, UA 10/23/2024 Negative  Negative Final    Ketones, UA 10/23/2024 Negative  Negative Final    Bilirubin, UA 10/23/2024 Negative  Negative Final    Blood, UA 10/23/2024 Negative  Negative Final    Protein, UA 10/23/2024 Negative  Negative Final    Leuk Esterase, UA 10/23/2024 Negative  Negative Final    Nitrite, UA 10/23/2024 Negative  Negative Final    Urobilinogen, UA 10/23/2024 0.2 E.U./dL  0.2 -  1.0 E.U./dL Final    Ethanol 10/23/2024 <10  0 - 10 mg/dL Final    Ethanol % 10/23/2024 <0.010  % Final    QT Interval 10/23/2024 468  ms Final    QTC Interval 10/23/2024 439  ms Final    WBC 10/23/2024 6.62  3.40 - 10.80 10*3/mm3 Final    RBC 10/23/2024 4.32  3.77 - 5.28 10*6/mm3 Final    Hemoglobin 10/23/2024 12.3  12.0 - 15.9 g/dL Final    Hematocrit 10/23/2024 39.6  34.0 - 46.6 % Final    MCV 10/23/2024 91.7  79.0 - 97.0 fL Final    MCH 10/23/2024 28.5  26.6 - 33.0 pg Final    MCHC 10/23/2024 31.1 (L)  31.5 - 35.7 g/dL Final    RDW 10/23/2024 12.5  12.3 - 15.4 % Final    RDW-SD 10/23/2024 42.4  37.0 - 54.0 fl Final    MPV 10/23/2024 9.4  6.0 - 12.0 fL Final    Platelets 10/23/2024 259  140 - 450 10*3/mm3 Final    Neutrophil % 10/23/2024 66.0  42.7 - 76.0 % Final    Lymphocyte % 10/23/2024 22.4  19.6 - 45.3 % Final    Monocyte % 10/23/2024 8.6  5.0 - 12.0 % Final    Eosinophil % 10/23/2024 1.7  0.3 - 6.2 % Final    Basophil % 10/23/2024 1.1  0.0 - 1.5 % Final    Immature Grans % 10/23/2024 0.2  0.0 - 0.5 % Final    Neutrophils, Absolute 10/23/2024 4.38  1.70 - 7.00 10*3/mm3 Final    Lymphocytes, Absolute 10/23/2024 1.48  0.70 - 3.10 10*3/mm3 Final    Monocytes, Absolute 10/23/2024 0.57  0.10 - 0.90 10*3/mm3 Final    Eosinophils, Absolute 10/23/2024 0.11  0.00 - 0.40 10*3/mm3 Final    Basophils, Absolute 10/23/2024 0.07  0.00 - 0.20 10*3/mm3 Final    Immature Grans, Absolute 10/23/2024 0.01  0.00 - 0.05 10*3/mm3 Final    nRBC 10/23/2024 0.0  0.0 - 0.2 /100 WBC Final    THC, Screen, Urine 10/23/2024 Negative  Negative Final    Phencyclidine (PCP), Urine 10/23/2024 Negative  Negative Final    Cocaine Screen, Urine 10/23/2024 Negative  Negative Final    Methamphetamine, Ur 10/23/2024 Negative  Negative Final    Opiate Screen 10/23/2024 Negative  Negative Final    Amphetamine Screen, Urine 10/23/2024 Negative  Negative Final    Benzodiazepine Screen, Urine 10/23/2024 Negative  Negative Final    Tricyclic  Antidepressants Screen 10/23/2024 Negative  Negative Final    Methadone Screen, Urine 10/23/2024 Negative  Negative Final    Barbiturates Screen, Urine 10/23/2024 Negative  Negative Final    Oxycodone Screen, Urine 10/23/2024 Negative  Negative Final    Buprenorphine, Screen, Urine 10/23/2024 Negative  Negative Final    Fentanyl, Urine 10/23/2024 Negative  Negative Final   Admission on 10/03/2024, Discharged on 10/03/2024   Component Date Value Ref Range Status    Glucose 10/03/2024 90  65 - 99 mg/dL Final    BUN 10/03/2024 12  6 - 20 mg/dL Final    Creatinine 10/03/2024 0.85  0.57 - 1.00 mg/dL Final    Sodium 10/03/2024 138  136 - 145 mmol/L Final    Potassium 10/03/2024 4.3  3.5 - 5.2 mmol/L Final    Chloride 10/03/2024 102  98 - 107 mmol/L Final    CO2 10/03/2024 25.5  22.0 - 29.0 mmol/L Final    Calcium 10/03/2024 9.6  8.6 - 10.5 mg/dL Final    Total Protein 10/03/2024 6.7  6.0 - 8.5 g/dL Final    Albumin 10/03/2024 4.4  3.5 - 5.2 g/dL Final    ALT (SGPT) 10/03/2024 21  1 - 33 U/L Final    AST (SGOT) 10/03/2024 23  1 - 32 U/L Final    Alkaline Phosphatase 10/03/2024 77  39 - 117 U/L Final    Total Bilirubin 10/03/2024 0.5  0.0 - 1.2 mg/dL Final    Globulin 10/03/2024 2.3  gm/dL Final    A/G Ratio 10/03/2024 1.9  g/dL Final    BUN/Creatinine Ratio 10/03/2024 14.1  7.0 - 25.0 Final    Anion Gap 10/03/2024 10.5  5.0 - 15.0 mmol/L Final    eGFR 10/03/2024 79.0  >60.0 mL/min/1.73 Final    Magnesium 10/03/2024 2.0  1.6 - 2.6 mg/dL Final    Acetaminophen 10/03/2024 <5.0  0.0 - 30.0 mcg/mL Final    Ethanol 10/03/2024 <10  0 - 10 mg/dL Final    Ethanol % 10/03/2024 <0.010  % Final    Salicylate 10/03/2024 <0.3  <=30.0 mg/dL Final    Color, UA 10/03/2024 Yellow  Yellow, Straw Final    Appearance, UA 10/03/2024 Cloudy (A)  Clear Final    pH, UA 10/03/2024 5.5  5.0 - 8.0 Final    Specific [place], UA 10/03/2024 1.019  1.005 - 1.030 Final    Glucose, UA 10/03/2024 Negative  Negative Final    Ketones, UA 10/03/2024 Trace (A)   Negative Final    Bilirubin, UA 10/03/2024 Negative  Negative Final    Blood, UA 10/03/2024 Negative  Negative Final    Protein, UA 10/03/2024 Negative  Negative Final    Leuk Esterase, UA 10/03/2024 Negative  Negative Final    Nitrite, UA 10/03/2024 Negative  Negative Final    Urobilinogen, UA 10/03/2024 0.2 E.U./dL  0.2 - 1.0 E.U./dL Final    THC, Screen, Urine 10/03/2024 Negative  Negative Final    Phencyclidine (PCP), Urine 10/03/2024 Negative  Negative Final    Cocaine Screen, Urine 10/03/2024 Negative  Negative Final    Methamphetamine, Ur 10/03/2024 Negative  Negative Final    Opiate Screen 10/03/2024 Negative  Negative Final    Amphetamine Screen, Urine 10/03/2024 Negative  Negative Final    Benzodiazepine Screen, Urine 10/03/2024 Positive (A)  Negative Final    Tricyclic Antidepressants Screen 10/03/2024 Negative  Negative Final    Methadone Screen, Urine 10/03/2024 Negative  Negative Final    Barbiturates Screen, Urine 10/03/2024 Negative  Negative Final    Oxycodone Screen, Urine 10/03/2024 Negative  Negative Final    Buprenorphine, Screen, Urine 10/03/2024 Negative  Negative Final    TSH 10/03/2024 2.940  0.270 - 4.200 uIU/mL Final    Extra Tube 10/03/2024 Hold for add-ons.   Final    Auto resulted.    Extra Tube 10/03/2024 hold for add-on   Final    Auto resulted    Extra Tube 10/03/2024 Hold for add-ons.   Final    Auto resulted.    Extra Tube 10/03/2024 Hold for add-ons.   Final    Auto resulted    WBC 10/03/2024 5.38  3.40 - 10.80 10*3/mm3 Final    RBC 10/03/2024 4.49  3.77 - 5.28 10*6/mm3 Final    Hemoglobin 10/03/2024 13.0  12.0 - 15.9 g/dL Final    Hematocrit 10/03/2024 40.2  34.0 - 46.6 % Final    MCV 10/03/2024 89.5  79.0 - 97.0 fL Final    MCH 10/03/2024 29.0  26.6 - 33.0 pg Final    MCHC 10/03/2024 32.3  31.5 - 35.7 g/dL Final    RDW 10/03/2024 12.7  12.3 - 15.4 % Final    RDW-SD 10/03/2024 42.1  37.0 - 54.0 fl Final    MPV 10/03/2024 9.3  6.0 - 12.0 fL Final    Platelets 10/03/2024 242  140 -  450 10*3/mm3 Final    Neutrophil % 10/03/2024 59.5  42.7 - 76.0 % Final    Lymphocyte % 10/03/2024 29.4  19.6 - 45.3 % Final    Monocyte % 10/03/2024 9.3  5.0 - 12.0 % Final    Eosinophil % 10/03/2024 0.7  0.3 - 6.2 % Final    Basophil % 10/03/2024 0.7  0.0 - 1.5 % Final    Immature Grans % 10/03/2024 0.4  0.0 - 0.5 % Final    Neutrophils, Absolute 10/03/2024 3.20  1.70 - 7.00 10*3/mm3 Final    Lymphocytes, Absolute 10/03/2024 1.58  0.70 - 3.10 10*3/mm3 Final    Monocytes, Absolute 10/03/2024 0.50  0.10 - 0.90 10*3/mm3 Final    Eosinophils, Absolute 10/03/2024 0.04  0.00 - 0.40 10*3/mm3 Final    Basophils, Absolute 10/03/2024 0.04  0.00 - 0.20 10*3/mm3 Final    Immature Grans, Absolute 10/03/2024 0.02  0.00 - 0.05 10*3/mm3 Final    nRBC 10/03/2024 0.0  0.0 - 0.2 /100 WBC Final    Fentanyl, Urine 10/03/2024 Negative  Negative Final        Procedure:       Assessment/Plan:   Urinary incontinence:  Patient was diagnosed with urinary incontinence.  We discussed treatable and non-treatable causes of both stress and urge urinary incontinence.  With regards to stress urinary incontinence, we discussed its relationship to childbirth and pelvic health.  We discussed the grading of stress incontinence with trying to quantitate the number of pads used.  We talked about leaking urine with laughing, lifting, coughing, and sexual intercourse.  Talked about the urge component and the concept of mixed incontinence where upon the stress treatable at the urge may exist and that 50% of the time the urge will resolve with treatment of the stress incontinence.  We talked with the diagnostic workup including a postvoid residual urine and even a simple cystometrogram.  I discussed the findings that may be neurologically related including commonly seen with multiple sclerosis, Parkinson's disease, and stroke.  I talked about the various therapeutic options including anticholinergics, beta 3 agonists, and alpha blockade if there is a  component of obstruction.  I discussed the side effects of anticholinergic including dry mouth, double vision, etc. recommend urodynamics as a starting point because of the significant psychiatric drugs and the bedwetting rather than the classic genuine stress incontinence history.        This document has been electronically signed by DESIREE LEGGETT MD December 9, 2024 14:02 EST    Dictated Utilizing Dragon Dictation: Part of this note may be an electronic transcription/translation of spoken language to printed text using the Dragon Dictation System.

## 2024-12-09 NOTE — PROGRESS NOTES
Chief Complaint   Patient presents with    Prediabetes     Follow-up       HPI:   Zo Palma is a 59 y.o.female who returns to endocrine clinic for follow-up evaluation of her prediabetes and weight gain.  Last visit 06/03/2024. Her history is as follows:    Interim Events:   - Pt hospitalized at [facility] from 10/14/2024 to 10/24/2024 for SI. Then hospitalized at [facility] from 10/28/2024 to 11/07/2024. Pt reports she is feeling better and is currently taking imipramine 50 mg, olanzapine 2.5 mg, fluoxetine 20 mg, Trazodone 100 mg PRN, and donepezil 10 mg    1) Weight gain:  - h/o sleeve gastrectomy in 2008. Pre-surgery wt was 245 lbs. Post-op was 135 lbs for many years  - By 2018, she had gained back to 175 lbs. She was prescribed Qsymia in 10/2018 and was able to lose 30 lbs (down to 145 lbs)  - Pt had to stop the Qsymia in 01/2022 as she did not have a provider to continue the Rx.  - She then regained wt to 165 lbs. In 2022, Dr. Suresh Edge prescribed Qsymia 15-92 mg dose which she was taking in 02/2023. However, she has not had weight loss on this dose of Qsymia.  - (02/2023) Started Victoza 1.8 mg daily, obtained through PAP. Had lost approximately 31 lbs since 02/2023 with Rx, dietary changes and exercise    2) Prediabetes:  - diagnosed in 2008, A1C% 5.7. Highest A1C% was 6.1% in 11/2019  - reports a h/o reactive hypoglycemia   - Did not tolerate Metformin  - (02/2023) Started Victoza 1.8 mg daily, obtained through PAP  - Pt had obtained Victoza through the [employer] PAP. However, the program ended in August 2023.  She has been using her residual supply. She reports she has a supply that will last another year  - Is tolerating 1.8 mg qAM    3) h/o  abnormal endocrine labs:  - In the early 2000's, pt reported being evaluated for multiple hormone deficiencies [place]. She was evaluated with serum, urine, and multiple salivary tests collected throughout the day. Does  not appear to have had a Cosyntropin stimulation test. She was told she had deficiencies in estrogen and adrenal hormones. Pt was prescribed estrogen. Was not prescribed glucocorticoids.     Currently on these supplements that do not contain Biotin:  - Calcium +D3 (600mg / 120 mcg): 1 tab daily  - D-Mannose: 1 tab BID  - Magnesium: 500 mg daily  - doTerra Bone Nutrient: 2 caps daily  - Vit D3: 5000 IU daily  - doTerra TerraZyme: 2 caps daily  - doTerra Onguard: 1 cap daily  - Pantethina: 900 mg daily  - doTerra Yarrow: 2 caps daily  - doTerra Serenity: 1 cap daily  - doTerra Alpha CRS: 2 caps daily  - doTerra Microplex: 2 caps daily  - doTerra E Omega: 2 caps daily  - doTerra MetaPower: 1 cap BID  - potassium 99 mg: daily     - Had patient complete an ACTH stimulation test on 2/9/2023 at 8:27 AM.  Labs showed no evidence of adrenal insufficiency  - Patient without evidence of thyroid hormone deficiency    ACTH stimulation test completed 2/9/2023 at 8:27 AM:  8AM ACTH: 8.7 - normal  Time 0 AM Cortisol: 13.95  Time 30 min Cortisol: 29.30  Time 60 min Cortisol: 34.80    Other history:  - pt's  passed away in 2020  - had a h/o acute liver injury in the past while on Depakote. LFT's in the 300's. H/o fatty liver    Review of Systems   Constitutional:  Negative for fatigue.        Wt stable   HENT: Negative.     Eyes: Negative.    Respiratory: Negative.     Cardiovascular: Negative.    Gastrointestinal: Negative.         Acid reflux   Endocrine: Negative.    Genitourinary: Negative.    Musculoskeletal: Negative.    Skin: Negative.    Allergic/Immunologic: Positive for environmental allergies.   Neurological:  Positive for tremors.   Hematological:  Bruises/bleeds easily.   Psychiatric/Behavioral:          H/O anxiety, depression, PTSD     The following portions of the patient's history were reviewed and updated as appropriate: allergies, current medications, past family history, past medical history, past social  "history, past surgical history and problem list.      /66 (BP Location: Right arm, Patient Position: Sitting, Cuff Size: Adult)   Pulse 55   Temp 97.1 °F (36.2 °C) (Infrared)   Resp 14   Ht 160 cm (63\")   Wt 66.7 kg (147 lb)   SpO2 98%   BMI 26.04 kg/m²   Physical Exam  Vitals reviewed.   Constitutional:       General: She is not in acute distress.     Appearance: She is well-developed. She is not diaphoretic.   HENT:      Head: Normocephalic.   Eyes:      Conjunctiva/sclera: Conjunctivae normal.      Pupils: Pupils are equal, round, and reactive to light.   Neck:      Thyroid: No thyromegaly.      Trachea: No tracheal deviation.      Comments: No palpable thyroid nodules    Cardiovascular:      Rate and Rhythm: Normal rate and regular rhythm.      Heart sounds: Normal heart sounds. No murmur heard.  Pulmonary:      Effort: Pulmonary effort is normal. No respiratory distress.      Breath sounds: Normal breath sounds.   Abdominal:      General: Bowel sounds are normal.      Palpations: Abdomen is soft. There is no mass.      Tenderness: There is no abdominal tenderness.   Lymphadenopathy:      Cervical: No cervical adenopathy.   Skin:     General: Skin is warm and dry.      Findings: No erythema.      Comments: No areas of hyperpigmentation   Neurological:      Mental Status: She is alert and oriented to person, place, and time.      Cranial Nerves: No cranial nerve deficit.   Psychiatric:         Behavior: Behavior normal.         LABS/IMAGING: outside records reviewed and summarized in HPI  Lab Results   Component Value Date    HGBA1C 5.1 10/16/2024     Lab Results   Component Value Date    WBC 6.62 10/23/2024    HGB 12.3 10/23/2024    HCT 39.6 10/23/2024    MCV 91.7 10/23/2024     10/23/2024     Lab Results   Component Value Date    GLUCOSE 93 10/23/2024    BUN 8 10/23/2024    CREATININE 0.94 10/23/2024     10/23/2024    K 3.7 10/23/2024     10/23/2024    CALCIUM 9.5 10/23/2024    " PROTEINTOT 6.8 10/23/2024    ALBUMIN 4.1 10/23/2024    ALT 21 10/23/2024    AST 23 10/23/2024    ALKPHOS 64 10/23/2024    BILITOT 0.4 10/23/2024    GLOB 2.7 10/23/2024    AGRATIO 1.5 10/23/2024    BCR 8.5 10/23/2024    ANIONGAP 9.9 10/23/2024    EGFR 70.0 10/23/2024     Lab Results   Component Value Date    CHOL 178 02/14/2024    CHLPL 218 (H) 11/14/2022    TRIG 106 02/14/2024    HDL 55 02/14/2024     (H) 02/14/2024     Lab Results   Component Value Date    TSH 2.940 10/03/2024     Office Visit on 12/09/2024   Component Date Value Ref Range Status    Glucose 12/09/2024 69 (A)  70 - 130 mg/dL Final    Lot Number 12/09/2024 [id number]   Final    Expiration Date 12/09/2024 07/18/2025   Final           ASSESSMENT/PLAN:    1) prediabetes: controlled, A1C% 5.10 in 10/2024.   - Patient did not tolerate metformin in the past  - Pt had obtained Victoza through the [employer] PAP. However, the program ended in August 2023.  She has been using her residual supply.  - Is tolerating 1.8 mg qAM.   -Discussed with patient that we may not be able to obtain another GLP-1 agonist through her insurance as these are usually not covered if the patient does not have a known diagnosis of type 2 diabetes with an A1c of 6.5 or higher at time of diagnosis.  -Will assess treatment options at her follow-up once her supply of Victoza has finished.    RTC 6 months    Electronically Signed: Damari Fulton MD

## 2024-12-17 ENCOUNTER — OFFICE VISIT (OUTPATIENT)
Age: 59
End: 2024-12-17
Payer: MEDICARE

## 2024-12-17 VITALS
SYSTOLIC BLOOD PRESSURE: 92 MMHG | HEART RATE: 69 BPM | DIASTOLIC BLOOD PRESSURE: 68 MMHG | OXYGEN SATURATION: 98 % | WEIGHT: 146 LBS | BODY MASS INDEX: 25.87 KG/M2 | HEIGHT: 63 IN

## 2024-12-17 DIAGNOSIS — Z13.820 ENCOUNTER FOR SCREENING FOR OSTEOPOROSIS: ICD-10-CM

## 2024-12-17 DIAGNOSIS — Z78.0 POSTMENOPAUSAL: ICD-10-CM

## 2024-12-17 DIAGNOSIS — F33.2 SEVERE EPISODE OF RECURRENT MAJOR DEPRESSIVE DISORDER, WITHOUT PSYCHOTIC FEATURES: ICD-10-CM

## 2024-12-17 DIAGNOSIS — R73.01 IMPAIRED FASTING GLUCOSE: ICD-10-CM

## 2024-12-17 DIAGNOSIS — Z00.00 MEDICARE ANNUAL WELLNESS VISIT, SUBSEQUENT: Primary | ICD-10-CM

## 2024-12-17 DIAGNOSIS — F43.10 PTSD (POST-TRAUMATIC STRESS DISORDER): ICD-10-CM

## 2024-12-17 DIAGNOSIS — Z12.31 ENCOUNTER FOR SCREENING MAMMOGRAM FOR MALIGNANT NEOPLASM OF BREAST: ICD-10-CM

## 2024-12-17 LAB
EXPIRATION DATE: 0
Lab: 0
POC CREATININE URINE: 100
POC MICROALBUMIN URINE: 30

## 2024-12-17 PROCEDURE — G0439 PPPS, SUBSEQ VISIT: HCPCS | Performed by: FAMILY MEDICINE

## 2024-12-17 PROCEDURE — 82044 UR ALBUMIN SEMIQUANTITATIVE: CPT | Performed by: FAMILY MEDICINE

## 2024-12-17 PROCEDURE — 1126F AMNT PAIN NOTED NONE PRSNT: CPT | Performed by: FAMILY MEDICINE

## 2024-12-17 NOTE — ASSESSMENT & PLAN NOTE
I have discussed age/gender specific preventative healthcare issues in detail with patient today.  I have answered all of the questions.

## 2024-12-17 NOTE — ASSESSMENT & PLAN NOTE
Orders:    POC Microalbumin  Patient has been erroneously marked as diabetic. Based on the available clinical information, she does not have diabetes and should therefore be excluded from diabetic health maintenance and quality measures for the remainder of the reporting period.

## 2024-12-17 NOTE — ASSESSMENT & PLAN NOTE
Has mammogram already ordered, does not appear it has been completed yet.  I will ask our referral staff to aid in getting this scheduled for patient.

## 2024-12-17 NOTE — ASSESSMENT & PLAN NOTE
Psychological condition is improving with lifestyle modifications.  Continue current treatment regimen.  Psychological condition  will be reassessed in 3 months.

## 2024-12-17 NOTE — PROGRESS NOTES
Subjective   The ABCs of the Annual Wellness Visit  Medicare Wellness Visit      Zo Palma is a 59 y.o. patient who presents for a Medicare Wellness Visit.    Answers submitted by the patient for this visit:  Primary Reason for Visit (Submitted on 12/17/2024)  What is the primary reason for your visit?: Problem Not Listed  Problem not listed (Submitted on 12/17/2024)  Chief Complaint: Other medical problem  abdominal pain: No  anorexia: No  joint pain: Yes  change in stool: No  chest pain: No  chills: No  nasal congestion: No  cough: No  diaphoresis: Yes  fatigue: No  fever: No  headaches: Yes  joint swelling: No  myalgias: No  nausea: No  neck pain: No  numbness: No  rash: No  sore throat: No  swollen glands: No  dysuria: No  vertigo: No  visual change: No  vomiting: No  weakness: No  Onset: 1 to 6 months  Chronicity: chronic  Frequency: daily      The following portions of the patient's history were reviewed and   updated as appropriate: allergies, current medications, past family history, past medical history, past social history, past surgical history, and problem list.    Compared to one year ago, the patient's physical   health is the same.  Compared to one year ago, the patient's mental   health is worse.    Recent Hospitalizations:  This patient has had a [facility] admission record on file within the last 365 days.  Current Medical Providers:  Patient Care Team:  Evan Rivas DO as PCP - General (Family Medicine)  Damari Fulton MD as Consulting Physician (Endocrinology)  Vinod Mckenna MD as Consulting Physician (Neurosurgery)  Yeimi Phan APRN as Referring Physician (Neurology)  Brian Platt MD as Consulting Physician (Psychiatry)    Outpatient Medications Prior to Visit   Medication Sig Dispense Refill    acetaminophen (TYLENOL) 500 MG tablet Take 1 tablet by mouth Daily As Needed for Mild Pain or Moderate Pain. Indications: Pain      donepezil (Aricept) 10 MG  tablet Take 1 tablet by mouth Every Night. 90 tablet 2    ibandronate (BONIVA) 150 MG tablet Take 1 tablet by mouth Every 30 (Thirty) Days. Indications: Postmenopausal Osteoporosis 3 tablet 3    Liraglutide (VICTOZA) 18 MG/3ML solution pen-injector injection Inject 1.8 mg under the skin into the appropriate area as directed Daily. Indications: Type 2 Diabetes      nitrofurantoin, macrocrystal-monohydrate, (MACROBID) 100 MG capsule TAKE ONE CAPSULE BY MOUTH TWICE A DAY FOR PREVENTION OF FREQUENTLY OCCURING URINARY TRACT INFECTIONS 180 capsule 2    pantoprazole (PROTONIX) 40 MG EC tablet TAKE ONE TABLET BY MOUTH EVERY DAY FOR GASTROESOPHAGEAL REFLUX DISEASE 90 tablet 2    promethazine (PHENERGAN)       estradiol (ESTRACE) 0.1 MG/GM vaginal cream Insert 1 g into the vagina 3 (Three) Times a Week. Indications: Vulvovaginal Atrophy 42.5 g 1    FLUoxetine (PROzac) 20 MG capsule Take 3 capsules by mouth Every Morning. Indications: Depression 90 capsule 0    imipramine (TOFRANIL) 50 MG tablet Take 0.5 tablets PO nightly for one week, then increase to 1 tablet PO nightly. (Patient taking differently: Take 0.5 tablets PO nightly for one week, then increase to 1 tablet PO nightly. Starting 12/6/24) 30 tablet 0    OLANZapine (ZyPREXA) 2.5 MG tablet Take 1 tablet by mouth Every Night. 30 tablet 0    traZODone (DESYREL) 100 MG tablet Take 1 tablet by mouth At Night As Needed for Sleep. Indications: Trouble Sleeping 30 tablet 0     No facility-administered medications prior to visit.     No opioid medication identified on active medication list. I have reviewed chart for other potential  high risk medication/s and harmful drug interactions in the elderly.      Aspirin is not on active medication list.  Aspirin use is not indicated based on review of current medical condition/s. Risk of harm outweighs potential benefits.  .    Patient Active Problem List   Diagnosis    Anxiety    Bursitis of hip    Cervical spondylosis with  radiculopathy    Chronic migraine without aura without status migrainosus, not intractable    Conversion disorder with attacks or seizures, persistent, with psychological stressor    Cramp of limb    Diverticulosis of colon    Elevated liver enzymes    Eustachian tube dysfunction    Foot drop    Gastroesophageal reflux disease without esophagitis    Hereditary and idiopathic peripheral neuropathy    Hypersomnia    Internal hemorrhoids    Iron deficiency    Lumbar radiculopathy, chronic    Malaise and fatigue    Metabolic syndrome    Medicare annual wellness visit, subsequent    Encounter for screening mammogram for malignant neoplasm of breast    Mixed hyperlipidemia    Moderate episode of recurrent major depressive disorder    Obstructive sleep apnea    Osteoarthritis of right temporomandibular joint    Osteopenia of left lower leg    Panic disorder    PTSD (post-traumatic stress disorder)    RLS (restless legs syndrome)    Primary insomnia    Sinus drainage    Tinnitus    Urge incontinence    Urinary urgency    Vitamin D deficiency    Postmenopausal symptoms    Postmenopausal osteoporosis    Right leg pain    Edema of right lower extremity    Suicidal ideations    Recurrent UTI (urinary tract infection)    Acute cystitis without hematuria    Bilateral flank pain    Incomplete emptying of bladder    Osteoarthritis of left AC (acromioclavicular) joint    Tear of left glenoid labrum    Infraspinatus tendon partial tear, left, initial encounter    Supraspinatus tendonitis with fraying, left    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    DDD (degenerative disc disease), lumbar    Chronic midline low back pain with bilateral sciatica    Orthostasis    Essential tremor    Age-related cognitive decline    Suicidal ideation    Severe episode of recurrent major depressive disorder, without psychotic features    Constipation    Allergic rhinitis    MDD (major depressive disorder)    Dysthymia    Ulnar neuropathy of both  "upper extremities    MDD (major depressive disorder), recurrent episode, severe    Chronic back pain    Postmenopausal    Impaired fasting glucose     Advance Care Planning Advance Directive is on file.  ACP discussion was held with the patient during this visit. Patient has an advance directive in EMR which is still valid.       Review of Systems  Constitutional: Negative for fever. Negative for chills, diaphoresis, fatigue and unexpected weight change.   HENT: No dysphagia; no changes to vision/hearing/smell/taste; no epistaxis  Eyes: Negative for redness and visual disturbance.   Respiratory: negative for shortness of breath. Negative for chest pain . Negative for cough and chest tightness.   Cardiovascular: Negative for chest pain and palpitations.   Gastrointestinal: Negative for abdominal distention, abdominal pain and blood in stool.   Endocrine: Negative for cold intolerance and heat intolerance.   Genitourinary: Negative for difficulty urinating, dysuria and frequency.   Musculoskeletal: Chronic arthralgias, back pain and myalgias.   Skin: Negative for color change, rash and wound.   Neurological:  paroxysmal positional vertigo, no focal weakness or unrelenting headache.  Paresthesias to the left upper extremity, radiating down the posterior portion of the left upper arm, throughout the elbow and into the forearm, and particularly noticeable to the fourth and fifth phalanx of the left hand.  Hematological: Negative for adenopathy. Does not bruise/bleed easily.   Psychiatric/Behavioral: Negative for confusion. The patient is not nervous/anxious.       Objective   Vitals:    12/17/24 1429   BP: 92/68   Pulse: 69   SpO2: 98%   Weight: 66.2 kg (146 lb)   Height: 160 cm (63\")       Estimated body mass index is 25.86 kg/m² as calculated from the following:    Height as of this encounter: 160 cm (63\").    Weight as of this encounter: 66.2 kg (146 lb).    BMI is >= 25 and <30. (Overweight) The following options " were offered after discussion;: exercise counseling/recommendations and nutrition counseling/recommendations    General Appearance: alert, oriented x 3, no acute distress.  Skin: warm and dry.   HEENT: Atraumatic.  pupils round and reactive to light and accommodation, oral mucosa pink and moist.  Nares patent without epistaxis.  External auditory canals are patent tympanic membranes intact.  Neck: supple, no JVD, trachea midline.  No thyromegaly  Lungs: CTA, unlabored breathing effort.  Heart: RRR, normal S1 and S2, no S3, no rub.  Abdomen: soft, non-tender, no palpable bladder, present bowel sounds to auscultation ×4.  No guarding or rigidity.  Extremities: no clubbing, cyanosis or edema.  Good range of motion actively and passively.  Symmetric muscle strength and development  Neuro: normal speech and mental status.  Cranial nerves II through XII intact.  No anosmia. DTR 2+; proprioception intact.  No focal motor/sensory deficits.           Does the patient have evidence of cognitive impairment? No  Lab Results   Component Value Date    HGBA1C 5.1 10/16/2024                                                                                               Health  Risk Assessment    Smoking Status:  Social History     Tobacco Use   Smoking Status Never    Passive exposure: Past   Smokeless Tobacco Never     Alcohol Consumption:  Social History     Substance and Sexual Activity   Alcohol Use Yes    Alcohol/week: 1.0 standard drink of alcohol    Types: 1 Drinks containing 0.5 oz of alcohol per week    Comment: Occasionally       Fall Risk Screen  STEADI Fall Risk Assessment has not been completed.    Depression Screening   The PHQ has not been completed during this encounter.     Health Habits and Functional and Cognitive Screenin/19/2023     4:29 PM   Functional & Cognitive Status   Do you have difficulty preparing food and eating? No   Do you have difficulty bathing yourself, getting dressed or grooming  yourself? Yes   Do you have difficulty using the toilet? No   Do you have difficulty moving around from place to place? No   Do you have trouble with steps or getting out of a bed or a chair? No   Current Diet Well Balanced Diet   Dental Exam Not up to date   Eye Exam Not up to date   Exercise (times per week) 7 times per week   Current Exercises Include House Cleaning;Walking;Yard Work   Do you need help using the phone?  No   Are you deaf or do you have serious difficulty hearing?  Yes   Do you need help to go to places out of walking distance? No   Do you need help shopping? No   Do you need help preparing meals?  No   Do you need help with housework?  No   Do you need help with laundry? No   Do you need help taking your medications? No   Do you need help managing money? Yes   Do you ever drive or ride in a car without wearing a seat belt? No           Age-appropriate Screening Schedule:  Refer to the list below for future screening recommendations based on patient's age, sex and/or medical conditions. Orders for these recommended tests are listed in the plan section. The patient has been provided with a written plan.    Health Maintenance List  Health Maintenance   Topic Date Due    DIABETIC EYE EXAM  Never done    COVID-19 Vaccine (1 - 2024-25 season) Never done    ANNUAL WELLNESS VISIT  09/19/2024    BMI FOLLOWUP  12/13/2024    MAMMOGRAM  11/17/2024    COLORECTAL CANCER SCREENING  06/16/2025    HEMOGLOBIN A1C  04/16/2025    LIPID PANEL  10/16/2025    URINE MICROALBUMIN  12/17/2025    TDAP/TD VACCINES (4 - Td or Tdap) 09/21/2031    HEPATITIS C SCREENING  Completed    Hepatitis B  Completed    Pneumococcal Vaccine 0-64  Completed    INFLUENZA VACCINE  Completed    ZOSTER VACCINE  Completed                                                                                                                                                CMS Preventative Services Quick Reference  Risk Factors Identified During  Encounter  None Identified    The above risks/problems have been discussed with the patient.  Pertinent information has been shared with the patient in the After Visit Summary.  An After Visit Summary and PPPS were made available to the patient.    Follow Up:  Next Medicare Wellness visit to be scheduled in 1 year.     Assessment & Plan  Medicare annual wellness visit, subsequent       I have discussed age/gender specific preventative healthcare issues in detail with patient today.  I have answered all of the questions.  PTSD (post-traumatic stress disorder)  Psychological condition is improving with lifestyle modifications.  Continue current treatment regimen.  Psychological condition  will be reassessed in 3 months.         Severe episode of recurrent major depressive disorder, without psychotic features  Patient's depression is a recurrent episode that is moderate without psychosis. Depression is in partial remission and improving with treatment.    Plan:   Continue current medication therapy     Followup in 3 months.        Keep scheduled follow-up appointment with behavioral health.  Impaired fasting glucose    Orders:    POC Microalbumin  Patient has been erroneously marked as diabetic. Based on the available clinical information, she does not have diabetes and should therefore be excluded from diabetic health maintenance and quality measures for the remainder of the reporting period.    Postmenopausal         Encounter for screening for osteoporosis         Encounter for screening mammogram for malignant neoplasm of breast       Has mammogram already ordered, does not appear it has been completed yet.  I will ask our referral staff to aid in getting this scheduled for patient.       Follow Up:  Return in about 4 months (around 4/17/2025) for Recheck.        I confirm accuracy of unchanged data/findings which have been carried forward from previous visit, as well as I have updated appropriately those that have  changed.

## 2024-12-17 NOTE — ASSESSMENT & PLAN NOTE
Patient's depression is a recurrent episode that is moderate without psychosis. Depression is in partial remission and improving with treatment.    Plan:   Continue current medication therapy     Followup in 3 months.        Keep scheduled follow-up appointment with behavioral health.

## 2024-12-18 ENCOUNTER — OFFICE VISIT (OUTPATIENT)
Dept: PSYCHIATRY | Facility: CLINIC | Age: 59
End: 2024-12-18
Payer: MEDICARE

## 2024-12-18 VITALS
OXYGEN SATURATION: 100 % | BODY MASS INDEX: 26.9 KG/M2 | SYSTOLIC BLOOD PRESSURE: 100 MMHG | DIASTOLIC BLOOD PRESSURE: 70 MMHG | HEIGHT: 63 IN | HEART RATE: 81 BPM | WEIGHT: 151.8 LBS

## 2024-12-18 DIAGNOSIS — F33.2 SEVERE EPISODE OF RECURRENT MAJOR DEPRESSIVE DISORDER, WITHOUT PSYCHOTIC FEATURES: ICD-10-CM

## 2024-12-18 DIAGNOSIS — F41.0 PANIC ATTACKS: ICD-10-CM

## 2024-12-18 DIAGNOSIS — G47.09 OTHER INSOMNIA: ICD-10-CM

## 2024-12-18 DIAGNOSIS — F41.1 GENERALIZED ANXIETY DISORDER: ICD-10-CM

## 2024-12-18 DIAGNOSIS — F43.10 POST TRAUMATIC STRESS DISORDER (PTSD): ICD-10-CM

## 2024-12-18 RX ORDER — TRAZODONE HYDROCHLORIDE 100 MG/1
100 TABLET ORAL NIGHTLY PRN
Qty: 30 TABLET | Refills: 1 | Status: SHIPPED | OUTPATIENT
Start: 2024-12-18

## 2024-12-18 RX ORDER — DOXEPIN HYDROCHLORIDE 50 MG/1
50 CAPSULE ORAL NIGHTLY PRN
Qty: 30 CAPSULE | Refills: 1 | Status: SHIPPED | OUTPATIENT
Start: 2024-12-18

## 2024-12-18 RX ORDER — OLANZAPINE 2.5 MG/1
2.5 TABLET, FILM COATED ORAL NIGHTLY
Qty: 30 TABLET | Refills: 1 | Status: SHIPPED | OUTPATIENT
Start: 2024-12-18 | End: 2025-12-18

## 2024-12-18 NOTE — PROGRESS NOTES
Subjective   Zo Palma is a 59 y.o. female who presents today for follow up    Chief Complaint: PTSD, dysthymia, MDD    History of Present Illness: Patient presenting today with her emotional support animal for follow-up.  Since last visit, patient reports that she has actually been feeling much better.  She states that it is like a night and day difference.  Her depressive symptoms have lightened up immensely though she still has some dysphoria and her suicidal ideation is still present though it is not as intense and may be partially habitual for her brain to default to at times.  She reports that up until the past 3 days, she had been doing very very well but she started having very poor sleep for the past 3 days and last night she had a little bit of go to pieces but she feels it is largely due to poor sleep.  She is having some mild urinary retention with the imipramine but she previously had some mild incontinence so this is not necessarily a bad side effect but we will continue to monitor for anticholinergic side effects.  She denies HI/AVH.      The following portions of the patient's history were reviewed and updated as appropriate: allergies, current medications, past family history, past medical history, past social history, past surgical history and problem list.      Past Medical History:  Past Medical History:   Diagnosis Date    Ankle sprain     Anxiety     Arteriovenous malformation 2004    Temporal hemangiona - right    Arthritis of back     Arthritis of neck     Brain concussion     Bursitis of hip     Cataract 11/2021    Cervical disc disorder     Chronic pain disorder 1995    Conversion disorder     CTS (carpal tunnel syndrome)     Depression     Difficulty walking 3/2024    Diverticulosis     Fracture of wrist     Fracture, finger     Fracture, foot     Frozen shoulder     GERD (gastroesophageal reflux disease)     Hip arthrosis     HL (hearing loss)     Hormone disorder     Hyperlipidemia   "   Insomnia     Interstitial cystitis     Kidney stone     Liver failure     due to depakote    Lumbosacral disc disease     Lupus     Memory loss     Migraines     Movement disorder 3/2014    Essential tremor    Orthostatic hypotension     Osteopenia     Panic disorder 2001    Periarthritis of shoulder     Peripheral neuropathy     PTSD (post-traumatic stress disorder) 1985    Rotator cuff syndrome     Scoliosis 11/2023    Seizures 2001    Conversion Disorder    Sleep apnea     Subluxation of patella     Suicidal thoughts     Suicide attempt     \"I tried overdosing\"  32 antihistamine tablets per pt 1/27/24    Tremor 2/2022    Urinary tract infection     Weight loss        Social History:  Social History     Socioeconomic History    Marital status:     Number of children: 2    Highest education level: Master's degree (e.g., MA, MS, Pancho, MEd, MSW, SULY)   Tobacco Use    Smoking status: Never     Passive exposure: Past    Smokeless tobacco: Never   Vaping Use    Vaping status: Never Used   Substance and Sexual Activity    Alcohol use: Yes     Alcohol/week: 1.0 standard drink of alcohol     Types: 1 Drinks containing 0.5 oz of alcohol per week     Comment: Occasionally    Drug use: Never    Sexual activity: Not Currently     Partners: Male     Birth control/protection: Post-menopausal, Hysterectomy       Family History:  Family History   Problem Relation Age of Onset    Cancer Mother         Brain, suspected uterine    Rheumatologic disease Mother     Dementia Father         Family predisposed    Cancer Father         Prostate    Learning disabilities Father         Dyslexia    Prostate cancer Father     Cancer Brother         Prostate, bone    Learning disabilities Brother         Dyslexia    Dementia Brother     Prostate cancer Maternal Grandfather     Parkinsonism Maternal Grandfather     Depression Son     Learning disabilities Son        Past Surgical History:  Past Surgical History:   Procedure Laterality " Date    ABDOMINAL SURGERY      BARIATRIC SURGERY  2008    Gastric sleeve    BLADDER SURGERY      BREAST SURGERY Bilateral     reduction    CHOLECYSTECTOMY  2010    COLONOSCOPY      COSMETIC SURGERY      CYSTOSCOPY      ELECTROCONVULSIVE THERAPY Bilateral 02/15/2024    Procedure: BIFRONTAL ELECTROCONVULSIVE THERAPY;  Surgeon: Vinod Osuna MD;  Location:  [facility];  Service: ECT;  Laterality: Bilateral;    ELECTROCONVULSIVE THERAPY N/A 02/19/2024    Procedure: ELECTROCONVULSIVE THERAPY;  Surgeon: Vinod Osuna MD;  Location:  [facility];  Service: ECT;  Laterality: N/A;    ELECTROCONVULSIVE THERAPY N/A 02/21/2024    Procedure: ELECTROCONVULSIVE THERAPY;  Surgeon: Vinod Osuna MD;  Location:  [facility];  Service: ECT;  Laterality: N/A;    ELECTROCONVULSIVE THERAPY Bilateral 06/10/2024    Procedure: BIFRONTAL ELECTROCONVULSIVE THERAPY;  Surgeon: Vinod Osuna MD;  Location:  [facility];  Service: ECT;  Laterality: Bilateral;    ELECTROCONVULSIVE THERAPY N/A 06/12/2024    Procedure: ELECTROCONVULSIVE THERAPY;  Surgeon: Vinod Osuna MD;  Location:  [facility];  Service: ECT;  Laterality: N/A;    ELECTROCONVULSIVE THERAPY N/A 06/14/2024    Procedure: ELECTROCONVULSIVE THERAPY;  Surgeon: Vinod Osuna MD;  Location:  [facility];  Service: ECT;  Laterality: N/A;    ELECTROCONVULSIVE THERAPY N/A 06/17/2024    Procedure: ELECTROCONVULSIVE THERAPY;  Surgeon: Vinod Osuna MD;  Location:  [facility];  Service: ECT;  Laterality: N/A;    ELECTROCONVULSIVE THERAPY N/A 06/19/2024    Procedure: ELECTROCONVULSIVE THERAPY;  Surgeon: Vinod Osuna MD;  Location:  [facility];  Service: ECT;  Laterality: N/A;    ENDOSCOPY      FOOT SURGERY Bilateral     6 hammer toes    FRACTURE SURGERY      Left wrist    GASTRECTOMY      GASTRIC RESTRICTION SURGERY  2007    GASTRIC SLEEVE LAPAROSCOPIC N/A     HYSTERECTOMY      NECK SURGERY      OOPHORECTOMY Bilateral      SINUS SURGERY  2021    WISDOM TOOTH EXTRACTION N/A     WRIST SURGERY Left        Problem List:  Patient Active Problem List   Diagnosis    Anxiety    Bursitis of hip    Cervical spondylosis with radiculopathy    Chronic migraine without aura without status migrainosus, not intractable    Conversion disorder with attacks or seizures, persistent, with psychological stressor    Cramp of limb    Prediabetes    Diverticulosis of colon    Elevated liver enzymes    Eustachian tube dysfunction    Foot drop    Gastroesophageal reflux disease without esophagitis    Hereditary and idiopathic peripheral neuropathy    Hypersomnia    Internal hemorrhoids    Iron deficiency    Lumbar radiculopathy, chronic    Malaise and fatigue    Metabolic syndrome    Medicare annual wellness visit, subsequent    Encounter for screening mammogram for malignant neoplasm of breast    Mixed hyperlipidemia    Moderate episode of recurrent major depressive disorder    Obstructive sleep apnea    Osteoarthritis of right temporomandibular joint    Osteopenia of left lower leg    Panic disorder    PTSD (post-traumatic stress disorder)    RLS (restless legs syndrome)    Primary insomnia    Sinus drainage    Tinnitus    Urge incontinence    Urinary urgency    Vitamin D deficiency    Postmenopausal symptoms    Postmenopausal osteoporosis    Right leg pain    Edema of right lower extremity    Suicidal ideations    Recurrent UTI (urinary tract infection)    Acute cystitis without hematuria    Bilateral flank pain    Incomplete emptying of bladder    Osteoarthritis of left AC (acromioclavicular) joint    Tear of left glenoid labrum    Infraspinatus tendon partial tear, left, initial encounter    Supraspinatus tendonitis with fraying, left    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    DDD (degenerative disc disease), lumbar    Chronic midline low back pain with bilateral sciatica    Orthostasis    Essential tremor    Age-related cognitive decline     "Suicidal ideation    Severe episode of recurrent major depressive disorder, without psychotic features    Constipation    Allergic rhinitis    MDD (major depressive disorder)    Dysthymia    Ulnar neuropathy of both upper extremities    MDD (major depressive disorder), recurrent episode, severe    Chronic back pain    Postmenopausal    Impaired fasting glucose       Allergy:   Allergies   Allergen Reactions    Chlorzoxazone Swelling and Unknown - High Severity     Lorzone, muscle relaxant.    Iodinated Contrast Media Anaphylaxis    Iodine Anaphylaxis     Topical makes her swell  Anaphylaxis with IV contrast (when she was ~ 9yrs old)    Phenazopyridine Hcl Swelling and Anaphylaxis    Pyridium [Phenazopyridine] Anaphylaxis    Quinine Unknown - High Severity     Has malaria symptoms    Valproic Acid Other (See Comments)     Lupus like symptoms and liver failure    Methocarbamol Mental Status Change     Made her feel \"suicidal\" and gave her less control over her actions.    Codeine Itching    Diphenhydramine Anxiety     Pt has severe panic attack symptoms when given benadryl IV. Needs to take a benzodiazapine med concurrently when getting benadryl.         Current Medications:   Current Outpatient Medications   Medication Sig Dispense Refill    acetaminophen (TYLENOL) 500 MG tablet Take 1 tablet by mouth Daily As Needed for Mild Pain or Moderate Pain. Indications: Pain      donepezil (Aricept) 10 MG tablet Take 1 tablet by mouth Every Night. 90 tablet 2    estradiol (ESTRACE) 0.1 MG/GM vaginal cream Insert 1 g into the vagina 3 (Three) Times a Week. Indications: Vulvovaginal Atrophy 42.5 g 1    FLUoxetine (PROzac) 20 MG capsule Take 3 capsules by mouth Every Morning. Indications: Depression 90 capsule 0    ibandronate (BONIVA) 150 MG tablet Take 1 tablet by mouth Every 30 (Thirty) Days. Indications: Postmenopausal Osteoporosis 3 tablet 3    imipramine (TOFRANIL) 50 MG tablet Take 0.5 tablets PO nightly for one week, " "then increase to 1 tablet PO nightly. (Patient taking differently: Take 0.5 tablets PO nightly for one week, then increase to 1 tablet PO nightly. Starting 12/6/24) 30 tablet 0    Liraglutide (VICTOZA) 18 MG/3ML solution pen-injector injection Inject 1.8 mg under the skin into the appropriate area as directed Daily. Indications: Type 2 Diabetes      nitrofurantoin, macrocrystal-monohydrate, (MACROBID) 100 MG capsule TAKE ONE CAPSULE BY MOUTH TWICE A DAY FOR PREVENTION OF FREQUENTLY OCCURING URINARY TRACT INFECTIONS 180 capsule 2    OLANZapine (ZyPREXA) 2.5 MG tablet Take 1 tablet by mouth Every Night. 30 tablet 0    pantoprazole (PROTONIX) 40 MG EC tablet TAKE ONE TABLET BY MOUTH EVERY DAY FOR GASTROESOPHAGEAL REFLUX DISEASE 90 tablet 2    promethazine (PHENERGAN)       traZODone (DESYREL) 100 MG tablet Take 1 tablet by mouth At Night As Needed for Sleep. Indications: Trouble Sleeping 30 tablet 0     No current facility-administered medications for this visit.       Review of Symptoms:    Review of Systems   Neurological:  Negative for dizziness and confusion.   Psychiatric/Behavioral:  Positive for dysphoric mood and sleep disturbance. The patient is nervous/anxious.          Physical Exam:   Blood pressure 100/70, pulse 81, height 160 cm (62.99\"), weight 68.9 kg (151 lb 12.8 oz), SpO2 100%.    Appearance: CF, stated age, NAD  Gait, Station, Strength: WNL    Mental Status Exam:   Hygiene:   good  Cooperation:  Cooperative  Eye Contact:  Good  Psychomotor Behavior:  Appropriate  Affect:  Full range  Mood:  dysphoric but much improved  Hopelessness: Denies  Speech:  Normal  Thought Process:  Goal directed and Linear  Thought Content:  Normal and Mood congruent  Suicidal:  Death wish  Homicidal:  None  Hallucinations:  None  Delusion:  None  Memory:  Intact  Orientation:  Person, Place, Time, and Situation  Reliability:  good  Insight:  Fair  Judgement:  Fair  Impulse Control:  Fair      Lab Results:   Office Visit on " 12/17/2024   Component Date Value Ref Range Status    Microalbumin, Urine 12/17/2024 30   Final    Creatinine, Urine 12/17/2024 100   Final    Lot Number 12/17/2024 0   Final    Expiration Date 12/17/2024 0   Final   Office Visit on 12/09/2024   Component Date Value Ref Range Status    Glucose 12/09/2024 69 (A)  70 - 130 mg/dL Final    Lot Number 12/09/2024 [id number]   Final    Expiration Date 12/09/2024 07/18/2025   Final     Results      Assessment & Plan    Diagnoses and all orders for this visit:    1. Severe episode of recurrent major depressive disorder, without psychotic features  -     OLANZapine (ZyPREXA) 2.5 MG tablet; Take 1 tablet by mouth Every Night.  Dispense: 30 tablet; Refill: 1  -     FLUoxetine (PROzac) 20 MG capsule; Take 3 capsules by mouth Every Morning. Indications: Depression  Dispense: 90 capsule; Refill: 1    2. Post traumatic stress disorder (PTSD)  -     OLANZapine (ZyPREXA) 2.5 MG tablet; Take 1 tablet by mouth Every Night.  Dispense: 30 tablet; Refill: 1  -     FLUoxetine (PROzac) 20 MG capsule; Take 3 capsules by mouth Every Morning. Indications: Depression  Dispense: 90 capsule; Refill: 1    3. Generalized anxiety disorder  -     OLANZapine (ZyPREXA) 2.5 MG tablet; Take 1 tablet by mouth Every Night.  Dispense: 30 tablet; Refill: 1  -     FLUoxetine (PROzac) 20 MG capsule; Take 3 capsules by mouth Every Morning. Indications: Depression  Dispense: 90 capsule; Refill: 1    4. Panic attacks  -     OLANZapine (ZyPREXA) 2.5 MG tablet; Take 1 tablet by mouth Every Night.  Dispense: 30 tablet; Refill: 1    5. Other insomnia  -     OLANZapine (ZyPREXA) 2.5 MG tablet; Take 1 tablet by mouth Every Night.  Dispense: 30 tablet; Refill: 1  -     traZODone (DESYREL) 100 MG tablet; Take 1 tablet by mouth At Night As Needed for Sleep. Indications: Trouble Sleeping  Dispense: 30 tablet; Refill: 1    Other orders  -     imipramine (TOFRANIL-PM) 75 MG capsule; Take 1 capsule by mouth Every Night.   Dispense: 30 capsule; Refill: 0  -     doxepin (SINEquan) 50 MG capsule; Take 1 capsule by mouth At Night As Needed for Sleep.  Dispense: 30 capsule; Refill: 1      -This is my initial encounter with the patient  -Patient presenting for evaluation with a history of refractory depression and dysthymia with multiple hospitalizations.  ECT and ketamine were both treatment failures in the past.  She is also having poor sleep which may be contributing.  -Reviewed previous available documentation  -Reviewed most recent available labs   -YOSHI reviewed and appropriate. Patient counseled on use of controlled substances.   -Continue Prozac 60 mg p.o. daily for mood and anxiety, plan to reduce and taper but for now will maintain given significant improvement  -Continue Zyprexa 2.5 mg p.o. nightly for mood stabilization and insomnia  -Increase imipramine to 75 mg p.o. nightly for mood, anxiety, and insomnia  -Continue trazodone 100 mg p.o. nightly as needed for insomnia  -Start doxepin 50 mg nightly as needed for insomnia, patient advised not to take doxepin and trazodone together and we will initiate doxepin to see if it is more effective for sleep and discontinue trazodone but will send trazodone today in case doxepin is ineffective  -Encouraged to consider therapy    Visit Diagnoses:    ICD-10-CM ICD-9-CM   1. Severe episode of recurrent major depressive disorder, without psychotic features  F33.2 296.33   2. Post traumatic stress disorder (PTSD)  F43.10 309.81   3. Generalized anxiety disorder  F41.1 300.02   4. Panic attacks  F41.0 300.01   5. Other insomnia  G47.09 780.52         TREATMENT PLAN - SHORT AND LONG-TERM GOALS: Continue supportive psychotherapy efforts and medications as indicated. Treatment and medication options discussed during today's visit. Patient acknowledged and verbally consented to continue with current treatment plan and was educated on the importance of compliance with treatment and follow-up  appointments.    MEDICATION ISSUES:    Discussed medication options and treatment plan of prescribed medication as well as the risks, benefits, and side effects including potential falls, possible impaired driving and metabolic adversities among others. Patient is agreeable to call the office with any worsening of symptoms or onset of side effects. Patient is agreeable to call 911 or go to the nearest ER should he/she begin having SI/HI.     MEDS ORDERED DURING VISIT:  New Medications Ordered This Visit   Medications    imipramine (TOFRANIL-PM) 75 MG capsule     Sig: Take 1 capsule by mouth Every Night.     Dispense:  30 capsule     Refill:  0    OLANZapine (ZyPREXA) 2.5 MG tablet     Sig: Take 1 tablet by mouth Every Night.     Dispense:  30 tablet     Refill:  1    traZODone (DESYREL) 100 MG tablet     Sig: Take 1 tablet by mouth At Night As Needed for Sleep. Indications: Trouble Sleeping     Dispense:  30 tablet     Refill:  1    FLUoxetine (PROzac) 20 MG capsule     Sig: Take 3 capsules by mouth Every Morning. Indications: Depression     Dispense:  90 capsule     Refill:  1    doxepin (SINEquan) 50 MG capsule     Sig: Take 1 capsule by mouth At Night As Needed for Sleep.     Dispense:  30 capsule     Refill:  1       FOLLOW UP:  Return in about 6 weeks (around 1/29/2025).          This document has been electronically signed by Brian Platt MD  December 18, 2024 16:03 EST    Dictated using Dragon Dictation.

## 2024-12-27 ENCOUNTER — PRIOR AUTHORIZATION (OUTPATIENT)
Dept: PSYCHIATRY | Facility: CLINIC | Age: 59
End: 2024-12-27
Payer: MEDICARE

## 2024-12-27 NOTE — TELEPHONE ENCOUNTER
PA request sent to [employer] PA review Fax # [phone] via fax on 12/27/24  imipramine (TOFRANIL-PM) 75 MG capsule

## 2025-01-07 DIAGNOSIS — Z78.0 POSTMENOPAUSAL: ICD-10-CM

## 2025-01-07 RX ORDER — ESTRADIOL 0.1 MG/G
CREAM VAGINAL
Qty: 42.5 G | Refills: 1 | Status: SHIPPED | OUTPATIENT
Start: 2025-01-07

## 2025-01-07 NOTE — TELEPHONE ENCOUNTER
Rx Refill Note  Requested Prescriptions     Pending Prescriptions Disp Refills    estradiol (ESTRACE) 0.1 MG/GM vaginal cream [Pharmacy Med Name: ESTRADIOL 0.1MG/GM CREA] 42.5 g 1     Sig: INSERT 1 GRAM VAGINALLY THREE TIMES WEEKLY FOR VULVOVAGINAL ATROPHY      Last office visit with prescribing clinician: 12/17/2024   Last telemedicine visit with prescribing clinician: Visit date not found   Next office visit with prescribing clinician: 4/17/2025     Nica Mathew MA  01/07/25, 09:55 EST

## 2025-01-14 ENCOUNTER — TELEPHONE (OUTPATIENT)
Dept: PSYCHIATRY | Facility: CLINIC | Age: 60
End: 2025-01-14
Payer: MEDICARE

## 2025-01-14 NOTE — TELEPHONE ENCOUNTER
Patient called states since starting the imipramine her suicidal thought have gotten worse.States she feels she needs something changed with her medication.Requesting a call back as soon as possible

## 2025-01-15 NOTE — TELEPHONE ENCOUNTER
Have patient stop imipramine. I will try and call her back as soon as I can but if she feels unsafe she needs to go to the ER.

## 2025-01-23 ENCOUNTER — TELEPHONE (OUTPATIENT)
Dept: PSYCHIATRY | Facility: CLINIC | Age: 60
End: 2025-01-23
Payer: MEDICARE

## 2025-01-23 NOTE — TELEPHONE ENCOUNTER
Patient called states she received a letter stating the Olanzapine had been recalled states she has stopped taking it and needs to know what to do now.Requesting a call back ASAP

## 2025-01-24 RX ORDER — OLANZAPINE 5 MG/1
2.5 TABLET ORAL NIGHTLY
Qty: 15 TABLET | Refills: 2 | Status: SHIPPED | OUTPATIENT
Start: 2025-01-24 | End: 2026-01-24

## 2025-01-29 ENCOUNTER — OFFICE VISIT (OUTPATIENT)
Dept: PSYCHIATRY | Facility: CLINIC | Age: 60
End: 2025-01-29
Payer: MEDICARE

## 2025-01-29 VITALS
OXYGEN SATURATION: 100 % | HEIGHT: 63 IN | BODY MASS INDEX: 28.42 KG/M2 | DIASTOLIC BLOOD PRESSURE: 70 MMHG | WEIGHT: 160.4 LBS | HEART RATE: 85 BPM | SYSTOLIC BLOOD PRESSURE: 118 MMHG

## 2025-01-29 DIAGNOSIS — F33.2 SEVERE EPISODE OF RECURRENT MAJOR DEPRESSIVE DISORDER, WITHOUT PSYCHOTIC FEATURES: Primary | ICD-10-CM

## 2025-01-29 DIAGNOSIS — F41.1 GENERALIZED ANXIETY DISORDER: ICD-10-CM

## 2025-01-29 DIAGNOSIS — F43.10 POST TRAUMATIC STRESS DISORDER (PTSD): ICD-10-CM

## 2025-01-29 DIAGNOSIS — G47.09 OTHER INSOMNIA: ICD-10-CM

## 2025-01-29 RX ORDER — DOXEPIN HYDROCHLORIDE 50 MG/1
50 CAPSULE ORAL NIGHTLY PRN
Qty: 30 CAPSULE | Refills: 1 | Status: SHIPPED | OUTPATIENT
Start: 2025-01-29

## 2025-01-29 RX ORDER — TRAZODONE HYDROCHLORIDE 100 MG/1
100 TABLET ORAL NIGHTLY PRN
Qty: 30 TABLET | Refills: 2 | Status: SHIPPED | OUTPATIENT
Start: 2025-01-29

## 2025-01-29 NOTE — PROGRESS NOTES
"Subjective   Zo Palma is a 60 y.o. female who presents today for follow up    Chief Complaint: PTSD, dysthymia, MDD    History of Present Illness: Patient presented today for follow-up.  She reports that imipramine causes worsening depression so we have since gone back to Prozac.  She states that olanzapine was helpful but caused some weight gain and she had some hair loss with the medication.  She is back on Prozac, trazodone, and doxepin and she reports things are going, \"great right now.\"  She denies any major symptom burden and is not having any medication side effects.  She denies SI/HI/AVH but has some intermittent passive suicidal thoughts which is improved over baseline.    The following portions of the patient's history were reviewed and updated as appropriate: allergies, current medications, past family history, past medical history, past social history, past surgical history and problem list.      Past Medical History:  Past Medical History:   Diagnosis Date    Ankle sprain     Anxiety     Arteriovenous malformation 2004    Temporal hemangiona - right    Arthritis of back     Arthritis of neck     Brain concussion     Bursitis of hip     Cataract 11/2021    Cervical disc disorder     Chronic pain disorder 1995    Conversion disorder     CTS (carpal tunnel syndrome)     Depression     Difficulty walking 3/2024    Diverticulosis     Fracture of wrist     Fracture, finger     Fracture, foot     Frozen shoulder     GERD (gastroesophageal reflux disease)     Hip arthrosis     HL (hearing loss)     Hormone disorder     Hyperlipidemia     Insomnia     Interstitial cystitis     Kidney stone     Liver failure     due to depakote    Lumbosacral disc disease     Lupus     Memory loss     Migraines     Movement disorder 3/2014    Essential tremor    Orthostatic hypotension     Osteopenia     Panic disorder 2001    Periarthritis of shoulder     Peripheral neuropathy     PTSD (post-traumatic stress disorder) 1985 " "   Rotator cuff syndrome     Scoliosis 11/2023    Seizures 2001    Conversion Disorder    Sleep apnea     Subluxation of patella     Suicidal thoughts     Suicide attempt     \"I tried overdosing\"  32 antihistamine tablets per pt 1/27/24    Tremor 2/2022    Urinary tract infection     Weight loss        Social History:  Social History     Socioeconomic History    Marital status:     Number of children: 2    Highest education level: Master's degree (e.g., MA, MS, Pancho, MEd, MSW, SULY)   Tobacco Use    Smoking status: Never     Passive exposure: Past    Smokeless tobacco: Never   Vaping Use    Vaping status: Never Used   Substance and Sexual Activity    Alcohol use: Yes     Alcohol/week: 1.0 standard drink of alcohol     Types: 1 Drinks containing 0.5 oz of alcohol per week     Comment: Occasionally    Drug use: Never    Sexual activity: Not Currently     Partners: Male     Birth control/protection: Post-menopausal, Hysterectomy       Family History:  Family History   Problem Relation Age of Onset    Cancer Mother         Brain, suspected uterine    Rheumatologic disease Mother     Dementia Father         Family predisposed    Cancer Father         Prostate    Learning disabilities Father         Dyslexia    Prostate cancer Father     Cancer Brother         Prostate, bone    Learning disabilities Brother         Dyslexia    Dementia Brother     Prostate cancer Maternal Grandfather     Parkinsonism Maternal Grandfather     Depression Son     Learning disabilities Son        Past Surgical History:  Past Surgical History:   Procedure Laterality Date    ABDOMINAL SURGERY      BARIATRIC SURGERY  2008    Gastric sleeve    BLADDER SURGERY      BREAST SURGERY Bilateral     reduction    CHOLECYSTECTOMY  2010    COLONOSCOPY      COSMETIC SURGERY      CYSTOSCOPY      ELECTROCONVULSIVE THERAPY Bilateral 02/15/2024    Procedure: BIFRONTAL ELECTROCONVULSIVE THERAPY;  Surgeon: Vinod Osuna MD;  Location:  [facility]; "  Service: ECT;  Laterality: Bilateral;    ELECTROCONVULSIVE THERAPY N/A 02/19/2024    Procedure: ELECTROCONVULSIVE THERAPY;  Surgeon: Vinod Osuna MD;  Location:  [facility];  Service: ECT;  Laterality: N/A;    ELECTROCONVULSIVE THERAPY N/A 02/21/2024    Procedure: ELECTROCONVULSIVE THERAPY;  Surgeon: Vinod Osuna MD;  Location:  [facility];  Service: ECT;  Laterality: N/A;    ELECTROCONVULSIVE THERAPY Bilateral 06/10/2024    Procedure: BIFRONTAL ELECTROCONVULSIVE THERAPY;  Surgeon: Vinod Osuna MD;  Location:  [facility];  Service: ECT;  Laterality: Bilateral;    ELECTROCONVULSIVE THERAPY N/A 06/12/2024    Procedure: ELECTROCONVULSIVE THERAPY;  Surgeon: Vinod Osuna MD;  Location:  [facility];  Service: ECT;  Laterality: N/A;    ELECTROCONVULSIVE THERAPY N/A 06/14/2024    Procedure: ELECTROCONVULSIVE THERAPY;  Surgeon: Vinod Osuna MD;  Location:  [facility];  Service: ECT;  Laterality: N/A;    ELECTROCONVULSIVE THERAPY N/A 06/17/2024    Procedure: ELECTROCONVULSIVE THERAPY;  Surgeon: Vinod Osuna MD;  Location:  [facility];  Service: ECT;  Laterality: N/A;    ELECTROCONVULSIVE THERAPY N/A 06/19/2024    Procedure: ELECTROCONVULSIVE THERAPY;  Surgeon: Vinod Osuna MD;  Location:  [facility];  Service: ECT;  Laterality: N/A;    ENDOSCOPY      FOOT SURGERY Bilateral     6 hammer toes    FRACTURE SURGERY      Left wrist    GASTRECTOMY      GASTRIC RESTRICTION SURGERY  2007    GASTRIC SLEEVE LAPAROSCOPIC N/A     HYSTERECTOMY      NECK SURGERY      OOPHORECTOMY Bilateral     SINUS SURGERY  2021    WISDOM TOOTH EXTRACTION N/A     WRIST SURGERY Left        Problem List:  Patient Active Problem List   Diagnosis    Anxiety    Bursitis of hip    Cervical spondylosis with radiculopathy    Chronic migraine without aura without status migrainosus, not intractable    Conversion disorder with attacks or seizures, persistent, with psychological stressor     Cramp of limb    Diverticulosis of colon    Elevated liver enzymes    Eustachian tube dysfunction    Foot drop    Gastroesophageal reflux disease without esophagitis    Hereditary and idiopathic peripheral neuropathy    Hypersomnia    Internal hemorrhoids    Iron deficiency    Lumbar radiculopathy, chronic    Malaise and fatigue    Metabolic syndrome    Medicare annual wellness visit, subsequent    Encounter for screening mammogram for malignant neoplasm of breast    Mixed hyperlipidemia    Moderate episode of recurrent major depressive disorder    Obstructive sleep apnea    Osteoarthritis of right temporomandibular joint    Osteopenia of left lower leg    Panic disorder    PTSD (post-traumatic stress disorder)    RLS (restless legs syndrome)    Primary insomnia    Sinus drainage    Tinnitus    Urge incontinence    Urinary urgency    Vitamin D deficiency    Postmenopausal symptoms    Postmenopausal osteoporosis    Right leg pain    Edema of right lower extremity    Suicidal ideations    Recurrent UTI (urinary tract infection)    Acute cystitis without hematuria    Bilateral flank pain    Incomplete emptying of bladder    Osteoarthritis of left AC (acromioclavicular) joint    Tear of left glenoid labrum    Infraspinatus tendon partial tear, left, initial encounter    Supraspinatus tendonitis with fraying, left    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    DDD (degenerative disc disease), lumbar    Chronic midline low back pain with bilateral sciatica    Orthostasis    Essential tremor    Age-related cognitive decline    Suicidal ideation    Severe episode of recurrent major depressive disorder, without psychotic features    Constipation    Allergic rhinitis    MDD (major depressive disorder)    Dysthymia    Ulnar neuropathy of both upper extremities    MDD (major depressive disorder), recurrent episode, severe    Chronic back pain    Postmenopausal    Impaired fasting glucose       Allergy:   Allergies  "  Allergen Reactions    Chlorzoxazone Swelling and Unknown - High Severity     Lorzone, muscle relaxant.    Iodinated Contrast Media Anaphylaxis    Iodine Anaphylaxis     Topical makes her swell  Anaphylaxis with IV contrast (when she was ~ 9yrs old)    Phenazopyridine Hcl Swelling and Anaphylaxis    Pyridium [Phenazopyridine] Anaphylaxis    Quinine Unknown - High Severity     Has malaria symptoms    Valproic Acid Other (See Comments)     Lupus like symptoms and liver failure    Methocarbamol Mental Status Change     Made her feel \"suicidal\" and gave her less control over her actions.    Codeine Itching    Diphenhydramine Anxiety     Pt has severe panic attack symptoms when given benadryl IV. Needs to take a benzodiazapine med concurrently when getting benadryl.         Current Medications:   Current Outpatient Medications   Medication Sig Dispense Refill    acetaminophen (TYLENOL) 500 MG tablet Take 1 tablet by mouth Daily As Needed for Mild Pain or Moderate Pain. Indications: Pain      donepezil (Aricept) 10 MG tablet Take 1 tablet by mouth Every Night. 90 tablet 2    doxepin (SINEquan) 50 MG capsule Take 1 capsule by mouth At Night As Needed for Sleep. 30 capsule 1    estradiol (ESTRACE) 0.1 MG/GM vaginal cream INSERT 1 GRAM VAGINALLY THREE TIMES WEEKLY FOR VULVOVAGINAL ATROPHY 42.5 g 1    FLUoxetine (PROzac) 20 MG capsule Take 3 capsules by mouth Every Morning. Indications: Depression 90 capsule 1    ibandronate (BONIVA) 150 MG tablet Take 1 tablet by mouth Every 30 (Thirty) Days. Indications: Postmenopausal Osteoporosis 3 tablet 3    imipramine (TOFRANIL-PM) 75 MG capsule Take 1 capsule by mouth Every Night. 30 capsule 0    Liraglutide (VICTOZA) 18 MG/3ML solution pen-injector injection Inject 1.8 mg under the skin into the appropriate area as directed Daily. Indications: Type 2 Diabetes      nitrofurantoin, macrocrystal-monohydrate, (MACROBID) 100 MG capsule TAKE ONE CAPSULE BY MOUTH TWICE A DAY FOR PREVENTION " "OF FREQUENTLY OCCURING URINARY TRACT INFECTIONS 180 capsule 2    OLANZapine (ZyPREXA) 2.5 MG tablet Take 1 tablet by mouth Every Night. 30 tablet 1    OLANZapine (ZyPREXA) 5 MG tablet Take 0.5 tablets by mouth Every Night. 15 tablet 2    pantoprazole (PROTONIX) 40 MG EC tablet TAKE ONE TABLET BY MOUTH EVERY DAY FOR GASTROESOPHAGEAL REFLUX DISEASE 90 tablet 2    promethazine (PHENERGAN)       traZODone (DESYREL) 100 MG tablet Take 1 tablet by mouth At Night As Needed for Sleep. Indications: Trouble Sleeping 30 tablet 1     No current facility-administered medications for this visit.       Review of Symptoms:    Review of Systems   Neurological:  Negative for dizziness and confusion.   Psychiatric/Behavioral:  Negative for dysphoric mood and sleep disturbance. The patient is not nervous/anxious.          Physical Exam:   Blood pressure 118/70, pulse 85, height 160 cm (62.99\"), weight 72.8 kg (160 lb 6.4 oz), SpO2 100%.    Appearance: CF, stated age, NAD  Gait, Station, Strength: WNL    Mental Status Exam:   Hygiene:   good  Cooperation:  Cooperative  Eye Contact:  Good  Psychomotor Behavior:  Appropriate  Affect:  Full range  Mood: normal \"great right now\"  Hopelessness: Denies  Speech:  Normal  Thought Process:  Goal directed and Linear  Thought Content:  Normal and Mood congruent  Suicidal:  Death wish  Homicidal:  None  Hallucinations:  None  Delusion:  None  Memory:  Intact  Orientation:  Person, Place, Time, and Situation  Reliability:  good  Insight:  Fair  Judgement:  Fair  Impulse Control:  Fair      Lab Results:   No visits with results within 1 Month(s) from this visit.   Latest known visit with results is:   Office Visit on 12/17/2024   Component Date Value Ref Range Status    Microalbumin, Urine 12/17/2024 30   Final    Creatinine, Urine 12/17/2024 100   Final    Lot Number 12/17/2024 0   Final    Expiration Date 12/17/2024 0   Final     Results      Assessment & Plan    Diagnoses and all orders for this " visit:    1. Severe episode of recurrent major depressive disorder, without psychotic features (Primary)  -     FLUoxetine (PROzac) 20 MG capsule; Take 3 capsules by mouth Every Morning. Indications: Depression  Dispense: 90 capsule; Refill: 1    2. Other insomnia  -     traZODone (DESYREL) 100 MG tablet; Take 1 tablet by mouth At Night As Needed for Sleep. Indications: Trouble Sleeping  Dispense: 30 tablet; Refill: 2  -     doxepin (SINEquan) 50 MG capsule; Take 1 capsule by mouth At Night As Needed for Sleep.  Dispense: 30 capsule; Refill: 1    3. Post traumatic stress disorder (PTSD)  -     FLUoxetine (PROzac) 20 MG capsule; Take 3 capsules by mouth Every Morning. Indications: Depression  Dispense: 90 capsule; Refill: 1    4. Generalized anxiety disorder  -     FLUoxetine (PROzac) 20 MG capsule; Take 3 capsules by mouth Every Morning. Indications: Depression  Dispense: 90 capsule; Refill: 1      -Patient could not tolerate imipramine but is doing well right now on a combination of trazodone, doxepin, and Prozac so we will maintain  -Reviewed previous available documentation  -Reviewed most recent available labs   -YOSHI reviewed and appropriate. Patient counseled on use of controlled substances.   -Continue Prozac 60 mg p.o. daily for mood and anxiety, plan to reduce and taper but for now will maintain given significant improvement  -Discontinue Zyprexa  -Discontinue imipramine  -Continue trazodone 100 mg p.o. nightly as needed for insomnia  -Continue doxepin 50 mg nightly as needed for insomnia  -Encouraged to consider therapy    Visit Diagnoses:    ICD-10-CM ICD-9-CM   1. Severe episode of recurrent major depressive disorder, without psychotic features  F33.2 296.33   2. Other insomnia  G47.09 780.52   3. Post traumatic stress disorder (PTSD)  F43.10 309.81   4. Generalized anxiety disorder  F41.1 300.02           TREATMENT PLAN - SHORT AND LONG-TERM GOALS: Continue supportive psychotherapy efforts and  medications as indicated. Treatment and medication options discussed during today's visit. Patient acknowledged and verbally consented to continue with current treatment plan and was educated on the importance of compliance with treatment and follow-up appointments.    MEDICATION ISSUES:    Discussed medication options and treatment plan of prescribed medication as well as the risks, benefits, and side effects including potential falls, possible impaired driving and metabolic adversities among others. Patient is agreeable to call the office with any worsening of symptoms or onset of side effects. Patient is agreeable to call 911 or go to the nearest ER should he/she begin having SI/HI.     MEDS ORDERED DURING VISIT:  New Medications Ordered This Visit   Medications    traZODone (DESYREL) 100 MG tablet     Sig: Take 1 tablet by mouth At Night As Needed for Sleep. Indications: Trouble Sleeping     Dispense:  30 tablet     Refill:  2    doxepin (SINEquan) 50 MG capsule     Sig: Take 1 capsule by mouth At Night As Needed for Sleep.     Dispense:  30 capsule     Refill:  1    FLUoxetine (PROzac) 20 MG capsule     Sig: Take 3 capsules by mouth Every Morning. Indications: Depression     Dispense:  90 capsule     Refill:  1       FOLLOW UP:  Return in about 3 months (around 4/29/2025).          This document has been electronically signed by Brian Platt MD  January 29, 2025 14:20 EST    Dictated using Dragon Dictation.

## 2025-01-31 DIAGNOSIS — G47.09 OTHER INSOMNIA: ICD-10-CM

## 2025-02-05 RX ORDER — DOXEPIN HYDROCHLORIDE 50 MG/1
50 CAPSULE ORAL NIGHTLY PRN
Qty: 30 CAPSULE | Refills: 1 | Status: SHIPPED | OUTPATIENT
Start: 2025-02-05

## 2025-02-06 DIAGNOSIS — Z78.0 POSTMENOPAUSAL: ICD-10-CM

## 2025-02-07 RX ORDER — ESTRADIOL 0.1 MG/G
1 CREAM VAGINAL 3 TIMES WEEKLY
Qty: 42.5 G | Refills: 1 | Status: SHIPPED | OUTPATIENT
Start: 2025-02-07

## 2025-02-07 NOTE — TELEPHONE ENCOUNTER
Rx Refill Note  Requested Prescriptions     Pending Prescriptions Disp Refills    estradiol (ESTRACE) 0.1 MG/GM vaginal cream 42.5 g 1      Last office visit with prescribing clinician: 12/17/2024   Last telemedicine visit with prescribing clinician: Visit date not found   Next office visit with prescribing clinician: 4/17/2025                         Would you like a call back once the refill request has been completed: [] Yes [] No    If the office needs to give you a call back, can they leave a voicemail: [] Yes [] No    Yesica Vargas MA  02/07/25, 15:43 EST

## 2025-03-14 DIAGNOSIS — F41.1 GENERALIZED ANXIETY DISORDER: ICD-10-CM

## 2025-03-14 DIAGNOSIS — F43.10 POST TRAUMATIC STRESS DISORDER (PTSD): ICD-10-CM

## 2025-03-14 DIAGNOSIS — F33.2 SEVERE EPISODE OF RECURRENT MAJOR DEPRESSIVE DISORDER, WITHOUT PSYCHOTIC FEATURES: ICD-10-CM

## 2025-04-01 RX ORDER — IBANDRONATE SODIUM 150 MG/1
TABLET, FILM COATED ORAL
Qty: 3 TABLET | Refills: 3 | Status: SHIPPED | OUTPATIENT
Start: 2025-04-01

## 2025-04-01 NOTE — TELEPHONE ENCOUNTER
Rx Refill Note  Requested Prescriptions     Pending Prescriptions Disp Refills    ibandronate (BONIVA) 150 MG tablet [Pharmacy Med Name: IBANDRONATE SODIUM 150MG TABS] 3 tablet 3     Sig: TAKE 1 TABLET BY MOUTH EVERY 30 DAYS. FOR POSTMENOPAUSAL OSTEOPOROSIS      Last office visit with prescribing clinician: 12/17/2024   Last telemedicine visit with prescribing clinician: Visit date not found   Next office visit with prescribing clinician: 4/17/2025                         Would you like a call back once the refill request has been completed: [] Yes [] No    If the office needs to give you a call back, can they leave a voicemail: [] Yes [] No    Yesica Vargas MA  04/01/25, 08:29 EDT

## 2025-04-17 ENCOUNTER — OFFICE VISIT (OUTPATIENT)
Age: 60
End: 2025-04-17
Payer: MEDICARE

## 2025-04-17 VITALS
SYSTOLIC BLOOD PRESSURE: 108 MMHG | DIASTOLIC BLOOD PRESSURE: 70 MMHG | HEIGHT: 63 IN | HEART RATE: 64 BPM | WEIGHT: 165 LBS | OXYGEN SATURATION: 98 % | BODY MASS INDEX: 29.23 KG/M2

## 2025-04-17 DIAGNOSIS — E55.9 VITAMIN D DEFICIENCY: ICD-10-CM

## 2025-04-17 DIAGNOSIS — M81.0 POSTMENOPAUSAL OSTEOPOROSIS: ICD-10-CM

## 2025-04-17 DIAGNOSIS — E88.810 METABOLIC SYNDROME: Primary | ICD-10-CM

## 2025-04-17 DIAGNOSIS — Z12.31 ENCOUNTER FOR SCREENING MAMMOGRAM FOR MALIGNANT NEOPLASM OF BREAST: ICD-10-CM

## 2025-04-17 DIAGNOSIS — J30.9 CHRONIC ALLERGIC RHINITIS: ICD-10-CM

## 2025-04-17 DIAGNOSIS — R73.01 IMPAIRED FASTING GLUCOSE: ICD-10-CM

## 2025-04-17 DIAGNOSIS — F51.01 PRIMARY INSOMNIA: ICD-10-CM

## 2025-04-17 DIAGNOSIS — K58.1 IRRITABLE BOWEL SYNDROME WITH CONSTIPATION: ICD-10-CM

## 2025-04-17 DIAGNOSIS — Z78.0 POSTMENOPAUSAL: ICD-10-CM

## 2025-04-17 DIAGNOSIS — K21.9 GASTROESOPHAGEAL REFLUX DISEASE WITHOUT ESOPHAGITIS: ICD-10-CM

## 2025-04-17 PROBLEM — J31.0 CHRONIC RHINITIS: Chronic | Status: ACTIVE | Noted: 2024-02-09

## 2025-04-17 PROBLEM — M79.604 RIGHT LEG PAIN: Status: RESOLVED | Noted: 2023-08-01 | Resolved: 2025-04-17

## 2025-04-17 PROBLEM — G47.10 HYPERSOMNIA: Status: RESOLVED | Noted: 2018-07-03 | Resolved: 2025-04-17

## 2025-04-17 PROBLEM — J34.89 SINUS DRAINAGE: Status: RESOLVED | Noted: 2021-02-19 | Resolved: 2025-04-17

## 2025-04-17 PROBLEM — R10.9 BILATERAL FLANK PAIN: Status: RESOLVED | Noted: 2023-11-18 | Resolved: 2025-04-17

## 2025-04-17 PROBLEM — R25.2 CRAMP OF LIMB: Status: RESOLVED | Noted: 2022-11-14 | Resolved: 2025-04-17

## 2025-04-17 PROBLEM — N95.9 POSTMENOPAUSAL SYMPTOMS: Status: RESOLVED | Noted: 2022-11-14 | Resolved: 2025-04-17

## 2025-04-17 LAB
EXPIRATION DATE: NORMAL
HBA1C MFR BLD: 5.5 % (ref 4.5–5.7)
Lab: NORMAL

## 2025-04-17 RX ORDER — LEVOCETIRIZINE DIHYDROCHLORIDE 5 MG/1
5 TABLET, FILM COATED ORAL EVERY EVENING
Qty: 90 TABLET | Refills: 2 | Status: SHIPPED | OUTPATIENT
Start: 2025-04-17

## 2025-04-17 NOTE — PROGRESS NOTES
Established Patient        Chief Complaint:   Chief Complaint   Patient presents with    Diabetes    Constipation        Zo Palma is a 60 y.o. female    History of Present Illness:   Answers submitted by the patient for this visit:  Problem not listed (Submitted on 4/15/2025)  Chief Complaint: Other medical problem  Reason for appointment: Follow up  anorexia: No  joint pain: No  change in stool: Yes  joint swelling: No  swollen glands: No  vertigo: No  visual change: No  Onset: 1 to 5 years  Chronicity: chronic  Frequency: daily    Here today in scheduled follow-up visit of GERD, insomnia, vitamin D deficiency, postmenopausal osteoporosis and diabetes mellitus.    Patient reports an approaching appointment to be seen by her endocrinologist.  She denies any cyclical/persistent hypoglycemic episodes.      No acute behavioral/mood changes.  She reports she is sleeping well.  She also reports that she is in a new relationship, very happy at present.  Denies SI/HI.    Denies chest pain, syncope, palpitations or vertigo.  Denies fever, chills or night sweats.  Maintains an active lifestyle, balanced dietary intake; reports good hydration habits.  Denies hematuria/dysuria.  Denies any BRB/BTS.  No new rashes.  Denies orthopnea, epistaxis or hemoptysis.    1 painful BM weekly for long period of time.  Subjective     The following portions of the patient's history were reviewed and updated as appropriate: allergies, current medications, past family history, past medical history, past social history, past surgical history and problem list.    Allergies   Allergen Reactions    Chlorzoxazone Swelling and Unknown - High Severity     Lorzone, muscle relaxant.    Iodinated Contrast Media Anaphylaxis    Iodine Anaphylaxis     Topical makes her swell  Anaphylaxis with IV contrast (when she was ~ 9yrs old)    Phenazopyridine Hcl Swelling and Anaphylaxis    Pyridium [Phenazopyridine] Anaphylaxis    Quinine  "Unknown - High Severity     Has malaria symptoms    Valproic Acid Other (See Comments)     Lupus like symptoms and liver failure    Methocarbamol Mental Status Change     Made her feel \"suicidal\" and gave her less control over her actions.    Codeine Itching    Diphenhydramine Anxiety     Pt has severe panic attack symptoms when given benadryl IV. Needs to take a benzodiazapine med concurrently when getting benadryl.        Review of Systems  Constitutional: Negative for fever. Negative for chills, diaphoresis, fatigue and unexpected weight change.   HENT: No dysphagia; no changes to vision/hearing/smell/taste; no epistaxis  Eyes: Negative for redness and visual disturbance.   Respiratory: negative for shortness of breath. Negative for chest pain . Negative for cough and chest tightness.   Cardiovascular: Negative for chest pain and palpitations.   Gastrointestinal: Negative for abdominal distention, abdominal pain and blood in stool.  Constipation as per above.  Endocrine: Negative for cold intolerance and heat intolerance.   Genitourinary: Negative for difficulty urinating, dysuria and frequency.   Musculoskeletal: Negative for arthralgias, back pain and myalgias.   Skin: Negative for color change, rash and wound.   Neurological: Negative for syncope, weakness and headaches.   Hematological: Negative for adenopathy. Does not bruise/bleed easily.   Psychiatric/Behavioral: Negative for confusion. The patient is not nervous/anxious.    Objective     Physical Exam   Vital Signs: /70   Pulse 64   Ht 160 cm (63\")   Wt 74.8 kg (165 lb)   SpO2 98%   BMI 29.23 kg/m²       Patient's (Body mass index is 29.23 kg/m².) indicates that they are overweight (BMI 25-29.9) with health related conditions that include hypertension, diabetes mellitus, GERD, and osteoarthritis . Weight is unchanged. BMI is is above average; BMI management plan is completed. We discussed portion control and increasing exercise.      General " Appearance: alert, oriented x 3, no acute distress.  Skin: warm and dry.   HEENT: Atraumatic.  pupils round and reactive to light and accommodation, oral mucosa pink and moist.  Nares patent without epistaxis.  External auditory canals are patent tympanic membranes intact.  Neck: supple, no JVD, trachea midline.  No thyromegaly  Lungs: CTA, unlabored breathing effort.  Heart: RRR, normal S1 and S2, no S3, no rub.  Abdomen: soft, non-tender, no palpable bladder, present bowel sounds to auscultation ×4.  No guarding or rigidity.  Extremities: no clubbing, cyanosis or edema.  Good range of motion actively and passively.  Symmetric muscle strength and development  Neuro: normal speech and mental status.  Cranial nerves II through XII intact.  No anosmia. DTR 2+; proprioception intact.  No focal motor/sensory deficits.    Advance Care Planning   ACP discussion was held with the patient during this visit. Patient has an advance directive in EMR which is still valid.        Assessment and Plan      Assessment/Plan:   Diagnoses and all orders for this visit:    1. Metabolic syndrome (Primary)    2. Impaired fasting glucose    3. Gastroesophageal reflux disease without esophagitis    4. Primary insomnia    5. Vitamin D deficiency    6. Chronic idiopathic constipation    7. Postmenopausal    8. Postmenopausal osteoporosis    9. Encounter for screening mammogram for malignant neoplasm of breast  -     Mammo Screening Digital Tomosynthesis Bilateral With CAD; Future      Followed by Dr. Fulton, endocrinology, for her metabolic syndrome.  Needs updated HbA1c and microalbumin.    Updated mammography ordered today.    VSS, appears HD asymptomatic.    Anti - reflux measures, trigger foods and drinks to avoid: Fatty foods, alcohol, chocolate, coffee, tea, caffeinated soft drinks (decaffeinated coffee still has some caffeine), peppermint and spearmint, spices and vinegar, citrus fruits and juices, tomatoes and tomato sauces, and  smoking. Other antireflux measures include weight reduction if overweight, avoid tight clothing around the abdomen, elevate the head of her bed 6 inches (May use a bed wedge which is placed between the mattress in box [place]) or blocks under the head of the bed, don't drink or eat for 2 hours before going to bed and avoid lying down immediately after meals.    Refilled bisphosphonate today.  Will need updated DEXA.    Adding daily antihistamine.  Longstanding history of chronic allergic rhinitis.    Begin trial of Linzess; I have asked that she notify the office should she develop any ill effects to the new medication.  Dose adjustment can be made if needed/tolerated.    Discussion Summary:    Discussed plan of care in detail with pt today; pt verb understanding and agrees.    I spent 35 minutes caring for Zo on this date of service. This time includes time spent by me in the following activities:preparing for the visit, performing a medically appropriate examination and/or evaluation , counseling and educating the patient/family/caregiver, ordering medications, tests, or procedures, documenting information in the medical record, and care coordination    I have reviewed and updated all copied forward information, as appropriate.  I attest to the accuracy and relevance of any unchanged information.    Follow up:  No follow-ups on file.     There are no Patient Instructions on file for this visit.        Evan Rivas DO  04/17/25  11:04 EDT

## 2025-04-18 DIAGNOSIS — Z78.0 POSTMENOPAUSAL: ICD-10-CM

## 2025-04-18 DIAGNOSIS — K58.1 IRRITABLE BOWEL SYNDROME WITH CONSTIPATION: ICD-10-CM

## 2025-04-18 DIAGNOSIS — J30.9 CHRONIC ALLERGIC RHINITIS: ICD-10-CM

## 2025-04-18 RX ORDER — LEVOCETIRIZINE DIHYDROCHLORIDE 5 MG/1
5 TABLET, FILM COATED ORAL EVERY EVENING
Qty: 90 TABLET | Refills: 2 | OUTPATIENT
Start: 2025-04-18

## 2025-04-18 RX ORDER — ESTRADIOL 0.1 MG/G
1 CREAM VAGINAL 3 TIMES WEEKLY
Qty: 42.5 G | Refills: 1 | OUTPATIENT
Start: 2025-04-18

## 2025-04-23 ENCOUNTER — OFFICE VISIT (OUTPATIENT)
Dept: PSYCHIATRY | Facility: CLINIC | Age: 60
End: 2025-04-23
Payer: MEDICARE

## 2025-04-23 VITALS
BODY MASS INDEX: 29.52 KG/M2 | WEIGHT: 166.6 LBS | DIASTOLIC BLOOD PRESSURE: 58 MMHG | OXYGEN SATURATION: 99 % | HEART RATE: 62 BPM | SYSTOLIC BLOOD PRESSURE: 98 MMHG | HEIGHT: 63 IN

## 2025-04-23 DIAGNOSIS — G47.09 OTHER INSOMNIA: ICD-10-CM

## 2025-04-23 DIAGNOSIS — F41.1 GENERALIZED ANXIETY DISORDER: ICD-10-CM

## 2025-04-23 DIAGNOSIS — F43.10 POST TRAUMATIC STRESS DISORDER (PTSD): ICD-10-CM

## 2025-04-23 DIAGNOSIS — F33.2 SEVERE EPISODE OF RECURRENT MAJOR DEPRESSIVE DISORDER, WITHOUT PSYCHOTIC FEATURES: ICD-10-CM

## 2025-04-23 PROCEDURE — 99214 OFFICE O/P EST MOD 30 MIN: CPT | Performed by: PSYCHIATRY & NEUROLOGY

## 2025-04-23 PROCEDURE — 1160F RVW MEDS BY RX/DR IN RCRD: CPT | Performed by: PSYCHIATRY & NEUROLOGY

## 2025-04-23 PROCEDURE — 1159F MED LIST DOCD IN RCRD: CPT | Performed by: PSYCHIATRY & NEUROLOGY

## 2025-04-23 RX ORDER — DOXEPIN HYDROCHLORIDE 50 MG/1
50 CAPSULE ORAL NIGHTLY PRN
Qty: 30 CAPSULE | Refills: 2 | Status: SHIPPED | OUTPATIENT
Start: 2025-04-23

## 2025-04-23 RX ORDER — TRAZODONE HYDROCHLORIDE 100 MG/1
100 TABLET ORAL NIGHTLY PRN
Qty: 30 TABLET | Refills: 2 | Status: SHIPPED | OUTPATIENT
Start: 2025-04-23

## 2025-04-23 NOTE — PROGRESS NOTES
Subjective   Zo Palma is a 60 y.o. female who presents today for follow up    Chief Complaint: PTSD, dysthymia, MDD    History of Present Illness:   History of Present Illness  The patient presents for evaluation of mood and sleep issues.    The chief complaint is difficulty with sleep and feeling less rested despite increased sleep duration. She reports a stable mood, attributing this to her current relationship status, having been in a romantic relationship since 01/2025. No adverse effects from her medication regimen are noted.    An incident is recounted where sleep medication was not taken due to an early morning commitment, resulting in a negative experience. Over the past week, an increase in sleep duration is observed, but there is a paradoxical decrease in perceived restfulness.    Social History:  - In a romantic relationship since 01/2025  - Reports needing to mow the lawn and dealing with dandelions    Pertinent Negatives: No adverse effects from medication      The following portions of the patient's history were reviewed and updated as appropriate: allergies, current medications, past family history, past medical history, past social history, past surgical history and problem list.      Past Medical History:  Past Medical History:   Diagnosis Date    Ankle sprain     Anxiety     Arteriovenous malformation 2004    Temporal hemangiona - right    Arthritis of back     Arthritis of neck     Brain concussion     Bursitis of hip     Cataract 11/2021    Cervical disc disorder     Chronic pain disorder 1995    Conversion disorder     CTS (carpal tunnel syndrome)     Depression     Difficulty walking 3/2024    Diverticulosis     Fracture of wrist     Fracture, finger     Fracture, foot     Frozen shoulder     GERD (gastroesophageal reflux disease)     Hip arthrosis     HL (hearing loss)     Hormone disorder     Hyperlipidemia     Hypoglycemia 2014    Insomnia     Interstitial cystitis     Kidney stone      "Knee swelling 2024    Liver failure     due to depakote    Lumbosacral disc disease     Lupus     Memory loss     Migraines     Movement disorder 3/2014    Essential tremor    Orthostatic hypotension     Osteopenia     Panic disorder 2001    Periarthritis of shoulder     Peripheral neuropathy     PTSD (post-traumatic stress disorder) 1985    Rotator cuff syndrome     Scoliosis 11/2023    Seizures 2001    Conversion Disorder    Sleep apnea     Subluxation of patella     Suicidal thoughts     Suicide attempt     \"I tried overdosing\"  32 antihistamine tablets per pt 1/27/24    Tremor 2/2022    Urinary tract infection     Weight loss        Social History:  Social History     Socioeconomic History    Marital status:     Number of children: 2    Highest education level: Master's degree (e.g., MA, MS, Pancho, MEd, MSW, SULY)   Tobacco Use    Smoking status: Never     Passive exposure: Past    Smokeless tobacco: Never   Vaping Use    Vaping status: Never Used   Substance and Sexual Activity    Alcohol use: Yes     Alcohol/week: 1.0 standard drink of alcohol     Types: 1 Drinks containing 0.5 oz of alcohol per week     Comment: Occasionally    Drug use: Never    Sexual activity: Yes     Partners: Male     Birth control/protection: Post-menopausal, Hysterectomy       Family History:  Family History   Problem Relation Age of Onset    Cancer Mother         Brain, suspected uterine    Rheumatologic disease Mother     Dementia Father         Family predisposed    Cancer Father         Prostate    Learning disabilities Father         Dyslexia    Prostate cancer Father     Broken bones Father         Knees    Cancer Brother         Prostate, bone    Learning disabilities Brother         Dyslexia    Dementia Brother     Prostate cancer Maternal Grandfather     Parkinsonism Maternal Grandfather     Depression Son     Learning disabilities Son        Past Surgical History:  Past Surgical History:   Procedure Laterality Date    " ABDOMINAL SURGERY      BARIATRIC SURGERY  2008    Gastric sleeve    BLADDER SURGERY      BREAST SURGERY Bilateral     reduction    CHOLECYSTECTOMY  2010    COLONOSCOPY      COSMETIC SURGERY      CYSTOSCOPY      ELECTROCONVULSIVE THERAPY Bilateral 02/15/2024    Procedure: BIFRONTAL ELECTROCONVULSIVE THERAPY;  Surgeon: Vinod Osuna MD;  Location:  [facility];  Service: ECT;  Laterality: Bilateral;    ELECTROCONVULSIVE THERAPY N/A 02/19/2024    Procedure: ELECTROCONVULSIVE THERAPY;  Surgeon: Vinod Osuna MD;  Location:  [facility];  Service: ECT;  Laterality: N/A;    ELECTROCONVULSIVE THERAPY N/A 02/21/2024    Procedure: ELECTROCONVULSIVE THERAPY;  Surgeon: Vinod Osuna MD;  Location:  [facility];  Service: ECT;  Laterality: N/A;    ELECTROCONVULSIVE THERAPY Bilateral 06/10/2024    Procedure: BIFRONTAL ELECTROCONVULSIVE THERAPY;  Surgeon: Vinod Osuna MD;  [facility];  Service: ECT;  Laterality: Bilateral;    ELECTROCONVULSIVE THERAPY N/A 06/12/2024    Procedure: ELECTROCONVULSIVE THERAPY;  Surgeon: Vinod Osuna MD;  [facility];  Service: ECT;  Laterality: N/A;    ELECTROCONVULSIVE THERAPY N/A 06/14/2024    Procedure: ELECTROCONVULSIVE THERAPY;  Surgeon: Vinod Osuna MD;  [facility];  Service: ECT;  Laterality: N/A;    ELECTROCONVULSIVE THERAPY N/A 06/17/2024    Procedure: ELECTROCONVULSIVE THERAPY;  Surgeon: Vinod Osuna MD;  [facility];  Service: ECT;  Laterality: N/A;    ELECTROCONVULSIVE THERAPY N/A 06/19/2024    Procedure: ELECTROCONVULSIVE THERAPY;  Surgeon: Vinod Osuna MD;  [facility];  Service: ECT;  Laterality: N/A;    ENDOSCOPY      FOOT SURGERY Bilateral     6 hammer toes    FRACTURE SURGERY      Left wrist    GASTRECTOMY      GASTRIC RESTRICTION SURGERY  2007    GASTRIC SLEEVE LAPAROSCOPIC N/A     HYSTERECTOMY      NECK SURGERY      OOPHORECTOMY Bilateral     SINUS  SURGERY  2021    WISDOM TOOTH EXTRACTION N/A     WRIST SURGERY Left        Problem List:  Patient Active Problem List   Diagnosis    Anxiety    Bursitis of hip    Cervical spondylosis with radiculopathy    Chronic migraine without aura without status migrainosus, not intractable    Conversion disorder with attacks or seizures, persistent, with psychological stressor    Diverticulosis of colon    Elevated liver enzymes    Foot drop    Gastroesophageal reflux disease without esophagitis    Hereditary and idiopathic peripheral neuropathy    Internal hemorrhoids    Iron deficiency    Lumbar radiculopathy, chronic    Metabolic syndrome    Medicare annual wellness visit, subsequent    Encounter for screening mammogram for malignant neoplasm of breast    Mixed hyperlipidemia    Moderate episode of recurrent major depressive disorder    Obstructive sleep apnea    Osteoarthritis of right temporomandibular joint    Osteopenia of left lower leg    PTSD (post-traumatic stress disorder)    RLS (restless legs syndrome)    Primary insomnia    Tinnitus    Urge incontinence    Urinary urgency    Vitamin D deficiency    Postmenopausal osteoporosis    Edema of right lower extremity    Suicidal ideations    Recurrent UTI (urinary tract infection)    Acute cystitis without hematuria    Incomplete emptying of bladder    Osteoarthritis of left AC (acromioclavicular) joint    Tear of left glenoid labrum    Infraspinatus tendon partial tear, left, initial encounter    Supraspinatus tendonitis with fraying, left    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    DDD (degenerative disc disease), lumbar    Chronic midline low back pain with bilateral sciatica    Orthostasis    Essential tremor    Age-related cognitive decline    Suicidal ideation    Severe episode of recurrent major depressive disorder, without psychotic features    Constipation    Chronic rhinitis    MDD (major depressive disorder)    Dysthymia    Ulnar neuropathy of both  "upper extremities    MDD (major depressive disorder), recurrent episode, severe    Chronic back pain    Postmenopausal    Impaired fasting glucose       Allergy:   Allergies   Allergen Reactions    Chlorzoxazone Swelling and Unknown - High Severity     Lorzone, muscle relaxant.    Iodinated Contrast Media Anaphylaxis    Iodine Anaphylaxis     Topical makes her swell  Anaphylaxis with IV contrast (when she was ~ 9yrs old)    Phenazopyridine Hcl Swelling and Anaphylaxis    Pyridium [Phenazopyridine] Anaphylaxis    Quinine Unknown - High Severity     Has malaria symptoms    Valproic Acid Other (See Comments)     Lupus like symptoms and liver failure    Methocarbamol Mental Status Change     Made her feel \"suicidal\" and gave her less control over her actions.    Codeine Itching    Diphenhydramine Anxiety     Pt has severe panic attack symptoms when given benadryl IV. Needs to take a benzodiazapine med concurrently when getting benadryl.         Current Medications:   Current Outpatient Medications   Medication Sig Dispense Refill    acetaminophen (TYLENOL) 500 MG tablet Take 1 tablet by mouth Daily As Needed for Mild Pain or Moderate Pain. Indications: Pain      donepezil (Aricept) 10 MG tablet Take 1 tablet by mouth Every Night. 90 tablet 2    doxepin (SINEquan) 50 MG capsule Take 1 capsule by mouth At Night As Needed for Sleep. 30 capsule 1    estradiol (ESTRACE) 0.1 MG/GM vaginal cream Insert 1 g into the vagina 3 (Three) Times a Week. 42.5 g 1    FLUoxetine (PROzac) 20 MG capsule Take 3 capsules by mouth Every Morning. Indications: Depression 90 capsule 1    ibandronate (BONIVA) 150 MG tablet TAKE 1 TABLET BY MOUTH EVERY 30 DAYS. FOR POSTMENOPAUSAL OSTEOPOROSIS 3 tablet 3    levocetirizine (XYZAL) 5 MG tablet Take 1 tablet by mouth Every Evening. 90 tablet 2    linaclotide (Linzess) 72 MCG capsule capsule Take 1 capsule by mouth Every Morning Before Breakfast. 90 capsule 2    Liraglutide (VICTOZA) 18 MG/3ML solution " "pen-injector injection Inject 1.8 mg under the skin into the appropriate area as directed Daily. Indications: Type 2 Diabetes      nitrofurantoin, macrocrystal-monohydrate, (MACROBID) 100 MG capsule TAKE ONE CAPSULE BY MOUTH TWICE A DAY FOR PREVENTION OF FREQUENTLY OCCURING URINARY TRACT INFECTIONS 180 capsule 2    pantoprazole (PROTONIX) 40 MG EC tablet TAKE ONE TABLET BY MOUTH EVERY DAY FOR GASTROESOPHAGEAL REFLUX DISEASE 90 tablet 2    promethazine (PHENERGAN)       traZODone (DESYREL) 100 MG tablet Take 1 tablet by mouth At Night As Needed for Sleep. Indications: Trouble Sleeping 30 tablet 2     No current facility-administered medications for this visit.       Review of Symptoms:    Review of Systems   Neurological:  Negative for dizziness and confusion.   Psychiatric/Behavioral:  Negative for dysphoric mood and sleep disturbance. The patient is not nervous/anxious.          Physical Exam:   Blood pressure 98/58, pulse 62, height 160 cm (62.99\"), weight 75.6 kg (166 lb 9.6 oz), SpO2 99%.    Appearance: CF, stated age, NAD  Gait, Station, Strength: WNL    Mental Status Exam:     Mental Status exam performed 04/23/2025  and patient shows no significant changes from previous exam.     Hygiene:   good  Cooperation:  Cooperative  Eye Contact:  Good  Psychomotor Behavior:  Appropriate  Affect:  Full range  Mood: normal  Hopelessness: Denies  Speech:  Normal  Thought Process:  Goal directed and Linear  Thought Content:  Normal and Mood congruent  Suicidal:  Death wish  Homicidal:  None  Hallucinations:  None  Delusion:  None  Memory:  Intact  Orientation:  Person, Place, Time, and Situation  Reliability:  good  Insight:  Fair  Judgement:  Fair  Impulse Control:  Fair      Lab Results:   Office Visit on 04/17/2025   Component Date Value Ref Range Status    Hemoglobin A1C 04/17/2025 5.5  4.5 - 5.7 % Final    Lot Number 04/17/2025 [id number]   Final    Expiration Date 04/17/2025 12/03/2026   Final "     Results      Assessment & Plan    Diagnoses and all orders for this visit:    1. Severe episode of recurrent major depressive disorder, without psychotic features  -     FLUoxetine (PROzac) 20 MG capsule; Take 3 capsules by mouth Every Morning. Indications: Depression  Dispense: 90 capsule; Refill: 2    2. Post traumatic stress disorder (PTSD)  -     FLUoxetine (PROzac) 20 MG capsule; Take 3 capsules by mouth Every Morning. Indications: Depression  Dispense: 90 capsule; Refill: 2    3. Generalized anxiety disorder  -     FLUoxetine (PROzac) 20 MG capsule; Take 3 capsules by mouth Every Morning. Indications: Depression  Dispense: 90 capsule; Refill: 2    4. Other insomnia  -     doxepin (SINEquan) 50 MG capsule; Take 1 capsule by mouth At Night As Needed for Sleep.  Dispense: 30 capsule; Refill: 2  -     traZODone (DESYREL) 100 MG tablet; Take 1 tablet by mouth At Night As Needed for Sleep. Indications: Trouble Sleeping  Dispense: 30 tablet; Refill: 2      -Patient overall doing very well.  No major issues noted at this time  -Reviewed previous available documentation  -Reviewed most recent available labs   -YOSHI reviewed and appropriate. Patient counseled on use of controlled substances.   -Continue Prozac 60 mg p.o. daily for mood and anxiety, plan to reduce and taper but for now will maintain given significant improvement  -Continue trazodone 100 mg p.o. nightly as needed for insomnia  -Continue doxepin 50 mg nightly as needed for insomnia  -Encouraged to consider therapy    Visit Diagnoses:    ICD-10-CM ICD-9-CM   1. Severe episode of recurrent major depressive disorder, without psychotic features  F33.2 296.33   2. Post traumatic stress disorder (PTSD)  F43.10 309.81   3. Generalized anxiety disorder  F41.1 300.02   4. Other insomnia  G47.09 780.52         TREATMENT PLAN - SHORT AND LONG-TERM GOALS: Continue supportive psychotherapy efforts and medications as indicated. Treatment and medication options  discussed during today's visit. Patient acknowledged and verbally consented to continue with current treatment plan and was educated on the importance of compliance with treatment and follow-up appointments.    MEDICATION ISSUES:    Discussed medication options and treatment plan of prescribed medication as well as the risks, benefits, and side effects including potential falls, possible impaired driving and metabolic adversities among others. Patient is agreeable to call the office with any worsening of symptoms or onset of side effects. Patient is agreeable to call 911 or go to the nearest ER should he/she begin having SI/HI.     MEDS ORDERED DURING VISIT:  New Medications Ordered This Visit   Medications    FLUoxetine (PROzac) 20 MG capsule     Sig: Take 3 capsules by mouth Every Morning. Indications: Depression     Dispense:  90 capsule     Refill:  2    doxepin (SINEquan) 50 MG capsule     Sig: Take 1 capsule by mouth At Night As Needed for Sleep.     Dispense:  30 capsule     Refill:  2    traZODone (DESYREL) 100 MG tablet     Sig: Take 1 tablet by mouth At Night As Needed for Sleep. Indications: Trouble Sleeping     Dispense:  30 tablet     Refill:  2     Patient or patient representative verbalized consent for the use of Ambient Listening during the visit with  Brian Platt MD for chart documentation. 4/23/2025  11:16 EDT    FOLLOW UP:  Return in about 3 months (around 7/23/2025).          This document has been electronically signed by Brian Platt MD  April 23, 2025 10:31 EDT    Dictated using Dragon Dictation.

## 2025-05-04 DIAGNOSIS — Z78.0 POSTMENOPAUSAL: ICD-10-CM

## 2025-05-05 RX ORDER — ESTRADIOL 0.1 MG/G
1 CREAM VAGINAL 3 TIMES WEEKLY
Qty: 42.5 G | Refills: 1 | Status: SHIPPED | OUTPATIENT
Start: 2025-05-05

## 2025-05-05 NOTE — TELEPHONE ENCOUNTER
Rx Refill Note  Requested Prescriptions     Pending Prescriptions Disp Refills    estradiol (ESTRACE) 0.1 MG/GM vaginal cream 42.5 g 1     Sig: Insert 1 g into the vagina 3 (Three) Times a Week.      Last office visit with prescribing clinician: 4/17/2025   Last telemedicine visit with prescribing clinician: Visit date not found   Next office visit with prescribing clinician: 7/17/2025                         Would you like a call back once the refill request has been completed: [] Yes [] No    If the office needs to give you a call back, can they leave a voicemail: [] Yes [] No    Cheryl Cornejo MA  05/05/25, 08:14 EDT

## 2025-05-21 ENCOUNTER — OFFICE VISIT (OUTPATIENT)
Dept: NEUROLOGY | Facility: CLINIC | Age: 60
End: 2025-05-21
Payer: MEDICARE

## 2025-05-21 VITALS
BODY MASS INDEX: 29.41 KG/M2 | SYSTOLIC BLOOD PRESSURE: 96 MMHG | DIASTOLIC BLOOD PRESSURE: 60 MMHG | OXYGEN SATURATION: 97 % | TEMPERATURE: 97.3 F | WEIGHT: 166 LBS | HEIGHT: 63 IN | HEART RATE: 64 BPM

## 2025-05-21 DIAGNOSIS — R41.89 SUBJECTIVE MEMORY COMPLAINTS: ICD-10-CM

## 2025-05-21 RX ORDER — DONEPEZIL HYDROCHLORIDE 10 MG/1
10 TABLET, FILM COATED ORAL NIGHTLY
Qty: 90 TABLET | Refills: 2 | Status: SHIPPED | OUTPATIENT
Start: 2025-05-21

## 2025-05-21 NOTE — PROGRESS NOTES
Follow Up Office Visit      Patient Name: Zo Palma  : 1965   MRN: [id number]     Chief Complaint:    Chief Complaint   Patient presents with    Follow-up     Memory concerns' patient reports improvement of symptoms       History of Present Illness: Zo Palma is a 60 y.o. female who is here today to follow up with memory concerns.  She brings her service dog with her today to the office visit. She says she is doing well on Donepezil 10mg and says she has started back taking classes online at [facility]. She says she is able to process so much easier and process easier. She says she is seeing a trauma therapist and this has been very helpful.  She feels that the ECT treatments and the donepezil have made a huge difference for her.  She is able to read maps well and able to navigate driving without difficulty.  She says even her significant other has noticed how well her memory is doing now.  -MMSE score today 30/30  - She has tremors in her hands and has tried the medications and either they did not work or she had side effects with these.  She is trying to live with these.    - She is right hand dominant  -She had neuropsych testing on 2024.  Impression: Demonstrated borderline impaired visual constructive ability.  All other cognitive functions including learning, memory, processing speed, auditory attention, verbal fluency, word retrieval and visual perception are low average to high average.  Global cognitive ability is average.  Neurocognitive testing is not consistent with a neurodegenerative condition.  Suspect her perceived memory loss is attributed to fluctuations in attention related to her significant anxiety, depression, mood related symptoms and sleep dysregulation.        -MRI Brain W/WO contrast 2024:    IMPRESSION:     1. No parenchymal mass, hemorrhage, or midline shift.  2. No contrast-enhancing mass.  3. Minimal sphenoid sinus disease.  4. Linear focus  of vague enhancement in the right frontal lobe linear in  appearance and probably physiologic but could represent small venous  malformation.    -CT of the head without contrast on 2/13/2024 was noncontributory    Pertinent Medical History: bradycardia. Orthostatic HTN, anxiety, tremors, depression, anxiety, PTSD, RLS, suicidal ideations, ECT therapy, kidney stone, lupus, migraines, orthostatic hypotension  Multiple family members with dementia-father and brothers    Subjective      Review of Systems:   Review of Systems   Neurological:  Positive for memory problem.       I have reviewed and the following portions of the patient's history were updated as appropriate: past family history, past medical history, past social history, past surgical history and problem list.    Medications:     Current Outpatient Medications:     acetaminophen (TYLENOL) 500 MG tablet, Take 1 tablet by mouth Daily As Needed for Mild Pain or Moderate Pain. Indications: Pain, Disp: , Rfl:     donepezil (Aricept) 10 MG tablet, Take 1 tablet by mouth Every Night., Disp: 90 tablet, Rfl: 2    doxepin (SINEquan) 50 MG capsule, Take 1 capsule by mouth At Night As Needed for Sleep., Disp: 30 capsule, Rfl: 2    estradiol (ESTRACE) 0.1 MG/GM vaginal cream, Insert 1 g into the vagina 3 (Three) Times a Week., Disp: 42.5 g, Rfl: 1    FLUoxetine (PROzac) 20 MG capsule, Take 3 capsules by mouth Every Morning. Indications: Depression, Disp: 90 capsule, Rfl: 2    ibandronate (BONIVA) 150 MG tablet, TAKE 1 TABLET BY MOUTH EVERY 30 DAYS. FOR POSTMENOPAUSAL OSTEOPOROSIS, Disp: 3 tablet, Rfl: 3    Liraglutide (VICTOZA) 18 MG/3ML solution pen-injector injection, Inject 1.8 mg under the skin into the appropriate area as directed Daily. Indications: Type 2 Diabetes, Disp: , Rfl:     nitrofurantoin, macrocrystal-monohydrate, (MACROBID) 100 MG capsule, TAKE ONE CAPSULE BY MOUTH TWICE A DAY FOR PREVENTION OF FREQUENTLY OCCURING URINARY TRACT INFECTIONS, Disp: 180  "capsule, Rfl: 2    pantoprazole (PROTONIX) 40 MG EC tablet, TAKE ONE TABLET BY MOUTH EVERY DAY FOR GASTROESOPHAGEAL REFLUX DISEASE, Disp: 90 tablet, Rfl: 2    promethazine (PHENERGAN), , Disp: , Rfl:     traZODone (DESYREL) 100 MG tablet, Take 1 tablet by mouth At Night As Needed for Sleep. Indications: Trouble Sleeping, Disp: 30 tablet, Rfl: 2    levocetirizine (XYZAL) 5 MG tablet, Take 1 tablet by mouth Every Evening., Disp: 90 tablet, Rfl: 2    linaclotide (Linzess) 72 MCG capsule capsule, Take 1 capsule by mouth Every Morning Before Breakfast., Disp: 90 capsule, Rfl: 2    Allergies:   Allergies   Allergen Reactions    Chlorzoxazone Swelling and Unknown - High Severity     Lorzone, muscle relaxant.    Iodinated Contrast Media Anaphylaxis    Iodine Anaphylaxis     Topical makes her swell  Anaphylaxis with IV contrast (when she was ~ 9yrs old)    Phenazopyridine Hcl Swelling and Anaphylaxis    Pyridium [Phenazopyridine] Anaphylaxis    Quinine Unknown - High Severity     Has malaria symptoms    Valproic Acid Other (See Comments)     Lupus like symptoms and liver failure    Methocarbamol Mental Status Change     Made her feel \"suicidal\" and gave her less control over her actions.    Codeine Itching    Diphenhydramine Anxiety     Pt has severe panic attack symptoms when given benadryl IV. Needs to take a benzodiazapine med concurrently when getting benadryl.        Objective     Physical Exam:  Vital Signs:   Vitals:    05/21/25 1300   BP: 96/60   Pulse: 64   Temp: 97.3 °F (36.3 °C)   SpO2: 97%   Weight: 75.3 kg (166 lb)   Height: 160 cm (63\")   PainSc: 0-No pain     Body mass index is 29.41 kg/m².    Physical Exam  Constitutional:       Appearance: Normal appearance.   HENT:      Head: Normocephalic.   Eyes:      Conjunctiva/sclera: Conjunctivae normal.   Pulmonary:      Effort: Pulmonary effort is normal.   Musculoskeletal:         General: Normal range of motion.   Skin:     General: Skin is warm and dry. "   Neurological:      General: No focal deficit present.      Mental Status: She is alert and oriented to person, place, and time.      Cranial Nerves: Cranial nerves 2-12 are intact. No dysarthria.      Motor: Motor function is intact.      Coordination: Coordination is intact.   Psychiatric:         Attention and Perception: Attention normal.         Mood and Affect: Mood and affect normal.         Speech: Speech normal.         Behavior: Behavior normal. Behavior is cooperative.         Thought Content: Thought content normal.         Cognition and Memory: Cognition normal.         Judgment: Judgment normal.         Neurological Exam  Mental Status  Alert. Oriented to person, place, time and situation. Oriented to person, place, and time. Recalls 3 of 3 objects immediately. At 3 minutes recalls 3 of 3 objects. Speech is normal. no dysarthria present. Language is fluent with no aphasia. Able to spell words backwards. MMSE score: 30.    Coordination    Finger-to-nose, rapid alternating movements and heel-to-shin normal bilaterally without dysmetria.    Gait  Casual gait is normal including stance, stride, and arm swing.      Assessment / Plan      Assessment/Plan:   Diagnoses and all orders for this visit:    1. Subjective memory complaints  -     donepezil (Aricept) 10 MG tablet; Take 1 tablet by mouth Every Night.  Dispense: 90 tablet; Refill: 2           Doing well with currently regimen. Refills given without change. She will continue with therapy as scheduled. No safety concerns.   Patient will call in the interim if she has any questions or concerns or changes.    Follow Up:   Return in about 6 months (around 11/21/2025).    TRACI Reyes, FN-[facility]

## 2025-06-17 ENCOUNTER — OFFICE VISIT (OUTPATIENT)
Dept: ENDOCRINOLOGY | Facility: CLINIC | Age: 60
End: 2025-06-17
Payer: MEDICARE

## 2025-06-17 VITALS
TEMPERATURE: 96.8 F | WEIGHT: 167.2 LBS | HEIGHT: 63 IN | HEART RATE: 64 BPM | RESPIRATION RATE: 16 BRPM | BODY MASS INDEX: 29.62 KG/M2 | OXYGEN SATURATION: 99 % | DIASTOLIC BLOOD PRESSURE: 72 MMHG | SYSTOLIC BLOOD PRESSURE: 110 MMHG

## 2025-06-17 DIAGNOSIS — R73.03 PREDIABETES: Primary | ICD-10-CM

## 2025-06-17 LAB
EXPIRATION DATE: NORMAL
EXPIRATION DATE: NORMAL
GLUCOSE BLDC GLUCOMTR-MCNC: 72 MG/DL (ref 70–130)
HBA1C MFR BLD: 5.2 % (ref 4.5–5.7)
Lab: NORMAL
Lab: NORMAL

## 2025-06-17 PROCEDURE — 82947 ASSAY GLUCOSE BLOOD QUANT: CPT | Performed by: INTERNAL MEDICINE

## 2025-06-17 PROCEDURE — 99213 OFFICE O/P EST LOW 20 MIN: CPT | Performed by: INTERNAL MEDICINE

## 2025-06-17 PROCEDURE — 83036 HEMOGLOBIN GLYCOSYLATED A1C: CPT | Performed by: INTERNAL MEDICINE

## 2025-06-17 PROCEDURE — 3044F HG A1C LEVEL LT 7.0%: CPT | Performed by: INTERNAL MEDICINE

## 2025-07-04 DIAGNOSIS — G47.09 OTHER INSOMNIA: ICD-10-CM

## 2025-07-07 RX ORDER — TRAZODONE HYDROCHLORIDE 100 MG/1
TABLET ORAL
Qty: 30 TABLET | Refills: 2 | Status: SHIPPED | OUTPATIENT
Start: 2025-07-07

## 2025-07-07 NOTE — PROGRESS NOTES
Chief Complaint   Patient presents with    Prediabetes       HPI:   Zo Palma is a 60 y.o.female who returns to endocrine clinic for follow-up evaluation of her prediabetes and weight gain.  Last visit 12/09/2024. Her history is as follows:    Interim Events:   - Pt currently well. Is still on Victoza 1.8 mg which she had obtained through PAP assistance and extra pens from friends. Victoza is no longer available through [email or web address] PAP.      1) Weight gain:  - h/o sleeve gastrectomy in 2008. Pre-surgery wt was 245 lbs. Post-op was 135 lbs for many years  - By 2018, she had gained back to 175 lbs. She was prescribed Qsymia in 10/2018 and was able to lose 30 lbs (down to 145 lbs)  - Pt had to stop the Qsymia in 01/2022 as she did not have a provider to continue the Rx.  - She then regained wt to 165 lbs. In 2022, Dr. Suresh Edge prescribed Qsymia 15-92 mg dose which she was taking in 02/2023. However, she has not had weight loss on this dose of Qsymia.  - (02/2023) Started Victoza 1.8 mg daily, obtained through PAP. Had lost approximately 31 lbs since 02/2023 with Rx, dietary changes and exercise    2) Prediabetes:  - diagnosed in 2008, A1C% 5.7. Highest A1C% was 6.1% in 11/2019  - reports a h/o reactive hypoglycemia   - Did not tolerate Metformin  - (02/2023) Started Victoza 1.8 mg daily, obtained through PAP  - Pt had obtained Victoza through the [email or web address] PAP. However, the program ended in August 2023.  She has been using her residual supply.  - Is tolerating 1.8 mg qAM    3) h/o  abnormal endocrine labs:  - In the early 2000's, pt reported being evaluated for multiple hormone deficiencies by a [place]. She was evaluated with serum, urine, and multiple salivary tests collected throughout the day. Does not appear to have had a Cosyntropin stimulation test. She was told she had deficiencies in estrogen and adrenal hormones. Pt was prescribed estrogen. Was not prescribed glucocorticoids.      Currently on these supplements that do not contain Biotin:  - Calcium +D3 (600mg / 120 mcg): 1 tab daily  - D-Mannose: 1 tab BID  - Magnesium: 500 mg daily  - doTerra Bone Nutrient: 2 caps daily  - Vit D3: 5000 IU daily  - doTerra TerraZyme: 2 caps daily  - doTerra Onguard: 1 cap daily  - Pantethina: 900 mg daily  - doTerra Yarrow: 2 caps daily  - doTerra Serenity: 1 cap daily  - doTerra Alpha CRS: 2 caps daily  - doTerra Microplex: 2 caps daily  - doTerra E Omega: 2 caps daily  - doTerra MetaPower: 1 cap BID  - potassium 99 mg: daily     - Had patient complete an ACTH stimulation test on 2/9/2023 at 8:27 AM.  Labs showed no evidence of adrenal insufficiency  - Patient without evidence of thyroid hormone deficiency    ACTH stimulation test completed 2/9/2023 at 8:27 AM:  8AM ACTH: 8.7 - normal  Time 0 AM Cortisol: 13.95  Time 30 min Cortisol: 29.30  Time 60 min Cortisol: 34.80    Other history:  - pt's  passed away in 2020  - had a h/o acute liver injury in the past while on Depakote. LFT's in the 300's. H/o fatty liver    Review of Systems   Constitutional:  Negative for fatigue.        Wt gain   HENT: Negative.     Eyes: Negative.    Respiratory: Negative.     Cardiovascular: Negative.    Gastrointestinal: Negative.         Acid reflux   Endocrine: Negative.    Genitourinary: Negative.    Musculoskeletal: Negative.    Skin: Negative.    Allergic/Immunologic: Positive for environmental allergies.   Neurological:  Positive for tremors.   Hematological:  Bruises/bleeds easily.   Psychiatric/Behavioral:          H/O anxiety, depression, PTSD     The following portions of the patient's history were reviewed and updated as appropriate: allergies, current medications, past family history, past medical history, past social history, past surgical history and problem list.    /72 (BP Location: Right arm, Patient Position: Sitting, Cuff Size: Adult)   Pulse 64   Temp 96.8 °F (36 °C) (Infrared)   Resp 16   " Ht 160 cm (62.99\")   Wt 75.8 kg (167 lb 3.2 oz)   SpO2 99%   BMI 29.63 kg/m²   Physical Exam  Vitals reviewed.   Constitutional:       General: She is not in acute distress.     Appearance: She is well-developed. She is not diaphoretic.   HENT:      Head: Normocephalic.   Eyes:      Conjunctiva/sclera: Conjunctivae normal.      Pupils: Pupils are equal, round, and reactive to light.   Neck:      Thyroid: No thyromegaly.      Trachea: No tracheal deviation.      Comments: No palpable thyroid nodules    Cardiovascular:      Rate and Rhythm: Normal rate and regular rhythm.      Heart sounds: Normal heart sounds. No murmur heard.  Pulmonary:      Effort: Pulmonary effort is normal. No respiratory distress.      Breath sounds: Normal breath sounds.   Abdominal:      General: Bowel sounds are normal.      Palpations: Abdomen is soft. There is no mass.      Tenderness: There is no abdominal tenderness.   Lymphadenopathy:      Cervical: No cervical adenopathy.   Skin:     General: Skin is warm and dry.      Findings: No erythema.      Comments: No areas of hyperpigmentation   Neurological:      Mental Status: She is alert and oriented to person, place, and time.      Cranial Nerves: No cranial nerve deficit.   Psychiatric:         Behavior: Behavior normal.         LABS/IMAGING: outside records reviewed and summarized in HPI  Office Visit on 06/17/2025   Component Date Value Ref Range Status    Hemoglobin A1C 06/17/2025 5.2  4.5 - 5.7 % Final    Lot Number 06/17/2025 [id number]   Final    Expiration Date 06/17/2025 2/26/27   Final    Glucose 06/17/2025 72  70 - 130 mg/dL Final    Lot Number 06/17/2025 [id number]   Final    Expiration Date 06/17/2025 12/27/25   Final       Lab Results   Component Value Date    WBC 6.62 10/23/2024    HGB 12.3 10/23/2024    HCT 39.6 10/23/2024    MCV 91.7 10/23/2024     10/23/2024     Lab Results   Component Value Date    GLUCOSE 93 10/23/2024    BUN 8 10/23/2024    CREATININE 0.94 " 10/23/2024     10/23/2024    K 3.7 10/23/2024     10/23/2024    CALCIUM 9.5 10/23/2024    PROTEINTOT 6.8 10/23/2024    ALBUMIN 4.1 10/23/2024    ALT 21 10/23/2024    AST 23 10/23/2024    ALKPHOS 64 10/23/2024    BILITOT 0.4 10/23/2024    GLOB 2.7 10/23/2024    AGRATIO 1.5 10/23/2024    BCR 8.5 10/23/2024    ANIONGAP 9.9 10/23/2024    EGFR 70.0 10/23/2024     Lab Results   Component Value Date    CHOL 178 02/14/2024    CHLPL 218 (H) 11/14/2022    TRIG 106 02/14/2024    HDL 55 02/14/2024     (H) 02/14/2024     Lab Results   Component Value Date    TSH 2.940 10/03/2024         ASSESSMENT/PLAN:    1) prediabetes: controlled, A1C% 5.20 today   - Patient did not tolerate metformin in the past  - Pt had obtained Victoza through the [employer] PAP. However, the program ended in August 2023.  She has been using her residual supply. Is tolerating 1.8 mg qAM.   -Discussed with patient that we will not be able to obtain another GLP-1 agonist through her insurance as these are not covered if the patient does not have a known diagnosis of type 2 diabetes with an A1c of 6.5 or higher at time of diagnosis.  -Will have patient try sample of Ozempic 0.25 mg weekly x 8 weeks, then increase to 0.5 mg weekly. This medication can be obtained through PAP. Reviewed potential side effects (mood side effects, GI side effects). If tolerated, will send application for [employer].     RTC 6 months    Electronically Signed: Damari Fulton MD

## 2025-07-16 ENCOUNTER — OFFICE VISIT (OUTPATIENT)
Dept: PSYCHIATRY | Facility: CLINIC | Age: 60
End: 2025-07-16
Payer: MEDICARE

## 2025-07-16 VITALS
HEART RATE: 89 BPM | SYSTOLIC BLOOD PRESSURE: 102 MMHG | BODY MASS INDEX: 30.83 KG/M2 | WEIGHT: 174 LBS | OXYGEN SATURATION: 97 % | HEIGHT: 63 IN | DIASTOLIC BLOOD PRESSURE: 70 MMHG

## 2025-07-16 DIAGNOSIS — G47.09 OTHER INSOMNIA: ICD-10-CM

## 2025-07-16 DIAGNOSIS — F43.10 POST TRAUMATIC STRESS DISORDER (PTSD): ICD-10-CM

## 2025-07-16 DIAGNOSIS — F41.1 GENERALIZED ANXIETY DISORDER: ICD-10-CM

## 2025-07-16 DIAGNOSIS — F33.2 SEVERE EPISODE OF RECURRENT MAJOR DEPRESSIVE DISORDER, WITHOUT PSYCHOTIC FEATURES: ICD-10-CM

## 2025-07-16 PROCEDURE — 1159F MED LIST DOCD IN RCRD: CPT | Performed by: PSYCHIATRY & NEUROLOGY

## 2025-07-16 PROCEDURE — 99214 OFFICE O/P EST MOD 30 MIN: CPT | Performed by: PSYCHIATRY & NEUROLOGY

## 2025-07-16 PROCEDURE — 1160F RVW MEDS BY RX/DR IN RCRD: CPT | Performed by: PSYCHIATRY & NEUROLOGY

## 2025-07-16 RX ORDER — DOXEPIN HYDROCHLORIDE 50 MG/1
50 CAPSULE ORAL NIGHTLY PRN
Qty: 30 CAPSULE | Refills: 2 | Status: SHIPPED | OUTPATIENT
Start: 2025-07-16

## 2025-07-16 RX ORDER — SEMAGLUTIDE 1.34 MG/ML
0.25 INJECTION, SOLUTION SUBCUTANEOUS WEEKLY
COMMUNITY

## 2025-07-16 NOTE — PROGRESS NOTES
Subjective   Zo Palma is a 60 y.o. female who presents today for follow up    Chief Complaint: PTSD, dysthymia, MDD    History of Present Illness:   History of Present Illness    The patient is a 60-year-old female presenting today for follow-up for PTSD, dysthymia, and insomnia.    She reports that her current medication regimen is effective, with no adverse effects noted. She has not yet received a new supply of Prozac.    Her endocrinologist recently transitioned her from Victoza to Ozempic due to the former's diminishing efficacy after prolonged use. She has completed one dose of Ozempic without any complications and is scheduled for her second dose on Friday.      The following portions of the patient's history were reviewed and updated as appropriate: allergies, current medications, past family history, past medical history, past social history, past surgical history and problem list.      Past Medical History:  Past Medical History:   Diagnosis Date    Ankle sprain     Anxiety     Arteriovenous malformation 2004    Temporal hemangiona - right    Arthritis of back     Arthritis of neck     Brain concussion     Bursitis of hip     Cataract 11/2021    Cervical disc disorder     Chronic pain disorder 1995    Conversion disorder     CTS (carpal tunnel syndrome)     Depression     Difficulty walking 3/2024    Diverticulosis     Fracture of wrist     Fracture, finger     Fracture, foot     Frozen shoulder     GERD (gastroesophageal reflux disease)     Hip arthrosis     HL (hearing loss)     Hormone disorder     Hyperlipidemia     Shows up periodically    Hypoglycemia 2014    Hypothyroidism     Insomnia     Interstitial cystitis     Kidney stone     Knee swelling 2024    Liver failure     due to depakote    Lumbosacral disc disease     Lupus     Memory loss     Migraines     Movement disorder 3/2014    Essential tremor    Orthostatic hypotension     Osteopenia     Panic disorder 2001    Periarthritis of shoulder  "    Peripheral neuropathy     PTSD (post-traumatic stress disorder) 1985    Rotator cuff syndrome     Scoliosis 11/2023    Seizures 2001    Conversion Disorder    Sleep apnea     Subluxation of patella     Suicidal thoughts     Suicide attempt     \"I tried overdosing\"  32 antihistamine tablets per pt 1/27/24    Tremor 2/2022    Urinary tract infection     Weight loss        Social History:  Social History     Socioeconomic History    Marital status:     Number of children: 2    Highest education level: Master's degree (e.g., MA, MS, Pancho, MEd, MSW, SULY)   Tobacco Use    Smoking status: Never     Passive exposure: Past    Smokeless tobacco: Never   Vaping Use    Vaping status: Never Used   Substance and Sexual Activity    Alcohol use: Yes     Alcohol/week: 1.0 standard drink of alcohol     Types: 1 Drinks containing 0.5 oz of alcohol per week     Comment: Occasionally    Drug use: Never    Sexual activity: Yes     Partners: Male     Birth control/protection: Post-menopausal, Vasectomy, Hysterectomy       Family History:  Family History   Problem Relation Age of Onset    Cancer Mother         Brain, suspected uterine    Rheumatologic disease Mother     Dementia Father         Family predisposed    Cancer Father         Prostate    Learning disabilities Father         Dyslexia    Prostate cancer Father     Broken bones Father         Knees    Cancer Brother         Prostate, bone    Learning disabilities Brother         Dyslexia    Dementia Brother     Prostate cancer Maternal Grandfather     Parkinsonism Maternal Grandfather     Depression Son     Learning disabilities Son        Past Surgical History:  Past Surgical History:   Procedure Laterality Date    ABDOMINAL SURGERY      BARIATRIC SURGERY  2008    Gastric sleeve    BLADDER SURGERY      BREAST SURGERY Bilateral     reduction    CHOLECYSTECTOMY  2010    COLONOSCOPY      COSMETIC SURGERY      CYSTOSCOPY      ELECTROCONVULSIVE THERAPY Bilateral 02/15/2024 "    Procedure: BIFRONTAL ELECTROCONVULSIVE THERAPY;  Surgeon: Vinod Osuna MD;  Location:  [facility];  Service: ECT;  Laterality: Bilateral;    ELECTROCONVULSIVE THERAPY N/A 02/19/2024    Procedure: ELECTROCONVULSIVE THERAPY;  Surgeon: Vinod Osuna MD;  Location:  [facility];  Service: ECT;  Laterality: N/A;    ELECTROCONVULSIVE THERAPY N/A 02/21/2024    Procedure: ELECTROCONVULSIVE THERAPY;  Surgeon: Vinod Osuna MD;  Location:  [facility];  Service: ECT;  Laterality: N/A;    ELECTROCONVULSIVE THERAPY Bilateral 06/10/2024    Procedure: BIFRONTAL ELECTROCONVULSIVE THERAPY;  Surgeon: Vinod Osuna MD;  Location:  [facility];  Service: ECT;  Laterality: Bilateral;    ELECTROCONVULSIVE THERAPY N/A 06/12/2024    Procedure: ELECTROCONVULSIVE THERAPY;  Surgeon: Vinod Osuna MD;  Location:  [facility];  Service: ECT;  Laterality: N/A;    ELECTROCONVULSIVE THERAPY N/A 06/14/2024    Procedure: ELECTROCONVULSIVE THERAPY;  Surgeon: Vinod Osuna MD;  Location:  [facility];  Service: ECT;  Laterality: N/A;    ELECTROCONVULSIVE THERAPY N/A 06/17/2024    Procedure: ELECTROCONVULSIVE THERAPY;  Surgeon: Vinod Osuna MD;  Location:  [facility];  Service: ECT;  Laterality: N/A;    ELECTROCONVULSIVE THERAPY N/A 06/19/2024    Procedure: ELECTROCONVULSIVE THERAPY;  Surgeon: Vinod Osuna MD;  Location:  [facility];  Service: ECT;  Laterality: N/A;    ENDOSCOPY      FOOT SURGERY Bilateral     6 hammer toes    FRACTURE SURGERY      Left wrist    GASTRECTOMY      GASTRIC RESTRICTION SURGERY  2007    GASTRIC SLEEVE LAPAROSCOPIC N/A     HYSTERECTOMY      NECK SURGERY      OOPHORECTOMY Bilateral     SINUS SURGERY  2021    WISDOM TOOTH EXTRACTION N/A     WRIST SURGERY Left        Problem List:  Patient Active Problem List   Diagnosis    Anxiety    Bursitis of hip    Cervical spondylosis with radiculopathy    Chronic migraine without aura without status  migrainosus, not intractable    Conversion disorder with attacks or seizures, persistent, with psychological stressor    Diverticulosis of colon    Elevated liver enzymes    Foot drop    Gastroesophageal reflux disease without esophagitis    Hereditary and idiopathic peripheral neuropathy    Internal hemorrhoids    Iron deficiency    Lumbar radiculopathy, chronic    Metabolic syndrome    Medicare annual wellness visit, subsequent    Encounter for screening mammogram for malignant neoplasm of breast    Mixed hyperlipidemia    Moderate episode of recurrent major depressive disorder    Obstructive sleep apnea    Osteoarthritis of right temporomandibular joint    Osteopenia of left lower leg    PTSD (post-traumatic stress disorder)    RLS (restless legs syndrome)    Primary insomnia    Tinnitus    Urge incontinence    Urinary urgency    Vitamin D deficiency    Postmenopausal osteoporosis    Edema of right lower extremity    Suicidal ideations    Recurrent UTI (urinary tract infection)    Acute cystitis without hematuria    Incomplete emptying of bladder    Osteoarthritis of left AC (acromioclavicular) joint    Tear of left glenoid labrum    Infraspinatus tendon partial tear, left, initial encounter    Supraspinatus tendonitis with fraying, left    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    DDD (degenerative disc disease), lumbar    Chronic midline low back pain with bilateral sciatica    Orthostasis    Essential tremor    Age-related cognitive decline    Suicidal ideation    Severe episode of recurrent major depressive disorder, without psychotic features    Constipation    Chronic rhinitis    MDD (major depressive disorder)    Dysthymia    Ulnar neuropathy of both upper extremities    MDD (major depressive disorder), recurrent episode, severe    Chronic back pain    Postmenopausal    Impaired fasting glucose       Allergy:   Allergies   Allergen Reactions    Chlorzoxazone Swelling and Unknown - High Severity      "Lorzone, muscle relaxant.    Iodinated Contrast Media Anaphylaxis    Iodine Anaphylaxis     Topical makes her swell  Anaphylaxis with IV contrast (when she was ~ 9yrs old)    Phenazopyridine Hcl Swelling and Anaphylaxis    Pyridium [Phenazopyridine] Anaphylaxis    Quinine Unknown - High Severity     Has malaria symptoms    Valproic Acid Other (See Comments)     Lupus like symptoms and liver failure    Methocarbamol Mental Status Change     Made her feel \"suicidal\" and gave her less control over her actions.    Codeine Itching    Diphenhydramine Anxiety     Pt has severe panic attack symptoms when given benadryl IV. Needs to take a benzodiazapine med concurrently when getting benadryl.         Current Medications:   Current Outpatient Medications   Medication Sig Dispense Refill    acetaminophen (TYLENOL) 500 MG tablet Take 1 tablet by mouth Daily As Needed for Mild Pain or Moderate Pain. Indications: Pain      donepezil (Aricept) 10 MG tablet Take 1 tablet by mouth Every Night. 90 tablet 2    doxepin (SINEquan) 50 MG capsule Take 1 capsule by mouth At Night As Needed for Sleep. 30 capsule 2    estradiol (ESTRACE) 0.1 MG/GM vaginal cream Insert 1 g into the vagina 3 (Three) Times a Week. 42.5 g 1    FLUoxetine (PROzac) 20 MG capsule Take 3 capsules by mouth Every Morning. Indications: Depression 90 capsule 2    ibandronate (BONIVA) 150 MG tablet TAKE 1 TABLET BY MOUTH EVERY 30 DAYS. FOR POSTMENOPAUSAL OSTEOPOROSIS 3 tablet 3    nitrofurantoin, macrocrystal-monohydrate, (MACROBID) 100 MG capsule TAKE ONE CAPSULE BY MOUTH TWICE A DAY FOR PREVENTION OF FREQUENTLY OCCURING URINARY TRACT INFECTIONS 180 capsule 2    pantoprazole (PROTONIX) 40 MG EC tablet TAKE ONE TABLET BY MOUTH EVERY DAY FOR GASTROESOPHAGEAL REFLUX DISEASE 90 tablet 2    promethazine (PHENERGAN)       Semaglutide,0.25 or 0.5MG/DOS, (Ozempic, 0.25 or 0.5 MG/DOSE,) 2 MG/1.5ML solution pen-injector Inject 0.25 mg under the skin into the appropriate area as " "directed 1 (One) Time Per Week.      traZODone (DESYREL) 100 MG tablet TAKE 1 TABLET BY MOUTH EVERY NIGHT AS NEEDED FOR SLEEP. INDICATIONS: TROUBLE SLEEPING 30 tablet 2    levocetirizine (XYZAL) 5 MG tablet Take 1 tablet by mouth Every Evening. (Patient not taking: Reported on 7/16/2025) 90 tablet 2    linaclotide (Linzess) 72 MCG capsule capsule Take 1 capsule by mouth Every Morning Before Breakfast. (Patient not taking: Reported on 7/16/2025) 90 capsule 2    Liraglutide (VICTOZA) 18 MG/3ML solution pen-injector injection Inject 1.8 mg under the skin into the appropriate area as directed Daily. Indications: Type 2 Diabetes       No current facility-administered medications for this visit.       Review of Symptoms:    Review of Systems   Neurological:  Negative for dizziness and confusion.   Psychiatric/Behavioral:  Negative for dysphoric mood and sleep disturbance. The patient is not nervous/anxious.          Physical Exam:   Blood pressure 102/70, pulse 89, height 160 cm (62.99\"), weight 78.9 kg (174 lb), SpO2 97%.    Appearance: CF, stated age, NAD  Gait, Station, Strength: WNL    Mental Status Exam:     Mental Status exam performed 07/16/2025  and patient shows no significant changes from previous exam.     Hygiene:   good  Cooperation:  Cooperative  Eye Contact:  Good  Psychomotor Behavior:  Appropriate  Affect:  Full range  Mood: normal  Hopelessness: Denies  Speech:  Normal  Thought Process:  Goal directed and Linear  Thought Content:  Normal and Mood congruent  Suicidal:  Death wish  Homicidal:  None  Hallucinations:  None  Delusion:  None  Memory:  Intact  Orientation:  Person, Place, Time, and Situation  Reliability:  good  Insight:  Fair  Judgement:  Fair  Impulse Control:  Fair      Lab Results:   Office Visit on 06/17/2025   Component Date Value Ref Range Status    Hemoglobin A1C 06/17/2025 5.2  4.5 - 5.7 % Final    Lot Number 06/17/2025 [id number]   Final    Expiration Date 06/17/2025 2/26/27   Final "    Glucose 06/17/2025 72  70 - 130 mg/dL Final    Lot Number 06/17/2025 [id number]   Final    Expiration Date 06/17/2025 12/27/25   Final     Results      Assessment & Plan    Diagnoses and all orders for this visit:    1. Severe episode of recurrent major depressive disorder, without psychotic features  -     FLUoxetine (PROzac) 20 MG capsule; Take 3 capsules by mouth Every Morning. Indications: Depression  Dispense: 90 capsule; Refill: 2    2. Post traumatic stress disorder (PTSD)  -     FLUoxetine (PROzac) 20 MG capsule; Take 3 capsules by mouth Every Morning. Indications: Depression  Dispense: 90 capsule; Refill: 2    3. Generalized anxiety disorder  -     FLUoxetine (PROzac) 20 MG capsule; Take 3 capsules by mouth Every Morning. Indications: Depression  Dispense: 90 capsule; Refill: 2    4. Other insomnia  -     doxepin (SINEquan) 50 MG capsule; Take 1 capsule by mouth At Night As Needed for Sleep.  Dispense: 30 capsule; Refill: 2      -Patient stable and doing well  -Reviewed previous available documentation  -Reviewed most recent available labs   -YOSHI reviewed and appropriate. Patient counseled on use of controlled substances.   -Continue Prozac 60 mg p.o. daily for mood and anxiety, plan to reduce and taper but for now will maintain given significant improvement  -Continue trazodone 100 mg p.o. nightly as needed for insomnia  -Continue doxepin 50 mg nightly as needed for insomnia  -Encouraged to consider therapy    Visit Diagnoses:    ICD-10-CM ICD-9-CM   1. Severe episode of recurrent major depressive disorder, without psychotic features  F33.2 296.33   2. Post traumatic stress disorder (PTSD)  F43.10 309.81   3. Generalized anxiety disorder  F41.1 300.02   4. Other insomnia  G47.09 780.52       TREATMENT PLAN - SHORT AND LONG-TERM GOALS: Continue supportive psychotherapy efforts and medications as indicated. Treatment and medication options discussed during today's visit. Patient acknowledged and  verbally consented to continue with current treatment plan and was educated on the importance of compliance with treatment and follow-up appointments.    MEDICATION ISSUES:    Discussed medication options and treatment plan of prescribed medication as well as the risks, benefits, and side effects including potential falls, possible impaired driving and metabolic adversities among others. Patient is agreeable to call the office with any worsening of symptoms or onset of side effects. Patient is agreeable to call 911 or go to the nearest ER should he/she begin having SI/HI.     MEDS ORDERED DURING VISIT:  New Medications Ordered This Visit   Medications    FLUoxetine (PROzac) 20 MG capsule     Sig: Take 3 capsules by mouth Every Morning. Indications: Depression     Dispense:  90 capsule     Refill:  2    doxepin (SINEquan) 50 MG capsule     Sig: Take 1 capsule by mouth At Night As Needed for Sleep.     Dispense:  30 capsule     Refill:  2     Patient or patient representative verbalized consent for the use of Ambient Listening during the visit with  Brian Platt MD for chart documentation. 7/16/2025  11:16 EDT    FOLLOW UP:  Return in about 3 months (around 10/16/2025).          This document has been electronically signed by Brian Platt MD  July 16, 2025 11:01 EDT    Dictated using Dragon Dictation.

## 2025-07-17 ENCOUNTER — OFFICE VISIT (OUTPATIENT)
Age: 60
End: 2025-07-17
Payer: MEDICARE

## 2025-07-17 VITALS
HEART RATE: 68 BPM | BODY MASS INDEX: 30.65 KG/M2 | WEIGHT: 173 LBS | HEIGHT: 63 IN | SYSTOLIC BLOOD PRESSURE: 100 MMHG | DIASTOLIC BLOOD PRESSURE: 58 MMHG | OXYGEN SATURATION: 98 %

## 2025-07-17 DIAGNOSIS — M81.0 OSTEOPOROSIS, POSTMENOPAUSAL: Primary | ICD-10-CM

## 2025-07-17 DIAGNOSIS — K58.1 IRRITABLE BOWEL SYNDROME WITH CONSTIPATION: ICD-10-CM

## 2025-07-17 PROCEDURE — 1126F AMNT PAIN NOTED NONE PRSNT: CPT | Performed by: FAMILY MEDICINE

## 2025-07-17 PROCEDURE — 99214 OFFICE O/P EST MOD 30 MIN: CPT | Performed by: FAMILY MEDICINE

## 2025-07-17 PROCEDURE — 3044F HG A1C LEVEL LT 7.0%: CPT | Performed by: FAMILY MEDICINE

## 2025-07-17 RX ORDER — LUBIPROSTONE 8 UG/1
8 CAPSULE ORAL 2 TIMES DAILY WITH MEALS
Qty: 180 CAPSULE | Refills: 2 | Status: SHIPPED | OUTPATIENT
Start: 2025-07-17

## 2025-07-17 NOTE — PROGRESS NOTES
"                      Established Patient        Chief Complaint:   Chief Complaint   Patient presents with    Constipation        Zo Palma is a 60 y.o. female    History of Present Illness:   Answers submitted by the patient for this visit:  Chronic Condition Follow-up (Submitted on 7/15/2025)  Chief Complaint: PCP follow-up  anxiety: Yes  depression: Yes  dry mouth: No  weight loss: No  weight gain: Yes  blurred vision: No  insomnia: No  Medication compliance: most of the time  Side effects: change in bowel movements  Treatment barriers: no complaince problems  Exercise: every other day  Sleep quality: good  Sleep per night: 8 Hours    Here today in scheduled follow-up visit of osteoporosis and irritable bowel syndrome with constipation.    Denies chest pain, syncope, palpitations or vertigo.  Denies fever, chills or night sweats.  Maintains an active lifestyle, balanced dietary intake; reports good hydration habits.  Denies hematuria/dysuria.  Denies any BRB/BTS.  No new rashes.  Denies orthopnea, epistaxis or hemoptysis.    Subjective     The following portions of the patient's history were reviewed and updated as appropriate: allergies, current medications, past family history, past medical history, past social history, past surgical history and problem list.    Allergies   Allergen Reactions    Chlorzoxazone Swelling and Unknown - High Severity     Lorzone, muscle relaxant.    Iodinated Contrast Media Anaphylaxis    Iodine Anaphylaxis     Topical makes her swell  Anaphylaxis with IV contrast (when she was ~ 9yrs old)    Phenazopyridine Hcl Swelling and Anaphylaxis    Pyridium [Phenazopyridine] Anaphylaxis    Quinine Unknown - High Severity     Has malaria symptoms    Valproic Acid Other (See Comments)     Lupus like symptoms and liver failure    Methocarbamol Mental Status Change     Made her feel \"suicidal\" and gave her less control over her actions.    Codeine Itching    Diphenhydramine Anxiety     Pt " "has severe panic attack symptoms when given benadryl IV. Needs to take a benzodiazapine med concurrently when getting benadryl.        Review of Systems  Constitutional: Negative for fever. Negative for chills, diaphoresis, fatigue and unexpected weight change.   HENT: No dysphagia; no changes to vision/hearing/smell/taste; no epistaxis  Eyes: Negative for redness and visual disturbance.   Respiratory: negative for shortness of breath. Negative for chest pain . Negative for cough and chest tightness.   Cardiovascular: Negative for chest pain and palpitations.   Gastrointestinal: Negative for abdominal distention, abdominal pain and blood in stool.  Constipation as per above.  Endocrine: Negative for cold intolerance and heat intolerance.   Genitourinary: Negative for difficulty urinating, dysuria and frequency.   Musculoskeletal: Negative for arthralgias, back pain and myalgias.   Skin: Negative for color change, rash and wound.   Neurological: Negative for syncope, weakness and headaches.   Hematological: Negative for adenopathy. Does not bruise/bleed easily.   Psychiatric/Behavioral: Negative for confusion. The patient is not nervous/anxious.    Objective     Physical Exam   Vital Signs: /58   Pulse 68   Ht 160 cm (63\")   Wt 78.5 kg (173 lb)   SpO2 98%   BMI 30.65 kg/m²       Patient's (Body mass index is 30.65 kg/m².) indicates that they are overweight (BMI 25-29.9) with health related conditions that include hypertension, diabetes mellitus, GERD, and osteoarthritis . Weight is unchanged. BMI is is above average; BMI management plan is completed. We discussed portion control and increasing exercise.      General Appearance: alert, oriented x 3, no acute distress.  Skin: warm and dry.   HEENT: Atraumatic.  pupils round and reactive to light and accommodation, oral mucosa pink and moist.  Nares patent without epistaxis.  External auditory canals are patent tympanic membranes intact.  Neck: supple, no " JVD, trachea midline.  No thyromegaly  Lungs: CTA, unlabored breathing effort.  Heart: RRR, normal S1 and S2, no S3, no rub.  Abdomen: soft, non-tender, no palpable bladder, present bowel sounds to auscultation ×4.  No guarding or rigidity.  Extremities: no clubbing, cyanosis or edema.  Good range of motion actively and passively.  Symmetric muscle strength and development  Neuro: normal speech and mental status.  Cranial nerves II through XII intact.  No anosmia. DTR 2+; proprioception intact.  No focal motor/sensory deficits.        Assessment and Plan      Assessment/Plan:   Diagnoses and all orders for this visit:    1. Osteoporosis, postmenopausal (Primary)  -     Dexa Bone Density, Axial (Every 2 Years); Future    2. Irritable bowel syndrome with constipation  -     lubiprostone (Amitiza) 8 MCG capsule; Take 1 capsule by mouth 2 (Two) Times a Day With Meals.  Dispense: 180 capsule; Refill: 2      Planned utilization of Amitiza, reminded patient to take with meals to reduce potential nausea affect.  Maintain appropriate hydration status, as well as balanced dietary intake.    Updated bone density testing ordered today.  Continue weightbearing activities and natural sunlight exposure.  Currently utilizing bisphosphonate therapy.    Vital signs demonstrate hemodynamic stability, blood pressure is at goal.    Discussion Summary:    Discussed plan of care in detail with pt today; pt verb understanding and agrees.    I spent 30 minutes caring for Zo on this date of service. This time includes time spent by me in the following activities:preparing for the visit, reviewing tests, performing a medically appropriate examination and/or evaluation , counseling and educating the patient/family/caregiver, ordering medications, tests, or procedures, documenting information in the medical record, and care coordination    I confirm accuracy of unchanged data/findings which have been carried forward from previous visit, as  well as I have updated appropriately those that have changed.      Follow up:  No follow-ups on file.     There are no Patient Instructions on file for this visit.        Evan Rivas DO  08/01/25  12:52 EDT

## 2025-07-22 DIAGNOSIS — Z78.0 POSTMENOPAUSAL: ICD-10-CM

## 2025-07-22 RX ORDER — ESTRADIOL 0.1 MG/G
1 CREAM VAGINAL 3 TIMES WEEKLY
Qty: 42.5 G | Refills: 1 | Status: SHIPPED | OUTPATIENT
Start: 2025-07-23

## 2025-07-22 NOTE — TELEPHONE ENCOUNTER
Rx Refill Note  Requested Prescriptions     Pending Prescriptions Disp Refills    estradiol (ESTRACE) 0.1 MG/GM vaginal cream 42.5 g 1     Sig: Insert 1 g into the vagina 3 (Three) Times a Week.      Last office visit with prescribing clinician: 7/17/2025   Last telemedicine visit with prescribing clinician: Visit date not found   Next office visit with prescribing clinician: 10/20/2025                         Would you like a call back once the refill request has been completed: [] Yes [] No    If the office needs to give you a call back, can they leave a voicemail: [] Yes [] No    Yesica Vargas MA  07/22/25, 07:16 EDT

## 2025-07-30 DIAGNOSIS — K21.9 GASTROESOPHAGEAL REFLUX DISEASE WITHOUT ESOPHAGITIS: ICD-10-CM

## 2025-07-30 RX ORDER — PANTOPRAZOLE SODIUM 40 MG/1
TABLET, DELAYED RELEASE ORAL
Qty: 90 TABLET | Refills: 2 | Status: SHIPPED | OUTPATIENT
Start: 2025-07-30

## 2025-07-30 RX ORDER — NITROFURANTOIN 25; 75 MG/1; MG/1
CAPSULE ORAL
Qty: 180 CAPSULE | Refills: 2 | Status: SHIPPED | OUTPATIENT
Start: 2025-07-30

## 2025-07-30 NOTE — TELEPHONE ENCOUNTER
Rx Refill Note  Requested Prescriptions     Pending Prescriptions Disp Refills    nitrofurantoin, macrocrystal-monohydrate, (MACROBID) 100 MG capsule [Pharmacy Med Name: NITROFURANTOIN MONOHYD  MG CAPS] 180 capsule 2     Sig: TAKE ONE CAPSULE BY MOUTH TWICE A DAY FOR PREVENTION OF FREQUENTLY OCCURING URINARY TRACT INFECTIONS    pantoprazole (PROTONIX) 40 MG EC tablet [Pharmacy Med Name: PANTOPRAZOLE SODIUM 40MG TBEC] 90 tablet 2     Sig: TAKE ONE TABLET BY MOUTH EVERY DAY FOR GASTROESOPHAGEAL REFLUX DISEASE      Last office visit with prescribing clinician: 7/17/2025   Last telemedicine visit with prescribing clinician: Visit date not found   Next office visit with prescribing clinician: 10/20/2025                         Would you like a call back once the refill request has been completed: [] Yes [] No    If the office needs to give you a call back, can they leave a voicemail: [] Yes [] No    Yesica Vargas MA  07/30/25, 09:06 EDT

## 2025-08-11 ENCOUNTER — HOSPITAL ENCOUNTER (OUTPATIENT)
Dept: MAMMOGRAPHY | Facility: HOSPITAL | Age: 60
Discharge: HOME | End: 2025-08-11
Admitting: FAMILY MEDICINE
Payer: MEDICARE

## 2025-08-11 DIAGNOSIS — Z12.31 ENCOUNTER FOR SCREENING MAMMOGRAM FOR MALIGNANT NEOPLASM OF BREAST: ICD-10-CM

## 2025-08-11 PROCEDURE — 77067 SCR MAMMO BI INCL CAD: CPT

## 2025-08-11 PROCEDURE — 77063 BREAST TOMOSYNTHESIS BI: CPT

## 2025-08-18 ENCOUNTER — OFFICE VISIT (OUTPATIENT)
Age: 60
End: 2025-08-18
Payer: MEDICARE

## 2025-08-18 VITALS
HEIGHT: 63 IN | BODY MASS INDEX: 30.48 KG/M2 | OXYGEN SATURATION: 95 % | HEART RATE: 57 BPM | SYSTOLIC BLOOD PRESSURE: 133 MMHG | DIASTOLIC BLOOD PRESSURE: 81 MMHG | WEIGHT: 172 LBS | TEMPERATURE: 97.3 F

## 2025-08-18 DIAGNOSIS — M62.830 BACK SPASM: Primary | ICD-10-CM

## 2025-08-18 PROCEDURE — 99214 OFFICE O/P EST MOD 30 MIN: CPT | Performed by: FAMILY MEDICINE

## 2025-08-18 PROCEDURE — 1126F AMNT PAIN NOTED NONE PRSNT: CPT | Performed by: FAMILY MEDICINE

## 2025-08-18 PROCEDURE — 3044F HG A1C LEVEL LT 7.0%: CPT | Performed by: FAMILY MEDICINE

## 2025-08-18 RX ORDER — TRAMADOL HYDROCHLORIDE 50 MG/1
50 TABLET ORAL EVERY 6 HOURS PRN
Qty: 20 TABLET | Refills: 0 | Status: SHIPPED | OUTPATIENT
Start: 2025-08-18

## 2025-08-18 RX ORDER — IBUPROFEN 800 MG/1
800 TABLET, FILM COATED ORAL EVERY 8 HOURS PRN
Qty: 20 TABLET | Refills: 0 | Status: SHIPPED | OUTPATIENT
Start: 2025-08-18

## 2025-08-18 RX ORDER — CYCLOBENZAPRINE HCL 5 MG
5 TABLET ORAL 3 TIMES DAILY PRN
Qty: 12 TABLET | Refills: 0 | Status: SHIPPED | OUTPATIENT
Start: 2025-08-18

## 2025-08-27 ENCOUNTER — TELEPHONE (OUTPATIENT)
Dept: ENDOCRINOLOGY | Facility: CLINIC | Age: 60
End: 2025-08-27
Payer: MEDICARE

## (undated) DEVICE — PCH SURG STRL INST SLF/SEAL 7.5X13IN

## (undated) DEVICE — ELECTRODE,FOAM,MONITORING,SLD GEL,5 PK: Brand: MEDLINE

## (undated) DEVICE — KENDALL 100 SERIES FOAM MONITORING ELECTRODE- TEAR DROP SHAPE: Brand: KENDALL